# Patient Record
Sex: MALE | Race: WHITE | NOT HISPANIC OR LATINO | Employment: FULL TIME | URBAN - METROPOLITAN AREA
[De-identification: names, ages, dates, MRNs, and addresses within clinical notes are randomized per-mention and may not be internally consistent; named-entity substitution may affect disease eponyms.]

---

## 2018-01-25 ENCOUNTER — HOSPITAL ENCOUNTER (INPATIENT)
Facility: HOSPITAL | Age: 48
LOS: 55 days | Discharge: HOME HEALTH | DRG: 023 | End: 2018-03-21
Attending: EMERGENCY MEDICINE | Admitting: PSYCHIATRY & NEUROLOGY
Payer: COMMERCIAL

## 2018-01-25 DIAGNOSIS — I63.9 ISCHEMIC STROKE: ICD-10-CM

## 2018-01-25 DIAGNOSIS — R41.82 ALTERED MENTAL STATUS, UNSPECIFIED ALTERED MENTAL STATUS TYPE: ICD-10-CM

## 2018-01-25 DIAGNOSIS — G93.5 BRAIN COMPRESSION: ICD-10-CM

## 2018-01-25 DIAGNOSIS — R13.11 ORAL PHASE DYSPHAGIA: ICD-10-CM

## 2018-01-25 DIAGNOSIS — G47.33 OBSTRUCTIVE SLEEP APNEA: ICD-10-CM

## 2018-01-25 DIAGNOSIS — I10 ESSENTIAL HYPERTENSION: ICD-10-CM

## 2018-01-25 DIAGNOSIS — I63.412 EMBOLIC STROKE INVOLVING LEFT MIDDLE CEREBRAL ARTERY: ICD-10-CM

## 2018-01-25 DIAGNOSIS — K94.22 INFECTION OF PEG SITE: ICD-10-CM

## 2018-01-25 DIAGNOSIS — R41.82 ALTERED MENTAL STATUS: ICD-10-CM

## 2018-01-25 DIAGNOSIS — E87.3 METABOLIC ALKALOSIS: ICD-10-CM

## 2018-01-25 DIAGNOSIS — R06.03 RESPIRATORY DISTRESS, ACUTE: ICD-10-CM

## 2018-01-25 DIAGNOSIS — N39.0 URINARY TRACT INFECTION DUE TO KLEBSIELLA SPECIES: ICD-10-CM

## 2018-01-25 DIAGNOSIS — I63.9 STROKE: Primary | ICD-10-CM

## 2018-01-25 DIAGNOSIS — R06.02 SHORTNESS OF BREATH: ICD-10-CM

## 2018-01-25 DIAGNOSIS — E11.65 TYPE 2 DIABETES MELLITUS WITH HYPERGLYCEMIA, WITHOUT LONG-TERM CURRENT USE OF INSULIN: ICD-10-CM

## 2018-01-25 DIAGNOSIS — Z78.9 ON ENTERAL NUTRITION: ICD-10-CM

## 2018-01-25 DIAGNOSIS — G93.6 CYTOTOXIC CEREBRAL EDEMA: ICD-10-CM

## 2018-01-25 DIAGNOSIS — Z79.4 TYPE 2 DIABETES MELLITUS WITH HYPERGLYCEMIA, WITH LONG-TERM CURRENT USE OF INSULIN: ICD-10-CM

## 2018-01-25 DIAGNOSIS — E11.65 TYPE 2 DIABETES MELLITUS WITH HYPERGLYCEMIA, WITH LONG-TERM CURRENT USE OF INSULIN: ICD-10-CM

## 2018-01-25 DIAGNOSIS — R74.01 TRANSAMINITIS: ICD-10-CM

## 2018-01-25 DIAGNOSIS — J96.01 ACUTE RESPIRATORY FAILURE WITH HYPOXIA: ICD-10-CM

## 2018-01-25 DIAGNOSIS — B96.89 URINARY TRACT INFECTION DUE TO KLEBSIELLA SPECIES: ICD-10-CM

## 2018-01-25 LAB
ABO + RH BLD: NORMAL
ALBUMIN SERPL BCP-MCNC: 3.8 G/DL
ALBUMIN SERPL BCP-MCNC: 3.9 G/DL
ALLENS TEST: ABNORMAL
ALP SERPL-CCNC: 53 U/L
ALP SERPL-CCNC: 55 U/L
ALT SERPL W/O P-5'-P-CCNC: 34 U/L
ALT SERPL W/O P-5'-P-CCNC: 35 U/L
ANION GAP SERPL CALC-SCNC: 13 MMOL/L
ANION GAP SERPL CALC-SCNC: 15 MMOL/L
APTT BLDCRRT: <21 SEC
AST SERPL-CCNC: 17 U/L
AST SERPL-CCNC: 18 U/L
BASOPHILS # BLD AUTO: 0.03 K/UL
BASOPHILS # BLD AUTO: 0.04 K/UL
BASOPHILS NFR BLD: 0.3 %
BASOPHILS NFR BLD: 0.3 %
BILIRUB SERPL-MCNC: 0.7 MG/DL
BILIRUB SERPL-MCNC: 0.7 MG/DL
BLD GP AB SCN CELLS X3 SERPL QL: NORMAL
BNP SERPL-MCNC: <10 PG/ML
BUN SERPL-MCNC: 20 MG/DL
BUN SERPL-MCNC: 21 MG/DL
CALCIUM SERPL-MCNC: 9 MG/DL
CALCIUM SERPL-MCNC: 9.4 MG/DL
CHLORIDE SERPL-SCNC: 98 MMOL/L
CHLORIDE SERPL-SCNC: 98 MMOL/L
CHOLEST SERPL-MCNC: 200 MG/DL
CHOLEST/HDLC SERPL: 7.4 {RATIO}
CK MB SERPL-MCNC: 1.8 NG/ML
CK MB SERPL-RTO: 3.3 %
CK SERPL-CCNC: 54 U/L
CO2 SERPL-SCNC: 21 MMOL/L
CO2 SERPL-SCNC: 22 MMOL/L
CREAT SERPL-MCNC: 0.7 MG/DL (ref 0.5–1.4)
CREAT SERPL-MCNC: 1.2 MG/DL
CREAT SERPL-MCNC: 1.3 MG/DL
DELSYS: ABNORMAL
DIFFERENTIAL METHOD: ABNORMAL
DIFFERENTIAL METHOD: ABNORMAL
EOSINOPHIL # BLD AUTO: 0 K/UL
EOSINOPHIL # BLD AUTO: 0 K/UL
EOSINOPHIL NFR BLD: 0.2 %
EOSINOPHIL NFR BLD: 0.3 %
ERYTHROCYTE [DISTWIDTH] IN BLOOD BY AUTOMATED COUNT: 12.6 %
ERYTHROCYTE [DISTWIDTH] IN BLOOD BY AUTOMATED COUNT: 12.6 %
EST. GFR  (AFRICAN AMERICAN): >60 ML/MIN/1.73 M^2
EST. GFR  (AFRICAN AMERICAN): >60 ML/MIN/1.73 M^2
EST. GFR  (NON AFRICAN AMERICAN): >60 ML/MIN/1.73 M^2
EST. GFR  (NON AFRICAN AMERICAN): >60 ML/MIN/1.73 M^2
FIO2: 32
FLOW: 3
GLUCOSE SERPL-MCNC: 411 MG/DL
GLUCOSE SERPL-MCNC: 457 MG/DL
HCO3 UR-SCNC: 23 MMOL/L (ref 24–28)
HCT VFR BLD AUTO: 44.6 %
HCT VFR BLD AUTO: 46.8 %
HDLC SERPL-MCNC: 27 MG/DL
HDLC SERPL: 13.5 %
HGB BLD-MCNC: 15.9 G/DL
HGB BLD-MCNC: 16.8 G/DL
IMM GRANULOCYTES # BLD AUTO: 0.04 K/UL
IMM GRANULOCYTES # BLD AUTO: 0.07 K/UL
IMM GRANULOCYTES NFR BLD AUTO: 0.3 %
IMM GRANULOCYTES NFR BLD AUTO: 0.5 %
INR PPP: 1
INR PPP: 1
LDLC SERPL CALC-MCNC: ABNORMAL MG/DL
LYMPHOCYTES # BLD AUTO: 1.4 K/UL
LYMPHOCYTES # BLD AUTO: 2 K/UL
LYMPHOCYTES NFR BLD: 11.9 %
LYMPHOCYTES NFR BLD: 15.4 %
MAGNESIUM SERPL-MCNC: 1.8 MG/DL
MCH RBC QN AUTO: 29.7 PG
MCH RBC QN AUTO: 29.8 PG
MCHC RBC AUTO-ENTMCNC: 35.7 G/DL
MCHC RBC AUTO-ENTMCNC: 35.9 G/DL
MCV RBC AUTO: 83 FL
MCV RBC AUTO: 83 FL
MODE: ABNORMAL
MONOCYTES # BLD AUTO: 0.6 K/UL
MONOCYTES # BLD AUTO: 0.7 K/UL
MONOCYTES NFR BLD: 4.8 %
MONOCYTES NFR BLD: 5.1 %
NEUTROPHILS # BLD AUTO: 10 K/UL
NEUTROPHILS # BLD AUTO: 9.9 K/UL
NEUTROPHILS NFR BLD: 78.4 %
NEUTROPHILS NFR BLD: 82.5 %
NONHDLC SERPL-MCNC: 173 MG/DL
NRBC BLD-RTO: 0 /100 WBC
NRBC BLD-RTO: 0 /100 WBC
PCO2 BLDA: 40.7 MMHG (ref 35–45)
PH SMN: 7.36 [PH] (ref 7.35–7.45)
PHOSPHATE SERPL-MCNC: 3.8 MG/DL
PLATELET # BLD AUTO: 254 K/UL
PLATELET # BLD AUTO: 277 K/UL
PMV BLD AUTO: 10.3 FL
PMV BLD AUTO: 9.9 FL
PO2 BLDA: 91 MMHG (ref 80–100)
POC BE: -2 MMOL/L
POC PTINR: 1.1 (ref 0.9–1.2)
POC PTWBT: 12.6 SEC (ref 9.7–14.3)
POC SATURATED O2: 97 % (ref 95–100)
POC TCO2: 24 MMOL/L (ref 23–27)
POCT GLUCOSE: 276 MG/DL (ref 70–110)
POTASSIUM SERPL-SCNC: 4 MMOL/L
POTASSIUM SERPL-SCNC: 4 MMOL/L
PROT SERPL-MCNC: 7.2 G/DL
PROT SERPL-MCNC: 7.6 G/DL
PROTHROMBIN TIME: 10.1 SEC
PROTHROMBIN TIME: 10.3 SEC
RBC # BLD AUTO: 5.36 M/UL
RBC # BLD AUTO: 5.64 M/UL
SAMPLE: ABNORMAL
SAMPLE: NORMAL
SAMPLE: NORMAL
SITE: ABNORMAL
SODIUM SERPL-SCNC: 133 MMOL/L
SODIUM SERPL-SCNC: 134 MMOL/L
TRIGL SERPL-MCNC: 460 MG/DL
TROPONIN I SERPL DL<=0.01 NG/ML-MCNC: <0.006 NG/ML
TROPONIN I SERPL DL<=0.01 NG/ML-MCNC: <0.006 NG/ML
TSH SERPL DL<=0.005 MIU/L-ACNC: 1.87 UIU/ML
WBC # BLD AUTO: 11.99 K/UL
WBC # BLD AUTO: 12.83 K/UL

## 2018-01-25 PROCEDURE — 85025 COMPLETE CBC W/AUTO DIFF WBC: CPT

## 2018-01-25 PROCEDURE — 85730 THROMBOPLASTIN TIME PARTIAL: CPT

## 2018-01-25 PROCEDURE — 99285 EMERGENCY DEPT VISIT HI MDM: CPT | Mod: 25

## 2018-01-25 PROCEDURE — 80053 COMPREHEN METABOLIC PANEL: CPT | Mod: 91

## 2018-01-25 PROCEDURE — 63600175 PHARM REV CODE 636 W HCPCS: Performed by: NURSE PRACTITIONER

## 2018-01-25 PROCEDURE — 99900035 HC TECH TIME PER 15 MIN (STAT)

## 2018-01-25 PROCEDURE — 25000003 PHARM REV CODE 250: Performed by: NURSE PRACTITIONER

## 2018-01-25 PROCEDURE — 84443 ASSAY THYROID STIM HORMONE: CPT

## 2018-01-25 PROCEDURE — 25000003 PHARM REV CODE 250: Performed by: RADIOLOGY

## 2018-01-25 PROCEDURE — 83735 ASSAY OF MAGNESIUM: CPT

## 2018-01-25 PROCEDURE — 36415 COLL VENOUS BLD VENIPUNCTURE: CPT

## 2018-01-25 PROCEDURE — 84484 ASSAY OF TROPONIN QUANT: CPT | Mod: 91

## 2018-01-25 PROCEDURE — 93010 ELECTROCARDIOGRAM REPORT: CPT | Mod: ,,, | Performed by: INTERNAL MEDICINE

## 2018-01-25 PROCEDURE — 80053 COMPREHEN METABOLIC PANEL: CPT

## 2018-01-25 PROCEDURE — 96374 THER/PROPH/DIAG INJ IV PUSH: CPT

## 2018-01-25 PROCEDURE — 25500020 PHARM REV CODE 255: Performed by: EMERGENCY MEDICINE

## 2018-01-25 PROCEDURE — 80061 LIPID PANEL: CPT

## 2018-01-25 PROCEDURE — 83880 ASSAY OF NATRIURETIC PEPTIDE: CPT

## 2018-01-25 PROCEDURE — 82553 CREATINE MB FRACTION: CPT

## 2018-01-25 PROCEDURE — 037G3EZ DILATION OF INTRACRANIAL ARTERY WITH TWO INTRALUMINAL DEVICES, PERCUTANEOUS APPROACH: ICD-10-PCS | Performed by: PSYCHIATRY & NEUROLOGY

## 2018-01-25 PROCEDURE — 82550 ASSAY OF CK (CPK): CPT

## 2018-01-25 PROCEDURE — 20000000 HC ICU ROOM

## 2018-01-25 PROCEDURE — 36600 WITHDRAWAL OF ARTERIAL BLOOD: CPT

## 2018-01-25 PROCEDURE — B3141ZZ FLUOROSCOPY OF LEFT COMMON CAROTID ARTERY USING LOW OSMOLAR CONTRAST: ICD-10-PCS | Performed by: PSYCHIATRY & NEUROLOGY

## 2018-01-25 PROCEDURE — 99291 CRITICAL CARE FIRST HOUR: CPT | Mod: ,,, | Performed by: PSYCHIATRY & NEUROLOGY

## 2018-01-25 PROCEDURE — 85610 PROTHROMBIN TIME: CPT

## 2018-01-25 PROCEDURE — 86901 BLOOD TYPING SEROLOGIC RH(D): CPT

## 2018-01-25 PROCEDURE — 84100 ASSAY OF PHOSPHORUS: CPT

## 2018-01-25 PROCEDURE — 63600175 PHARM REV CODE 636 W HCPCS: Performed by: PSYCHIATRY & NEUROLOGY

## 2018-01-25 PROCEDURE — 82565 ASSAY OF CREATININE: CPT

## 2018-01-25 PROCEDURE — 81001 URINALYSIS AUTO W/SCOPE: CPT

## 2018-01-25 PROCEDURE — 99285 EMERGENCY DEPT VISIT HI MDM: CPT | Mod: ,,, | Performed by: EMERGENCY MEDICINE

## 2018-01-25 PROCEDURE — 83036 HEMOGLOBIN GLYCOSYLATED A1C: CPT

## 2018-01-25 PROCEDURE — 03CG3ZZ EXTIRPATION OF MATTER FROM INTRACRANIAL ARTERY, PERCUTANEOUS APPROACH: ICD-10-PCS | Performed by: PSYCHIATRY & NEUROLOGY

## 2018-01-25 PROCEDURE — 25000003 PHARM REV CODE 250: Performed by: PHYSICIAN ASSISTANT

## 2018-01-25 PROCEDURE — 82803 BLOOD GASES ANY COMBINATION: CPT

## 2018-01-25 PROCEDURE — 85610 PROTHROMBIN TIME: CPT | Mod: 91

## 2018-01-25 PROCEDURE — 84484 ASSAY OF TROPONIN QUANT: CPT

## 2018-01-25 PROCEDURE — B3171ZZ FLUOROSCOPY OF LEFT INTERNAL CAROTID ARTERY USING LOW OSMOLAR CONTRAST: ICD-10-PCS | Performed by: PSYCHIATRY & NEUROLOGY

## 2018-01-25 RX ORDER — CLOPIDOGREL BISULFATE 75 MG/1
75 TABLET ORAL DAILY
Status: DISCONTINUED | OUTPATIENT
Start: 2018-01-26 | End: 2018-02-09

## 2018-01-25 RX ORDER — SODIUM CHLORIDE 0.9 % (FLUSH) 0.9 %
3 SYRINGE (ML) INJECTION EVERY 8 HOURS
Status: DISCONTINUED | OUTPATIENT
Start: 2018-01-26 | End: 2018-03-21 | Stop reason: HOSPADM

## 2018-01-25 RX ORDER — CLOPIDOGREL 300 MG/1
600 TABLET, FILM COATED ORAL ONCE
Status: DISCONTINUED | OUTPATIENT
Start: 2018-01-25 | End: 2018-01-25

## 2018-01-25 RX ORDER — CLOPIDOGREL 300 MG/1
600 TABLET, FILM COATED ORAL ONCE
Status: COMPLETED | OUTPATIENT
Start: 2018-01-25 | End: 2018-01-25

## 2018-01-25 RX ORDER — LABETALOL HYDROCHLORIDE 5 MG/ML
10 INJECTION, SOLUTION INTRAVENOUS EVERY 4 HOURS PRN
Status: DISCONTINUED | OUTPATIENT
Start: 2018-01-25 | End: 2018-01-29

## 2018-01-25 RX ORDER — ASPIRIN 325 MG
325 TABLET ORAL DAILY
Status: DISCONTINUED | OUTPATIENT
Start: 2018-01-26 | End: 2018-02-05

## 2018-01-25 RX ORDER — INSULIN ASPART 100 [IU]/ML
1-10 INJECTION, SOLUTION INTRAVENOUS; SUBCUTANEOUS EVERY 6 HOURS PRN
Status: DISCONTINUED | OUTPATIENT
Start: 2018-01-25 | End: 2018-01-25

## 2018-01-25 RX ORDER — NICARDIPINE HYDROCHLORIDE 0.2 MG/ML
5 INJECTION INTRAVENOUS CONTINUOUS
Status: DISCONTINUED | OUTPATIENT
Start: 2018-01-25 | End: 2018-01-29

## 2018-01-25 RX ORDER — CLOPIDOGREL 300 MG/1
600 TABLET, FILM COATED ORAL ONCE
Status: DISCONTINUED | OUTPATIENT
Start: 2018-01-26 | End: 2018-01-25

## 2018-01-25 RX ORDER — CLOPIDOGREL BISULFATE 75 MG/1
75 TABLET ORAL DAILY
Status: DISCONTINUED | OUTPATIENT
Start: 2018-01-26 | End: 2018-01-25

## 2018-01-25 RX ORDER — GLUCAGON 1 MG
1 KIT INJECTION
Status: DISCONTINUED | OUTPATIENT
Start: 2018-01-25 | End: 2018-02-06 | Stop reason: SDUPTHER

## 2018-01-25 RX ORDER — SODIUM CHLORIDE 9 MG/ML
INJECTION, SOLUTION INTRAVENOUS CONTINUOUS
Status: DISCONTINUED | OUTPATIENT
Start: 2018-01-25 | End: 2018-01-29

## 2018-01-25 RX ORDER — SODIUM CHLORIDE 0.9 % (FLUSH) 0.9 %
5 SYRINGE (ML) INJECTION
Status: DISCONTINUED | OUTPATIENT
Start: 2018-01-25 | End: 2018-02-21

## 2018-01-25 RX ORDER — ASPIRIN 325 MG
325 TABLET ORAL DAILY
Status: DISCONTINUED | OUTPATIENT
Start: 2018-01-25 | End: 2018-01-25

## 2018-01-25 RX ORDER — ATORVASTATIN CALCIUM 20 MG/1
40 TABLET, FILM COATED ORAL DAILY
Status: DISCONTINUED | OUTPATIENT
Start: 2018-01-26 | End: 2018-01-26

## 2018-01-25 RX ORDER — AMOXICILLIN 250 MG
1 CAPSULE ORAL 2 TIMES DAILY
Status: DISCONTINUED | OUTPATIENT
Start: 2018-01-25 | End: 2018-01-29

## 2018-01-25 RX ORDER — FENTANYL CITRATE 50 UG/ML
50 INJECTION, SOLUTION INTRAMUSCULAR; INTRAVENOUS ONCE
Status: COMPLETED | OUTPATIENT
Start: 2018-01-25 | End: 2018-01-25

## 2018-01-25 RX ORDER — ACETAMINOPHEN 325 MG/1
650 TABLET ORAL EVERY 6 HOURS PRN
Status: DISCONTINUED | OUTPATIENT
Start: 2018-01-25 | End: 2018-01-26

## 2018-01-25 RX ORDER — ONDANSETRON 2 MG/ML
4 INJECTION INTRAMUSCULAR; INTRAVENOUS EVERY 8 HOURS PRN
Status: DISCONTINUED | OUTPATIENT
Start: 2018-01-25 | End: 2018-03-21 | Stop reason: HOSPADM

## 2018-01-25 RX ADMIN — IOHEXOL 100 ML: 350 INJECTION, SOLUTION INTRAVENOUS at 07:01

## 2018-01-25 RX ADMIN — SODIUM CHLORIDE: 0.9 INJECTION, SOLUTION INTRAVENOUS at 11:01

## 2018-01-25 RX ADMIN — ASPIRIN 325 MG ORAL TABLET 325 MG: 325 PILL ORAL at 10:01

## 2018-01-25 RX ADMIN — FENTANYL CITRATE 50 MCG: 50 INJECTION, SOLUTION INTRAMUSCULAR; INTRAVENOUS at 07:01

## 2018-01-25 RX ADMIN — CLOPIDOGREL BISULFATE 600 MG: 300 TABLET, FILM COATED ORAL at 11:01

## 2018-01-25 RX ADMIN — NICARDIPINE HYDROCHLORIDE 10 MG/HR: 0.2 INJECTION, SOLUTION INTRAVENOUS at 09:01

## 2018-01-25 RX ADMIN — INSULIN ASPART 3 UNITS: 100 INJECTION, SOLUTION INTRAVENOUS; SUBCUTANEOUS at 11:01

## 2018-01-25 RX ADMIN — STANDARDIZED SENNA CONCENTRATE AND DOCUSATE SODIUM 1 TABLET: 8.6; 5 TABLET, FILM COATED ORAL at 11:01

## 2018-01-25 RX ADMIN — NICARDIPINE HYDROCHLORIDE 15 MG/HR: 0.2 INJECTION, SOLUTION INTRAVENOUS at 10:01

## 2018-01-26 PROBLEM — R73.9 HYPERGLYCEMIA: Status: ACTIVE | Noted: 2018-01-26

## 2018-01-26 PROBLEM — I63.412 EMBOLIC STROKE INVOLVING LEFT MIDDLE CEREBRAL ARTERY: Status: ACTIVE | Noted: 2018-01-26

## 2018-01-26 PROBLEM — E78.5 HYPERLIPIDEMIA: Status: ACTIVE | Noted: 2018-01-26

## 2018-01-26 PROBLEM — I63.9 STROKE: Status: RESOLVED | Noted: 2018-01-25 | Resolved: 2018-01-26

## 2018-01-26 PROBLEM — E11.9 TYPE 2 DIABETES MELLITUS: Status: ACTIVE | Noted: 2018-01-26

## 2018-01-26 PROBLEM — I10 ESSENTIAL HYPERTENSION: Status: ACTIVE | Noted: 2018-01-26

## 2018-01-26 LAB
ALBUMIN SERPL BCP-MCNC: 3.6 G/DL
ALP SERPL-CCNC: 50 U/L
ALT SERPL W/O P-5'-P-CCNC: 32 U/L
ANION GAP SERPL CALC-SCNC: 11 MMOL/L
ANION GAP SERPL CALC-SCNC: 12 MMOL/L
AST SERPL-CCNC: 15 U/L
BACTERIA #/AREA URNS AUTO: NORMAL /HPF
BASOPHILS # BLD AUTO: 0.03 K/UL
BASOPHILS NFR BLD: 0.2 %
BILIRUB SERPL-MCNC: 0.9 MG/DL
BILIRUB UR QL STRIP: NEGATIVE
BUN SERPL-MCNC: 19 MG/DL
BUN SERPL-MCNC: 23 MG/DL
CALCIUM SERPL-MCNC: 8 MG/DL
CALCIUM SERPL-MCNC: 8.3 MG/DL
CHLORIDE SERPL-SCNC: 101 MMOL/L
CHLORIDE SERPL-SCNC: 107 MMOL/L
CLARITY UR REFRACT.AUTO: CLEAR
CO2 SERPL-SCNC: 21 MMOL/L
CO2 SERPL-SCNC: 24 MMOL/L
COLOR UR AUTO: YELLOW
CREAT SERPL-MCNC: 1 MG/DL
CREAT SERPL-MCNC: 1.4 MG/DL
DIASTOLIC DYSFUNCTION: NO
DIFFERENTIAL METHOD: ABNORMAL
EOSINOPHIL # BLD AUTO: 0 K/UL
EOSINOPHIL NFR BLD: 0.1 %
ERYTHROCYTE [DISTWIDTH] IN BLOOD BY AUTOMATED COUNT: 12.6 %
EST. GFR  (AFRICAN AMERICAN): >60 ML/MIN/1.73 M^2
EST. GFR  (AFRICAN AMERICAN): >60 ML/MIN/1.73 M^2
EST. GFR  (NON AFRICAN AMERICAN): 59 ML/MIN/1.73 M^2
EST. GFR  (NON AFRICAN AMERICAN): >60 ML/MIN/1.73 M^2
ESTIMATED AVG GLUCOSE: 240 MG/DL
GLUCOSE SERPL-MCNC: 155 MG/DL
GLUCOSE SERPL-MCNC: 478 MG/DL
GLUCOSE UR QL STRIP: ABNORMAL
HBA1C MFR BLD HPLC: 10 %
HCT VFR BLD AUTO: 43.1 %
HGB BLD-MCNC: 15.5 G/DL
HGB UR QL STRIP: NEGATIVE
IMM GRANULOCYTES # BLD AUTO: 0.07 K/UL
IMM GRANULOCYTES NFR BLD AUTO: 0.5 %
INR PPP: 1
KETONES UR QL STRIP: ABNORMAL
LEUKOCYTE ESTERASE UR QL STRIP: NEGATIVE
LYMPHOCYTES # BLD AUTO: 1.1 K/UL
LYMPHOCYTES NFR BLD: 8.5 %
MAGNESIUM SERPL-MCNC: 1.8 MG/DL
MCH RBC QN AUTO: 29.8 PG
MCHC RBC AUTO-ENTMCNC: 36 G/DL
MCV RBC AUTO: 83 FL
MICROSCOPIC COMMENT: NORMAL
MONOCYTES # BLD AUTO: 0.6 K/UL
MONOCYTES NFR BLD: 4.2 %
NEUTROPHILS # BLD AUTO: 11.2 K/UL
NEUTROPHILS NFR BLD: 86.5 %
NITRITE UR QL STRIP: NEGATIVE
NON-SQ EPI CELLS #/AREA URNS AUTO: 0 /HPF
NRBC BLD-RTO: 0 /100 WBC
PH UR STRIP: 5 [PH] (ref 5–8)
PHOSPHATE SERPL-MCNC: 3 MG/DL
PLATELET # BLD AUTO: 255 K/UL
PMV BLD AUTO: 9.6 FL
POCT GLUCOSE: 153 MG/DL (ref 70–110)
POCT GLUCOSE: 181 MG/DL (ref 70–110)
POCT GLUCOSE: 215 MG/DL (ref 70–110)
POCT GLUCOSE: 254 MG/DL (ref 70–110)
POCT GLUCOSE: 264 MG/DL (ref 70–110)
POCT GLUCOSE: 320 MG/DL (ref 70–110)
POCT GLUCOSE: 326 MG/DL (ref 70–110)
POCT GLUCOSE: 329 MG/DL (ref 70–110)
POCT GLUCOSE: 329 MG/DL (ref 70–110)
POCT GLUCOSE: 360 MG/DL (ref 70–110)
POTASSIUM SERPL-SCNC: 3.6 MMOL/L
POTASSIUM SERPL-SCNC: 4.4 MMOL/L
PROT SERPL-MCNC: 7 G/DL
PROT UR QL STRIP: NEGATIVE
PROTHROMBIN TIME: 10.3 SEC
RBC # BLD AUTO: 5.21 M/UL
RBC #/AREA URNS AUTO: 0 /HPF (ref 0–4)
RETIRED EF AND QEF - SEE NOTES: 68 (ref 55–65)
SODIUM SERPL-SCNC: 134 MMOL/L
SODIUM SERPL-SCNC: 142 MMOL/L
SP GR UR STRIP: >=1.03 (ref 1–1.03)
URN SPEC COLLECT METH UR: ABNORMAL
UROBILINOGEN UR STRIP-ACNC: NEGATIVE EU/DL
WBC # BLD AUTO: 12.96 K/UL
YEAST UR QL AUTO: NORMAL

## 2018-01-26 PROCEDURE — 36620 INSERTION CATHETER ARTERY: CPT | Mod: ,,, | Performed by: NURSE PRACTITIONER

## 2018-01-26 PROCEDURE — 27000221 HC OXYGEN, UP TO 24 HOURS

## 2018-01-26 PROCEDURE — 85025 COMPLETE CBC W/AUTO DIFF WBC: CPT

## 2018-01-26 PROCEDURE — 85610 PROTHROMBIN TIME: CPT

## 2018-01-26 PROCEDURE — 94660 CPAP INITIATION&MGMT: CPT

## 2018-01-26 PROCEDURE — 25000003 PHARM REV CODE 250: Performed by: PHYSICIAN ASSISTANT

## 2018-01-26 PROCEDURE — 93005 ELECTROCARDIOGRAM TRACING: CPT

## 2018-01-26 PROCEDURE — 97163 PT EVAL HIGH COMPLEX 45 MIN: CPT

## 2018-01-26 PROCEDURE — 95951 PR EEG MONITORING/VIDEORECORD: CPT | Mod: 26,,, | Performed by: PSYCHIATRY & NEUROLOGY

## 2018-01-26 PROCEDURE — 93306 TTE W/DOPPLER COMPLETE: CPT | Mod: 26,,, | Performed by: INTERNAL MEDICINE

## 2018-01-26 PROCEDURE — 80048 BASIC METABOLIC PNL TOTAL CA: CPT

## 2018-01-26 PROCEDURE — 83735 ASSAY OF MAGNESIUM: CPT

## 2018-01-26 PROCEDURE — 36620 INSERTION CATHETER ARTERY: CPT

## 2018-01-26 PROCEDURE — 99223 1ST HOSP IP/OBS HIGH 75: CPT | Mod: 25,,, | Performed by: NURSE PRACTITIONER

## 2018-01-26 PROCEDURE — 84100 ASSAY OF PHOSPHORUS: CPT

## 2018-01-26 PROCEDURE — 97165 OT EVAL LOW COMPLEX 30 MIN: CPT

## 2018-01-26 PROCEDURE — 97530 THERAPEUTIC ACTIVITIES: CPT

## 2018-01-26 PROCEDURE — 93306 TTE W/DOPPLER COMPLETE: CPT

## 2018-01-26 PROCEDURE — 92610 EVALUATE SWALLOWING FUNCTION: CPT

## 2018-01-26 PROCEDURE — 99900035 HC TECH TIME PER 15 MIN (STAT)

## 2018-01-26 PROCEDURE — 20000000 HC ICU ROOM

## 2018-01-26 PROCEDURE — 63600175 PHARM REV CODE 636 W HCPCS: Performed by: NURSE PRACTITIONER

## 2018-01-26 PROCEDURE — 95951 HC EEG MONITORING/VIDEO RECORD: CPT

## 2018-01-26 PROCEDURE — 80053 COMPREHEN METABOLIC PANEL: CPT

## 2018-01-26 PROCEDURE — 99223 1ST HOSP IP/OBS HIGH 75: CPT | Mod: ,,, | Performed by: NEUROLOGICAL SURGERY

## 2018-01-26 PROCEDURE — 99233 SBSQ HOSP IP/OBS HIGH 50: CPT | Mod: ,,, | Performed by: PSYCHIATRY & NEUROLOGY

## 2018-01-26 PROCEDURE — 25000003 PHARM REV CODE 250: Performed by: NURSE PRACTITIONER

## 2018-01-26 PROCEDURE — 93010 ELECTROCARDIOGRAM REPORT: CPT | Mod: ,,, | Performed by: INTERNAL MEDICINE

## 2018-01-26 PROCEDURE — A4216 STERILE WATER/SALINE, 10 ML: HCPCS | Performed by: NURSE PRACTITIONER

## 2018-01-26 RX ORDER — ACETAMINOPHEN 325 MG/1
650 TABLET ORAL EVERY 6 HOURS
Status: DISCONTINUED | OUTPATIENT
Start: 2018-01-27 | End: 2018-02-05

## 2018-01-26 RX ORDER — ATORVASTATIN CALCIUM 20 MG/1
80 TABLET, FILM COATED ORAL DAILY
Status: DISCONTINUED | OUTPATIENT
Start: 2018-01-26 | End: 2018-03-05

## 2018-01-26 RX ADMIN — LABETALOL HYDROCHLORIDE 10 MG: 5 INJECTION, SOLUTION INTRAVENOUS at 02:01

## 2018-01-26 RX ADMIN — ACETAMINOPHEN 650 MG: 325 TABLET, FILM COATED ORAL at 07:01

## 2018-01-26 RX ADMIN — SODIUM CHLORIDE 4 UNITS/HR: 9 INJECTION, SOLUTION INTRAVENOUS at 12:01

## 2018-01-26 RX ADMIN — NICARDIPINE HYDROCHLORIDE 12.5 MG/HR: 0.2 INJECTION, SOLUTION INTRAVENOUS at 04:01

## 2018-01-26 RX ADMIN — Medication 3 ML: at 02:01

## 2018-01-26 RX ADMIN — ATORVASTATIN CALCIUM 80 MG: 20 TABLET, FILM COATED ORAL at 10:01

## 2018-01-26 RX ADMIN — ACETAMINOPHEN 650 MG: 325 TABLET, FILM COATED ORAL at 11:01

## 2018-01-26 RX ADMIN — NICARDIPINE HYDROCHLORIDE 2.5 MG/HR: 0.2 INJECTION, SOLUTION INTRAVENOUS at 10:01

## 2018-01-26 RX ADMIN — CLOPIDOGREL 75 MG: 75 TABLET, FILM COATED ORAL at 10:01

## 2018-01-26 RX ADMIN — NICARDIPINE HYDROCHLORIDE 12.5 MG/HR: 0.2 INJECTION, SOLUTION INTRAVENOUS at 12:01

## 2018-01-26 RX ADMIN — SODIUM CHLORIDE: 0.9 INJECTION, SOLUTION INTRAVENOUS at 01:01

## 2018-01-26 RX ADMIN — STANDARDIZED SENNA CONCENTRATE AND DOCUSATE SODIUM 1 TABLET: 8.6; 5 TABLET, FILM COATED ORAL at 10:01

## 2018-01-26 RX ADMIN — Medication 3 ML: at 10:01

## 2018-01-26 RX ADMIN — STANDARDIZED SENNA CONCENTRATE AND DOCUSATE SODIUM 1 TABLET: 8.6; 5 TABLET, FILM COATED ORAL at 08:01

## 2018-01-26 RX ADMIN — ASPIRIN 325 MG ORAL TABLET 325 MG: 325 PILL ORAL at 10:01

## 2018-01-26 RX ADMIN — SODIUM CHLORIDE 2.4 UNITS/HR: 9 INJECTION, SOLUTION INTRAVENOUS at 10:01

## 2018-01-26 RX ADMIN — NICARDIPINE HYDROCHLORIDE 2.5 MG/HR: 0.2 INJECTION, SOLUTION INTRAVENOUS at 05:01

## 2018-01-26 NOTE — ASSESSMENT & PLAN NOTE
Patient is a 47 year old male who walked into [facility] and was acting abnormal.  EMS was called and brought to the ED for concern of L MCA syndrome. Not tpa candidate due to unknown last known normal.  Patient went to IR for thrombectomy of L M1 occlusion seen on CTA MP.  TICI2C reperfusion and angioplasty.      Antithrombotics for secondary stroke prevention:start asa 325mg qd and plavix 75mg qd     Statins for secondary stroke prevention and hyperlipidemia, if present: LDL     Aggressive risk factor modification: Obesity     Rehab efforts: Occupational Therapy, PT/OT/SLP to evaluate and treat    Diagnostics ordered/pending: MRI head without contrast to assess brain parenchyma, TTE to assess cardiac function/status , LDL, HgA1C     VTE prophylaxis: Heparin 5000 units SQ every 8 hours after repeat scan     BP parameters: Infarct: Post sucessful thrombectomy, SBP <140

## 2018-01-26 NOTE — PLAN OF CARE
Problem: Patient Care Overview  Goal: Plan of Care Review  Outcome: Ongoing (interventions implemented as appropriate)  VS and assessment per flow sheet, pt taken for MRI, tolerated well, exam unchanged and stable, EEG in place, remains on insulin gtt, patient progressing towards goal as tolerated. Plan of care reviewed with Todd Quevedo and wife at 1600, all concerns addressed. Will continue to monitor.

## 2018-01-26 NOTE — PROGRESS NOTES
Per hospital HgbA1c policy, pt identified for education on diabetic diet. HgbA1c 10.0    Pt remains NPO, failed WOODY, globally aphasic with NG tube in place.  Education inappropriate at this time 2/2 reasons stated prior.  Please consult should nutrition support or education be warranted at any time during admission.    Thanks,  TAMARA Austin, LDN  [id number]

## 2018-01-26 NOTE — PLAN OF CARE
Problem: Physical Therapy Goal  Goal: Physical Therapy Goal  Outcome: Ongoing (interventions implemented as appropriate)  Initial eval completed.  Results, POC, and therapy recommendations discussed with patient and wife.  Complete evaluation documentation to follow.     Recommendations pending OOB assessment.     Madina Luu, PT  1/26/2018  [phone] (pager)

## 2018-01-26 NOTE — ED NOTES
Pt presents to ED via EMS. It is stated pt was found in a restaurant unresponsive earlier this evening. No hx is able to be found at this time. Pt being sent straight to CT scan    Unable to complete admission assessments per pt's condition.       LOC:   · The pt is awake, alert, and nonverbal  APPEARANCE:   · Pt resting comfortably and in no acute distress; clean and well groomed  SKIN:   · Skin is warm and dry; color consistent with ethnicity; pt has normal skin turgor and moist mucus membranes; no obvious trauma seen  MUSCULOSKELETAL:   · Pt moving all extremities; absent of obvious swelling  RESPIRATORY:   · Airway is open and patent; respirations are spontaneous; normal effort and rate noted; absent of accessory-muscle use  CARDIAC:   · normal heart sounds auscultated; Pt has no peripheral edema noted; capillary refill < 3 seconds  ABDOMEN:   · Soft; bowel sounds present x 4  NEUROLOGIC:   · PERRL, 3mm bilaterally, eyes open spontaneously; pt follows commands; facial expression symmetrical

## 2018-01-26 NOTE — ASSESSMENT & PLAN NOTE
S/P L M1 thrombectomy  --Continue Neuro checks q 1hr  -- Vascular Neurology  following  -- CT MP-Short segment occlusion of left M1   -- Repeat CTH-No detrimental change  -- SBP goal <140  -- PT/OT/Speech

## 2018-01-26 NOTE — HPI
Patient is a 47 year old male who walked into [facility] and was acting abnormal.  EMS was called and brought to the ED for concern of L MCA syndrome.  He had left gaze deviation, right sided hemianopsia, right sided weakness and global aphasia.  CT head showed no hemorrhage and CTA MP showed L M1 occlusion.  Not a candidate for IV-tpa because unknown last normal.  He was than emergently taken for thrombectomy.          EMS reported glucose 309 and EKG abnormalities.

## 2018-01-26 NOTE — SUBJECTIVE & OBJECTIVE
No prescriptions prior to admission.       Review of patient's allergies indicates:  No Known Allergies    History reviewed. No pertinent past medical history.  History reviewed. No pertinent surgical history.  Family History     None        Social History Main Topics    Smoking status: Unknown If Ever Smoked    Smokeless tobacco: Not on file    Alcohol use Not on file    Drug use: Unknown    Sexual activity: Not on file     Review of Systems   Unable to obtain secondary to mental status  Objective:     Weight: 113.8 kg (250 lb 14.1 oz)  Body mass index is 36 kg/m².  Vital Signs (Most Recent):  Temp: 99.6 °F (37.6 °C) (01/26/18 1103)  Pulse: 95 (01/26/18 1521)  Resp: (!) 9 (01/26/18 1521)  BP: (!) 120/59 (01/26/18 1200)  SpO2: (!) 94 % (01/26/18 1521) Vital Signs (24h Range):  Temp:  [97.8 °F (36.6 °C)-99.6 °F (37.6 °C)] 99.6 °F (37.6 °C)  Pulse:  [] 95  Resp:  [9-35] 9  SpO2:  [92 %-98 %] 94 %  BP: (115-217)/() 120/59  Arterial Line BP: (103-121)/(58-68) 121/62       Date 01/26/18 0700 - 01/27/18 0659   Shift 1762-0152 4705-6479 4072-7905 24 Hour Total   I  N  T  A  K  E   I.V.  (mL/kg) 525  (4.6)   525  (4.6)    NG/GT 60   60    Shift Total  (mL/kg) 585  (5.1)   585  (5.1)   O  U  T  P  U  T   Urine  (mL/kg/hr) 300  (0.3)   300    Shift Total  (mL/kg) 300  (2.6)   300  (2.6)   Weight (kg) 113.8 113.8 113.8 113.8              Oxygen Concentration (%):  [30] 30         NG/OG Tube 01/25/18 2325 nasogastric Right nostril (Active)   Placement Check placement verified by x-ray 1/26/2018  3:00 AM   Tolerance no signs/symptoms of discomfort 1/26/2018  3:00 AM   Securement taped to commercial device 1/26/2018  3:00 AM   Clamp Status/Tolerance clamped;no residual;no emesis;no abdominal distention 1/26/2018  3:00 AM   Intake (mL) 60 mL 1/26/2018 10:00 AM       Male External Urinary Catheter 01/25/18 2200 Small (Active)   Collection Container Urimeter 1/26/2018  3:00 AM   Securement Method secured to top of  thigh w/ tape 1/26/2018  3:00 AM   Skin no redness;no breakdown 1/26/2018  3:00 AM   Tolerance no signs/symptoms of discomfort 1/26/2018  3:00 AM   Output (mL) 125 mL 1/26/2018 11:00 AM   Catheter Change Date 01/26/18 1/26/2018  3:00 AM   Catheter Change Time 0300 1/26/2018  3:00 AM       Neurosurgery Physical Exam  Vital signs: reviewed above.   Constitutional: well-developed,   Cardiovascular: regular rate and rhythm  Resp: normal respirations on NC  Abdomen: soft, non-distended, not tender to palpation  Pulses: palpable distal pulses   Skin: warm, dry and intact, no rashes  Neurological  GCS unable to accurately assess  Mental Status: lethargic  non verbal on exam, does not follow commands  Head: normocephalic, atraumatic  PERRL  Facial expression symmetric  Moves left extremities spontaneously, right side withdraws to painful stimuli  Unable to assess drift and dysmetria d/t mental status   Corneal and cough/gag intact      Significant Labs:    Recent Labs  Lab 01/25/18 2218 01/25/18 2303 01/26/18  0306   * 457* 478*   * 133* 134*   K 4.0 4.0 4.4   CL 98 98 101   CO2 21* 22* 21*   BUN 20 21* 23*   CREATININE 1.2 1.3 1.4   CALCIUM 9.4 9.0 8.3*   MG  --  1.8 1.8       Recent Labs  Lab 01/25/18 2218 01/25/18 2303 01/26/18  0306   WBC 12.83* 11.99 12.96*   HGB 16.8 15.9 15.5   HCT 46.8 44.6 43.1    254 255       Recent Labs  Lab 01/25/18 2218 01/25/18 2303 01/26/18  0306   INR 1.0 1.0 1.0   APTT <21.0  --   --      Microbiology Results (last 7 days)     ** No results found for the last 168 hours. **          Significant Diagnostics:  CT: Ct Head Without Contrast  Result Date: 1/26/2018  No detrimental change. Evolving subtle left MCA territory infarct.   Electronically signed by resident: DARCY CURRAN MD Date: 01/26/18 Time:00:50 A    MRI: Mri Brain Without Contrast  Result Date: 1/26/2018  1. Large acute left MCA territory infarction as above.  No associated hemorrhage or significant mass  effect at this time. 2. Mild chronic ischemic changes.   Electronically signed by resident: HUNTER CASTRO Date: 01/26/18 Time:14:07

## 2018-01-26 NOTE — ASSESSMENT & PLAN NOTE
47 y.o. male with history of HTN and DM who [place] [facility] [place] for concern of L MCA syndrome 1/25/18 AM. He is s/p thrombectomy of L M1 occlusion. CT MP revealed short segment occlusion of left M1.  Patient went to IR for thrombectomy of L M1.  TICI2C reperfusion and angioplasty. Neurosurgery consulted for hemicrani watch. Patient currently on plavix.    -Imaging reviewed with Dr. Ingram  -Medical management per NCC.  -Neuro checks hourly  -Please notify NSR if worsening neuro exam     Discussed with Dr. Ingram

## 2018-01-26 NOTE — PT/OT/SLP EVAL
Physical Therapy Evaluation    Patient Name:  Todd Quevedo   MRN:  [id number]    Recommendations:     Discharge Recommendations:   (to be determined with OOB assessment )   Discharge Equipment Recommendations:  (TBD)   Barriers to discharge: Decreased caregiver support and high burden of care      Plan:     During this hospitalization, patient to be seen 4 x/week to address the above listed problems via gait training, therapeutic activities, therapeutic exercises, neuromuscular re-education  · Plan of Care Expires:  02/26/18   Plan of Care Reviewed with: spouse    Subjective     Communicated with Rn prior to session.  Patient found supine upon PT entry to room, agreeable to evaluation.      Chief Complaint: patient nonverbal at this time  Patient comments/goals: set by therapist  Pain/Comfort:  Pain Rating 1:  (unable to express)    Patients cultural, spiritual, Yarsani conflicts given the current situation:      Living Environment:  Pt lives with his wife near [place], [place] in a 1 story home with no step to enter.  Pt was independent, working and driving prior to admit.  His wife works nights at a hospital.      Patient has the following equipment: none.  DME owned (not currently used): none.  Upon discharge, family assistance will be limited.     Objective:     Patient found with: arterial line, blood pressure cuff, oxygen, pulse ox (continuous), restraints, telemetry, SCD, boss catheter     General Precautions: Standard, aphasia, aspiration, fall     PHYSICAL EXAMINATION  Cognitive Function:  - Oriented to: somnolent, did not wake up fully during evaluation   - Level of Alertness: somnolent throughout with minimal arousal even during sitting EOB  - Follows Commands/attention: Inattentive  - Communication: global aphasia  - Safety awareness/insight to disability: impaired  Musculoskeletal System  Upper Extremities:   Strength: LUE moves spontaneously during session, no visualized movement of RUE  Lower  Extremities:  ROM: WFL passive  Strength: LLE moves spontaneously during eval, no visualized movement of RLE  Integumentary System: Visible skin intact  Neuromuscular System:  - Sensation: unknown; patient withdrew to noxious stimuli bilaterally   - Coordination: unable to test  Posture and gross symmetry: total assist to sit at this time due to somnolence    BALANCE:  Sitting:  - Static: 0: Needs MAXIMAL assist to maintain sitting without back support; total assist  Patient attempted to maintain head in neutral position after therapist assisted for initial postural support    FUNCTIONAL MOBILITY ASSESSMENT:  Bed Mobility: performed with HOB flat  - Rolling/Turning R: total assist x2   - Rolling/Turning L: total assist x2   - Supine <> sit: total assist x2   - Scooting EOB: total assist x2      Transfers: NT due to poor sitting balance and somnolence    THERAPEUTIC ACTIVITIES AND EXERCISES:  Attempts were made to maximize patient participation by sitting EOB, sternal rub, and maximal verbal/tactile cues.  Despite this level of stimulation, the patient did not open eyes throughout the session.  He roused slightly and was able to hold up his head in sitting for a short period of time, but never fully awakened.  Pt was returned to supine after these attempts and repositioned in bed with total assistance using draw sheet.     Therapist educated patient's wife on the role of PT, POC..  Therapist discussed the patients current mobility status and level of assistance with patient's wife and RN.  Therapist answered questions to patient/familys satisfaction within scope of practice.  White board updated to reflect current level of assistance.       Patient left supine with all lines intact, call button in reach, restraints reapplied at end of session and rn notified.     AM-PAC 6 CLICK MOBILITY  Total Score:6     GOALS:    Physical Therapy Goals        Problem: Physical Therapy Goal    Goal Priority Disciplines Outcome  Goal Variances Interventions   Physical Therapy Goal     PT/OT, PT Ongoing (interventions implemented as appropriate)     Description:    Goals to be met by 2/9/2018    1. Pt will perform rolling to the R and L with moderate assistance.   2. Pt will perform supine to sit from both sides of the bed with moderate assistance.  3. Pt will perform sit to supine with moderate assistance.  4. Pt will sit EOB x 10 minutes with min assistance and no LOB to prepare for functional tasks in sitting.   5. Pt will perform sit to stand transfers with max assistance.    6. Pt will perform bed <> chair transfers with max assistance.  7. Pt will perform gait x 10 feet with max assistance.                    Assessment:     Todd Quevedo is a 47 y.o. male admitted with a medical diagnosis of Embolic stroke involving left middle cerebral artery s/p thrombectomy. Patient was independent prior to admit.   He now presents with the following impairments/functional limitations:  weakness, impaired self care skills, impaired balance, decreased safety awareness, impaired endurance, impaired functional mobilty, decreased upper extremity function, gait instability, impaired cognition, decreased lower extremity function.  Initial evaluation was severely limited due to patient's level of somnolence.  He was unable to open his eyes or remain alert to actively participate in functional mobility tasks.  He would benefit from additional skilled PT services for ongoing assessment of functional abilities, progressive mobilization to improve level of alertness, and d/c planning.  Discharge recommendations not given at this time due to limited initial evaluation.      Rehab Prognosis:  Fair; patient would benefit from acute skilled PT services to address these deficits and reach maximum level of function.      Recent Surgery: * No surgery found *        History:     History reviewed. No pertinent past medical history.    History reviewed. No pertinent  surgical history.    Clinical Decision Making:     Comorbidities and personal factors that affect the PT plan of care or the patient's ability to participate or progress with therapy:  1. Obesity   2. Global aphasia  3. Unable to arouse      Clinical Presentation: Unstable/unpredictable characteristics  Pt evaluated in the ICU with continuous monitoring of vitals and significant impairment in level of alertness.     Level of Complexity:   High Complexity - at least 3 or more personal factors or comorbidities that impact the plan of care; examination addressing 4 or more body structures and functions, activity limitations, and/or participation restrictions; clinical presentation with unstable or unpredictable characteristics      Time Tracking:     PT Received On: 01/26/18  PT Start Time: 1000     PT Stop Time: 1038  PT Total Time (min): 38 min     Billable Minutes: Evaluation 38      Madina Luu, PT  01/26/2018

## 2018-01-26 NOTE — HOSPITAL COURSE
01/25/18:  Brought in for aphasia, RSW with L gaze preferance. LKN unknown, not tPA candidate. Went to IR for angiogram and stent.  01/29/18:  Off Cardene, remains on Insulin gtt. Concern for sepsis, respiratory source. Imaging with mass effect and some brain compression; maycol crani watch.  01/30/18:  EEG consistent with focal L slowing 2/2 lesion and mild generalized slowing, no seizures. CT head stable, no hemorrhagic conversion  02/01/18:  Emergent intubation likely due to upper airway obstruction per NCC.    02/02/18:  Pt off Precedex, moving left side spontaneously and opening eyes. Intubated, on spontaneous today. NCC giving steroids in hopes to extubate tomorrow.  02/03/18:  NAEON, intubated, not responsive to verbal stimuli, withdraws from pain on LUE, SBP ~135-230. Continued on insulin gtt, SQH for DVT ppx, and captopril.   02/16/18:  Few changes on exam, continues to have significant RUE/RLE weakness with global aphasia.  Family member at bedside noted attempt to communicate with her.  02/18/18:  Pt largely unchanged on exam this am.  No family member present during am rounds.  02/19/18:  Tolerating facemask. PEG by IR this afternoon.  02/20/18:  s/p PEG. O2 % on 5L NC, still requiring frequent suctioning. Primary team considering transfer to Medicine tomorrow.  02/21/18:  Pt sating % on 3L NC. Restarted DAPT, including . Plans for step down to stroke service.  02/22/18:  Pt with [facility] but not service-connected so unable to be placed at SNF.  Working on insurance coverage of at least HHC therapy.  02/23/18:  Increased detemir to 36 U bid.  [facility] with [facility] pending completion of paperwork by Stroke team and pt's spouse--in process.  2/24/18 - NAEON. Pts WBC mildly elevated 13.01, pt is afebrile. Will continue to monitor. Endocrine consulted - Recommend levemir 40 units daily to cover basal needs (using ~0.7u/kg); Start novolog 6 units q4hr to cover TF; Moderate correction  q4hr. SNF placement pending.  2/25/18 -  Pt WBC increased overnight, HR range , and temp 99.2. Blood cultures ordered and drawn. UA/UC ordered. Nursing staff called a rapid response this AM due to pts tachycardia, fever, and decreased O2 sats. Arrived to room @ 0824. 1 liter of NS ordered. STAT CXR ordered. Tylenol given. ABG completed with no significant abnormal results. Respiratory suctioned pt and pt repositioned to help with breathing. Pt O2 sats improved on venti mask, temp decreased, and BP remained normotensive. Started on  vanc and zosyn. Pt appears more comfortable and no decline in neuro status. Pt family updated.   2/26/18 - afebrile overnight and leukocytosis improving with BS antibiotics. CT abdomen yesterday noted seroma/hematoma on left rectus abdominus. IR consulted for culture +/- drain.  02/28/2018 s/p aspiration of left rectus collection adjacent to the PEG tube.  Culture sent to lab, pending.  Remains afebrile.  Currently on 4L NC O2.   3/1/18: NAEON.   3/2: Pt afebrile, WBC WNL. Now sating well on 1L NC. Pt emotional on exam, family not interested in SSRI at this time. CM attempting to get [place] so he can then transfer to [facility]. Increased rate of TFs.  3/3: NAEON. Pending placement. Will monitor Na level tomorrow.  3/4: stable, no new issues.  3/5/18 - sleepy today but no events overnight   3/6/18 - sister concerned due to coughing post speech therapy - discussed with speech team and sister   3/7/18 - No change in exam.  [facility] to [facility] - pending approval.  Continue to monitor liver enzymes.  3/8/18 - no acute events overnight. Vitals within normal. Pending placement   03/09/2018 NAEON  3/10/18 starting baclofen for spasticity, fluoxetine for depression and therapy motivation  3/11/18 OT to make splint for RUE to prevent contractures, plans for discharge home with family training planned for Monday  3/15/18 CM working with [facility] to get home care needs in  place. Possible discharge tomorrow with family. NAEON. Liver enzymes normalized, but sister refuses to restart statin due to potential side effects.   3/16/18 NAEON. Still awaiting home health equipment before patient is able to go home.   3-17-18 still awaiting mattress for bed to be able to go home to [place]  3/20/18 mattress to be delivered to patient's home in [place] today, nurse noted cloudy urine, UA pending  3-21-18 UA negative, ready for discharge, will not have f/u with stroke service as going to [place]

## 2018-01-26 NOTE — H&P
[facility]  Neurocritical Care  History & Physical    Admit Date: 1/25/2018  Service Date: 01/26/2018  Length of Stay: 1    Subjective:     Chief Complaint: Embolic stroke involving left middle cerebral artery    History of Present Illness: The patient is a 47 year old male with no known PMHx admitted to [facility]   s/p thrombectomy of L M1 occlusion. Per chart review, he walked into [place] and was acting abnormal.  EMS was called and brought to the ED for concern of L MCA syndrome. CT MP revealed short segment occlusion of left M1.  Patient went to IR for thrombectomy of L M1.  TICI2C reperfusion and angioplasty. Patient admitted to [facility] for close monitoring and higher level of care.   History obtained from chart review only                Review of Systems  Unable to obtain       Objective:     Vitals:  Temp: 98.9 °F (37.2 °C)  Pulse: 108  Rhythm: sinus tachycardia  BP: (!) 121/58  MAP (mmHg): 78  Resp: 17  SpO2: 98 %    Temp  Min: 97.8 °F (36.6 °C)  Max: 98.9 °F (37.2 °C)  Pulse  Min: 79  Max: 134  BP  Min: 118/60  Max: 217/119  MAP (mmHg)  Min: 78  Max: 155  Resp  Min: 14  Max: 35  SpO2  Min: 93 %  Max: 98 %    01/25 0701 - 01/26 0700  In: 213.8 [I.V.:213.8]  Out: 525 [Urine:525]   Unmeasured Output  Urine Occurrence: 1       Physical Exam    Physical Exam:  GA: obese, well-developed, well-nourished  HEENT: No scleral icterus or JVD.   Pulmonary: on Bipap, Coarse to auscultation throughout all lung fields-Anterior  Cardiac: RRR S1 & S2 w/o rubs/murmurs/gallops.   Abdominal: Bowel sounds present x 4. No appreciable hepatosplenomegaly.  Skin: No jaundice, rashes, or visible lesions.  Neuro:  --GCS: E1V1 M5  --Mental Status: sleepy, requires frequent painful stimulation to open eyes, does not follow commands   --CN II-XII -FRIEDA  --Pupils 4mm, PERRL.   --Corneal reflex, gag, cough intact.  --Moves spontaneously LUE/LLE  -- RUE extensor posturing, RLE moves spontaneously  --Sensation unable to  assess      Medications:  Continuous  sodium chloride 0.9% Last Rate: 75 mL/hr at 01/26/18 0000   insulin (HUMAN R) infusion (adults) Last Rate: 4 Units/hr (01/26/18 0045)   niCARdipine Last Rate: 12.5 mg/hr (01/26/18 0053)   Scheduled  aspirin 325 mg Daily   atorvastatin 40 mg Daily   clopidogrel 75 mg Daily   senna-docusate 8.6-50 mg 1 tablet BID   sodium chloride 0.9% 3 mL Q8H   PRN  acetaminophen 650 mg Q6H PRN   dextrose 50% 12.5 g PRN   dextrose 50% 12.5 g PRN   dextrose 50% 25 g PRN   glucagon (human recombinant) 1 mg PRN   labetalol 10 mg Q4H PRN   ondansetron 4 mg Q8H PRN   sodium chloride 0.9% 5 mL PRN     Today I personally reviewed pertinent medications, lines/drains/airways, imaging, lab results,           Assessment/Plan:     Neuro   * Embolic stroke involving left middle cerebral artery    S/P L M1 thrombectomy  --Continue Neuro checks q 1hr  -- Vascular Neurology  following  -- CT MP-Short segment occlusion of left M1   -- Plavix load and daily   -- ASA 325mg once  -- Repeat CTH-No detrimental change  -- SBP goal <140  -- PT/OT/Speech              Cardiac/Vascular   Hyperlipidemia    -total cholesterol 200, hypertriglyceridemia  -atorvastatin 80 daily           Essential hypertension    --SBP <140  --cardene gtt  --pending echo            Endocrine   Hyperglycemia    -A1C-10  -poorly controlled diabetes  -BG >400 on arrival  -insulin gtt              Prophylaxis:  Venous Thromboembolism: chemical  Stress Ulcer: NA  Ventilator Pneumonia: not applicable       Full Code    Isha Gauthier NP  Neurocritical Care  [facility]

## 2018-01-26 NOTE — CONSULTS
Vascular Neurology   Consult Note      Please see consult note completed today by Chely Currie.    Raymundo Shanks MD

## 2018-01-26 NOTE — PLAN OF CARE
01/26/18 1720   Discharge Assessment   Assessment Type Discharge Planning Assessment   Confirmed/corrected address and phone number on facesheet? Yes   Assessment information obtained from? Caregiver   Expected Length of Stay (days) 7   Communicated expected length of stay with patient/caregiver yes   Prior to hospitilization cognitive status: Alert/Oriented   Prior to hospitalization functional status: Independent   Current cognitive status: Unable to Assess   Current Functional Status: Completely Dependent   Lives With spouse   Able to Return to Prior Arrangements unable to determine at this time (comments)   Is patient able to care for self after discharge? Unable to determine at this time (comments)   Who are your caregiver(s) and their phone number(s)? Ruth Quevedo (wife)  [phone]   Patient's perception of discharge disposition other (comments)  (ty)   Readmission Within The Last 30 Days no previous admission in last 30 days   Patient currently being followed by outpatient case management? No   Patient currently receives any other outside agency services? No   Equipment Currently Used at Home none   Do you have any problems affording any of your prescribed medications? No   Is the patient taking medications as prescribed? yes   Does the patient have transportation home? No   Transportation Available none   Does the patient receive services at the [facility]? No   Discharge Plan A Rehab  (patient with [facility] Benefits in [place] )   Discharge Plan B Home with family   Patient/Family In Agreement With Plan unable to assess     Per wife, patient was in [place] for work.  Patient resides in [place].  Per wife, patient has VA benefits.  No health insurance.  Wife stated that she flew here and has no transportation.         PCP:  [facility]      Pharmacy:    [facility] [address] [address]  Phone: [phone] Fax:  [phone]        Emergency Contacts:    Extended Emergency Contact Information  Primary Emergency Contact: Yaquelin Quevedo  Address: [facility] [address] [place]  Home Phone: [phone]  Mobile Phone: [phone]  Relation: Spouse    Insurance:  Payor: [employer] / Plan: VA CHOICE PROGRAM / Product Type: Government /       Delaney Mark RN, CCRN-K, [facility]  Neuro-Critical Care   [phone]

## 2018-01-26 NOTE — SUBJECTIVE & OBJECTIVE
No past medical history on file.  No past surgical history on file.  No family history on file.  Social History   Substance Use Topics    Smoking status: Not on file    Smokeless tobacco: Not on file    Alcohol use Not on file     Review of patient's allergies indicates:  No Known Allergies    Medications: I have reviewed the current medication administration record.      (Not in a hospital admission)    Review of Systems   Constitutional: Negative for chills and fever.   HENT: Negative for drooling and rhinorrhea.    Eyes: Positive for visual disturbance. Negative for redness.   Respiratory: Negative for cough and shortness of breath.    Cardiovascular: Negative for leg swelling.   Gastrointestinal: Negative for diarrhea and vomiting.   Genitourinary: Negative for frequency and urgency.   Musculoskeletal: Positive for arthralgias. Negative for back pain and neck pain.   Skin: Negative for pallor and rash.   Neurological: Positive for weakness. Negative for seizures.   Psychiatric/Behavioral: Negative for agitation and behavioral problems.     Objective:     Vital Signs (Most Recent):  Temp: 97.8 °F (36.6 °C) (01/25/18 1901)  Pulse: 83 (01/25/18 1929)  Resp: 14 (01/25/18 1929)  BP: (!) 177/107 (01/25/18 1929)  SpO2: (!) 94 % (01/25/18 1929)    Vital Signs Range (Last 24H):  Temp:  [97.8 °F (36.6 °C)]   Pulse:  [82-83]   Resp:  [14-17]   BP: (148-177)/()   SpO2:  [94 %-98 %]     Physical Exam   Constitutional: He appears well-developed and well-nourished.   HENT:   Head: Normocephalic and atraumatic.   Eyes: Pupils are equal, round, and reactive to light.   Left gaze deviation    Cardiovascular: Normal rate and regular rhythm.    Pulmonary/Chest: Effort normal. No respiratory distress.   Neurological:   See below   Skin: Skin is warm and dry.   Psychiatric: He has a normal mood and affect.       Neurological Exam:   LOC: alert  Attention Span: poor  Language: Mute  Articulation:  Mute/Anarthric  Orientation: mute aphasia   Visual Fields: right sided hemianopsia   EOM (CN III, IV, VI): Vertical gaze deviation   Pupils (CN II, III): PERRL   Facial Sensation (CN V):equal   Facial Movement (CN VII):mild nasolabial flattening   Gag Reflex: not examined   Reflexes: not examined   Motor: moves all extremities but unable to test strength due to aphasia   Cebellar:unable to examine   Sensation:intact   Tone:normal      Laboratory:  CMP: No results for input(s): GLUCOSE, CALCIUM, ALBUMIN, PROT, NA, K, CO2, CL, BUN, CREATININE, ALKPHOS, ALT, AST, BILITOT in the last 24 hours.  CBC: No results for input(s): WBC, RBC, HGB, HCT, PLT, MCV, MCH, MCHC in the last 168 hours.  Lipid Panel: No results for input(s): CHOL, LDLCALC, HDL, TRIG in the last 168 hours.  Hgb A1C: No results for input(s): HGBA1C in the last 168 hours.  TSH: No results for input(s): TSH in the last 168 hours.    Diagnostic Results:      Brain imaging:  CTA  01/25/18   No evidence of hemorrhagic infarct   Hypodensity in the left parietal lobe  Left m1 occlusion       Vessel Imaging:  See above     Cardiac Evaluation:

## 2018-01-26 NOTE — ASSESSMENT & PLAN NOTE
47 y.o. male with history of HTN and DM who presented to [facility] by EMS for concern of L MCA syndrome 1/25/18 AM. He is s/p thrombectomy of L M1 occlusion. CT MP revealed short segment occlusion of left M1.  Patient went to IR for thrombectomy of L M1.  TICI2C reperfusion and angioplasty. Neurosurgery consulted for hemicrani watch. Patient currently on asa and plavix.    -No neurosurgical intervention at this time.  -Maximum medical management per NCC.  -Neuro checks hourly  -Please notify NSR if worsening neuro exam     Discussed with Dr. Ingram

## 2018-01-26 NOTE — SUBJECTIVE & OBJECTIVE
History reviewed. No pertinent past medical history.  History reviewed. No pertinent surgical history.  History reviewed. No pertinent family history.  Social History   Substance Use Topics    Smoking status: Unknown If Ever Smoked    Smokeless tobacco: Not on file    Alcohol use Not on file     Review of patient's allergies indicates:  No Known Allergies    Medications: I have reviewed the current medication administration record.    No prescriptions prior to admission.       Review of Systems   Constitutional: Negative for chills and fever.   HENT: Negative for drooling and rhinorrhea.    Eyes: Positive for visual disturbance. Negative for redness.   Respiratory: Negative for cough and shortness of breath.    Cardiovascular: Negative for leg swelling.   Gastrointestinal: Negative for diarrhea and vomiting.   Genitourinary: Negative for frequency and urgency.   Musculoskeletal: Positive for arthralgias. Negative for back pain and neck pain.   Skin: Negative for pallor and rash.   Neurological: Positive for weakness. Negative for seizures.   Psychiatric/Behavioral: Negative for agitation and behavioral problems.     Objective:     Vital Signs (Most Recent):  Temp: 99.6 °F (37.6 °C) (01/26/18 1103)  Pulse: 92 (01/26/18 1100)  Resp: (!) 21 (01/26/18 1100)  BP: 128/73 (01/26/18 1100)  SpO2: (!) 94 % (01/26/18 1100)    Vital Signs Range (Last 24H):  Temp:  [97.8 °F (36.6 °C)-99.6 °F (37.6 °C)]   Pulse:  []   Resp:  [10-35]   BP: (115-217)/()   SpO2:  [92 %-98 %]   Arterial Line BP: (103-119)/(58-68)     Physical Exam   Constitutional: He appears well-developed and well-nourished.   HENT:   Head: Normocephalic and atraumatic.   Eyes: Pupils are equal, round, and reactive to light.   Left gaze deviation    Cardiovascular: Normal rate and regular rhythm.    Pulmonary/Chest: Effort normal. No respiratory distress.   Neurological:   See below   Skin: Skin is warm and dry.   Psychiatric: He has a normal  mood and affect.       Neurological Exam:   LOC: alert  Attention Span: poor  Language: Mute  Articulation: Mute/Anarthric  Orientation: mute aphasia   Visual Fields: right sided hemianopsia   EOM (CN III, IV, VI): horizontal gaze deviation   Pupils (CN II, III): PERRL   Facial Sensation (CN V):equal   Facial Movement (CN VII):mild nasolabial flattening   Gag Reflex: not examined   Reflexes: not examined   Motor: moves all extremities but unable to test strength due to aphasia   Cebellar:unable to examine   Sensation:intact   Tone:normal      Laboratory:  CMP:     Recent Labs  Lab 01/26/18  0306   CALCIUM 8.3*   ALBUMIN 3.6   PROT 7.0   *   K 4.4   CO2 21*      BUN 23*   CREATININE 1.4   ALKPHOS 50*   ALT 32   AST 15   BILITOT 0.9     CBC:     Recent Labs  Lab 01/26/18  0306   WBC 12.96*   RBC 5.21   HGB 15.5   HCT 43.1      MCV 83   MCH 29.8   MCHC 36.0     Lipid Panel:     Recent Labs  Lab 01/25/18  2218   CHOL 200*   LDLCALC Invalid, Trig>400.0   HDL 27*   TRIG 460*     Hgb A1C:     Recent Labs  Lab 01/25/18  2303   HGBA1C 10.0*     TSH:     Recent Labs  Lab 01/25/18  2218   TSH 1.872       Diagnostic Results:      Brain imaging:  CTA MP 01/25/18   No evidence of hemorrhagic infarct   Hypodensity in the left parietal lobe  Left m1 occlusion   MRI pending    Vessel Imaging:  See above     Cardiac Evaluation:

## 2018-01-26 NOTE — PLAN OF CARE
Problem: Occupational Therapy Goal  Goal: Occupational Therapy Goal  Goals to be met by: 2/9/18    Patient will increase functional independence with ADLs by performing:    UE Dressing with Moderate Assistance.  Grooming while seated at sink with Moderate Assistance.  Toileting from bedside commode with Moderate Assistance for hygiene and clothing management.   Supine to sit with Maximum Assistance.  Stand pivot transfers with Maximum Assistance.  CG demo independence with upper extremity exercise program per handout.   Pt follow 50% of simple one-step commands  Eyes open 50% of session.     Outcome: Ongoing (interventions implemented as appropriate)  Evaluation completed. Initiate POC.   Anne swenson OT  1/26/2018

## 2018-01-26 NOTE — PT/OT/SLP EVAL
"Occupational Therapy   Evaluation    Name: Todd Quevedo  MRN: [id number]  Admitting Diagnosis:  Embolic stroke involving left middle cerebral artery      Recommendations:     Discharge Recommendations:  (Pending OOB assessement and improve alertness)  Discharge Equipment Recommendations:   (TBD)  Barriers to discharge:   (Increased level of skilled assistance required at this time)    History:     Occupational Profile:  Living Environment: Pt lives with wife in [place]. He lives in a 1SH with no SYLVIA. He has a tub-shower with no DME.   Previous level of function: PTA, wife reports he was I with ADL and functional mobility. He works running heavy equipment.   Roles and Routines: , father, , works mon-sun;  Wife reports, " he is a "   Equipment Owned:  none  Assistance upon Discharge: Lives with wife     History reviewed. No pertinent past medical history.    History reviewed. No pertinent surgical history.    Subjective     Chief Complaint: Unable to verbalize  Communicated with: RN prior to session.  Pt agreeable to participate with therapy.     Pain/Comfort:  · Pain Rating 1:  (Unable to verbalize)    Patients cultural, spiritual, Mandaen conflicts given the current situation:      Objective:     Patient found with: arterial line, blood pressure cuff, pulse ox (continuous), telemetry, peripheral IV, restraints, SCD, oxygen    General Precautions: Standard, fall, aspiration, NPO, aphasia   Orthopedic Precautions:N/A   Braces: N/A     Occupational Performance:    Bed Mobility:    · Patient completed Scooting/Bridging with dependent  Sitting EOB NT 2/2 poor alertness level/safety concerns    Activities of Daily Living:  · Grooming: dependence washing face  · LB Dressing: dependence maciej B socks while supine    Cognitive/Visual Perceptual:  Cognitive/Psychosocial Skills:     -       Oriented to: FRIEDA 2/2 alertness level  -       Follows Commands/attention:FRIEDA 2/2 alertness level  -       " Communication: Non verbal   -       Memory: FRIEDA  -       Safety awareness/insight to disability: impaired   -       Mood/Affect/Coping skills/emotional control: Lethargic  Visual/Perceptual:      - L visual gaze; nystagmus noted ( Unable to follow command to open eyes)     Physical Exam:  Postural examination/scapula alignment:    -       FRIEDA ( No OOB activity)   Skin integrity: Visible skin intact  Edema:  Mild RUE  Sensation:  FRIEDA  Dominant hand:    -       Right  Upper Extremity Range of Motion:     -       Right Upper Extremity: Full PROM; Flaccid; no Active movement noted  -       Left Upper Extremity: Full PROM; spontaeous movement on LUE/LLE   Strength:    -       Right Upper Extremity: 0/5  -       Left Upper Extremity: WFL  Gross motor coordination:   hemiplegia/paresis    Patient left with bed in chair position with all lines intact, call button in reach and RN notified    [facility] 6 Click:  AMPA Total Score: 6    Treatment & Education:  -Pt edu on OT role/POC-- family present for education   - Communication board updated  - family Edu on:         *enviornmental modifications to implement to improve overall alertness level ( Blind open during the day, T.V on, HOB raised, tactile stimuli)           * Positioning of pt in chair position; LUE digits splayed with BUE elevated using pillow; cervical spine in neutral, LLE in neutral position           * BUE P/AROM exercises to complete 3x daily--            1x15 reps in elbow flexion/extension, shoulder IR/ER, wrist flexion/extension, and digit flexion/extension.  Education:    Assessment:     Todd Quevedo is a 47 y.o. male with a medical diagnosis of Embolic stroke involving left middle cerebral artery.  He presents with lethargic and unable to arouse for evaluation.  Performance deficits affecting function are weakness, impaired endurance, impaired self care skills, impaired functional mobilty, impaired balance, decreased upper extremity function, decreased  "lower extremity function, decreased safety awareness, impaired cognition.      Rehab Prognosis:  Fair; patient would benefit from acute skilled OT services to address these deficits and reach maximum level of function.         Clinical Decision Makin.  OT Low:  "Pt evaluation falls under low complexity for evaluation coding due to performance deficits noted in 1-3 areas as stated above and 0 co-morbities affecting current functional status. Data obtained from problem focused assessments. No modifications or assistance was required for completion of evaluation. Only brief occupational profile and history review completed."     Plan:     Patient to be seen 4 x/week to address the above listed problems via self-care/home management, therapeutic activities, therapeutic exercises, neuromuscular re-education, cognitive retraining  · Plan of Care Expires: 18  · Plan of Care Reviewed with:      This Plan of care has been discussed with the patient who was involved in its development and understands and is in agreement with the identified goals and treatment plan    GOALS:    Occupational Therapy Goals        Problem: Occupational Therapy Goal    Goal Priority Disciplines Outcome Interventions   Occupational Therapy Goal     OT, PT/OT Ongoing (interventions implemented as appropriate)    Description:  Goals to be met by: 18    Patient will increase functional independence with ADLs by performing:    UE Dressing with Moderate Assistance.  Grooming while seated at sink with Moderate Assistance.  Toileting from bedside commode with Moderate Assistance for hygiene and clothing management.   Supine to sit with Maximum Assistance.  Stand pivot transfers with Maximum Assistance.  CG demo independence with upper extremity exercise program per handout.   Pt follow 50% of simple one-step commands  Eyes open 50% of session.                       Time Tracking:     OT Date of Treatment: 18  OT Start Time: " 1410  OT Stop Time: 1432  OT Total Time (min): 22 min    Billable Minutes:Evaluation 10  Therapeutic Activity 12    Anne swenson OT  1/26/2018

## 2018-01-26 NOTE — PROCEDURES
Radiology Post-Procedure Note    Pre Op Diagnosis: Stroke    Post Op Diagnosis: same    Procedure: Cerebral angiogram, mechanical thrombectomy, angioplasty and stenting of left MCA    Procedure performed by: Dr. Hollins, Dr. Gannon    Written Informed Consent Obtained: Yes    Specimen Removed: YES, clot    Estimated Blood Loss: Minimal    Procedure report:     An 8F sheath was placed into the right femoral artery and a 5F Cortes catheter was advanced into the aortic arch.  Angiography showed left M1 occlusion which resulted in TICI 2 B flow after 3 passes of aspiration.  There was a persistent stenosis of the distal left M1 which was angioplastied and stented.     Angioseal used to close the arteriotomy.  No hematoma was present at the time of hemostasis.    Attempt made at placing an NG tube on IR table was quickly aborted due to patient's difficulty breathing.  Patient transferred immediately to the neuro ICU.    Patient needs urgent antiplatelet loading - 325 mg aspirin and 600 mg plavix.  If this cannot happen soon he should be started on an integrelin drip.    Kushal Gannon MD  Neurointerventional Fellow  Department of Radiology

## 2018-01-26 NOTE — PLAN OF CARE
LMCA stroke with symptoms suggestive of large hemispheric syndrome.   Lethargy out of proportion for ischemic stroke  MRI of the brain and consider EEG monitoring to r/o NCSE  Neurosurgery consultation for hemicraniectomy watch  Family (wife) updated at bedside

## 2018-01-26 NOTE — ED PROVIDER NOTES
Encounter Date: 1/25/2018       History     Chief Complaint   Patient presents with    Weakness     Mr. Quevedo is a 48-year-old male with unknown past medical history brought in by EMS for altered mental status at the restaurant.  Unsure of prior history, preceding events or onset of symptoms.  There is no additional information provided in his wallet.  Patient is aphasic at this time.          Review of patient's allergies indicates:  No Known Allergies  History reviewed. No pertinent past medical history.  History reviewed. No pertinent surgical history.  History reviewed. No pertinent family history.  Social History   Substance Use Topics    Smoking status: Unknown If Ever Smoked    Smokeless tobacco: Not on file    Alcohol use Not on file     Review of Systems   Unable to perform ROS: Patient nonverbal       Physical Exam     Initial Vitals [01/25/18 1901]   BP Pulse Resp Temp SpO2   (!) 148/87 82 17 97.8 °F (36.6 °C) 98 %      MAP       107.33         Physical Exam    Nursing note and vitals reviewed.  Constitutional: He appears well-developed and well-nourished. He is not diaphoretic. No distress.   HENT:   Head: Normocephalic and atraumatic.   Eyes: Conjunctivae are normal. Pupils are equal, round, and reactive to light.   Neck: Normal range of motion.   Cardiovascular: Normal rate, regular rhythm, normal heart sounds and intact distal pulses. Exam reveals no gallop and no friction rub.    No murmur heard.  Pulmonary/Chest: Breath sounds normal. No respiratory distress. He has no wheezes. He has no rhonchi. He has no rales.   Abdominal: Soft. He exhibits no distension. There is no tenderness. There is no rebound and no guarding.   Musculoskeletal: He exhibits no edema or tenderness.   Neurological: He is alert. A cranial nerve deficit is present. GCS eye subscore is 4. GCS verbal subscore is 1. GCS motor subscore is 6.   Patient with left gaze preference.  See NIH stroke scale below   Skin: Skin is warm.  Capillary refill takes less than 2 seconds.         ED Course   Procedures  Labs Reviewed   POCT GLUCOSE   ISTAT PROCEDURE   ISTAT CREATININE                Additional MDM:     NIH Stroke Scale:   Interval = baseline (upon arrival/admit)  Level of consciousness = 0 - alert  LOC questions = 2 - answers none correctly  Best gaze = 1 - partial gaze palsy  Visual = 0 - no visual loss  Facial palsy = 0 - normal  Motor left arm =  0 - no drift  Motor right arm =  1 - drift  Motor left leg = 0 - no drift  Motor right leg =  0 - no drift  Best language = 3 - mute  Dysarthria = 2 - near to unintelligible  Extinction and inattention = 0 - no neglect  NIH Stroke Scale Total = 9    APC / Resident Notes:   HO4 MDM  Pt is 47 y.o. male that presents with aphasia and right arm weakness. DDx includes but is not limited to hemorrhagic CVA, ischemic CVA, arrhythmia, tox, electrolyte derangement, hypoglycemia.  Patient's glucose prior to arrival elevated stroke code activated upon arrival.  Vascular neurology consulted emergently and expeditiously took the patient to interventional radiology for possible thrombectomy given unsure time of onset.  Andrew Persaud  [facility] Emergency Medicine Resident  7:26 PM 01/25/2018           Attending Attestation:   Physician Attestation Statement for Resident:  As the supervising MD   Physician Attestation Statement: I have personally seen and examined this patient.   I agree with the above history. -: 48 yo m, unknown PMH, per EMS, presented to [facility] about 45 min PTA, noted to be confused and having difficulty on arrival there.   EMS called - found pt with dense aphasia/R sided weakness.  On arrival, pt with same findings, also R sided neglect.  Stat CT head and CT multiphase done - acute L MCA occlusion.  Extensive d/w vascular neurology - no clear time of onset so will proceed directly to IR for thrombectomy.  Pt has never been to this hospital before so no family contact numbers.  Also looked  through pt's wallet to try to find contacts but unable to locate any   As the supervising MD I agree with the above PE.    As the supervising MD I agree with the above treatment, course, plan, and disposition.  I have reviewed and agree with the residents interpretation of the following: lab data, x-rays, CT scans and EKG.                    ED Course      Clinical Impression:   The primary encounter diagnosis was Stroke. Diagnoses of Ischemic stroke and Embolic stroke involving left middle cerebral artery were also pertinent to this visit.                           Andrew Persaud MD  Resident  01/26/18 8561

## 2018-01-26 NOTE — PROGRESS NOTES
[facility]  Vascular Neurology  Comprehensive Stroke Center  Progress Note    Assessment/Plan:     * Embolic stroke involving left middle cerebral artery    Patient is a 47 year old male who walked into [facility] and was acting abnormal.  EMS was called and brought to the ED for concern of L MCA syndrome. Not tpa candidate due to unknown last known normal.  Patient went to IR for thrombectomy of L M1 occlusion seen on CTA MP.  TICI2C reperfusion and angioplasty.      Antithrombotics for secondary stroke prevention:start asa 325mg qd and plavix 75mg qd     Statins for secondary stroke prevention and hyperlipidemia, if present: LDL     Aggressive risk factor modification: Obesity     Rehab efforts: Occupational Therapy, PT/OT/SLP to evaluate and treat    Diagnostics ordered/pending: MRI, TTE pending  CTA - L M1 occlusion  A1C 10, TSH 1.8, LDL immeasurable 2/2 trigylcerides (460), Chol 200    VTE prophylaxis: Heparin 5000 units SQ every 8 hours after repeat scan     BP parameters: Infarct: Post sucessful thrombectomy, SBP <140            Type 2 diabetes mellitus    - A1C 10  - Glu 478; started on insulin gtt         Hyperlipidemia    - Chol 200, LDL immeasurable,   - atorvastatin 80 mg          Essential hypertension    - management for primary team  - goal SBP post thrombectomy <140             1/25: Brought in for aphasia, RSW with L gaze preferance. LKN unknown, not tPA candidate. Went to IR for angiogram and stent.    STROKE DOCUMENTATION   Acute Stroke Times   Stroke Team Called Date: 01/25/18  Stroke Team Called Time: 1852  Stroke Team Arrival Date: 01/25/18  Stroke Team Arrival Time: 0652  CT Interpretation Time: 1910  Decision to Treat Time for Alteplase:  (n/a unknown last known normal )  Decision to Treat Time for IR: 1915    NIH Scale:  1a. Level Of Consciousness: 2-->Not alert: requires repeated stimulation to attend, or is obtunded and requires strong or painful stimulation to  make movements (not stereotyped)  1b. LOC Questions: 2-->Answers neither question correctly  1c. LOC Commands: 2-->Performs neither task correctly  2. Best Gaze: 1-->Partial gaze palsy: gaze is abnormal in one or both eyes, but forced deviation or total gaze paresis is not present  3. Visual: 2-->Complete hemianopia  4. Facial Palsy: 0-->Normal symmetrical movements  5a. Motor Arm, Left: 0-->No drift: limb holds 90 (or 45) degrees for full 10 secs  5b. Motor Arm, Right: 0-->No drift: limb holds 90 (or 45) degrees for full 10 secs  6a. Motor Leg, Left: 0-->No drift: leg holds 30 degree position for full 5 secs  6b. Motor Leg, Right: 0-->No drift: leg holds 30 degree position for full 5 secs  7. Limb Ataxia: 0-->Absent  8. Sensory: 0-->Normal: no sensory loss  9. Best Language: 3-->Mute, global aphasia: no usable speech or auditory comprehension  10. Dysarthria: 0-->Normal  11. Extinction and Inattention (formerly Neglect): 1-->Visual, tactile, auditory, spatial, or personal inattention or extinction to bilateral simultaneous stimulation in one of the sensory modalities  Total (NIH Stroke Scale): 13       Modified [place]    [place] Coma Scale:    ABCD2 Score:    [id number] Score:   HAS -BLED Score:   ICH Score:   Hunt & Laguerre Classification:      Hemorrhagic change of an Ischemic Stroke: Does this patient have an ischemic stroke with hemorrhagic changes? No         History reviewed. No pertinent past medical history.  History reviewed. No pertinent surgical history.  History reviewed. No pertinent family history.  Social History   Substance Use Topics    Smoking status: Unknown If Ever Smoked    Smokeless tobacco: Not on file    Alcohol use Not on file     Review of patient's allergies indicates:  No Known Allergies    Medications: I have reviewed the current medication administration record.    No prescriptions prior to admission.       Review of Systems   Constitutional: Negative for chills and fever.   HENT:  Negative for drooling and rhinorrhea.    Eyes: Positive for visual disturbance. Negative for redness.   Respiratory: Negative for cough and shortness of breath.    Cardiovascular: Negative for leg swelling.   Gastrointestinal: Negative for diarrhea and vomiting.   Genitourinary: Negative for frequency and urgency.   Musculoskeletal: Positive for arthralgias. Negative for back pain and neck pain.   Skin: Negative for pallor and rash.   Neurological: Positive for weakness. Negative for seizures.   Psychiatric/Behavioral: Negative for agitation and behavioral problems.     Objective:     Vital Signs (Most Recent):  Temp: 99.6 °F (37.6 °C) (01/26/18 1103)  Pulse: 92 (01/26/18 1100)  Resp: (!) 21 (01/26/18 1100)  BP: 128/73 (01/26/18 1100)  SpO2: (!) 94 % (01/26/18 1100)    Vital Signs Range (Last 24H):  Temp:  [97.8 °F (36.6 °C)-99.6 °F (37.6 °C)]   Pulse:  []   Resp:  [10-35]   BP: (115-217)/()   SpO2:  [92 %-98 %]   Arterial Line BP: (103-119)/(58-68)     Physical Exam   Constitutional: He appears well-developed and well-nourished.   HENT:   Head: Normocephalic and atraumatic.   Eyes: Pupils are equal, round, and reactive to light.   Left gaze deviation    Cardiovascular: Normal rate and regular rhythm.    Pulmonary/Chest: Effort normal. No respiratory distress.   Neurological:   See below   Skin: Skin is warm and dry.   Psychiatric: He has a normal mood and affect.       Neurological Exam:   LOC: alert  Attention Span: poor  Language: Mute  Articulation: Mute/Anarthric  Orientation: mute aphasia   Visual Fields: right sided hemianopsia   EOM (CN III, IV, VI): horizontal gaze deviation   Pupils (CN II, III): PERRL   Facial Sensation (CN V):equal   Facial Movement (CN VII):mild nasolabial flattening   Gag Reflex: not examined   Reflexes: not examined   Motor: moves all extremities but unable to test strength due to aphasia   Cebellar:unable to examine   Sensation:intact   Tone:normal      Laboratory:  CMP:      Recent Labs  Lab 01/26/18  0306   CALCIUM 8.3*   ALBUMIN 3.6   PROT 7.0   *   K 4.4   CO2 21*      BUN 23*   CREATININE 1.4   ALKPHOS 50*   ALT 32   AST 15   BILITOT 0.9     CBC:     Recent Labs  Lab 01/26/18  0306   WBC 12.96*   RBC 5.21   HGB 15.5   HCT 43.1      MCV 83   MCH 29.8   MCHC 36.0     Lipid Panel:     Recent Labs  Lab 01/25/18  2218   CHOL 200*   LDLCALC Invalid, Trig>400.0   HDL 27*   TRIG 460*     Hgb A1C:     Recent Labs  Lab 01/25/18  2303   HGBA1C 10.0*     TSH:     Recent Labs  Lab 01/25/18  2218   TSH 1.872       Diagnostic Results:      Brain imaging:  CTA MP 01/25/18   No evidence of hemorrhagic infarct   Hypodensity in the left parietal lobe  Left m1 occlusion   MRI pending    Vessel Imaging:  See above     Cardiac Evaluation:         Shagufta Arce MD  Comprehensive Stroke Center  Department of Vascular Neurology   [facility]

## 2018-01-26 NOTE — PLAN OF CARE
Problem: SLP Goal  Goal: SLP Goal  Speech Language Pathology Goals  Goals expected to be met by 2/2  1. Pt will participate in ongoing assessment of swallow to determine least restrictive diet as appropriate.  2. Pt will complete speech, language, cognitive evaluation to further determine short term goals as appropriate.        Outcome: Ongoing (interventions implemented as appropriate)  Bedside swallow study completed.  Rec strict npo with aspiration precautions.  SLP to continue to follow. EDWARD Key, BLANCHE/SLP  1/26/2018

## 2018-01-26 NOTE — HPI
Todd Quevedo is 47 y.o. male with history of HTN and DM who [place] [facility] [place] for concern of L MCA syndrome 1/25/18 AM. He is s/p thrombectomy of L M1 occlusion. CT MP revealed short segment occlusion of left M1.  Patient went to IR for thrombectomy of L M1.  TICI2C reperfusion and angioplasty. Neurosurgery consulted for hemicrani watch. Wife was at bedside during exam. Patient not intubated, lethargic, and moving left side spontaneously. He is currently on plavix daily.

## 2018-01-26 NOTE — PT/OT/SLP EVAL
Speech Language Pathology Evaluation  Bedside Swallow    Patient Name:  Todd Quevedo   MRN:  [id number]  Admitting Diagnosis: Embolic stroke involving left middle cerebral artery    Recommendations:                 General Recommendations:  Dysphagia therapy, Speech language evaluation and Cognitive-linguistic evaluation  Diet recommendations:  NPO, NPO   Aspiration Precautions: Continue alternate means of nutrition and Standard aspiration precautions   General Precautions: Standard, aspiration, aphasia, fall, NPO, respiratory (hemicrani/intubation watch)  Communication strategies:  provide increased time to answer and go to room if call light pushed    History:     History reviewed. No pertinent past medical history.    History reviewed. No pertinent surgical history.    Social History: unknown, pt somnolent, family not present, assessment to be ongoing.    Prior Intubation HX:  None this admission, on CPAP overnight    Subjective     Pt somnolent, eyes closed t/o, nonverbal, no response to y/n questions or commands      Objective:     Oral Musculature Evaluation  · Oral Musculature: unable to assess due to poor participation/comprehension  · Secretion Management: left corner drooling, coughing on secretions  · Volitional Cough: not elicited  · Volitional Swallow: not elicited  · Voice Prior to PO Intake: not elicited  · Oral Musculature Comments: head turn to L with pooling/drooling of thick brown secretions observed    Bedside Swallow Eval:   Consistencies Assessed:  · Oral care provided with suction 2/2 pooling of thick, brown secretions  · Ice chip to lips x3     Oral Phase:   · No attempt to accept ice chip when placed to lips    Pharyngeal Phase:   · coughing/choking on secretions  · Spontaneous swallow of secretions noted x2, swallow initiated with oral care x1 and on 2/3 thermal stim presented to lips    Compensatory Strategies  · None    Treatment: Education on role of SLP, npo, aspiration risk and POC  provided though no response by pt.  Nursing alerted re: results and recs.     Assessment:     Todd Quevedo is a 47 y.o. male with an SLP diagnosis of Dysphagia.  He presents with with poor alertness/responsiveness.    Goals:    SLP Goals        Problem: SLP Goal    Goal Priority Disciplines Outcome   SLP Goal     SLP Ongoing (interventions implemented as appropriate)   Description:  Speech Language Pathology Goals  Goals expected to be met by 2/2  1. Pt will participate in ongoing assessment of swallow to determine least restrictive diet as appropriate.  2. Pt will complete speech, language, cognitive evaluation to further determine short term goals as appropriate.                         Plan:     · Patient to be seen:  4 x/week   · Plan of Care expires:  02/25/18  · Plan of Care reviewed with:  patient   · SLP Follow-Up:  Yes       Discharge recommendations:   (pending improved alertness)   Barriers to Discharge:  Level of Skilled Assistance Needed   and unsafe for po intake at this time    Time Tracking:     SLP Treatment Date:   01/26/18  Speech Start Time:  0732  Speech Stop Time:  0741     Speech Total Time (min):  9 min    Billable Minutes: Eval Swallow and Oral Function 9    EDWARD Key, CCC-SLP  01/26/2018

## 2018-01-26 NOTE — HOSPITAL COURSE
1/25: Pt admitted to [facility] s/p L M1 thrombectomy, TICI2C reperfusion and angioplasty   1/27: large L MCA, hemicrani watch, NSGY following, insulin ggt, cardene ggt, L sided intermittent slowing eeg but no sz, +nasal trumpet for copious thick secretions, wheezing this am - improved with duo nebs, 3% nebs, and cough assist, concern for sepsis 2/2 hemodynamic changes - increased HR and BP, worsening leukocytosis, +ceftriaxone, pan cx, adrian track  1/28: continued respiratory challenges due to secretions. Aggressive pulmonary toileting. Continue monitoring for neuro worsening. Add moxifloxacin.   1/29: CTH to eval for increased edema/shift, EEG afterwards. Concern for decrease exam, no following/decreased alertness). CXR and Procal to follow leukocytosis. Hold TF until eval decreasing EXAM  1/30: neuro exam stable, increase 2%, current amount of sodium iv not suffuicient to meet target 145-155, cont abx for likely pneumonia, repeat ct in am  1/31: abrupt desaturation today requiring emergent intubation followed by bronchoscopy  Etiology likely upper airway obstruction from dried secretions  02/01: stable on vent overnight, marked improvement on today's chest x ray, switch to spontaneous today, sodium stable at 150, start tfs  02/02: moves left side spontaneously and opens eyes off sedation,  02/03: 2% buffered NS re-started at 20mL/hr for rapid decrease in serum sodium. Enteral free water flushes discontinued. Patient has copious secretions likely pneumonia. Holding dexamethasone  2/4:  No acute events overnight.  Sodium stable.  2/5: transition insulin infusion to subcutaneous insulin   2/7: gen surg consulted for trach/peg  2/9: increased Aspart and Detemir, pending Trach/Peg placement next week.  2/10: No changes overnight . Pending PEG and trach, WBC mildly elevated  12K  2/11: No changes overnight . Pending PEG and trach, WBC mildly elevated  13K. Procal was low and lower than prior. Patient got 16 units of SSI  yesterday.  2/13: NAEO.  Pending peg/trach tomorrow. But then self-extubated.  2/14: Tolerating face mask well.  Holding tube feeds.   2/15: IR consult placed, for PEG. ABD ct and NG placed in preparation for procedure.  2/16: NAEO.  Pending PEG.  2/17: NAEON, Pending PEG placement on Monday 2/18: NAEON.  Pending PEG on Monday.  Hold tube feeds, ASA, meds, scheduled aspart after MN.  2/19: Peg   2/20: Heparin and asa restarted, Ngt pulled and oxygenation improved, possible transfer to medicine nandini  2/21: Transfer to stroke, nasal trumpet dced

## 2018-01-26 NOTE — PLAN OF CARE
Problem: Patient Care Overview  Goal: Plan of Care Review  Outcome: Ongoing (interventions implemented as appropriate)  POC reviewed with pt at 0400. Pt is globally aphasic - unable to verbalize nor demonstrate understanding. Questions and concerns addressed. CT scan completed overnight. Pt progressing toward goals. Will continue to monitor. See flowsheets for full assessment and VS info

## 2018-01-26 NOTE — PROGRESS NOTES
Hemostasis achieved via right groin with use of AngioSeal closure device.  Pt must remain flat until 2226.

## 2018-01-26 NOTE — CONSULTS
[facility]  Neurosurgery  Consult Note    Consults  Subjective:     Chief Complaint/Reason for Admission: L MCA stroke     History of Present Illness: Todd Quevedo is 47 y.o. male with history of HTN and DM who presented to [facility] by EMS for concern of L MCA syndrome 1/25/18 AM. He is s/p thrombectomy of L M1 occlusion. CT MP revealed short segment occlusion of left M1.  Patient went to IR for thrombectomy of L M1.  TICI2C reperfusion and angioplasty. Neurosurgery consulted for hemicrani watch. Wife was at bedside during exam. Patient not intubated, lethargic, and moving left side spontaneously. He is currently on plavix daily.     No prescriptions prior to admission.       Review of patient's allergies indicates:  No Known Allergies    History reviewed. No pertinent past medical history.  History reviewed. No pertinent surgical history.  Family History     None        Social History Main Topics    Smoking status: Unknown If Ever Smoked    Smokeless tobacco: Not on file    Alcohol use Not on file    Drug use: Unknown    Sexual activity: Not on file     Review of Systems   Unable to obtain secondary to mental status  Objective:     Weight: 113.8 kg (250 lb 14.1 oz)  Body mass index is 36 kg/m².  Vital Signs (Most Recent):  Temp: 99.6 °F (37.6 °C) (01/26/18 1103)  Pulse: 95 (01/26/18 1521)  Resp: (!) 9 (01/26/18 1521)  BP: (!) 120/59 (01/26/18 1200)  SpO2: (!) 94 % (01/26/18 1521) Vital Signs (24h Range):  Temp:  [97.8 °F (36.6 °C)-99.6 °F (37.6 °C)] 99.6 °F (37.6 °C)  Pulse:  [] 95  Resp:  [9-35] 9  SpO2:  [92 %-98 %] 94 %  BP: (115-217)/() 120/59  Arterial Line BP: (103-121)/(58-68) 121/62       Date 01/26/18 0700 - 01/27/18 0659   Shift 4632-4817 2685-2478 7565-4806 24 Hour Total   I  N  T  A  K  E   I.V.  (mL/kg) 525  (4.6)   525  (4.6)    NG/GT 60   60    Shift Total  (mL/kg) 585  (5.1)   585  (5.1)   O  U  T  P  U  T   Urine  (mL/kg/hr) 300  (0.3)   300    Shift Total  (mL/kg)  300  (2.6)   300  (2.6)   Weight (kg) 113.8 113.8 113.8 113.8              Oxygen Concentration (%):  [30] 30         NG/OG Tube 01/25/18 2325 nasogastric Right nostril (Active)   Placement Check placement verified by x-ray 1/26/2018  3:00 AM   Tolerance no signs/symptoms of discomfort 1/26/2018  3:00 AM   Securement taped to commercial device 1/26/2018  3:00 AM   Clamp Status/Tolerance clamped;no residual;no emesis;no abdominal distention 1/26/2018  3:00 AM   Intake (mL) 60 mL 1/26/2018 10:00 AM       Male External Urinary Catheter 01/25/18 2200 Small (Active)   Collection Container Urimeter 1/26/2018  3:00 AM   Securement Method secured to top of thigh w/ tape 1/26/2018  3:00 AM   Skin no redness;no breakdown 1/26/2018  3:00 AM   Tolerance no signs/symptoms of discomfort 1/26/2018  3:00 AM   Output (mL) 125 mL 1/26/2018 11:00 AM   Catheter Change Date 01/26/18 1/26/2018  3:00 AM   Catheter Change Time 0300 1/26/2018  3:00 AM       Neurosurgery Physical Exam  Vital signs: reviewed above.   Constitutional: well-developed,   Cardiovascular: regular rate and rhythm  Resp: normal respirations on NC  Abdomen: soft, non-distended, not tender to palpation  Pulses: palpable distal pulses   Skin: warm, dry and intact, no rashes  Neurological  GCS unable to accurately assess  Mental Status: lethargic  non verbal on exam, does not follow commands  Head: normocephalic, atraumatic  PERRL  Facial expression symmetric  Moves left extremities spontaneously, right side withdraws to painful stimuli  Unable to assess drift and dysmetria d/t mental status   Corneal and cough/gag intact      Significant Labs:    Recent Labs  Lab 01/25/18 2218 01/25/18  2303 01/26/18  0306   * 457* 478*   * 133* 134*   K 4.0 4.0 4.4   CL 98 98 101   CO2 21* 22* 21*   BUN 20 21* 23*   CREATININE 1.2 1.3 1.4   CALCIUM 9.4 9.0 8.3*   MG  --  1.8 1.8       Recent Labs  Lab 01/25/18 2218 01/25/18  2303 01/26/18  0306   WBC 12.83* 11.99 12.96*    HGB 16.8 15.9 15.5   HCT 46.8 44.6 43.1    254 255       Recent Labs  Lab 01/25/18  2218 01/25/18  2303 01/26/18  0306   INR 1.0 1.0 1.0   APTT <21.0  --   --      Microbiology Results (last 7 days)     ** No results found for the last 168 hours. **          Significant Diagnostics:  CT: Ct Head Without Contrast  Result Date: 1/26/2018  No detrimental change. Evolving subtle left MCA territory infarct.   Electronically signed by resident: DARCY CURRAN MD Date: 01/26/18 Time:00:50 A    MRI: Mri Brain Without Contrast  Result Date: 1/26/2018  1. Large acute left MCA territory infarction as above.  No associated hemorrhage or significant mass effect at this time. 2. Mild chronic ischemic changes.   Electronically signed by resident: HUNTER CASTRO Date: 01/26/18 Time:14:07       Assessment/Plan:     * Embolic stroke involving left middle cerebral artery    47 y.o. male with history of HTN and DM who presented to [facility] by EMS for concern of L MCA syndrome 1/25/18 AM. He is s/p thrombectomy of L M1 occlusion. CT MP revealed short segment occlusion of left M1.  Patient went to IR for thrombectomy of L M1.  TICI2C reperfusion and angioplasty. Neurosurgery consulted for hemicrani watch. Patient currently on asa and plavix.    -Imaging reviewed with Dr. Ingram  -Medical management per NCC.  -Neuro checks hourly  -Please notify NSR if worsening neuro exam     Discussed with Dr. Ingram                Thank you for your consult. I will follow-up with patient. Please contact us if you have any additional questions.    Ibrahima Maynard PA-C  Neurosurgery  [facility]

## 2018-01-26 NOTE — PROCEDURES
"Todd Quevedo is a 47 y.o. male patient.    Temp: 98.6 °F (37 °C) (01/26/18 0300)  Pulse: 97 (01/26/18 0600)  Resp: 18 (01/26/18 0600)  BP: 120/61 (01/26/18 0600)  SpO2: 96 % (01/26/18 0600)  Weight: 113.8 kg (250 lb 14.1 oz) (01/26/18 0300)  Height: 5' 10" (177.8 cm) (01/25/18 2300)       Arterial Line  Date/Time: 1/26/2018 6:27 AM  Location procedure was performed: [facility]  Performed by: JUAN PABLO GAUTHIER  Authorized by: JUAN PABLO GAUTHIER   Assisting provider: AGNES GHOSH  Consent Done: Emergent Situation  Indications: hemodynamic monitoring  Location: right radial  Edward's test normal: yes  Seldinger technique: Seldinger technique used  Number of attempts: 1  Complications: No  Estimated blood loss (mL): 0  Implants: No  Post-procedure: dressing applied  Patient tolerance: Patient tolerated the procedure well with no immediate complications          Juan Pablo Gauthier  1/26/2018    "

## 2018-01-26 NOTE — H&P
Inpatient Radiology Pre-procedure Note    History of Present Illness:  Todd Quevedo is a 47 y.o. male with stroke.    Admission H&P reviewed.  No past medical history on file.  No past surgical history on file.    Review of Systems:   As documented in primary team H&P    Home Meds:   Prior to Admission medications    Not on File     Scheduled Meds:   Continuous Infusions:   PRN Meds:  Anticoagulants/Antiplatelets: no anticoagulation    Allergies: Review of patient's allergies indicates:  No Known Allergies  Sedation Hx: have not been any systemic reactions    Labs:  No results for input(s): INR in the last 168 hours.    Invalid input(s):  PT,  PTT  No results for input(s): WBC, HGB, HCT, MCV, PLT in the last 168 hours. No results for input(s): GLU, NA, K, CL, CO2, BUN, CREATININE, CALCIUM, MG, ALT, AST, ALBUMIN, BILITOT, BILIDIR in the last 168 hours.      Vitals:  Temp: 97.8 °F (36.6 °C) (01/25/18 1901)  Pulse: 82 (01/25/18 1901)  Resp: 17 (01/25/18 1901)  BP: (!) 148/87 (01/25/18 1901)  SpO2: 98 % (01/25/18 1901)     Physical Exam:  ASA: 3  Mallampati: 2    Plan: cerebral angiogram / thrombectomy  Sedation Plan: light to moderate, PRN    Kushal Gannon MD  Neurointerventional Fellow  Department of Radiology  [phone]

## 2018-01-26 NOTE — CONSULTS
[facility]  Vascular Neurology  Comprehensive Stroke Center  Consult Note    Inpatient consult to Vascular (Stroke) Neurology  Consult performed by: ANTHONY CALLOWAY  Consult ordered by: DAVIS MYERS  Reason for consult: L MCA syndrome         Assessment/Plan:     Patient is a 47 y.o. year old male with:    Embolic stroke involving left middle cerebral artery    Patient is a 47 year old male who walked into [facility] and was acting abnormal.  EMS was called and brought to the ED for concern of L MCA syndrome. Not tpa candidate due to unknown last known normal.  Patient went to IR for thrombectomy of L M1 occlusion seen on CTA MP.  TICI2C reperfusion and angioplasty.      Antithrombotics for secondary stroke prevention:start asa 325mg qd and plavix 75mg qd     Statins for secondary stroke prevention and hyperlipidemia, if present: LDL     Aggressive risk factor modification: Obesity     Rehab efforts: Occupational Therapy, PT/OT/SLP to evaluate and treat    Diagnostics ordered/pending: MRI head without contrast to assess brain parenchyma, TTE to assess cardiac function/status , LDL, HgA1C     VTE prophylaxis: Heparin 5000 units SQ every 8 hours after repeat scan     BP parameters: Infarct: Post sucessful thrombectomy, SBP <140                STROKE DOCUMENTATION     Acute Stroke Times   Stroke Team Called Date: 01/25/18  Stroke Team Called Time: 1852  Stroke Team Arrival Date: 01/25/18  Stroke Team Arrival Time: 0652  CT Interpretation Time: 1910  Decision to Treat Time for Alteplase:  (n/a unknown last known normal )  Decision to Treat Time for IR: 1915    NIH Scale:  Interval: baseline (upon arrival/admit)  1a. Level Of Consciousness: 0-->Alert: keenly responsive  1b. LOC Questions: 2-->Answers neither question correctly  1c. LOC Commands: 2-->Performs neither task correctly  2. Best Gaze: 2-->Forced deviation, or total gaze paresis not overcome by the oculocephalic maneuver  3. Visual:  2-->Complete hemianopia  4. Facial Palsy: 0-->Normal symmetrical movements  5a. Motor Arm, Left: 3-->No effort against gravity: limb falls  5b. Motor Arm, Right: 3-->No effort against gravity: limb falls  6a. Motor Leg, Left: 3-->No effort against gravity: leg falls to bed immediately  6b. Motor Leg, Right: 3-->No effort against gravity: leg falls to bed immediately  7. Limb Ataxia: 0-->Absent  8. Sensory: 0-->Normal: no sensory loss  9. Best Language: 3-->Mute, global aphasia: no usable speech or auditory comprehension  10. Dysarthria: (UN) Intubated or other physical barrier  11. Extinction and Inattention (formerly Neglect): 2-->Profound maycol-inattention/extinction more than 1 modality  Total (NIH Stroke Scale): 25    Modified [place]    [place] Coma Scale:    ABCD2 Score:    [id number] Score:   HAS -BLED Score:   ICH Score:   Hunt & Laguerre Classification:       Thrombolysis Candidate? No, unknown last known normal       Interventional Revascularization Candidate?   Is the patient eligible for mechanical endovascular reperfusion (SIERRA)?  Yes TICI 2C reperfusion and angioplasty       Hemorrhagic change of an Ischemic Stroke: Does this patient have an ischemic stroke with hemorrhagic changes? No     Subjective:     History of Present Illness:  Patient is a 47 year old male who walked into [facility] and was acting abnormal.  EMS was called and brought to the ED for concern of L MCA syndrome.  He had left gaze deviation, right sided hemianopsia, right sided weakness and global aphasia.  CT head showed no hemorrhage and CTA MP showed L M1 occlusion.  Not a candidate for IV-tpa because unknown last normal.  He was than emergently taken for thrombectomy.            EMS reported glucose 309 and EKG abnormalities.          No past medical history on file.  No past surgical history on file.  No family history on file.  Social History   Substance Use Topics    Smoking status: Not on file    Smokeless tobacco: Not on file     Alcohol use Not on file     Review of patient's allergies indicates:  No Known Allergies    Medications: I have reviewed the current medication administration record.      (Not in a hospital admission)    Review of Systems   Constitutional: Negative for chills and fever.   HENT: Negative for drooling and rhinorrhea.    Eyes: Positive for visual disturbance. Negative for redness.   Respiratory: Negative for cough and shortness of breath.    Cardiovascular: Negative for leg swelling.   Gastrointestinal: Negative for diarrhea and vomiting.   Genitourinary: Negative for frequency and urgency.   Musculoskeletal: Positive for arthralgias. Negative for back pain and neck pain.   Skin: Negative for pallor and rash.   Neurological: Positive for weakness. Negative for seizures.   Psychiatric/Behavioral: Negative for agitation and behavioral problems.     Objective:     Vital Signs (Most Recent):  Temp: 97.8 °F (36.6 °C) (01/25/18 1901)  Pulse: 83 (01/25/18 1929)  Resp: 14 (01/25/18 1929)  BP: (!) 177/107 (01/25/18 1929)  SpO2: (!) 94 % (01/25/18 1929)    Vital Signs Range (Last 24H):  Temp:  [97.8 °F (36.6 °C)]   Pulse:  [82-83]   Resp:  [14-17]   BP: (148-177)/()   SpO2:  [94 %-98 %]     Physical Exam   Constitutional: He appears well-developed and well-nourished.   HENT:   Head: Normocephalic and atraumatic.   Eyes: Pupils are equal, round, and reactive to light.   Left gaze deviation    Cardiovascular: Normal rate and regular rhythm.    Pulmonary/Chest: Effort normal. No respiratory distress.   Neurological:   See below   Skin: Skin is warm and dry.   Psychiatric: He has a normal mood and affect.       Neurological Exam:   LOC: alert  Attention Span: poor  Language: Mute  Articulation: Mute/Anarthric  Orientation: mute aphasia   Visual Fields: right sided hemianopsia   EOM (CN III, IV, VI): Vertical gaze deviation   Pupils (CN II, III): PERRL   Facial Sensation (CN V):equal   Facial Movement (CN VII):mild  nasolabial flattening   Gag Reflex: not examined   Reflexes: not examined   Motor: moves all extremities but unable to test strength due to aphasia   Cebellar:unable to examine   Sensation:intact   Tone:normal      Laboratory:  CMP: No results for input(s): GLUCOSE, CALCIUM, ALBUMIN, PROT, NA, K, CO2, CL, BUN, CREATININE, ALKPHOS, ALT, AST, BILITOT in the last 24 hours.  CBC: No results for input(s): WBC, RBC, HGB, HCT, PLT, MCV, MCH, MCHC in the last 168 hours.  Lipid Panel: No results for input(s): CHOL, LDLCALC, HDL, TRIG in the last 168 hours.  Hgb A1C: No results for input(s): HGBA1C in the last 168 hours.  TSH: No results for input(s): TSH in the last 168 hours.    Diagnostic Results:      Brain imaging:  CTA MP 01/25/18   No evidence of hemorrhagic infarct   Hypodensity in the left parietal lobe  Left m1 occlusion       Vessel Imaging:  See above     Cardiac Evaluation:         Chely Currie PA-C  [facility] Stroke Center  Department of Vascular Neurology   [facility]

## 2018-01-26 NOTE — HPI
The patient is a 47 year old male with no known PMHx admitted to [facility]  s/p thrombectomy of L M1 occlusion. Per chart review, he   walked into [place] and was acting abnormal.  EMS was called and brought to the ED for concern of L MCA syndrome. CT MP revealed short segment occlusion of left M1.  Patient went to IR for thrombectomy of L M1 occlusion seen on CTA MP.  TICI2C reperfusion and angioplasty. Patient admitted to [facility] for close monitoring and higher level of care.   History obtained from chart review only

## 2018-01-27 PROBLEM — D72.829 LEUKOCYTOSIS: Status: ACTIVE | Noted: 2018-01-27

## 2018-01-27 PROBLEM — R06.03 RESPIRATORY DISTRESS: Status: ACTIVE | Noted: 2018-01-27

## 2018-01-27 LAB
ALBUMIN SERPL BCP-MCNC: 3.5 G/DL
ALLENS TEST: ABNORMAL
ALP SERPL-CCNC: 52 U/L
ALT SERPL W/O P-5'-P-CCNC: 29 U/L
ANION GAP SERPL CALC-SCNC: 10 MMOL/L
AST SERPL-CCNC: 22 U/L
BACTERIA #/AREA URNS AUTO: NORMAL /HPF
BASOPHILS # BLD AUTO: 0.04 K/UL
BASOPHILS NFR BLD: 0.3 %
BILIRUB SERPL-MCNC: 1.2 MG/DL
BILIRUB UR QL STRIP: NEGATIVE
BUN SERPL-MCNC: 18 MG/DL
CALCIUM SERPL-MCNC: 8.1 MG/DL
CHLORIDE SERPL-SCNC: 106 MMOL/L
CLARITY UR REFRACT.AUTO: CLEAR
CO2 SERPL-SCNC: 22 MMOL/L
COLOR UR AUTO: YELLOW
CREAT SERPL-MCNC: 1 MG/DL
DELSYS: ABNORMAL
DIFFERENTIAL METHOD: ABNORMAL
EOSINOPHIL # BLD AUTO: 0 K/UL
EOSINOPHIL NFR BLD: 0.1 %
ERYTHROCYTE [DISTWIDTH] IN BLOOD BY AUTOMATED COUNT: 12.8 %
ERYTHROCYTE [SEDIMENTATION RATE] IN BLOOD BY WESTERGREN METHOD: 16 MM/H
EST. GFR  (AFRICAN AMERICAN): >60 ML/MIN/1.73 M^2
EST. GFR  (NON AFRICAN AMERICAN): >60 ML/MIN/1.73 M^2
FIO2: 32
FLOW: 3
GLUCOSE SERPL-MCNC: 262 MG/DL
GLUCOSE UR QL STRIP: ABNORMAL
HCO3 UR-SCNC: 21.5 MMOL/L (ref 24–28)
HCT VFR BLD AUTO: 44.4 %
HGB BLD-MCNC: 15.4 G/DL
HGB UR QL STRIP: NEGATIVE
IMM GRANULOCYTES # BLD AUTO: 0.05 K/UL
IMM GRANULOCYTES NFR BLD AUTO: 0.4 %
INR PPP: 1
KETONES UR QL STRIP: ABNORMAL
LEUKOCYTE ESTERASE UR QL STRIP: NEGATIVE
LYMPHOCYTES # BLD AUTO: 2.3 K/UL
LYMPHOCYTES NFR BLD: 17.2 %
MAGNESIUM SERPL-MCNC: 2 MG/DL
MCH RBC QN AUTO: 29.6 PG
MCHC RBC AUTO-ENTMCNC: 34.7 G/DL
MCV RBC AUTO: 85 FL
MICROSCOPIC COMMENT: NORMAL
MODE: ABNORMAL
MONOCYTES # BLD AUTO: 1.1 K/UL
MONOCYTES NFR BLD: 7.9 %
NEUTROPHILS # BLD AUTO: 10 K/UL
NEUTROPHILS NFR BLD: 74.1 %
NITRITE UR QL STRIP: NEGATIVE
NRBC BLD-RTO: 0 /100 WBC
PCO2 BLDA: 31.7 MMHG (ref 35–45)
PH SMN: 7.44 [PH] (ref 7.35–7.45)
PH UR STRIP: 5 [PH] (ref 5–8)
PHOSPHATE SERPL-MCNC: 2.8 MG/DL
PLATELET # BLD AUTO: 253 K/UL
PMV BLD AUTO: 9.5 FL
PO2 BLDA: 59 MMHG (ref 80–100)
POC BE: -3 MMOL/L
POC SATURATED O2: 92 % (ref 95–100)
POC TCO2: 22 MMOL/L (ref 23–27)
POCT GLUCOSE: 108 MG/DL (ref 70–110)
POCT GLUCOSE: 121 MG/DL (ref 70–110)
POCT GLUCOSE: 123 MG/DL (ref 70–110)
POCT GLUCOSE: 133 MG/DL (ref 70–110)
POCT GLUCOSE: 140 MG/DL (ref 70–110)
POCT GLUCOSE: 144 MG/DL (ref 70–110)
POCT GLUCOSE: 164 MG/DL (ref 70–110)
POCT GLUCOSE: 165 MG/DL (ref 70–110)
POCT GLUCOSE: 171 MG/DL (ref 70–110)
POCT GLUCOSE: 172 MG/DL (ref 70–110)
POCT GLUCOSE: 175 MG/DL (ref 70–110)
POCT GLUCOSE: 176 MG/DL (ref 70–110)
POCT GLUCOSE: 177 MG/DL (ref 70–110)
POCT GLUCOSE: 180 MG/DL (ref 70–110)
POCT GLUCOSE: 181 MG/DL (ref 70–110)
POCT GLUCOSE: 183 MG/DL (ref 70–110)
POCT GLUCOSE: 191 MG/DL (ref 70–110)
POCT GLUCOSE: 192 MG/DL (ref 70–110)
POCT GLUCOSE: 196 MG/DL (ref 70–110)
POCT GLUCOSE: 211 MG/DL (ref 70–110)
POCT GLUCOSE: 212 MG/DL (ref 70–110)
POCT GLUCOSE: 216 MG/DL (ref 70–110)
POCT GLUCOSE: 216 MG/DL (ref 70–110)
POCT GLUCOSE: 225 MG/DL (ref 70–110)
POCT GLUCOSE: 226 MG/DL (ref 70–110)
POCT GLUCOSE: 235 MG/DL (ref 70–110)
POCT GLUCOSE: 235 MG/DL (ref 70–110)
POCT GLUCOSE: 242 MG/DL (ref 70–110)
POTASSIUM SERPL-SCNC: 3.9 MMOL/L
PROT SERPL-MCNC: 7.1 G/DL
PROT UR QL STRIP: NEGATIVE
PROTHROMBIN TIME: 10.2 SEC
RBC # BLD AUTO: 5.21 M/UL
RBC #/AREA URNS AUTO: 2 /HPF (ref 0–4)
SAMPLE: ABNORMAL
SITE: ABNORMAL
SODIUM SERPL-SCNC: 138 MMOL/L
SP GR UR STRIP: 1.02 (ref 1–1.03)
SP02: 93
URN SPEC COLLECT METH UR: ABNORMAL
UROBILINOGEN UR STRIP-ACNC: NEGATIVE EU/DL
WBC # BLD AUTO: 13.47 K/UL
WBC #/AREA URNS AUTO: 1 /HPF (ref 0–5)
YEAST UR QL AUTO: NORMAL

## 2018-01-27 PROCEDURE — 80053 COMPREHEN METABOLIC PANEL: CPT

## 2018-01-27 PROCEDURE — 63600175 PHARM REV CODE 636 W HCPCS: Performed by: NURSE PRACTITIONER

## 2018-01-27 PROCEDURE — 20000000 HC ICU ROOM

## 2018-01-27 PROCEDURE — 94640 AIRWAY INHALATION TREATMENT: CPT

## 2018-01-27 PROCEDURE — 25000003 PHARM REV CODE 250: Performed by: NURSE PRACTITIONER

## 2018-01-27 PROCEDURE — 25000003 PHARM REV CODE 250: Performed by: PHYSICIAN ASSISTANT

## 2018-01-27 PROCEDURE — 84100 ASSAY OF PHOSPHORUS: CPT

## 2018-01-27 PROCEDURE — 85610 PROTHROMBIN TIME: CPT

## 2018-01-27 PROCEDURE — 87086 URINE CULTURE/COLONY COUNT: CPT

## 2018-01-27 PROCEDURE — C1751 CATH, INF, PER/CENT/MIDLINE: HCPCS

## 2018-01-27 PROCEDURE — 85025 COMPLETE CBC W/AUTO DIFF WBC: CPT

## 2018-01-27 PROCEDURE — 27000221 HC OXYGEN, UP TO 24 HOURS

## 2018-01-27 PROCEDURE — 87040 BLOOD CULTURE FOR BACTERIA: CPT

## 2018-01-27 PROCEDURE — 81001 URINALYSIS AUTO W/SCOPE: CPT

## 2018-01-27 PROCEDURE — 31720 CLEARANCE OF AIRWAYS: CPT

## 2018-01-27 PROCEDURE — 25000003 PHARM REV CODE 250: Performed by: PSYCHIATRY & NEUROLOGY

## 2018-01-27 PROCEDURE — 99233 SBSQ HOSP IP/OBS HIGH 50: CPT | Mod: ,,, | Performed by: NEUROLOGICAL SURGERY

## 2018-01-27 PROCEDURE — 37799 UNLISTED PX VASCULAR SURGERY: CPT

## 2018-01-27 PROCEDURE — 83735 ASSAY OF MAGNESIUM: CPT

## 2018-01-27 PROCEDURE — 82803 BLOOD GASES ANY COMBINATION: CPT

## 2018-01-27 PROCEDURE — 95951 PR EEG MONITORING/VIDEORECORD: CPT | Mod: 26,52,, | Performed by: PSYCHIATRY & NEUROLOGY

## 2018-01-27 PROCEDURE — 63600175 PHARM REV CODE 636 W HCPCS: Performed by: PHYSICIAN ASSISTANT

## 2018-01-27 PROCEDURE — 95951 HC EEG MONITORING/VIDEO RECORD: CPT

## 2018-01-27 PROCEDURE — 63600175 PHARM REV CODE 636 W HCPCS: Performed by: PSYCHIATRY & NEUROLOGY

## 2018-01-27 PROCEDURE — 99233 SBSQ HOSP IP/OBS HIGH 50: CPT | Mod: ,,, | Performed by: PSYCHIATRY & NEUROLOGY

## 2018-01-27 PROCEDURE — 99233 SBSQ HOSP IP/OBS HIGH 50: CPT | Mod: ,,, | Performed by: PHYSICIAN ASSISTANT

## 2018-01-27 PROCEDURE — A4216 STERILE WATER/SALINE, 10 ML: HCPCS | Performed by: NURSE PRACTITIONER

## 2018-01-27 PROCEDURE — 25000242 PHARM REV CODE 250 ALT 637 W/ HCPCS: Performed by: PHYSICIAN ASSISTANT

## 2018-01-27 PROCEDURE — 99900035 HC TECH TIME PER 15 MIN (STAT)

## 2018-01-27 RX ORDER — HEPARIN SODIUM 5000 [USP'U]/ML
5000 INJECTION, SOLUTION INTRAVENOUS; SUBCUTANEOUS EVERY 8 HOURS
Status: DISCONTINUED | OUTPATIENT
Start: 2018-01-27 | End: 2018-02-08

## 2018-01-27 RX ORDER — SODIUM CHLORIDE FOR INHALATION 3 %
4 VIAL, NEBULIZER (ML) INHALATION EVERY 4 HOURS
Status: DISCONTINUED | OUTPATIENT
Start: 2018-01-27 | End: 2018-02-03

## 2018-01-27 RX ORDER — IPRATROPIUM BROMIDE AND ALBUTEROL SULFATE 2.5; .5 MG/3ML; MG/3ML
3 SOLUTION RESPIRATORY (INHALATION) EVERY 4 HOURS
Status: DISCONTINUED | OUTPATIENT
Start: 2018-01-27 | End: 2018-02-03

## 2018-01-27 RX ADMIN — ATORVASTATIN CALCIUM 80 MG: 20 TABLET, FILM COATED ORAL at 08:01

## 2018-01-27 RX ADMIN — Medication 3 ML: at 05:01

## 2018-01-27 RX ADMIN — IPRATROPIUM BROMIDE AND ALBUTEROL SULFATE 3 ML: .5; 3 SOLUTION RESPIRATORY (INHALATION) at 09:01

## 2018-01-27 RX ADMIN — SODIUM CHLORIDE 1.6 UNITS/HR: 9 INJECTION, SOLUTION INTRAVENOUS at 11:01

## 2018-01-27 RX ADMIN — IPRATROPIUM BROMIDE AND ALBUTEROL SULFATE 3 ML: .5; 3 SOLUTION RESPIRATORY (INHALATION) at 11:01

## 2018-01-27 RX ADMIN — STANDARDIZED SENNA CONCENTRATE AND DOCUSATE SODIUM 1 TABLET: 8.6; 5 TABLET, FILM COATED ORAL at 08:01

## 2018-01-27 RX ADMIN — NICARDIPINE HYDROCHLORIDE 7.5 MG/HR: 0.2 INJECTION, SOLUTION INTRAVENOUS at 04:01

## 2018-01-27 RX ADMIN — CLOPIDOGREL 75 MG: 75 TABLET, FILM COATED ORAL at 08:01

## 2018-01-27 RX ADMIN — SODIUM CHLORIDE 1000 ML: 0.9 INJECTION, SOLUTION INTRAVENOUS at 04:01

## 2018-01-27 RX ADMIN — IPRATROPIUM BROMIDE AND ALBUTEROL SULFATE 3 ML: .5; 3 SOLUTION RESPIRATORY (INHALATION) at 07:01

## 2018-01-27 RX ADMIN — ACETAMINOPHEN 650 MG: 325 TABLET, FILM COATED ORAL at 05:01

## 2018-01-27 RX ADMIN — ACETAMINOPHEN 650 MG: 325 TABLET, FILM COATED ORAL at 11:01

## 2018-01-27 RX ADMIN — NICARDIPINE HYDROCHLORIDE 10 MG/HR: 0.2 INJECTION, SOLUTION INTRAVENOUS at 06:01

## 2018-01-27 RX ADMIN — NICARDIPINE HYDROCHLORIDE 10 MG/HR: 0.2 INJECTION, SOLUTION INTRAVENOUS at 08:01

## 2018-01-27 RX ADMIN — DEXTROSE MONOHYDRATE 750 MG: 5 INJECTION, SOLUTION INTRAVENOUS at 08:01

## 2018-01-27 RX ADMIN — SODIUM CHLORIDE SOLN NEBU 3% 4 ML: 3 NEBU SOLN at 04:01

## 2018-01-27 RX ADMIN — HEPARIN SODIUM 5000 UNITS: 5000 INJECTION, SOLUTION INTRAVENOUS; SUBCUTANEOUS at 09:01

## 2018-01-27 RX ADMIN — Medication 3 ML: at 02:01

## 2018-01-27 RX ADMIN — DEXTROSE MONOHYDRATE 750 MG: 5 INJECTION, SOLUTION INTRAVENOUS at 04:01

## 2018-01-27 RX ADMIN — NICARDIPINE HYDROCHLORIDE 10 MG/HR: 0.2 INJECTION, SOLUTION INTRAVENOUS at 01:01

## 2018-01-27 RX ADMIN — CEFTRIAXONE 2 G: 2 INJECTION, SOLUTION INTRAVENOUS at 04:01

## 2018-01-27 RX ADMIN — IPRATROPIUM BROMIDE AND ALBUTEROL SULFATE 3 ML: .5; 3 SOLUTION RESPIRATORY (INHALATION) at 04:01

## 2018-01-27 RX ADMIN — ACETAMINOPHEN 650 MG: 325 TABLET, FILM COATED ORAL at 06:01

## 2018-01-27 RX ADMIN — SODIUM CHLORIDE SOLN NEBU 3% 4 ML: 3 NEBU SOLN at 11:01

## 2018-01-27 RX ADMIN — ASPIRIN 325 MG ORAL TABLET 325 MG: 325 PILL ORAL at 08:01

## 2018-01-27 RX ADMIN — SODIUM CHLORIDE SOLN NEBU 3% 4 ML: 3 NEBU SOLN at 09:01

## 2018-01-27 RX ADMIN — ACETAMINOPHEN 650 MG: 325 TABLET, FILM COATED ORAL at 01:01

## 2018-01-27 RX ADMIN — SODIUM CHLORIDE SOLN NEBU 3% 4 ML: 3 NEBU SOLN at 07:01

## 2018-01-27 NOTE — PLAN OF CARE
Problem: Patient Care Overview  Goal: Plan of Care Review  Outcome: Ongoing (interventions implemented as appropriate)  POC reviewed with pt and pt's wife at 0400. Pt is globally aphasic and unable to verbalize understanding, pt's wife verbalized understanding. Questions and concerns addressed. No acute events overnight. Pt progressing toward goals. Will continue to monitor. See flowsheets for full assessment and VS info

## 2018-01-27 NOTE — PROGRESS NOTES
[facility]  Neurosurgery  Progress Note    Subjective:     History of Present Illness: Todd Quevedo is 47 y.o. male with history of HTN and DM who presented to [facility] by EMS for concern of L MCA syndrome 1/25/18 AM. He is s/p thrombectomy of L M1 occlusion. CT MP revealed short segment occlusion of left M1.  Patient went to IR for thrombectomy of L M1.  TICI2C reperfusion and angioplasty. Neurosurgery consulted for hemicrani watch. Wife was at bedside during exam. Patient not intubated, lethargic, and moving left side spontaneously. He is currently on plavix daily.     Post-Op Info:  * No surgery found *         Interval History: NAEON.    Medications:  Continuous Infusions:   sodium chloride 0.9% 75 mL/hr at 01/27/18 1000    insulin (HUMAN R) infusion (adults) 5.2 Units/hr (01/27/18 0600)    niCARdipine 10 mg/hr (01/27/18 1000)     Scheduled Meds:   acetaminophen  650 mg Per NG tube Q6H    albuterol-ipratropium 2.5mg-0.5mg/3mL  3 mL Nebulization Q4H    aspirin  325 mg Per NG tube Daily    atorvastatin  80 mg Per NG tube Daily    clopidogrel  75 mg Per NG tube Daily    senna-docusate 8.6-50 mg  1 tablet Oral BID    sodium chloride 0.9%  3 mL Intravenous Q8H    sodium chloride 3%  4 mL Nebulization Q4H     PRN Meds:dextrose 50%, dextrose 50%, dextrose 50%, glucagon (human recombinant), labetalol, ondansetron, sodium chloride 0.9%     Review of Systems  Objective:     Weight: 113.1 kg (249 lb 5.4 oz)  Body mass index is 35.78 kg/m².  Vital Signs (Most Recent):  Temp: 98.9 °F (37.2 °C) (01/27/18 0701)  Pulse: (!) 113 (01/27/18 1000)  Resp: (!) 27 (01/27/18 1000)  BP: (!) 207/128 (01/27/18 0900)  SpO2: 97 % (01/27/18 1000) Vital Signs (24h Range):  Temp:  [98.8 °F (37.1 °C)-100.5 °F (38.1 °C)] 98.9 °F (37.2 °C)  Pulse:  [] 113  Resp:  [16-32] 27  SpO2:  [93 %-100 %] 97 %  BP: (117-207)/() 207/128  Arterial Line BP: (113-161)/(62-88) 140/73       Date 01/27/18 0700 - 01/28/18 0659    Shift [id number] 9170-0869 5179-1212 24 Hour Total   I  N  T  A  K  E   I.V.  (mL/kg) 500  (4.4)   500  (4.4)    Shift Total  (mL/kg) 500  (4.4)   500  (4.4)   O  U  T  P  U  T   Urine  (mL/kg/hr) 400   400    Shift Total  (mL/kg) 400  (3.5)   400  (3.5)   Weight (kg) 113.1 113.1 113.1 113.1            NG/OG Tube 01/25/18 2325 nasogastric Right nostril (Active)   Placement Check placement verified by x-ray 1/27/2018  7:01 AM   Tolerance no signs/symptoms of discomfort 1/27/2018  7:01 AM   Securement taped to nostril center 1/27/2018  7:01 AM   Clamp Status/Tolerance clamped 1/27/2018  7:01 AM   Intake (mL) 60 mL 1/26/2018 11:00 PM       Male External Urinary Catheter 01/25/18 2200 Small (Active)   Collection Container Urimeter 1/27/2018  7:01 AM   Securement Method secured to top of thigh w/ adhesive device 1/27/2018  7:01 AM   Skin no redness;no breakdown 1/27/2018  7:01 AM   Tolerance no signs/symptoms of discomfort 1/27/2018  7:01 AM   Output (mL) 400 mL 1/27/2018 10:00 AM   Catheter Change Date 01/26/18 1/27/2018  3:00 AM   Catheter Change Time 0300 1/27/2018  3:00 AM       Neurosurgery Physical Exam    Vital signs: reviewed above.   Constitutional: well-developed,   Cardiovascular: regular rate and rhythm  Resp: on CPAP  Abdomen: soft  Neurological  GCS E2V1M5  Mental Status: lethargic  non verbal on exam, does not follow commands  Head: normocephalic, atraumatic  PERRL  Facial expression symmetric  Moves left extremities spontaneously, right side withdraws to painful stimuli    Significant Labs:    Recent Labs  Lab 01/25/18  2303 01/26/18  0306 01/26/18  1752 01/27/18  0205   * 478* 155* 262*   * 134* 142 138   K 4.0 4.4 3.6 3.9   CL 98 101 107 106   CO2 22* 21* 24 22*   BUN 21* 23* 19 18   CREATININE 1.3 1.4 1.0 1.0   CALCIUM 9.0 8.3* 8.0* 8.1*   MG 1.8 1.8  --  2.0       Recent Labs  Lab 01/25/18  2303 01/26/18  0306 01/27/18  0205   WBC 11.99 12.96* 13.47*   HGB 15.9 15.5 15.4   HCT 44.6 43.1  44.4    255 253       Recent Labs  Lab 01/25/18  2218 01/25/18  2303 01/26/18  0306 01/27/18  0205   INR 1.0 1.0 1.0 1.0   APTT <21.0  --   --   --      Microbiology Results (last 7 days)     ** No results found for the last 168 hours. **        All pertinent labs from the last 24 hours have been reviewed.    Significant Diagnostics:  MRI: Mri Brain Without Contrast    Result Date: 1/26/2018  1. Large acute left MCA territory infarction as above.  No associated hemorrhage or significant mass effect at this time. 2. Mild chronic ischemic changes. ______________________________________ Electronically signed by resident: HUNTER CASTRO Date:     01/26/18 Time:    14:07 As the supervising and teaching physician, I personally reviewed the images and resident's interpretation and I agree with the findings. Electronically signed by: FLORES MENEZES MD Date:     01/26/18 Time:    15:25     Assessment/Plan:     * Embolic stroke involving left middle cerebral artery    47 y.o. male with history of HTN and DM who presented to [facility] by EMS for concern of L MCA syndrome 1/25/18 AM. He is s/p thrombectomy of L M1 occlusion. CT MP revealed short segment occlusion of left M1.  Patient went to IR for thrombectomy of L M1.  TICI2C reperfusion and angioplasty. Neurosurgery consulted for hemicrani watch. Patient currently on asa and plavix.    -No neurosurgical intervention at this time.  -Maximum medical management per NCC.  -Neuro checks hourly  -Please notify NSR if worsening neuro exam     Discussed with Dr. Nataly Duran MD  Neurosurgery  [facility]

## 2018-01-27 NOTE — SUBJECTIVE & OBJECTIVE
Interval History: NAEON.    Medications:  Continuous Infusions:   sodium chloride 0.9% 75 mL/hr at 01/27/18 1000    insulin (HUMAN R) infusion (adults) 5.2 Units/hr (01/27/18 0600)    niCARdipine 10 mg/hr (01/27/18 1000)     Scheduled Meds:   acetaminophen  650 mg Per NG tube Q6H    albuterol-ipratropium 2.5mg-0.5mg/3mL  3 mL Nebulization Q4H    aspirin  325 mg Per NG tube Daily    atorvastatin  80 mg Per NG tube Daily    clopidogrel  75 mg Per NG tube Daily    senna-docusate 8.6-50 mg  1 tablet Oral BID    sodium chloride 0.9%  3 mL Intravenous Q8H    sodium chloride 3%  4 mL Nebulization Q4H     PRN Meds:dextrose 50%, dextrose 50%, dextrose 50%, glucagon (human recombinant), labetalol, ondansetron, sodium chloride 0.9%     Review of Systems  Objective:     Weight: 113.1 kg (249 lb 5.4 oz)  Body mass index is 35.78 kg/m².  Vital Signs (Most Recent):  Temp: 98.9 °F (37.2 °C) (01/27/18 0701)  Pulse: (!) 113 (01/27/18 1000)  Resp: (!) 27 (01/27/18 1000)  BP: (!) 207/128 (01/27/18 0900)  SpO2: 97 % (01/27/18 1000) Vital Signs (24h Range):  Temp:  [98.8 °F (37.1 °C)-100.5 °F (38.1 °C)] 98.9 °F (37.2 °C)  Pulse:  [] 113  Resp:  [16-32] 27  SpO2:  [93 %-100 %] 97 %  BP: (117-207)/() 207/128  Arterial Line BP: (113-161)/(62-88) 140/73       Date 01/27/18 0700 - 01/28/18 0659   Shift [id number] 1171-8538 5342-8155 24 Hour Total   I  N  T  A  K  E   I.V.  (mL/kg) 500  (4.4)   500  (4.4)    Shift Total  (mL/kg) 500  (4.4)   500  (4.4)   O  U  T  P  U  T   Urine  (mL/kg/hr) 400   400    Shift Total  (mL/kg) 400  (3.5)   400  (3.5)   Weight (kg) 113.1 113.1 113.1 113.1            NG/OG Tube 01/25/18 2758 nasogastric Right nostril (Active)   Placement Check placement verified by x-ray 1/27/2018  7:01 AM   Tolerance no signs/symptoms of discomfort 1/27/2018  7:01 AM   Securement taped to nostril center 1/27/2018  7:01 AM   Clamp Status/Tolerance clamped 1/27/2018  7:01 AM   Intake (mL) 60 mL 1/26/2018 11:00  PM       Male External Urinary Catheter 01/25/18 2200 Small (Active)   Collection Container Urimeter 1/27/2018  7:01 AM   Securement Method secured to top of thigh w/ adhesive device 1/27/2018  7:01 AM   Skin no redness;no breakdown 1/27/2018  7:01 AM   Tolerance no signs/symptoms of discomfort 1/27/2018  7:01 AM   Output (mL) 400 mL 1/27/2018 10:00 AM   Catheter Change Date 01/26/18 1/27/2018  3:00 AM   Catheter Change Time 0300 1/27/2018  3:00 AM       Neurosurgery Physical Exam    Vital signs: reviewed above.   Constitutional: well-developed,   Cardiovascular: regular rate and rhythm  Resp: on CPAP  Abdomen: soft  Neurological  GCS E2V1M5  Mental Status: lethargic  non verbal on exam, does not follow commands  Head: normocephalic, atraumatic  PERRL  Facial expression symmetric  Moves left extremities spontaneously, right side withdraws to painful stimuli    Significant Labs:    Recent Labs  Lab 01/25/18  2303 01/26/18  0306 01/26/18  1752 01/27/18  0205   * 478* 155* 262*   * 134* 142 138   K 4.0 4.4 3.6 3.9   CL 98 101 107 106   CO2 22* 21* 24 22*   BUN 21* 23* 19 18   CREATININE 1.3 1.4 1.0 1.0   CALCIUM 9.0 8.3* 8.0* 8.1*   MG 1.8 1.8  --  2.0       Recent Labs  Lab 01/25/18  2303 01/26/18  0306 01/27/18  0205   WBC 11.99 12.96* 13.47*   HGB 15.9 15.5 15.4   HCT 44.6 43.1 44.4    255 253       Recent Labs  Lab 01/25/18  2218 01/25/18  2303 01/26/18  0306 01/27/18  0205   INR 1.0 1.0 1.0 1.0   APTT <21.0  --   --   --      Microbiology Results (last 7 days)     ** No results found for the last 168 hours. **        All pertinent labs from the last 24 hours have been reviewed.    Significant Diagnostics:  MRI: Mri Brain Without Contrast    Result Date: 1/26/2018  1. Large acute left MCA territory infarction as above.  No associated hemorrhage or significant mass effect at this time. 2. Mild chronic ischemic changes. ______________________________________ Electronically signed by resident:  HUNTER CASTRO Date:     01/26/18 Time:    14:07 As the supervising and teaching physician, I personally reviewed the images and resident's interpretation and I agree with the findings. Electronically signed by: FLORES MENEZES MD Date:     01/26/18 Time:    15:25

## 2018-01-27 NOTE — PROGRESS NOTES
[facility]  Neurocritical Care  Progress Note    Admit Date: 1/25/2018  Service Date: 01/27/2018  Length of Stay: 2    Subjective:     Chief Complaint: Embolic stroke involving left middle cerebral artery    History of Present Illness: The patient is a 47 year old male with no known PMHx admitted to [facility]   s/p thrombectomy of L M1 occlusion. Per chart review, he   walked into [place] and was acting abnormal.  EMS was called and brought to the ED for concern of L MCA syndrome. CT MP revealed short segment occlusion of left M1.  Patient went to IR for thrombectomy of L M1 occlusion seen on CTA MP.  TICI2C reperfusion and angioplasty. Patient admitted to [facility] for close monitoring and higher level of care.   History obtained from chart review only        Hospital Course:  1/25: Pt admitted to [facility] s/p L M1 thrombectomy, TICI2C reperfusion and angioplasty   1/27: large L MCA, hemicrani watch, NSGY following, insulin ggt, cardene ggt, L sided intermittent slowing eeg but no sz, +nasal trumpet for copious thick secretions, wheezing this am - improved with duo nebs, 3% nebs, and cough assist, concern for sepsis 2/2 hemodynamic changes - increased HR and BP, worsening leukocytosis, +ceftriaxone, pan cx, adrian track    Interval History: wheezing and respiratory distress this am relieved with nebs and NT suctioning, concern for sepsis, pan cx, ID work up    Review of Systems:   Constitutional: Denies fevers or chills.  Pulmonary: Denies shortness of breath or cough.  Cardiology: Denies chest pain or palpitations.  GI: Denies abdominal pain or constipation.  Neurologic: Denies new weakness,  headache, or paresthesias.    Vitals:   Temp: 100.1 °F (37.8 °C)  Pulse: (!) 114  Rhythm: sinus tachycardia  BP: (!) 146/108  MAP (mmHg): 119  CI (L/min/m2): 4.4 L/min/m2  SVI: 37  SVV: 20 %  Resp: (!) 23  SpO2: 96 %  O2 Device (Oxygen Therapy): nasal cannula w/ humidification    Temp  Min: 98.7 °F (37.1 °C)  Max: 100.5 °F  (38.1 °C)  Pulse  Min: 88  Max: 118  BP  Min: 135/69  Max: 207/128  MAP (mmHg)  Min: 93  Max: 151  CI (L/min/m2)  Min: 4.4 L/min/m2  Max: 4.4 L/min/m2  SVI  Min: 37  Max: 37  SVV  Min: 20 %  Max: 20 %  Resp  Min: 15  Max: 32  SpO2  Min: 92 %  Max: 100 %    01/26 0701 - 01/27 0700  In: 2430.8 [I.V.:2250.8]  Out: 1400 [Urine:1400]   Unmeasured Output  Urine Occurrence: 1     Examination:   Constitutional: Well-nourished and -developed. No apparent distress.   Eyes: Conjunctiva clear, anicteric. Lids no lesions.  Head/Ears/Nose/Mouth/Throat/Neck: Moist mucous membranes. External ears, nose atraumatic.   Cardiovascular: Regular rhythm. No murmurs. No leg edema.  Respiratory: copious secretions. Exp wheezing bilat.  Gastrointestinal: No hernia. Soft, nondistended, nontender. + bowel sounds.    Neurologic:  --GCS: E1V1 M5  --Mental Status: lethargic, requires frequent painful stimulation to open eyes, does not follow commands   --CN II-XII -FRIEDA  --Pupils 4mm, PERRL.   --Corneal reflex, gag, cough intact.  --Moves spontaneously LUE/LLE  --RUE extensor posturing, RLE moves spontaneously  --Sensation unable to assess     Medications:   Continuous    sodium chloride 0.9% Last Rate: 75 mL/hr at 01/27/18 1700   insulin (HUMAN R) infusion (adults) Last Rate: 5.2 Units/hr (01/27/18 0600)   niCARdipine Last Rate: 10 mg/hr (01/27/18 1700)   Scheduled    acetaminophen 650 mg Q6H   albuterol-ipratropium 2.5mg-0.5mg/3mL 3 mL Q4H   aspirin 325 mg Daily   atorvastatin 80 mg Daily   cefTRIAXone (ROCEPHIN) IVPB 2 g Q24H   clopidogrel 75 mg Daily   heparin (porcine) 5,000 Units Q8H   levetiracetam (KEPPRA) IVPB 100ml 750 mg Q12H   senna-docusate 8.6-50 mg 1 tablet BID   sodium chloride 0.9% 3 mL Q8H   sodium chloride 3% 4 mL Q4H   PRN    dextrose 50% 12.5 g PRN   dextrose 50% 12.5 g PRN   dextrose 50% 25 g PRN   glucagon (human recombinant) 1 mg PRN   labetalol 10 mg Q4H PRN   ondansetron 4 mg Q8H PRN   sodium chloride 0.9% 5 mL PRN       Today I independently reviewed pertinent medications, lines/drains/airways, imaging, cardiology, lab results, microbiology results, notably: pan cx, infectious workup, repeat CTH stable, remains on hemicrani watch. VSS. HDS. cardene ggt. Airway secure - NC and nasal trumpet.     Assessment/Plan:     Neuro   * Embolic stroke involving left middle cerebral artery    S/P L M1 thrombectomy  -- Neuro checks q 1hr  -- Vascular Neurology  following  -- CT MP- Short segment occlusion of left M1   -- Repeat CTH 1/26 - No detrimental change  -- SBP goal <140  -- PT/OT/Speech  -- hemicrani watch, NSGY following   -- MRI 1/26 - L MCA, no hemorrhagic conversion  -- stat CTH 1/27: L MCA stable, no worsening edema or hemorrhagic conversion  -- cEEG, no sz but intermittent L sided slowing, starting keppra 750 mg BID   -- ASA, plavix             Pulmonary   Respiratory distress    -episode of wheezing this am, improved with suction via nasal trumpet, 3% nebs, duo nebs, and cough assist  -concern for sinusitis/resp infection 2/2 low grade fever, leukocytosis, and increasing thick secretions  -ceftriaxone 2g q24h  -pan cx  -concern for sepsis, adrian track, follow abgs, 1L NS bolus         Cardiac/Vascular   Hyperlipidemia    -atorvastatin 80 daily           Essential hypertension    --SBP <140  --cardene gtt  --echo:    1 - Normal left ventricular systolic function (EF 65-70%).     2 - Concentric remodeling.     3 - No wall motion abnormalities.     4 - Normal left ventricular diastolic function.     5 - Normal right ventricular systolic function .             Endocrine   Type 2 diabetes mellitus    -A1C-10  -poorly controlled diabetes  -BG >400 on arrival  -insulin gtt              Prophylaxis:  Venous Thromboembolism: mechanical chemical  Stress Ulcer: None  Ventilator Pneumonia: not applicable     Activity Orders          None        Full Code    Michael Cochran PA-C  Neurocritical Care  [facility]

## 2018-01-27 NOTE — ASSESSMENT & PLAN NOTE
-episode of wheezing this am, improved with suction via nasal trumpet, 3% nebs, duo nebs, and cough assist  -concern for sinusitis/resp infection 2/2 low grade fever, leukocytosis, and increasing thick secretions  -ceftriaxone 2g q24h  -pan cx  -concern for sepsis, adrian track, follow abgs, 1L NS bolus

## 2018-01-27 NOTE — PLAN OF CARE
Problem: Patient Care Overview  Goal: Plan of Care Review  Outcome: Ongoing (interventions implemented as appropriate)  VS and assessment per flow sheet, pt taken to CT, tolerated well, blood cultures and urine culture collected, fluids and antibiotics started, pt remains on cooling blanket for fever, patient progressing towards goal as tolerated. Plan of care reviewed with Todd Quevedo and spouse at 1700, all concerns addressed. Will continue to monitor.

## 2018-01-27 NOTE — ASSESSMENT & PLAN NOTE
S/P L M1 thrombectomy  -- Neuro checks q 1hr  -- Vascular Neurology  following  -- CT MP- Short segment occlusion of left M1   -- Repeat CTH 1/26 - No detrimental change  -- SBP goal <140  -- PT/OT/Speech  -- hemicrani watch, NSGY following   -- MRI 1/26 - L MCA, no hemorrhagic conversion  -- stat CTH 1/27: L MCA stable, no worsening edema or hemorrhagic conversion  -- cEEG, no sz but intermittent L sided slowing, starting keppra 750 mg BID   -- ASA, plavix

## 2018-01-27 NOTE — ASSESSMENT & PLAN NOTE
--SBP <140  --cardene gtt  --echo:    1 - Normal left ventricular systolic function (EF 65-70%).     2 - Concentric remodeling.     3 - No wall motion abnormalities.     4 - Normal left ventricular diastolic function.     5 - Normal right ventricular systolic function .

## 2018-01-28 PROBLEM — G93.6 CYTOTOXIC CEREBRAL EDEMA: Status: ACTIVE | Noted: 2018-01-28

## 2018-01-28 LAB
ALBUMIN SERPL BCP-MCNC: 3.3 G/DL
ALP SERPL-CCNC: 65 U/L
ALT SERPL W/O P-5'-P-CCNC: 32 U/L
ANION GAP SERPL CALC-SCNC: 14 MMOL/L
AST SERPL-CCNC: 30 U/L
BACTERIA UR CULT: NO GROWTH
BASOPHILS # BLD AUTO: 0.05 K/UL
BASOPHILS NFR BLD: 0.3 %
BILIRUB SERPL-MCNC: 1.4 MG/DL
BUN SERPL-MCNC: 16 MG/DL
CALCIUM SERPL-MCNC: 8.5 MG/DL
CHLORIDE SERPL-SCNC: 107 MMOL/L
CO2 SERPL-SCNC: 18 MMOL/L
CREAT SERPL-MCNC: 0.9 MG/DL
DIFFERENTIAL METHOD: ABNORMAL
EOSINOPHIL # BLD AUTO: 0 K/UL
EOSINOPHIL NFR BLD: 0 %
ERYTHROCYTE [DISTWIDTH] IN BLOOD BY AUTOMATED COUNT: 12.5 %
EST. GFR  (AFRICAN AMERICAN): >60 ML/MIN/1.73 M^2
EST. GFR  (NON AFRICAN AMERICAN): >60 ML/MIN/1.73 M^2
GLUCOSE SERPL-MCNC: 238 MG/DL
HCT VFR BLD AUTO: 42.9 %
HGB BLD-MCNC: 15.2 G/DL
IMM GRANULOCYTES # BLD AUTO: 0.11 K/UL
IMM GRANULOCYTES NFR BLD AUTO: 0.6 %
INR PPP: 1
LYMPHOCYTES # BLD AUTO: 1.8 K/UL
LYMPHOCYTES NFR BLD: 9.5 %
MAGNESIUM SERPL-MCNC: 1.7 MG/DL
MCH RBC QN AUTO: 29.5 PG
MCHC RBC AUTO-ENTMCNC: 35.4 G/DL
MCV RBC AUTO: 83 FL
MONOCYTES # BLD AUTO: 1.5 K/UL
MONOCYTES NFR BLD: 8.1 %
NEUTROPHILS # BLD AUTO: 15.3 K/UL
NEUTROPHILS NFR BLD: 81.5 %
NRBC BLD-RTO: 0 /100 WBC
PHOSPHATE SERPL-MCNC: 3.5 MG/DL
PLATELET # BLD AUTO: 276 K/UL
PMV BLD AUTO: 9.2 FL
POCT GLUCOSE: 110 MG/DL (ref 70–110)
POCT GLUCOSE: 132 MG/DL (ref 70–110)
POCT GLUCOSE: 140 MG/DL (ref 70–110)
POCT GLUCOSE: 157 MG/DL (ref 70–110)
POCT GLUCOSE: 160 MG/DL (ref 70–110)
POCT GLUCOSE: 166 MG/DL (ref 70–110)
POCT GLUCOSE: 173 MG/DL (ref 70–110)
POCT GLUCOSE: 198 MG/DL (ref 70–110)
POCT GLUCOSE: 203 MG/DL (ref 70–110)
POCT GLUCOSE: 223 MG/DL (ref 70–110)
POCT GLUCOSE: 225 MG/DL (ref 70–110)
POCT GLUCOSE: 230 MG/DL (ref 70–110)
POCT GLUCOSE: 87 MG/DL (ref 70–110)
POTASSIUM SERPL-SCNC: 3.9 MMOL/L
PROCALCITONIN SERPL IA-MCNC: 0.23 NG/ML
PROT SERPL-MCNC: 7.1 G/DL
PROTHROMBIN TIME: 10.5 SEC
RBC # BLD AUTO: 5.16 M/UL
SODIUM SERPL-SCNC: 139 MMOL/L
WBC # BLD AUTO: 18.71 K/UL

## 2018-01-28 PROCEDURE — 99233 SBSQ HOSP IP/OBS HIGH 50: CPT | Mod: ,,, | Performed by: NEUROLOGICAL SURGERY

## 2018-01-28 PROCEDURE — 83735 ASSAY OF MAGNESIUM: CPT

## 2018-01-28 PROCEDURE — A4216 STERILE WATER/SALINE, 10 ML: HCPCS | Performed by: NURSE PRACTITIONER

## 2018-01-28 PROCEDURE — 25000003 PHARM REV CODE 250: Performed by: PHYSICIAN ASSISTANT

## 2018-01-28 PROCEDURE — 25000242 PHARM REV CODE 250 ALT 637 W/ HCPCS: Performed by: PHYSICIAN ASSISTANT

## 2018-01-28 PROCEDURE — 94640 AIRWAY INHALATION TREATMENT: CPT

## 2018-01-28 PROCEDURE — 94761 N-INVAS EAR/PLS OXIMETRY MLT: CPT

## 2018-01-28 PROCEDURE — 85025 COMPLETE CBC W/AUTO DIFF WBC: CPT

## 2018-01-28 PROCEDURE — 25000003 PHARM REV CODE 250: Performed by: NURSE PRACTITIONER

## 2018-01-28 PROCEDURE — 99233 SBSQ HOSP IP/OBS HIGH 50: CPT | Mod: ,,, | Performed by: PSYCHIATRY & NEUROLOGY

## 2018-01-28 PROCEDURE — 27000221 HC OXYGEN, UP TO 24 HOURS

## 2018-01-28 PROCEDURE — 63600175 PHARM REV CODE 636 W HCPCS: Performed by: PHYSICIAN ASSISTANT

## 2018-01-28 PROCEDURE — 25000003 PHARM REV CODE 250: Performed by: PSYCHIATRY & NEUROLOGY

## 2018-01-28 PROCEDURE — 84145 PROCALCITONIN (PCT): CPT

## 2018-01-28 PROCEDURE — 94660 CPAP INITIATION&MGMT: CPT

## 2018-01-28 PROCEDURE — 84100 ASSAY OF PHOSPHORUS: CPT

## 2018-01-28 PROCEDURE — 63600175 PHARM REV CODE 636 W HCPCS: Performed by: PSYCHIATRY & NEUROLOGY

## 2018-01-28 PROCEDURE — 20000000 HC ICU ROOM

## 2018-01-28 PROCEDURE — 99900035 HC TECH TIME PER 15 MIN (STAT)

## 2018-01-28 PROCEDURE — 99291 CRITICAL CARE FIRST HOUR: CPT | Mod: ,,, | Performed by: PSYCHIATRY & NEUROLOGY

## 2018-01-28 PROCEDURE — 85610 PROTHROMBIN TIME: CPT

## 2018-01-28 PROCEDURE — 80053 COMPREHEN METABOLIC PANEL: CPT

## 2018-01-28 RX ORDER — MAGNESIUM SULFATE HEPTAHYDRATE 40 MG/ML
4 INJECTION, SOLUTION INTRAVENOUS
Status: DISCONTINUED | OUTPATIENT
Start: 2018-01-28 | End: 2018-02-21

## 2018-01-28 RX ORDER — BISACODYL 10 MG
10 SUPPOSITORY, RECTAL RECTAL DAILY PRN
Status: DISCONTINUED | OUTPATIENT
Start: 2018-01-28 | End: 2018-03-21 | Stop reason: HOSPADM

## 2018-01-28 RX ORDER — MAGNESIUM SULFATE HEPTAHYDRATE 40 MG/ML
2 INJECTION, SOLUTION INTRAVENOUS
Status: DISCONTINUED | OUTPATIENT
Start: 2018-01-28 | End: 2018-02-21

## 2018-01-28 RX ADMIN — IPRATROPIUM BROMIDE AND ALBUTEROL SULFATE 3 ML: .5; 3 SOLUTION RESPIRATORY (INHALATION) at 03:01

## 2018-01-28 RX ADMIN — HEPARIN SODIUM 5000 UNITS: 5000 INJECTION, SOLUTION INTRAVENOUS; SUBCUTANEOUS at 10:01

## 2018-01-28 RX ADMIN — CLOPIDOGREL 75 MG: 75 TABLET, FILM COATED ORAL at 08:01

## 2018-01-28 RX ADMIN — HEPARIN SODIUM 5000 UNITS: 5000 INJECTION, SOLUTION INTRAVENOUS; SUBCUTANEOUS at 01:01

## 2018-01-28 RX ADMIN — HEPARIN SODIUM 5000 UNITS: 5000 INJECTION, SOLUTION INTRAVENOUS; SUBCUTANEOUS at 05:01

## 2018-01-28 RX ADMIN — ATORVASTATIN CALCIUM 80 MG: 20 TABLET, FILM COATED ORAL at 08:01

## 2018-01-28 RX ADMIN — IPRATROPIUM BROMIDE AND ALBUTEROL SULFATE 3 ML: .5; 3 SOLUTION RESPIRATORY (INHALATION) at 07:01

## 2018-01-28 RX ADMIN — IPRATROPIUM BROMIDE AND ALBUTEROL SULFATE 3 ML: .5; 3 SOLUTION RESPIRATORY (INHALATION) at 08:01

## 2018-01-28 RX ADMIN — DEXTROSE MONOHYDRATE 750 MG: 5 INJECTION, SOLUTION INTRAVENOUS at 08:01

## 2018-01-28 RX ADMIN — ACETAMINOPHEN 650 MG: 325 TABLET, FILM COATED ORAL at 05:01

## 2018-01-28 RX ADMIN — SODIUM CHLORIDE SOLN NEBU 3% 4 ML: 3 NEBU SOLN at 08:01

## 2018-01-28 RX ADMIN — SODIUM CHLORIDE SOLN NEBU 3% 4 ML: 3 NEBU SOLN at 12:01

## 2018-01-28 RX ADMIN — Medication 3 ML: at 10:01

## 2018-01-28 RX ADMIN — Medication 3 ML: at 01:01

## 2018-01-28 RX ADMIN — NICARDIPINE HYDROCHLORIDE 12.5 MG/HR: 0.2 INJECTION, SOLUTION INTRAVENOUS at 03:01

## 2018-01-28 RX ADMIN — NICARDIPINE HYDROCHLORIDE 10 MG/HR: 0.2 INJECTION, SOLUTION INTRAVENOUS at 06:01

## 2018-01-28 RX ADMIN — STANDARDIZED SENNA CONCENTRATE AND DOCUSATE SODIUM 1 TABLET: 8.6; 5 TABLET, FILM COATED ORAL at 08:01

## 2018-01-28 RX ADMIN — Medication 3 ML: at 05:01

## 2018-01-28 RX ADMIN — BISACODYL 10 MG: 10 SUPPOSITORY RECTAL at 03:01

## 2018-01-28 RX ADMIN — SODIUM CHLORIDE SOLN NEBU 3% 4 ML: 3 NEBU SOLN at 03:01

## 2018-01-28 RX ADMIN — MAGNESIUM SULFATE HEPTAHYDRATE 2 G: 40 INJECTION, SOLUTION INTRAVENOUS at 09:01

## 2018-01-28 RX ADMIN — SODIUM CHLORIDE SOLN NEBU 3% 4 ML: 3 NEBU SOLN at 07:01

## 2018-01-28 RX ADMIN — IPRATROPIUM BROMIDE AND ALBUTEROL SULFATE 3 ML: .5; 3 SOLUTION RESPIRATORY (INHALATION) at 12:01

## 2018-01-28 RX ADMIN — SODIUM CHLORIDE SOLN NEBU 3% 4 ML: 3 NEBU SOLN at 11:01

## 2018-01-28 RX ADMIN — CEFTRIAXONE 2 G: 2 INJECTION, SOLUTION INTRAVENOUS at 03:01

## 2018-01-28 RX ADMIN — ASPIRIN 325 MG ORAL TABLET 325 MG: 325 PILL ORAL at 08:01

## 2018-01-28 RX ADMIN — IPRATROPIUM BROMIDE AND ALBUTEROL SULFATE 3 ML: .5; 3 SOLUTION RESPIRATORY (INHALATION) at 11:01

## 2018-01-28 RX ADMIN — MOXIFLOXACIN HYDROCHLORIDE 400 MG: 400 INJECTION, SOLUTION INTRAVENOUS at 09:01

## 2018-01-28 NOTE — PROGRESS NOTES
Attempted to NT suction via nasal trumpet; resistance was felt; nasal trumpet was removed with intention of being replaced. Upon removal it was noted by RT and RN that nasal trumpet was clogged with secretions.   Pt. began grunting; oral and NT suction was attempted; Pt was placed on 4NC with humidity; SpO2@ 95%; Nasal trumpet was not replaced at this time to prevent further irritation; Will continue to monitor.

## 2018-01-28 NOTE — PROGRESS NOTES
[facility]  Neurocritical Care  Progress Note    Admit Date: 1/25/2018  Service Date: 01/28/2018  Length of Stay: 3    Subjective:     Chief Complaint: Embolic stroke involving left middle cerebral artery    History of Present Illness: The patient is a 47 year old male with no known PMHx admitted to [facility]   s/p thrombectomy of L M1 occlusion. Per chart review, he   walked into [place] and was acting abnormal.  EMS was called and brought to the ED for concern of L MCA syndrome. CT MP revealed short segment occlusion of left M1.  Patient went to IR for thrombectomy of L M1 occlusion seen on CTA MP.  TICI2C reperfusion and angioplasty. Patient admitted to [facility] for close monitoring and higher level of care.   History obtained from chart review only            Hospital Course: 1/25: Pt admitted to [facility] s/p L M1 thrombectomy, TICI2C reperfusion and angioplasty   1/27: large L MCA, hemicrani watch, NSGY following, insulin ggt, cardene ggt, L sided intermittent slowing eeg but no sz, +nasal trumpet for copious thick secretions, wheezing this am - improved with duo nebs, 3% nebs, and cough assist, concern for sepsis 2/2 hemodynamic changes - increased HR and BP, worsening leukocytosis, +ceftriaxone, pan cx, adrian track  1/28: continued respiratory challenges due to secretions. Aggressive pulmonary toileting. Continue monitoring for neuro worsening. Add moxifloxacin.       Review of Symptoms: encephalopathic, aphasic cannot participate  Constitutional: Denies fevers or chills.  Pulmonary: Denies shortness of breath or cough.  Cardiology: Denies chest pain or palpitations.  GI: Denies abdominal pain or constipation.  Neurologic: Denies new weakness, headaches, or paresthesias.     Medications:  Continuous Infusions:   sodium chloride 0.9% 75 mL/hr at 01/28/18 0800    insulin (HUMAN R) infusion (adults) 1.3 Units/hr (01/28/18 0800)    niCARdipine 5 mg/hr (01/28/18 0800)     Scheduled Meds:   acetaminophen   650 mg Per NG tube Q6H    albuterol-ipratropium 2.5mg-0.5mg/3mL  3 mL Nebulization Q4H    aspirin  325 mg Per NG tube Daily    atorvastatin  80 mg Per NG tube Daily    cefTRIAXone (ROCEPHIN) IVPB  2 g Intravenous Q24H    clopidogrel  75 mg Per NG tube Daily    heparin (porcine)  5,000 Units Subcutaneous Q8H    levetiracetam (KEPPRA) IVPB 100ml  750 mg Intravenous Q12H    senna-docusate 8.6-50 mg  1 tablet Oral BID    sodium chloride 0.9%  3 mL Intravenous Q8H    sodium chloride 3%  4 mL Nebulization Q4H     PRN Meds:.dextrose 50%, dextrose 50%, dextrose 50%, glucagon (human recombinant), labetalol, ondansetron, sodium chloride 0.9%    OBJECTIVE:   Vital Signs (Most Recent):   Temp: 99 °F (37.2 °C) (01/28/18 0701)  Pulse: 97 (01/28/18 0800)  Resp: 20 (01/28/18 0800)  BP: (!) 157/70 (01/28/18 0100)  SpO2: 100 % (01/28/18 0800)    Vital Signs (24h Range):   Temp:  [97.9 °F (36.6 °C)-100.1 °F (37.8 °C)] 99 °F (37.2 °C)  Pulse:  [] 97  Resp:  [14-33] 20  SpO2:  [92 %-100 %] 100 %  BP: (127-207)/() 157/70  Arterial Line BP: (122-158)/(64-80) 128/64    ICP/CPP (Last 24h):   CO:  [9.8 L/min-12.9 L/min] 9.8 L/min  CI:  [4.3 L/min/m2-5.6 L/min/m2] 4.3 L/min/m2    I & O (Last 24h):   Intake/Output Summary (Last 24 hours) at 01/28/18 0843  Last data filed at 01/28/18 0800   Gross per 24 hour   Intake          4286.58 ml   Output             3180 ml   Net          1106.58 ml     Physical Exam:  GA: Lethargic, comfortable, no acute distress.   HEENT: No scleral icterus or JVD.   Pulmonary: Clear to auscultation A/P/L. wheezing  Cardiac: RRR S1 & S2 w/o rubs/murmurs/gallops.   Abdominal: Bowel sounds present x 4. No appreciable hepatosplenomegaly.  Skin: No jaundice, rashes, or visible lesions.  Neuro:  --Mental Status:  Lethargic, grimaces to noxious stimuli and localizes with left UE.   --Pupils 3.5 mm, PERRL.  Scleral edema R>L  --Corneal reflex, gag, cough intact.  Right facial nerve palsy UMN type  Dense  right hemiparesis      Vent Data:   Oxygen Concentration (%):  [3-30] 30    Lines/Drains/Airway:            Arterial Line 01/26/18 0626 Right Radial (Active)   Site Assessment Clean;Dry;Intact;No redness;No swelling 1/28/2018  3:00 AM   Line Status Positional 1/28/2018  3:00 AM   Art Line Waveform Appropriate;Square wave test performed 1/28/2018  3:00 AM   Arterial Line Interventions Zeroed and calibrated;Leveled;Connections checked and tightened;Flushed per protocol 1/28/2018  3:00 AM   Color/Movement/Sensation Capillary refill less than 3 sec 1/28/2018  3:00 AM   Dressing Type Transparent 1/28/2018  3:00 AM   Dressing Status Biopatch in place;Clean;Dry;Intact 1/28/2018  3:00 AM   Dressing Intervention Dressing reinforced 1/28/2018  3:00 AM   Dressing Change Due 02/02/18 1/28/2018  3:00 AM           NG/OG Tube 01/25/18 2325 nasogastric Right nostril (Active)   Placement Check placement verified by x-ray 1/28/2018  3:00 AM   Tolerance no signs/symptoms of discomfort 1/28/2018  3:00 AM   Securement taped to commercial device 1/28/2018  3:00 AM   Clamp Status/Tolerance clamped;no residual;no emesis;no abdominal distention 1/28/2018  3:00 AM   Intake (mL) 60 mL 1/26/2018 11:00 PM       Male External Urinary Catheter 01/25/18 2200 Small (Active)   Collection Container Urimeter 1/28/2018  3:00 AM   Securement Method secured to top of thigh w/ tape 1/28/2018  3:00 AM   Skin no redness;no breakdown 1/28/2018  3:00 AM   Tolerance no signs/symptoms of discomfort 1/28/2018  3:00 AM   Output (mL) 450 mL 1/28/2018  7:01 AM   Catheter Change Date 01/26/18 1/28/2018  3:00 AM   Catheter Change Time 0300 1/28/2018  3:00 AM     Nutrition/Tube Feeds (if NPO state why): npo, hemicrani watch     Labs:  ABG:   Recent Labs  Lab 01/27/18  1607   PH 7.441   PO2 59*   PCO2 31.7*   HCO3 21.5*   POCSATURATED 92*   BE -3     BMP:  Recent Labs  Lab 01/28/18  0154      K 3.9      CO2 18*   BUN 16   CREATININE 0.9   *   MG 1.7    PHOS 3.5     LFT: Lab Results   Component Value Date    AST 30 01/28/2018    ALT 32 01/28/2018    ALKPHOS 65 01/28/2018    BILITOT 1.4 (H) 01/28/2018    ALBUMIN 3.3 (L) 01/28/2018    PROT 7.1 01/28/2018     CBC:   Lab Results   Component Value Date    WBC 18.71 (H) 01/28/2018    HGB 15.2 01/28/2018    HCT 42.9 01/28/2018    MCV 83 01/28/2018     01/28/2018     Microbiology x 7d:   Microbiology Results (last 7 days)     Procedure Component Value Units Date/Time    Blood culture [[id number]] Collected:  01/27/18 1559    Order Status:  Completed Specimen:  Blood from Peripheral, Hand, Right Updated:  01/28/18 0115     Blood Culture, Routine No Growth to date    Narrative:       Blood cultures from 2 different sites. 4 bottles total.  Please draw before starting antibiotics.    Blood culture [[id number]] Collected:  01/27/18 1558    Order Status:  Completed Specimen:  Blood from Peripheral, Hand, Left Updated:  01/28/18 0115     Blood Culture, Routine No Growth to date    Narrative:       Blood cultures x 2 different sites. 4 bottles total. Please  draw cultures before administering antibiotics.    Urine culture [[id number]] Collected:  01/27/18 1621    Order Status:  Sent Specimen:  Urine from Urine, Catheterized Updated:  01/27/18 1730        Imaging:  CT head 1/27 - defined left mca stroke with mass effect on lateral ventricles.   I personally reviewed the above image.    ASSESSMENT/PLAN:     Active Hospital Problems    Diagnosis    *Embolic stroke involving left middle cerebral artery    Cytotoxic cerebral edema    Leukocytosis    Respiratory distress    Essential hypertension    Hyperlipidemia    Type 2 diabetes mellitus    Obstructive sleep apnea      Neuro:   Hemicrani watch for cerebral edema  Encephalopathy out of proportion to ischemic stroke injury  Likely due to underlying sepsis.   Monitoring progression of cerebral edema  Secondary stroke prevention . Holding asa in anticipation of needing  surgical decompression.  Started on keppra 750mg for prophylaxis; will d/c if EEG neg.    Pulmonary:   Significant respiratory secretions. Likely sinusitis  Pulmonary toileting strategies.   Duonebs/3% nacl nebs  Intubation watch    Cardiac:   Hemodynamically stable  SBP <160 mmhg  EF 65-70%    Renal:    Making urine  BUN/cr stable    ID:   Febrile on cooling blanket  Leucocytosis may reflect underlying cerebrovascular disease with superimpose sepsis.  Check procalcitonin  Add moxifloxacin     Hem/Onc:   Stable HH and plt count    Endocrine:    On insulin gtt    Fluids/Electrolytes/Nutrition/GI:   Npo, aspiration precaution  Intubation watch  Lines:  Art: +  CVC NA  ETT NA  Bruce NA  NG +  PEG NA    Proph:  DVT:SCD/heparin  Constipation:  Last output:bowel regimen  PUP:pepcid  VAP:NA    Family updated at bedside    Full Code    Uninterrupted Critical Care/Counseling Time (not including procedures):: 33 mins      Jaswant Quigley MD  Neurocritical Care  [facility]

## 2018-01-28 NOTE — ASSESSMENT & PLAN NOTE
47 y.o. male with history of HTN and DM who presented to [facility] by EMS for concern of L MCA syndrome 1/25/18 AM. He is s/p thrombectomy of L M1 occlusion. CT MP revealed short segment occlusion of left M1.  Patient went to IR for thrombectomy of L M1.  TICI2C reperfusion and angioplasty. Neurosurgery consulted for hemicrani watch. Patient currently on asa and plavix.    -No neurosurgical intervention at this time.  -Maximum medical management per NCC.  -Goal Na 145-155  -Neuro checks hourly  -Please notify NSR if worsening neuro exam     Discussed with Dr. Ingram

## 2018-01-28 NOTE — SUBJECTIVE & OBJECTIVE
Neurologic Chief Complaint:  L M1 embolic infarction s/p thrombectomy    Subjective:     Interval History: Patient is seen for follow-up neurological assessment and treatment recommendations:     T max 100.5 last night.  Pan cultured and started on antibiotics.  PVCs on telemetry overnight.    HPI, Past Medical, Family, and Social History remains the same as documented in the initial encounter.     Review of Systems   Constitutional: Positive for fever.   Neurological: Positive for weakness.   Psychiatric/Behavioral: Positive for confusion.     Scheduled Meds:   acetaminophen  650 mg Per NG tube Q6H    albuterol-ipratropium 2.5mg-0.5mg/3mL  3 mL Nebulization Q4H    aspirin  325 mg Per NG tube Daily    atorvastatin  80 mg Per NG tube Daily    cefTRIAXone (ROCEPHIN) IVPB  2 g Intravenous Q24H    clopidogrel  75 mg Per NG tube Daily    heparin (porcine)  5,000 Units Subcutaneous Q8H    levetiracetam (KEPPRA) IVPB 100ml  750 mg Intravenous Q12H    senna-docusate 8.6-50 mg  1 tablet Oral BID    sodium chloride 0.9%  3 mL Intravenous Q8H    sodium chloride 3%  4 mL Nebulization Q4H     Continuous Infusions:   sodium chloride 0.9% 75 mL/hr at 01/27/18 2000    insulin (HUMAN R) infusion (adults) 1.8 Units/hr (01/27/18 2000)    niCARdipine 7.5 mg/hr (01/27/18 2000)     PRN Meds:dextrose 50%, dextrose 50%, dextrose 50%, glucagon (human recombinant), labetalol, ondansetron, sodium chloride 0.9%    Objective:     Vital Signs (Most Recent):  Temp: 100 °F (37.8 °C) (01/27/18 1900)  Pulse: (!) 122 (01/27/18 2000)  Resp: (!) 22 (01/27/18 2000)  BP: 137/75 (01/27/18 2000)  SpO2: 95 % (01/27/18 2000)  BP Location: Right arm    Vital Signs Range (Last 24H):  Temp:  [98.7 °F (37.1 °C)-100.3 °F (37.9 °C)]   Pulse:  []   Resp:  [15-32]   BP: (135-207)/()   SpO2:  [92 %-100 %]   Arterial Line BP: (113-161)/(64-88)   BP Location: Right arm    Physical Exam   Constitutional: He appears well-developed.    Cardiovascular: Normal rate and regular rhythm.  Exam reveals no friction rub.    No murmur heard.  Pulmonary/Chest: No respiratory distress. He has no wheezes.   Abdominal: Soft. He exhibits no distension. There is no tenderness.       Neurological Exam:   LOC: obtunded  Language: Mute  Articulation: Mute/Anarthric  Visual Fields: no blink to threat b/l  EOM (CN III, IV, VI): L gaze preference  Pupils (CN II, III): PERRL  Sustained clonus on R    Laboratory:  Recent Results (from the past 24 hour(s))   POCT glucose    Collection Time: 01/26/18  9:02 PM   Result Value Ref Range    POCT Glucose 176 (H) 70 - 110 mg/dL   POCT glucose    Collection Time: 01/26/18 10:02 PM   Result Value Ref Range    POCT Glucose 216 (H) 70 - 110 mg/dL   POCT glucose    Collection Time: 01/26/18 11:05 PM   Result Value Ref Range    POCT Glucose 181 (H) 70 - 110 mg/dL   POCT glucose    Collection Time: 01/26/18 11:50 PM   Result Value Ref Range    POCT Glucose 171 (H) 70 - 110 mg/dL   POCT glucose    Collection Time: 01/27/18  1:04 AM   Result Value Ref Range    POCT Glucose 242 (H) 70 - 110 mg/dL   POCT glucose    Collection Time: 01/27/18  2:03 AM   Result Value Ref Range    POCT Glucose 235 (H) 70 - 110 mg/dL   Comprehensive metabolic panel    Collection Time: 01/27/18  2:05 AM   Result Value Ref Range    Sodium 138 136 - 145 mmol/L    Potassium 3.9 3.5 - 5.1 mmol/L    Chloride 106 95 - 110 mmol/L    CO2 22 (L) 23 - 29 mmol/L    Glucose 262 (H) 70 - 110 mg/dL    BUN, Bld 18 6 - 20 mg/dL    Creatinine 1.0 0.5 - 1.4 mg/dL    Calcium 8.1 (L) 8.7 - 10.5 mg/dL    Total Protein 7.1 6.0 - 8.4 g/dL    Albumin 3.5 3.5 - 5.2 g/dL    Total Bilirubin 1.2 (H) 0.1 - 1.0 mg/dL    Alkaline Phosphatase 52 (L) 55 - 135 U/L    AST 22 10 - 40 U/L    ALT 29 10 - 44 U/L    Anion Gap 10 8 - 16 mmol/L    eGFR if African American >60.0 >60 mL/min/1.73 m^2    eGFR if non African American >60.0 >60 mL/min/1.73 m^2   CBC auto differential    Collection Time:  01/27/18  2:05 AM   Result Value Ref Range    WBC 13.47 (H) 3.90 - 12.70 K/uL    RBC 5.21 4.60 - 6.20 M/uL    Hemoglobin 15.4 14.0 - 18.0 g/dL    Hematocrit 44.4 40.0 - 54.0 %    MCV 85 82 - 98 fL    MCH 29.6 27.0 - 31.0 pg    MCHC 34.7 32.0 - 36.0 g/dL    RDW 12.8 11.5 - 14.5 %    Platelets 253 150 - 350 K/uL    MPV 9.5 9.2 - 12.9 fL    Immature Granulocytes 0.4 0.0 - 0.5 %    Gran # (ANC) 10.0 (H) 1.8 - 7.7 K/uL    Immature Grans (Abs) 0.05 (H) 0.00 - 0.04 K/uL    Lymph # 2.3 1.0 - 4.8 K/uL    Mono # 1.1 (H) 0.3 - 1.0 K/uL    Eos # 0.0 0.0 - 0.5 K/uL    Baso # 0.04 0.00 - 0.20 K/uL    nRBC 0 0 /100 WBC    Gran% 74.1 (H) 38.0 - 73.0 %    Lymph% 17.2 (L) 18.0 - 48.0 %    Mono% 7.9 4.0 - 15.0 %    Eosinophil% 0.1 0.0 - 8.0 %    Basophil% 0.3 0.0 - 1.9 %    Differential Method Automated    Magnesium    Collection Time: 01/27/18  2:05 AM   Result Value Ref Range    Magnesium 2.0 1.6 - 2.6 mg/dL   Phosphorus    Collection Time: 01/27/18  2:05 AM   Result Value Ref Range    Phosphorus 2.8 2.7 - 4.5 mg/dL   Protime-INR    Collection Time: 01/27/18  2:05 AM   Result Value Ref Range    Prothrombin Time 10.2 9.0 - 12.5 sec    INR 1.0 0.8 - 1.2   POCT glucose    Collection Time: 01/27/18  3:04 AM   Result Value Ref Range    POCT Glucose 225 (H) 70 - 110 mg/dL   POCT glucose    Collection Time: 01/27/18  4:01 AM   Result Value Ref Range    POCT Glucose 192 (H) 70 - 110 mg/dL   POCT glucose    Collection Time: 01/27/18  5:03 AM   Result Value Ref Range    POCT Glucose 235 (H) 70 - 110 mg/dL   POCT glucose    Collection Time: 01/27/18  5:55 AM   Result Value Ref Range    POCT Glucose 211 (H) 70 - 110 mg/dL   POCT glucose    Collection Time: 01/27/18  8:05 AM   Result Value Ref Range    POCT Glucose 212 (H) 70 - 110 mg/dL   POCT glucose    Collection Time: 01/27/18  9:07 AM   Result Value Ref Range    POCT Glucose 165 (H) 70 - 110 mg/dL   POCT glucose    Collection Time: 01/27/18 10:01 AM   Result Value Ref Range    POCT Glucose  180 (H) 70 - 110 mg/dL   POCT glucose    Collection Time: 01/27/18 10:59 AM   Result Value Ref Range    POCT Glucose 177 (H) 70 - 110 mg/dL   POCT glucose    Collection Time: 01/27/18 12:20 PM   Result Value Ref Range    POCT Glucose 175 (H) 70 - 110 mg/dL   POCT glucose    Collection Time: 01/27/18  1:05 PM   Result Value Ref Range    POCT Glucose 191 (H) 70 - 110 mg/dL   POCT glucose    Collection Time: 01/27/18  1:59 PM   Result Value Ref Range    POCT Glucose 183 (H) 70 - 110 mg/dL   POCT glucose    Collection Time: 01/27/18  3:45 PM   Result Value Ref Range    POCT Glucose 216 (H) 70 - 110 mg/dL   ISTAT PROCEDURE    Collection Time: 01/27/18  4:07 PM   Result Value Ref Range    POC PH 7.441 7.35 - 7.45    POC PCO2 31.7 (L) 35 - 45 mmHg    POC PO2 59 (LL) 80 - 100 mmHg    POC HCO3 21.5 (L) 24 - 28 mmol/L    POC BE -3 -2 to 2 mmol/L    POC SATURATED O2 92 (L) 95 - 100 %    POC TCO2 22 (L) 23 - 27 mmol/L    Rate 16     Sample ARTERIAL     Site Brianne/UAC     Allens Test N/A     DelSys Nasal Can     Mode SPONT     Flow 3     FiO2 32     Sp02 93    Urinalysis    Collection Time: 01/27/18  4:20 PM   Result Value Ref Range    Specimen UA Urine, Catheterized     Color, UA Yellow Yellow, Straw, Norma    Appearance, UA Clear Clear    pH, UA 5.0 5.0 - 8.0    Specific Gravity, UA 1.020 1.005 - 1.030    Protein, UA Negative Negative    Glucose, UA 3+ (A) Negative    Ketones, UA 1+ (A) Negative    Bilirubin (UA) Negative Negative    Occult Blood UA Negative Negative    Nitrite, UA Negative Negative    Urobilinogen, UA Negative <2.0 EU/dL    Leukocytes, UA Negative Negative   Urinalysis Microscopic    Collection Time: 01/27/18  4:20 PM   Result Value Ref Range    RBC, UA 2 0 - 4 /hpf    WBC, UA 1 0 - 5 /hpf    Bacteria, UA None None-Occ /hpf    Yeast, UA None None    Microscopic Comment SEE COMMENT    POCT glucose    Collection Time: 01/27/18  6:05 PM   Result Value Ref Range    POCT Glucose 108 70 - 110 mg/dL   POCT glucose     Collection Time: 01/27/18  6:40 PM   Result Value Ref Range    POCT Glucose 121 (H) 70 - 110 mg/dL       Microbiology Results (last 7 days)     Procedure Component Value Units Date/Time    Urine culture [[id number]] Collected:  01/27/18 1621    Order Status:  Sent Specimen:  Urine from Urine, Catheterized Updated:  01/27/18 1730    Blood culture [[id number]] Collected:  01/27/18 1558    Order Status:  Sent Specimen:  Blood from Peripheral, Hand, Left Updated:  01/27/18 1631    Blood culture [[id number]] Collected:  01/27/18 1559    Order Status:  Sent Specimen:  Blood from Peripheral, Hand, Right Updated:  01/27/18 1631            Diagnostic Results     Brain Imaging   1/27/18 CTH: Per independent resident review and interpretation,  L MCA infarction s/p stenting.  No sign of hemorrhagic transformation.  R basal ganglia and cerebellar infarct.          1/26/18 MRI Brain w/o: Per independent resident review and interpretation,  Acute L MCA infarction.        Vessel Imaging   1/25/18 IR Angiogram: Per independent resident review and interpretation,  L M1 occlusion.        Cardiac Imaging   1/26/18 EKG: Per independent resident review and interpretation,  Sinus tachycardia.  .  QTc 500.    1/26/18 TTE: Per report,  LA wnl  EF 65-70%  No regional wall motion abnormalities  Diastolic function normal

## 2018-01-28 NOTE — ASSESSMENT & PLAN NOTE
- A1C 10  - hyperglycemic; started on insulin gtt   - Can consider endocrine consult when stepped down to stroke service

## 2018-01-28 NOTE — PROGRESS NOTES
[facility]  Neurosurgery  Progress Note    Subjective:     History of Present Illness: Todd Quevedo is 47 y.o. male with history of HTN and DM who presented to [facility] by EMS for concern of L MCA syndrome 1/25/18 AM. He is s/p thrombectomy of L M1 occlusion. CT MP revealed short segment occlusion of left M1.  Patient went to IR for thrombectomy of L M1.  TICI2C reperfusion and angioplasty. Neurosurgery consulted for hemicrani watch. Wife was at bedside during exam. Patient not intubated, lethargic, and moving left side spontaneously. He is currently on plavix daily.     Post-Op Info:  * No surgery found *         Interval History: Exam stable.     Medications:  Continuous Infusions:   sodium chloride 0.9% 75 mL/hr at 01/28/18 0800    insulin (HUMAN R) infusion (adults) 1.3 Units/hr (01/28/18 0800)    niCARdipine 5 mg/hr (01/28/18 0800)     Scheduled Meds:   acetaminophen  650 mg Per NG tube Q6H    albuterol-ipratropium 2.5mg-0.5mg/3mL  3 mL Nebulization Q4H    aspirin  325 mg Per NG tube Daily    atorvastatin  80 mg Per NG tube Daily    cefTRIAXone (ROCEPHIN) IVPB  2 g Intravenous Q24H    clopidogrel  75 mg Per NG tube Daily    heparin (porcine)  5,000 Units Subcutaneous Q8H    levetiracetam (KEPPRA) IVPB 100ml  750 mg Intravenous Q12H    senna-docusate 8.6-50 mg  1 tablet Oral BID    sodium chloride 0.9%  3 mL Intravenous Q8H    sodium chloride 3%  4 mL Nebulization Q4H     PRN Meds:dextrose 50%, dextrose 50%, dextrose 50%, glucagon (human recombinant), labetalol, ondansetron, sodium chloride 0.9%     Review of Systems    Objective:     Weight: 113.1 kg (249 lb 5.4 oz)  Body mass index is 35.78 kg/m².  Vital Signs (Most Recent):  Temp: 99 °F (37.2 °C) (01/28/18 0701)  Pulse: 97 (01/28/18 0800)  Resp: 20 (01/28/18 0800)  BP: (!) 157/70 (01/28/18 0100)  SpO2: 100 % (01/28/18 0800) Vital Signs (24h Range):  Temp:  [97.9 °F (36.6 °C)-100.1 °F (37.8 °C)] 99 °F (37.2 °C)  Pulse:  []  97  Resp:  [14-33] 20  SpO2:  [92 %-100 %] 100 %  BP: (127-207)/() 157/70  Arterial Line BP: (122-158)/(64-80) 128/64       Date 01/28/18 0700 - 01/29/18 0659   Shift 5769-8863 9002-4063 8527-7839 24 Hour Total   I  N  T  A  K  E   I.V.  (mL/kg) 228  (2)   228  (2)    Shift Total  (mL/kg) 228  (2)   228  (2)   O  U  T  P  U  T   Urine  (mL/kg/hr) 450   450    Shift Total  (mL/kg) 450  (4)   450  (4)   Weight (kg) 113.1 113.1 113.1 113.1            NG/OG Tube 01/25/18 2325 nasogastric Right nostril (Active)   Placement Check placement verified by x-ray 1/27/2018  7:01 AM   Tolerance no signs/symptoms of discomfort 1/27/2018  7:01 AM   Securement taped to nostril center 1/27/2018  7:01 AM   Clamp Status/Tolerance clamped 1/27/2018  7:01 AM   Intake (mL) 60 mL 1/26/2018 11:00 PM       Male External Urinary Catheter 01/25/18 2200 Small (Active)   Collection Container Urimeter 1/27/2018  7:01 AM   Securement Method secured to top of thigh w/ adhesive device 1/27/2018  7:01 AM   Skin no redness;no breakdown 1/27/2018  7:01 AM   Tolerance no signs/symptoms of discomfort 1/27/2018  7:01 AM   Output (mL) 400 mL 1/27/2018 10:00 AM   Catheter Change Date 01/26/18 1/27/2018  3:00 AM   Catheter Change Time 0300 1/27/2018  3:00 AM       Neurosurgery Physical Exam      Vital signs: reviewed above.   Constitutional: well-developed,   Cardiovascular: regular rate and rhythm  Resp: on CPAP  Abdomen: soft  Neurological  GCS [id number]  Mental Status: lethargic  non verbal on exam, does not follow commands  Head: normocephalic, atraumatic  PERRL  Facial expression symmetric  Moves left extremities spontaneously, right side withdraws to painful stimuli    Significant Labs:    Recent Labs  Lab 01/26/18  1752 01/27/18  0205 01/28/18  0154   * 262* 238*    138 139   K 3.6 3.9 3.9    106 107   CO2 24 22* 18*   BUN 19 18 16   CREATININE 1.0 1.0 0.9   CALCIUM 8.0* 8.1* 8.5*   MG  --  2.0 1.7       Recent Labs  Lab  01/27/18  0205 01/28/18  0154   WBC 13.47* 18.71*   HGB 15.4 15.2   HCT 44.4 42.9    276       Recent Labs  Lab 01/27/18 0205 01/28/18  0154   INR 1.0 1.0     Microbiology Results (last 7 days)     Procedure Component Value Units Date/Time    Blood culture [[id number]] Collected:  01/27/18 1559    Order Status:  Completed Specimen:  Blood from Peripheral, Hand, Right Updated:  01/28/18 0115     Blood Culture, Routine No Growth to date    Narrative:       Blood cultures from 2 different sites. 4 bottles total.  Please draw before starting antibiotics.    Blood culture [[id number]] Collected:  01/27/18 1558    Order Status:  Completed Specimen:  Blood from Peripheral, Hand, Left Updated:  01/28/18 0115     Blood Culture, Routine No Growth to date    Narrative:       Blood cultures x 2 different sites. 4 bottles total. Please  draw cultures before administering antibiotics.    Urine culture [[id number]] Collected:  01/27/18 1621    Order Status:  Sent Specimen:  Urine from Urine, Catheterized Updated:  01/27/18 1730        All pertinent labs from the last 24 hours have been reviewed.    Significant Diagnostics:  CTH reviewed    Assessment/Plan:     * Embolic stroke involving left middle cerebral artery    47 y.o. male with history of HTN and DM who presented to [facility] by EMS for concern of L MCA syndrome 1/25/18 AM. He is s/p thrombectomy of L M1 occlusion. CT MP revealed short segment occlusion of left M1.  Patient went to IR for thrombectomy of L M1.  TICI2C reperfusion and angioplasty. Neurosurgery consulted for hemicrani watch. Patient currently on asa and plavix.    -No neurosurgical intervention at this time.  -Maximum medical management per NCC.  -Goal Na 145-155  -Neuro checks hourly  -Please notify NSR if worsening neuro exam     Discussed with Dr. Nataly Duran MD  Neurosurgery  [facility]

## 2018-01-28 NOTE — ASSESSMENT & PLAN NOTE
Mr. Quevedo is a 47 year old male who walked into [facility] and was acting abnormal.  EMS was called and brought to the ED for concern of L MCA syndrome. Not tpa candidate due to unknown last known normal.  Patient went to IR for thrombectomy of L M1 occlusion seen on CTA MP on 1/25.  TICI2C reperfusion and angioplasty.      Antithrombotics for secondary stroke prevention: asa 325mg qd and plavix 75mg qd     Statins for secondary stroke prevention and hyperlipidemia, if present: Repeat LDL (last invalid secondary to hypertriglyceridemia), atorvastatin 80 in interim    Aggressive risk factor modification: Obesity     Rehab efforts: Occupational Therapy, PT/OT/SLP to evaluate and treat    Diagnostics ordered/pending:   CTA - L M1 occlusion  A1C 10, TSH 1.8, LDL immeasurable 2/2 trigylcerides (460), Chol 200    VTE prophylaxis: Heparin 5000 units SQ every 8 hours    BP parameters: Infarct: Post sucessful thrombectomy, SBP <140     Currently on insulin gtt, cardene, keppra and antibiotics

## 2018-01-28 NOTE — PLAN OF CARE
Problem: Patient Care Overview  Goal: Plan of Care Review  Outcome: Ongoing (interventions implemented as appropriate)  POC reviewed with pt and pt's wife at 0300. Pt globally aphasic and unable to verbalize understanding, wife verbalizes understanding but will need reinforcement. Questions and concerns addressed. No acute events overnight. Pt progressing toward goals. Will continue to monitor. See flowsheets for full assessment and VS info

## 2018-01-28 NOTE — PROGRESS NOTES
[facility]  Vascular Neurology  Comprehensive Stroke Center  Progress Note    Assessment/Plan:     * Embolic stroke involving left middle cerebral artery    Mr. Quevedo is a 47 year old male who walked into [facility] and was acting abnormal.  EMS was called and brought to the ED for concern of L MCA syndrome. Not tpa candidate due to unknown last known normal.  Patient went to IR for thrombectomy of L M1 occlusion seen on CTA MP on 1/25.  TICI2C reperfusion and angioplasty.      Antithrombotics for secondary stroke prevention: asa 325mg qd and plavix 75mg qd     Statins for secondary stroke prevention and hyperlipidemia, if present: Repeat LDL (last invalid secondary to hypertriglyceridemia), atorvastatin 80 in interim    Aggressive risk factor modification: Obesity     Rehab efforts: Occupational Therapy, PT/OT/SLP to evaluate and treat    Diagnostics ordered/pending:   CTA - L M1 occlusion  A1C 10, TSH 1.8, LDL immeasurable 2/2 trigylcerides (460), Chol 200    VTE prophylaxis: Heparin 5000 units SQ every 8 hours    BP parameters: Infarct: Post sucessful thrombectomy, SBP <140     Currently on insulin gtt, cardenebetora (EEG read pending) and antibiotics            Leukocytosis    Pancultured, broad spectrum antibiotics  -Management per primary        Type 2 diabetes mellitus    - A1C 10  - hyperglycemic; started on insulin gtt   - Can consider endocrine consult when stepped down to stroke service        Hyperlipidemia    - Chol 200, LDL immeasurable,   - atorvastatin 80 mg          Essential hypertension    - management for primary team  - goal SBP post thrombectomy <140  -On cardene        Obstructive sleep apnea    Stroke risk factor               1/25: Brought in for aphasia, RSW with L gaze preferance. LKN unknown, not tPA candidate. Went to IR for angiogram and stent.    STROKE DOCUMENTATION   Acute Stroke Times   Stroke Team Called Date: 01/25/18  Stroke Team Called Time:  1852  Stroke Team Arrival Date: 01/25/18  Stroke Team Arrival Time: 0652  CT Interpretation Time: 1910  Decision to Treat Time for Alteplase:  (n/a unknown last known normal )  Decision to Treat Time for IR: 1915    NIH Scale:  1a. Level Of Consciousness: 2-->Not alert: requires repeated stimulation to attend, or is obtunded and requires strong or painful stimulation to make movements (not stereotyped)  1b. LOC Questions: 2-->Answers neither question correctly  1c. LOC Commands: 2-->Performs neither task correctly  2. Best Gaze: 1-->Partial gaze palsy: gaze is abnormal in one or both eyes, but forced deviation or total gaze paresis is not present  3. Visual: 3-->Bilateral hemianopia (blind including cortical blindness)  4. Facial Palsy: 0-->Normal symmetrical movements  5a. Motor Arm, Left: 2-->Some effort against gravity: limb cannot get to or maintain (if cued) 90 (or 45) degrees, drifts down to bed, but has some effort against gravity  5b. Motor Arm, Right: 2-->Some effort against gravity: limb cannot get to or maintain (if cued) 90 (or 45) degrees, drifts down to bed, but has some effort against gravity  6a. Motor Leg, Left: 2-->Some effort against gravity: leg falls to bed by 5 secs, but has some effort against gravity  6b. Motor Leg, Right: 3-->No effort against gravity: leg falls to bed immediately  7. Limb Ataxia: 0-->Absent  8. Sensory: 0-->Normal: no sensory loss  9. Best Language: 3-->Mute, global aphasia: no usable speech or auditory comprehension  10. Dysarthria: 2-->Severe dysarthria: patients speech is so slurred as to be unintelligible in the absence of or out of proportion to any dysphasia, or is mute/anarthric  11. Extinction and Inattention (formerly Neglect): 0-->No abnormality  Total (NIH Stroke Scale): 24       Modified [place] Coma Scale:7   ABCD2 Score:    [id number] Score:   HAS -BLED Score:   ICH Score:   Hunt & Laguerre Classification:      Hemorrhagic change of an Ischemic Stroke:  Does this patient have an ischemic stroke with hemorrhagic changes? No     Neurologic Chief Complaint:  L M1 embolic infarction s/p thrombectomy    Subjective:     Interval History: Patient is seen for follow-up neurological assessment and treatment recommendations:     T max 100.5 last night.  Pan cultured and started on antibiotics.  PVCs on telemetry overnight.    HPI, Past Medical, Family, and Social History remains the same as documented in the initial encounter.     Review of Systems   Constitutional: Positive for fever.   Neurological: Positive for weakness.   Psychiatric/Behavioral: Positive for confusion.     Scheduled Meds:   acetaminophen  650 mg Per NG tube Q6H    albuterol-ipratropium 2.5mg-0.5mg/3mL  3 mL Nebulization Q4H    aspirin  325 mg Per NG tube Daily    atorvastatin  80 mg Per NG tube Daily    cefTRIAXone (ROCEPHIN) IVPB  2 g Intravenous Q24H    clopidogrel  75 mg Per NG tube Daily    heparin (porcine)  5,000 Units Subcutaneous Q8H    levetiracetam (KEPPRA) IVPB 100ml  750 mg Intravenous Q12H    senna-docusate 8.6-50 mg  1 tablet Oral BID    sodium chloride 0.9%  3 mL Intravenous Q8H    sodium chloride 3%  4 mL Nebulization Q4H     Continuous Infusions:   sodium chloride 0.9% 75 mL/hr at 01/27/18 2000    insulin (HUMAN R) infusion (adults) 1.8 Units/hr (01/27/18 2000)    niCARdipine 7.5 mg/hr (01/27/18 2000)     PRN Meds:dextrose 50%, dextrose 50%, dextrose 50%, glucagon (human recombinant), labetalol, ondansetron, sodium chloride 0.9%    Objective:     Vital Signs (Most Recent):  Temp: 100 °F (37.8 °C) (01/27/18 1900)  Pulse: (!) 122 (01/27/18 2000)  Resp: (!) 22 (01/27/18 2000)  BP: 137/75 (01/27/18 2000)  SpO2: 95 % (01/27/18 2000)  BP Location: Right arm    Vital Signs Range (Last 24H):  Temp:  [98.7 °F (37.1 °C)-100.3 °F (37.9 °C)]   Pulse:  []   Resp:  [15-32]   BP: (135-207)/()   SpO2:  [92 %-100 %]   Arterial Line BP: (113-161)/(64-88)   BP Location: Right  arm    Physical Exam   Constitutional: He appears well-developed.   Cardiovascular: Normal rate and regular rhythm.  Exam reveals no friction rub.    No murmur heard.  Pulmonary/Chest: No respiratory distress. He has no wheezes.   Abdominal: Soft. He exhibits no distension. There is no tenderness.       Neurological Exam:   LOC: obtunded  Language: Mute  Articulation: Mute/Anarthric  Visual Fields: no blink to threat b/l  EOM (CN III, IV, VI): L gaze preference  Pupils (CN II, III): PERRL  Sustained clonus on R    Laboratory:  Recent Results (from the past 24 hour(s))   POCT glucose    Collection Time: 01/26/18  9:02 PM   Result Value Ref Range    POCT Glucose 176 (H) 70 - 110 mg/dL   POCT glucose    Collection Time: 01/26/18 10:02 PM   Result Value Ref Range    POCT Glucose 216 (H) 70 - 110 mg/dL   POCT glucose    Collection Time: 01/26/18 11:05 PM   Result Value Ref Range    POCT Glucose 181 (H) 70 - 110 mg/dL   POCT glucose    Collection Time: 01/26/18 11:50 PM   Result Value Ref Range    POCT Glucose 171 (H) 70 - 110 mg/dL   POCT glucose    Collection Time: 01/27/18  1:04 AM   Result Value Ref Range    POCT Glucose 242 (H) 70 - 110 mg/dL   POCT glucose    Collection Time: 01/27/18  2:03 AM   Result Value Ref Range    POCT Glucose 235 (H) 70 - 110 mg/dL   Comprehensive metabolic panel    Collection Time: 01/27/18  2:05 AM   Result Value Ref Range    Sodium 138 136 - 145 mmol/L    Potassium 3.9 3.5 - 5.1 mmol/L    Chloride 106 95 - 110 mmol/L    CO2 22 (L) 23 - 29 mmol/L    Glucose 262 (H) 70 - 110 mg/dL    BUN, Bld 18 6 - 20 mg/dL    Creatinine 1.0 0.5 - 1.4 mg/dL    Calcium 8.1 (L) 8.7 - 10.5 mg/dL    Total Protein 7.1 6.0 - 8.4 g/dL    Albumin 3.5 3.5 - 5.2 g/dL    Total Bilirubin 1.2 (H) 0.1 - 1.0 mg/dL    Alkaline Phosphatase 52 (L) 55 - 135 U/L    AST 22 10 - 40 U/L    ALT 29 10 - 44 U/L    Anion Gap 10 8 - 16 mmol/L    eGFR if African American >60.0 >60 mL/min/1.73 m^2    eGFR if non African American >60.0  >60 mL/min/1.73 m^2   CBC auto differential    Collection Time: 01/27/18  2:05 AM   Result Value Ref Range    WBC 13.47 (H) 3.90 - 12.70 K/uL    RBC 5.21 4.60 - 6.20 M/uL    Hemoglobin 15.4 14.0 - 18.0 g/dL    Hematocrit 44.4 40.0 - 54.0 %    MCV 85 82 - 98 fL    MCH 29.6 27.0 - 31.0 pg    MCHC 34.7 32.0 - 36.0 g/dL    RDW 12.8 11.5 - 14.5 %    Platelets 253 150 - 350 K/uL    MPV 9.5 9.2 - 12.9 fL    Immature Granulocytes 0.4 0.0 - 0.5 %    Gran # (ANC) 10.0 (H) 1.8 - 7.7 K/uL    Immature Grans (Abs) 0.05 (H) 0.00 - 0.04 K/uL    Lymph # 2.3 1.0 - 4.8 K/uL    Mono # 1.1 (H) 0.3 - 1.0 K/uL    Eos # 0.0 0.0 - 0.5 K/uL    Baso # 0.04 0.00 - 0.20 K/uL    nRBC 0 0 /100 WBC    Gran% 74.1 (H) 38.0 - 73.0 %    Lymph% 17.2 (L) 18.0 - 48.0 %    Mono% 7.9 4.0 - 15.0 %    Eosinophil% 0.1 0.0 - 8.0 %    Basophil% 0.3 0.0 - 1.9 %    Differential Method Automated    Magnesium    Collection Time: 01/27/18  2:05 AM   Result Value Ref Range    Magnesium 2.0 1.6 - 2.6 mg/dL   Phosphorus    Collection Time: 01/27/18  2:05 AM   Result Value Ref Range    Phosphorus 2.8 2.7 - 4.5 mg/dL   Protime-INR    Collection Time: 01/27/18  2:05 AM   Result Value Ref Range    Prothrombin Time 10.2 9.0 - 12.5 sec    INR 1.0 0.8 - 1.2   POCT glucose    Collection Time: 01/27/18  3:04 AM   Result Value Ref Range    POCT Glucose 225 (H) 70 - 110 mg/dL   POCT glucose    Collection Time: 01/27/18  4:01 AM   Result Value Ref Range    POCT Glucose 192 (H) 70 - 110 mg/dL   POCT glucose    Collection Time: 01/27/18  5:03 AM   Result Value Ref Range    POCT Glucose 235 (H) 70 - 110 mg/dL   POCT glucose    Collection Time: 01/27/18  5:55 AM   Result Value Ref Range    POCT Glucose 211 (H) 70 - 110 mg/dL   POCT glucose    Collection Time: 01/27/18  8:05 AM   Result Value Ref Range    POCT Glucose 212 (H) 70 - 110 mg/dL   POCT glucose    Collection Time: 01/27/18  9:07 AM   Result Value Ref Range    POCT Glucose 165 (H) 70 - 110 mg/dL   POCT glucose    Collection  Time: 01/27/18 10:01 AM   Result Value Ref Range    POCT Glucose 180 (H) 70 - 110 mg/dL   POCT glucose    Collection Time: 01/27/18 10:59 AM   Result Value Ref Range    POCT Glucose 177 (H) 70 - 110 mg/dL   POCT glucose    Collection Time: 01/27/18 12:20 PM   Result Value Ref Range    POCT Glucose 175 (H) 70 - 110 mg/dL   POCT glucose    Collection Time: 01/27/18  1:05 PM   Result Value Ref Range    POCT Glucose 191 (H) 70 - 110 mg/dL   POCT glucose    Collection Time: 01/27/18  1:59 PM   Result Value Ref Range    POCT Glucose 183 (H) 70 - 110 mg/dL   POCT glucose    Collection Time: 01/27/18  3:45 PM   Result Value Ref Range    POCT Glucose 216 (H) 70 - 110 mg/dL   ISTAT PROCEDURE    Collection Time: 01/27/18  4:07 PM   Result Value Ref Range    POC PH 7.441 7.35 - 7.45    POC PCO2 31.7 (L) 35 - 45 mmHg    POC PO2 59 (LL) 80 - 100 mmHg    POC HCO3 21.5 (L) 24 - 28 mmol/L    POC BE -3 -2 to 2 mmol/L    POC SATURATED O2 92 (L) 95 - 100 %    POC TCO2 22 (L) 23 - 27 mmol/L    Rate 16     Sample ARTERIAL     Site Brianne/UAC     Allens Test N/A     DelSys Nasal Can     Mode SPONT     Flow 3     FiO2 32     Sp02 93    Urinalysis    Collection Time: 01/27/18  4:20 PM   Result Value Ref Range    Specimen UA Urine, Catheterized     Color, UA Yellow Yellow, Straw, Norma    Appearance, UA Clear Clear    pH, UA 5.0 5.0 - 8.0    Specific Gravity, UA 1.020 1.005 - 1.030    Protein, UA Negative Negative    Glucose, UA 3+ (A) Negative    Ketones, UA 1+ (A) Negative    Bilirubin (UA) Negative Negative    Occult Blood UA Negative Negative    Nitrite, UA Negative Negative    Urobilinogen, UA Negative <2.0 EU/dL    Leukocytes, UA Negative Negative   Urinalysis Microscopic    Collection Time: 01/27/18  4:20 PM   Result Value Ref Range    RBC, UA 2 0 - 4 /hpf    WBC, UA 1 0 - 5 /hpf    Bacteria, UA None None-Occ /hpf    Yeast, UA None None    Microscopic Comment SEE COMMENT    POCT glucose    Collection Time: 01/27/18  6:05 PM   Result  Value Ref Range    POCT Glucose 108 70 - 110 mg/dL   POCT glucose    Collection Time: 01/27/18  6:40 PM   Result Value Ref Range    POCT Glucose 121 (H) 70 - 110 mg/dL       Microbiology Results (last 7 days)     Procedure Component Value Units Date/Time    Urine culture [[id number]] Collected:  01/27/18 1621    Order Status:  Sent Specimen:  Urine from Urine, Catheterized Updated:  01/27/18 1730    Blood culture [[id number]] Collected:  01/27/18 1558    Order Status:  Sent Specimen:  Blood from Peripheral, Hand, Left Updated:  01/27/18 1631    Blood culture [[id number]] Collected:  01/27/18 1559    Order Status:  Sent Specimen:  Blood from Peripheral, Hand, Right Updated:  01/27/18 1631            Diagnostic Results     Brain Imaging   1/27/18 CTH: Per independent resident review and interpretation,  L MCA infarction s/p stenting.  No sign of hemorrhagic transformation.  R basal ganglia and cerebellar infarct.          1/26/18 MRI Brain w/o: Per independent resident review and interpretation,  Acute L MCA infarction.        Vessel Imaging   1/25/18 IR Angiogram: Per independent resident review and interpretation,  L M1 occlusion.        Cardiac Imaging   1/26/18 EKG: Per independent resident review and interpretation,  Sinus tachycardia.  .  QTc 500.    1/26/18 TTE: Per report,  LA wnl  EF 65-70%  No regional wall motion abnormalities  Diastolic function normal        Raymundo Shanks MD  [facility]  Department of Vascular Neurology   [facility]

## 2018-01-28 NOTE — PROCEDURES
DATE OF SERVICE:  01/26/2018    ICU EEG/VIDEO MONITORING REPORT    METHODOLOGY:  Electroencephalographic (EEG) is recorded with electrodes placed   according to the International 10-20 placement system.  Thirty two (32) channels   of digital signal (sampling rate of 512/sec), including T1 and T2, were   simultaneously recorded from the scalp and may include EKG, EMG, and/or eye   monitors.  Recording band pass was 0.1 to 512 Hz.  Digital video recording of   the patient is simultaneously recorded with the EEG.  The patient is instructed   to report clinical symptoms which may occur during the recording session.  EEG   and video recording are stored and archived in digital format.  Activation   procedures, which include photic stimulation, hyperventilation and instructing   patients to perform simple tasks, are done in selected patients.    The EEG is displayed on a monitor screen and can be reviewed using different   montages.  Computer-assisted analysis is employed to detect spike and   electrographic seizure activity.   The entire record is submitted for computer   analysis.  The entire recording is visually reviewed, and the times identified   by computer analysis as being spikes or seizures are reviewed again.    Compressed spectral analysis (CSA) is also performed on the activity recorded   from each individual channel.  This is displayed as a power display of   frequencies from 0 to 30 Hz over time.   The CSA is reviewed looking for   asymmetries in power between homologous areas of the scalp, then compared with   the original EEG recording.    Einspect software was also utilized in the review of this study.  This software   suite analyzes the EEG recording in multiple domains.  Coherence and rhythmicity   are computed to identify EEG sections which may contain organized seizures.    Each channel undergoes analysis to detect the presence of spike and sharp waves   which have special and morphological  characteristics of epileptic activity.  The   routine EEG recording is converted from special into frequency domain.  This is   then displayed comparing homologous areas to identify areas of significant   asymmetry.  Algorithm to identify non-cortically generated artifact is used to   separate artifact from the EEG.    RECORDING TIMES:  Study begins on 01/26/2018 at 1447.  Ends on 01/27/2018 at 0700.  Duration of this study is 14 hours and 40 minutes.    EEG FINDINGS:  The background of this study contains bilaterally symmetrical,   mixed frequency activity with alpha frequencies seen in the posterior region at   9 to 10 Hz at times with a normal posterior dominant rhythm type appearance.    However, there are intermixed theta frequencies seen in the alpha and there are   frequent paroxysms of rhythmic delta activity seen diffusely interrupting this   pattern.  There is a significant amount of variability in the record with   frontal and generalized rhythmic delta activity commonly seen.  The patient   becomes drowsy at times with qualitatively normal appearing stage II sleep with   vertex sharp waves and sleep spindles seen centrally.  There are long portions   of sleep seen overnight.  During the waking portions, a normal posterior   dominant rhythm is seen at times, but the anterior background continues to   fluctuate with a variety of frequencies including theta and delta suggesting   some degree of encephalopathy.  No clear epileptiform disturbances were seen.    INTERPRETATION:  Abnormal EEG due to mild disorganization and slowing of the   anterior background during wakefulness suggesting a mild encephalopathy.    Qualitatively, normal sleep was seen and a posterior dominant rhythm was seen,   which suggest a mild degree of encephalopathy.  Evidence of seizures was not   seen.      MEGHAN/REHANA  dd: 01/27/2018 13:31:04 (CST)  td: 01/27/2018 21:06:42 (CST)  Doc ID   #[id number]  Job ID #[id number]    CC:

## 2018-01-28 NOTE — SUBJECTIVE & OBJECTIVE
Interval History: Exam stable.     Medications:  Continuous Infusions:   sodium chloride 0.9% 75 mL/hr at 01/28/18 0800    insulin (HUMAN R) infusion (adults) 1.3 Units/hr (01/28/18 0800)    niCARdipine 5 mg/hr (01/28/18 0800)     Scheduled Meds:   acetaminophen  650 mg Per NG tube Q6H    albuterol-ipratropium 2.5mg-0.5mg/3mL  3 mL Nebulization Q4H    aspirin  325 mg Per NG tube Daily    atorvastatin  80 mg Per NG tube Daily    cefTRIAXone (ROCEPHIN) IVPB  2 g Intravenous Q24H    clopidogrel  75 mg Per NG tube Daily    heparin (porcine)  5,000 Units Subcutaneous Q8H    levetiracetam (KEPPRA) IVPB 100ml  750 mg Intravenous Q12H    senna-docusate 8.6-50 mg  1 tablet Oral BID    sodium chloride 0.9%  3 mL Intravenous Q8H    sodium chloride 3%  4 mL Nebulization Q4H     PRN Meds:dextrose 50%, dextrose 50%, dextrose 50%, glucagon (human recombinant), labetalol, ondansetron, sodium chloride 0.9%     Review of Systems    Objective:     Weight: 113.1 kg (249 lb 5.4 oz)  Body mass index is 35.78 kg/m².  Vital Signs (Most Recent):  Temp: 99 °F (37.2 °C) (01/28/18 0701)  Pulse: 97 (01/28/18 0800)  Resp: 20 (01/28/18 0800)  BP: (!) 157/70 (01/28/18 0100)  SpO2: 100 % (01/28/18 0800) Vital Signs (24h Range):  Temp:  [97.9 °F (36.6 °C)-100.1 °F (37.8 °C)] 99 °F (37.2 °C)  Pulse:  [] 97  Resp:  [14-33] 20  SpO2:  [92 %-100 %] 100 %  BP: (127-207)/() 157/70  Arterial Line BP: (122-158)/(64-80) 128/64       Date 01/28/18 0700 - 01/29/18 0659   Shift 1030-8448 2391-8001 2222-3509 24 Hour Total   I  N  T  A  K  E   I.V.  (mL/kg) 228  (2)   228  (2)    Shift Total  (mL/kg) 228  (2)   228  (2)   O  U  T  P  U  T   Urine  (mL/kg/hr) 450   450    Shift Total  (mL/kg) 450  (4)   450  (4)   Weight (kg) 113.1 113.1 113.1 113.1            NG/OG Tube 01/25/18 8625 nasogastric Right nostril (Active)   Placement Check placement verified by x-ray 1/27/2018  7:01 AM   Tolerance no signs/symptoms of discomfort 1/27/2018   7:01 AM   Securement taped to nostril center 1/27/2018  7:01 AM   Clamp Status/Tolerance clamped 1/27/2018  7:01 AM   Intake (mL) 60 mL 1/26/2018 11:00 PM       Male External Urinary Catheter 01/25/18 2200 Small (Active)   Collection Container Urimeter 1/27/2018  7:01 AM   Securement Method secured to top of thigh w/ adhesive device 1/27/2018  7:01 AM   Skin no redness;no breakdown 1/27/2018  7:01 AM   Tolerance no signs/symptoms of discomfort 1/27/2018  7:01 AM   Output (mL) 400 mL 1/27/2018 10:00 AM   Catheter Change Date 01/26/18 1/27/2018  3:00 AM   Catheter Change Time 0300 1/27/2018  3:00 AM       Neurosurgery Physical Exam      Vital signs: reviewed above.   Constitutional: well-developed,   Cardiovascular: regular rate and rhythm  Resp: on CPAP  Abdomen: soft  Neurological  GCS E2V1M5  Mental Status: lethargic  non verbal on exam, does not follow commands  Head: normocephalic, atraumatic  PERRL  Facial expression symmetric  Moves left extremities spontaneously, right side withdraws to painful stimuli    Significant Labs:    Recent Labs  Lab 01/26/18  1752 01/27/18  0205 01/28/18  0154   * 262* 238*    138 139   K 3.6 3.9 3.9    106 107   CO2 24 22* 18*   BUN 19 18 16   CREATININE 1.0 1.0 0.9   CALCIUM 8.0* 8.1* 8.5*   MG  --  2.0 1.7       Recent Labs  Lab 01/27/18  0205 01/28/18  0154   WBC 13.47* 18.71*   HGB 15.4 15.2   HCT 44.4 42.9    276       Recent Labs  Lab 01/27/18  0205 01/28/18  0154   INR 1.0 1.0     Microbiology Results (last 7 days)     Procedure Component Value Units Date/Time    Blood culture [[id number]] Collected:  01/27/18 1559    Order Status:  Completed Specimen:  Blood from Peripheral, Hand, Right Updated:  01/28/18 0115     Blood Culture, Routine No Growth to date    Narrative:       Blood cultures from 2 different sites. 4 bottles total.  Please draw before starting antibiotics.    Blood culture [[id number]] Collected:  01/27/18 1558    Order Status:   Completed Specimen:  Blood from Peripheral, Hand, Left Updated:  01/28/18 0115     Blood Culture, Routine No Growth to date    Narrative:       Blood cultures x 2 different sites. 4 bottles total. Please  draw cultures before administering antibiotics.    Urine culture [[id number]] Collected:  01/27/18 1621    Order Status:  Sent Specimen:  Urine from Urine, Catheterized Updated:  01/27/18 1730        All pertinent labs from the last 24 hours have been reviewed.    Significant Diagnostics:  CTH reviewed

## 2018-01-29 LAB
ABO + RH BLD: NORMAL
ALBUMIN SERPL BCP-MCNC: 3 G/DL
ALLENS TEST: ABNORMAL
ALP SERPL-CCNC: 69 U/L
ALT SERPL W/O P-5'-P-CCNC: 30 U/L
ANION GAP SERPL CALC-SCNC: 10 MMOL/L
AST SERPL-CCNC: 29 U/L
BASOPHILS # BLD AUTO: 0.04 K/UL
BASOPHILS NFR BLD: 0.3 %
BILIRUB SERPL-MCNC: 1.1 MG/DL
BLD GP AB SCN CELLS X3 SERPL QL: NORMAL
BUN SERPL-MCNC: 20 MG/DL
CALCIUM SERPL-MCNC: 8.3 MG/DL
CHLORIDE SERPL-SCNC: 110 MMOL/L
CO2 SERPL-SCNC: 21 MMOL/L
CREAT SERPL-MCNC: 0.8 MG/DL
DELSYS: ABNORMAL
DIFFERENTIAL METHOD: ABNORMAL
EOSINOPHIL # BLD AUTO: 0 K/UL
EOSINOPHIL NFR BLD: 0.2 %
ERYTHROCYTE [DISTWIDTH] IN BLOOD BY AUTOMATED COUNT: 12.8 %
EST. GFR  (AFRICAN AMERICAN): >60 ML/MIN/1.73 M^2
EST. GFR  (NON AFRICAN AMERICAN): >60 ML/MIN/1.73 M^2
GLUCOSE SERPL-MCNC: 158 MG/DL
HCO3 UR-SCNC: 21 MMOL/L (ref 24–28)
HCT VFR BLD AUTO: 41.3 %
HGB BLD-MCNC: 14.4 G/DL
IMM GRANULOCYTES # BLD AUTO: 0.06 K/UL
IMM GRANULOCYTES NFR BLD AUTO: 0.5 %
INR PPP: 0.9
LYMPHOCYTES # BLD AUTO: 2.1 K/UL
LYMPHOCYTES NFR BLD: 15.8 %
MAGNESIUM SERPL-MCNC: 2.3 MG/DL
MCH RBC QN AUTO: 29.4 PG
MCHC RBC AUTO-ENTMCNC: 34.9 G/DL
MCV RBC AUTO: 84 FL
MONOCYTES # BLD AUTO: 1.2 K/UL
MONOCYTES NFR BLD: 8.8 %
NEUTROPHILS # BLD AUTO: 9.8 K/UL
NEUTROPHILS NFR BLD: 74.4 %
NRBC BLD-RTO: 0 /100 WBC
PCO2 BLDA: 27.7 MMHG (ref 35–45)
PH SMN: 7.49 [PH] (ref 7.35–7.45)
PHOSPHATE SERPL-MCNC: 3.2 MG/DL
PLATELET # BLD AUTO: 279 K/UL
PMV BLD AUTO: 9.5 FL
PO2 BLDA: 94 MMHG (ref 80–100)
POC BE: -2 MMOL/L
POC SATURATED O2: 98 % (ref 95–100)
POC TCO2: 22 MMOL/L (ref 23–27)
POCT GLUCOSE: 130 MG/DL (ref 70–110)
POCT GLUCOSE: 134 MG/DL (ref 70–110)
POCT GLUCOSE: 139 MG/DL (ref 70–110)
POCT GLUCOSE: 144 MG/DL (ref 70–110)
POCT GLUCOSE: 147 MG/DL (ref 70–110)
POCT GLUCOSE: 158 MG/DL (ref 70–110)
POCT GLUCOSE: 158 MG/DL (ref 70–110)
POCT GLUCOSE: 159 MG/DL (ref 70–110)
POCT GLUCOSE: 161 MG/DL (ref 70–110)
POCT GLUCOSE: 161 MG/DL (ref 70–110)
POCT GLUCOSE: 163 MG/DL (ref 70–110)
POCT GLUCOSE: 165 MG/DL (ref 70–110)
POCT GLUCOSE: 166 MG/DL (ref 70–110)
POCT GLUCOSE: 168 MG/DL (ref 70–110)
POCT GLUCOSE: 173 MG/DL (ref 70–110)
POCT GLUCOSE: 175 MG/DL (ref 70–110)
POCT GLUCOSE: 175 MG/DL (ref 70–110)
POCT GLUCOSE: 178 MG/DL (ref 70–110)
POCT GLUCOSE: 179 MG/DL (ref 70–110)
POCT GLUCOSE: 183 MG/DL (ref 70–110)
POCT GLUCOSE: 184 MG/DL (ref 70–110)
POCT GLUCOSE: 185 MG/DL (ref 70–110)
POCT GLUCOSE: 186 MG/DL (ref 70–110)
POCT GLUCOSE: 187 MG/DL (ref 70–110)
POCT GLUCOSE: 191 MG/DL (ref 70–110)
POCT GLUCOSE: 192 MG/DL (ref 70–110)
POCT GLUCOSE: 194 MG/DL (ref 70–110)
POCT GLUCOSE: 198 MG/DL (ref 70–110)
POCT GLUCOSE: 199 MG/DL (ref 70–110)
POCT GLUCOSE: 202 MG/DL (ref 70–110)
POCT GLUCOSE: 207 MG/DL (ref 70–110)
POCT GLUCOSE: 209 MG/DL (ref 70–110)
POCT GLUCOSE: 212 MG/DL (ref 70–110)
POCT GLUCOSE: 219 MG/DL (ref 70–110)
POCT GLUCOSE: 240 MG/DL (ref 70–110)
POTASSIUM SERPL-SCNC: 3.8 MMOL/L
PROCALCITONIN SERPL IA-MCNC: 0.2 NG/ML
PROT SERPL-MCNC: 6.9 G/DL
PROTHROMBIN TIME: 9.8 SEC
RBC # BLD AUTO: 4.9 M/UL
SAMPLE: ABNORMAL
SITE: ABNORMAL
SODIUM SERPL-SCNC: 140 MMOL/L
SODIUM SERPL-SCNC: 141 MMOL/L
SODIUM SERPL-SCNC: 141 MMOL/L
WBC # BLD AUTO: 13.19 K/UL

## 2018-01-29 PROCEDURE — 25000003 PHARM REV CODE 250: Performed by: PSYCHIATRY & NEUROLOGY

## 2018-01-29 PROCEDURE — 82803 BLOOD GASES ANY COMBINATION: CPT

## 2018-01-29 PROCEDURE — 85025 COMPLETE CBC W/AUTO DIFF WBC: CPT

## 2018-01-29 PROCEDURE — 92523 SPEECH SOUND LANG COMPREHEN: CPT

## 2018-01-29 PROCEDURE — 83735 ASSAY OF MAGNESIUM: CPT

## 2018-01-29 PROCEDURE — 63600175 PHARM REV CODE 636 W HCPCS: Performed by: PSYCHIATRY & NEUROLOGY

## 2018-01-29 PROCEDURE — 99900035 HC TECH TIME PER 15 MIN (STAT)

## 2018-01-29 PROCEDURE — 20000000 HC ICU ROOM

## 2018-01-29 PROCEDURE — 99232 SBSQ HOSP IP/OBS MODERATE 35: CPT | Mod: ,,, | Performed by: NEUROLOGICAL SURGERY

## 2018-01-29 PROCEDURE — 99233 SBSQ HOSP IP/OBS HIGH 50: CPT | Mod: ,,, | Performed by: PSYCHIATRY & NEUROLOGY

## 2018-01-29 PROCEDURE — A4217 STERILE WATER/SALINE, 500 ML: HCPCS | Performed by: NURSE PRACTITIONER

## 2018-01-29 PROCEDURE — 27000221 HC OXYGEN, UP TO 24 HOURS

## 2018-01-29 PROCEDURE — 80053 COMPREHEN METABOLIC PANEL: CPT

## 2018-01-29 PROCEDURE — 95951 HC EEG MONITORING/VIDEO RECORD: CPT

## 2018-01-29 PROCEDURE — 63600175 PHARM REV CODE 636 W HCPCS: Performed by: PHYSICIAN ASSISTANT

## 2018-01-29 PROCEDURE — 95951 PR EEG MONITORING/VIDEORECORD: CPT | Mod: 26,,, | Performed by: PSYCHIATRY & NEUROLOGY

## 2018-01-29 PROCEDURE — 84145 PROCALCITONIN (PCT): CPT

## 2018-01-29 PROCEDURE — 94660 CPAP INITIATION&MGMT: CPT

## 2018-01-29 PROCEDURE — 84100 ASSAY OF PHOSPHORUS: CPT

## 2018-01-29 PROCEDURE — A4216 STERILE WATER/SALINE, 10 ML: HCPCS | Performed by: NURSE PRACTITIONER

## 2018-01-29 PROCEDURE — 94640 AIRWAY INHALATION TREATMENT: CPT

## 2018-01-29 PROCEDURE — 25000242 PHARM REV CODE 250 ALT 637 W/ HCPCS: Performed by: PHYSICIAN ASSISTANT

## 2018-01-29 PROCEDURE — 25000003 PHARM REV CODE 250: Performed by: NURSE PRACTITIONER

## 2018-01-29 PROCEDURE — 86901 BLOOD TYPING SEROLOGIC RH(D): CPT

## 2018-01-29 PROCEDURE — 37799 UNLISTED PX VASCULAR SURGERY: CPT

## 2018-01-29 PROCEDURE — 84295 ASSAY OF SERUM SODIUM: CPT

## 2018-01-29 PROCEDURE — 25000003 PHARM REV CODE 250: Performed by: PHYSICIAN ASSISTANT

## 2018-01-29 PROCEDURE — 85610 PROTHROMBIN TIME: CPT

## 2018-01-29 PROCEDURE — 63600175 PHARM REV CODE 636 W HCPCS: Performed by: NURSE PRACTITIONER

## 2018-01-29 RX ORDER — HYDRALAZINE HYDROCHLORIDE 20 MG/ML
10 INJECTION INTRAMUSCULAR; INTRAVENOUS EVERY 4 HOURS PRN
Status: DISCONTINUED | OUTPATIENT
Start: 2018-01-29 | End: 2018-02-02

## 2018-01-29 RX ORDER — AMOXICILLIN 250 MG
1 CAPSULE ORAL 2 TIMES DAILY
Status: DISCONTINUED | OUTPATIENT
Start: 2018-01-29 | End: 2018-03-21 | Stop reason: HOSPADM

## 2018-01-29 RX ORDER — LABETALOL HYDROCHLORIDE 5 MG/ML
10 INJECTION, SOLUTION INTRAVENOUS EVERY 4 HOURS PRN
Status: DISCONTINUED | OUTPATIENT
Start: 2018-01-29 | End: 2018-02-02

## 2018-01-29 RX ADMIN — HEPARIN SODIUM 5000 UNITS: 5000 INJECTION, SOLUTION INTRAVENOUS; SUBCUTANEOUS at 03:01

## 2018-01-29 RX ADMIN — MOXIFLOXACIN HYDROCHLORIDE 400 MG: 400 INJECTION, SOLUTION INTRAVENOUS at 08:01

## 2018-01-29 RX ADMIN — Medication 3 ML: at 06:01

## 2018-01-29 RX ADMIN — SODIUM ACETATE: 164 INJECTION, SOLUTION, CONCENTRATE INTRAVENOUS at 05:01

## 2018-01-29 RX ADMIN — ASPIRIN 325 MG ORAL TABLET 325 MG: 325 PILL ORAL at 08:01

## 2018-01-29 RX ADMIN — Medication 3 ML: at 09:01

## 2018-01-29 RX ADMIN — SODIUM CHLORIDE SOLN NEBU 3% 4 ML: 3 NEBU SOLN at 03:01

## 2018-01-29 RX ADMIN — CLOPIDOGREL 75 MG: 75 TABLET, FILM COATED ORAL at 08:01

## 2018-01-29 RX ADMIN — ACETAMINOPHEN 650 MG: 325 TABLET, FILM COATED ORAL at 05:01

## 2018-01-29 RX ADMIN — HEPARIN SODIUM 5000 UNITS: 5000 INJECTION, SOLUTION INTRAVENOUS; SUBCUTANEOUS at 09:01

## 2018-01-29 RX ADMIN — IPRATROPIUM BROMIDE AND ALBUTEROL SULFATE 3 ML: .5; 3 SOLUTION RESPIRATORY (INHALATION) at 11:01

## 2018-01-29 RX ADMIN — IPRATROPIUM BROMIDE AND ALBUTEROL SULFATE 3 ML: .5; 3 SOLUTION RESPIRATORY (INHALATION) at 12:01

## 2018-01-29 RX ADMIN — STANDARDIZED SENNA CONCENTRATE AND DOCUSATE SODIUM 1 TABLET: 8.6; 5 TABLET, FILM COATED ORAL at 08:01

## 2018-01-29 RX ADMIN — SODIUM CHLORIDE SOLN NEBU 3% 4 ML: 3 NEBU SOLN at 07:01

## 2018-01-29 RX ADMIN — HEPARIN SODIUM 5000 UNITS: 5000 INJECTION, SOLUTION INTRAVENOUS; SUBCUTANEOUS at 05:01

## 2018-01-29 RX ADMIN — ACETAMINOPHEN 650 MG: 325 TABLET, FILM COATED ORAL at 11:01

## 2018-01-29 RX ADMIN — ACETAMINOPHEN 650 MG: 325 TABLET, FILM COATED ORAL at 06:01

## 2018-01-29 RX ADMIN — CEFTRIAXONE 2 G: 2 INJECTION, SOLUTION INTRAVENOUS at 03:01

## 2018-01-29 RX ADMIN — SODIUM CHLORIDE SOLN NEBU 3% 4 ML: 3 NEBU SOLN at 12:01

## 2018-01-29 RX ADMIN — Medication 3 ML: at 02:01

## 2018-01-29 RX ADMIN — IPRATROPIUM BROMIDE AND ALBUTEROL SULFATE 3 ML: .5; 3 SOLUTION RESPIRATORY (INHALATION) at 03:01

## 2018-01-29 RX ADMIN — ATORVASTATIN CALCIUM 80 MG: 20 TABLET, FILM COATED ORAL at 08:01

## 2018-01-29 RX ADMIN — LABETALOL HYDROCHLORIDE 10 MG: 5 INJECTION, SOLUTION INTRAVENOUS at 03:01

## 2018-01-29 RX ADMIN — SODIUM CHLORIDE SOLN NEBU 3% 4 ML: 3 NEBU SOLN at 08:01

## 2018-01-29 RX ADMIN — IPRATROPIUM BROMIDE AND ALBUTEROL SULFATE 3 ML: .5; 3 SOLUTION RESPIRATORY (INHALATION) at 07:01

## 2018-01-29 RX ADMIN — ACETAMINOPHEN 650 MG: 325 TABLET, FILM COATED ORAL at 12:01

## 2018-01-29 RX ADMIN — IPRATROPIUM BROMIDE AND ALBUTEROL SULFATE 3 ML: .5; 3 SOLUTION RESPIRATORY (INHALATION) at 08:01

## 2018-01-29 NOTE — PROGRESS NOTES
[facility]  Neurocritical Care  Progress Note    Admit Date: 1/25/2018  Service Date: 01/29/2018  Length of Stay: 4    Subjective:     Chief Complaint: Embolic stroke involving left middle cerebral artery    History of Present Illness: The patient is a 47 year old male with no known PMHx admitted to [facility]   s/p thrombectomy of L M1 occlusion. Per chart review, he   walked into [place] and was acting abnormal.  EMS was called and brought to the ED for concern of L MCA syndrome. CT MP revealed short segment occlusion of left M1.  Patient went to IR for thrombectomy of L M1 occlusion seen on CTA MP.  TICI2C reperfusion and angioplasty. Patient admitted to [facility] for close monitoring and higher level of care.   History obtained from chart review only            Hospital Course: 1/25: Pt admitted to [facility] s/p L M1 thrombectomy, TICI2C reperfusion and angioplasty   1/27: large L MCA, hemicrani watch, NSGY following, insulin ggt, cardene ggt, L sided intermittent slowing eeg but no sz, +nasal trumpet for copious thick secretions, wheezing this am - improved with duo nebs, 3% nebs, and cough assist, concern for sepsis 2/2 hemodynamic changes - increased HR and BP, worsening leukocytosis, +ceftriaxone, pan cx, adrian track  1/28: continued respiratory challenges due to secretions. Aggressive pulmonary toileting. Continue monitoring for neuro worsening. Add moxifloxacin.   1/29: CTH to eval for increased edema/shift, EEG afterwards. Concern for decrease exam, no following/decreased alertness). CXR and Procal to follow leukocytosis. Hold TF until eval decreasing EXAM    Interval History:    --CTH stat  --EEG pending CTH results  --follow CXR/Procal, unsure of leukocytosis cause          Review of Systems   Reason unable to perform ROS: aphasic.   .  Objective:     Vitals:  Temp: 99.4 °F (37.4 °C)  Pulse: 104  Rhythm: sinus tachycardia  BP: (!) 150/104  MAP (mmHg): 118  CI (L/min/m2): 3.5 L/min/m2  SVI: 13  SVV: 33  %  Resp: 20  SpO2: 100 %  Oxygen Concentration (%): 30  O2 Device (Oxygen Therapy): nasal cannula w/ humidification    Temp  Min: 98.5 °F (36.9 °C)  Max: 99.6 °F (37.6 °C)  Pulse  Min: 79  Max: 125  BP  Min: 132/80  Max: 188/97  MAP (mmHg)  Min: 98  Max: 151  CI (L/min/m2)  Min: 3.5 L/min/m2  Max: 4.7 L/min/m2  SVI  Min: 13  Max: 39  SVV  Min: 30 %  Max: 35 %  Resp  Min: 17  Max: 33  SpO2  Min: 96 %  Max: 100 %  Oxygen Concentration (%)  Min: 30  Max: 30    01/28 0701 - 01/29 0700  In: 2447.6 [I.V.:2047.6]  Out: 2020 [Urine:2020]   Unmeasured Output  Urine Occurrence: 1  Stool Occurrence: 1       Physical Exam   Cardiovascular: Normal rate, regular rhythm, normal heart sounds and intact distal pulses.    Pulmonary/Chest: Effort normal and breath sounds normal.   Abdominal: Soft. Bowel sounds are normal.   Neurological:   E4 V1 M4  Eyes open, does not attend or follow  PERRLA, 3mm/3mm  Right hemiparesis    RUE 5/5  LUE withdraw to pain  RLE 5/5  LLE triple flexion   Vitals reviewed.        Medications:  Continuous  sodium chloride 0.9% Last Rate: 75 mL/hr at 01/29/18 0900   insulin (HUMAN R) infusion (adults) Last Rate: 2.1 Units/hr (01/29/18 0900)   niCARdipine Last Rate: Stopped (01/28/18 0855)   Scheduled  acetaminophen 650 mg Q6H   albuterol-ipratropium 2.5mg-0.5mg/3mL 3 mL Q4H   aspirin 325 mg Daily   atorvastatin 80 mg Daily   cefTRIAXone (ROCEPHIN) IVPB 2 g Q24H   clopidogrel 75 mg Daily   heparin (porcine) 5,000 Units Q8H   senna-docusate 8.6-50 mg 1 tablet BID   sodium chloride 0.9% 3 mL Q8H   sodium chloride 3% 4 mL Q4H   PRN  bisacodyl 10 mg Daily PRN   dextrose 50% 12.5 g PRN   dextrose 50% 12.5 g PRN   dextrose 50% 25 g PRN   glucagon (human recombinant) 1 mg PRN   labetalol 10 mg Q4H PRN   magnesium sulfate IVPB 2 g PRN   magnesium sulfate IVPB 4 g PRN   ondansetron 4 mg Q8H PRN   sodium chloride 0.9% 5 mL PRN     Today I personally reviewed pertinent medications, lines/drains/airways, imaging,  cardiology, lab results, microbiology results,   Diet  Diet NPO  Diet NPO        Assessment/Plan:     Neuro   * Embolic stroke involving left middle cerebral artery    S/P L M1 thrombectomy  -- Neuro checks q 1hr  -- Vascular Neurology  following  -- CT MP- Short segment occlusion of left M1   -- Repeat CTH 1/26 - No detrimental change  -- SBP goal <160  -- PT/OT/Speech  -- hemicrani watch, NSGY following   -- MRI 1/26 - L MCA, no hemorrhagic conversion  -- stat CTH 1/27: L MCA stable, no worsening edema or hemorrhagic conversion  -- ASA, plavix     Appears exam declined, no consistent with previous imaging. Stat CTH to eval for increased edema and then EEG.   NO AEDs at this time             Cytotoxic cerebral edema    monitor CTh and exam for increasing edema        Pulmonary   Respiratory distress    -wheezing of weekend , improved with suction via nasal trumpet, 3% nebs, duo nebs, and cough assist  -concern for sinusitis/resp infection 2/2 low grade fever, leukocytosis, and increasing thick secretions  -ceftriaxone 2g q24h and moxifloxacin  -pan cx, follow procal and eval CXR          Cardiac/Vascular   Hyperlipidemia    -atorvastatin 80 daily           Essential hypertension    --SBP <160  --cardene gtt  --echo:    1 - Normal left ventricular systolic function (EF 65-70%).     2 - Concentric remodeling.     3 - No wall motion abnormalities.     4 - Normal left ventricular diastolic function.     5 - Normal right ventricular systolic function .             Oncology   Leukocytosis    _Pan Cx and Abx over weekend  -Trend PRocal  -SLight improvement in Leukocytosis with ABX  -Eval CXR        Endocrine   Type 2 diabetes mellitus    -A1C-10  -poorly controlled diabetes  -BG >400 on arrival  -insulin gtt until on TF. Will resp stability before starting TF              Prophylaxis:  Venous Thromboembolism: mechanical chemical  Stress Ulcer: None  Ventilator Pneumonia: not applicable     Activity Orders          None         Full Code   Critical Care: 36 min    Scar Bear NP  Neurocritical Care  [facility]

## 2018-01-29 NOTE — ASSESSMENT & PLAN NOTE
-A1C-10  -poorly controlled diabetes  -BG >400 on arrival  -insulin gtt until on TF. Will resp stability before starting TF

## 2018-01-29 NOTE — SUBJECTIVE & OBJECTIVE
Neurologic Chief Complaint:  L M1 embolic infarction s/p thrombectomy    Subjective:     Interval History: Patient is seen for follow-up neurological assessment and treatment recommendations:     Concern for sepsis, started on moxifloxacin. EEG and procal pending.       HPI, Past Medical, Family, and Social History remains the same as documented in the initial encounter.     Review of Systems   Constitutional: Positive for fever.   Neurological: Positive for weakness.   Psychiatric/Behavioral: Positive for confusion.     Scheduled Meds:   acetaminophen  650 mg Per NG tube Q6H    albuterol-ipratropium 2.5mg-0.5mg/3mL  3 mL Nebulization Q4H    aspirin  325 mg Per NG tube Daily    atorvastatin  80 mg Per NG tube Daily    cefTRIAXone (ROCEPHIN) IVPB  2 g Intravenous Q24H    clopidogrel  75 mg Per NG tube Daily    heparin (porcine)  5,000 Units Subcutaneous Q8H    senna-docusate 8.6-50 mg  1 tablet Per NG tube BID    sodium chloride 0.9%  3 mL Intravenous Q8H    sodium chloride 3%  4 mL Nebulization Q4H     Continuous Infusions:   insulin (HUMAN R) infusion (adults) 2.1 Units/hr (01/29/18 1600)    buffered 2% sodium acetate 86meq, sodium chloride 86meq, sterile water for inj IV soln       PRN Meds:bisacodyl, dextrose 50%, dextrose 50%, dextrose 50%, glucagon (human recombinant), hydrALAZINE, labetalol, magnesium sulfate IVPB, magnesium sulfate IVPB, ondansetron, sodium chloride 0.9%    Objective:     Vital Signs (Most Recent):  Temp: 99.4 °F (37.4 °C) (01/29/18 1600)  Pulse: 89 (01/29/18 1600)  Resp: (!) 37 (01/29/18 1600)  BP: (!) 175/119 (01/29/18 1600)  SpO2: (!) 94 % (01/29/18 1600)  BP Location: Right leg    Vital Signs Range (Last 24H):  Temp:  [98.5 °F (36.9 °C)-99.6 °F (37.6 °C)]   Pulse:  []   Resp:  [17-37]   BP: (132-188)/()   SpO2:  [94 %-100 %]   Arterial Line BP: ()/()   BP Location: Right leg    Physical Exam   Constitutional: He appears well-developed.   Cardiovascular:  Normal rate and regular rhythm.  Exam reveals no friction rub.    No murmur heard.  Pulmonary/Chest: No respiratory distress. He has no wheezes.   Abdominal: Soft. He exhibits no distension. There is no tenderness.       Neurological Exam:   LOC: obtunded  Language: Mute  Articulation: Mute/Anarthric  Visual Fields: no blink to threat b/l  EOM (CN III, IV, VI): L gaze preference  Pupils (CN II, III): PERRL  Sustained clonus on R    Laboratory:  Recent Results (from the past 24 hour(s))   POCT glucose    Collection Time: 01/28/18  5:03 PM   Result Value Ref Range    POCT Glucose 175 (H) 70 - 110 mg/dL   POCT glucose    Collection Time: 01/28/18  5:42 PM   Result Value Ref Range    POCT Glucose 134 (H) 70 - 110 mg/dL   POCT glucose    Collection Time: 01/28/18  7:26 PM   Result Value Ref Range    POCT Glucose 192 (H) 70 - 110 mg/dL   POCT glucose    Collection Time: 01/28/18  8:06 PM   Result Value Ref Range    POCT Glucose 198 (H) 70 - 110 mg/dL   POCT glucose    Collection Time: 01/28/18  9:05 PM   Result Value Ref Range    POCT Glucose 183 (H) 70 - 110 mg/dL   POCT glucose    Collection Time: 01/28/18 10:22 PM   Result Value Ref Range    POCT Glucose 209 (H) 70 - 110 mg/dL   POCT glucose    Collection Time: 01/28/18 11:13 PM   Result Value Ref Range    POCT Glucose 202 (H) 70 - 110 mg/dL   POCT glucose    Collection Time: 01/29/18 12:33 AM   Result Value Ref Range    POCT Glucose 179 (H) 70 - 110 mg/dL   POCT glucose    Collection Time: 01/29/18  1:10 AM   Result Value Ref Range    POCT Glucose 184 (H) 70 - 110 mg/dL   POCT glucose    Collection Time: 01/29/18  2:03 AM   Result Value Ref Range    POCT Glucose 158 (H) 70 - 110 mg/dL   Comprehensive metabolic panel    Collection Time: 01/29/18  2:32 AM   Result Value Ref Range    Sodium 141 136 - 145 mmol/L    Potassium 3.8 3.5 - 5.1 mmol/L    Chloride 110 95 - 110 mmol/L    CO2 21 (L) 23 - 29 mmol/L    Glucose 158 (H) 70 - 110 mg/dL    BUN, Bld 20 6 - 20 mg/dL     Creatinine 0.8 0.5 - 1.4 mg/dL    Calcium 8.3 (L) 8.7 - 10.5 mg/dL    Total Protein 6.9 6.0 - 8.4 g/dL    Albumin 3.0 (L) 3.5 - 5.2 g/dL    Total Bilirubin 1.1 (H) 0.1 - 1.0 mg/dL    Alkaline Phosphatase 69 55 - 135 U/L    AST 29 10 - 40 U/L    ALT 30 10 - 44 U/L    Anion Gap 10 8 - 16 mmol/L    eGFR if African American >60.0 >60 mL/min/1.73 m^2    eGFR if non African American >60.0 >60 mL/min/1.73 m^2   CBC auto differential    Collection Time: 01/29/18  2:32 AM   Result Value Ref Range    WBC 13.19 (H) 3.90 - 12.70 K/uL    RBC 4.90 4.60 - 6.20 M/uL    Hemoglobin 14.4 14.0 - 18.0 g/dL    Hematocrit 41.3 40.0 - 54.0 %    MCV 84 82 - 98 fL    MCH 29.4 27.0 - 31.0 pg    MCHC 34.9 32.0 - 36.0 g/dL    RDW 12.8 11.5 - 14.5 %    Platelets 279 150 - 350 K/uL    MPV 9.5 9.2 - 12.9 fL    Immature Granulocytes 0.5 0.0 - 0.5 %    Gran # (ANC) 9.8 (H) 1.8 - 7.7 K/uL    Immature Grans (Abs) 0.06 (H) 0.00 - 0.04 K/uL    Lymph # 2.1 1.0 - 4.8 K/uL    Mono # 1.2 (H) 0.3 - 1.0 K/uL    Eos # 0.0 0.0 - 0.5 K/uL    Baso # 0.04 0.00 - 0.20 K/uL    nRBC 0 0 /100 WBC    Gran% 74.4 (H) 38.0 - 73.0 %    Lymph% 15.8 (L) 18.0 - 48.0 %    Mono% 8.8 4.0 - 15.0 %    Eosinophil% 0.2 0.0 - 8.0 %    Basophil% 0.3 0.0 - 1.9 %    Differential Method Automated    Magnesium    Collection Time: 01/29/18  2:32 AM   Result Value Ref Range    Magnesium 2.3 1.6 - 2.6 mg/dL   Phosphorus    Collection Time: 01/29/18  2:32 AM   Result Value Ref Range    Phosphorus 3.2 2.7 - 4.5 mg/dL   Protime-INR    Collection Time: 01/29/18  2:32 AM   Result Value Ref Range    Prothrombin Time 9.8 9.0 - 12.5 sec    INR 0.9 0.8 - 1.2   Type & Screen    Collection Time: 01/29/18  2:32 AM   Result Value Ref Range    Group & Rh O POS     Indirect Leyla NEG    POCT glucose    Collection Time: 01/29/18  2:46 AM   Result Value Ref Range    POCT Glucose 139 (H) 70 - 110 mg/dL   POCT glucose    Collection Time: 01/29/18  4:25 AM   Result Value Ref Range    POCT Glucose 161 (H) 70 -  110 mg/dL   POCT glucose    Collection Time: 01/29/18  5:05 AM   Result Value Ref Range    POCT Glucose 161 (H) 70 - 110 mg/dL   POCT glucose    Collection Time: 01/29/18  6:09 AM   Result Value Ref Range    POCT Glucose 173 (H) 70 - 110 mg/dL   POCT glucose    Collection Time: 01/29/18  7:51 AM   Result Value Ref Range    POCT Glucose 166 (H) 70 - 110 mg/dL   POCT glucose    Collection Time: 01/29/18  9:03 AM   Result Value Ref Range    POCT Glucose 194 (H) 70 - 110 mg/dL   POCT glucose    Collection Time: 01/29/18  9:59 AM   Result Value Ref Range    POCT Glucose 207 (H) 70 - 110 mg/dL   POCT glucose    Collection Time: 01/29/18 11:06 AM   Result Value Ref Range    POCT Glucose 179 (H) 70 - 110 mg/dL   POCT glucose    Collection Time: 01/29/18 12:05 PM   Result Value Ref Range    POCT Glucose 178 (H) 70 - 110 mg/dL   POCT glucose    Collection Time: 01/29/18  1:32 PM   Result Value Ref Range    POCT Glucose 168 (H) 70 - 110 mg/dL   Procalcitonin    Collection Time: 01/29/18  1:44 PM   Result Value Ref Range    Procalcitonin 0.20 <0.25 ng/mL   POCT glucose    Collection Time: 01/29/18  2:36 PM   Result Value Ref Range    POCT Glucose 147 (H) 70 - 110 mg/dL   POCT glucose    Collection Time: 01/29/18  3:38 PM   Result Value Ref Range    POCT Glucose 144 (H) 70 - 110 mg/dL   POCT glucose    Collection Time: 01/29/18  4:20 PM   Result Value Ref Range    POCT Glucose 179 (H) 70 - 110 mg/dL       Microbiology Results (last 7 days)     Procedure Component Value Units Date/Time    Urine culture [[id number]] Collected:  01/27/18 1621    Order Status:  Completed Specimen:  Urine from Urine, Catheterized Updated:  01/28/18 2047     Urine Culture, Routine No growth    Blood culture [[id number]] Collected:  01/27/18 1559    Order Status:  Completed Specimen:  Blood from Peripheral, Hand, Right Updated:  01/28/18 2012     Blood Culture, Routine No Growth to date     Blood Culture, Routine No Growth to date    Narrative:        Blood cultures from 2 different sites. 4 bottles total.  Please draw before starting antibiotics.    Blood culture [[id number]] Collected:  01/27/18 2471    Order Status:  Completed Specimen:  Blood from Peripheral, Hand, Left Updated:  01/28/18 2012     Blood Culture, Routine No Growth to date     Blood Culture, Routine No Growth to date    Narrative:       Blood cultures x 2 different sites. 4 bottles total. Please  draw cultures before administering antibiotics.            Diagnostic Results     Brain Imaging   1/27/18 CTH: Per independent resident review and interpretation,  L MCA infarction s/p stenting.  No sign of hemorrhagic transformation.  R basal ganglia and cerebellar infarct.          1/26/18 MRI Brain w/o: Per independent resident review and interpretation,  Acute L MCA infarction.        Vessel Imaging   1/25/18 IR Angiogram: Per independent resident review and interpretation,  L M1 occlusion.        Cardiac Imaging   1/26/18 EKG: Per independent resident review and interpretation,  Sinus tachycardia.  .  QTc 500.    1/26/18 TTE: Per report,  LA wnl  EF 65-70%  No regional wall motion abnormalities  Diastolic function normal

## 2018-01-29 NOTE — ASSESSMENT & PLAN NOTE
47 y.o. male with history of HTN and DM who presented to [facility] by EMS for concern of L MCA syndrome 1/25/18 AM. He is s/p thrombectomy of L M1 occlusion. CT MP revealed short segment occlusion of left M1.  Patient went to IR for thrombectomy of L M1.  TICI2C reperfusion and angioplasty. Neurosurgery consulted for hemicrani watch. Patient currently on asa and plavix.    -No neurosurgical intervention at this time.  -Maximum medical management per NCC.  -Goal Na 145-155  -Neuro checks hourly  -Please notify NSR if worsening neuro exam   -We will continue to monitor closely, please contact us with any questions or concerns.

## 2018-01-29 NOTE — PLAN OF CARE
Problem: Patient Care Overview  Goal: Plan of Care Review  Outcome: Ongoing (interventions implemented as appropriate)  VS and assessment per flow sheet, SBP goal changed to <160, cardene gtt off, patient progressing towards goal as tolerated. Plan of care reviewed with Todd Quevedo and family at 1800, all concerns addressed. Will continue to monitor.

## 2018-01-29 NOTE — ASSESSMENT & PLAN NOTE
--SBP <160  --cardene gtt  --echo:    1 - Normal left ventricular systolic function (EF 65-70%).     2 - Concentric remodeling.     3 - No wall motion abnormalities.     4 - Normal left ventricular diastolic function.     5 - Normal right ventricular systolic function .

## 2018-01-29 NOTE — ASSESSMENT & PLAN NOTE
-wheezing of weekend , improved with suction via nasal trumpet, 3% nebs, duo nebs, and cough assist  -concern for sinusitis/resp infection 2/2 low grade fever, leukocytosis, and increasing thick secretions  -ceftriaxone 2g q24h and moxifloxacin  -pan cx, follow procal and eval CXR

## 2018-01-29 NOTE — SUBJECTIVE & OBJECTIVE
Neurologic Chief Complaint:  L M1 embolic infarction s/p thrombectomy    Subjective:     Interval History: Patient is seen for follow-up neurological assessment and treatment recommendations: Off Cardene, remains on Insulin gtt. Concern for sepsis, respiratory source. Imaging with mass effect and some brain compression; maycol crani watch. Wife taking close care at bedside.      HPI, Past Medical, Family, and Social History remains the same as documented in the initial encounter.     Review of Systems   Constitutional: Positive for fever.   Neurological: Positive for weakness.   Psychiatric/Behavioral: Positive for confusion.     Scheduled Meds:   acetaminophen  650 mg Per NG tube Q6H    albuterol-ipratropium 2.5mg-0.5mg/3mL  3 mL Nebulization Q4H    aspirin  325 mg Per NG tube Daily    atorvastatin  80 mg Per NG tube Daily    cefTRIAXone (ROCEPHIN) IVPB  2 g Intravenous Q24H    clopidogrel  75 mg Per NG tube Daily    heparin (porcine)  5,000 Units Subcutaneous Q8H    moxifloxacin  400 mg Intravenous Q24H    senna-docusate 8.6-50 mg  1 tablet Oral BID    sodium chloride 0.9%  3 mL Intravenous Q8H    sodium chloride 3%  4 mL Nebulization Q4H     Continuous Infusions:   sodium chloride 0.9% 75 mL/hr at 01/28/18 2300    insulin (HUMAN R) infusion (adults) 3.1 Units/hr (01/28/18 2300)    niCARdipine Stopped (01/28/18 0855)     PRN Meds:bisacodyl, dextrose 50%, dextrose 50%, dextrose 50%, glucagon (human recombinant), labetalol, magnesium sulfate IVPB, magnesium sulfate IVPB, ondansetron, sodium chloride 0.9%    Objective:     Vital Signs (Most Recent):  Temp: 99 °F (37.2 °C) (01/28/18 2300)  Pulse: 95 (01/28/18 2307)  Resp: 17 (01/28/18 2307)  BP: (!) 142/80 (01/28/18 2300)  SpO2: 98 % (01/28/18 2307)  BP Location: Right leg    Vital Signs Range (Last 24H):  Temp:  [97.9 °F (36.6 °C)-99.7 °F (37.6 °C)]   Pulse:  []   Resp:  [14-33]   BP: (132-181)/()   SpO2:  [96 %-100 %]   Arterial Line BP:  ()/()   BP Location: Right leg    Physical Exam   Constitutional: He appears well-developed.   Cardiovascular: Normal rate and regular rhythm.  Exam reveals no friction rub.    No murmur heard.  Pulmonary/Chest: No respiratory distress. He has no wheezes.   Abdominal: Soft. He exhibits no distension. There is no tenderness.       Neurological Exam:   LOC: obtunded  Language: Mute  Articulation: Mute/Anarthric  Visual Fields: no blink to threat b/l  EOM (CN III, IV, VI): L gaze preference  Pupils (CN II, III): PERRL  Sustained clonus on R    Laboratory:  Recent Results (from the past 24 hour(s))   POCT glucose    Collection Time: 01/28/18  1:51 AM   Result Value Ref Range    POCT Glucose 223 (H) 70 - 110 mg/dL   Comprehensive metabolic panel    Collection Time: 01/28/18  1:54 AM   Result Value Ref Range    Sodium 139 136 - 145 mmol/L    Potassium 3.9 3.5 - 5.1 mmol/L    Chloride 107 95 - 110 mmol/L    CO2 18 (L) 23 - 29 mmol/L    Glucose 238 (H) 70 - 110 mg/dL    BUN, Bld 16 6 - 20 mg/dL    Creatinine 0.9 0.5 - 1.4 mg/dL    Calcium 8.5 (L) 8.7 - 10.5 mg/dL    Total Protein 7.1 6.0 - 8.4 g/dL    Albumin 3.3 (L) 3.5 - 5.2 g/dL    Total Bilirubin 1.4 (H) 0.1 - 1.0 mg/dL    Alkaline Phosphatase 65 55 - 135 U/L    AST 30 10 - 40 U/L    ALT 32 10 - 44 U/L    Anion Gap 14 8 - 16 mmol/L    eGFR if African American >60.0 >60 mL/min/1.73 m^2    eGFR if non African American >60.0 >60 mL/min/1.73 m^2   CBC auto differential    Collection Time: 01/28/18  1:54 AM   Result Value Ref Range    WBC 18.71 (H) 3.90 - 12.70 K/uL    RBC 5.16 4.60 - 6.20 M/uL    Hemoglobin 15.2 14.0 - 18.0 g/dL    Hematocrit 42.9 40.0 - 54.0 %    MCV 83 82 - 98 fL    MCH 29.5 27.0 - 31.0 pg    MCHC 35.4 32.0 - 36.0 g/dL    RDW 12.5 11.5 - 14.5 %    Platelets 276 150 - 350 K/uL    MPV 9.2 9.2 - 12.9 fL    Immature Granulocytes 0.6 (H) 0.0 - 0.5 %    Gran # (ANC) 15.3 (H) 1.8 - 7.7 K/uL    Immature Grans (Abs) 0.11 (H) 0.00 - 0.04 K/uL    Lymph #  1.8 1.0 - 4.8 K/uL    Mono # 1.5 (H) 0.3 - 1.0 K/uL    Eos # 0.0 0.0 - 0.5 K/uL    Baso # 0.05 0.00 - 0.20 K/uL    nRBC 0 0 /100 WBC    Gran% 81.5 (H) 38.0 - 73.0 %    Lymph% 9.5 (L) 18.0 - 48.0 %    Mono% 8.1 4.0 - 15.0 %    Eosinophil% 0.0 0.0 - 8.0 %    Basophil% 0.3 0.0 - 1.9 %    Differential Method Automated    Magnesium    Collection Time: 01/28/18  1:54 AM   Result Value Ref Range    Magnesium 1.7 1.6 - 2.6 mg/dL   Phosphorus    Collection Time: 01/28/18  1:54 AM   Result Value Ref Range    Phosphorus 3.5 2.7 - 4.5 mg/dL   Protime-INR    Collection Time: 01/28/18  1:54 AM   Result Value Ref Range    Prothrombin Time 10.5 9.0 - 12.5 sec    INR 1.0 0.8 - 1.2   POCT glucose    Collection Time: 01/28/18  3:01 AM   Result Value Ref Range    POCT Glucose 173 (H) 70 - 110 mg/dL   POCT glucose    Collection Time: 01/28/18  4:04 AM   Result Value Ref Range    POCT Glucose 230 (H) 70 - 110 mg/dL   POCT glucose    Collection Time: 01/28/18  4:59 AM   Result Value Ref Range    POCT Glucose 160 (H) 70 - 110 mg/dL   POCT glucose    Collection Time: 01/28/18  6:02 AM   Result Value Ref Range    POCT Glucose 225 (H) 70 - 110 mg/dL   Procalcitonin    Collection Time: 01/28/18  7:49 AM   Result Value Ref Range    Procalcitonin 0.23 <0.25 ng/mL       Microbiology Results (last 7 days)     Procedure Component Value Units Date/Time    Urine culture [[id number]] Collected:  01/27/18 1621    Order Status:  Completed Specimen:  Urine from Urine, Catheterized Updated:  01/28/18 2047     Urine Culture, Routine No growth    Blood culture [[id number]] Collected:  01/27/18 1559    Order Status:  Completed Specimen:  Blood from Peripheral, Hand, Right Updated:  01/28/18 2012     Blood Culture, Routine No Growth to date     Blood Culture, Routine No Growth to date    Narrative:       Blood cultures from 2 different sites. 4 bottles total.  Please draw before starting antibiotics.    Blood culture [[id number]] Collected:  01/27/18 1559     Order Status:  Completed Specimen:  Blood from Peripheral, Hand, Left Updated:  01/28/18 2012     Blood Culture, Routine No Growth to date     Blood Culture, Routine No Growth to date    Narrative:       Blood cultures x 2 different sites. 4 bottles total. Please  draw cultures before administering antibiotics.            Diagnostic Results     Brain Imaging   1/27/18 CTH: Per independent resident review and interpretation,  L MCA infarction s/p stenting.  No sign of hemorrhagic transformation.  R basal ganglia and cerebellar infarct.          1/26/18 MRI Brain w/o: Per independent resident review and interpretation,  Acute L MCA infarction.        Vessel Imaging   1/25/18 IR Angiogram: Per independent resident review and interpretation,  L M1 occlusion.        Cardiac Imaging   1/26/18 EKG: Per independent resident review and interpretation,  Sinus tachycardia.  .  QTc 500.    1/26/18 TTE: Per report,  LA wnl  EF 65-70%  No regional wall motion abnormalities  Diastolic function normal

## 2018-01-29 NOTE — ASSESSMENT & PLAN NOTE
Mr. Queved[facility] is a 47 year [facility].  EMS was called and brought to the ED for concern of L MCA syndrome. Not tpa candidate due to unknown last known normal.  Patient went to IR for thrombectomy of L M1 occlusion seen on CTA MP on 1/25.  TICI2C reperfusion and angioplasty.      Primary team with concerns for sepsis, believe respiratory source, however, difficult to get an adequate specimen as pt not intubated, with thick secretions. Intubation watch.    Antithrombotics for secondary stroke prevention: asa 325mg qd and plavix 75mg qd   Statins for secondary stroke prevention and hyperlipidemia, if present: Repeat LDL (last invalid secondary to hypertriglyceridemia), atorvastatin 80 in interim  Aggressive risk factor modification: Obesity  Rehab efforts: PT/OT/SLP to evaluate and treat  Diagnostics ordered/pending:   CTA - L M1 occlusion  A1C 10, TSH 1.8, LDL immeasurable 2/2 trigylcerides (460), Chol 200  MRI - most of the L MCA shows area of infarct  VTE prophylaxis: Heparin 5000 units SQ every 8 hours  BP parameters: Infarct: Post sucessful thrombectomy, SBP <140     Worsening exam today. Repeat head CT negative for hemorrhagic conversion. EEG, procal, CXR

## 2018-01-29 NOTE — ASSESSMENT & PLAN NOTE
Mr. Quevedo is a 47 year old male [facility].  EMS was called and brought to the ED for concern of L MCA syndrome. Not tpa candidate due to unknown last known normal.  Patient went to IR for thrombectomy of L M1 occlusion seen on CTA MP on 1/25.  TICI2C reperfusion and angioplasty.      Primary team with concerns for sepsis, believe respiratory source, however, difficult to get an adequate specimen as pt not intubated, with thick secretions. Intubation watch.    Antithrombotics for secondary stroke prevention: asa 325mg qd and plavix 75mg qd   Statins for secondary stroke prevention and hyperlipidemia, if present: Repeat LDL (last invalid secondary to hypertriglyceridemia), atorvastatin 80 in interim  Aggressive risk factor modification: Obesity  Rehab efforts: PT/OT/SLP to evaluate and treat  Diagnostics ordered/pending:   CTA - L M1 occlusion  A1C 10, TSH 1.8, LDL immeasurable 2/2 trigylcerides (460), Chol 200  VTE prophylaxis: Heparin 5000 units SQ every 8 hours  BP parameters: Infarct: Post sucessful thrombectomy, SBP <140     Currently on insulin gtt, keppra and antibiotics

## 2018-01-29 NOTE — PROCEDURES
DATE OF SERVICE:  01/27/2018    ICU EEG/VIDEO MONITORING REPORT    METHODOLOGY:  Electroencephalographic (EEG) is recorded with electrodes placed   according to the International 10-20 placement system.  Thirty two (32) channels   of digital signal (sampling rate of 512/sec), including T1 and T2, were   simultaneously recorded from the scalp and may include EKG, EMG, and/or eye   monitors.  Recording band pass was 0.1 to 512 Hz.  Digital video recording of   the patient is simultaneously recorded with the EEG.  The patient is instructed   to report clinical symptoms which may occur during the recording session.  EEG   and video recording are stored and archived in digital format.  Activation   procedures, which include photic stimulation, hyperventilation and instructing   patients to perform simple tasks, are done in selected patients.    The EEG is displayed on a monitor screen and can be reviewed using different   montages.  Computer-assisted analysis is employed to detect spike and   electrographic seizure activity.  The entire record is submitted for computer   analysis.  The entire recording is visually reviewed, and the times identified   by computer analysis as being spikes or seizures are reviewed again.    Compressed spectral analysis (CSA) is also performed on the activity recorded   from each individual channel.  This is displayed as a power display of   frequencies from 0 to 30 Hz over time.  The CSA is reviewed looking for   asymmetries in power between homologous areas of the scalp, then compared with   the original EEG recording.    [employer]erv software was also utilized in the review of this study.  This software   suite analyzes the EEG recording in multiple domains.  Coherence and rhythmicity   are computed to identify EEG sections which may contain organized seizures.    Each channel undergoes analysis to detect the presence of spike and sharp waves   which have special and morphological  characteristics of epileptic activity.  The   routine EEG recording is converted from special into frequency domain.  This is   then displayed comparing homologous areas to identify areas of significant   asymmetry.  Algorithm to identify non-cortically generated artifact is used to   separate artifact from the EEG.    RECORDING TIMES:  Duration is 7 hours and 25 minutes.  The study begins January 27th at 07:00 and ends January 27th at 14:25 (the study does continue after   that, but EEG is removed and video is inoperative so the remainder of the study   is uninterpretable).    EEG FINDINGS:  This record consists of mixed frequency activity with   predominantly theta and alpha frequencies seen in the right hemisphere and a   relatively slower background in the left hemisphere consisting of   medium-amplitude delta activity, which is at times rhythmic and semi-rhythmic.    There are also superimposed faster frequencies seen in the left hemisphere in   the alpha and beta range.  During wakefulness, the hemispheric asymmetry is more   pronounced, and during quiescent or sleep periods, the record at roughly   symmetrical.  No epileptiform discharges or seizures are seen at any point.    INTERPRETATION:  Abnormal EEG due to mild diffuse encephalopathy and   disorganization with superimposed left hemispheric slowing indicating focal   cerebral dysfunction in the left hemisphere.  No seizures were seen.      MEGHAN/REHANA  dd: 01/28/2018 11:12:25 (CST)  td: 01/28/2018 22:21:56 (CST)  Doc ID   #[id number]  Job ID #[id number]    CC:

## 2018-01-29 NOTE — PLAN OF CARE
Problem: SLP Goal  Goal: SLP Goal  Speech Language Pathology Goals  Goals expected to be met by 2/2  1. Pt will participate in ongoing assessment of swallow to determine least restrictive diet as appropriate.  2. Pt will complete speech, language, cognitive evaluation to further determine short term goals as appropriate. -met  Additional goals:  2. Pt will open eyes to stim x5/session given mod cues.  3. Pt will follow simple commands x2/session given max cues.  4. Pt will answer basic y/n question via any modality x2/session given max cues.  5. Pt will vocalize in response to any stim x2/session given max cues.        Outcome: Ongoing (interventions implemented as appropriate)  Evaluation completed.  Pt remains somnolent.  POC updated. EDWARD Key, CCC/SLP  1/29/2018

## 2018-01-29 NOTE — ASSESSMENT & PLAN NOTE
Pancultured, broad spectrum antibiotics  Possible sepsis, Respiratory source  -Management per primary

## 2018-01-29 NOTE — PROGRESS NOTES
[facility]  Vascular Neurology  Comprehensive Stroke Center  Progress Note    Assessment/Plan:     * Embolic stroke involving left middle cerebral artery    Mr. Quevedo is a 47 year old male who [facility] and was acting abnormal.  EMS was called and brought to the ED for concern of L MCA syndrome. Not tpa candidate due to unknown last known normal.  Patient went to IR for thrombectomy of L M1 occlusion seen on CTA MP on 1/25.  TICI2C reperfusion and angioplasty.      Primary team with concerns for sepsis, believe respiratory source, however, difficult to get an adequate specimen as pt not intubated, with thick secretions. Intubation watch.    Antithrombotics for secondary stroke prevention: asa 325mg qd and plavix 75mg qd   Statins for secondary stroke prevention and hyperlipidemia, if present: Repeat LDL (last invalid secondary to hypertriglyceridemia), atorvastatin 80 in interim  Aggressive risk factor modification: Obesity  Rehab efforts: PT/OT/SLP to evaluate and treat  Diagnostics ordered/pending:   CTA - L M1 occlusion  A1C 10, TSH 1.8, LDL immeasurable 2/2 trigylcerides (460), Chol 200  MRI - most of the L MCA shows area of infarct  VTE prophylaxis: Heparin 5000 units SQ every 8 hours  BP parameters: Infarct: Post sucessful thrombectomy, SBP <140     Worsening exam today. Repeat head CT negative for hemorrhagic conversion. EEG, procal, CXR        Cytotoxic cerebral edema    Due to stroke  Evidence of mass effect and brain compression on imaging        Leukocytosis    Pancultured, broad spectrum antibiotics  Possible sepsis, Respiratory source  -Management per primary        Type 2 diabetes mellitus    - A1C 10  - hyperglycemic; started on insulin gtt   - Can consider endocrine consult when stepped down to stroke service        Hyperlipidemia    - Chol 200, LDL immeasurable,   - atorvastatin 80 mg          Essential hypertension    - management for primary team  - goal SBP  post thrombectomy <140  -Off cardene        Obstructive sleep apnea    Stroke risk factor               1/25: Brought in for aphasia, RSW with L gaze preferance. LKN unknown, not tPA candidate. Went to IR for angiogram and stent.  1/29: Off Cardene, remains on Insulin gtt. Concern for sepsis, respiratory source. Imaging with mass effect and some brain compression; maycol crani watch.    STROKE DOCUMENTATION   Acute Stroke Times   Stroke Team Called Date: 01/25/18  Stroke Team Called Time: 1852  Stroke Team Arrival Date: 01/25/18  Stroke Team Arrival Time: 0652  CT Interpretation Time: 1910  Decision to Treat Time for Alteplase:  (n/a unknown last known normal )  Decision to Treat Time for IR: 1915    NIH Scale:  1a. Level Of Consciousness: 2-->Not alert: requires repeated stimulation to attend, or is obtunded and requires strong or painful stimulation to make movements (not stereotyped)  1b. LOC Questions: 2-->Answers neither question correctly  1c. LOC Commands: 2-->Performs neither task correctly  2. Best Gaze: 1-->Partial gaze palsy: gaze is abnormal in one or both eyes, but forced deviation or total gaze paresis is not present  3. Visual: 1-->Partial hemianopia  4. Facial Palsy: 0-->Normal symmetrical movements  5a. Motor Arm, Left: 2-->Some effort against gravity: limb cannot get to or maintain (if cued) 90 (or 45) degrees, drifts down to bed, but has some effort against gravity  5b. Motor Arm, Right: 3-->No effort against gravity: limb falls  6a. Motor Leg, Left: 1-->Drift: leg falls by the end of the 5-sec period but does not hit bed  6b. Motor Leg, Right: 1-->Drift: leg falls by the end of the 5-sec period but does not hit bed  7. Limb Ataxia: 0-->Absent  8. Sensory: 0-->Normal: no sensory loss  9. Best Language: 3-->Mute, global aphasia: no usable speech or auditory comprehension  10. Dysarthria: 0-->Normal  11. Extinction and Inattention (formerly Neglect): 0-->No abnormality  Total (NIH Stroke Scale): 18        Modified Jasmin    Jeff Coma Scale:    ABCD2 Score:    [id number] Score:   HAS -BLED Score:   ICH Score:   Hunt & Laguerre Classification:      Hemorrhagic change of an Ischemic Stroke: Does this patient have an ischemic stroke with hemorrhagic changes? No     Neurologic Chief Complaint:  L M1 embolic infarction s/p thrombectomy    Subjective:     Interval History: Patient is seen for follow-up neurological assessment and treatment recommendations:     Concern for sepsis, started on moxifloxacin. EEG and procal pending.       HPI, Past Medical, Family, and Social History remains the same as documented in the initial encounter.     Review of Systems   Constitutional: Positive for fever.   Neurological: Positive for weakness.   Psychiatric/Behavioral: Positive for confusion.     Scheduled Meds:   acetaminophen  650 mg Per NG tube Q6H    albuterol-ipratropium 2.5mg-0.5mg/3mL  3 mL Nebulization Q4H    aspirin  325 mg Per NG tube Daily    atorvastatin  80 mg Per NG tube Daily    cefTRIAXone (ROCEPHIN) IVPB  2 g Intravenous Q24H    clopidogrel  75 mg Per NG tube Daily    heparin (porcine)  5,000 Units Subcutaneous Q8H    senna-docusate 8.6-50 mg  1 tablet Per NG tube BID    sodium chloride 0.9%  3 mL Intravenous Q8H    sodium chloride 3%  4 mL Nebulization Q4H     Continuous Infusions:   insulin (HUMAN R) infusion (adults) 2.1 Units/hr (01/29/18 1600)    buffered 2% sodium acetate 86meq, sodium chloride 86meq, sterile water for inj IV soln       PRN Meds:bisacodyl, dextrose 50%, dextrose 50%, dextrose 50%, glucagon (human recombinant), hydrALAZINE, labetalol, magnesium sulfate IVPB, magnesium sulfate IVPB, ondansetron, sodium chloride 0.9%    Objective:     Vital Signs (Most Recent):  Temp: 99.4 °F (37.4 °C) (01/29/18 1600)  Pulse: 89 (01/29/18 1600)  Resp: (!) 37 (01/29/18 1600)  BP: (!) 175/119 (01/29/18 1600)  SpO2: (!) 94 % (01/29/18 1600)  BP Location: Right leg    Vital Signs Range (Last  24H):  Temp:  [98.5 °F (36.9 °C)-99.6 °F (37.6 °C)]   Pulse:  []   Resp:  [17-37]   BP: (132-188)/()   SpO2:  [94 %-100 %]   Arterial Line BP: ()/()   BP Location: Right leg    Physical Exam   Constitutional: He appears well-developed.   Cardiovascular: Normal rate and regular rhythm.  Exam reveals no friction rub.    No murmur heard.  Pulmonary/Chest: No respiratory distress. He has no wheezes.   Abdominal: Soft. He exhibits no distension. There is no tenderness.       Neurological Exam:   LOC: obtunded  Language: Mute  Articulation: Mute/Anarthric  Visual Fields: no blink to threat b/l  EOM (CN III, IV, VI): L gaze preference  Pupils (CN II, III): PERRL  Sustained clonus on R    Laboratory:  Recent Results (from the past 24 hour(s))   POCT glucose    Collection Time: 01/28/18  5:03 PM   Result Value Ref Range    POCT Glucose 175 (H) 70 - 110 mg/dL   POCT glucose    Collection Time: 01/28/18  5:42 PM   Result Value Ref Range    POCT Glucose 134 (H) 70 - 110 mg/dL   POCT glucose    Collection Time: 01/28/18  7:26 PM   Result Value Ref Range    POCT Glucose 192 (H) 70 - 110 mg/dL   POCT glucose    Collection Time: 01/28/18  8:06 PM   Result Value Ref Range    POCT Glucose 198 (H) 70 - 110 mg/dL   POCT glucose    Collection Time: 01/28/18  9:05 PM   Result Value Ref Range    POCT Glucose 183 (H) 70 - 110 mg/dL   POCT glucose    Collection Time: 01/28/18 10:22 PM   Result Value Ref Range    POCT Glucose 209 (H) 70 - 110 mg/dL   POCT glucose    Collection Time: 01/28/18 11:13 PM   Result Value Ref Range    POCT Glucose 202 (H) 70 - 110 mg/dL   POCT glucose    Collection Time: 01/29/18 12:33 AM   Result Value Ref Range    POCT Glucose 179 (H) 70 - 110 mg/dL   POCT glucose    Collection Time: 01/29/18  1:10 AM   Result Value Ref Range    POCT Glucose 184 (H) 70 - 110 mg/dL   POCT glucose    Collection Time: 01/29/18  2:03 AM   Result Value Ref Range    POCT Glucose 158 (H) 70 - 110 mg/dL    Comprehensive metabolic panel    Collection Time: 01/29/18  2:32 AM   Result Value Ref Range    Sodium 141 136 - 145 mmol/L    Potassium 3.8 3.5 - 5.1 mmol/L    Chloride 110 95 - 110 mmol/L    CO2 21 (L) 23 - 29 mmol/L    Glucose 158 (H) 70 - 110 mg/dL    BUN, Bld 20 6 - 20 mg/dL    Creatinine 0.8 0.5 - 1.4 mg/dL    Calcium 8.3 (L) 8.7 - 10.5 mg/dL    Total Protein 6.9 6.0 - 8.4 g/dL    Albumin 3.0 (L) 3.5 - 5.2 g/dL    Total Bilirubin 1.1 (H) 0.1 - 1.0 mg/dL    Alkaline Phosphatase 69 55 - 135 U/L    AST 29 10 - 40 U/L    ALT 30 10 - 44 U/L    Anion Gap 10 8 - 16 mmol/L    eGFR if African American >60.0 >60 mL/min/1.73 m^2    eGFR if non African American >60.0 >60 mL/min/1.73 m^2   CBC auto differential    Collection Time: 01/29/18  2:32 AM   Result Value Ref Range    WBC 13.19 (H) 3.90 - 12.70 K/uL    RBC 4.90 4.60 - 6.20 M/uL    Hemoglobin 14.4 14.0 - 18.0 g/dL    Hematocrit 41.3 40.0 - 54.0 %    MCV 84 82 - 98 fL    MCH 29.4 27.0 - 31.0 pg    MCHC 34.9 32.0 - 36.0 g/dL    RDW 12.8 11.5 - 14.5 %    Platelets 279 150 - 350 K/uL    MPV 9.5 9.2 - 12.9 fL    Immature Granulocytes 0.5 0.0 - 0.5 %    Gran # (ANC) 9.8 (H) 1.8 - 7.7 K/uL    Immature Grans (Abs) 0.06 (H) 0.00 - 0.04 K/uL    Lymph # 2.1 1.0 - 4.8 K/uL    Mono # 1.2 (H) 0.3 - 1.0 K/uL    Eos # 0.0 0.0 - 0.5 K/uL    Baso # 0.04 0.00 - 0.20 K/uL    nRBC 0 0 /100 WBC    Gran% 74.4 (H) 38.0 - 73.0 %    Lymph% 15.8 (L) 18.0 - 48.0 %    Mono% 8.8 4.0 - 15.0 %    Eosinophil% 0.2 0.0 - 8.0 %    Basophil% 0.3 0.0 - 1.9 %    Differential Method Automated    Magnesium    Collection Time: 01/29/18  2:32 AM   Result Value Ref Range    Magnesium 2.3 1.6 - 2.6 mg/dL   Phosphorus    Collection Time: 01/29/18  2:32 AM   Result Value Ref Range    Phosphorus 3.2 2.7 - 4.5 mg/dL   Protime-INR    Collection Time: 01/29/18  2:32 AM   Result Value Ref Range    Prothrombin Time 9.8 9.0 - 12.5 sec    INR 0.9 0.8 - 1.2   Type & Screen    Collection Time: 01/29/18  2:32 AM   Result  Value Ref Range    Group & Rh O POS     Indirect Leyla NEG    POCT glucose    Collection Time: 01/29/18  2:46 AM   Result Value Ref Range    POCT Glucose 139 (H) 70 - 110 mg/dL   POCT glucose    Collection Time: 01/29/18  4:25 AM   Result Value Ref Range    POCT Glucose 161 (H) 70 - 110 mg/dL   POCT glucose    Collection Time: 01/29/18  5:05 AM   Result Value Ref Range    POCT Glucose 161 (H) 70 - 110 mg/dL   POCT glucose    Collection Time: 01/29/18  6:09 AM   Result Value Ref Range    POCT Glucose 173 (H) 70 - 110 mg/dL   POCT glucose    Collection Time: 01/29/18  7:51 AM   Result Value Ref Range    POCT Glucose 166 (H) 70 - 110 mg/dL   POCT glucose    Collection Time: 01/29/18  9:03 AM   Result Value Ref Range    POCT Glucose 194 (H) 70 - 110 mg/dL   POCT glucose    Collection Time: 01/29/18  9:59 AM   Result Value Ref Range    POCT Glucose 207 (H) 70 - 110 mg/dL   POCT glucose    Collection Time: 01/29/18 11:06 AM   Result Value Ref Range    POCT Glucose 179 (H) 70 - 110 mg/dL   POCT glucose    Collection Time: 01/29/18 12:05 PM   Result Value Ref Range    POCT Glucose 178 (H) 70 - 110 mg/dL   POCT glucose    Collection Time: 01/29/18  1:32 PM   Result Value Ref Range    POCT Glucose 168 (H) 70 - 110 mg/dL   Procalcitonin    Collection Time: 01/29/18  1:44 PM   Result Value Ref Range    Procalcitonin 0.20 <0.25 ng/mL   POCT glucose    Collection Time: 01/29/18  2:36 PM   Result Value Ref Range    POCT Glucose 147 (H) 70 - 110 mg/dL   POCT glucose    Collection Time: 01/29/18  3:38 PM   Result Value Ref Range    POCT Glucose 144 (H) 70 - 110 mg/dL   POCT glucose    Collection Time: 01/29/18  4:20 PM   Result Value Ref Range    POCT Glucose 179 (H) 70 - 110 mg/dL       Microbiology Results (last 7 days)     Procedure Component Value Units Date/Time    Urine culture [[id number]] Collected:  01/27/18 1621    Order Status:  Completed Specimen:  Urine from Urine, Catheterized Updated:  01/28/18 2047     Urine Culture,  Routine No growth    Blood culture [[id number]] Collected:  01/27/18 1559    Order Status:  Completed Specimen:  Blood from Peripheral, Hand, Right Updated:  01/28/18 2012     Blood Culture, Routine No Growth to date     Blood Culture, Routine No Growth to date    Narrative:       Blood cultures from 2 different sites. 4 bottles total.  Please draw before starting antibiotics.    Blood culture [[id number]] Collected:  01/27/18 1558    Order Status:  Completed Specimen:  Blood from Peripheral, Hand, Left Updated:  01/28/18 2012     Blood Culture, Routine No Growth to date     Blood Culture, Routine No Growth to date    Narrative:       Blood cultures x 2 different sites. 4 bottles total. Please  draw cultures before administering antibiotics.            Diagnostic Results     Brain Imaging   1/27/18 CTH: Per independent resident review and interpretation,  L MCA infarction s/p stenting.  No sign of hemorrhagic transformation.  R basal ganglia and cerebellar infarct.          1/26/18 MRI Brain w/o: Per independent resident review and interpretation,  Acute L MCA infarction.        Vessel Imaging   1/25/18 IR Angiogram: Per independent resident review and interpretation,  L M1 occlusion.        Cardiac Imaging   1/26/18 EKG: Per independent resident review and interpretation,  Sinus tachycardia.  .  QTc 500.    1/26/18 TTE: Per report,  LA wnl  EF 65-70%  No regional wall motion abnormalities  Diastolic function normal        Shagufta Arce MD  Comprehensive Stroke Center  Department of Vascular Neurology   [facility]

## 2018-01-29 NOTE — SUBJECTIVE & OBJECTIVE
Interval History: Exam stable. Pt on CPAP and cEEG per primary team.     Medications:  Continuous Infusions:   sodium chloride 0.9% 75 mL/hr at 01/29/18 0900    insulin (HUMAN R) infusion (adults) 2.1 Units/hr (01/29/18 0900)    niCARdipine Stopped (01/28/18 0855)     Scheduled Meds:   acetaminophen  650 mg Per NG tube Q6H    albuterol-ipratropium 2.5mg-0.5mg/3mL  3 mL Nebulization Q4H    aspirin  325 mg Per NG tube Daily    atorvastatin  80 mg Per NG tube Daily    cefTRIAXone (ROCEPHIN) IVPB  2 g Intravenous Q24H    clopidogrel  75 mg Per NG tube Daily    heparin (porcine)  5,000 Units Subcutaneous Q8H    senna-docusate 8.6-50 mg  1 tablet Oral BID    sodium chloride 0.9%  3 mL Intravenous Q8H    sodium chloride 3%  4 mL Nebulization Q4H     PRN Meds:bisacodyl, dextrose 50%, dextrose 50%, dextrose 50%, glucagon (human recombinant), labetalol, magnesium sulfate IVPB, magnesium sulfate IVPB, ondansetron, sodium chloride 0.9%     Review of Systems    Objective:     Weight: 116.3 kg (256 lb 6.3 oz)  Body mass index is 36.79 kg/m².  Vital Signs (Most Recent):  Temp: 99.4 °F (37.4 °C) (01/29/18 0700)  Pulse: (!) 112 (01/29/18 0909)  Resp: (!) 28 (01/29/18 0909)  BP: (!) 150/104 (01/29/18 0900)  SpO2: 97 % (01/29/18 0909) Vital Signs (24h Range):  Temp:  [98.5 °F (36.9 °C)-99.7 °F (37.6 °C)] 99.4 °F (37.4 °C)  Pulse:  [] 112  Resp:  [14-33] 28  SpO2:  [96 %-100 %] 97 %  BP: (132-188)/() 150/104  Arterial Line BP: ()/() 161/90       Date 01/29/18 0700 - 01/30/18 0659   Shift 4567-0441 4741-5015 2559-7074 24 Hour Total   I  N  T  A  K  E   I.V.  (mL/kg) 229.5  (2)   229.5  (2)    NG/   200    IV Piggyback 250   250    Shift Total  (mL/kg) 679.5  (5.8)   679.5  (5.8)   O  U  T  P  U  T   Urine  (mL/kg/hr) 25   25    Shift Total  (mL/kg) 25  (0.2)   25  (0.2)   Weight (kg) 116.3 116.3 116.3 116.3            NG/OG Tube 01/25/18 2695 nasogastric Right nostril (Active)   Placement Check  placement verified by x-ray 1/27/2018  7:01 AM   Tolerance no signs/symptoms of discomfort 1/27/2018  7:01 AM   Securement taped to nostril center 1/27/2018  7:01 AM   Clamp Status/Tolerance clamped 1/27/2018  7:01 AM   Intake (mL) 60 mL 1/26/2018 11:00 PM       Male External Urinary Catheter 01/25/18 2200 Small (Active)   Collection Container Urimeter 1/27/2018  7:01 AM   Securement Method secured to top of thigh w/ adhesive device 1/27/2018  7:01 AM   Skin no redness;no breakdown 1/27/2018  7:01 AM   Tolerance no signs/symptoms of discomfort 1/27/2018  7:01 AM   Output (mL) 400 mL 1/27/2018 10:00 AM   Catheter Change Date 01/26/18 1/27/2018  3:00 AM   Catheter Change Time 0300 1/27/2018  3:00 AM       Neurosurgery Physical Exam      Vital signs: reviewed above.   Constitutional: well-developed,   Cardiovascular: regular rate and rhythm  Resp: on CPAP  Abdomen: soft  Neurological  GCS E2V1M5  Mental Status: lethargic  non verbal on exam, does not follow commands  Head: normocephalic, atraumatic  PERRL  Facial expression symmetric  Moves left extremities spontaneously, right side withdraws to painful stimuli    Significant Labs:    Recent Labs  Lab 01/28/18  0154 01/29/18  0232   * 158*    141   K 3.9 3.8    110   CO2 18* 21*   BUN 16 20   CREATININE 0.9 0.8   CALCIUM 8.5* 8.3*   MG 1.7 2.3       Recent Labs  Lab 01/28/18  0154 01/29/18  0232   WBC 18.71* 13.19*   HGB 15.2 14.4   HCT 42.9 41.3    279       Recent Labs  Lab 01/28/18  0154 01/29/18  0232   INR 1.0 0.9     Microbiology Results (last 7 days)     Procedure Component Value Units Date/Time    Urine culture [[id number]] Collected:  01/27/18 1621    Order Status:  Completed Specimen:  Urine from Urine, Catheterized Updated:  01/28/18 2047     Urine Culture, Routine No growth    Blood culture [[id number]] Collected:  01/27/18 1559    Order Status:  Completed Specimen:  Blood from Peripheral, Hand, Right Updated:  01/28/18 2012      Blood Culture, Routine No Growth to date     Blood Culture, Routine No Growth to date    Narrative:       Blood cultures from 2 different sites. 4 bottles total.  Please draw before starting antibiotics.    Blood culture [[id number]] Collected:  01/27/18 1558    Order Status:  Completed Specimen:  Blood from Peripheral, Hand, Left Updated:  01/28/18 2012     Blood Culture, Routine No Growth to date     Blood Culture, Routine No Growth to date    Narrative:       Blood cultures x 2 different sites. 4 bottles total. Please  draw cultures before administering antibiotics.        All pertinent labs from the last 24 hours have been reviewed.    Significant Diagnostics:  CTH reviewed

## 2018-01-29 NOTE — PT/OT/SLP EVAL
"Speech Language Pathology Evaluation  Cognitive Communication    Patient Name:  Todd Quevedo   MRN:  [id number]  Admitting Diagnosis: Embolic stroke involving left middle cerebral artery    Recommendations:     Recommendations:                General Recommendations:  Dysphagia therapy, Speech/language therapy and Cognitive-linguistic therapy  Diet recommendations:  NPO, NPO   Aspiration Precautions: Strict aspiration precautions   General Precautions: Standard, aphasia, aspiration, fall, NPO, respiratory  Communication strategies:  yes/no questions only, provide increased time to answer and go to room if call light pushed    History:     History reviewed. No pertinent past medical history.    History reviewed. No pertinent surgical history.    Social History: Patient lives with spouse, indpt pta.    Prior Intubation HX:  None this admission, on intubation watch    Modified Barium Swallow: none this admission    Prior diet: reg/thin.      Subjective   Pt somnolent, opened eyes x2 though minimal ability to sustain  Family reports, "He moved his leg on command earlier.  He moans but hasn't said anything yet."    Objective:   Cognitive Status:    Unable to assess 2/2 profound language impairment, poor alertness      Receptive Language:   Comprehension:   severely impaired  Questions Simple yes/no no response via any modality  Commands  One step 0%    Pragmatics:    Miniaml eye opening, did localize to SLP on L with head turn x2    Expressive Language:  Verbal:    Spontnaeous moan elicited x1 only, no volitional vocalizations observed in response to any stim  Nonverbal:   Pt with spontaneous movement of L hand and foot though not on command to aid nonverbal communication       Motor Speech:  cont to assess, pt nonverbal    Voice:   cont to assess, pt nonverbal    Visual-Spatial:  tba    Reading:   tba     Written Expression:   tba    Treatment: Pt with heavy, loud breathing.  Dried blood noted in R buccal cavity.  gentle " oral care provided with spontaneous swallow elicited x2.  Pt with some coating to R side of tongue, possibly 2/2 bite injury though would not open oral cavity to allow for oral care past teeth.  Nursing alerted. Family educated on role of SLP, POC, appropriate level of stimulation and ongoing swallow evaluation.  They verbalized understanding.     Assessment:   Todd Quevedo is a 47 y.o. male with an SLP diagnosis of Aphasia, Dysphagia and Cognitive-Linguistic Impairment.  He presents with poor alertness.    Goals:    SLP Goals        Problem: SLP Goal    Goal Priority Disciplines Outcome   SLP Goal     SLP Ongoing (interventions implemented as appropriate)   Description:  Speech Language Pathology Goals  Goals expected to be met by 2/2  1. Pt will participate in ongoing assessment of swallow to determine least restrictive diet as appropriate.  2. Pt will complete speech, language, cognitive evaluation to further determine short term goals as appropriate. -met  Additional goals:  2. Pt will open eyes to stim x5/session given mod cues.  3. Pt will follow simple commands x2/session given max cues.  4. Pt will answer basic y/n question via any modality x2/session given max cues.  5. Pt will vocalize in response to any stim x2/session given max cues.                          Plan:   · Patient to be seen:  4 x/week   · Plan of Care expires:  02/25/18  · Plan of Care reviewed with:  patient, family   · SLP Follow-Up:  Yes       Discharge recommendations:  Discharge Facility/Level Of Care Needs:  (pending progress/improved alertness)   Barriers to Discharge:  Level of Skilled Assistance Needed      Time Tracking:   SLP Treatment Date:   01/29/18  Speech Start Time:  0940  Speech Stop Time:  0952     Speech Total Time (min):  12 min    Billable Minutes: Eval 12     EDWARD Key, CCC-SLP  01/29/2018

## 2018-01-29 NOTE — ASSESSMENT & PLAN NOTE
_Pan Cx and Abx over weekend  -Trend PRocal  -SLight improvement in Leukocytosis with ABX  -Eval CXR

## 2018-01-29 NOTE — PROGRESS NOTES
[facility]  Neurosurgery  Progress Note    Subjective:     History of Present Illness: Todd Quevedo is 47 y.o. male with history of HTN and DM who presented to [facility] [place] for concern of L MCA syndrome 1/25/18 AM. He is s/p thrombectomy of L M1 occlusion. CT MP revealed short segment occlusion of left M1.  Patient went to IR for thrombectomy of L M1.  TICI2C reperfusion and angioplasty. Neurosurgery consulted for hemicrani watch. Wife was at bedside during exam. Patient not intubated, lethargic, and moving left side spontaneously. He is currently on plavix daily.     Post-Op Info:  * No surgery found *         Interval History: Exam stable. Pt on CPAP and cEEG per primary team.     Medications:  Continuous Infusions:   sodium chloride 0.9% 75 mL/hr at 01/29/18 0900    insulin (HUMAN R) infusion (adults) 2.1 Units/hr (01/29/18 0900)    niCARdipine Stopped (01/28/18 0855)     Scheduled Meds:   acetaminophen  650 mg Per NG tube Q6H    albuterol-ipratropium 2.5mg-0.5mg/3mL  3 mL Nebulization Q4H    aspirin  325 mg Per NG tube Daily    atorvastatin  80 mg Per NG tube Daily    cefTRIAXone (ROCEPHIN) IVPB  2 g Intravenous Q24H    clopidogrel  75 mg Per NG tube Daily    heparin (porcine)  5,000 Units Subcutaneous Q8H    senna-docusate 8.6-50 mg  1 tablet Oral BID    sodium chloride 0.9%  3 mL Intravenous Q8H    sodium chloride 3%  4 mL Nebulization Q4H     PRN Meds:bisacodyl, dextrose 50%, dextrose 50%, dextrose 50%, glucagon (human recombinant), labetalol, magnesium sulfate IVPB, magnesium sulfate IVPB, ondansetron, sodium chloride 0.9%     Review of Systems    Objective:     Weight: 116.3 kg (256 lb 6.3 oz)  Body mass index is 36.79 kg/m².  Vital Signs (Most Recent):  Temp: 99.4 °F (37.4 °C) (01/29/18 0700)  Pulse: (!) 112 (01/29/18 0909)  Resp: (!) 28 (01/29/18 0909)  BP: (!) 150/104 (01/29/18 0900)  SpO2: 97 % (01/29/18 0909) Vital Signs (24h Range):  Temp:  [98.5 °F (36.9 °C)-99.7 °F  (37.6 °C)] 99.4 °F (37.4 °C)  Pulse:  [] 112  Resp:  [14-33] 28  SpO2:  [96 %-100 %] 97 %  BP: (132-188)/() 150/104  Arterial Line BP: ()/() 161/90       Date 01/29/18 0700 - 01/30/18 0659   Shift 8338-8195 9707-2277 4977-8446 24 Hour Total   I  N  T  A  K  E   I.V.  (mL/kg) 229.5  (2)   229.5  (2)    NG/   200    IV Piggyback 250   250    Shift Total  (mL/kg) 679.5  (5.8)   679.5  (5.8)   O  U  T  P  U  T   Urine  (mL/kg/hr) 25   25    Shift Total  (mL/kg) 25  (0.2)   25  (0.2)   Weight (kg) 116.3 116.3 116.3 116.3            NG/OG Tube 01/25/18 2325 nasogastric Right nostril (Active)   Placement Check placement verified by x-ray 1/27/2018  7:01 AM   Tolerance no signs/symptoms of discomfort 1/27/2018  7:01 AM   Securement taped to nostril center 1/27/2018  7:01 AM   Clamp Status/Tolerance clamped 1/27/2018  7:01 AM   Intake (mL) 60 mL 1/26/2018 11:00 PM       Male External Urinary Catheter 01/25/18 2200 Small (Active)   Collection Container Urimeter 1/27/2018  7:01 AM   Securement Method secured to top of thigh w/ adhesive device 1/27/2018  7:01 AM   Skin no redness;no breakdown 1/27/2018  7:01 AM   Tolerance no signs/symptoms of discomfort 1/27/2018  7:01 AM   Output (mL) 400 mL 1/27/2018 10:00 AM   Catheter Change Date 01/26/18 1/27/2018  3:00 AM   Catheter Change Time 0300 1/27/2018  3:00 AM       Neurosurgery Physical Exam      Vital signs: reviewed above.   Constitutional: well-developed,   Cardiovascular: regular rate and rhythm  Resp: on CPAP  Abdomen: soft  Neurological  GCS E2V1M5  Mental Status: lethargic  non verbal on exam, does not follow commands  Head: normocephalic, atraumatic  PERRL  Facial expression symmetric  Moves left extremities spontaneously, right side withdraws to painful stimuli    Significant Labs:    Recent Labs  Lab 01/28/18  0154 01/29/18  0232   * 158*    141   K 3.9 3.8    110   CO2 18* 21*   BUN 16 20   CREATININE 0.9 0.8   CALCIUM  8.5* 8.3*   MG 1.7 2.3       Recent Labs  Lab 01/28/18  0154 01/29/18  0232   WBC 18.71* 13.19*   HGB 15.2 14.4   HCT 42.9 41.3    279       Recent Labs  Lab 01/28/18  0154 01/29/18  0232   INR 1.0 0.9     Microbiology Results (last 7 days)     Procedure Component Value Units Date/Time    Urine culture [[id number]] Collected:  01/27/18 1621    Order Status:  Completed Specimen:  Urine from Urine, Catheterized Updated:  01/28/18 2047     Urine Culture, Routine No growth    Blood culture [[id number]] Collected:  01/27/18 1559    Order Status:  Completed Specimen:  Blood from Peripheral, Hand, Right Updated:  01/28/18 2012     Blood Culture, Routine No Growth to date     Blood Culture, Routine No Growth to date    Narrative:       Blood cultures from 2 different sites. 4 bottles total.  Please draw before starting antibiotics.    Blood culture [[id number]] Collected:  01/27/18 1558    Order Status:  Completed Specimen:  Blood from Peripheral, Hand, Left Updated:  01/28/18 2012     Blood Culture, Routine No Growth to date     Blood Culture, Routine No Growth to date    Narrative:       Blood cultures x 2 different sites. 4 bottles total. Please  draw cultures before administering antibiotics.        All pertinent labs from the last 24 hours have been reviewed.    Significant Diagnostics:  CTH reviewed    Assessment/Plan:     * Embolic stroke involving left middle cerebral artery    47 y.o. male with history of HTN and DM who [place] [facility] [place] for concern of L MCA syndrome 1/25/18 AM. He is s/p thrombectomy of L M1 occlusion. CT MP revealed short segment occlusion of left M1.  Patient went to IR for thrombectomy of L M1.  TICI2C reperfusion and angioplasty. Neurosurgery consulted for hemicrani watch. Patient currently on asa and plavix.    -No neurosurgical intervention at this time.  -Maximum medical management per NCC.  -Goal Na 145-155  -Neuro checks hourly  -Please notify NSR if worsening neuro exam   -We  will continue to monitor closely, please contact us with any questions or concerns.                   Raymundo Gan MD  Neurosurgery  [facility]

## 2018-01-29 NOTE — PLAN OF CARE
01/29/18 1613   Discharge Reassessment   Assessment Type Discharge Planning Reassessment   Provided patient/caregiver education on the expected discharge date and the discharge plan No   Do you have any problems affording any of your prescribed medications? No   Discharge Plan A Rehab   Discharge Plan B Home with family   Patient choice form signed by patient/caregiver N/A   Can the patient answer the patient profile reliably? No, cognitively impaired   How does the patient rate their overall health at the present time? (ty)   Describe the patient's ability to walk at the present time. Does not walk or unable to take any steps at all   How often would a person be available to care for the patient? Occasionally   Number of comorbid conditions (as recorded on the chart) Two   During the past month, has the patient often been bothered by feeling down, depressed or hopeless? (ty)   During the past month, has the patient often been bothered by little interest or pleasure in doing things? (ty)       Patient not medically stable for discharge.     Delaney Mark RN, CCRN-K, [facility]  Neuro-Critical Care   [phone]

## 2018-01-29 NOTE — PLAN OF CARE
Problem: Patient Care Overview  Goal: Plan of Care Review  Outcome: Ongoing (interventions implemented as appropriate)  POC reviewed with patient and patient's wife. Pt unable to verbalized understanding. Questions and concerns addressed with wife. No acute events overnight. Pt on and off cooling blanket. Insulin drip titrated up and down during the night. No signs of acute neurological change. Sodium stayed within normal range. Pt progressing toward goals. Will continue to monitor. See flow sheets for full assessment and VS info

## 2018-01-29 NOTE — ASSESSMENT & PLAN NOTE
S/P L M1 thrombectomy  -- Neuro checks q 1hr  -- Vascular Neurology  following  -- CT MP- Short segment occlusion of left M1   -- Repeat CTH 1/26 - No detrimental change  -- SBP goal <160  -- PT/OT/Speech  -- hemicrani watch, NSGY following   -- MRI 1/26 - L MCA, no hemorrhagic conversion  -- stat CTH 1/27: L MCA stable, no worsening edema or hemorrhagic conversion  -- ASA, plavix     Appears exam declined, no consistent with previous imaging. Stat CTH to eval for increased edema and then EEG.   NO AEDs at this time

## 2018-01-29 NOTE — PROGRESS NOTES
[facility]  Vascular Neurology  Comprehensive Stroke Center  Progress Note    Assessment/Plan:     * Embolic stroke involving left middle cerebral artery    Mr. Quevedo is a 47 year old male [facility] and was acting abnormal.  EMS was called and brought to the ED for concern of L MCA syndrome. Not tpa candidate due to unknown last known normal.  Patient went to IR for thrombectomy of L M1 occlusion seen on CTA MP on 1/25.  TICI2C reperfusion and angioplasty.      Primary team with concerns for sepsis, believe respiratory source, however, difficult to get an adequate specimen as pt not intubated, with thick secretions. Intubation watch.    Antithrombotics for secondary stroke prevention: asa 325mg qd and plavix 75mg qd   Statins for secondary stroke prevention and hyperlipidemia, if present: Repeat LDL (last invalid secondary to hypertriglyceridemia), atorvastatin 80 in interim  Aggressive risk factor modification: Obesity  Rehab efforts: PT/OT/SLP to evaluate and treat  Diagnostics ordered/pending:   CTA - L M1 occlusion  A1C 10, TSH 1.8, LDL immeasurable 2/2 trigylcerides (460), Chol 200  VTE prophylaxis: Heparin 5000 units SQ every 8 hours  BP parameters: Infarct: Post sucessful thrombectomy, SBP <140     Currently on insulin gtt, keppra and antibiotics        Cytotoxic cerebral edema    Due to stroke  Evidence of mass effect and brain compression on imaging        Leukocytosis    Pancultured, broad spectrum antibiotics  Possible sepsis, Respiratory source  -Management per primary        Type 2 diabetes mellitus    - A1C 10  - hyperglycemic; started on insulin gtt   - Can consider endocrine consult when stepped down to stroke service        Hyperlipidemia    - Chol 200, LDL immeasurable,   - atorvastatin 80 mg          Essential hypertension    - management for primary team  - goal SBP post thrombectomy <140  -Off cardene        Obstructive sleep apnea    Stroke risk  factor               1/25: Brought in for aphasia, RSW with L gaze preferance. LKN unknown, not tPA candidate. Went to IR for angiogram and stent.  1/29: Off Cardene, remains on Insulin gtt. Concern for sepsis, respiratory source. Imaging with mass effect and some brain compression; maycol crani watch.    STROKE DOCUMENTATION   Acute Stroke Times   Stroke Team Called Date: 01/25/18  Stroke Team Called Time: 1852  Stroke Team Arrival Date: 01/25/18  Stroke Team Arrival Time: 0652  CT Interpretation Time: 1910  Decision to Treat Time for Alteplase:  (n/a unknown last known normal )  Decision to Treat Time for IR: 1915    NIH Scale:  1a. Level Of Consciousness: 2-->Not alert: requires repeated stimulation to attend, or is obtunded and requires strong or painful stimulation to make movements (not stereotyped)  1b. LOC Questions: 2-->Answers neither question correctly  1c. LOC Commands: 2-->Performs neither task correctly  2. Best Gaze: 1-->Partial gaze palsy: gaze is abnormal in one or both eyes, but forced deviation or total gaze paresis is not present  3. Visual: 3-->Bilateral hemianopia (blind including cortical blindness)  4. Facial Palsy: 0-->Normal symmetrical movements  5a. Motor Arm, Left: 4-->No movement  5b. Motor Arm, Right: 3-->No effort against gravity: limb falls  6a. Motor Leg, Left: 3-->No effort against gravity: leg falls to bed immediately  6b. Motor Leg, Right: 3-->No effort against gravity: leg falls to bed immediately  7. Limb Ataxia: 0-->Absent  8. Sensory: 0-->Normal: no sensory loss  9. Best Language: 3-->Mute, global aphasia: no usable speech or auditory comprehension  10. Dysarthria: 2-->Severe dysarthria: patients speech is so slurred as to be unintelligible in the absence of or out of proportion to any dysphasia, or is mute/anarthric  11. Extinction and Inattention (formerly Neglect): 0-->No abnormality  Total (NIH Stroke Scale): 28       Modified [place]    Jeff Coma Scale:    ABCD2 Score:     [id number] Score:   HAS -BLED Score:   ICH Score:   Hunt & Laguerre Classification:      Hemorrhagic change of an Ischemic Stroke: Does this patient have an ischemic stroke with hemorrhagic changes? No     Neurologic Chief Complaint:  L M1 embolic infarction s/p thrombectomy    Subjective:     Interval History: Patient is seen for follow-up neurological assessment and treatment recommendations: Off Cardene, remains on Insulin gtt. Concern for sepsis, respiratory source. Imaging with mass effect and some brain compression; maycol crani watch. Wife taking close care at bedside.      HPI, Past Medical, Family, and Social History remains the same as documented in the initial encounter.     Review of Systems   Constitutional: Positive for fever.   Neurological: Positive for weakness.   Psychiatric/Behavioral: Positive for confusion.     Scheduled Meds:   acetaminophen  650 mg Per NG tube Q6H    albuterol-ipratropium 2.5mg-0.5mg/3mL  3 mL Nebulization Q4H    aspirin  325 mg Per NG tube Daily    atorvastatin  80 mg Per NG tube Daily    cefTRIAXone (ROCEPHIN) IVPB  2 g Intravenous Q24H    clopidogrel  75 mg Per NG tube Daily    heparin (porcine)  5,000 Units Subcutaneous Q8H    moxifloxacin  400 mg Intravenous Q24H    senna-docusate 8.6-50 mg  1 tablet Oral BID    sodium chloride 0.9%  3 mL Intravenous Q8H    sodium chloride 3%  4 mL Nebulization Q4H     Continuous Infusions:   sodium chloride 0.9% 75 mL/hr at 01/28/18 2300    insulin (HUMAN R) infusion (adults) 3.1 Units/hr (01/28/18 2300)    niCARdipine Stopped (01/28/18 0855)     PRN Meds:bisacodyl, dextrose 50%, dextrose 50%, dextrose 50%, glucagon (human recombinant), labetalol, magnesium sulfate IVPB, magnesium sulfate IVPB, ondansetron, sodium chloride 0.9%    Objective:     Vital Signs (Most Recent):  Temp: 99 °F (37.2 °C) (01/28/18 2300)  Pulse: 95 (01/28/18 2307)  Resp: 17 (01/28/18 2307)  BP: (!) 142/80 (01/28/18 2300)  SpO2: 98 % (01/28/18 2307)  BP  Location: Right leg    Vital Signs Range (Last 24H):  Temp:  [97.9 °F (36.6 °C)-99.7 °F (37.6 °C)]   Pulse:  []   Resp:  [14-33]   BP: (132-181)/()   SpO2:  [96 %-100 %]   Arterial Line BP: ()/()   BP Location: Right leg    Physical Exam   Constitutional: He appears well-developed.   Cardiovascular: Normal rate and regular rhythm.  Exam reveals no friction rub.    No murmur heard.  Pulmonary/Chest: No respiratory distress. He has no wheezes.   Abdominal: Soft. He exhibits no distension. There is no tenderness.       Neurological Exam:   LOC: obtunded  Language: Mute  Articulation: Mute/Anarthric  Visual Fields: no blink to threat b/l  EOM (CN III, IV, VI): L gaze preference  Pupils (CN II, III): PERRL  Sustained clonus on R    Laboratory:  Recent Results (from the past 24 hour(s))   POCT glucose    Collection Time: 01/28/18  1:51 AM   Result Value Ref Range    POCT Glucose 223 (H) 70 - 110 mg/dL   Comprehensive metabolic panel    Collection Time: 01/28/18  1:54 AM   Result Value Ref Range    Sodium 139 136 - 145 mmol/L    Potassium 3.9 3.5 - 5.1 mmol/L    Chloride 107 95 - 110 mmol/L    CO2 18 (L) 23 - 29 mmol/L    Glucose 238 (H) 70 - 110 mg/dL    BUN, Bld 16 6 - 20 mg/dL    Creatinine 0.9 0.5 - 1.4 mg/dL    Calcium 8.5 (L) 8.7 - 10.5 mg/dL    Total Protein 7.1 6.0 - 8.4 g/dL    Albumin 3.3 (L) 3.5 - 5.2 g/dL    Total Bilirubin 1.4 (H) 0.1 - 1.0 mg/dL    Alkaline Phosphatase 65 55 - 135 U/L    AST 30 10 - 40 U/L    ALT 32 10 - 44 U/L    Anion Gap 14 8 - 16 mmol/L    eGFR if African American >60.0 >60 mL/min/1.73 m^2    eGFR if non African American >60.0 >60 mL/min/1.73 m^2   CBC auto differential    Collection Time: 01/28/18  1:54 AM   Result Value Ref Range    WBC 18.71 (H) 3.90 - 12.70 K/uL    RBC 5.16 4.60 - 6.20 M/uL    Hemoglobin 15.2 14.0 - 18.0 g/dL    Hematocrit 42.9 40.0 - 54.0 %    MCV 83 82 - 98 fL    MCH 29.5 27.0 - 31.0 pg    MCHC 35.4 32.0 - 36.0 g/dL    RDW 12.5 11.5 - 14.5 %     Platelets 276 150 - 350 K/uL    MPV 9.2 9.2 - 12.9 fL    Immature Granulocytes 0.6 (H) 0.0 - 0.5 %    Gran # (ANC) 15.3 (H) 1.8 - 7.7 K/uL    Immature Grans (Abs) 0.11 (H) 0.00 - 0.04 K/uL    Lymph # 1.8 1.0 - 4.8 K/uL    Mono # 1.5 (H) 0.3 - 1.0 K/uL    Eos # 0.0 0.0 - 0.5 K/uL    Baso # 0.05 0.00 - 0.20 K/uL    nRBC 0 0 /100 WBC    Gran% 81.5 (H) 38.0 - 73.0 %    Lymph% 9.5 (L) 18.0 - 48.0 %    Mono% 8.1 4.0 - 15.0 %    Eosinophil% 0.0 0.0 - 8.0 %    Basophil% 0.3 0.0 - 1.9 %    Differential Method Automated    Magnesium    Collection Time: 01/28/18  1:54 AM   Result Value Ref Range    Magnesium 1.7 1.6 - 2.6 mg/dL   Phosphorus    Collection Time: 01/28/18  1:54 AM   Result Value Ref Range    Phosphorus 3.5 2.7 - 4.5 mg/dL   Protime-INR    Collection Time: 01/28/18  1:54 AM   Result Value Ref Range    Prothrombin Time 10.5 9.0 - 12.5 sec    INR 1.0 0.8 - 1.2   POCT glucose    Collection Time: 01/28/18  3:01 AM   Result Value Ref Range    POCT Glucose 173 (H) 70 - 110 mg/dL   POCT glucose    Collection Time: 01/28/18  4:04 AM   Result Value Ref Range    POCT Glucose 230 (H) 70 - 110 mg/dL   POCT glucose    Collection Time: 01/28/18  4:59 AM   Result Value Ref Range    POCT Glucose 160 (H) 70 - 110 mg/dL   POCT glucose    Collection Time: 01/28/18  6:02 AM   Result Value Ref Range    POCT Glucose 225 (H) 70 - 110 mg/dL   Procalcitonin    Collection Time: 01/28/18  7:49 AM   Result Value Ref Range    Procalcitonin 0.23 <0.25 ng/mL       Microbiology Results (last 7 days)     Procedure Component Value Units Date/Time    Urine culture [[id number]] Collected:  01/27/18 1621    Order Status:  Completed Specimen:  Urine from Urine, Catheterized Updated:  01/28/18 2047     Urine Culture, Routine No growth    Blood culture [[id number]] Collected:  01/27/18 1559    Order Status:  Completed Specimen:  Blood from Peripheral, Hand, Right Updated:  01/28/18 2012     Blood Culture, Routine No Growth to date     Blood Culture,  Routine No Growth to date    Narrative:       Blood cultures from 2 different sites. 4 bottles total.  Please draw before starting antibiotics.    Blood culture [[id number]] Collected:  01/27/18 8411    Order Status:  Completed Specimen:  Blood from Peripheral, Hand, Left Updated:  01/28/18 2012     Blood Culture, Routine No Growth to date     Blood Culture, Routine No Growth to date    Narrative:       Blood cultures x 2 different sites. 4 bottles total. Please  draw cultures before administering antibiotics.            Diagnostic Results     Brain Imaging   1/27/18 CTH: Per independent resident review and interpretation,  L MCA infarction s/p stenting.  No sign of hemorrhagic transformation.  R basal ganglia and cerebellar infarct.          1/26/18 MRI Brain w/o: Per independent resident review and interpretation,  Acute L MCA infarction.        Vessel Imaging   1/25/18 IR Angiogram: Per independent resident review and interpretation,  L M1 occlusion.        Cardiac Imaging   1/26/18 EKG: Per independent resident review and interpretation,  Sinus tachycardia.  .  QTc 500.    1/26/18 TTE: Per report,  LA wnl  EF 65-70%  No regional wall motion abnormalities  Diastolic function normal        Arlette Hernández PA-C  Comprehensive Stroke Center  Department of Vascular Neurology   [facility]

## 2018-01-29 NOTE — SUBJECTIVE & OBJECTIVE
Interval History:    --CTH stat  --EEG pending CTH results  --follow CXR/Procal, unsure of leukocytosis cause          Review of Systems   Reason unable to perform ROS: aphasic.   .  Objective:     Vitals:  Temp: 99.4 °F (37.4 °C)  Pulse: 104  Rhythm: sinus tachycardia  BP: (!) 150/104  MAP (mmHg): 118  CI (L/min/m2): 3.5 L/min/m2  SVI: 13  SVV: 33 %  Resp: 20  SpO2: 100 %  Oxygen Concentration (%): 30  O2 Device (Oxygen Therapy): nasal cannula w/ humidification    Temp  Min: 98.5 °F (36.9 °C)  Max: 99.6 °F (37.6 °C)  Pulse  Min: 79  Max: 125  BP  Min: 132/80  Max: 188/97  MAP (mmHg)  Min: 98  Max: 151  CI (L/min/m2)  Min: 3.5 L/min/m2  Max: 4.7 L/min/m2  SVI  Min: 13  Max: 39  SVV  Min: 30 %  Max: 35 %  Resp  Min: 17  Max: 33  SpO2  Min: 96 %  Max: 100 %  Oxygen Concentration (%)  Min: 30  Max: 30    01/28 0701 - 01/29 0700  In: 2447.6 [I.V.:2047.6]  Out: 2020 [Urine:2020]   Unmeasured Output  Urine Occurrence: 1  Stool Occurrence: 1       Physical Exam   Cardiovascular: Normal rate, regular rhythm, normal heart sounds and intact distal pulses.    Pulmonary/Chest: Effort normal and breath sounds normal.   Abdominal: Soft. Bowel sounds are normal.   Neurological:   E4 V1 M4  Eyes open, does not attend or follow  PERRLA, 3mm/3mm  Right hemiparesis    RUE 5/5  LUE withdraw to pain  RLE 5/5  LLE triple flexion   Vitals reviewed.        Medications:  Continuous  sodium chloride 0.9% Last Rate: 75 mL/hr at 01/29/18 0900   insulin (HUMAN R) infusion (adults) Last Rate: 2.1 Units/hr (01/29/18 0900)   niCARdipine Last Rate: Stopped (01/28/18 0855)   Scheduled  acetaminophen 650 mg Q6H   albuterol-ipratropium 2.5mg-0.5mg/3mL 3 mL Q4H   aspirin 325 mg Daily   atorvastatin 80 mg Daily   cefTRIAXone (ROCEPHIN) IVPB 2 g Q24H   clopidogrel 75 mg Daily   heparin (porcine) 5,000 Units Q8H   senna-docusate 8.6-50 mg 1 tablet BID   sodium chloride 0.9% 3 mL Q8H   sodium chloride 3% 4 mL Q4H   PRN  bisacodyl 10 mg Daily PRN   dextrose  50% 12.5 g PRN   dextrose 50% 12.5 g PRN   dextrose 50% 25 g PRN   glucagon (human recombinant) 1 mg PRN   labetalol 10 mg Q4H PRN   magnesium sulfate IVPB 2 g PRN   magnesium sulfate IVPB 4 g PRN   ondansetron 4 mg Q8H PRN   sodium chloride 0.9% 5 mL PRN     Today I personally reviewed pertinent medications, lines/drains/airways, imaging, cardiology, lab results, microbiology results,   Diet  Diet NPO  Diet NPO

## 2018-01-30 PROBLEM — I63.9 ISCHEMIC STROKE: Status: ACTIVE | Noted: 2018-01-30

## 2018-01-30 LAB
ALBUMIN SERPL BCP-MCNC: 2.6 G/DL
ALLENS TEST: ABNORMAL
ALP SERPL-CCNC: 64 U/L
ALT SERPL W/O P-5'-P-CCNC: 26 U/L
ANION GAP SERPL CALC-SCNC: 10 MMOL/L
AST SERPL-CCNC: 27 U/L
BASOPHILS # BLD AUTO: 0.03 K/UL
BASOPHILS NFR BLD: 0.4 %
BILIRUB SERPL-MCNC: 0.7 MG/DL
BUN SERPL-MCNC: 20 MG/DL
CALCIUM SERPL-MCNC: 8.1 MG/DL
CHLORIDE SERPL-SCNC: 109 MMOL/L
CO2 SERPL-SCNC: 26 MMOL/L
CREAT SERPL-MCNC: 0.8 MG/DL
DELSYS: ABNORMAL
DIFFERENTIAL METHOD: ABNORMAL
EOSINOPHIL # BLD AUTO: 0.1 K/UL
EOSINOPHIL NFR BLD: 0.8 %
ERYTHROCYTE [DISTWIDTH] IN BLOOD BY AUTOMATED COUNT: 12.9 %
ERYTHROCYTE [SEDIMENTATION RATE] IN BLOOD BY WESTERGREN METHOD: 30 MM/H
EST. GFR  (AFRICAN AMERICAN): >60 ML/MIN/1.73 M^2
EST. GFR  (NON AFRICAN AMERICAN): >60 ML/MIN/1.73 M^2
FIO2: 35
FLOW: 3
GLUCOSE SERPL-MCNC: 168 MG/DL
HCO3 UR-SCNC: 25.1 MMOL/L (ref 24–28)
HCT VFR BLD AUTO: 36.5 %
HGB BLD-MCNC: 12.6 G/DL
IMM GRANULOCYTES # BLD AUTO: 0.05 K/UL
IMM GRANULOCYTES NFR BLD AUTO: 0.6 %
INR PPP: 1
LYMPHOCYTES # BLD AUTO: 1.4 K/UL
LYMPHOCYTES NFR BLD: 16.9 %
MAGNESIUM SERPL-MCNC: 2.1 MG/DL
MCH RBC QN AUTO: 29.8 PG
MCHC RBC AUTO-ENTMCNC: 34.5 G/DL
MCV RBC AUTO: 86 FL
MODE: ABNORMAL
MONOCYTES # BLD AUTO: 0.8 K/UL
MONOCYTES NFR BLD: 9.5 %
NEUTROPHILS # BLD AUTO: 5.9 K/UL
NEUTROPHILS NFR BLD: 71.8 %
NRBC BLD-RTO: 0 /100 WBC
PCO2 BLDA: 36.3 MMHG (ref 35–45)
PH SMN: 7.45 [PH] (ref 7.35–7.45)
PHOSPHATE SERPL-MCNC: 3.2 MG/DL
PLATELET # BLD AUTO: 251 K/UL
PMV BLD AUTO: 9.4 FL
PO2 BLDA: 125 MMHG (ref 80–100)
POC BE: 1 MMOL/L
POC SATURATED O2: 99 % (ref 95–100)
POC TCO2: 26 MMOL/L (ref 23–27)
POCT GLUCOSE: 140 MG/DL (ref 70–110)
POCT GLUCOSE: 141 MG/DL (ref 70–110)
POCT GLUCOSE: 149 MG/DL (ref 70–110)
POCT GLUCOSE: 149 MG/DL (ref 70–110)
POCT GLUCOSE: 151 MG/DL (ref 70–110)
POCT GLUCOSE: 153 MG/DL (ref 70–110)
POCT GLUCOSE: 155 MG/DL (ref 70–110)
POCT GLUCOSE: 156 MG/DL (ref 70–110)
POCT GLUCOSE: 159 MG/DL (ref 70–110)
POCT GLUCOSE: 159 MG/DL (ref 70–110)
POCT GLUCOSE: 164 MG/DL (ref 70–110)
POCT GLUCOSE: 165 MG/DL (ref 70–110)
POCT GLUCOSE: 168 MG/DL (ref 70–110)
POCT GLUCOSE: 177 MG/DL (ref 70–110)
POCT GLUCOSE: 181 MG/DL (ref 70–110)
POCT GLUCOSE: 184 MG/DL (ref 70–110)
POCT GLUCOSE: 190 MG/DL (ref 70–110)
POTASSIUM SERPL-SCNC: 3.4 MMOL/L
POTASSIUM SERPL-SCNC: 3.9 MMOL/L
PROT SERPL-MCNC: 6.1 G/DL
PROTHROMBIN TIME: 10.5 SEC
RBC # BLD AUTO: 4.23 M/UL
SAMPLE: ABNORMAL
SITE: ABNORMAL
SODIUM SERPL-SCNC: 143 MMOL/L
SODIUM SERPL-SCNC: 144 MMOL/L
SODIUM SERPL-SCNC: 145 MMOL/L
SODIUM SERPL-SCNC: 146 MMOL/L
SODIUM SERPL-SCNC: 147 MMOL/L
SP02: 98
WBC # BLD AUTO: 8.24 K/UL

## 2018-01-30 PROCEDURE — 99233 SBSQ HOSP IP/OBS HIGH 50: CPT | Mod: ,,, | Performed by: PSYCHIATRY & NEUROLOGY

## 2018-01-30 PROCEDURE — 85610 PROTHROMBIN TIME: CPT

## 2018-01-30 PROCEDURE — 92507 TX SP LANG VOICE COMM INDIV: CPT

## 2018-01-30 PROCEDURE — 84100 ASSAY OF PHOSPHORUS: CPT

## 2018-01-30 PROCEDURE — 80053 COMPREHEN METABOLIC PANEL: CPT

## 2018-01-30 PROCEDURE — 37799 UNLISTED PX VASCULAR SURGERY: CPT

## 2018-01-30 PROCEDURE — A4216 STERILE WATER/SALINE, 10 ML: HCPCS | Performed by: NURSE PRACTITIONER

## 2018-01-30 PROCEDURE — 25000003 PHARM REV CODE 250: Performed by: PSYCHIATRY & NEUROLOGY

## 2018-01-30 PROCEDURE — 84295 ASSAY OF SERUM SODIUM: CPT

## 2018-01-30 PROCEDURE — 63600175 PHARM REV CODE 636 W HCPCS: Performed by: PSYCHIATRY & NEUROLOGY

## 2018-01-30 PROCEDURE — 25000242 PHARM REV CODE 250 ALT 637 W/ HCPCS: Performed by: PHYSICIAN ASSISTANT

## 2018-01-30 PROCEDURE — 99900035 HC TECH TIME PER 15 MIN (STAT)

## 2018-01-30 PROCEDURE — A4217 STERILE WATER/SALINE, 500 ML: HCPCS | Performed by: PSYCHIATRY & NEUROLOGY

## 2018-01-30 PROCEDURE — 94640 AIRWAY INHALATION TREATMENT: CPT

## 2018-01-30 PROCEDURE — 84132 ASSAY OF SERUM POTASSIUM: CPT

## 2018-01-30 PROCEDURE — 84295 ASSAY OF SERUM SODIUM: CPT | Mod: 91

## 2018-01-30 PROCEDURE — 99232 SBSQ HOSP IP/OBS MODERATE 35: CPT | Mod: ,,, | Performed by: NEUROLOGICAL SURGERY

## 2018-01-30 PROCEDURE — 97803 MED NUTRITION INDIV SUBSEQ: CPT

## 2018-01-30 PROCEDURE — 97530 THERAPEUTIC ACTIVITIES: CPT

## 2018-01-30 PROCEDURE — 27000221 HC OXYGEN, UP TO 24 HOURS

## 2018-01-30 PROCEDURE — 63600175 PHARM REV CODE 636 W HCPCS: Performed by: NURSE PRACTITIONER

## 2018-01-30 PROCEDURE — 82803 BLOOD GASES ANY COMBINATION: CPT

## 2018-01-30 PROCEDURE — 25000003 PHARM REV CODE 250: Performed by: PHYSICIAN ASSISTANT

## 2018-01-30 PROCEDURE — 94761 N-INVAS EAR/PLS OXIMETRY MLT: CPT

## 2018-01-30 PROCEDURE — 20000000 HC ICU ROOM

## 2018-01-30 PROCEDURE — 85025 COMPLETE CBC W/AUTO DIFF WBC: CPT

## 2018-01-30 PROCEDURE — 25000003 PHARM REV CODE 250: Performed by: NURSE PRACTITIONER

## 2018-01-30 PROCEDURE — 99291 CRITICAL CARE FIRST HOUR: CPT | Mod: ,,, | Performed by: PSYCHIATRY & NEUROLOGY

## 2018-01-30 PROCEDURE — A4217 STERILE WATER/SALINE, 500 ML: HCPCS | Performed by: NURSE PRACTITIONER

## 2018-01-30 PROCEDURE — 94660 CPAP INITIATION&MGMT: CPT

## 2018-01-30 PROCEDURE — 83735 ASSAY OF MAGNESIUM: CPT

## 2018-01-30 RX ORDER — POTASSIUM CHLORIDE 20 MEQ/15ML
40 SOLUTION ORAL
Status: DISCONTINUED | OUTPATIENT
Start: 2018-01-30 | End: 2018-02-21

## 2018-01-30 RX ADMIN — SODIUM CHLORIDE 2.7 UNITS/HR: 9 INJECTION, SOLUTION INTRAVENOUS at 09:01

## 2018-01-30 RX ADMIN — CEFTRIAXONE 2 G: 2 INJECTION, SOLUTION INTRAVENOUS at 03:01

## 2018-01-30 RX ADMIN — ASPIRIN 325 MG ORAL TABLET 325 MG: 325 PILL ORAL at 08:01

## 2018-01-30 RX ADMIN — Medication 3 ML: at 09:01

## 2018-01-30 RX ADMIN — LABETALOL HYDROCHLORIDE 10 MG: 5 INJECTION, SOLUTION INTRAVENOUS at 02:01

## 2018-01-30 RX ADMIN — SODIUM CHLORIDE SOLN NEBU 3% 4 ML: 3 NEBU SOLN at 11:01

## 2018-01-30 RX ADMIN — Medication 3 ML: at 05:01

## 2018-01-30 RX ADMIN — ACETAMINOPHEN 650 MG: 325 TABLET, FILM COATED ORAL at 05:01

## 2018-01-30 RX ADMIN — STANDARDIZED SENNA CONCENTRATE AND DOCUSATE SODIUM 1 TABLET: 8.6; 5 TABLET, FILM COATED ORAL at 08:01

## 2018-01-30 RX ADMIN — IPRATROPIUM BROMIDE AND ALBUTEROL SULFATE 3 ML: .5; 3 SOLUTION RESPIRATORY (INHALATION) at 03:01

## 2018-01-30 RX ADMIN — HYDRALAZINE HYDROCHLORIDE 10 MG: 20 INJECTION INTRAMUSCULAR; INTRAVENOUS at 09:01

## 2018-01-30 RX ADMIN — ATORVASTATIN CALCIUM 80 MG: 20 TABLET, FILM COATED ORAL at 08:01

## 2018-01-30 RX ADMIN — IPRATROPIUM BROMIDE AND ALBUTEROL SULFATE 3 ML: .5; 3 SOLUTION RESPIRATORY (INHALATION) at 12:01

## 2018-01-30 RX ADMIN — LABETALOL HYDROCHLORIDE 10 MG: 5 INJECTION, SOLUTION INTRAVENOUS at 08:01

## 2018-01-30 RX ADMIN — CLOPIDOGREL 75 MG: 75 TABLET, FILM COATED ORAL at 08:01

## 2018-01-30 RX ADMIN — SODIUM CHLORIDE SOLN NEBU 3% 4 ML: 3 NEBU SOLN at 03:01

## 2018-01-30 RX ADMIN — IPRATROPIUM BROMIDE AND ALBUTEROL SULFATE 3 ML: .5; 3 SOLUTION RESPIRATORY (INHALATION) at 04:01

## 2018-01-30 RX ADMIN — HYDRALAZINE HYDROCHLORIDE 10 MG: 20 INJECTION INTRAMUSCULAR; INTRAVENOUS at 05:01

## 2018-01-30 RX ADMIN — SODIUM ACETATE: 164 INJECTION, SOLUTION, CONCENTRATE INTRAVENOUS at 09:01

## 2018-01-30 RX ADMIN — HEPARIN SODIUM 5000 UNITS: 5000 INJECTION, SOLUTION INTRAVENOUS; SUBCUTANEOUS at 02:01

## 2018-01-30 RX ADMIN — STANDARDIZED SENNA CONCENTRATE AND DOCUSATE SODIUM 1 TABLET: 8.6; 5 TABLET, FILM COATED ORAL at 09:01

## 2018-01-30 RX ADMIN — SODIUM ACETATE: 164 INJECTION, SOLUTION, CONCENTRATE INTRAVENOUS at 10:01

## 2018-01-30 RX ADMIN — SODIUM CHLORIDE SOLN NEBU 3% 4 ML: 3 NEBU SOLN at 04:01

## 2018-01-30 RX ADMIN — POTASSIUM CHLORIDE 40 MEQ: 20 SOLUTION ORAL at 02:01

## 2018-01-30 RX ADMIN — ACETAMINOPHEN 650 MG: 325 TABLET, FILM COATED ORAL at 01:01

## 2018-01-30 RX ADMIN — SODIUM ACETATE: 164 INJECTION, SOLUTION, CONCENTRATE INTRAVENOUS at 02:01

## 2018-01-30 RX ADMIN — POTASSIUM CHLORIDE 40 MEQ: 20 SOLUTION ORAL at 05:01

## 2018-01-30 RX ADMIN — IPRATROPIUM BROMIDE AND ALBUTEROL SULFATE 3 ML: .5; 3 SOLUTION RESPIRATORY (INHALATION) at 11:01

## 2018-01-30 RX ADMIN — HEPARIN SODIUM 5000 UNITS: 5000 INJECTION, SOLUTION INTRAVENOUS; SUBCUTANEOUS at 09:01

## 2018-01-30 RX ADMIN — HEPARIN SODIUM 5000 UNITS: 5000 INJECTION, SOLUTION INTRAVENOUS; SUBCUTANEOUS at 05:01

## 2018-01-30 RX ADMIN — IPRATROPIUM BROMIDE AND ALBUTEROL SULFATE 3 ML: .5; 3 SOLUTION RESPIRATORY (INHALATION) at 07:01

## 2018-01-30 RX ADMIN — ACETAMINOPHEN 650 MG: 325 TABLET, FILM COATED ORAL at 11:01

## 2018-01-30 RX ADMIN — IPRATROPIUM BROMIDE AND ALBUTEROL SULFATE 3 ML: .5; 3 SOLUTION RESPIRATORY (INHALATION) at 08:01

## 2018-01-30 RX ADMIN — LABETALOL HYDROCHLORIDE 10 MG: 5 INJECTION, SOLUTION INTRAVENOUS at 07:01

## 2018-01-30 RX ADMIN — SODIUM CHLORIDE SOLN NEBU 3% 4 ML: 3 NEBU SOLN at 07:01

## 2018-01-30 RX ADMIN — SODIUM CHLORIDE SOLN NEBU 3% 4 ML: 3 NEBU SOLN at 12:01

## 2018-01-30 RX ADMIN — SODIUM CHLORIDE SOLN NEBU 3% 4 ML: 3 NEBU SOLN at 08:01

## 2018-01-30 RX ADMIN — Medication 3 ML: at 02:01

## 2018-01-30 NOTE — PT/OT/SLP PROGRESS
Physical Therapy  Missed Visit    Patient Name:  Todd Quevedo   MRN:  [id number]  Admitting Diagnosis:  Embolic stroke involving left middle cerebral artery   Recent Surgery: * No surgery found *      Patient not seen today secondary to Unavailable (Comment) (Attempted to see pt at 1516, RN states pt lethargic with limited responses. OT performed ROM exercises on this date. Will hold PT on this date until pt can participate in EOB activities). Will follow-up as POC allows.    Yvonne Perea PT, DPT  1/30/2018  Pager: [phone]

## 2018-01-30 NOTE — PLAN OF CARE
Problem: SLP Goal  Goal: SLP Goal  Speech Language Pathology Goals  Goals expected to be met by 2/2  1. Pt will participate in ongoing assessment of swallow to determine least restrictive diet as appropriate.  2. Pt will complete speech, language, cognitive evaluation to further determine short term goals as appropriate. -met  Additional goals:  2. Pt will open eyes to stim x5/session given mod cues.  3. Pt will follow simple commands x2/session given max cues.  4. Pt will answer basic y/n question via any modality x2/session given max cues.  5. Pt will vocalize in response to any stim x2/session given max cues.         Outcome: Ongoing (interventions implemented as appropriate)  Goals remain appropriate, cont POC. EDWARD Key, CCC/SLP  1/30/2018

## 2018-01-30 NOTE — PROCEDURES
[facility]     PRELIMINARY C-EEG REPORT:  Review: 1/29/18, 16:32 (start) - 18:32     Focal left sided delta > theta (2-5 Hz) slowing seen         No epileptiform activity or electrographic seizures seen.    Full report to follow.  Will continue to monitor.     Thank you for involving us in the care of this patient.  Millicent Ann MD, MADDY (), [facility].  Neurology-Epilepsy.

## 2018-01-30 NOTE — PLAN OF CARE
Problem: Occupational Therapy Goal  Goal: Occupational Therapy Goal  Goals to be met by: 2/9/18    Patient will increase functional independence with ADLs by performing:    UE Dressing with Moderate Assistance.  Grooming while seated at sink with Moderate Assistance.  Toileting from bedside commode with Moderate Assistance for hygiene and clothing management.   Supine to sit with Maximum Assistance.  Stand pivot transfers with Maximum Assistance.  CG demo independence with upper extremity exercise program per handout.   Pt follow 50% of simple one-step commands  Eyes open 50% of session.        POC remains appropriate.    ISAMAR Chang  1/30/2018

## 2018-01-30 NOTE — ASSESSMENT & PLAN NOTE
Mr. Quevedo is a 47 year old male who walked into [facility] and was acting abnormal.  EMS was called and brought to the ED for concern of L MCA syndrome. Not tpa candidate due to unknown last known normal.  Patient went to IR for thrombectomy of L M1 occlusion seen on CTA MP on 1/25.  TICI2C reperfusion and angioplasty.      Primary team with concerns for sepsis, believe respiratory source. Started on antibiotics.    Antithrombotics for secondary stroke prevention: asa 325mg qd and plavix 75mg qd   Statins for secondary stroke prevention and hyperlipidemia, if present: Repeat LDL (last invalid secondary to hypertriglyceridemia), atorvastatin 80 in interim  Aggressive risk factor modification: Obesity  Rehab efforts: PT/OT/SLP to evaluate and treat  Diagnostics ordered/pending:   CTA - L M1 occlusion  A1C 10, TSH 1.8, LDL immeasurable 2/2 trigylcerides (460), Chol 200  MRI - most of the L MCA shows area of infarct  VTE prophylaxis: Heparin 5000 units SQ every 8 hours  BP parameters: Infarct: Post sucessful thrombectomy, SBP <140     Worsening exam on 1/29: EEG showed difuse slowing and left focal slowing consistent with stroke but no seizures, procal wnl , CXR unremarkable

## 2018-01-30 NOTE — SUBJECTIVE & OBJECTIVE
Interval History: Exam stable. Pt on CPAP and cEEG per primary team.     Medications:  Continuous Infusions:   insulin (HUMAN R) infusion (adults) 2.7 Units/hr (01/30/18 1100)    buffered 2% sodium acetate 86meq, sodium chloride 86meq, sterile water for inj IV soln 100 mL/hr at 01/30/18 1100     Scheduled Meds:   acetaminophen  650 mg Per NG tube Q6H    albuterol-ipratropium 2.5mg-0.5mg/3mL  3 mL Nebulization Q4H    aspirin  325 mg Per NG tube Daily    atorvastatin  80 mg Per NG tube Daily    cefTRIAXone (ROCEPHIN) IVPB  2 g Intravenous Q24H    clopidogrel  75 mg Per NG tube Daily    heparin (porcine)  5,000 Units Subcutaneous Q8H    senna-docusate 8.6-50 mg  1 tablet Per NG tube BID    sodium chloride 0.9%  3 mL Intravenous Q8H    sodium chloride 3%  4 mL Nebulization Q4H     PRN Meds:bisacodyl, dextrose 50%, dextrose 50%, dextrose 50%, glucagon (human recombinant), hydrALAZINE, labetalol, magnesium sulfate IVPB, magnesium sulfate IVPB, ondansetron, potassium chloride 10%, potassium chloride 10%, sodium chloride 0.9%     Review of Systems    Objective:     Weight: 116.3 kg (256 lb 6.3 oz)  Body mass index is 36.79 kg/m².  Vital Signs (Most Recent):  Temp: 99 °F (37.2 °C) (01/30/18 1100)  Pulse: 88 (01/30/18 1221)  Resp: (!) 30 (01/30/18 1221)  BP: (!) 142/113 (01/30/18 1100)  SpO2: 99 % (01/30/18 1221) Vital Signs (24h Range):  Temp:  [98.7 °F (37.1 °C)-100.3 °F (37.9 °C)] 99 °F (37.2 °C)  Pulse:  [] 88  Resp:  [15-43] 30  SpO2:  [93 %-100 %] 99 %  BP: (142-199)/() 142/113  Arterial Line BP: (117-239)/() 239/237       Date 01/30/18 0700 - 01/31/18 0659   Shift 7193-4653 5720-8655 5516-8989 24 Hour Total   I  N  T  A  K  E   I.V.  (mL/kg) 359.9  (3.1)   359.9  (3.1)    Shift Total  (mL/kg) 359.9  (3.1)   359.9  (3.1)   O  U  T  P  U  T   Urine  (mL/kg/hr) 700   700    Shift Total  (mL/kg) 700  (6)   700  (6)   Weight (kg) 116.3 116.3 116.3 116.3     Interval History: Exam stable. Pt on  CPAP and cEEG per primary team.     Medications:  Continuous Infusions:   insulin (HUMAN R) infusion (adults) 2.7 Units/hr (01/30/18 1100)    buffered 2% sodium acetate 86meq, sodium chloride 86meq, sterile water for inj IV soln 100 mL/hr at 01/30/18 1100     Scheduled Meds:   acetaminophen  650 mg Per NG tube Q6H    albuterol-ipratropium 2.5mg-0.5mg/3mL  3 mL Nebulization Q4H    aspirin  325 mg Per NG tube Daily    atorvastatin  80 mg Per NG tube Daily    cefTRIAXone (ROCEPHIN) IVPB  2 g Intravenous Q24H    clopidogrel  75 mg Per NG tube Daily    heparin (porcine)  5,000 Units Subcutaneous Q8H    senna-docusate 8.6-50 mg  1 tablet Per NG tube BID    sodium chloride 0.9%  3 mL Intravenous Q8H    sodium chloride 3%  4 mL Nebulization Q4H     PRN Meds:bisacodyl, dextrose 50%, dextrose 50%, dextrose 50%, glucagon (human recombinant), hydrALAZINE, labetalol, magnesium sulfate IVPB, magnesium sulfate IVPB, ondansetron, potassium chloride 10%, potassium chloride 10%, sodium chloride 0.9%     Review of Systems    Objective:     Weight: 116.3 kg (256 lb 6.3 oz)  Body mass index is 36.79 kg/m².  Vital Signs (Most Recent):  Temp: 99 °F (37.2 °C) (01/30/18 1100)  Pulse: 88 (01/30/18 1221)  Resp: (!) 30 (01/30/18 1221)  BP: (!) 142/113 (01/30/18 1100)  SpO2: 99 % (01/30/18 1221) Vital Signs (24h Range):  Temp:  [98.7 °F (37.1 °C)-100.3 °F (37.9 °C)] 99 °F (37.2 °C)  Pulse:  [] 88  Resp:  [15-43] 30  SpO2:  [93 %-100 %] 99 %  BP: (142-199)/() 142/113  Arterial Line BP: (117-239)/() 239/237       Date 01/30/18 0700 - 01/31/18 0659   Shift 3204-1276 3786-2592 6055-9376 24 Hour Total   I  N  T  A  K  E   I.V.  (mL/kg) 359.9  (3.1)   359.9  (3.1)    Shift Total  (mL/kg) 359.9  (3.1)   359.9  (3.1)   O  U  T  P  U  T   Urine  (mL/kg/hr) 700   700    Shift Total  (mL/kg) 700  (6)   700  (6)   Weight (kg) 116.3 116.3 116.3 116.3            NG/OG Tube 01/25/18 2325 nasogastric Right nostril (Active)    Placement Check placement verified by x-ray 1/27/2018  7:01 AM   Tolerance no signs/symptoms of discomfort 1/27/2018  7:01 AM   Securement taped to nostril center 1/27/2018  7:01 AM   Clamp Status/Tolerance clamped 1/27/2018  7:01 AM   Intake (mL) 60 mL 1/26/2018 11:00 PM       Male External Urinary Catheter 01/25/18 2200 Small (Active)   Collection Container Urimeter 1/27/2018  7:01 AM   Securement Method secured to top of thigh w/ adhesive device 1/27/2018  7:01 AM   Skin no redness;no breakdown 1/27/2018  7:01 AM   Tolerance no signs/symptoms of discomfort 1/27/2018  7:01 AM   Output (mL) 400 mL 1/27/2018 10:00 AM   Catheter Change Date 01/26/18 1/27/2018  3:00 AM   Catheter Change Time 0300 1/27/2018  3:00 AM       Neurosurgery Physical Exam      Vital signs: reviewed above.   Constitutional: well-developed,   Cardiovascular: regular rate and rhythm  Resp: on CPAP  Abdomen: soft  Neurological  GCS E2V1M5  Mental Status: lethargic  non verbal on exam, does not follow commands  Head: normocephalic, atraumatic  PERRL  Facial expression symmetric  Moves left extremities spontaneously, right side withdraws to painful stimuli    Significant Labs:    Recent Labs  Lab 01/29/18 0232 01/30/18  0004 01/30/18 0101 01/30/18  0552   *  --   --  168*  --      < > 144 145 143   K 3.8  --   --  3.4*  --      --   --  109  --    CO2 21*  --   --  26  --    BUN 20  --   --  20  --    CREATININE 0.8  --   --  0.8  --    CALCIUM 8.3*  --   --  8.1*  --    MG 2.3  --   --  2.1  --    < > = values in this interval not displayed.    Recent Labs  Lab 01/29/18 0232 01/30/18 0101   WBC 13.19* 8.24   HGB 14.4 12.6*   HCT 41.3 36.5*    251       Recent Labs  Lab 01/29/18 0232 01/30/18 0101   INR 0.9 1.0     Microbiology Results (last 7 days)     Procedure Component Value Units Date/Time    Blood culture [[id number]] Collected:  01/27/18 1559    Order Status:  Completed Specimen:  Blood from Peripheral,  Hand, Right Updated:  01/29/18 2012     Blood Culture, Routine No Growth to date     Blood Culture, Routine No Growth to date     Blood Culture, Routine No Growth to date    Narrative:       Blood cultures from 2 different sites. 4 bottles total.  Please draw before starting antibiotics.    Blood culture [[id number]] Collected:  01/27/18 1558    Order Status:  Completed Specimen:  Blood from Peripheral, Hand, Left Updated:  01/29/18 2012     Blood Culture, Routine No Growth to date     Blood Culture, Routine No Growth to date     Blood Culture, Routine No Growth to date    Narrative:       Blood cultures x 2 different sites. 4 bottles total. Please  draw cultures before administering antibiotics.    Urine culture [[id number]] Collected:  01/27/18 1621    Order Status:  Completed Specimen:  Urine from Urine, Catheterized Updated:  01/28/18 2047     Urine Culture, Routine No growth        All pertinent labs from the last 24 hours have been reviewed.    Significant Diagnostics:  CTH reviewed       NG/OG Tube 01/25/18 2325 nasogastric Right nostril (Active)   Placement Check placement verified by x-ray 1/27/2018  7:01 AM   Tolerance no signs/symptoms of discomfort 1/27/2018  7:01 AM   Securement taped to nostril center 1/27/2018  7:01 AM   Clamp Status/Tolerance clamped 1/27/2018  7:01 AM   Intake (mL) 60 mL 1/26/2018 11:00 PM       Male External Urinary Catheter 01/25/18 2200 Small (Active)   Collection Container Urimeter 1/27/2018  7:01 AM   Securement Method secured to top of thigh w/ adhesive device 1/27/2018  7:01 AM   Skin no redness;no breakdown 1/27/2018  7:01 AM   Tolerance no signs/symptoms of discomfort 1/27/2018  7:01 AM   Output (mL) 400 mL 1/27/2018 10:00 AM   Catheter Change Date 01/26/18 1/27/2018  3:00 AM   Catheter Change Time 0300 1/27/2018  3:00 AM       Neurosurgery Physical Exam      Vital signs: reviewed above.   Constitutional: well-developed,   Cardiovascular: regular rate and rhythm  Resp: on  CPAP  Abdomen: soft  Neurological  GCS E2V1M5  Mental Status: lethargic  non verbal on exam, does not follow commands  Head: normocephalic, atraumatic  PERRL  Facial expression symmetric  Moves left extremities spontaneously, right side withdraws to painful stimuli    Significant Labs:    Recent Labs  Lab 01/29/18  0232  01/30/18  0004 01/30/18  0101 01/30/18  0552   *  --   --  168*  --      < > 144 145 143   K 3.8  --   --  3.4*  --      --   --  109  --    CO2 21*  --   --  26  --    BUN 20  --   --  20  --    CREATININE 0.8  --   --  0.8  --    CALCIUM 8.3*  --   --  8.1*  --    MG 2.3  --   --  2.1  --    < > = values in this interval not displayed.    Recent Labs  Lab 01/29/18  0232 01/30/18  0101   WBC 13.19* 8.24   HGB 14.4 12.6*   HCT 41.3 36.5*    251       Recent Labs  Lab 01/29/18  0232 01/30/18  0101   INR 0.9 1.0     Microbiology Results (last 7 days)     Procedure Component Value Units Date/Time    Blood culture [[id number]] Collected:  01/27/18 1559    Order Status:  Completed Specimen:  Blood from Peripheral, Hand, Right Updated:  01/29/18 2012     Blood Culture, Routine No Growth to date     Blood Culture, Routine No Growth to date     Blood Culture, Routine No Growth to date    Narrative:       Blood cultures from 2 different sites. 4 bottles total.  Please draw before starting antibiotics.    Blood culture [[id number]] Collected:  01/27/18 1558    Order Status:  Completed Specimen:  Blood from Peripheral, Hand, Left Updated:  01/29/18 2012     Blood Culture, Routine No Growth to date     Blood Culture, Routine No Growth to date     Blood Culture, Routine No Growth to date    Narrative:       Blood cultures x 2 different sites. 4 bottles total. Please  draw cultures before administering antibiotics.    Urine culture [[id number]] Collected:  01/27/18 1621    Order Status:  Completed Specimen:  Urine from Urine, Catheterized Updated:  01/28/18 2047     Urine Culture, Routine  No growth        All pertinent labs from the last 24 hours have been reviewed.    Significant Diagnostics:  CTH reviewed

## 2018-01-30 NOTE — PROGRESS NOTES
[facility]  Adult Nutrition  Progress Note    SUMMARY     Recommendations    Recommend change of TF formula to Glucerna 1.5 starting @ 10 ml/hr, increasing 10 ml/hr q4hrs until rate rate of 70mL/hr reached to provide 2520 kcals, 129g pro, and 1184 ml free fluids.    - Hold for residuals >500 ml.     RD to monitor and follow-up.    Goals: % EEN, EPN  Nutrition Goal Status: new  Communication of RD Recs: reviewed with RN    Reason for Assessment    Reason for Assessment: new tube feeding  Diagnosis: stroke/CVA (Embolic stroke L MCA)     General Information Comments: Pt currently on Diabeta Source @ 10 ml/hr with goal rate of 60 ml/hr. Goal rate of 60 ml/hr of Diabeticsource will not provide adequate calories. Recommend switch to Glucerna 1.5.   Pt continues to be inappropriate for education at this time (HgbA1c 10.0) 2/2 NPO and TF.      Nutrition Discharge Plannin-100% EEN, EPN    Nutrition Prescription Ordered    Current Diet Order: NPO  Current Nutrition Support Formula Ordered: Diabetisource  Current Nutrition Support Rate Ordered: 60 (ml)  Current Nutrition Support Frequency Ordered: ml/hr    Evaluation of Received Nutrients/Fluid Intake    Enteral Calories (kcal): 1728  Enteral Protein (gm): 86  Enteral (Free Water) Fluid (mL): 1178  Free Water Flush Fluid (mL): 500  Energy Calories Required: not meeting needs   % Kcal Needs: 68     Total Protein (gm): 86  Protein Required: not meeting needs  % Protein Needs: 74     IV fluids(ml): 4078   Total Fluid Intake (mL): 1678  I/O: +  Fluid Required: not meeting needs  Comments: LBM 1/    % Intake of Estimated Energy Needs: 50 - 75 %  % Meal Intake: NPO     Nutrition Risk Screen     Nutrition Risk Screen: no indicators present    Nutrition/Diet History    Patient Reported Diet/Restrictions/Preferences: other (see comments) (FRIEDA)  Typical Food/Fluid Intake: FRIEDA  Food Preferences: FRIEDA 2/2 pt not awake at the time of visit  Supplemental  "Drinks or Food Habits: Other (see comments) (FRIEDA)  Factors Affecting Nutritional Intake: impaired cognitive status/motor control, difficulty/impaired swallowing, inability to feed self, NPO     Labs/Tests/Procedures/Meds    Pertinent Labs Reviewed: reviewed  Pertinent Labs Comments: K 3.4, Glucose 168, Ca 8.1, Alb 2.6, Chol 200, HDL 27,   Pertinent Medications Reviewed: reviewed  Pertinent Medications Comments: Statin, Heparin, Senna-Docusate, NaCl    Physical Findings    Overall Physical Appearance: overweight  Tubes: nasogastric tube  Oral/Mouth Cavity: WDL  Skin: intact    Anthropometrics    Height: 5' 10" (177.8 cm)  Weight Method: Bed Scale  Weight: 116.3 kg (256 lb 6.3 oz)     Ideal Body Weight (IBW), Male: 166 lb  % Ideal Body Weight, Male (lb): 154.46 lb    BMI (Calculated): 36.9  BMI Grade: 35 - 39.9 - obesity - grade II    Estimated/Assessed Needs    Weight Used For Calorie Calculations: 116.3 kg (256 lb 6.3 oz)     Energy Calorie Requirements (kcal): 2549  Energy Need Method: Mineral-St Jeor (1.25 PAL)  RMR (Mineral-St. Jeor Equation): 2039.25     Weight Used For Protein Calculations: 116.3 kg (256 lb 6.3 oz)  Protein Requirements: 117-140 g/d (1.0-1.2 g/kg)    Fluid Need Method: RDA Method (Per MD or 1 ml/kcal)  RDA Method (mL): 2549    CHO Requirement: 50% total kcals     Assessment and Plan    * Embolic stroke involving left middle cerebral artery    Problem  Inadequate oral intake    Related to (etiology):   Inability to consume sufficient energy    Signs and Symptoms (as evidenced by):   NPO with no alternative means of nutrition    Interventions/Recommendations (treatment strategy):  See recs above    Nutrition Diagnosis Status:   New              Monitor and Evaluation    Food and Nutrient Intake: enteral nutrition intake  Food and Nutrient Adminstration: enteral and parenteral nutrition administration     Physical Activity and Function: nutrition-related ADLs and IADLs  Anthropometric " Measurements: height/length, weight, body mass index, weight change  Biochemical Data, Medical Tests and Procedures: electrolyte and renal panel, gastrointestinal profile, glucose/endocrine profile, inflammatory profile, lipid profile  Nutrition-Focused Physical Findings: overall appearance    Nutrition Risk    Level of Risk:  (2x/week)    Nutrition Follow-Up    RD Follow-up?: Yes     I certify that I directed the dietetic intern in service delivery and guided them using my skilled judgment. As the cosigning dietitian, I have reviewed the dietetic interns documentation and am responsible for the treatment, assessment, and plan.      Ramon Self  Dietetic Intern

## 2018-01-30 NOTE — PLAN OF CARE
Problem: Patient Care Overview  Goal: Plan of Care Review  Plan of care reviewed with spouse.All questions and concerns addressed.EEG monitoring in place.No acute distress noted.

## 2018-01-30 NOTE — PROGRESS NOTES
[facility]  Vascular Neurology  Comprehensive Stroke Center  Progress Note    Assessment/Plan:     * Embolic stroke involving left middle cerebral artery    Mr. Quevedo is a 47 year old male who walked into [facility] and was acting abnormal.  EMS was called and brought to the ED for concern of L MCA syndrome. Not tpa candidate due to unknown last known normal.  Patient went to IR for thrombectomy of L M1 occlusion seen on CTA MP on 1/25.  TICI2C reperfusion and angioplasty.      Primary team with concerns for sepsis, believe respiratory source. Started on antibiotics.    Antithrombotics for secondary stroke prevention: asa 325mg qd and plavix 75mg qd   Statins for secondary stroke prevention and hyperlipidemia, if present: Repeat LDL (last invalid secondary to hypertriglyceridemia), atorvastatin 80 in interim  Aggressive risk factor modification: Obesity  Rehab efforts: PT/OT/SLP to evaluate and treat  Diagnostics ordered/pending:   CTA - L M1 occlusion  A1C 10, TSH 1.8, LDL immeasurable 2/2 trigylcerides (460), Chol 200  MRI - most of the L MCA shows area of infarct  VTE prophylaxis: Heparin 5000 units SQ every 8 hours  BP parameters: Infarct: Post sucessful thrombectomy, SBP <140     Worsening exam on 1/29: EEG showed difuse slowing and left focal slowing consistent with stroke but no seizures, procal wnl , CXR unremarkable        Cytotoxic cerebral edema    Due to stroke  Evidence of mass effect and brain compression on imaging        Leukocytosis    Pancultured, broad spectrum antibiotics  Possible sepsis, Respiratory source  -Management per primary        Type 2 diabetes mellitus    - A1C 10  - hyperglycemic; started on insulin gtt   - Can consider endocrine consult when stepped down to stroke service        Hyperlipidemia    - Chol 200, LDL immeasurable,   - atorvastatin 80 mg  - repeat lipid panel          Essential hypertension    - management for primary team  - goal SBP post  thrombectomy <140  -Off cardene        Obstructive sleep apnea    Stroke risk factor               1/25: Brought in for aphasia, RSW with L gaze preferance. LKN unknown, not tPA candidate. Went to IR for angiogram and stent.  1/29: Off Cardene, remains on Insulin gtt. Concern for sepsis, respiratory source. Imaging with mass effect and some brain compression; maycol crani watch.  1/30: EEG consistent with focal L slowing 2/2 lesion and mild generalized slowing, no seizures. CT head stable, no hemorrhagic conversion    STROKE DOCUMENTATION   Acute Stroke Times   Stroke Team Called Date: 01/25/18  Stroke Team Called Time: 1852  Stroke Team Arrival Date: 01/25/18  Stroke Team Arrival Time: 0652  CT Interpretation Time: 1910  Decision to Treat Time for Alteplase:  (n/a unknown last known normal )  Decision to Treat Time for IR: 1915    NIH Scale:  1a. Level Of Consciousness: 2-->Not alert: requires repeated stimulation to attend, or is obtunded and requires strong or painful stimulation to make movements (not stereotyped)  1b. LOC Questions: 2-->Answers neither question correctly  1c. LOC Commands: 2-->Performs neither task correctly  2. Best Gaze: 1-->Partial gaze palsy: gaze is abnormal in one or both eyes, but forced deviation or total gaze paresis is not present  3. Visual: 2-->Complete hemianopia  4. Facial Palsy: 0-->Normal symmetrical movements  5a. Motor Arm, Left: 0-->No drift: limb holds 90 (or 45) degrees for full 10 secs  5b. Motor Arm, Right: 0-->No drift: limb holds 90 (or 45) degrees for full 10 secs  6a. Motor Leg, Left: 0-->No drift: leg holds 30 degree position for full 5 secs  6b. Motor Leg, Right: 0-->No drift: leg holds 30 degree position for full 5 secs  7. Limb Ataxia: 0-->Absent  8. Sensory: 0-->Normal: no sensory loss  9. Best Language: 3-->Mute, global aphasia: no usable speech or auditory comprehension  10. Dysarthria: 0-->Normal  11. Extinction and Inattention (formerly Neglect): 1-->Visual,  tactile, auditory, spatial, or personal inattention or extinction to bilateral simultaneous stimulation in one of the sensory modalities  Total (NIH Stroke Scale): 13       Modified [place]    Jeff Coma Scale:    ABCD2 Score:    [id number] Score:   HAS -BLED Score:   ICH Score:   Hunt & Laguerre Classification:      Hemorrhagic change of an Ischemic Stroke: Does this patient have an ischemic stroke with hemorrhagic changes? No     Neurologic Chief Complaint:  L M1 embolic infarction s/p thrombectomy    Subjective:     Interval History: Patient is seen for follow-up neurological assessment and treatment recommendations:     Wife says pt follows commands intermittently.     HPI, Past Medical, Family, and Social History remains the same as documented in the initial encounter.     Review of Systems   Constitutional: Positive for fever.   Neurological: Positive for weakness.   Psychiatric/Behavioral: Positive for confusion.     Scheduled Meds:   acetaminophen  650 mg Per NG tube Q6H    albuterol-ipratropium 2.5mg-0.5mg/3mL  3 mL Nebulization Q4H    aspirin  325 mg Per NG tube Daily    atorvastatin  80 mg Per NG tube Daily    cefTRIAXone (ROCEPHIN) IVPB  2 g Intravenous Q24H    clopidogrel  75 mg Per NG tube Daily    heparin (porcine)  5,000 Units Subcutaneous Q8H    senna-docusate 8.6-50 mg  1 tablet Per NG tube BID    sodium chloride 0.9%  3 mL Intravenous Q8H    sodium chloride 3%  4 mL Nebulization Q4H     Continuous Infusions:   insulin (HUMAN R) infusion (adults) 2.7 Units/hr (01/30/18 0948)    buffered 2% sodium acetate 86meq, sodium chloride 86meq, sterile water for inj IV soln 100 mL/hr at 01/30/18 0859     PRN Meds:bisacodyl, dextrose 50%, dextrose 50%, dextrose 50%, glucagon (human recombinant), hydrALAZINE, labetalol, magnesium sulfate IVPB, magnesium sulfate IVPB, ondansetron, potassium chloride 10%, potassium chloride 10%, sodium chloride 0.9%    Objective:     Vital Signs (Most Recent):  Temp:  98.7 °F (37.1 °C) (01/30/18 0700)  Pulse: 71 (01/30/18 0900)  Resp: (!) 22 (01/30/18 0900)  BP: (!) 150/72 (01/30/18 0900)  SpO2: 98 % (01/30/18 0900)  BP Location: Right leg    Vital Signs Range (Last 24H):  Temp:  [98.7 °F (37.1 °C)-100.3 °F (37.9 °C)]   Pulse:  []   Resp:  [15-43]   BP: (144-199)/()   SpO2:  [93 %-100 %]   Arterial Line BP: (117-177)/()   BP Location: Right leg    Physical Exam   Constitutional: He appears well-developed.   Cardiovascular: Normal rate and regular rhythm.  Exam reveals no friction rub.    No murmur heard.  Pulmonary/Chest: No respiratory distress. He has no wheezes.   Abdominal: Soft. He exhibits no distension. There is no tenderness.       Neurological Exam:   LOC: obtunded  Language: Mute  Articulation: Mute/Anarthric  Visual Fields: no blink to threat on right  EOM (CN III, IV, VI): L gaze preference  Pupils (CN II, III): PERRL  Sustained clonus on R  Withdraws to noxious stimulin in all 4 extremities    Laboratory:  Recent Results (from the past 24 hour(s))   POCT glucose    Collection Time: 01/29/18 11:06 AM   Result Value Ref Range    POCT Glucose 179 (H) 70 - 110 mg/dL   POCT glucose    Collection Time: 01/29/18 12:05 PM   Result Value Ref Range    POCT Glucose 178 (H) 70 - 110 mg/dL   POCT glucose    Collection Time: 01/29/18  1:32 PM   Result Value Ref Range    POCT Glucose 168 (H) 70 - 110 mg/dL   Procalcitonin    Collection Time: 01/29/18  1:44 PM   Result Value Ref Range    Procalcitonin 0.20 <0.25 ng/mL   POCT glucose    Collection Time: 01/29/18  2:36 PM   Result Value Ref Range    POCT Glucose 147 (H) 70 - 110 mg/dL   POCT glucose    Collection Time: 01/29/18  3:38 PM   Result Value Ref Range    POCT Glucose 144 (H) 70 - 110 mg/dL   POCT glucose    Collection Time: 01/29/18  4:20 PM   Result Value Ref Range    POCT Glucose 179 (H) 70 - 110 mg/dL   ISTAT PROCEDURE    Collection Time: 01/29/18  4:59 PM   Result Value Ref Range    POC PH 7.488 (H)  7.35 - 7.45    POC PCO2 27.7 (LL) 35 - 45 mmHg    POC PO2 94 80 - 100 mmHg    POC HCO3 21.0 (L) 24 - 28 mmol/L    POC BE -2 -2 to 2 mmol/L    POC SATURATED O2 98 95 - 100 %    POC TCO2 22 (L) 23 - 27 mmol/L    Sample ARTERIAL     Site [place]/C     Allens Test N/A     DelSys Nasal Can    POCT glucose    Collection Time: 01/29/18  5:08 PM   Result Value Ref Range    POCT Glucose 185 (H) 70 - 110 mg/dL   Sodium    Collection Time: 01/29/18  5:16 PM   Result Value Ref Range    Sodium 140 136 - 145 mmol/L   POCT glucose    Collection Time: 01/29/18  6:17 PM   Result Value Ref Range    POCT Glucose 163 (H) 70 - 110 mg/dL   Sodium    Collection Time: 01/29/18  6:31 PM   Result Value Ref Range    Sodium 141 136 - 145 mmol/L   POCT glucose    Collection Time: 01/29/18  7:21 PM   Result Value Ref Range    POCT Glucose 165 (H) 70 - 110 mg/dL   POCT glucose    Collection Time: 01/29/18  8:05 PM   Result Value Ref Range    POCT Glucose 187 (H) 70 - 110 mg/dL   POCT glucose    Collection Time: 01/29/18  8:56 PM   Result Value Ref Range    POCT Glucose 175 (H) 70 - 110 mg/dL   POCT glucose    Collection Time: 01/29/18 10:02 PM   Result Value Ref Range    POCT Glucose 158 (H) 70 - 110 mg/dL   POCT glucose    Collection Time: 01/29/18 10:55 PM   Result Value Ref Range    POCT Glucose 130 (H) 70 - 110 mg/dL   POCT glucose    Collection Time: 01/30/18 12:00 AM   Result Value Ref Range    POCT Glucose 140 (H) 70 - 110 mg/dL   Sodium    Collection Time: 01/30/18 12:04 AM   Result Value Ref Range    Sodium 144 136 - 145 mmol/L   POCT glucose    Collection Time: 01/30/18 12:55 AM   Result Value Ref Range    POCT Glucose 165 (H) 70 - 110 mg/dL   Comprehensive metabolic panel    Collection Time: 01/30/18  1:01 AM   Result Value Ref Range    Sodium 145 136 - 145 mmol/L    Potassium 3.4 (L) 3.5 - 5.1 mmol/L    Chloride 109 95 - 110 mmol/L    CO2 26 23 - 29 mmol/L    Glucose 168 (H) 70 - 110 mg/dL    BUN, Bld 20 6 - 20 mg/dL    Creatinine 0.8  0.5 - 1.4 mg/dL    Calcium 8.1 (L) 8.7 - 10.5 mg/dL    Total Protein 6.1 6.0 - 8.4 g/dL    Albumin 2.6 (L) 3.5 - 5.2 g/dL    Total Bilirubin 0.7 0.1 - 1.0 mg/dL    Alkaline Phosphatase 64 55 - 135 U/L    AST 27 10 - 40 U/L    ALT 26 10 - 44 U/L    Anion Gap 10 8 - 16 mmol/L    eGFR if African American >60.0 >60 mL/min/1.73 m^2    eGFR if non African American >60.0 >60 mL/min/1.73 m^2   CBC auto differential    Collection Time: 01/30/18  1:01 AM   Result Value Ref Range    WBC 8.24 3.90 - 12.70 K/uL    RBC 4.23 (L) 4.60 - 6.20 M/uL    Hemoglobin 12.6 (L) 14.0 - 18.0 g/dL    Hematocrit 36.5 (L) 40.0 - 54.0 %    MCV 86 82 - 98 fL    MCH 29.8 27.0 - 31.0 pg    MCHC 34.5 32.0 - 36.0 g/dL    RDW 12.9 11.5 - 14.5 %    Platelets 251 150 - 350 K/uL    MPV 9.4 9.2 - 12.9 fL    Immature Granulocytes 0.6 (H) 0.0 - 0.5 %    Gran # (ANC) 5.9 1.8 - 7.7 K/uL    Immature Grans (Abs) 0.05 (H) 0.00 - 0.04 K/uL    Lymph # 1.4 1.0 - 4.8 K/uL    Mono # 0.8 0.3 - 1.0 K/uL    Eos # 0.1 0.0 - 0.5 K/uL    Baso # 0.03 0.00 - 0.20 K/uL    nRBC 0 0 /100 WBC    Gran% 71.8 38.0 - 73.0 %    Lymph% 16.9 (L) 18.0 - 48.0 %    Mono% 9.5 4.0 - 15.0 %    Eosinophil% 0.8 0.0 - 8.0 %    Basophil% 0.4 0.0 - 1.9 %    Differential Method Automated    Magnesium    Collection Time: 01/30/18  1:01 AM   Result Value Ref Range    Magnesium 2.1 1.6 - 2.6 mg/dL   Phosphorus    Collection Time: 01/30/18  1:01 AM   Result Value Ref Range    Phosphorus 3.2 2.7 - 4.5 mg/dL   Protime-INR    Collection Time: 01/30/18  1:01 AM   Result Value Ref Range    Prothrombin Time 10.5 9.0 - 12.5 sec    INR 1.0 0.8 - 1.2   POCT glucose    Collection Time: 01/30/18  1:57 AM   Result Value Ref Range    POCT Glucose 159 (H) 70 - 110 mg/dL   POCT glucose    Collection Time: 01/30/18  2:58 AM   Result Value Ref Range    POCT Glucose 190 (H) 70 - 110 mg/dL   POCT glucose    Collection Time: 01/30/18  3:56 AM   Result Value Ref Range    POCT Glucose 177 (H) 70 - 110 mg/dL   POCT glucose     Collection Time: 01/30/18  4:57 AM   Result Value Ref Range    POCT Glucose 181 (H) 70 - 110 mg/dL   Sodium    Collection Time: 01/30/18  5:52 AM   Result Value Ref Range    Sodium 143 136 - 145 mmol/L   POCT glucose    Collection Time: 01/30/18  6:04 AM   Result Value Ref Range    POCT Glucose 184 (H) 70 - 110 mg/dL   ISTAT PROCEDURE    Collection Time: 01/30/18  6:31 AM   Result Value Ref Range    POC PH 7.448 7.35 - 7.45    POC PCO2 36.3 35 - 45 mmHg    POC PO2 125 (H) 80 - 100 mmHg    POC HCO3 25.1 24 - 28 mmol/L    POC BE 1 -2 to 2 mmol/L    POC SATURATED O2 99 95 - 100 %    POC TCO2 26 23 - 27 mmol/L    Rate 30     Sample ARTERIAL     Site [place]     Allens Test N/A     DelSys Nasal Can     Mode SPONT     Flow 3     FiO2 35     Sp02 98    POCT glucose    Collection Time: 01/30/18  7:07 AM   Result Value Ref Range    POCT Glucose 164 (H) 70 - 110 mg/dL   POCT glucose    Collection Time: 01/30/18  8:03 AM   Result Value Ref Range    POCT Glucose 168 (H) 70 - 110 mg/dL   POCT glucose    Collection Time: 01/30/18  9:03 AM   Result Value Ref Range    POCT Glucose 151 (H) 70 - 110 mg/dL       Microbiology Results (last 7 days)     Procedure Component Value Units Date/Time    Blood culture [[id number]] Collected:  01/27/18 1559    Order Status:  Completed Specimen:  Blood from Peripheral, Hand, Right Updated:  01/29/18 2012     Blood Culture, Routine No Growth to date     Blood Culture, Routine No Growth to date     Blood Culture, Routine No Growth to date    Narrative:       Blood cultures from 2 different sites. 4 bottles total.  Please draw before starting antibiotics.    Blood culture [[id number]] Collected:  01/27/18 1558    Order Status:  Completed Specimen:  Blood from Peripheral, Hand, Left Updated:  01/29/18 2012     Blood Culture, Routine No Growth to date     Blood Culture, Routine No Growth to date     Blood Culture, Routine No Growth to date    Narrative:       Blood cultures x 2 different sites. 4  bottles total. Please  draw cultures before administering antibiotics.    Urine culture [[id number]] Collected:  01/27/18 1621    Order Status:  Completed Specimen:  Urine from Urine, Catheterized Updated:  01/28/18 2047     Urine Culture, Routine No growth            Diagnostic Results     Brain Imaging   1/27/18 CTH: Per independent resident review and interpretation,  L MCA infarction s/p stenting.  No sign of hemorrhagic transformation.  R basal ganglia and cerebellar infarct.          1/26/18 MRI Brain w/o: Per independent resident review and interpretation,  Acute L MCA infarction.        Vessel Imaging   1/25/18 IR Angiogram: Per independent resident review and interpretation,  L M1 occlusion.        Cardiac Imaging   1/26/18 EKG: Per independent resident review and interpretation,  Sinus tachycardia.  .  QTc 500.    1/26/18 TTE: Per report,  LA wnl  EF 65-70%  No regional wall motion abnormalities  Diastolic function normal        Shagufta Arce MD  Comprehensive Stroke Center  Department of Vascular Neurology   [facility]

## 2018-01-30 NOTE — ASSESSMENT & PLAN NOTE
47 y.o. male with history of HTN and DM who presented to [facility] by EMS for concern of L MCA syndrome 1/25/18 AM. He is s/p thrombectomy of L M1 occlusion. CT MP revealed short segment occlusion of left M1.  Patient went to IR for thrombectomy of L M1.  TICI2C reperfusion and angioplasty. Neurosurgery consulted for hemicrani watch. Patient currently on asa and plavix.    -No neurosurgical intervention at this time.  -Maximum medical management per NCC.  -Goal Na 145-155  -Neuro checks hourly  -Please notify NSR if worsening neuro exam   -We will continue to monitor closely, please contact us with any questions or concerns.

## 2018-01-30 NOTE — ASSESSMENT & PLAN NOTE
Problem  Inadequate oral intake    Related to (etiology):   Inability to consume sufficient energy    Signs and Symptoms (as evidenced by):   TF meeting <85% EEN/EPN     Interventions/Recommendations (treatment strategy):  See recs above    Nutrition Diagnosis Status:   Continues

## 2018-01-30 NOTE — PROGRESS NOTES
[facility]  Neurosurgery  Progress Note    Subjective:     History of Present Illness: Todd Quevedo is 47 y.o. male with history of HTN and DM [place] [facility] [place] concern of L MCA syndrome 1/25/18 AM. He is s/p thrombectomy of L M1 occlusion. CT MP revealed short segment occlusion of left M1.  Patient went to IR for thrombectomy of L M1.  TICI2C reperfusion and angioplasty. Neurosurgery consulted for hemicrani watch. Wife was at bedside during exam. Patient not intubated, lethargic, and moving left side spontaneously. He is currently on plavix daily.     Post-Op Info:  * No surgery found *         Interval History: Exam stable. Pt on CPAP and cEEG per primary team.     Medications:  Continuous Infusions:   insulin (HUMAN R) infusion (adults) 2.7 Units/hr (01/30/18 1100)    buffered 2% sodium acetate 86meq, sodium chloride 86meq, sterile water for inj IV soln 100 mL/hr at 01/30/18 1100     Scheduled Meds:   acetaminophen  650 mg Per NG tube Q6H    albuterol-ipratropium 2.5mg-0.5mg/3mL  3 mL Nebulization Q4H    aspirin  325 mg Per NG tube Daily    atorvastatin  80 mg Per NG tube Daily    cefTRIAXone (ROCEPHIN) IVPB  2 g Intravenous Q24H    clopidogrel  75 mg Per NG tube Daily    heparin (porcine)  5,000 Units Subcutaneous Q8H    senna-docusate 8.6-50 mg  1 tablet Per NG tube BID    sodium chloride 0.9%  3 mL Intravenous Q8H    sodium chloride 3%  4 mL Nebulization Q4H     PRN Meds:bisacodyl, dextrose 50%, dextrose 50%, dextrose 50%, glucagon (human recombinant), hydrALAZINE, labetalol, magnesium sulfate IVPB, magnesium sulfate IVPB, ondansetron, potassium chloride 10%, potassium chloride 10%, sodium chloride 0.9%     Review of Systems    Objective:     Weight: 116.3 kg (256 lb 6.3 oz)  Body mass index is 36.79 kg/m².  Vital Signs (Most Recent):  Temp: 99 °F (37.2 °C) (01/30/18 1100)  Pulse: 88 (01/30/18 1221)  Resp: (!) 30 (01/30/18 1221)  BP: (!) 142/113 (01/30/18  1100)  SpO2: 99 % (01/30/18 1221) Vital Signs (24h Range):  Temp:  [98.7 °F (37.1 °C)-100.3 °F (37.9 °C)] 99 °F (37.2 °C)  Pulse:  [] 88  Resp:  [15-43] 30  SpO2:  [93 %-100 %] 99 %  BP: (142-199)/() 142/113  Arterial Line BP: (117-239)/() 239/237       Date 01/30/18 0700 - 01/31/18 0659   Shift 3883-6713 2152-5834 3078-2442 24 Hour Total   I  N  T  A  K  E   I.V.  (mL/kg) 359.9  (3.1)   359.9  (3.1)    Shift Total  (mL/kg) 359.9  (3.1)   359.9  (3.1)   O  U  T  P  U  T   Urine  (mL/kg/hr) 700   700    Shift Total  (mL/kg) 700  (6)   700  (6)   Weight (kg) 116.3 116.3 116.3 116.3     Interval History: Exam stable. Pt on CPAP and cEEG per primary team.     Medications:  Continuous Infusions:   insulin (HUMAN R) infusion (adults) 2.7 Units/hr (01/30/18 1100)    buffered 2% sodium acetate 86meq, sodium chloride 86meq, sterile water for inj IV soln 100 mL/hr at 01/30/18 1100     Scheduled Meds:   acetaminophen  650 mg Per NG tube Q6H    albuterol-ipratropium 2.5mg-0.5mg/3mL  3 mL Nebulization Q4H    aspirin  325 mg Per NG tube Daily    atorvastatin  80 mg Per NG tube Daily    cefTRIAXone (ROCEPHIN) IVPB  2 g Intravenous Q24H    clopidogrel  75 mg Per NG tube Daily    heparin (porcine)  5,000 Units Subcutaneous Q8H    senna-docusate 8.6-50 mg  1 tablet Per NG tube BID    sodium chloride 0.9%  3 mL Intravenous Q8H    sodium chloride 3%  4 mL Nebulization Q4H     PRN Meds:bisacodyl, dextrose 50%, dextrose 50%, dextrose 50%, glucagon (human recombinant), hydrALAZINE, labetalol, magnesium sulfate IVPB, magnesium sulfate IVPB, ondansetron, potassium chloride 10%, potassium chloride 10%, sodium chloride 0.9%     Review of Systems    Objective:     Weight: 116.3 kg (256 lb 6.3 oz)  Body mass index is 36.79 kg/m².  Vital Signs (Most Recent):  Temp: 99 °F (37.2 °C) (01/30/18 1100)  Pulse: 88 (01/30/18 1221)  Resp: (!) 30 (01/30/18 1221)  BP: (!) 142/113 (01/30/18 1100)  SpO2: 99 % (01/30/18 1221)  Vital Signs (24h Range):  Temp:  [98.7 °F (37.1 °C)-100.3 °F (37.9 °C)] 99 °F (37.2 °C)  Pulse:  [] 88  Resp:  [15-43] 30  SpO2:  [93 %-100 %] 99 %  BP: (142-199)/() 142/113  Arterial Line BP: (117-239)/() 239/237       Date 01/30/18 0700 - 01/31/18 0659   Shift 0635-2729 9626-1687 1994-5175 24 Hour Total   I  N  T  A  K  E   I.V.  (mL/kg) 359.9  (3.1)   359.9  (3.1)    Shift Total  (mL/kg) 359.9  (3.1)   359.9  (3.1)   O  U  T  P  U  T   Urine  (mL/kg/hr) 700   700    Shift Total  (mL/kg) 700  (6)   700  (6)   Weight (kg) 116.3 116.3 116.3 116.3            NG/OG Tube 01/25/18 2325 nasogastric Right nostril (Active)   Placement Check placement verified by x-ray 1/27/2018  7:01 AM   Tolerance no signs/symptoms of discomfort 1/27/2018  7:01 AM   Securement taped to nostril center 1/27/2018  7:01 AM   Clamp Status/Tolerance clamped 1/27/2018  7:01 AM   Intake (mL) 60 mL 1/26/2018 11:00 PM       Male External Urinary Catheter 01/25/18 2200 Small (Active)   Collection Container Urimeter 1/27/2018  7:01 AM   Securement Method secured to top of thigh w/ adhesive device 1/27/2018  7:01 AM   Skin no redness;no breakdown 1/27/2018  7:01 AM   Tolerance no signs/symptoms of discomfort 1/27/2018  7:01 AM   Output (mL) 400 mL 1/27/2018 10:00 AM   Catheter Change Date 01/26/18 1/27/2018  3:00 AM   Catheter Change Time 0300 1/27/2018  3:00 AM       Neurosurgery Physical Exam      Vital signs: reviewed above.   Constitutional: well-developed,   Cardiovascular: regular rate and rhythm  Resp: on CPAP  Abdomen: soft  Neurological  GCS E2V1M5  Mental Status: lethargic  non verbal on exam, does not follow commands  Head: normocephalic, atraumatic  PERRL  Facial expression symmetric  Moves left extremities spontaneously, right side withdraws to painful stimuli    Significant Labs:    Recent Labs  Lab 01/29/18  0232  01/30/18  0004 01/30/18  0101 01/30/18  0552   *  --   --  168*  --      < > 144 145 143   K  3.8  --   --  3.4*  --      --   --  109  --    CO2 21*  --   --  26  --    BUN 20  --   --  20  --    CREATININE 0.8  --   --  0.8  --    CALCIUM 8.3*  --   --  8.1*  --    MG 2.3  --   --  2.1  --    < > = values in this interval not displayed.    Recent Labs  Lab 01/29/18  0232 01/30/18  0101   WBC 13.19* 8.24   HGB 14.4 12.6*   HCT 41.3 36.5*    251       Recent Labs  Lab 01/29/18  0232 01/30/18  0101   INR 0.9 1.0     Microbiology Results (last 7 days)     Procedure Component Value Units Date/Time    Blood culture [[id number]] Collected:  01/27/18 1559    Order Status:  Completed Specimen:  Blood from Peripheral, Hand, Right Updated:  01/29/18 2012     Blood Culture, Routine No Growth to date     Blood Culture, Routine No Growth to date     Blood Culture, Routine No Growth to date    Narrative:       Blood cultures from 2 different sites. 4 bottles total.  Please draw before starting antibiotics.    Blood culture [[id number]] Collected:  01/27/18 1558    Order Status:  Completed Specimen:  Blood from Peripheral, Hand, Left Updated:  01/29/18 2012     Blood Culture, Routine No Growth to date     Blood Culture, Routine No Growth to date     Blood Culture, Routine No Growth to date    Narrative:       Blood cultures x 2 different sites. 4 bottles total. Please  draw cultures before administering antibiotics.    Urine culture [[id number]] Collected:  01/27/18 1621    Order Status:  Completed Specimen:  Urine from Urine, Catheterized Updated:  01/28/18 2047     Urine Culture, Routine No growth        All pertinent labs from the last 24 hours have been reviewed.    Significant Diagnostics:  [facility] reviewed       NG/OG Tube 01/25/18 0705 nasogastric Right nostril (Active)   Placement Check placement verified by x-ray 1/27/2018  7:01 AM   Tolerance no signs/symptoms of discomfort 1/27/2018  7:01 AM   Securement taped to nostril center 1/27/2018  7:01 AM   Clamp Status/Tolerance clamped 1/27/2018  7:01 AM    Intake (mL) 60 mL 1/26/2018 11:00 PM       Male External Urinary Catheter 01/25/18 2200 Small (Active)   Collection Container Urimeter 1/27/2018  7:01 AM   Securement Method secured to top of thigh w/ adhesive device 1/27/2018  7:01 AM   Skin no redness;no breakdown 1/27/2018  7:01 AM   Tolerance no signs/symptoms of discomfort 1/27/2018  7:01 AM   Output (mL) 400 mL 1/27/2018 10:00 AM   Catheter Change Date 01/26/18 1/27/2018  3:00 AM   Catheter Change Time 0300 1/27/2018  3:00 AM       Neurosurgery Physical Exam      Vital signs: reviewed above.   Constitutional: well-developed,   Cardiovascular: regular rate and rhythm  Resp: on CPAP  Abdomen: soft  Neurological  GCS E2V1M5  Mental Status: lethargic  non verbal on exam, does not follow commands  Head: normocephalic, atraumatic  PERRL  Facial expression symmetric  Moves left extremities spontaneously, right side withdraws to painful stimuli    Significant Labs:    Recent Labs  Lab 01/29/18 0232 01/30/18  0004 01/30/18  0101 01/30/18  0552   *  --   --  168*  --      < > 144 145 143   K 3.8  --   --  3.4*  --      --   --  109  --    CO2 21*  --   --  26  --    BUN 20  --   --  20  --    CREATININE 0.8  --   --  0.8  --    CALCIUM 8.3*  --   --  8.1*  --    MG 2.3  --   --  2.1  --    < > = values in this interval not displayed.    Recent Labs  Lab 01/29/18 0232 01/30/18 0101   WBC 13.19* 8.24   HGB 14.4 12.6*   HCT 41.3 36.5*    251       Recent Labs  Lab 01/29/18 0232 01/30/18  0101   INR 0.9 1.0     Microbiology Results (last 7 days)     Procedure Component Value Units Date/Time    Blood culture [[id number]] Collected:  01/27/18 1557    Order Status:  Completed Specimen:  Blood from Peripheral, Hand, Right Updated:  01/29/18 2012     Blood Culture, Routine No Growth to date     Blood Culture, Routine No Growth to date     Blood Culture, Routine No Growth to date    Narrative:       Blood cultures from 2 different sites. 4 bottles  total.  Please draw before starting antibiotics.    Blood culture [[id number]] Collected:  01/27/18 1558    Order Status:  Completed Specimen:  Blood from Peripheral, Hand, Left Updated:  01/29/18 2012     Blood Culture, Routine No Growth to date     Blood Culture, Routine No Growth to date     Blood Culture, Routine No Growth to date    Narrative:       Blood cultures x 2 different sites. 4 bottles total. Please  draw cultures before administering antibiotics.    Urine culture [[id number]] Collected:  01/27/18 1621    Order Status:  Completed Specimen:  Urine from Urine, Catheterized Updated:  01/28/18 2047     Urine Culture, Routine No growth        All pertinent labs from the last 24 hours have been reviewed.    Significant Diagnostics:  [facility] reviewed    Assessment/Plan:     * Embolic stroke involving left middle cerebral artery    47 y.o. male with history of HTN and DM who presented to [facility] by EMS for concern of L MCA syndrome 1/25/18 AM. He is s/p thrombectomy of L M1 occlusion. CT MP revealed short segment occlusion of left M1.  Patient went to IR for thrombectomy of L M1.  TICI2C reperfusion and angioplasty. Neurosurgery consulted for hemicrani watch. Patient currently on asa and plavix.    -No neurosurgical intervention at this time.  -Maximum medical management per NCC.  -Goal Na 145-155  -Neuro checks hourly  -Please notify NSR if worsening neuro exam   -We will continue to monitor closely, please contact us with any questions or concerns.                   Raymundo Gan MD  Neurosurgery  [facility]

## 2018-01-30 NOTE — PLAN OF CARE
Problem: Pressure Ulcer Risk (Champ Scale) (Adult,Obstetrics,Pediatric)  Intervention: Promote/Optimize Nutrition  Recommendations     1. Recommend change of TF formula to Glucerna 1.5 starting @ 10 ml/hr, increasing 10 ml/hr q4hrs until rate rate of 70mL/hr reached to provide 2520 kcals, 129g pro, and 1184 ml free fluids.               - Hold for residuals >500 ml.   2. RD to monitor and follow-up.    Assessment and Plan         * Embolic stroke involving left middle cerebral artery     Problem  Inadequate oral intake     Related to (etiology):   Inability to consume sufficient energy     Signs and Symptoms (as evidenced by):   NPO with no alternative means of nutrition     Interventions/Recommendations (treatment strategy):  See recs above     Nutrition Diagnosis Status:   New

## 2018-01-30 NOTE — PT/OT/SLP PROGRESS
"Speech Language Pathology Treatment    Patient Name:  Todd Quevedo   MRN:  [id number]  Admitting Diagnosis: Embolic stroke involving left middle cerebral artery    Recommendations:                 Recommendations:                General Recommendations:  Dysphagia therapy, Speech/language therapy and Cognitive-linguistic therapy  Diet recommendations:  NPO, NPO   Aspiration Precautions: Strict aspiration precautions   General Precautions: Standard, aphasia, aspiration, fall, NPO, respiratory  Communication strategies:  yes/no questions only, provide increased time to answer and go to room if call light pushed    Subjective     "His scan was stable and he's doing a little more so I feel better" spouse      Pain/Comfort:  · Pain Rating 1: 0/10  · Pain Rating Post-Intervention 1: 0/10    Objective:     Has the patient been evaluated by SLP for swallowing?   Yes  Keep patient NPO? Yes   Current Respiratory Status: nasal cannula      Pt remains somnolent with spouse at bedside.  Pt minimally opened eyes x1 given max stim including wash cloth to face and brisk sternal rub.  He did not follow commands or answer y/n questions via any modality.  Oral care provided 2/2 thick secretions build-up.  No spontaneous swallow elicited.  PO noted attempted 2/2 poor alertness and loud/noisy mouth breathing.  Education provided to pt;s wife re: continued SLP POC and appropriate stim.  She verbalized understanding         Assessment:     Todd Quevedo is a 47 y.o. male with an SLP diagnosis of Aphasia, Dysphagia and Cognitive-Linguistic Impairment.      Goals:    SLP Goals        Problem: SLP Goal    Goal Priority Disciplines Outcome   SLP Goal     SLP Ongoing (interventions implemented as appropriate)   Description:  Speech Language Pathology Goals  Goals expected to be met by 2/2  1. Pt will participate in ongoing assessment of swallow to determine least restrictive diet as appropriate.  2. Pt will complete speech, language, cognitive " evaluation to further determine short term goals as appropriate. -met  Additional goals:  2. Pt will open eyes to stim x5/session given mod cues.  3. Pt will follow simple commands x2/session given max cues.  4. Pt will answer basic y/n question via any modality x2/session given max cues.  5. Pt will vocalize in response to any stim x2/session given max cues.                          Plan:     · Patient to be seen:  4 x/week   · Plan of Care expires:  02/25/18  · Plan of Care reviewed with:  spouse, patient   · SLP Follow-Up:  Yes       Discharge recommendations:   (pending progress/improved alertness)   Barriers to Discharge:  Level of Skilled Assistance Needed      Time Tracking:     SLP Treatment Date:   01/30/18  Speech Start Time:  0814  Speech Stop Time:  0823     Speech Total Time (min):  9 min    Billable Minutes: Speech Therapy Individual 9    EDWARD Key, CCC-SLP  01/30/2018

## 2018-01-30 NOTE — PLAN OF CARE
Problem: Patient Care Overview  Goal: Plan of Care Review  Outcome: Ongoing (interventions implemented as appropriate)  POC reviewed with pt at 0500. Pt unable to verbalized understanding due to diagnosis. Spouse verbalized understanding. Questions and concerns addressed. No acute events overnight. Pt progressing toward goals. Will continue to monitor. See flowsheets for full assessment and VS info

## 2018-01-30 NOTE — PROCEDURES
ICU EEG/VIDEO MONITORING REPORT    Todd Queveod  [id number]  1970    DATE OF SERVICE: 1/29-30/18    DATE OF ADMISSION: 1/25/2018  7:02 PM    ADMITTING PROVIDER: Augustine Hollins MD    REASON FOR CONSULT: 48yo M with no PMH, admitted with L MCA stroke    MEDICATIONS:   Current Facility-Administered Medications   Medication    acetaminophen tablet 650 mg    albuterol-ipratropium 2.5mg-0.5mg/3mL nebulizer solution 3 mL    aspirin tablet 325 mg    atorvastatin tablet 80 mg    bisacodyl suppository 10 mg    cefTRIAXone (ROCEPHIN) 2 g in dextrose 5 % 50 mL IVPB    clopidogrel tablet 75 mg    dextrose 50% injection 12.5 g    dextrose 50% injection 12.5 g    dextrose 50% injection 25 g    glucagon (human recombinant) injection 1 mg    heparin (porcine) injection 5,000 Units    hydrALAZINE injection 10 mg    insulin regular (Humulin R) 100 Units in sodium chloride 0.9% 100 mL infusion    labetalol injection 10 mg    magnesium sulfate 2g in water 50mL IVPB (premix)    magnesium sulfate 2g in water 50mL IVPB (premix)    ondansetron injection 4 mg    potassium chloride 10% solution 40 mEq    potassium chloride 10% solution 40 mEq    senna-docusate 8.6-50 mg per tablet 1 tablet    sodium acetate 86 mEq, sodium chloride (23.4%) 4 mEq/mL 86 mEq in sterile water 500 mL (buffered sodium 2%) infusion    sodium chloride 0.9% flush 3 mL    sodium chloride 0.9% flush 5 mL    sodium chloride 3% nebulizer solution 4 mL     METHODOLOGY   Electroencephalographic (EEG) recording is with electrodes placed according to the International 10-20 placement system.  Thirty two (32) channels of digital signal are simultaneously recorded from the scalp and may include EKG, EMG, and/or eye monitors.   Recording band pass was 0.1 to 512 hz.  Digital video recording of the patient is simultaneously recorded with the EEG.  The nursing staff report clinical symptoms and may press an event button when the patient has symptoms  of clinical interest to the treating physicians.  EEG and video recording is stored and archived in digital format.  The entire recording is visually reviewed and the times identified by computer analysis as being spikes or seizures are reviewed again.  Activation procedures which include photic stimulation, hyperventilation and instructing patients to perform simple task are done in selected patients.   Compresses spectral analysis (CSA) is also performed on the activity recorded from each individual channel.  This is displayed as a power display of frequencies from 0 to 30 Hz over time.   The CSA analysis is done and displayed continuously.  This is reviewed for asymmetries in power between homologous areas of the scalp and for presence of changes in power which canbe seen when seizures occur.  Sections of suspected abnormalities on the CSA is then compared with the original EEG recording.     Z[employer] software was also utilized in the review of this study.  This software suite analyzes the EEG recording in multiple domains.  Coherence and rhythmicity is computed to identify EEG sections which may contain organized seizures.  Each channel undergoes analysis to detect presence of spike and sharp waves which have special and morphological characteristic of epileptic activity.  The routine EEG recording is converted from spacial into frequency domain.  This is then displayed comparing homologous areas to identify areas of significant asymmetry.  Algorithm to identify non-cortically generated artifact is used to separate eye movement, EMG and other artifact from the EEG.      Recording Times  Start on 1/29/18, 16:32  Stop on 1/30/18, 09:21    A total of 16 hours and 46 minutes of EEG was recorded.    EEG FINDINGS  Background activity:   The background rhythm was characterized by theta-alpha (7-8 Hz) activity with a 8 Hz posterior dominant alpha rhythm at 30-70 microvolts, seen on the right.   Symmetry and continuity:   the background was continuous; asymmetry with left sided delta > theta (2-5 Hz) slowing seen throughout.  Latter part of the study has bilateral electrode artifacts limiting interpretation.    Sleep:   Not seen.    Activation procedures:   Not done    Abnormal activity:   No epileptiform discharges, periodic discharges, lateralized rhythmic delta activity or electrographic seizures were seen.    IMPRESSION:   This is an abnormal C-EEG due to:  1) focal left sided slowing, suggestive of underlying structural lesion  2) mild generalized slowing, suggestive of mild diffuse or multifocal cerebral dysfunction.    No epileptiform activity or electrographic seizures seen.    CLINICAL CORRELATION IS RECOMMENDED.    Millicent Ann MD, MADDY(), FACNS.  Neurology-Epilepsy.

## 2018-01-30 NOTE — SUBJECTIVE & OBJECTIVE
Neurologic Chief Complaint:  L M1 embolic infarction s/p thrombectomy    Subjective:     Interval History: Patient is seen for follow-up neurological assessment and treatment recommendations:     Wife says pt follows commands intermittently.     HPI, Past Medical, Family, and Social History remains the same as documented in the initial encounter.     Review of Systems   Constitutional: Positive for fever.   Neurological: Positive for weakness.   Psychiatric/Behavioral: Positive for confusion.     Scheduled Meds:   acetaminophen  650 mg Per NG tube Q6H    albuterol-ipratropium 2.5mg-0.5mg/3mL  3 mL Nebulization Q4H    aspirin  325 mg Per NG tube Daily    atorvastatin  80 mg Per NG tube Daily    cefTRIAXone (ROCEPHIN) IVPB  2 g Intravenous Q24H    clopidogrel  75 mg Per NG tube Daily    heparin (porcine)  5,000 Units Subcutaneous Q8H    senna-docusate 8.6-50 mg  1 tablet Per NG tube BID    sodium chloride 0.9%  3 mL Intravenous Q8H    sodium chloride 3%  4 mL Nebulization Q4H     Continuous Infusions:   insulin (HUMAN R) infusion (adults) 2.7 Units/hr (01/30/18 0948)    buffered 2% sodium acetate 86meq, sodium chloride 86meq, sterile water for inj IV soln 100 mL/hr at 01/30/18 0859     PRN Meds:bisacodyl, dextrose 50%, dextrose 50%, dextrose 50%, glucagon (human recombinant), hydrALAZINE, labetalol, magnesium sulfate IVPB, magnesium sulfate IVPB, ondansetron, potassium chloride 10%, potassium chloride 10%, sodium chloride 0.9%    Objective:     Vital Signs (Most Recent):  Temp: 98.7 °F (37.1 °C) (01/30/18 0700)  Pulse: 71 (01/30/18 0900)  Resp: (!) 22 (01/30/18 0900)  BP: (!) 150/72 (01/30/18 0900)  SpO2: 98 % (01/30/18 0900)  BP Location: Right leg    Vital Signs Range (Last 24H):  Temp:  [98.7 °F (37.1 °C)-100.3 °F (37.9 °C)]   Pulse:  []   Resp:  [15-43]   BP: (144-199)/()   SpO2:  [93 %-100 %]   Arterial Line BP: (117-177)/()   BP Location: Right leg    Physical Exam   Constitutional:  He appears well-developed.   Cardiovascular: Normal rate and regular rhythm.  Exam reveals no friction rub.    No murmur heard.  Pulmonary/Chest: No respiratory distress. He has no wheezes.   Abdominal: Soft. He exhibits no distension. There is no tenderness.       Neurological Exam:   LOC: obtunded  Language: Mute  Articulation: Mute/Anarthric  Visual Fields: no blink to threat on right  EOM (CN III, IV, VI): L gaze preference  Pupils (CN II, III): PERRL  Sustained clonus on R  Withdraws to noxious stimulin in all 4 extremities    Laboratory:  Recent Results (from the past 24 hour(s))   POCT glucose    Collection Time: 01/29/18 11:06 AM   Result Value Ref Range    POCT Glucose 179 (H) 70 - 110 mg/dL   POCT glucose    Collection Time: 01/29/18 12:05 PM   Result Value Ref Range    POCT Glucose 178 (H) 70 - 110 mg/dL   POCT glucose    Collection Time: 01/29/18  1:32 PM   Result Value Ref Range    POCT Glucose 168 (H) 70 - 110 mg/dL   Procalcitonin    Collection Time: 01/29/18  1:44 PM   Result Value Ref Range    Procalcitonin 0.20 <0.25 ng/mL   POCT glucose    Collection Time: 01/29/18  2:36 PM   Result Value Ref Range    POCT Glucose 147 (H) 70 - 110 mg/dL   POCT glucose    Collection Time: 01/29/18  3:38 PM   Result Value Ref Range    POCT Glucose 144 (H) 70 - 110 mg/dL   POCT glucose    Collection Time: 01/29/18  4:20 PM   Result Value Ref Range    POCT Glucose 179 (H) 70 - 110 mg/dL   ISTAT PROCEDURE    Collection Time: 01/29/18  4:59 PM   Result Value Ref Range    POC PH 7.488 (H) 7.35 - 7.45    POC PCO2 27.7 (LL) 35 - 45 mmHg    POC PO2 94 80 - 100 mmHg    POC HCO3 21.0 (L) 24 - 28 mmol/L    POC BE -2 -2 to 2 mmol/L    POC SATURATED O2 98 95 - 100 %    POC TCO2 22 (L) 23 - 27 mmol/L    Sample ARTERIAL     Site Brianne/UAC     Allens Test N/A     DelSys Nasal Can    POCT glucose    Collection Time: 01/29/18  5:08 PM   Result Value Ref Range    POCT Glucose 185 (H) 70 - 110 mg/dL   Sodium    Collection Time:  01/29/18  5:16 PM   Result Value Ref Range    Sodium 140 136 - 145 mmol/L   POCT glucose    Collection Time: 01/29/18  6:17 PM   Result Value Ref Range    POCT Glucose 163 (H) 70 - 110 mg/dL   Sodium    Collection Time: 01/29/18  6:31 PM   Result Value Ref Range    Sodium 141 136 - 145 mmol/L   POCT glucose    Collection Time: 01/29/18  7:21 PM   Result Value Ref Range    POCT Glucose 165 (H) 70 - 110 mg/dL   POCT glucose    Collection Time: 01/29/18  8:05 PM   Result Value Ref Range    POCT Glucose 187 (H) 70 - 110 mg/dL   POCT glucose    Collection Time: 01/29/18  8:56 PM   Result Value Ref Range    POCT Glucose 175 (H) 70 - 110 mg/dL   POCT glucose    Collection Time: 01/29/18 10:02 PM   Result Value Ref Range    POCT Glucose 158 (H) 70 - 110 mg/dL   POCT glucose    Collection Time: 01/29/18 10:55 PM   Result Value Ref Range    POCT Glucose 130 (H) 70 - 110 mg/dL   POCT glucose    Collection Time: 01/30/18 12:00 AM   Result Value Ref Range    POCT Glucose 140 (H) 70 - 110 mg/dL   Sodium    Collection Time: 01/30/18 12:04 AM   Result Value Ref Range    Sodium 144 136 - 145 mmol/L   POCT glucose    Collection Time: 01/30/18 12:55 AM   Result Value Ref Range    POCT Glucose 165 (H) 70 - 110 mg/dL   Comprehensive metabolic panel    Collection Time: 01/30/18  1:01 AM   Result Value Ref Range    Sodium 145 136 - 145 mmol/L    Potassium 3.4 (L) 3.5 - 5.1 mmol/L    Chloride 109 95 - 110 mmol/L    CO2 26 23 - 29 mmol/L    Glucose 168 (H) 70 - 110 mg/dL    BUN, Bld 20 6 - 20 mg/dL    Creatinine 0.8 0.5 - 1.4 mg/dL    Calcium 8.1 (L) 8.7 - 10.5 mg/dL    Total Protein 6.1 6.0 - 8.4 g/dL    Albumin 2.6 (L) 3.5 - 5.2 g/dL    Total Bilirubin 0.7 0.1 - 1.0 mg/dL    Alkaline Phosphatase 64 55 - 135 U/L    AST 27 10 - 40 U/L    ALT 26 10 - 44 U/L    Anion Gap 10 8 - 16 mmol/L    eGFR if African American >60.0 >60 mL/min/1.73 m^2    eGFR if non African American >60.0 >60 mL/min/1.73 m^2   CBC auto differential    Collection Time:  01/30/18  1:01 AM   Result Value Ref Range    WBC 8.24 3.90 - 12.70 K/uL    RBC 4.23 (L) 4.60 - 6.20 M/uL    Hemoglobin 12.6 (L) 14.0 - 18.0 g/dL    Hematocrit 36.5 (L) 40.0 - 54.0 %    MCV 86 82 - 98 fL    MCH 29.8 27.0 - 31.0 pg    MCHC 34.5 32.0 - 36.0 g/dL    RDW 12.9 11.5 - 14.5 %    Platelets 251 150 - 350 K/uL    MPV 9.4 9.2 - 12.9 fL    Immature Granulocytes 0.6 (H) 0.0 - 0.5 %    Gran # (ANC) 5.9 1.8 - 7.7 K/uL    Immature Grans (Abs) 0.05 (H) 0.00 - 0.04 K/uL    Lymph # 1.4 1.0 - 4.8 K/uL    Mono # 0.8 0.3 - 1.0 K/uL    Eos # 0.1 0.0 - 0.5 K/uL    Baso # 0.03 0.00 - 0.20 K/uL    nRBC 0 0 /100 WBC    Gran% 71.8 38.0 - 73.0 %    Lymph% 16.9 (L) 18.0 - 48.0 %    Mono% 9.5 4.0 - 15.0 %    Eosinophil% 0.8 0.0 - 8.0 %    Basophil% 0.4 0.0 - 1.9 %    Differential Method Automated    Magnesium    Collection Time: 01/30/18  1:01 AM   Result Value Ref Range    Magnesium 2.1 1.6 - 2.6 mg/dL   Phosphorus    Collection Time: 01/30/18  1:01 AM   Result Value Ref Range    Phosphorus 3.2 2.7 - 4.5 mg/dL   Protime-INR    Collection Time: 01/30/18  1:01 AM   Result Value Ref Range    Prothrombin Time 10.5 9.0 - 12.5 sec    INR 1.0 0.8 - 1.2   POCT glucose    Collection Time: 01/30/18  1:57 AM   Result Value Ref Range    POCT Glucose 159 (H) 70 - 110 mg/dL   POCT glucose    Collection Time: 01/30/18  2:58 AM   Result Value Ref Range    POCT Glucose 190 (H) 70 - 110 mg/dL   POCT glucose    Collection Time: 01/30/18  3:56 AM   Result Value Ref Range    POCT Glucose 177 (H) 70 - 110 mg/dL   POCT glucose    Collection Time: 01/30/18  4:57 AM   Result Value Ref Range    POCT Glucose 181 (H) 70 - 110 mg/dL   Sodium    Collection Time: 01/30/18  5:52 AM   Result Value Ref Range    Sodium 143 136 - 145 mmol/L   POCT glucose    Collection Time: 01/30/18  6:04 AM   Result Value Ref Range    POCT Glucose 184 (H) 70 - 110 mg/dL   ISTAT PROCEDURE    Collection Time: 01/30/18  6:31 AM   Result Value Ref Range    POC PH 7.448 7.35 - 7.45     POC PCO2 36.3 35 - 45 mmHg    POC PO2 125 (H) 80 - 100 mmHg    POC HCO3 25.1 24 - 28 mmol/L    POC BE 1 -2 to 2 mmol/L    POC SATURATED O2 99 95 - 100 %    POC TCO2 26 23 - 27 mmol/L    Rate 30     Sample ARTERIAL     Site Brianne/UAC     Allens Test N/A     DelSys Nasal Can     Mode SPONT     Flow 3     FiO2 35     Sp02 98    POCT glucose    Collection Time: 01/30/18  7:07 AM   Result Value Ref Range    POCT Glucose 164 (H) 70 - 110 mg/dL   POCT glucose    Collection Time: 01/30/18  8:03 AM   Result Value Ref Range    POCT Glucose 168 (H) 70 - 110 mg/dL   POCT glucose    Collection Time: 01/30/18  9:03 AM   Result Value Ref Range    POCT Glucose 151 (H) 70 - 110 mg/dL       Microbiology Results (last 7 days)     Procedure Component Value Units Date/Time    Blood culture [[id number]] Collected:  01/27/18 1559    Order Status:  Completed Specimen:  Blood from Peripheral, Hand, Right Updated:  01/29/18 2012     Blood Culture, Routine No Growth to date     Blood Culture, Routine No Growth to date     Blood Culture, Routine No Growth to date    Narrative:       Blood cultures from 2 different sites. 4 bottles total.  Please draw before starting antibiotics.    Blood culture [[id number]] Collected:  01/27/18 1558    Order Status:  Completed Specimen:  Blood from Peripheral, Hand, Left Updated:  01/29/18 2012     Blood Culture, Routine No Growth to date     Blood Culture, Routine No Growth to date     Blood Culture, Routine No Growth to date    Narrative:       Blood cultures x 2 different sites. 4 bottles total. Please  draw cultures before administering antibiotics.    Urine culture [[id number]] Collected:  01/27/18 1621    Order Status:  Completed Specimen:  Urine from Urine, Catheterized Updated:  01/28/18 2047     Urine Culture, Routine No growth            Diagnostic Results     Brain Imaging   1/27/18 CTH: Per independent resident review and interpretation,  L MCA infarction s/p stenting.  No sign of hemorrhagic  transformation.  R basal ganglia and cerebellar infarct.          1/26/18 MRI Brain w/o: Per independent resident review and interpretation,  Acute L MCA infarction.        Vessel Imaging   1/25/18 IR Angiogram: Per independent resident review and interpretation,  L M1 occlusion.        Cardiac Imaging   1/26/18 EKG: Per independent resident review and interpretation,  Sinus tachycardia.  .  QTc 500.    1/26/18 TTE: Per report,  LA wnl  EF 65-70%  No regional wall motion abnormalities  Diastolic function normal

## 2018-01-30 NOTE — PT/OT/SLP PROGRESS
Occupational Therapy   Treatment    Name: Todd Quevedo  MRN: [id number]  Admitting Diagnosis:  Embolic stroke involving left middle cerebral artery       Recommendations:     Discharge Recommendations:  (pending progress/level of alertness)  Discharge Equipment Recommendations:   (TBD)  Barriers to discharge:   (Increased assist required at this time)    Subjective     Communicated with: RN prior to session.  Pain/Comfort:  · Pain Rating 1: 0/10  · Pain Rating Post-Intervention 1: 0/10    Patients cultural, spiritual, Jainism conflicts given the current situation:      Objective:     Patient found with: arterial line, blood pressure cuff, peripheral IV, pulse ox (continuous), telemetry, SCD, oxygen, NG tube, boss catheter, restraints    General Precautions: Standard, aspiration, aphasia, fall, NPO, respiratory   Orthopedic Precautions:N/A   Braces: N/A     Occupational Performance:    Bed Mobility:    · Not assessed 2* session performed at bed level (pt sedated)    Functional Mobility/Transfers:  · Not appropriate to assess this date 2* lethargic and sedated    Activities of Daily Living:  · Grooming: total assistance for washing face while supine with HOB elevated.    Patient left supine with all lines intact, call button in reach and wife present    AMPA 6 Click:  [facility] Total Score: 6    Treatment & Education:  *PROM performed on (B) UE and (B) LE incorporating all joints: 3 sets x 15 reps per limb  *OT wiped face of pt several times with cool cloth to help increase level of arousal.  In addition max tactile and verbal stimuli provided with pt remaining in sedated state.    *Pt and spouse educated on purpose of ROM; wife verbalized understanding.  Education:    Assessment:     Todd Quevedo is a 47 y.o. male with a medical diagnosis of Embolic stroke involving left middle cerebral artery.  He presents with increased lethargy.  Performance deficits affecting function are weakness, impaired endurance, impaired  functional mobilty, impaired balance, impaired self care skills, decreased lower extremity function, decreased upper extremity function, impaired cognition.  Pt remained in sedated and lethargic state during session and was unable to follow commands.  Session performed at bed level for this reason; however, EOB activity to be assessed in upcoming sessions to determine if that will help pt's level of arousal.  Pt tolerated PROM well with some spontaneous movement  noted in L side of body.  Pt would continue to benefit from skilled OT services to address problems listed below and increase independence with ADLs.     Rehab Prognosis:  Good; patient would benefit from acute skilled OT services to address these deficits and reach maximum level of function.       Plan:     Patient to be seen 4 x/week to address the above listed problems via self-care/home management, therapeutic activities, therapeutic exercises, neuromuscular re-education  · Plan of Care Expires: 02/23/18  · Plan of Care Reviewed with: spouse, patient    This Plan of care has been discussed with the patient who was involved in its development and understands and is in agreement with the identified goals and treatment plan    GOALS:    Occupational Therapy Goals        Problem: Occupational Therapy Goal    Goal Priority Disciplines Outcome Interventions   Occupational Therapy Goal     OT, PT/OT Ongoing (interventions implemented as appropriate)    Description:  Goals to be met by: 2/9/18    Patient will increase functional independence with ADLs by performing:    UE Dressing with Moderate Assistance.  Grooming while seated at sink with Moderate Assistance.  Toileting from bedside commode with Moderate Assistance for hygiene and clothing management.   Supine to sit with Maximum Assistance.  Stand pivot transfers with Maximum Assistance.  CG demo independence with upper extremity exercise program per handout.   Pt follow 50% of simple one-step  commands  Eyes open 50% of session.                       Time Tracking:     OT Date of Treatment: 01/30/18  OT Start Time: 1405  OT Stop Time: 1423  OT Total Time (min): 18 min    Billable Minutes:Therapeutic Activity 18    ISAMAR Chang  1/30/2018

## 2018-01-31 PROBLEM — R06.03 RESPIRATORY DISTRESS: Status: RESOLVED | Noted: 2018-01-27 | Resolved: 2018-01-31

## 2018-01-31 PROBLEM — J96.01 ACUTE RESPIRATORY FAILURE WITH HYPOXIA: Status: ACTIVE | Noted: 2018-01-31

## 2018-01-31 PROBLEM — I63.9 ISCHEMIC STROKE: Status: RESOLVED | Noted: 2018-01-30 | Resolved: 2018-01-31

## 2018-01-31 LAB
ALBUMIN SERPL BCP-MCNC: 2.7 G/DL
ALLENS TEST: ABNORMAL
ALLENS TEST: ABNORMAL
ALP SERPL-CCNC: 83 U/L
ALT SERPL W/O P-5'-P-CCNC: 31 U/L
ANION GAP SERPL CALC-SCNC: 10 MMOL/L
AST SERPL-CCNC: 36 U/L
BASOPHILS # BLD AUTO: 0.04 K/UL
BASOPHILS NFR BLD: 0.5 %
BILIRUB SERPL-MCNC: 0.5 MG/DL
BUN SERPL-MCNC: 21 MG/DL
CALCIUM SERPL-MCNC: 8.9 MG/DL
CHLORIDE SERPL-SCNC: 110 MMOL/L
CO2 SERPL-SCNC: 28 MMOL/L
CREAT SERPL-MCNC: 1 MG/DL
DELSYS: ABNORMAL
DELSYS: ABNORMAL
DIFFERENTIAL METHOD: ABNORMAL
EOSINOPHIL # BLD AUTO: 0 K/UL
EOSINOPHIL NFR BLD: 0.4 %
EP: 8
ERYTHROCYTE [DISTWIDTH] IN BLOOD BY AUTOMATED COUNT: 12.8 %
ERYTHROCYTE [SEDIMENTATION RATE] IN BLOOD BY WESTERGREN METHOD: 16 MM/H
ERYTHROCYTE [SEDIMENTATION RATE] IN BLOOD BY WESTERGREN METHOD: 16 MM/H
EST. GFR  (AFRICAN AMERICAN): >60 ML/MIN/1.73 M^2
EST. GFR  (NON AFRICAN AMERICAN): >60 ML/MIN/1.73 M^2
FIO2: 55
FIO2: 70
GLUCOSE SERPL-MCNC: 175 MG/DL
HCO3 UR-SCNC: 32 MMOL/L (ref 24–28)
HCO3 UR-SCNC: 33.6 MMOL/L (ref 24–28)
HCT VFR BLD AUTO: 37.9 %
HGB BLD-MCNC: 13.2 G/DL
IMM GRANULOCYTES # BLD AUTO: 0.06 K/UL
IMM GRANULOCYTES NFR BLD AUTO: 0.8 %
INR PPP: 1
LYMPHOCYTES # BLD AUTO: 1.4 K/UL
LYMPHOCYTES NFR BLD: 18.1 %
MAGNESIUM SERPL-MCNC: 2.1 MG/DL
MCH RBC QN AUTO: 30.2 PG
MCHC RBC AUTO-ENTMCNC: 34.8 G/DL
MCV RBC AUTO: 87 FL
MIN VOL: 17
MODE: ABNORMAL
MODE: ABNORMAL
MONOCYTES # BLD AUTO: 0.7 K/UL
MONOCYTES NFR BLD: 8.4 %
NEUTROPHILS # BLD AUTO: 5.5 K/UL
NEUTROPHILS NFR BLD: 71.8 %
NRBC BLD-RTO: 0 /100 WBC
PCO2 BLDA: 35.7 MMHG (ref 35–45)
PCO2 BLDA: 39.3 MMHG (ref 35–45)
PH SMN: 7.52 [PH] (ref 7.35–7.45)
PH SMN: 7.58 [PH] (ref 7.35–7.45)
PHOSPHATE SERPL-MCNC: 3.2 MG/DL
PIP: 10
PLATELET # BLD AUTO: 290 K/UL
PMV BLD AUTO: 9.5 FL
PO2 BLDA: 112 MMHG (ref 80–100)
PO2 BLDA: 50 MMHG (ref 80–100)
POC BE: 12 MMOL/L
POC BE: 9 MMOL/L
POC SATURATED O2: 90 % (ref 95–100)
POC SATURATED O2: 99 % (ref 95–100)
POC TCO2: 33 MMOL/L (ref 23–27)
POC TCO2: 35 MMOL/L (ref 23–27)
POCT GLUCOSE: 103 MG/DL (ref 70–110)
POCT GLUCOSE: 129 MG/DL (ref 70–110)
POCT GLUCOSE: 130 MG/DL (ref 70–110)
POCT GLUCOSE: 148 MG/DL (ref 70–110)
POCT GLUCOSE: 148 MG/DL (ref 70–110)
POCT GLUCOSE: 149 MG/DL (ref 70–110)
POCT GLUCOSE: 154 MG/DL (ref 70–110)
POCT GLUCOSE: 156 MG/DL (ref 70–110)
POCT GLUCOSE: 163 MG/DL (ref 70–110)
POCT GLUCOSE: 165 MG/DL (ref 70–110)
POCT GLUCOSE: 167 MG/DL (ref 70–110)
POCT GLUCOSE: 167 MG/DL (ref 70–110)
POCT GLUCOSE: 171 MG/DL (ref 70–110)
POCT GLUCOSE: 173 MG/DL (ref 70–110)
POCT GLUCOSE: 176 MG/DL (ref 70–110)
POCT GLUCOSE: 177 MG/DL (ref 70–110)
POCT GLUCOSE: 178 MG/DL (ref 70–110)
POCT GLUCOSE: 179 MG/DL (ref 70–110)
POCT GLUCOSE: 179 MG/DL (ref 70–110)
POCT GLUCOSE: 185 MG/DL (ref 70–110)
POCT GLUCOSE: 189 MG/DL (ref 70–110)
POCT GLUCOSE: 194 MG/DL (ref 70–110)
POCT GLUCOSE: 199 MG/DL (ref 70–110)
POCT GLUCOSE: 199 MG/DL (ref 70–110)
POCT GLUCOSE: 208 MG/DL (ref 70–110)
POCT GLUCOSE: 222 MG/DL (ref 70–110)
POCT GLUCOSE: 223 MG/DL (ref 70–110)
POTASSIUM SERPL-SCNC: 3.5 MMOL/L
POTASSIUM SERPL-SCNC: 3.7 MMOL/L
PROT SERPL-MCNC: 6.6 G/DL
PROTHROMBIN TIME: 10.9 SEC
RBC # BLD AUTO: 4.37 M/UL
SAMPLE: ABNORMAL
SAMPLE: ABNORMAL
SITE: ABNORMAL
SITE: ABNORMAL
SODIUM SERPL-SCNC: 148 MMOL/L
SODIUM SERPL-SCNC: 149 MMOL/L
SODIUM SERPL-SCNC: 149 MMOL/L
SODIUM SERPL-SCNC: 151 MMOL/L
SP02: 85
SPONT RATE: 32
VT: 550
WBC # BLD AUTO: 7.7 K/UL

## 2018-01-31 PROCEDURE — 85610 PROTHROMBIN TIME: CPT

## 2018-01-31 PROCEDURE — 94640 AIRWAY INHALATION TREATMENT: CPT

## 2018-01-31 PROCEDURE — A4217 STERILE WATER/SALINE, 500 ML: HCPCS | Performed by: PSYCHIATRY & NEUROLOGY

## 2018-01-31 PROCEDURE — 25000003 PHARM REV CODE 250: Performed by: PSYCHIATRY & NEUROLOGY

## 2018-01-31 PROCEDURE — 63600175 PHARM REV CODE 636 W HCPCS: Performed by: NURSE PRACTITIONER

## 2018-01-31 PROCEDURE — 63600175 PHARM REV CODE 636 W HCPCS: Performed by: PSYCHIATRY & NEUROLOGY

## 2018-01-31 PROCEDURE — 85025 COMPLETE CBC W/AUTO DIFF WBC: CPT

## 2018-01-31 PROCEDURE — 27200966 HC CLOSED SUCTION SYSTEM

## 2018-01-31 PROCEDURE — 63600175 PHARM REV CODE 636 W HCPCS: Performed by: STUDENT IN AN ORGANIZED HEALTH CARE EDUCATION/TRAINING PROGRAM

## 2018-01-31 PROCEDURE — 25000003 PHARM REV CODE 250: Performed by: NURSE PRACTITIONER

## 2018-01-31 PROCEDURE — 84295 ASSAY OF SERUM SODIUM: CPT | Mod: 91

## 2018-01-31 PROCEDURE — 25000242 PHARM REV CODE 250 ALT 637 W/ HCPCS: Performed by: PHYSICIAN ASSISTANT

## 2018-01-31 PROCEDURE — 36620 INSERTION CATHETER ARTERY: CPT

## 2018-01-31 PROCEDURE — 84132 ASSAY OF SERUM POTASSIUM: CPT

## 2018-01-31 PROCEDURE — 83735 ASSAY OF MAGNESIUM: CPT

## 2018-01-31 PROCEDURE — 80053 COMPREHEN METABOLIC PANEL: CPT

## 2018-01-31 PROCEDURE — A4216 STERILE WATER/SALINE, 10 ML: HCPCS | Performed by: NURSE PRACTITIONER

## 2018-01-31 PROCEDURE — 94002 VENT MGMT INPAT INIT DAY: CPT

## 2018-01-31 PROCEDURE — 99292 CRITICAL CARE ADDL 30 MIN: CPT | Mod: 25,,, | Performed by: PSYCHIATRY & NEUROLOGY

## 2018-01-31 PROCEDURE — 94761 N-INVAS EAR/PLS OXIMETRY MLT: CPT

## 2018-01-31 PROCEDURE — 20000000 HC ICU ROOM

## 2018-01-31 PROCEDURE — 99233 SBSQ HOSP IP/OBS HIGH 50: CPT | Mod: ,,, | Performed by: PSYCHIATRY & NEUROLOGY

## 2018-01-31 PROCEDURE — 99900025 HC BRONCHOSCOPY-ASST (STAT)

## 2018-01-31 PROCEDURE — 31622 DX BRONCHOSCOPE/WASH: CPT

## 2018-01-31 PROCEDURE — 94660 CPAP INITIATION&MGMT: CPT

## 2018-01-31 PROCEDURE — 37799 UNLISTED PX VASCULAR SURGERY: CPT

## 2018-01-31 PROCEDURE — 99291 CRITICAL CARE FIRST HOUR: CPT | Mod: 25,,, | Performed by: PSYCHIATRY & NEUROLOGY

## 2018-01-31 PROCEDURE — 25000003 PHARM REV CODE 250

## 2018-01-31 PROCEDURE — 99900026 HC AIRWAY MAINTENANCE (STAT)

## 2018-01-31 PROCEDURE — 82803 BLOOD GASES ANY COMBINATION: CPT

## 2018-01-31 PROCEDURE — 84100 ASSAY OF PHOSPHORUS: CPT

## 2018-01-31 PROCEDURE — 27100171 HC OXYGEN HIGH FLOW UP TO 24 HOURS

## 2018-01-31 PROCEDURE — 0BC38ZZ EXTIRPATION OF MATTER FROM RIGHT MAIN BRONCHUS, VIA NATURAL OR ARTIFICIAL OPENING ENDOSCOPIC: ICD-10-PCS | Performed by: PSYCHIATRY & NEUROLOGY

## 2018-01-31 PROCEDURE — 5A1955Z RESPIRATORY VENTILATION, GREATER THAN 96 CONSECUTIVE HOURS: ICD-10-PCS | Performed by: PSYCHIATRY & NEUROLOGY

## 2018-01-31 PROCEDURE — 0BH17EZ INSERTION OF ENDOTRACHEAL AIRWAY INTO TRACHEA, VIA NATURAL OR ARTIFICIAL OPENING: ICD-10-PCS | Performed by: PSYCHIATRY & NEUROLOGY

## 2018-01-31 PROCEDURE — 31500 INSERT EMERGENCY AIRWAY: CPT | Mod: ,,, | Performed by: PSYCHIATRY & NEUROLOGY

## 2018-01-31 PROCEDURE — 25000003 PHARM REV CODE 250: Performed by: STUDENT IN AN ORGANIZED HEALTH CARE EDUCATION/TRAINING PROGRAM

## 2018-01-31 PROCEDURE — 25000242 PHARM REV CODE 250 ALT 637 W/ HCPCS: Performed by: NURSE PRACTITIONER

## 2018-01-31 PROCEDURE — 99232 SBSQ HOSP IP/OBS MODERATE 35: CPT | Mod: ,,, | Performed by: NEUROLOGICAL SURGERY

## 2018-01-31 PROCEDURE — 99900035 HC TECH TIME PER 15 MIN (STAT)

## 2018-01-31 PROCEDURE — 31622 DX BRONCHOSCOPE/WASH: CPT | Mod: ,,, | Performed by: PSYCHIATRY & NEUROLOGY

## 2018-01-31 RX ORDER — ETOMIDATE 2 MG/ML
40 INJECTION INTRAVENOUS ONCE
Status: COMPLETED | OUTPATIENT
Start: 2018-01-31 | End: 2018-01-31

## 2018-01-31 RX ORDER — CHLORHEXIDINE GLUCONATE ORAL RINSE 1.2 MG/ML
15 SOLUTION DENTAL 2 TIMES DAILY
Status: DISCONTINUED | OUTPATIENT
Start: 2018-01-31 | End: 2018-02-14

## 2018-01-31 RX ORDER — SUCCINYLCHOLINE CHLORIDE 20 MG/ML
INJECTION INTRAMUSCULAR; INTRAVENOUS
Status: DISPENSED
Start: 2018-01-31 | End: 2018-01-31

## 2018-01-31 RX ORDER — ROCURONIUM BROMIDE 10 MG/ML
INJECTION, SOLUTION INTRAVENOUS
Status: COMPLETED
Start: 2018-01-31 | End: 2018-01-31

## 2018-01-31 RX ORDER — PHENYLEPHRINE HCL IN 0.9% NACL 1 MG/10 ML
100 SYRINGE (ML) INTRAVENOUS ONCE
Status: DISCONTINUED | OUTPATIENT
Start: 2018-01-31 | End: 2018-02-01

## 2018-01-31 RX ORDER — ETOMIDATE 2 MG/ML
INJECTION INTRAVENOUS
Status: COMPLETED
Start: 2018-01-31 | End: 2018-01-31

## 2018-01-31 RX ORDER — DEXMEDETOMIDINE HYDROCHLORIDE 4 UG/ML
0.2 INJECTION, SOLUTION INTRAVENOUS CONTINUOUS
Status: DISCONTINUED | OUTPATIENT
Start: 2018-01-31 | End: 2018-02-02

## 2018-01-31 RX ORDER — HYDRALAZINE HYDROCHLORIDE 20 MG/ML
10 INJECTION INTRAMUSCULAR; INTRAVENOUS ONCE
Status: COMPLETED | OUTPATIENT
Start: 2018-01-31 | End: 2018-01-31

## 2018-01-31 RX ORDER — MIDAZOLAM HYDROCHLORIDE 1 MG/ML
4 INJECTION INTRAMUSCULAR; INTRAVENOUS ONCE
Status: COMPLETED | OUTPATIENT
Start: 2018-01-31 | End: 2018-01-31

## 2018-01-31 RX ORDER — ROCURONIUM BROMIDE 10 MG/ML
100 INJECTION, SOLUTION INTRAVENOUS ONCE
Status: COMPLETED | OUTPATIENT
Start: 2018-01-31 | End: 2018-01-31

## 2018-01-31 RX ORDER — FAMOTIDINE 20 MG/1
20 TABLET, FILM COATED ORAL 2 TIMES DAILY
Status: DISCONTINUED | OUTPATIENT
Start: 2018-01-31 | End: 2018-02-14

## 2018-01-31 RX ORDER — PROPOFOL 10 MG/ML
INJECTION, EMULSION INTRAVENOUS
Status: DISPENSED
Start: 2018-01-31 | End: 2018-01-31

## 2018-01-31 RX ORDER — MIDAZOLAM HYDROCHLORIDE 1 MG/ML
INJECTION INTRAMUSCULAR; INTRAVENOUS
Status: DISPENSED
Start: 2018-01-31 | End: 2018-01-31

## 2018-01-31 RX ORDER — IPRATROPIUM BROMIDE AND ALBUTEROL SULFATE 2.5; .5 MG/3ML; MG/3ML
3 SOLUTION RESPIRATORY (INHALATION) EVERY 4 HOURS PRN
Status: DISCONTINUED | OUTPATIENT
Start: 2018-01-31 | End: 2018-02-20

## 2018-01-31 RX ORDER — FUROSEMIDE 10 MG/ML
60 INJECTION INTRAMUSCULAR; INTRAVENOUS ONCE
Status: COMPLETED | OUTPATIENT
Start: 2018-01-31 | End: 2018-01-31

## 2018-01-31 RX ORDER — IPRATROPIUM BROMIDE AND ALBUTEROL SULFATE 2.5; .5 MG/3ML; MG/3ML
3 SOLUTION RESPIRATORY (INHALATION) ONCE
Status: COMPLETED | OUTPATIENT
Start: 2018-01-31 | End: 2018-01-31

## 2018-01-31 RX ORDER — PHENYLEPHRINE HCL IN 0.9% NACL 1 MG/10 ML
SYRINGE (ML) INTRAVENOUS
Status: DISPENSED
Start: 2018-01-31 | End: 2018-01-31

## 2018-01-31 RX ADMIN — SODIUM ACETATE: 164 INJECTION, SOLUTION, CONCENTRATE INTRAVENOUS at 01:01

## 2018-01-31 RX ADMIN — CHLORHEXIDINE GLUCONATE 15 ML: 1.2 RINSE ORAL at 09:01

## 2018-01-31 RX ADMIN — HEPARIN SODIUM 5000 UNITS: 5000 INJECTION, SOLUTION INTRAVENOUS; SUBCUTANEOUS at 09:01

## 2018-01-31 RX ADMIN — SODIUM CHLORIDE 2.9 UNITS/HR: 9 INJECTION, SOLUTION INTRAVENOUS at 11:01

## 2018-01-31 RX ADMIN — ACETAMINOPHEN 650 MG: 325 TABLET, FILM COATED ORAL at 05:01

## 2018-01-31 RX ADMIN — CEFTRIAXONE 2 G: 2 INJECTION, SOLUTION INTRAVENOUS at 04:01

## 2018-01-31 RX ADMIN — SODIUM CHLORIDE SOLN NEBU 3% 4 ML: 3 NEBU SOLN at 08:01

## 2018-01-31 RX ADMIN — SODIUM CHLORIDE SOLN NEBU 3% 4 ML: 3 NEBU SOLN at 04:01

## 2018-01-31 RX ADMIN — SODIUM CHLORIDE 500 ML: 0.9 INJECTION, SOLUTION INTRAVENOUS at 12:01

## 2018-01-31 RX ADMIN — ETOMIDATE 40 MG: 2 INJECTION INTRAVENOUS at 09:01

## 2018-01-31 RX ADMIN — Medication 3 ML: at 09:01

## 2018-01-31 RX ADMIN — SODIUM ACETATE: 3.28 INJECTION, SOLUTION, CONCENTRATE INTRAVENOUS at 12:01

## 2018-01-31 RX ADMIN — HYDRALAZINE HYDROCHLORIDE 10 MG: 20 INJECTION INTRAMUSCULAR; INTRAVENOUS at 05:01

## 2018-01-31 RX ADMIN — HEPARIN SODIUM 5000 UNITS: 5000 INJECTION, SOLUTION INTRAVENOUS; SUBCUTANEOUS at 02:01

## 2018-01-31 RX ADMIN — ROCURONIUM BROMIDE 100 MG: 10 INJECTION INTRAVENOUS at 09:01

## 2018-01-31 RX ADMIN — SODIUM CHLORIDE 2.3 UNITS/HR: 9 INJECTION, SOLUTION INTRAVENOUS at 08:01

## 2018-01-31 RX ADMIN — MIDAZOLAM HYDROCHLORIDE 4 MG: 1 INJECTION, SOLUTION INTRAMUSCULAR; INTRAVENOUS at 09:01

## 2018-01-31 RX ADMIN — CHLORHEXIDINE GLUCONATE 15 ML: 1.2 RINSE ORAL at 12:01

## 2018-01-31 RX ADMIN — HYDRALAZINE HYDROCHLORIDE 10 MG: 20 INJECTION INTRAMUSCULAR; INTRAVENOUS at 08:01

## 2018-01-31 RX ADMIN — IPRATROPIUM BROMIDE AND ALBUTEROL SULFATE 3 ML: .5; 3 SOLUTION RESPIRATORY (INHALATION) at 05:01

## 2018-01-31 RX ADMIN — FUROSEMIDE 60 MG: 10 INJECTION, SOLUTION INTRAMUSCULAR; INTRAVENOUS at 08:01

## 2018-01-31 RX ADMIN — DEXMEDETOMIDINE HYDROCHLORIDE 0.2 MCG/KG/HR: 4 INJECTION, SOLUTION INTRAVENOUS at 09:01

## 2018-01-31 RX ADMIN — SODIUM CHLORIDE: 0.9 INJECTION, SOLUTION INTRAVENOUS at 11:01

## 2018-01-31 RX ADMIN — POTASSIUM CHLORIDE 40 MEQ: 20 SOLUTION ORAL at 03:01

## 2018-01-31 RX ADMIN — STANDARDIZED SENNA CONCENTRATE AND DOCUSATE SODIUM 1 TABLET: 8.6; 5 TABLET, FILM COATED ORAL at 09:01

## 2018-01-31 RX ADMIN — IPRATROPIUM BROMIDE AND ALBUTEROL SULFATE 3 ML: .5; 3 SOLUTION RESPIRATORY (INHALATION) at 11:01

## 2018-01-31 RX ADMIN — Medication 3 ML: at 05:01

## 2018-01-31 RX ADMIN — ACETAMINOPHEN 650 MG: 325 TABLET, FILM COATED ORAL at 03:01

## 2018-01-31 RX ADMIN — ROCURONIUM BROMIDE 100 MG: 10 INJECTION, SOLUTION INTRAVENOUS at 09:01

## 2018-01-31 RX ADMIN — IPRATROPIUM BROMIDE AND ALBUTEROL SULFATE 3 ML: .5; 3 SOLUTION RESPIRATORY (INHALATION) at 08:01

## 2018-01-31 RX ADMIN — IPRATROPIUM BROMIDE AND ALBUTEROL SULFATE 3 ML: .5; 3 SOLUTION RESPIRATORY (INHALATION) at 04:01

## 2018-01-31 RX ADMIN — Medication 3 ML: at 02:01

## 2018-01-31 RX ADMIN — FAMOTIDINE 20 MG: 20 TABLET, FILM COATED ORAL at 09:01

## 2018-01-31 RX ADMIN — IPRATROPIUM BROMIDE AND ALBUTEROL SULFATE 3 ML: .5; 3 SOLUTION RESPIRATORY (INHALATION) at 09:01

## 2018-01-31 RX ADMIN — ETOMIDATE 40 MG: 2 INJECTION, SOLUTION INTRAVENOUS at 09:01

## 2018-01-31 RX ADMIN — HEPARIN SODIUM 5000 UNITS: 5000 INJECTION, SOLUTION INTRAVENOUS; SUBCUTANEOUS at 05:01

## 2018-01-31 RX ADMIN — LABETALOL HYDROCHLORIDE 10 MG: 5 INJECTION, SOLUTION INTRAVENOUS at 07:01

## 2018-01-31 RX ADMIN — SODIUM CHLORIDE SOLN NEBU 3% 4 ML: 3 NEBU SOLN at 11:01

## 2018-01-31 RX ADMIN — DEXMEDETOMIDINE HYDROCHLORIDE 0.4 MCG/KG/HR: 4 INJECTION, SOLUTION INTRAVENOUS at 09:01

## 2018-01-31 RX ADMIN — ETOMIDATE 40 MG: 2 INJECTION, SOLUTION INTRAVENOUS at 11:01

## 2018-01-31 RX ADMIN — LABETALOL HYDROCHLORIDE 10 MG: 5 INJECTION, SOLUTION INTRAVENOUS at 12:01

## 2018-01-31 RX ADMIN — ACETAMINOPHEN 650 MG: 325 TABLET, FILM COATED ORAL at 06:01

## 2018-01-31 RX ADMIN — POTASSIUM CHLORIDE 40 MEQ: 20 SOLUTION ORAL at 02:01

## 2018-01-31 NOTE — PROCEDURES
"Todd Quevedo is a 47 y.o. male patient.    Temp: 99.7 °F (37.6 °C) (01/31/18 1105)  Pulse: 78 (01/31/18 1205)  Resp: 16 (01/31/18 1205)  BP: (!) 120/59 (01/31/18 1205)  SpO2: 99 % (01/31/18 1205)  Weight: 116.3 kg (256 lb 6.3 oz) (01/30/18 1000)  Height: 5' 10" (177.8 cm) (01/30/18 1000)       Procedures    : Hany Stephenson              ANESTHETIC: Etomidate   PROCEDURE:  Flexible bronchoscopy  Position:   Supine  Intubation site:  ETT  INDICATION:  Pulmonary toilet/mucous plugging  FINDINGS: Large mucus plug at the right main bronchus   Di scription:  CXR prior to procedure showed complete collapse of the right lung and atelectasis. Patient O2 requirements continued to increase. A flexible bronchoscopy was done and a large right main bronchus mucus plug was removed. Patient tolerated procedure well and his O2 requirement started to improve   COMPLICATIONS:   None  IMPRESSION:   Bronchoscopy :       Hany Stephenson  1/31/2018  "

## 2018-01-31 NOTE — ASSESSMENT & PLAN NOTE
monitor CTh and exam for increasing edema  2% increased to 100ml/hr, if no increase in serum sodium or ct unchanged or worse, needs tlc for 3%

## 2018-01-31 NOTE — PT/OT/SLP PROGRESS
Physical Therapy      Patient Name:  Todd Quevedo   MRN:  [id number]    The patient experienced a change in respiratory status and is now on mechanical intubation.  He is only appropriate for one therapy discipline.  WIll hold PT until patient is extubated and appropriate for mobilization.  OT will continue to follow this patient.     Madina Luu, PT  1/31/2018  [phone] (pager)

## 2018-01-31 NOTE — PROGRESS NOTES
[facility]  Neurocritical Care  Progress Note    Admit Date: 1/25/2018  Service Date: 01/30/2018  Length of Stay: 5    Subjective:     Chief Complaint: Embolic stroke involving left middle cerebral artery    History of Present Illness: The patient is a 47 year old male with no known PMHx admitted to [facility]   s/p thrombectomy of L M1 occlusion. Per chart review, he   walked into [place] and was acting abnormal.  EMS was called and brought to the ED for concern of L MCA syndrome. CT MP revealed short segment occlusion of left M1.  Patient went to IR for thrombectomy of L M1 occlusion seen on CTA MP.  TICI2C reperfusion and angioplasty. Patient admitted to [facility] for close monitoring and higher level of care.   History obtained from chart review only            Hospital Course: 1/25: Pt admitted to [facility] s/p L M1 thrombectomy, TICI2C reperfusion and angioplasty   1/27: large L MCA, hemicrani watch, NSGY following, insulin ggt, cardene ggt, L sided intermittent slowing eeg but no sz, +nasal trumpet for copious thick secretions, wheezing this am - improved with duo nebs, 3% nebs, and cough assist, concern for sepsis 2/2 hemodynamic changes - increased HR and BP, worsening leukocytosis, +ceftriaxone, pan cx, adrian track  1/28: continued respiratory challenges due to secretions. Aggressive pulmonary toileting. Continue monitoring for neuro worsening. Add moxifloxacin.   1/29: CTH to eval for increased edema/shift, EEG afterwards. Concern for decrease exam, no following/decreased alertness). CXR and Procal to follow leukocytosis. Hold TF until eval decreasing EXAM  1/30: neuro exam stable, increase 2%, current amount of sodium iv not suffuicient to meet target 145-155, cont abx for likely pneumonia, repeat ct in am        Review of Systems   Unable to perform ROS: Intubated       Objective:     Vitals:  Temp: 99 °F (37.2 °C)  Pulse: 95  Rhythm: sinus tachycardia  BP: (!) 142/113  MAP (mmHg): 120  CI (L/min/m2):  2.7 L/min/m2  SVI: 27  SVV: 24 %  Resp: 20  SpO2: 98 %  O2 Device (Oxygen Therapy): nasal cannula    Temp  Min: 98.7 °F (37.1 °C)  Max: 99.8 °F (37.7 °C)  Pulse  Min: 71  Max: 106  BP  Min: 142/113  Max: 199/133  MAP (mmHg)  Min: 103  Max: 159  CI (L/min/m2)  Min: 2.7 L/min/m2  Max: 2.7 L/min/m2  SVI  Min: 27  Max: 27  SVV  Min: 24 %  Max: 24 %  Resp  Min: 20  Max: 43  SpO2  Min: 93 %  Max: 99 %  Oxygen Concentration (%)  Min: 30  Max: 30    01/29 0701 - 01/30 0700  In: 1992.4 [I.V.:1492.4]  Out: 1550 [Urine:1550]   Unmeasured Output  Urine Occurrence: 1  Stool Occurrence: 0       Physical Exam   Constitutional: He appears well-developed.   Eyes: Pupils are equal, round, and reactive to light.   Cardiovascular: Normal rate and regular rhythm.    Pulmonary/Chest: Breath sounds normal.   Abdominal: Soft. Bowel sounds are normal.   Neurological:   Arouses with physical stimulation, nonverbal, localizes with left, GCS 9  Occasional spontaneous movement right leg         Medications:  Continuous  insulin (HUMAN R) infusion (adults) Last Rate: 2.7 Units/hr (01/30/18 1800)   buffered 2% sodium acetate 86meq, sodium chloride 86meq, sterile water for inj IV soln Last Rate: 100 mL/hr at 01/30/18 1800   Scheduled  acetaminophen 650 mg Q6H   albuterol-ipratropium 2.5mg-0.5mg/3mL 3 mL Q4H   aspirin 325 mg Daily   atorvastatin 80 mg Daily   cefTRIAXone (ROCEPHIN) IVPB 2 g Q24H   clopidogrel 75 mg Daily   heparin (porcine) 5,000 Units Q8H   senna-docusate 8.6-50 mg 1 tablet BID   sodium chloride 0.9% 3 mL Q8H   sodium chloride 3% 4 mL Q4H   PRN  bisacodyl 10 mg Daily PRN   dextrose 50% 12.5 g PRN   dextrose 50% 12.5 g PRN   dextrose 50% 25 g PRN   glucagon (human recombinant) 1 mg PRN   hydrALAZINE 10 mg Q4H PRN   labetalol 10 mg Q4H PRN   magnesium sulfate IVPB 2 g PRN   magnesium sulfate IVPB 4 g PRN   ondansetron 4 mg Q8H PRN   potassium chloride 10% 40 mEq PRN   potassium chloride 10% 40 mEq PRN   sodium chloride 0.9% 5 mL PRN      Today I personally reviewed pertinent medications, lines/drains/airways, imaging, lab results, microbiology results, notably:    Diet  Diet NPO  Diet NPO        Assessment/Plan:     Neuro   * Embolic stroke involving left middle cerebral artery    S/P L M1 thrombectomy  -- Neuro checks q 1hr  -- Vascular Neurology  following  -- CT MP- Short segment occlusion of left M1   -- Repeat CTH 1/26 - No detrimental change  -- SBP goal <160  -- PT/OT/Speech  -- hemicrani watch, NSGY following   -- MRI 1/26 - L MCA, no hemorrhagic conversion  -- stat CTH 1/27: L MCA stable, no worsening edema or hemorrhagic conversion  -- ASA, plavix     Appears exam declined, no consistent with previous imaging. Stat CTH to eval for increased edema and then EEG.   NO AEDs at this time             Cytotoxic cerebral edema    monitor CTh and exam for increasing edema  2% increased to 100ml/hr, if no increase in serum sodium or ct unchanged or worse, needs tlc for 3%        Pulmonary   Respiratory distress    -wheezing of weekend , improved with suction via nasal trumpet, 3% nebs, duo nebs, and cough assist  -concern for sinusitis/resp infection 2/2 low grade fever, leukocytosis, and increasing thick secretions  -ceftriaxone 2g q24h and moxifloxacin  -pan cx, follow procal and eval CXR          Cardiac/Vascular   Hyperlipidemia    -atorvastatin 80 daily           Essential hypertension    --SBP <160  --cardene gtt  --echo:    1 - Normal left ventricular systolic function (EF 65-70%).     2 - Concentric remodeling.     3 - No wall motion abnormalities.     4 - Normal left ventricular diastolic function.     5 - Normal right ventricular systolic function .             Oncology   Leukocytosis    _Pan Cx and Abx over weekend  -Trend PRocal  -SLight improvement in Leukocytosis with ABX  -Eval CXR  Small development of perihilar infiltrate, temp, white count, secretions all better on ceftriaxone,cont 7 day course        Endocrine   Type 2  diabetes mellitus    -A1C-10  -poorly controlled diabetes  -BG >400 on arrival  -insulin gtt until on TF. Will resp stability before starting TF          Other   Obstructive sleep apnea    Oxygenating well at 45 degrees head position or higher            Prophylaxis:  Venous Thromboembolism: chemical  Stress Ulcer: None  Ventilator Pneumonia: no     Activity Orders          None      CRC> 30 min  Full Code    Matty Coulter MD  Neurocritical Care  [facility]

## 2018-01-31 NOTE — ASSESSMENT & PLAN NOTE
- management for primary team  - goal SBP post thrombectomy <140  - requiring multiple administration of PRN hydralazine

## 2018-01-31 NOTE — PLAN OF CARE
Problem: Patient Care Overview  Goal: Plan of Care Review  Outcome: Ongoing (interventions implemented as appropriate)  POC reviewed with pt at 0500. Pt  Unable to verbalize understanding due to diagnosis. Pt spouse verbalized understanding.  Questions and concerns addressed. Pt required PRN antihypertensives to keep BP with in parameters. Pt CT scan complete. Pt on and off cooling blanket. Pt progressing toward goals. Will continue to monitor. See flowsheets for full assessment and VS info

## 2018-01-31 NOTE — SUBJECTIVE & OBJECTIVE
Review of Systems   Unable to perform ROS: Intubated       Objective:     Vitals:  Temp: 99 °F (37.2 °C)  Pulse: 95  Rhythm: sinus tachycardia  BP: (!) 142/113  MAP (mmHg): 120  CI (L/min/m2): 2.7 L/min/m2  SVI: 27  SVV: 24 %  Resp: 20  SpO2: 98 %  O2 Device (Oxygen Therapy): nasal cannula    Temp  Min: 98.7 °F (37.1 °C)  Max: 99.8 °F (37.7 °C)  Pulse  Min: 71  Max: 106  BP  Min: 142/113  Max: 199/133  MAP (mmHg)  Min: 103  Max: 159  CI (L/min/m2)  Min: 2.7 L/min/m2  Max: 2.7 L/min/m2  SVI  Min: 27  Max: 27  SVV  Min: 24 %  Max: 24 %  Resp  Min: 20  Max: 43  SpO2  Min: 93 %  Max: 99 %  Oxygen Concentration (%)  Min: 30  Max: 30    01/29 0701 - 01/30 0700  In: 1992.4 [I.V.:1492.4]  Out: 1550 [Urine:1550]   Unmeasured Output  Urine Occurrence: 1  Stool Occurrence: 0       Physical Exam   Constitutional: He appears well-developed.   Eyes: Pupils are equal, round, and reactive to light.   Cardiovascular: Normal rate and regular rhythm.    Pulmonary/Chest: Breath sounds normal.   Abdominal: Soft. Bowel sounds are normal.   Neurological:   Arouses with physical stimulation, nonverbal, localizes with left, GCS 9  Occasional spontaneous movement right leg         Medications:  Continuous  insulin (HUMAN R) infusion (adults) Last Rate: 2.7 Units/hr (01/30/18 1800)   buffered 2% sodium acetate 86meq, sodium chloride 86meq, sterile water for inj IV soln Last Rate: 100 mL/hr at 01/30/18 1800   Scheduled  acetaminophen 650 mg Q6H   albuterol-ipratropium 2.5mg-0.5mg/3mL 3 mL Q4H   aspirin 325 mg Daily   atorvastatin 80 mg Daily   cefTRIAXone (ROCEPHIN) IVPB 2 g Q24H   clopidogrel 75 mg Daily   heparin (porcine) 5,000 Units Q8H   senna-docusate 8.6-50 mg 1 tablet BID   sodium chloride 0.9% 3 mL Q8H   sodium chloride 3% 4 mL Q4H   PRN  bisacodyl 10 mg Daily PRN   dextrose 50% 12.5 g PRN   dextrose 50% 12.5 g PRN   dextrose 50% 25 g PRN   glucagon (human recombinant) 1 mg PRN   hydrALAZINE 10 mg Q4H PRN   labetalol 10 mg Q4H PRN    magnesium sulfate IVPB 2 g PRN   magnesium sulfate IVPB 4 g PRN   ondansetron 4 mg Q8H PRN   potassium chloride 10% 40 mEq PRN   potassium chloride 10% 40 mEq PRN   sodium chloride 0.9% 5 mL PRN     Today I personally reviewed pertinent medications, lines/drains/airways, imaging, lab results, microbiology results, notably:    Diet  Diet NPO  Diet NPO

## 2018-01-31 NOTE — PLAN OF CARE
VLAD received a phone call from [facility]  [phone].  Per Ceci, once discharge plan is known, a Discharge Worksheet Packet must be completed and faxed to [phone] along with a consent to transfer signed by the patent or next of kin.  Per Ceci, there are rehab facilities in [facility].   VLAD informed Ceci that once patient is medically stable, the paperwork will be faxed to her for placement.     Delaney Mark RN, CCRN-K, [facility]  Neuro-Critical Care   [phone]

## 2018-01-31 NOTE — ASSESSMENT & PLAN NOTE
_Pan Cx and Abx over weekend  -Trend PRocal  -SLight improvement in Leukocytosis with ABX  -Eval CXR  Small development of perihilar infiltrate, temp, white count, secretions all better on ceftriaxone,cont 7 day course

## 2018-01-31 NOTE — PROGRESS NOTES
RN notified NCC team that the patient's SBP maintained in the 180s. RN ordered to give an additional hydralazine 10 mg per Isha., NP. RN will continue to monitor.

## 2018-01-31 NOTE — PROGRESS NOTES
[facility]  Neurosurgery  Progress Note    Subjective:     History of Present Illness: Todd Quevedo is 47 y.o. male with history of HTN and DM who [place] [facility] [place] for concern of L MCA syndrome 1/25/18 AM. He is s/p thrombectomy of L M1 occlusion. CT MP revealed short segment occlusion of left M1.  Patient went to IR for thrombectomy of L M1.  TICI2C reperfusion and angioplasty. Neurosurgery consulted for hemicrani watch. Wife was at bedside during exam. Patient not intubated, lethargic, and moving left side spontaneously. He is currently on plavix daily.     Post-Op Info:  * No surgery found *         Interval History: Exam stable.    Medications:  Continuous Infusions:   dexmedetomidine (PRECEDEX) infusion 1 mcg/kg/hr (01/31/18 1205)    insulin (HUMAN R) infusion (adults) 4.9 Units/hr (01/31/18 1205)    buffered 2% sodium acetate 86meq, sodium chloride 86meq, sterile water for inj IV soln 75 mL/hr at 01/31/18 1214     Scheduled Meds:   acetaminophen  650 mg Per NG tube Q6H    albuterol-ipratropium 2.5mg-0.5mg/3mL  3 mL Nebulization Q4H    aspirin  325 mg Per NG tube Daily    atorvastatin  80 mg Per NG tube Daily    cefTRIAXone (ROCEPHIN) IVPB  2 g Intravenous Q24H    chlorhexidine  15 mL Mouth/Throat BID    clopidogrel  75 mg Per NG tube Daily    famotidine  20 mg Per NG tube BID    heparin (porcine)  5,000 Units Subcutaneous Q8H    phenylephrine HCl in 0.9% NaCl  100 mcg Intravenous Once    phenylephrine HCl in 0.9% NaCl        propofol        propofol        senna-docusate 8.6-50 mg  1 tablet Per NG tube BID    sodium chloride 0.9%  500 mL Intravenous Once    sodium chloride 0.9%  3 mL Intravenous Q8H    sodium chloride 3%  4 mL Nebulization Q4H    succinylcholine         PRN Meds:albuterol-ipratropium 2.5mg-0.5mg/3mL, bisacodyl, dextrose 50%, dextrose 50%, dextrose 50%, glucagon (human recombinant), hydrALAZINE, labetalol, magnesium sulfate IVPB, magnesium  sulfate IVPB, ondansetron, potassium chloride 10%, potassium chloride 10%, sodium chloride 0.9%     Review of Systems    Objective:     Weight: 116.3 kg (256 lb 6.3 oz)  Body mass index is 36.79 kg/m².  Vital Signs (Most Recent):  Temp: 99.7 °F (37.6 °C) (01/31/18 1105)  Pulse: 78 (01/31/18 1205)  Resp: 16 (01/31/18 1205)  BP: (!) 120/59 (01/31/18 1205)  SpO2: 99 % (01/31/18 1205) Vital Signs (24h Range):  Temp:  [98.1 °F (36.7 °C)-99.7 °F (37.6 °C)] 99.7 °F (37.6 °C)  Pulse:  [] 78  Resp:  [0-57] 16  SpO2:  [85 %-99 %] 99 %  BP: (120-186)/() 120/59  Arterial Line BP: (100-213)/() 100/64       Date 01/31/18 0700 - 02/01/18 0659   Shift 7711-2453 5487-8938 2201-2892 24 Hour Total   I  N  T  A  K  E   I.V.  (mL/kg) 393.5  (3.4)   393.5  (3.4)    NG/GT 0   0    IV Piggyback 500   500    Shift Total  (mL/kg) 893.5  (7.7)   893.5  (7.7)   O  U  T  P  U  T   Urine  (mL/kg/hr) 1466   1466    Shift Total  (mL/kg) 1466  (12.6)   1466  (12.6)   Weight (kg) 116.3 116.3 116.3 116.3     Interval History: Exam stable. Pt on CPAP and cEEG per primary team.     Medications:  Continuous Infusions:   dexmedetomidine (PRECEDEX) infusion 1 mcg/kg/hr (01/31/18 1205)    insulin (HUMAN R) infusion (adults) 4.9 Units/hr (01/31/18 1205)    buffered 2% sodium acetate 86meq, sodium chloride 86meq, sterile water for inj IV soln 75 mL/hr at 01/31/18 1214     Scheduled Meds:   acetaminophen  650 mg Per NG tube Q6H    albuterol-ipratropium 2.5mg-0.5mg/3mL  3 mL Nebulization Q4H    aspirin  325 mg Per NG tube Daily    atorvastatin  80 mg Per NG tube Daily    cefTRIAXone (ROCEPHIN) IVPB  2 g Intravenous Q24H    chlorhexidine  15 mL Mouth/Throat BID    clopidogrel  75 mg Per NG tube Daily    famotidine  20 mg Per NG tube BID    heparin (porcine)  5,000 Units Subcutaneous Q8H    phenylephrine HCl in 0.9% NaCl  100 mcg Intravenous Once    phenylephrine HCl in 0.9% NaCl        propofol        propofol         senna-docusate 8.6-50 mg  1 tablet Per NG tube BID    sodium chloride 0.9%  500 mL Intravenous Once    sodium chloride 0.9%  3 mL Intravenous Q8H    sodium chloride 3%  4 mL Nebulization Q4H    succinylcholine         PRN Meds:albuterol-ipratropium 2.5mg-0.5mg/3mL, bisacodyl, dextrose 50%, dextrose 50%, dextrose 50%, glucagon (human recombinant), hydrALAZINE, labetalol, magnesium sulfate IVPB, magnesium sulfate IVPB, ondansetron, potassium chloride 10%, potassium chloride 10%, sodium chloride 0.9%     Review of Systems    Objective:     Weight: 116.3 kg (256 lb 6.3 oz)  Body mass index is 36.79 kg/m².  Vital Signs (Most Recent):  Temp: 99.7 °F (37.6 °C) (01/31/18 1105)  Pulse: 78 (01/31/18 1205)  Resp: 16 (01/31/18 1205)  BP: (!) 120/59 (01/31/18 1205)  SpO2: 99 % (01/31/18 1205) Vital Signs (24h Range):  Temp:  [98.1 °F (36.7 °C)-99.7 °F (37.6 °C)] 99.7 °F (37.6 °C)  Pulse:  [] 78  Resp:  [0-57] 16  SpO2:  [85 %-99 %] 99 %  BP: (120-186)/() 120/59  Arterial Line BP: (100-213)/() 100/64       Date 01/31/18 0700 - 02/01/18 0659   Shift 9054-2370 4631-9503 5504-4972 24 Hour Total   I  N  T  A  K  E   I.V.  (mL/kg) 393.5  (3.4)   393.5  (3.4)    NG/GT 0   0    IV Piggyback 500   500    Shift Total  (mL/kg) 893.5  (7.7)   893.5  (7.7)   O  U  T  P  U  T   Urine  (mL/kg/hr) 1466   1466    Shift Total  (mL/kg) 1466  (12.6)   1466  (12.6)   Weight (kg) 116.3 116.3 116.3 116.3            NG/OG Tube 01/25/18 2325 nasogastric Right nostril (Active)   Placement Check placement verified by x-ray 1/27/2018  7:01 AM   Tolerance no signs/symptoms of discomfort 1/27/2018  7:01 AM   Securement taped to nostril center 1/27/2018  7:01 AM   Clamp Status/Tolerance clamped 1/27/2018  7:01 AM   Intake (mL) 60 mL 1/26/2018 11:00 PM       Male External Urinary Catheter 01/25/18 2200 Small (Active)   Collection Container Urimeter 1/27/2018  7:01 AM   Securement Method secured to top of thigh w/ adhesive device 1/27/2018   7:01 AM   Skin no redness;no breakdown 1/27/2018  7:01 AM   Tolerance no signs/symptoms of discomfort 1/27/2018  7:01 AM   Output (mL) 400 mL 1/27/2018 10:00 AM   Catheter Change Date 01/26/18 1/27/2018  3:00 AM   Catheter Change Time 0300 1/27/2018  3:00 AM       Neurosurgery Physical Exam      Vital signs: reviewed above.   Constitutional: well-developed,   Cardiovascular: regular rate and rhythm  Resp: on CPAP  Abdomen: soft  Neurological  GCS [id number]  Mental Status: lethargic  non verbal on exam, does not follow commands  Head: normocephalic, atraumatic  PERRL  Facial expression symmetric  Moves left extremities spontaneously, right side withdraws to painful stimuli    Significant Labs:    Recent Labs  Lab 01/30/18  0101  01/30/18  1244  01/31/18  0110 01/31/18  0303 01/31/18  0821 01/31/18  1227   *  --   --   --  175*  --   --   --      < > 145  145  < > 148* 149* 148* 148*   K 3.4*  --  3.9  --  3.7  --   --   --      --   --   --  110  --   --   --    CO2 26  --   --   --  28  --   --   --    BUN 20  --   --   --  21*  --   --   --    CREATININE 0.8  --   --   --  1.0  --   --   --    CALCIUM 8.1*  --   --   --  8.9  --   --   --    MG 2.1  --   --   --  2.1  --   --   --    < > = values in this interval not displayed.    Recent Labs  Lab 01/30/18  0101 01/31/18  0110   WBC 8.24 7.70   HGB 12.6* 13.2*   HCT 36.5* 37.9*    290       Recent Labs  Lab 01/30/18 0101 01/31/18  0110   INR 1.0 1.0     Microbiology Results (last 7 days)     Procedure Component Value Units Date/Time    Blood culture [[id number]] Collected:  01/27/18 1558    Order Status:  Completed Specimen:  Blood from Peripheral, Hand, Left Updated:  01/30/18 2012     Blood Culture, Routine No Growth to date     Blood Culture, Routine No Growth to date     Blood Culture, Routine No Growth to date     Blood Culture, Routine No Growth to date    Narrative:       Blood cultures x 2 different sites. 4 bottles total.  Please  draw cultures before administering antibiotics.    Blood culture [[id number]] Collected:  01/27/18 1559    Order Status:  Completed Specimen:  Blood from Peripheral, Hand, Right Updated:  01/30/18 2012     Blood Culture, Routine No Growth to date     Blood Culture, Routine No Growth to date     Blood Culture, Routine No Growth to date     Blood Culture, Routine No Growth to date    Narrative:       Blood cultures from 2 different sites. 4 bottles total.  Please draw before starting antibiotics.    Urine culture [[id number]] Collected:  01/27/18 1621    Order Status:  Completed Specimen:  Urine from Urine, Catheterized Updated:  01/28/18 2047     Urine Culture, Routine No growth        All pertinent labs from the last 24 hours have been reviewed.    Significant Diagnostics:  CTH reviewed       NG/OG Tube 01/25/18 2325 nasogastric Right nostril (Active)   Placement Check placement verified by x-ray 1/27/2018  7:01 AM   Tolerance no signs/symptoms of discomfort 1/27/2018  7:01 AM   Securement taped to nostril center 1/27/2018  7:01 AM   Clamp Status/Tolerance clamped 1/27/2018  7:01 AM   Intake (mL) 60 mL 1/26/2018 11:00 PM       Male External Urinary Catheter 01/25/18 2200 Small (Active)   Collection Container Urimeter 1/27/2018  7:01 AM   Securement Method secured to top of thigh w/ adhesive device 1/27/2018  7:01 AM   Skin no redness;no breakdown 1/27/2018  7:01 AM   Tolerance no signs/symptoms of discomfort 1/27/2018  7:01 AM   Output (mL) 400 mL 1/27/2018 10:00 AM   Catheter Change Date 01/26/18 1/27/2018  3:00 AM   Catheter Change Time 0300 1/27/2018  3:00 AM       Neurosurgery Physical Exam      Vital signs: reviewed above.   Constitutional: well-developed,   Cardiovascular: regular rate and rhythm  Resp: on CPAP  Abdomen: soft  Neurological  GCS E3V2M6  Mental Status: lethargiv  Head: normocephalic, atraumatic  PERRL  Facial expression symmetric  Follows commands on left, right side withdraws to painful  stimuli    Significant Labs:    Recent Labs  Lab 01/30/18  0101  01/30/18  1244  01/31/18  0110 01/31/18  0303 01/31/18  0821 01/31/18  1227   *  --   --   --  175*  --   --   --      < > 145  145  < > 148* 149* 148* 148*   K 3.4*  --  3.9  --  3.7  --   --   --      --   --   --  110  --   --   --    CO2 26  --   --   --  28  --   --   --    BUN 20  --   --   --  21*  --   --   --    CREATININE 0.8  --   --   --  1.0  --   --   --    CALCIUM 8.1*  --   --   --  8.9  --   --   --    MG 2.1  --   --   --  2.1  --   --   --    < > = values in this interval not displayed.    Recent Labs  Lab 01/30/18  0101 01/31/18  0110   WBC 8.24 7.70   HGB 12.6* 13.2*   HCT 36.5* 37.9*    290       Recent Labs  Lab 01/30/18  0101 01/31/18  0110   INR 1.0 1.0     Microbiology Results (last 7 days)     Procedure Component Value Units Date/Time    Blood culture [[id number]] Collected:  01/27/18 1558    Order Status:  Completed Specimen:  Blood from Peripheral, Hand, Left Updated:  01/30/18 2012     Blood Culture, Routine No Growth to date     Blood Culture, Routine No Growth to date     Blood Culture, Routine No Growth to date     Blood Culture, Routine No Growth to date    Narrative:       Blood cultures x 2 different sites. 4 bottles total. Please  draw cultures before administering antibiotics.    Blood culture [[id number]] Collected:  01/27/18 1559    Order Status:  Completed Specimen:  Blood from Peripheral, Hand, Right Updated:  01/30/18 2012     Blood Culture, Routine No Growth to date     Blood Culture, Routine No Growth to date     Blood Culture, Routine No Growth to date     Blood Culture, Routine No Growth to date    Narrative:       Blood cultures from 2 different sites. 4 bottles total.  Please draw before starting antibiotics.    Urine culture [[id number]] Collected:  01/27/18 1621    Order Status:  Completed Specimen:  Urine from Urine, Catheterized Updated:  01/28/18 2047     Urine Culture,  Routine No growth        All pertinent labs from the last 24 hours have been reviewed.    Significant Diagnostics:  CTH reviewed    Assessment/Plan:     * Embolic stroke involving left middle cerebral artery    47 y.o. male with history of HTN and DM who [place] [facility] concern of L MCA syndrome 1/25/18 AM. He is s/p thrombectomy of L M1 occlusion. CT MP revealed short segment occlusion of left M1.  Patient went to IR for thrombectomy of L M1.  TICI2C reperfusion and angioplasty. Neurosurgery consulted for hemicrani watch. Patient currently on asa and plavix.    -No neurosurgical intervention at this time.  -Continue care per primary team.  -Neurosurgery will be signing off, please contact us with any questions or concerns.                   Raymundo Gan MD  Neurosurgery  [facility]

## 2018-01-31 NOTE — SUBJECTIVE & OBJECTIVE
Interval History: Exam stable.    Medications:  Continuous Infusions:   dexmedetomidine (PRECEDEX) infusion 1 mcg/kg/hr (01/31/18 1205)    insulin (HUMAN R) infusion (adults) 4.9 Units/hr (01/31/18 1205)    buffered 2% sodium acetate 86meq, sodium chloride 86meq, sterile water for inj IV soln 75 mL/hr at 01/31/18 1214     Scheduled Meds:   acetaminophen  650 mg Per NG tube Q6H    albuterol-ipratropium 2.5mg-0.5mg/3mL  3 mL Nebulization Q4H    aspirin  325 mg Per NG tube Daily    atorvastatin  80 mg Per NG tube Daily    cefTRIAXone (ROCEPHIN) IVPB  2 g Intravenous Q24H    chlorhexidine  15 mL Mouth/Throat BID    clopidogrel  75 mg Per NG tube Daily    famotidine  20 mg Per NG tube BID    heparin (porcine)  5,000 Units Subcutaneous Q8H    phenylephrine HCl in 0.9% NaCl  100 mcg Intravenous Once    phenylephrine HCl in 0.9% NaCl        propofol        propofol        senna-docusate 8.6-50 mg  1 tablet Per NG tube BID    sodium chloride 0.9%  500 mL Intravenous Once    sodium chloride 0.9%  3 mL Intravenous Q8H    sodium chloride 3%  4 mL Nebulization Q4H    succinylcholine         PRN Meds:albuterol-ipratropium 2.5mg-0.5mg/3mL, bisacodyl, dextrose 50%, dextrose 50%, dextrose 50%, glucagon (human recombinant), hydrALAZINE, labetalol, magnesium sulfate IVPB, magnesium sulfate IVPB, ondansetron, potassium chloride 10%, potassium chloride 10%, sodium chloride 0.9%     Review of Systems    Objective:     Weight: 116.3 kg (256 lb 6.3 oz)  Body mass index is 36.79 kg/m².  Vital Signs (Most Recent):  Temp: 99.7 °F (37.6 °C) (01/31/18 1105)  Pulse: 78 (01/31/18 1205)  Resp: 16 (01/31/18 1205)  BP: (!) 120/59 (01/31/18 1205)  SpO2: 99 % (01/31/18 1205) Vital Signs (24h Range):  Temp:  [98.1 °F (36.7 °C)-99.7 °F (37.6 °C)] 99.7 °F (37.6 °C)  Pulse:  [] 78  Resp:  [0-57] 16  SpO2:  [85 %-99 %] 99 %  BP: (120-186)/() 120/59  Arterial Line BP: (100-213)/() 100/64       Date 01/31/18 0700 -  02/01/18 0659   Shift 0204-3728 4650-0961 4294-8696 24 Hour Total   I  N  T  A  K  E   I.V.  (mL/kg) 393.5  (3.4)   393.5  (3.4)    NG/GT 0   0    IV Piggyback 500   500    Shift Total  (mL/kg) 893.5  (7.7)   893.5  (7.7)   O  U  T  P  U  T   Urine  (mL/kg/hr) 1466   1466    Shift Total  (mL/kg) 1466  (12.6)   1466  (12.6)   Weight (kg) 116.3 116.3 116.3 116.3     Interval History: Exam stable. Pt on CPAP and cEEG per primary team.     Medications:  Continuous Infusions:   dexmedetomidine (PRECEDEX) infusion 1 mcg/kg/hr (01/31/18 1205)    insulin (HUMAN R) infusion (adults) 4.9 Units/hr (01/31/18 1205)    buffered 2% sodium acetate 86meq, sodium chloride 86meq, sterile water for inj IV soln 75 mL/hr at 01/31/18 1214     Scheduled Meds:   acetaminophen  650 mg Per NG tube Q6H    albuterol-ipratropium 2.5mg-0.5mg/3mL  3 mL Nebulization Q4H    aspirin  325 mg Per NG tube Daily    atorvastatin  80 mg Per NG tube Daily    cefTRIAXone (ROCEPHIN) IVPB  2 g Intravenous Q24H    chlorhexidine  15 mL Mouth/Throat BID    clopidogrel  75 mg Per NG tube Daily    famotidine  20 mg Per NG tube BID    heparin (porcine)  5,000 Units Subcutaneous Q8H    phenylephrine HCl in 0.9% NaCl  100 mcg Intravenous Once    phenylephrine HCl in 0.9% NaCl        propofol        propofol        senna-docusate 8.6-50 mg  1 tablet Per NG tube BID    sodium chloride 0.9%  500 mL Intravenous Once    sodium chloride 0.9%  3 mL Intravenous Q8H    sodium chloride 3%  4 mL Nebulization Q4H    succinylcholine         PRN Meds:albuterol-ipratropium 2.5mg-0.5mg/3mL, bisacodyl, dextrose 50%, dextrose 50%, dextrose 50%, glucagon (human recombinant), hydrALAZINE, labetalol, magnesium sulfate IVPB, magnesium sulfate IVPB, ondansetron, potassium chloride 10%, potassium chloride 10%, sodium chloride 0.9%     Review of Systems    Objective:     Weight: 116.3 kg (256 lb 6.3 oz)  Body mass index is 36.79 kg/m².  Vital Signs (Most Recent):  Temp:  99.7 °F (37.6 °C) (01/31/18 1105)  Pulse: 78 (01/31/18 1205)  Resp: 16 (01/31/18 1205)  BP: (!) 120/59 (01/31/18 1205)  SpO2: 99 % (01/31/18 1205) Vital Signs (24h Range):  Temp:  [98.1 °F (36.7 °C)-99.7 °F (37.6 °C)] 99.7 °F (37.6 °C)  Pulse:  [] 78  Resp:  [0-57] 16  SpO2:  [85 %-99 %] 99 %  BP: (120-186)/() 120/59  Arterial Line BP: (100-213)/() 100/64       Date 01/31/18 0700 - 02/01/18 0659   Shift 7727-0662 1965-6560 9462-4371 24 Hour Total   I  N  T  A  K  E   I.V.  (mL/kg) 393.5  (3.4)   393.5  (3.4)    NG/GT 0   0    IV Piggyback 500   500    Shift Total  (mL/kg) 893.5  (7.7)   893.5  (7.7)   O  U  T  P  U  T   Urine  (mL/kg/hr) 1466   1466    Shift Total  (mL/kg) 1466  (12.6)   1466  (12.6)   Weight (kg) 116.3 116.3 116.3 116.3            NG/OG Tube 01/25/18 2325 nasogastric Right nostril (Active)   Placement Check placement verified by x-ray 1/27/2018  7:01 AM   Tolerance no signs/symptoms of discomfort 1/27/2018  7:01 AM   Securement taped to nostril center 1/27/2018  7:01 AM   Clamp Status/Tolerance clamped 1/27/2018  7:01 AM   Intake (mL) 60 mL 1/26/2018 11:00 PM       Male External Urinary Catheter 01/25/18 2200 Small (Active)   Collection Container Urimeter 1/27/2018  7:01 AM   Securement Method secured to top of thigh w/ adhesive device 1/27/2018  7:01 AM   Skin no redness;no breakdown 1/27/2018  7:01 AM   Tolerance no signs/symptoms of discomfort 1/27/2018  7:01 AM   Output (mL) 400 mL 1/27/2018 10:00 AM   Catheter Change Date 01/26/18 1/27/2018  3:00 AM   Catheter Change Time 0300 1/27/2018  3:00 AM       Neurosurgery Physical Exam      Vital signs: reviewed above.   Constitutional: well-developed,   Cardiovascular: regular rate and rhythm  Resp: on CPAP  Abdomen: soft  Neurological  GCS [id number]  Mental Status: lethargic  non verbal on exam, does not follow commands  Head: normocephalic, atraumatic  PERRL  Facial expression symmetric  Moves left extremities spontaneously, right  side withdraws to painful stimuli    Significant Labs:    Recent Labs  Lab 01/30/18  0101  01/30/18  1244  01/31/18  0110 01/31/18  0303 01/31/18  0821 01/31/18  1227   *  --   --   --  175*  --   --   --      < > 145  145  < > 148* 149* 148* 148*   K 3.4*  --  3.9  --  3.7  --   --   --      --   --   --  110  --   --   --    CO2 26  --   --   --  28  --   --   --    BUN 20  --   --   --  21*  --   --   --    CREATININE 0.8  --   --   --  1.0  --   --   --    CALCIUM 8.1*  --   --   --  8.9  --   --   --    MG 2.1  --   --   --  2.1  --   --   --    < > = values in this interval not displayed.    Recent Labs  Lab 01/30/18  0101 01/31/18  0110   WBC 8.24 7.70   HGB 12.6* 13.2*   HCT 36.5* 37.9*    290       Recent Labs  Lab 01/30/18  0101 01/31/18  0110   INR 1.0 1.0     Microbiology Results (last 7 days)     Procedure Component Value Units Date/Time    Blood culture [[id number]] Collected:  01/27/18 1558    Order Status:  Completed Specimen:  Blood from Peripheral, Hand, Left Updated:  01/30/18 2012     Blood Culture, Routine No Growth to date     Blood Culture, Routine No Growth to date     Blood Culture, Routine No Growth to date     Blood Culture, Routine No Growth to date    Narrative:       Blood cultures x 2 different sites. 4 bottles total. Please  draw cultures before administering antibiotics.    Blood culture [[id number]] Collected:  01/27/18 1559    Order Status:  Completed Specimen:  Blood from Peripheral, Hand, Right Updated:  01/30/18 2012     Blood Culture, Routine No Growth to date     Blood Culture, Routine No Growth to date     Blood Culture, Routine No Growth to date     Blood Culture, Routine No Growth to date    Narrative:       Blood cultures from 2 different sites. 4 bottles total.  Please draw before starting antibiotics.    Urine culture [[id number]] Collected:  01/27/18 1621    Order Status:  Completed Specimen:  Urine from Urine, Catheterized Updated:  01/28/18  2047     Urine Culture, Routine No growth        All pertinent labs from the last 24 hours have been reviewed.    Significant Diagnostics:  [facility] reviewed       NG/OG Tube 01/25/18 2325 nasogastric Right nostril (Active)   Placement Check placement verified by x-ray 1/27/2018  7:01 AM   Tolerance no signs/symptoms of discomfort 1/27/2018  7:01 AM   Securement taped to nostril center 1/27/2018  7:01 AM   Clamp Status/Tolerance clamped 1/27/2018  7:01 AM   Intake (mL) 60 mL 1/26/2018 11:00 PM       Male External Urinary Catheter 01/25/18 2200 Small (Active)   Collection Container Urimeter 1/27/2018  7:01 AM   Securement Method secured to top of thigh w/ adhesive device 1/27/2018  7:01 AM   Skin no redness;no breakdown 1/27/2018  7:01 AM   Tolerance no signs/symptoms of discomfort 1/27/2018  7:01 AM   Output (mL) 400 mL 1/27/2018 10:00 AM   Catheter Change Date 01/26/18 1/27/2018  3:00 AM   Catheter Change Time 0300 1/27/2018  3:00 AM       Neurosurgery Physical Exam      Vital signs: reviewed above.   Constitutional: well-developed,   Cardiovascular: regular rate and rhythm  Resp: on CPAP  Abdomen: soft  Neurological  GCS E3V2M6  Mental Status: lethargiv  Head: normocephalic, atraumatic  PERRL  Facial expression symmetric  Follows commands on left, right side withdraws to painful stimuli    Significant Labs:    Recent Labs  Lab 01/30/18  0101  01/30/18  1244  01/31/18  0110 01/31/18  0303 01/31/18  0821 01/31/18  1227   *  --   --   --  175*  --   --   --      < > 145  145  < > 148* 149* 148* 148*   K 3.4*  --  3.9  --  3.7  --   --   --      --   --   --  110  --   --   --    CO2 26  --   --   --  28  --   --   --    BUN 20  --   --   --  21*  --   --   --    CREATININE 0.8  --   --   --  1.0  --   --   --    CALCIUM 8.1*  --   --   --  8.9  --   --   --    MG 2.1  --   --   --  2.1  --   --   --    < > = values in this interval not displayed.    Recent Labs  Lab 01/30/18  0101 01/31/18  0110   WBC  8.24 7.70   HGB 12.6* 13.2*   HCT 36.5* 37.9*    290       Recent Labs  Lab 01/30/18  0101 01/31/18  0110   INR 1.0 1.0     Microbiology Results (last 7 days)     Procedure Component Value Units Date/Time    Blood culture [[id number]] Collected:  01/27/18 1558    Order Status:  Completed Specimen:  Blood from Peripheral, Hand, Left Updated:  01/30/18 2012     Blood Culture, Routine No Growth to date     Blood Culture, Routine No Growth to date     Blood Culture, Routine No Growth to date     Blood Culture, Routine No Growth to date    Narrative:       Blood cultures x 2 different sites. 4 bottles total. Please  draw cultures before administering antibiotics.    Blood culture [[id number]] Collected:  01/27/18 1559    Order Status:  Completed Specimen:  Blood from Peripheral, Hand, Right Updated:  01/30/18 2012     Blood Culture, Routine No Growth to date     Blood Culture, Routine No Growth to date     Blood Culture, Routine No Growth to date     Blood Culture, Routine No Growth to date    Narrative:       Blood cultures from 2 different sites. 4 bottles total.  Please draw before starting antibiotics.    Urine culture [[id number]] Collected:  01/27/18 1621    Order Status:  Completed Specimen:  Urine from Urine, Catheterized Updated:  01/28/18 2047     Urine Culture, Routine No growth        All pertinent labs from the last 24 hours have been reviewed.    Significant Diagnostics:  CT reviewed

## 2018-01-31 NOTE — SUBJECTIVE & OBJECTIVE
Review of Systems   Unable to perform ROS: Intubated       Objective:     Vitals:  Temp: 99.7 °F (37.6 °C)  Pulse: 80  Rhythm: normal sinus rhythm  BP: (!) 97/55  MAP (mmHg): 70  CI (L/min/m2): 1.9 L/min/m2  SVI: 24  SVV: 16 %  Resp: (!) 26  SpO2: 99 %  Oxygen Concentration (%): 65  O2 Device (Oxygen Therapy): ventilator  Vent Mode: A/C  Set Rate: 16 bmp  Vt Set: 500 mL  Pressure Support: 0 cmH20  PEEP/CPAP: 10 cmH20  Peak Airway Pressure: 22 cmH2O  Mean Airway Pressure: 14 cmH20  Plateau Pressure: 0 cmH20    Temp  Min: 98.1 °F (36.7 °C)  Max: 99.7 °F (37.6 °C)  Pulse  Min: 78  Max: 121  BP  Min: 97/55  Max: 186/103  MAP (mmHg)  Min: 70  Max: 145  CI (L/min/m2)  Min: 1.2 L/min/m2  Max: 3.2 L/min/m2  SVI  Min: 15  Max: 30  SVV  Min: 12 %  Max: 35 %  Resp  Min: 0  Max: 57  SpO2  Min: 85 %  Max: 100 %  Oxygen Concentration (%)  Min: 30  Max: 100    01/30 0701 - 01/31 0700  In: 2783.5 [I.V.:2273.5]  Out: 2550 [Urine:2550]   Unmeasured Output  Urine Occurrence: 1  Stool Occurrence: 0       Physical Exam   Constitutional: He appears well-developed.   Eyes: Pupils are equal, round, and reactive to light.   Cardiovascular: Regular rhythm and normal heart sounds.    Pulmonary/Chest: Breath sounds normal.   Abdominal: Soft. Bowel sounds are normal.   Neurological:   Sedated, withdrawal again noted mostly on left side, some withdrawal right leg         Medications:  Continuous  dexmedetomidine (PRECEDEX) infusion Last Rate: 0.8 mcg/kg/hr (01/31/18 1405)   insulin (HUMAN R) infusion (adults) Last Rate: 5.9 Units/hr (01/31/18 1305)   buffered 2% sodium acetate 86meq, sodium chloride 86meq, sterile water for inj IV soln Last Rate: 75 mL/hr at 01/31/18 1405   Scheduled  acetaminophen 650 mg Q6H   albuterol-ipratropium 2.5mg-0.5mg/3mL 3 mL Q4H   aspirin 325 mg Daily   atorvastatin 80 mg Daily   cefTRIAXone (ROCEPHIN) IVPB 2 g Q24H   chlorhexidine 15 mL BID   clopidogrel 75 mg Daily   famotidine 20 mg BID   heparin (porcine)  5,000 Units Q8H   phenylephrine HCl in 0.9% NaCl 100 mcg Once   phenylephrine HCl in 0.9% NaCl     propofol     propofol     senna-docusate 8.6-50 mg 1 tablet BID   sodium chloride 0.9% 3 mL Q8H   sodium chloride 3% 4 mL Q4H   succinylcholine     PRN  albuterol-ipratropium 2.5mg-0.5mg/3mL 3 mL Q4H PRN   bisacodyl 10 mg Daily PRN   dextrose 50% 12.5 g PRN   dextrose 50% 12.5 g PRN   dextrose 50% 25 g PRN   glucagon (human recombinant) 1 mg PRN   hydrALAZINE 10 mg Q4H PRN   labetalol 10 mg Q4H PRN   magnesium sulfate IVPB 2 g PRN   magnesium sulfate IVPB 4 g PRN   ondansetron 4 mg Q8H PRN   potassium chloride 10% 40 mEq PRN   potassium chloride 10% 40 mEq PRN   sodium chloride 0.9% 5 mL PRN     Today I personally reviewed pertinent medications, lines/drains/airways, imaging, lab results, notably:    Diet  Diet NPO  Diet NPO

## 2018-01-31 NOTE — PLAN OF CARE
Problem: Patient Care Overview  Goal: Plan of Care Review  Plan of care reviewed with pt and spouse.All questions and concerns addressed.EEG removed.No acute distress noted.

## 2018-01-31 NOTE — PROCEDURES
"Todd Quevedo is a 47 y.o. male patient.    Temp: 99.7 °F (37.6 °C) (01/31/18 1105)  Pulse: 79 (01/31/18 1327)  Resp: 16 (01/31/18 1327)  BP: 102/62 (01/31/18 1305)  SpO2: 100 % (01/31/18 1327)  Weight: 116.3 kg (256 lb 6.3 oz) (01/30/18 1000)  Height: 5' 10" (177.8 cm) (01/30/18 1000)       Intubation  Date/Time: 1/31/2018 9:30 AM  Performed by: MATTY COULTER  Authorized by: MATTY COULTER   Consent Done: Emergent Situation  Indications: respiratory distress,  respiratory failure and  hypoxemia  Intubation method: video-assisted  Patient status: sedated  Preoxygenation: BVM  Sedatives: etomidate  Paralytic: rocuronium  Laryngoscope size: Mac 3  Tube size: 8.0 mm  Tube type: cuffed  Number of attempts: 2  Ventilation between attempts: BVM  Cricoid pressure: no  Cords visualized: no  Post-procedure assessment: CO2 detector  Breath sounds: equal  Cuff inflated: yes  ETT to lip: 26 cm  ETT to teeth: 25 cm  Tube secured with: ETT bell  Chest x-ray interpreted by me.  Chest x-ray findings: endotracheal tube in appropriate position  Patient tolerance: Patient tolerated the procedure well with no immediate complications  Complications: No  Specimens: No  Implants: No  Comments: After procedure, patient became hypoxemic again, chest xray revealed adequate position of tube but right main stem bronchos occlusion. This was likely due to excessive dried secretions in mouth which were removed as best as able to visualize before intubation. With ambu bag, saline and suction as well as positioning, sats improved to mid 80s on 100% fiO2 and peep 14  Emergent bronchoscopy was then performed          Matty Coulter  1/31/2018    "

## 2018-01-31 NOTE — ASSESSMENT & PLAN NOTE
Mr. Quevedo is a 47 year old male who walked into [facility] in [place] and was acting abnormal.  EMS was called and brought to the ED for concern of L MCA syndrome. Not tpa candidate due to unknown last known normal.  Patient went to IR for thrombectomy of L M1 occlusion seen on CTA MP on 1/25.  TICI2C reperfusion and angioplasty.      Primary team with concerns for sepsis, believe respiratory source. Started on antibiotics.    Antithrombotics for secondary stroke prevention: asa 325mg qd and plavix 75mg qd   Statins for secondary stroke prevention and hyperlipidemia, if present: Repeat LDL (last invalid secondary to hypertriglyceridemia), atorvastatin 80 in interim  Aggressive risk factor modification: Obesity  Rehab efforts: PT/OT/SLP to evaluate and treat  Diagnostics ordered/pending:   CTA - L M1 occlusion  A1C 10, TSH 1.8, LDL immeasurable 2/2 trigylcerides (460), Chol 200  MRI - most of the L MCA shows area of infarct  VTE prophylaxis: Heparin 5000 units SQ every 8 hours  BP parameters: Infarct: Post sucessful thrombectomy, SBP <140     Repeat CTHead sable.

## 2018-01-31 NOTE — ASSESSMENT & PLAN NOTE
Due to airway obstruction form hardened secretions overlying epiglottis and soft palate, improved after intubation and bronchoscopy

## 2018-01-31 NOTE — PROGRESS NOTES
[facility]  Neurocritical Care  Progress Note    Admit Date: 1/25/2018  Service Date: 01/31/2018  Length of Stay: 6    Subjective:     Chief Complaint: Embolic stroke involving left middle cerebral artery    History of Present Illness: The patient is a 47 year old male with no known PMHx admitted to [facility]   s/p thrombectomy of L M1 occlusion. Per chart review, he   walked into [place] and was acting abnormal.  EMS was called and brought to the ED for concern of L MCA syndrome. CT MP revealed short segment occlusion of left M1.  Patient went to IR for thrombectomy of L M1 occlusion seen on CTA MP.  TICI2C reperfusion and angioplasty. Patient admitted to [facility] for close monitoring and higher level of care.   History obtained from chart review only            Hospital Course: 1/25: Pt admitted to [facility] s/p L M1 thrombectomy, TICI2C reperfusion and angioplasty   1/27: large L MCA, hemicrani watch, NSGY following, insulin ggt, cardene ggt, L sided intermittent slowing eeg but no sz, +nasal trumpet for copious thick secretions, wheezing this am - improved with duo nebs, 3% nebs, and cough assist, concern for sepsis 2/2 hemodynamic changes - increased HR and BP, worsening leukocytosis, +ceftriaxone, pan cx, adrian track  1/28: continued respiratory challenges due to secretions. Aggressive pulmonary toileting. Continue monitoring for neuro worsening. Add moxifloxacin.   1/29: CTH to eval for increased edema/shift, EEG afterwards. Concern for decrease exam, no following/decreased alertness). CXR and Procal to follow leukocytosis. Hold TF until eval decreasing EXAM  1/30: neuro exam stable, increase 2%, current amount of sodium iv not suffuicient to meet target 145-155, cont abx for likely pneumonia, repeat ct in am  1/31: abrupt desaturation today requiring emergent intubation followed by bronchoscopy  Etiology likely upper airway obstruction from dried secretions        Review of Systems   Unable to perform  ROS: Intubated       Objective:     Vitals:  Temp: 99.7 °F (37.6 °C)  Pulse: 80  Rhythm: normal sinus rhythm  BP: (!) 97/55  MAP (mmHg): 70  CI (L/min/m2): 1.9 L/min/m2  SVI: 24  SVV: 16 %  Resp: (!) 26  SpO2: 99 %  Oxygen Concentration (%): 65  O2 Device (Oxygen Therapy): ventilator  Vent Mode: A/C  Set Rate: 16 bmp  Vt Set: 500 mL  Pressure Support: 0 cmH20  PEEP/CPAP: 10 cmH20  Peak Airway Pressure: 22 cmH2O  Mean Airway Pressure: 14 cmH20  Plateau Pressure: 0 cmH20    Temp  Min: 98.1 °F (36.7 °C)  Max: 99.7 °F (37.6 °C)  Pulse  Min: 78  Max: 121  BP  Min: 97/55  Max: 186/103  MAP (mmHg)  Min: 70  Max: 145  CI (L/min/m2)  Min: 1.2 L/min/m2  Max: 3.2 L/min/m2  SVI  Min: 15  Max: 30  SVV  Min: 12 %  Max: 35 %  Resp  Min: 0  Max: 57  SpO2  Min: 85 %  Max: 100 %  Oxygen Concentration (%)  Min: 30  Max: 100    01/30 0701 - 01/31 0700  In: 2783.5 [I.V.:2273.5]  Out: 2550 [Urine:2550]   Unmeasured Output  Urine Occurrence: 1  Stool Occurrence: 0       Physical Exam   Constitutional: He appears well-developed.   Eyes: Pupils are equal, round, and reactive to light.   Cardiovascular: Regular rhythm and normal heart sounds.    Pulmonary/Chest: Breath sounds normal.   Abdominal: Soft. Bowel sounds are normal.   Neurological:   Sedated, withdrawal again noted mostly on left side, some withdrawal right leg         Medications:  Continuous  dexmedetomidine (PRECEDEX) infusion Last Rate: 0.8 mcg/kg/hr (01/31/18 1405)   insulin (HUMAN R) infusion (adults) Last Rate: 5.9 Units/hr (01/31/18 1305)   buffered 2% sodium acetate 86meq, sodium chloride 86meq, sterile water for inj IV soln Last Rate: 75 mL/hr at 01/31/18 1405   Scheduled  acetaminophen 650 mg Q6H   albuterol-ipratropium 2.5mg-0.5mg/3mL 3 mL Q4H   aspirin 325 mg Daily   atorvastatin 80 mg Daily   cefTRIAXone (ROCEPHIN) IVPB 2 g Q24H   chlorhexidine 15 mL BID   clopidogrel 75 mg Daily   famotidine 20 mg BID   heparin (porcine) 5,000 Units Q8H   phenylephrine HCl in 0.9%  NaCl 100 mcg Once   phenylephrine HCl in 0.9% NaCl     propofol     propofol     senna-docusate 8.6-50 mg 1 tablet BID   sodium chloride 0.9% 3 mL Q8H   sodium chloride 3% 4 mL Q4H   succinylcholine     PRN  albuterol-ipratropium 2.5mg-0.5mg/3mL 3 mL Q4H PRN   bisacodyl 10 mg Daily PRN   dextrose 50% 12.5 g PRN   dextrose 50% 12.5 g PRN   dextrose 50% 25 g PRN   glucagon (human recombinant) 1 mg PRN   hydrALAZINE 10 mg Q4H PRN   labetalol 10 mg Q4H PRN   magnesium sulfate IVPB 2 g PRN   magnesium sulfate IVPB 4 g PRN   ondansetron 4 mg Q8H PRN   potassium chloride 10% 40 mEq PRN   potassium chloride 10% 40 mEq PRN   sodium chloride 0.9% 5 mL PRN     Today I personally reviewed pertinent medications, lines/drains/airways, imaging, lab results, notably:    Diet  Diet NPO  Diet NPO        Assessment/Plan:     Neuro   * Embolic stroke involving left middle cerebral artery    S/P L M1 thrombectomy  -- Neuro checks q 1hr  -- Vascular Neurology  following  -- CT MP- Short segment occlusion of left M1   -- Repeat CTH 1/26 - No detrimental change  -- SBP goal <160  -- PT/OT/Speech  -- hemicrani watch, NSGY following   -- MRI 1/26 - L MCA, no hemorrhagic conversion  -- stat CTH 1/27: L MCA stable, no worsening edema or hemorrhagic conversion  -- ASA, plavix     Appears exam declined, no consistent with previous imaging. Stat CTH to eval for increased edema and then EEG.   NO AEDs at this time  01/31: no eeg evidence for seizure x 2 days, ct head stable, now that airway secured-for mri in am            Cytotoxic cerebral edema    monitor CTh and exam for increasing edema  2% increased to 100ml/hr, if no increase in serum sodium or ct unchanged or worse, needs tlc for 3%  1/31: sodium improved to goal, cont 2%        Pulmonary   Acute respiratory failure with hypoxia    Due to airway obstruction form hardened secretions overlying epiglottis and soft palate, improved after intubation and bronchoscopy        Cardiac/Vascular    Hyperlipidemia    -atorvastatin 80 daily           Essential hypertension    --SBP <160  --cardene gtt  --echo:    1 - Normal left ventricular systolic function (EF 65-70%).     2 - Concentric remodeling.     3 - No wall motion abnormalities.     4 - Normal left ventricular diastolic function.     5 - Normal right ventricular systolic function .             Oncology   Leukocytosis    _Pan Cx and Abx over weekend  -Trend PRocal  -SLight improvement in Leukocytosis with ABX  -Eval CXR  Small development of perihilar infiltrate, temp, white count, secretions all better on ceftriaxone,cont 7 day course        Endocrine   Type 2 diabetes mellitus    -A1C-10  -poorly controlled diabetes  -BG >400 on arrival  -insulin gtt until on TF. Will resp stability before starting TF          Other   Obstructive sleep apnea    Oxygenating well at 45 degrees head position or higher  Intubated due to inspissated secretions from nocturnal cpap            Prophylaxis:  Venous Thromboembolism: chemical  Stress Ulcer: PPI  Ventilator Pneumonia: no     Activity Orders          None      CRC 90 minutes  Full Code    Matty Coulter MD  Neurocritical Care  [facility]

## 2018-01-31 NOTE — ASSESSMENT & PLAN NOTE
Oxygenating well at 45 degrees head position or higher  Intubated due to inspissated secretions from nocturnal cpap

## 2018-01-31 NOTE — PROGRESS NOTES
[facility]  Vascular Neurology  Comprehensive Stroke Center  Progress Note    Assessment/Plan:     * Embolic stroke involving left middle cerebral artery    Mr. Quevedo is a 47 year old male who walked into [facility] and was acting abnormal.  EMS was called and brought to the ED for concern of L MCA syndrome. Not tpa candidate due to unknown last known normal.  Patient went to IR for thrombectomy of L M1 occlusion seen on CTA MP on 1/25.  TICI2C reperfusion and angioplasty.      Primary team with concerns for sepsis, believe respiratory source. Started on antibiotics.    Antithrombotics for secondary stroke prevention: asa 325mg qd and plavix 75mg qd   Statins for secondary stroke prevention and hyperlipidemia, if present: Repeat LDL (last invalid secondary to hypertriglyceridemia), atorvastatin 80 in interim  Aggressive risk factor modification: Obesity  Rehab efforts: PT/OT/SLP to evaluate and treat  Diagnostics ordered/pending:   CTA - L M1 occlusion  A1C 10, TSH 1.8, LDL immeasurable 2/2 trigylcerides (460), Chol 200  MRI - most of the L MCA shows area of infarct  VTE prophylaxis: Heparin 5000 units SQ every 8 hours  BP parameters: Infarct: Post sucessful thrombectomy, SBP <140     Repeat CTHead sable.         Cytotoxic cerebral edema    Due to stroke  Evidence of mass effect and brain compression on imaging        Leukocytosis    Pancultured, broad spectrum antibiotics  Possible sepsis, Respiratory source  -Management per primary        Type 2 diabetes mellitus    - A1C 10  - hyperglycemic; started on insulin gtt   - Can consider endocrine consult when stepped down to stroke service        Hyperlipidemia    - Chol 200, LDL immeasurable,   - atorvastatin 80 mg  - repeat lipid panel          Essential hypertension    - management for primary team  - goal SBP post thrombectomy <140  - requiring multiple administration of PRN hydralazine        Obstructive sleep apnea    Stroke risk  factor               1/25: Brought in for aphasia, RSW with L gaze preferance. LKN unknown, not tPA candidate. Went to IR for angiogram and stent.  1/29: Off Cardene, remains on Insulin gtt. Concern for sepsis, respiratory source. Imaging with mass effect and some brain compression; maycol crani watch.  1/30: EEG consistent with focal L slowing 2/2 lesion and mild generalized slowing, no seizures. CT head stable, no hemorrhagic conversion    STROKE DOCUMENTATION   Acute Stroke Times   Stroke Team Called Date: 01/25/18  Stroke Team Called Time: 1852  Stroke Team Arrival Date: 01/25/18  Stroke Team Arrival Time: 0652  CT Interpretation Time: 1910  Decision to Treat Time for Alteplase:  (n/a unknown last known normal )  Decision to Treat Time for IR: 1915    NIH Scale:  1a. Level Of Consciousness: 2-->Not alert: requires repeated stimulation to attend, or is obtunded and requires strong or painful stimulation to make movements (not stereotyped)  1b. LOC Questions: 2-->Answers neither question correctly  1c. LOC Commands: 2-->Performs neither task correctly  2. Best Gaze: 0-->Normal  3. Visual: 0-->No visual loss  4. Facial Palsy: 0-->Normal symmetrical movements  5a. Motor Arm, Left: 4-->No movement  5b. Motor Arm, Right: 4-->No movement  6a. Motor Leg, Left: 4-->No movement  6b. Motor Leg, Right: 4-->No movement  7. Limb Ataxia: 0-->Absent  8. Sensory: 0-->Normal: no sensory loss  9. Best Language: 3-->Mute, global aphasia: no usable speech or auditory comprehension  10. Dysarthria: 0-->Normal  11. Extinction and Inattention (formerly Neglect): 0-->No abnormality  Total (NIH Stroke Scale): 25       Modified Jasmin    University Coma Scale:    ABCD2 Score:    [id number] Score:   HAS -BLED Score:   ICH Score:   Hunt & Laguerre Classification:      Hemorrhagic change of an Ischemic Stroke: Does this patient have an ischemic stroke with hemorrhagic changes? No     Neurologic Chief Complaint:  L M1 embolic infarction s/p  thrombectomy    Subjective:     Interval History: Patient is seen for follow-up neurological assessment and treatment recommendations:     No acute changes overnight.     HPI, Past Medical, Family, and Social History remains the same as documented in the initial encounter.     Review of Systems   Constitutional: Positive for fever.   Neurological: Positive for weakness.   Psychiatric/Behavioral: Positive for confusion.     Scheduled Meds:   acetaminophen  650 mg Per NG tube Q6H    albuterol-ipratropium 2.5mg-0.5mg/3mL  3 mL Nebulization Q4H    aspirin  325 mg Per NG tube Daily    atorvastatin  80 mg Per NG tube Daily    cefTRIAXone (ROCEPHIN) IVPB  2 g Intravenous Q24H    chlorhexidine  15 mL Mouth/Throat BID    clopidogrel  75 mg Per NG tube Daily    famotidine  20 mg Per NG tube BID    heparin (porcine)  5,000 Units Subcutaneous Q8H    phenylephrine HCl in 0.9% NaCl  100 mcg Intravenous Once    phenylephrine HCl in 0.9% NaCl        propofol        propofol        senna-docusate 8.6-50 mg  1 tablet Per NG tube BID    sodium chloride 0.9%  3 mL Intravenous Q8H    sodium chloride 3%  4 mL Nebulization Q4H    succinylcholine         Continuous Infusions:   dexmedetomidine (PRECEDEX) infusion 1 mcg/kg/hr (01/31/18 1205)    insulin (HUMAN R) infusion (adults) 4.9 Units/hr (01/31/18 1205)    buffered 2% sodium acetate 86meq, sodium chloride 86meq, sterile water for inj IV soln 75 mL/hr at 01/31/18 1214     PRN Meds:albuterol-ipratropium 2.5mg-0.5mg/3mL, bisacodyl, dextrose 50%, dextrose 50%, dextrose 50%, glucagon (human recombinant), hydrALAZINE, labetalol, magnesium sulfate IVPB, magnesium sulfate IVPB, ondansetron, potassium chloride 10%, potassium chloride 10%, sodium chloride 0.9%    Objective:     Vital Signs (Most Recent):  Temp: 99.7 °F (37.6 °C) (01/31/18 1105)  Pulse: 79 (01/31/18 1327)  Resp: 16 (01/31/18 1327)  BP: (!) 120/59 (01/31/18 1205)  SpO2: 100 % (01/31/18 1327)  BP Location: Left  arm    Vital Signs Range (Last 24H):  Temp:  [98.1 °F (36.7 °C)-99.7 °F (37.6 °C)]   Pulse:  []   Resp:  [0-57]   BP: (120-186)/()   SpO2:  [85 %-100 %]   Arterial Line BP: (100-213)/()   BP Location: Left arm    Physical Exam   Constitutional: He appears well-developed.   Cardiovascular: Normal rate and regular rhythm.  Exam reveals no friction rub.    No murmur heard.  Pulmonary/Chest: No respiratory distress. He has no wheezes.   Abdominal: Soft. He exhibits no distension. There is no tenderness.       Neurological Exam:   LOC: obtunded  Language: Mute  Articulation: Mute/Anarthric  Visual Fields: no blink to threat on right  EOM (CN III, IV, VI): L gaze preference  Pupils (CN II, III): PERRL  Sustained clonus on R  Withdraws to noxious stimulin in all 4 extremities    Laboratory:  Recent Results (from the past 24 hour(s))   POCT glucose    Collection Time: 01/30/18  2:00 PM   Result Value Ref Range    POCT Glucose 155 (H) 70 - 110 mg/dL   POCT glucose    Collection Time: 01/30/18  3:08 PM   Result Value Ref Range    POCT Glucose 151 (H) 70 - 110 mg/dL   POCT glucose    Collection Time: 01/30/18  4:06 PM   Result Value Ref Range    POCT Glucose 141 (H) 70 - 110 mg/dL   Sodium    Collection Time: 01/30/18  4:15 PM   Result Value Ref Range    Sodium 146 (H) 136 - 145 mmol/L   POCT glucose    Collection Time: 01/30/18  5:17 PM   Result Value Ref Range    POCT Glucose 159 (H) 70 - 110 mg/dL   POCT glucose    Collection Time: 01/30/18  6:03 PM   Result Value Ref Range    POCT Glucose 156 (H) 70 - 110 mg/dL   POCT glucose    Collection Time: 01/30/18  7:09 PM   Result Value Ref Range    POCT Glucose 154 (H) 70 - 110 mg/dL   Sodium    Collection Time: 01/30/18  7:12 PM   Result Value Ref Range    Sodium 145 136 - 145 mmol/L   POCT glucose    Collection Time: 01/30/18  7:56 PM   Result Value Ref Range    POCT Glucose 154 (H) 70 - 110 mg/dL   POCT glucose    Collection Time: 01/30/18  9:05 PM   Result  Value Ref Range    POCT Glucose 149 (H) 70 - 110 mg/dL   POCT glucose    Collection Time: 01/30/18  9:57 PM   Result Value Ref Range    POCT Glucose 154 (H) 70 - 110 mg/dL   POCT glucose    Collection Time: 01/30/18 10:59 PM   Result Value Ref Range    POCT Glucose 177 (H) 70 - 110 mg/dL   Sodium    Collection Time: 01/30/18 11:06 PM   Result Value Ref Range    Sodium 147 (H) 136 - 145 mmol/L   POCT glucose    Collection Time: 01/31/18 12:08 AM   Result Value Ref Range    POCT Glucose 163 (H) 70 - 110 mg/dL   POCT glucose    Collection Time: 01/31/18 12:54 AM   Result Value Ref Range    POCT Glucose 167 (H) 70 - 110 mg/dL   Comprehensive metabolic panel    Collection Time: 01/31/18  1:10 AM   Result Value Ref Range    Sodium 148 (H) 136 - 145 mmol/L    Potassium 3.7 3.5 - 5.1 mmol/L    Chloride 110 95 - 110 mmol/L    CO2 28 23 - 29 mmol/L    Glucose 175 (H) 70 - 110 mg/dL    BUN, Bld 21 (H) 6 - 20 mg/dL    Creatinine 1.0 0.5 - 1.4 mg/dL    Calcium 8.9 8.7 - 10.5 mg/dL    Total Protein 6.6 6.0 - 8.4 g/dL    Albumin 2.7 (L) 3.5 - 5.2 g/dL    Total Bilirubin 0.5 0.1 - 1.0 mg/dL    Alkaline Phosphatase 83 55 - 135 U/L    AST 36 10 - 40 U/L    ALT 31 10 - 44 U/L    Anion Gap 10 8 - 16 mmol/L    eGFR if African American >60.0 >60 mL/min/1.73 m^2    eGFR if non African American >60.0 >60 mL/min/1.73 m^2   CBC auto differential    Collection Time: 01/31/18  1:10 AM   Result Value Ref Range    WBC 7.70 3.90 - 12.70 K/uL    RBC 4.37 (L) 4.60 - 6.20 M/uL    Hemoglobin 13.2 (L) 14.0 - 18.0 g/dL    Hematocrit 37.9 (L) 40.0 - 54.0 %    MCV 87 82 - 98 fL    MCH 30.2 27.0 - 31.0 pg    MCHC 34.8 32.0 - 36.0 g/dL    RDW 12.8 11.5 - 14.5 %    Platelets 290 150 - 350 K/uL    MPV 9.5 9.2 - 12.9 fL    Immature Granulocytes 0.8 (H) 0.0 - 0.5 %    Gran # (ANC) 5.5 1.8 - 7.7 K/uL    Immature Grans (Abs) 0.06 (H) 0.00 - 0.04 K/uL    Lymph # 1.4 1.0 - 4.8 K/uL    Mono # 0.7 0.3 - 1.0 K/uL    Eos # 0.0 0.0 - 0.5 K/uL    Baso # 0.04 0.00 - 0.20  K/uL    nRBC 0 0 /100 WBC    Gran% 71.8 38.0 - 73.0 %    Lymph% 18.1 18.0 - 48.0 %    Mono% 8.4 4.0 - 15.0 %    Eosinophil% 0.4 0.0 - 8.0 %    Basophil% 0.5 0.0 - 1.9 %    Differential Method Automated    Magnesium    Collection Time: 01/31/18  1:10 AM   Result Value Ref Range    Magnesium 2.1 1.6 - 2.6 mg/dL   Phosphorus    Collection Time: 01/31/18  1:10 AM   Result Value Ref Range    Phosphorus 3.2 2.7 - 4.5 mg/dL   Protime-INR    Collection Time: 01/31/18  1:10 AM   Result Value Ref Range    Prothrombin Time 10.9 9.0 - 12.5 sec    INR 1.0 0.8 - 1.2   POCT glucose    Collection Time: 01/31/18  1:55 AM   Result Value Ref Range    POCT Glucose 185 (H) 70 - 110 mg/dL   POCT glucose    Collection Time: 01/31/18  2:59 AM   Result Value Ref Range    POCT Glucose 178 (H) 70 - 110 mg/dL   Sodium    Collection Time: 01/31/18  3:03 AM   Result Value Ref Range    Sodium 149 (H) 136 - 145 mmol/L   POCT glucose    Collection Time: 01/31/18  4:13 AM   Result Value Ref Range    POCT Glucose 173 (H) 70 - 110 mg/dL   POCT glucose    Collection Time: 01/31/18  5:10 AM   Result Value Ref Range    POCT Glucose 148 (H) 70 - 110 mg/dL   POCT glucose    Collection Time: 01/31/18  6:06 AM   Result Value Ref Range    POCT Glucose 148 (H) 70 - 110 mg/dL   POCT glucose    Collection Time: 01/31/18  7:30 AM   Result Value Ref Range    POCT Glucose 179 (H) 70 - 110 mg/dL   POCT glucose    Collection Time: 01/31/18  8:11 AM   Result Value Ref Range    POCT Glucose 199 (H) 70 - 110 mg/dL   Sodium    Collection Time: 01/31/18  8:21 AM   Result Value Ref Range    Sodium 148 (H) 136 - 145 mmol/L   ISTAT PROCEDURE    Collection Time: 01/31/18  8:48 AM   Result Value Ref Range    POC PH 7.520 (H) 7.35 - 7.45    POC PCO2 39.3 35 - 45 mmHg    POC PO2 112 (H) 80 - 100 mmHg    POC HCO3 32.0 (H) 24 - 28 mmol/L    POC BE 9 -2 to 2 mmol/L    POC SATURATED O2 99 95 - 100 %    POC TCO2 33 (H) 23 - 27 mmol/L    Rate 16     Sample ARTERIAL     Site Brianne/UAC      Allens Test N/A     DelSys CPAP/BiPAP     Mode AVAPS     Vt 550     PiP 10     FiO2 55    POCT glucose    Collection Time: 01/31/18  9:10 AM   Result Value Ref Range    POCT Glucose 199 (H) 70 - 110 mg/dL   ISTAT PROCEDURE    Collection Time: 01/31/18  9:44 AM   Result Value Ref Range    POC PH 7.581 (HH) 7.35 - 7.45    POC PCO2 35.7 35 - 45 mmHg    POC PO2 50 (LL) 80 - 100 mmHg    POC HCO3 33.6 (H) 24 - 28 mmol/L    POC BE 12 -2 to 2 mmol/L    POC SATURATED O2 90 (L) 95 - 100 %    POC TCO2 35 (H) 23 - 27 mmol/L    Rate 16     Sample ARTERIAL     Site Brianne/UAC     Allens Test N/A     DelSys CPAP/BiPAP     Mode BiPAP     FiO2 70     Spont Rate 32     Min Vol 17     Sp02 85     EP 8    POCT glucose    Collection Time: 01/31/18 10:44 AM   Result Value Ref Range    POCT Glucose 222 (H) 70 - 110 mg/dL   POCT glucose    Collection Time: 01/31/18 11:09 AM   Result Value Ref Range    POCT Glucose 223 (H) 70 - 110 mg/dL   POCT glucose    Collection Time: 01/31/18 12:22 PM   Result Value Ref Range    POCT Glucose 208 (H) 70 - 110 mg/dL   Sodium    Collection Time: 01/31/18 12:27 PM   Result Value Ref Range    Sodium 148 (H) 136 - 145 mmol/L       Microbiology Results (last 7 days)     Procedure Component Value Units Date/Time    Blood culture [[id number]] Collected:  01/27/18 2592    Order Status:  Completed Specimen:  Blood from Peripheral, Hand, Left Updated:  01/30/18 2012     Blood Culture, Routine No Growth to date     Blood Culture, Routine No Growth to date     Blood Culture, Routine No Growth to date     Blood Culture, Routine No Growth to date    Narrative:       Blood cultures x 2 different sites. 4 bottles total. Please  draw cultures before administering antibiotics.    Blood culture [[id number]] Collected:  01/27/18 4033    Order Status:  Completed Specimen:  Blood from Peripheral, Hand, Right Updated:  01/30/18 2012     Blood Culture, Routine No Growth to date     Blood Culture, Routine No Growth to date      Blood Culture, Routine No Growth to date     Blood Culture, Routine No Growth to date    Narrative:       Blood cultures from 2 different sites. 4 bottles total.  Please draw before starting antibiotics.    Urine culture [[id number]] Collected:  01/27/18 1621    Order Status:  Completed Specimen:  Urine from Urine, Catheterized Updated:  01/28/18 2047     Urine Culture, Routine No growth            Diagnostic Results     Brain Imaging   1/27/18 CTH: Per independent resident review and interpretation,  L MCA infarction s/p stenting.  No sign of hemorrhagic transformation.  R basal ganglia and cerebellar infarct.          1/26/18 MRI Brain w/o: Per independent resident review and interpretation,  Acute L MCA infarction.        Vessel Imaging   1/25/18 IR Angiogram: Per independent resident review and interpretation,  L M1 occlusion.        Cardiac Imaging   1/26/18 EKG: Per independent resident review and interpretation,  Sinus tachycardia.  .  QTc 500.    1/26/18 TTE: Per report,  LA wnl  EF 65-70%  No regional wall motion abnormalities  Diastolic function normal        Shagufta Arce MD  Comprehensive Stroke Center  Department of Vascular Neurology   [facility]

## 2018-01-31 NOTE — PROGRESS NOTES
0330: Pt transported to CT with RN and PCT on portable monitor and O2.    0410: Pt returned to room and reconnected to bedside monitor.     NCC on call aware of CT completion.

## 2018-01-31 NOTE — ASSESSMENT & PLAN NOTE
47 y.o. male with history of HTN and DM who presented to [facility] by EMS for concern of L MCA syndrome 1/25/18 AM. He is s/p thrombectomy of L M1 occlusion. CT MP revealed short segment occlusion of left M1.  Patient went to IR for thrombectomy of L M1.  TICI2C reperfusion and angioplasty. Neurosurgery consulted for hemicrani watch. Patient currently on asa and plavix.    -No neurosurgical intervention at this time.  -Continue care per primary team.  -Neurosurgery will be signing off, please contact us with any questions or concerns.

## 2018-01-31 NOTE — ASSESSMENT & PLAN NOTE
monitor CTh and exam for increasing edema  2% increased to 100ml/hr, if no increase in serum sodium or ct unchanged or worse, needs tlc for 3%  1/31: sodium improved to goal, cont 2%

## 2018-01-31 NOTE — PLAN OF CARE
Problem: Patient Care Overview  Goal: Plan of Care Review  Outcome: Ongoing (interventions implemented as appropriate)  POC reviewed with patient and family at 1600. No evidence of learning from the patient. Questions and concerns addressed with the wife. RN came onto shift and turned off TF due to CPAP on patient. Patient was in respiratory distress and stating in the low 90s. RN called NCC team to bedside due to low stats and SBP > 160. RN was ordered to administer an additional dose of hydralazine, Lasix 60 mg and 4 mg of Versed per MD Jina. Patient did not improve and blood gases ordered. Patient was intubated per NCC team. Patient was bronched post intubation due to low O2 stats. Patient is currently stable and on precedex, 2%, and an insulin drip. Patient is on AC, rate of 16, , FiO2 at 65%, and Peep of 8. Day bath given. Patient turned per protocol. Oral care per protocol. RN will continue to monitor. See flowsheets for full assessment and VS info.

## 2018-01-31 NOTE — ASSESSMENT & PLAN NOTE
S/P L M1 thrombectomy  -- Neuro checks q 1hr  -- Vascular Neurology  following  -- CT MP- Short segment occlusion of left M1   -- Repeat CTH 1/26 - No detrimental change  -- SBP goal <160  -- PT/OT/Speech  -- hemicrani watch, NSGY following   -- MRI 1/26 - L MCA, no hemorrhagic conversion  -- stat CTH 1/27: L MCA stable, no worsening edema or hemorrhagic conversion  -- ASA, plavix     Appears exam declined, no consistent with previous imaging. Stat CTH to eval for increased edema and then EEG.   NO AEDs at this time  01/31: no eeg evidence for seizure x 2 days, ct head stable, now that airway secured-for mri in am

## 2018-01-31 NOTE — SUBJECTIVE & OBJECTIVE
Neurologic Chief Complaint:  L M1 embolic infarction s/p thrombectomy    Subjective:     Interval History: Patient is seen for follow-up neurological assessment and treatment recommendations:     No acute changes overnight.     HPI, Past Medical, Family, and Social History remains the same as documented in the initial encounter.     Review of Systems   Constitutional: Positive for fever.   Neurological: Positive for weakness.   Psychiatric/Behavioral: Positive for confusion.     Scheduled Meds:   acetaminophen  650 mg Per NG tube Q6H    albuterol-ipratropium 2.5mg-0.5mg/3mL  3 mL Nebulization Q4H    aspirin  325 mg Per NG tube Daily    atorvastatin  80 mg Per NG tube Daily    cefTRIAXone (ROCEPHIN) IVPB  2 g Intravenous Q24H    chlorhexidine  15 mL Mouth/Throat BID    clopidogrel  75 mg Per NG tube Daily    famotidine  20 mg Per NG tube BID    heparin (porcine)  5,000 Units Subcutaneous Q8H    phenylephrine HCl in 0.9% NaCl  100 mcg Intravenous Once    phenylephrine HCl in 0.9% NaCl        propofol        propofol        senna-docusate 8.6-50 mg  1 tablet Per NG tube BID    sodium chloride 0.9%  3 mL Intravenous Q8H    sodium chloride 3%  4 mL Nebulization Q4H    succinylcholine         Continuous Infusions:   dexmedetomidine (PRECEDEX) infusion 1 mcg/kg/hr (01/31/18 1205)    insulin (HUMAN R) infusion (adults) 4.9 Units/hr (01/31/18 1205)    buffered 2% sodium acetate 86meq, sodium chloride 86meq, sterile water for inj IV soln 75 mL/hr at 01/31/18 1214     PRN Meds:albuterol-ipratropium 2.5mg-0.5mg/3mL, bisacodyl, dextrose 50%, dextrose 50%, dextrose 50%, glucagon (human recombinant), hydrALAZINE, labetalol, magnesium sulfate IVPB, magnesium sulfate IVPB, ondansetron, potassium chloride 10%, potassium chloride 10%, sodium chloride 0.9%    Objective:     Vital Signs (Most Recent):  Temp: 99.7 °F (37.6 °C) (01/31/18 1105)  Pulse: 79 (01/31/18 1327)  Resp: 16 (01/31/18 1327)  BP: (!) 120/59  (01/31/18 1205)  SpO2: 100 % (01/31/18 1327)  BP Location: Left arm    Vital Signs Range (Last 24H):  Temp:  [98.1 °F (36.7 °C)-99.7 °F (37.6 °C)]   Pulse:  []   Resp:  [0-57]   BP: (120-186)/()   SpO2:  [85 %-100 %]   Arterial Line BP: (100-213)/()   BP Location: Left arm    Physical Exam   Constitutional: He appears well-developed.   Cardiovascular: Normal rate and regular rhythm.  Exam reveals no friction rub.    No murmur heard.  Pulmonary/Chest: No respiratory distress. He has no wheezes.   Abdominal: Soft. He exhibits no distension. There is no tenderness.       Neurological Exam:   LOC: obtunded  Language: Mute  Articulation: Mute/Anarthric  Visual Fields: no blink to threat on right  EOM (CN III, IV, VI): L gaze preference  Pupils (CN II, III): PERRL  Sustained clonus on R  Withdraws to noxious stimulin in all 4 extremities    Laboratory:  Recent Results (from the past 24 hour(s))   POCT glucose    Collection Time: 01/30/18  2:00 PM   Result Value Ref Range    POCT Glucose 155 (H) 70 - 110 mg/dL   POCT glucose    Collection Time: 01/30/18  3:08 PM   Result Value Ref Range    POCT Glucose 151 (H) 70 - 110 mg/dL   POCT glucose    Collection Time: 01/30/18  4:06 PM   Result Value Ref Range    POCT Glucose 141 (H) 70 - 110 mg/dL   Sodium    Collection Time: 01/30/18  4:15 PM   Result Value Ref Range    Sodium 146 (H) 136 - 145 mmol/L   POCT glucose    Collection Time: 01/30/18  5:17 PM   Result Value Ref Range    POCT Glucose 159 (H) 70 - 110 mg/dL   POCT glucose    Collection Time: 01/30/18  6:03 PM   Result Value Ref Range    POCT Glucose 156 (H) 70 - 110 mg/dL   POCT glucose    Collection Time: 01/30/18  7:09 PM   Result Value Ref Range    POCT Glucose 154 (H) 70 - 110 mg/dL   Sodium    Collection Time: 01/30/18  7:12 PM   Result Value Ref Range    Sodium 145 136 - 145 mmol/L   POCT glucose    Collection Time: 01/30/18  7:56 PM   Result Value Ref Range    POCT Glucose 154 (H) 70 - 110 mg/dL    POCT glucose    Collection Time: 01/30/18  9:05 PM   Result Value Ref Range    POCT Glucose 149 (H) 70 - 110 mg/dL   POCT glucose    Collection Time: 01/30/18  9:57 PM   Result Value Ref Range    POCT Glucose 154 (H) 70 - 110 mg/dL   POCT glucose    Collection Time: 01/30/18 10:59 PM   Result Value Ref Range    POCT Glucose 177 (H) 70 - 110 mg/dL   Sodium    Collection Time: 01/30/18 11:06 PM   Result Value Ref Range    Sodium 147 (H) 136 - 145 mmol/L   POCT glucose    Collection Time: 01/31/18 12:08 AM   Result Value Ref Range    POCT Glucose 163 (H) 70 - 110 mg/dL   POCT glucose    Collection Time: 01/31/18 12:54 AM   Result Value Ref Range    POCT Glucose 167 (H) 70 - 110 mg/dL   Comprehensive metabolic panel    Collection Time: 01/31/18  1:10 AM   Result Value Ref Range    Sodium 148 (H) 136 - 145 mmol/L    Potassium 3.7 3.5 - 5.1 mmol/L    Chloride 110 95 - 110 mmol/L    CO2 28 23 - 29 mmol/L    Glucose 175 (H) 70 - 110 mg/dL    BUN, Bld 21 (H) 6 - 20 mg/dL    Creatinine 1.0 0.5 - 1.4 mg/dL    Calcium 8.9 8.7 - 10.5 mg/dL    Total Protein 6.6 6.0 - 8.4 g/dL    Albumin 2.7 (L) 3.5 - 5.2 g/dL    Total Bilirubin 0.5 0.1 - 1.0 mg/dL    Alkaline Phosphatase 83 55 - 135 U/L    AST 36 10 - 40 U/L    ALT 31 10 - 44 U/L    Anion Gap 10 8 - 16 mmol/L    eGFR if African American >60.0 >60 mL/min/1.73 m^2    eGFR if non African American >60.0 >60 mL/min/1.73 m^2   CBC auto differential    Collection Time: 01/31/18  1:10 AM   Result Value Ref Range    WBC 7.70 3.90 - 12.70 K/uL    RBC 4.37 (L) 4.60 - 6.20 M/uL    Hemoglobin 13.2 (L) 14.0 - 18.0 g/dL    Hematocrit 37.9 (L) 40.0 - 54.0 %    MCV 87 82 - 98 fL    MCH 30.2 27.0 - 31.0 pg    MCHC 34.8 32.0 - 36.0 g/dL    RDW 12.8 11.5 - 14.5 %    Platelets 290 150 - 350 K/uL    MPV 9.5 9.2 - 12.9 fL    Immature Granulocytes 0.8 (H) 0.0 - 0.5 %    Gran # (ANC) 5.5 1.8 - 7.7 K/uL    Immature Grans (Abs) 0.06 (H) 0.00 - 0.04 K/uL    Lymph # 1.4 1.0 - 4.8 K/uL    Mono # 0.7 0.3 -  1.0 K/uL    Eos # 0.0 0.0 - 0.5 K/uL    Baso # 0.04 0.00 - 0.20 K/uL    nRBC 0 0 /100 WBC    Gran% 71.8 38.0 - 73.0 %    Lymph% 18.1 18.0 - 48.0 %    Mono% 8.4 4.0 - 15.0 %    Eosinophil% 0.4 0.0 - 8.0 %    Basophil% 0.5 0.0 - 1.9 %    Differential Method Automated    Magnesium    Collection Time: 01/31/18  1:10 AM   Result Value Ref Range    Magnesium 2.1 1.6 - 2.6 mg/dL   Phosphorus    Collection Time: 01/31/18  1:10 AM   Result Value Ref Range    Phosphorus 3.2 2.7 - 4.5 mg/dL   Protime-INR    Collection Time: 01/31/18  1:10 AM   Result Value Ref Range    Prothrombin Time 10.9 9.0 - 12.5 sec    INR 1.0 0.8 - 1.2   POCT glucose    Collection Time: 01/31/18  1:55 AM   Result Value Ref Range    POCT Glucose 185 (H) 70 - 110 mg/dL   POCT glucose    Collection Time: 01/31/18  2:59 AM   Result Value Ref Range    POCT Glucose 178 (H) 70 - 110 mg/dL   Sodium    Collection Time: 01/31/18  3:03 AM   Result Value Ref Range    Sodium 149 (H) 136 - 145 mmol/L   POCT glucose    Collection Time: 01/31/18  4:13 AM   Result Value Ref Range    POCT Glucose 173 (H) 70 - 110 mg/dL   POCT glucose    Collection Time: 01/31/18  5:10 AM   Result Value Ref Range    POCT Glucose 148 (H) 70 - 110 mg/dL   POCT glucose    Collection Time: 01/31/18  6:06 AM   Result Value Ref Range    POCT Glucose 148 (H) 70 - 110 mg/dL   POCT glucose    Collection Time: 01/31/18  7:30 AM   Result Value Ref Range    POCT Glucose 179 (H) 70 - 110 mg/dL   POCT glucose    Collection Time: 01/31/18  8:11 AM   Result Value Ref Range    POCT Glucose 199 (H) 70 - 110 mg/dL   Sodium    Collection Time: 01/31/18  8:21 AM   Result Value Ref Range    Sodium 148 (H) 136 - 145 mmol/L   ISTAT PROCEDURE    Collection Time: 01/31/18  8:48 AM   Result Value Ref Range    POC PH 7.520 (H) 7.35 - 7.45    POC PCO2 39.3 35 - 45 mmHg    POC PO2 112 (H) 80 - 100 mmHg    POC HCO3 32.0 (H) 24 - 28 mmol/L    POC BE 9 -2 to 2 mmol/L    POC SATURATED O2 99 95 - 100 %    POC TCO2 33 (H)  23 - 27 mmol/L    Rate 16     Sample ARTERIAL     Site Brianne/UAC     Allens Test N/A     DelSys CPAP/BiPAP     Mode AVAPS     Vt 550     PiP 10     FiO2 55    POCT glucose    Collection Time: 01/31/18  9:10 AM   Result Value Ref Range    POCT Glucose 199 (H) 70 - 110 mg/dL   ISTAT PROCEDURE    Collection Time: 01/31/18  9:44 AM   Result Value Ref Range    POC PH 7.581 (HH) 7.35 - 7.45    POC PCO2 35.7 35 - 45 mmHg    POC PO2 50 (LL) 80 - 100 mmHg    POC HCO3 33.6 (H) 24 - 28 mmol/L    POC BE 12 -2 to 2 mmol/L    POC SATURATED O2 90 (L) 95 - 100 %    POC TCO2 35 (H) 23 - 27 mmol/L    Rate 16     Sample ARTERIAL     Site Brianne/UAC     Allens Test N/A     DelSys CPAP/BiPAP     Mode BiPAP     FiO2 70     Spont Rate 32     Min Vol 17     Sp02 85     EP 8    POCT glucose    Collection Time: 01/31/18 10:44 AM   Result Value Ref Range    POCT Glucose 222 (H) 70 - 110 mg/dL   POCT glucose    Collection Time: 01/31/18 11:09 AM   Result Value Ref Range    POCT Glucose 223 (H) 70 - 110 mg/dL   POCT glucose    Collection Time: 01/31/18 12:22 PM   Result Value Ref Range    POCT Glucose 208 (H) 70 - 110 mg/dL   Sodium    Collection Time: 01/31/18 12:27 PM   Result Value Ref Range    Sodium 148 (H) 136 - 145 mmol/L       Microbiology Results (last 7 days)     Procedure Component Value Units Date/Time    Blood culture [[id number]] Collected:  01/27/18 9918    Order Status:  Completed Specimen:  Blood from Peripheral, Hand, Left Updated:  01/30/18 2012     Blood Culture, Routine No Growth to date     Blood Culture, Routine No Growth to date     Blood Culture, Routine No Growth to date     Blood Culture, Routine No Growth to date    Narrative:       Blood cultures x 2 different sites. 4 bottles total. Please  draw cultures before administering antibiotics.    Blood culture [[id number]] Collected:  01/27/18 1550    Order Status:  Completed Specimen:  Blood from Peripheral, Hand, Right Updated:  01/30/18 2012     Blood Culture, Routine No  Growth to date     Blood Culture, Routine No Growth to date     Blood Culture, Routine No Growth to date     Blood Culture, Routine No Growth to date    Narrative:       Blood cultures from 2 different sites. 4 bottles total.  Please draw before starting antibiotics.    Urine culture [[id number]] Collected:  01/27/18 1621    Order Status:  Completed Specimen:  Urine from Urine, Catheterized Updated:  01/28/18 2047     Urine Culture, Routine No growth            Diagnostic Results     Brain Imaging   1/27/18 CTH: Per independent resident review and interpretation,  L MCA infarction s/p stenting.  No sign of hemorrhagic transformation.  R basal ganglia and cerebellar infarct.          1/26/18 MRI Brain w/o: Per independent resident review and interpretation,  Acute L MCA infarction.        Vessel Imaging   1/25/18 IR Angiogram: Per independent resident review and interpretation,  L M1 occlusion.        Cardiac Imaging   1/26/18 EKG: Per independent resident review and interpretation,  Sinus tachycardia.  .  QTc 500.    1/26/18 TTE: Per report,  LA wnl  EF 65-70%  No regional wall motion abnormalities  Diastolic function normal

## 2018-02-01 PROBLEM — R06.03 RESPIRATORY DISTRESS, ACUTE: Status: ACTIVE | Noted: 2018-02-01

## 2018-02-01 LAB
ALBUMIN SERPL BCP-MCNC: 2.5 G/DL
ALLENS TEST: ABNORMAL
ALLENS TEST: ABNORMAL
ALP SERPL-CCNC: 72 U/L
ALT SERPL W/O P-5'-P-CCNC: 36 U/L
ANION GAP SERPL CALC-SCNC: 10 MMOL/L
AST SERPL-CCNC: 32 U/L
BACTERIA BLD CULT: NORMAL
BACTERIA BLD CULT: NORMAL
BASOPHILS # BLD AUTO: 0.04 K/UL
BASOPHILS NFR BLD: 0.5 %
BILIRUB SERPL-MCNC: 0.6 MG/DL
BUN SERPL-MCNC: 27 MG/DL
CALCIUM SERPL-MCNC: 8.5 MG/DL
CHLORIDE SERPL-SCNC: 112 MMOL/L
CO2 SERPL-SCNC: 29 MMOL/L
CREAT SERPL-MCNC: 0.9 MG/DL
DELSYS: ABNORMAL
DELSYS: ABNORMAL
DIFFERENTIAL METHOD: ABNORMAL
EOSINOPHIL # BLD AUTO: 0.2 K/UL
EOSINOPHIL NFR BLD: 2.5 %
ERYTHROCYTE [DISTWIDTH] IN BLOOD BY AUTOMATED COUNT: 13 %
ERYTHROCYTE [SEDIMENTATION RATE] IN BLOOD BY WESTERGREN METHOD: 16 MM/H
EST. GFR  (AFRICAN AMERICAN): >60 ML/MIN/1.73 M^2
EST. GFR  (NON AFRICAN AMERICAN): >60 ML/MIN/1.73 M^2
FIO2: 50
FIO2: 65
GLUCOSE SERPL-MCNC: 186 MG/DL
HCO3 UR-SCNC: 36 MMOL/L (ref 24–28)
HCO3 UR-SCNC: 36 MMOL/L (ref 24–28)
HCT VFR BLD AUTO: 38.8 %
HGB BLD-MCNC: 12.7 G/DL
IMM GRANULOCYTES # BLD AUTO: 0.03 K/UL
IMM GRANULOCYTES NFR BLD AUTO: 0.4 %
INR PPP: 1.1
LYMPHOCYTES # BLD AUTO: 1.9 K/UL
LYMPHOCYTES NFR BLD: 22.9 %
MAGNESIUM SERPL-MCNC: 2.1 MG/DL
MCH RBC QN AUTO: 29.7 PG
MCHC RBC AUTO-ENTMCNC: 32.7 G/DL
MCV RBC AUTO: 91 FL
MIN VOL: 17.5
MODE: ABNORMAL
MODE: ABNORMAL
MONOCYTES # BLD AUTO: 0.7 K/UL
MONOCYTES NFR BLD: 8.4 %
NEUTROPHILS # BLD AUTO: 5.3 K/UL
NEUTROPHILS NFR BLD: 65.3 %
NRBC BLD-RTO: 0 /100 WBC
PCO2 BLDA: 43.9 MMHG (ref 35–45)
PCO2 BLDA: 45.2 MMHG (ref 35–45)
PEEP: 5
PEEP: 8
PH SMN: 7.51 [PH] (ref 7.35–7.45)
PH SMN: 7.52 [PH] (ref 7.35–7.45)
PHOSPHATE SERPL-MCNC: 4 MG/DL
PIP: 26
PLATELET # BLD AUTO: 236 K/UL
PMV BLD AUTO: 9.4 FL
PO2 BLDA: 100 MMHG (ref 80–100)
PO2 BLDA: 244 MMHG (ref 80–100)
POC BE: 13 MMOL/L
POC BE: 13 MMOL/L
POC SATURATED O2: 100 % (ref 95–100)
POC SATURATED O2: 98 % (ref 95–100)
POC TCO2: 37 MMOL/L (ref 23–27)
POC TCO2: 37 MMOL/L (ref 23–27)
POCT GLUCOSE: 137 MG/DL (ref 70–110)
POCT GLUCOSE: 142 MG/DL (ref 70–110)
POCT GLUCOSE: 156 MG/DL (ref 70–110)
POCT GLUCOSE: 159 MG/DL (ref 70–110)
POCT GLUCOSE: 161 MG/DL (ref 70–110)
POCT GLUCOSE: 163 MG/DL (ref 70–110)
POCT GLUCOSE: 163 MG/DL (ref 70–110)
POCT GLUCOSE: 164 MG/DL (ref 70–110)
POCT GLUCOSE: 164 MG/DL (ref 70–110)
POCT GLUCOSE: 166 MG/DL (ref 70–110)
POCT GLUCOSE: 168 MG/DL (ref 70–110)
POCT GLUCOSE: 171 MG/DL (ref 70–110)
POCT GLUCOSE: 171 MG/DL (ref 70–110)
POCT GLUCOSE: 176 MG/DL (ref 70–110)
POCT GLUCOSE: 176 MG/DL (ref 70–110)
POCT GLUCOSE: 178 MG/DL (ref 70–110)
POCT GLUCOSE: 182 MG/DL (ref 70–110)
POCT GLUCOSE: 183 MG/DL (ref 70–110)
POCT GLUCOSE: 184 MG/DL (ref 70–110)
POCT GLUCOSE: 184 MG/DL (ref 70–110)
POCT GLUCOSE: 186 MG/DL (ref 70–110)
POCT GLUCOSE: 192 MG/DL (ref 70–110)
POCT GLUCOSE: 193 MG/DL (ref 70–110)
POCT GLUCOSE: 195 MG/DL (ref 70–110)
POTASSIUM SERPL-SCNC: 3.7 MMOL/L
POTASSIUM SERPL-SCNC: 3.9 MMOL/L
PROT SERPL-MCNC: 6.3 G/DL
PROTHROMBIN TIME: 11.7 SEC
PS: 10
RBC # BLD AUTO: 4.27 M/UL
SAMPLE: ABNORMAL
SAMPLE: ABNORMAL
SITE: ABNORMAL
SITE: ABNORMAL
SODIUM SERPL-SCNC: 150 MMOL/L
SODIUM SERPL-SCNC: 151 MMOL/L
SODIUM SERPL-SCNC: 151 MMOL/L
SODIUM SERPL-SCNC: 152 MMOL/L
SODIUM SERPL-SCNC: 152 MMOL/L
SODIUM SERPL-SCNC: 153 MMOL/L
SODIUM SERPL-SCNC: 153 MMOL/L
SP02: 100
SP02: 98
SPONT RATE: 25
VT: 500
WBC # BLD AUTO: 8.08 K/UL

## 2018-02-01 PROCEDURE — 25000003 PHARM REV CODE 250: Performed by: PSYCHIATRY & NEUROLOGY

## 2018-02-01 PROCEDURE — 63600175 PHARM REV CODE 636 W HCPCS: Performed by: PSYCHIATRY & NEUROLOGY

## 2018-02-01 PROCEDURE — 85610 PROTHROMBIN TIME: CPT

## 2018-02-01 PROCEDURE — 87205 SMEAR GRAM STAIN: CPT

## 2018-02-01 PROCEDURE — 97803 MED NUTRITION INDIV SUBSEQ: CPT

## 2018-02-01 PROCEDURE — 82803 BLOOD GASES ANY COMBINATION: CPT

## 2018-02-01 PROCEDURE — 25000003 PHARM REV CODE 250: Performed by: NURSE PRACTITIONER

## 2018-02-01 PROCEDURE — 37799 UNLISTED PX VASCULAR SURGERY: CPT

## 2018-02-01 PROCEDURE — 99900026 HC AIRWAY MAINTENANCE (STAT)

## 2018-02-01 PROCEDURE — 94761 N-INVAS EAR/PLS OXIMETRY MLT: CPT

## 2018-02-01 PROCEDURE — 84295 ASSAY OF SERUM SODIUM: CPT | Mod: 91

## 2018-02-01 PROCEDURE — A4217 STERILE WATER/SALINE, 500 ML: HCPCS | Performed by: PSYCHIATRY & NEUROLOGY

## 2018-02-01 PROCEDURE — 83735 ASSAY OF MAGNESIUM: CPT

## 2018-02-01 PROCEDURE — 27200966 HC CLOSED SUCTION SYSTEM

## 2018-02-01 PROCEDURE — 85025 COMPLETE CBC W/AUTO DIFF WBC: CPT

## 2018-02-01 PROCEDURE — 99900035 HC TECH TIME PER 15 MIN (STAT)

## 2018-02-01 PROCEDURE — 25000242 PHARM REV CODE 250 ALT 637 W/ HCPCS: Performed by: PHYSICIAN ASSISTANT

## 2018-02-01 PROCEDURE — 99291 CRITICAL CARE FIRST HOUR: CPT | Mod: ,,, | Performed by: PSYCHIATRY & NEUROLOGY

## 2018-02-01 PROCEDURE — 94003 VENT MGMT INPAT SUBQ DAY: CPT

## 2018-02-01 PROCEDURE — A4216 STERILE WATER/SALINE, 10 ML: HCPCS | Performed by: NURSE PRACTITIONER

## 2018-02-01 PROCEDURE — 84100 ASSAY OF PHOSPHORUS: CPT

## 2018-02-01 PROCEDURE — 80053 COMPREHEN METABOLIC PANEL: CPT

## 2018-02-01 PROCEDURE — 63600175 PHARM REV CODE 636 W HCPCS: Performed by: NURSE PRACTITIONER

## 2018-02-01 PROCEDURE — 25000003 PHARM REV CODE 250: Performed by: PHYSICIAN ASSISTANT

## 2018-02-01 PROCEDURE — 94640 AIRWAY INHALATION TREATMENT: CPT

## 2018-02-01 PROCEDURE — 84132 ASSAY OF SERUM POTASSIUM: CPT

## 2018-02-01 PROCEDURE — 20000000 HC ICU ROOM

## 2018-02-01 PROCEDURE — 87070 CULTURE OTHR SPECIMN AEROBIC: CPT

## 2018-02-01 PROCEDURE — 99233 SBSQ HOSP IP/OBS HIGH 50: CPT | Mod: ,,, | Performed by: PSYCHIATRY & NEUROLOGY

## 2018-02-01 RX ORDER — METOPROLOL TARTRATE 1 MG/ML
5 INJECTION, SOLUTION INTRAVENOUS ONCE
Status: DISCONTINUED | OUTPATIENT
Start: 2018-02-01 | End: 2018-02-01

## 2018-02-01 RX ORDER — PHENYLEPHRINE HCL IN 0.9% NACL 1 MG/10 ML
100 SYRINGE (ML) INTRAVENOUS EVERY 10 MIN PRN
Status: DISCONTINUED | OUTPATIENT
Start: 2018-02-01 | End: 2018-02-01

## 2018-02-01 RX ADMIN — ATORVASTATIN CALCIUM 80 MG: 20 TABLET, FILM COATED ORAL at 08:02

## 2018-02-01 RX ADMIN — SODIUM CHLORIDE SOLN NEBU 3% 4 ML: 3 NEBU SOLN at 11:02

## 2018-02-01 RX ADMIN — HEPARIN SODIUM 5000 UNITS: 5000 INJECTION, SOLUTION INTRAVENOUS; SUBCUTANEOUS at 05:02

## 2018-02-01 RX ADMIN — IPRATROPIUM BROMIDE AND ALBUTEROL SULFATE 3 ML: .5; 3 SOLUTION RESPIRATORY (INHALATION) at 03:02

## 2018-02-01 RX ADMIN — ASPIRIN 325 MG ORAL TABLET 325 MG: 325 PILL ORAL at 08:02

## 2018-02-01 RX ADMIN — SODIUM ACETATE: 3.28 INJECTION, SOLUTION, CONCENTRATE INTRAVENOUS at 05:02

## 2018-02-01 RX ADMIN — LABETALOL HYDROCHLORIDE 10 MG: 5 INJECTION, SOLUTION INTRAVENOUS at 09:02

## 2018-02-01 RX ADMIN — FAMOTIDINE 20 MG: 20 TABLET, FILM COATED ORAL at 09:02

## 2018-02-01 RX ADMIN — SODIUM CHLORIDE SOLN NEBU 3% 4 ML: 3 NEBU SOLN at 07:02

## 2018-02-01 RX ADMIN — POTASSIUM CHLORIDE 40 MEQ: 20 SOLUTION ORAL at 05:02

## 2018-02-01 RX ADMIN — Medication 3 ML: at 09:02

## 2018-02-01 RX ADMIN — SODIUM ACETATE: 3.28 INJECTION, SOLUTION, CONCENTRATE INTRAVENOUS at 06:02

## 2018-02-01 RX ADMIN — CEFTRIAXONE 2 G: 2 INJECTION, SOLUTION INTRAVENOUS at 03:02

## 2018-02-01 RX ADMIN — CLOPIDOGREL 75 MG: 75 TABLET, FILM COATED ORAL at 08:02

## 2018-02-01 RX ADMIN — STANDARDIZED SENNA CONCENTRATE AND DOCUSATE SODIUM 1 TABLET: 8.6; 5 TABLET, FILM COATED ORAL at 09:02

## 2018-02-01 RX ADMIN — DEXMEDETOMIDINE HYDROCHLORIDE 0.6 MCG/KG/HR: 4 INJECTION, SOLUTION INTRAVENOUS at 09:02

## 2018-02-01 RX ADMIN — IPRATROPIUM BROMIDE AND ALBUTEROL SULFATE 3 ML: .5; 3 SOLUTION RESPIRATORY (INHALATION) at 04:02

## 2018-02-01 RX ADMIN — FAMOTIDINE 20 MG: 20 TABLET, FILM COATED ORAL at 08:02

## 2018-02-01 RX ADMIN — IPRATROPIUM BROMIDE AND ALBUTEROL SULFATE 3 ML: .5; 3 SOLUTION RESPIRATORY (INHALATION) at 12:02

## 2018-02-01 RX ADMIN — SODIUM CHLORIDE 5.85 UNITS/HR: 9 INJECTION, SOLUTION INTRAVENOUS at 09:02

## 2018-02-01 RX ADMIN — IPRATROPIUM BROMIDE AND ALBUTEROL SULFATE 3 ML: .5; 3 SOLUTION RESPIRATORY (INHALATION) at 11:02

## 2018-02-01 RX ADMIN — IPRATROPIUM BROMIDE AND ALBUTEROL SULFATE 3 ML: .5; 3 SOLUTION RESPIRATORY (INHALATION) at 07:02

## 2018-02-01 RX ADMIN — ACETAMINOPHEN 650 MG: 325 TABLET, FILM COATED ORAL at 06:02

## 2018-02-01 RX ADMIN — SODIUM CHLORIDE SOLN NEBU 3% 4 ML: 3 NEBU SOLN at 12:02

## 2018-02-01 RX ADMIN — CHLORHEXIDINE GLUCONATE 15 ML: 1.2 RINSE ORAL at 09:02

## 2018-02-01 RX ADMIN — STANDARDIZED SENNA CONCENTRATE AND DOCUSATE SODIUM 1 TABLET: 8.6; 5 TABLET, FILM COATED ORAL at 08:02

## 2018-02-01 RX ADMIN — ACETAMINOPHEN 650 MG: 325 TABLET, FILM COATED ORAL at 12:02

## 2018-02-01 RX ADMIN — HEPARIN SODIUM 5000 UNITS: 5000 INJECTION, SOLUTION INTRAVENOUS; SUBCUTANEOUS at 09:02

## 2018-02-01 RX ADMIN — SODIUM ACETATE: 3.28 INJECTION, SOLUTION, CONCENTRATE INTRAVENOUS at 11:02

## 2018-02-01 RX ADMIN — Medication 3 ML: at 05:02

## 2018-02-01 RX ADMIN — Medication 3 ML: at 02:02

## 2018-02-01 RX ADMIN — SODIUM CHLORIDE SOLN NEBU 3% 4 ML: 3 NEBU SOLN at 08:02

## 2018-02-01 RX ADMIN — IPRATROPIUM BROMIDE AND ALBUTEROL SULFATE 3 ML: .5; 3 SOLUTION RESPIRATORY (INHALATION) at 08:02

## 2018-02-01 RX ADMIN — HEPARIN SODIUM 5000 UNITS: 5000 INJECTION, SOLUTION INTRAVENOUS; SUBCUTANEOUS at 03:02

## 2018-02-01 RX ADMIN — SODIUM CHLORIDE SOLN NEBU 3% 4 ML: 3 NEBU SOLN at 03:02

## 2018-02-01 RX ADMIN — DEXMEDETOMIDINE HYDROCHLORIDE 0.4 MCG/KG/HR: 4 INJECTION, SOLUTION INTRAVENOUS at 05:02

## 2018-02-01 RX ADMIN — ACETAMINOPHEN 650 MG: 325 TABLET, FILM COATED ORAL at 05:02

## 2018-02-01 RX ADMIN — SODIUM CHLORIDE SOLN NEBU 3% 4 ML: 3 NEBU SOLN at 04:02

## 2018-02-01 NOTE — ASSESSMENT & PLAN NOTE
-A1C-10  -poorly controlled diabetes  -BG >400 on arrival  -insulin gtt until on TF. Will resp stability before starting TF  02/01: intubated, tfs restarted

## 2018-02-01 NOTE — PT/OT/SLP PROGRESS
Speech Language Pathology  Discharge      Todd Quevedo  MRN: [id number]    Patient not seen today secondary to Other (Pt currently intubated). Please re-consult once pt extubated and appropriate for SLP services.       EDWARD Key, CCC-SLP

## 2018-02-01 NOTE — ASSESSMENT & PLAN NOTE
Due to airway obstruction form hardened secretions overlying epiglottis and soft palate, improved after intubation and bronchoscopy  Brief temp elevation yesterday, likely reflecting immune response to foreign material in lungs

## 2018-02-01 NOTE — PROGRESS NOTES
[facility]  Adult Nutrition  Progress Note    SUMMARY     Recommendations    1. Initiate change of TF formula to Glucerna 1.5 starting @ 10 ml/hr, increasing rate by 10 ml/hr 4qhrs until goal rate of 70 ml/hr reached to provide 2520 kcals, 129g pro, and 1184 ml free fluids.    - Hold residuals at >500 ml. Additional fluids per MD.  2. RD to monitor and follow-up.    Goals: % EEN, EPN  Nutrition Goal Status: goal not met  Communication of RD Recs: reviewed with RN    Reason for Assessment    Reason for Assessment: RD follow-up  Diagnosis: stroke/CVA (Embolic stroke L MCA)  General Information Comments: Pt currently intubated. TF stopped for CPAP , however, TF restarted at 30 ml/hr and progressing towards current goal of 60 ml/hr at the time of visit. Current TF rate and formula does not meet pt's needs. Recommend switch to Glucerna 1.5. Diabetic education not appropriate at this time (A1c 10.0) RD to follow-up.  Nutrition Discharge Plannin-100% EEN, EPN    Nutrition Prescription Ordered    Current Diet Order: NPO  Current Nutrition Support Formula Ordered: Diabetisource  Current Nutrition Support Rate Ordered: 60 (ml)  Current Nutrition Support Frequency Ordered: ml/hr    Evaluation of Received Nutrients/Fluid Intake    Enteral Calories (kcal): 1728  Enteral Protein (gm): 86  Enteral (Free Water) Fluid (mL): 1178  Free Water Flush Fluid (mL): 0     Energy Calories Required: not meeting needs  % Kcal Needs: 68     Total Protein (gm): 86  Protein Required: not meeting needs  % Protein Needs: 78    IV Fluid (mL): 1800  Total Fluid Intake (mL): 1678    I/O: + I/O, good UOP  Fluid Required: not meeting needs  Comments: LBM , 0 ml residual 2/1      % Intake of Estimated Energy Needs: 50 - 75 %  % Meal Intake: NPO     Nutrition Risk Screen     Nutrition Risk Screen: no indicators present    Nutrition/Diet History    Patient Reported Diet/Restrictions/Preferences: other (see comments)  "(FRIEDA)  Typical Food/Fluid Intake: FRIEDA  Food Preferences: FRIEDA 2/2 pt not awake at the time of visit    Supplemental Drinks or Food Habits: Other (see comments) (FRIEDA)  Factors Affecting Nutritional Intake: impaired cognitive status/motor control, difficulty/impaired swallowing, inability to feed self, NPO    Labs/Tests/Procedures/Meds    Pertinent Labs Reviewed: reviewed  Pertinent Labs Comments: Na 151, Cl 112, BUN 27, Glu 186, Ca 8.5, Alb 2.5, Chol 200, HDL 27, , A1c 10.0  Pertinent Medications Reviewed: reviewed  Pertinent Medications Comments: Statin, Heparin, NaCl, Senna-Docusate    Physical Findings    Overall Physical Appearance: overweight  Tubes: nasogastric tube  Oral/Mouth Cavity: WDL  Skin: intact    Anthropometrics    Height: 5' 10" (177.8 cm)  Weight Method: Bed Scale  Weight: 116.3 kg (256 lb 6.3 oz)     Ideal Body Weight (IBW), Male: 166 lb  % Ideal Body Weight, Male (lb): 154.46 lb    BMI (Calculated): 36.9  BMI Grade: 35 - 39.9 - obesity - grade II    Estimated/Assessed Needs    Weight Used For Calorie Calculations: 116.3 kg (256 lb 6.3 oz)     Energy Calorie Requirements (kcal): 2553  Energy Need Method: [facility]  RMR ([place]-St. Jeor Equation): 2039.25     Weight Used For Protein Calculations: 73 kg (160 lb 15 oz)  Protein Requirements: 110-146g/d (1.5-2.0 g/kg)    Fluid Need Method: RDA Method (Per MD or 1 ml/kcal)  RDA Method (mL): 2553    CHO Requirement: 50% total kcals     Assessment and Plan    * Embolic stroke involving left middle cerebral artery    Problem  Inadequate oral intake    Related to (etiology):   Inability to consume sufficient energy    Signs and Symptoms (as evidenced by):   NPO with no alternative means of nutrition    Interventions/Recommendations (treatment strategy):  See recs above    Nutrition Diagnosis Status:   Improving              Monitor and Evaluation    Food and Nutrient Intake: enteral nutrition intake  Food and Nutrient Adminstration: enteral and " parenteral nutrition administration     Physical Activity and Function: nutrition-related ADLs and IADLs  Anthropometric Measurements: height/length, weight, body mass index, weight change  Biochemical Data, Medical Tests and Procedures: electrolyte and renal panel, gastrointestinal profile, glucose/endocrine profile, inflammatory profile, lipid profile  Nutrition-Focused Physical Findings: overall appearance    Nutrition Risk    Level of Risk:  (2x/week)    Nutrition Follow-Up    RD Follow-up?: Yes     I certify that I directed the dietetic intern in service delivery and guided them using my skilled judgment. As the cosigning dietitian, I have reviewed the dietetic interns documentation and am responsible for the treatment, assessment, and plan.        Ramon Self  Dietetic Intern

## 2018-02-01 NOTE — PROGRESS NOTES
[facility]  Neurocritical Care  Progress Note    Admit Date: 1/25/2018  Service Date: 02/01/2018  Length of Stay: 7    Subjective:     Chief Complaint: Embolic stroke involving left middle cerebral artery    History of Present Illness: The patient is a 47 year old male with no known PMHx admitted to [facility]   s/p thrombectomy of L M1 occlusion. Per chart review, he   walked into [place] and was acting abnormal.  EMS was called and brought to the ED for concern of L MCA syndrome. CT MP revealed short segment occlusion of left M1.  Patient went to IR for thrombectomy of L M1 occlusion seen on CTA MP.  TICI2C reperfusion and angioplasty. Patient admitted to [facility] for close monitoring and higher level of care.   History obtained from chart review only            Hospital Course: 1/25: Pt admitted to [facility] s/p L M1 thrombectomy, TICI2C reperfusion and angioplasty   1/27: large L MCA, hemicrani watch, NSGY following, insulin ggt, cardene ggt, L sided intermittent slowing eeg but no sz, +nasal trumpet for copious thick secretions, wheezing this am - improved with duo nebs, 3% nebs, and cough assist, concern for sepsis 2/2 hemodynamic changes - increased HR and BP, worsening leukocytosis, +ceftriaxone, pan cx, adrian track  1/28: continued respiratory challenges due to secretions. Aggressive pulmonary toileting. Continue monitoring for neuro worsening. Add moxifloxacin.   1/29: CTH to eval for increased edema/shift, EEG afterwards. Concern for decrease exam, no following/decreased alertness). CXR and Procal to follow leukocytosis. Hold TF until eval decreasing EXAM  1/30: neuro exam stable, increase 2%, current amount of sodium iv not suffuicient to meet target 145-155, cont abx for likely pneumonia, repeat ct in am  1/31: abrupt desaturation today requiring emergent intubation followed by bronchoscopy  Etiology likely upper airway obstruction from dried secretions  02/01: stable on vent overnight, marked  improvement on today's chest x ray, switch to spontaneous today, sodium stable at 150, start tfs        Review of Systems   Unable to perform ROS: Intubated       Objective:     Vitals:  Temp: 99.8 °F (37.7 °C)  Pulse: 74  Rhythm: normal sinus rhythm  BP: 133/75  MAP (mmHg): 98  CI (L/min/m2): 2.2 L/min/m2  SVI: 30  SVV: 8 %  Resp: (!) 25  SpO2: 99 %  Oxygen Concentration (%): 50  O2 Device (Oxygen Therapy): ventilator  Vent Mode: Spont  Set Rate: 0 bmp  Vt Set: 500 mL  Pressure Support: 10 cmH20  PEEP/CPAP: 5 cmH20  Peak Airway Pressure: 15 cmH2O  Mean Airway Pressure: 7.2 cmH20  Plateau Pressure: 17 cmH20    Temp  Min: 98.1 °F (36.7 °C)  Max: 103.1 °F (39.5 °C)  Pulse  Min: 58  Max: 86  BP  Min: 88/53  Max: 163/106  MAP (mmHg)  Min: 65  Max: 128  CI (L/min/m2)  Min: 1.1 L/min/m2  Max: 2.2 L/min/m2  SVI  Min: 15  Max: 32  SVV  Min: 6 %  Max: 28 %  Resp  Min: 2  Max: 26  SpO2  Min: 95 %  Max: 100 %  Oxygen Concentration (%)  Min: 50  Max: 65    01/31 0701 - 02/01 0700  In: 2692.7 [I.V.:2112.7]  Out: 2444 [Urine:2444]   Unmeasured Output  Urine Occurrence: 1  Stool Occurrence: 0       Physical Exam   Constitutional: He appears well-developed.   Eyes: Pupils are equal, round, and reactive to light.   Cardiovascular: Regular rhythm and normal heart sounds.    Pulmonary/Chest: Breath sounds normal.   Abdominal: Soft. Bowel sounds are normal.   Neurological:   Sedated, opens eye to physical stimulation  Withdrawal left side         Medications:  Continuous  dexmedetomidine (PRECEDEX) infusion Last Rate: 0.4 mcg/kg/hr (02/01/18 1205)   insulin (HUMAN R) infusion (adults) Last Rate: 1.65 Units/hr (02/01/18 1105)   buffered 2% sodium acetate 86meq, sodium chloride 86meq, sterile water for inj IV soln Last Rate: 75 mL/hr at 02/01/18 1205   Scheduled  acetaminophen 650 mg Q6H   albuterol-ipratropium 2.5mg-0.5mg/3mL 3 mL Q4H   aspirin 325 mg Daily   atorvastatin 80 mg Daily   cefTRIAXone (ROCEPHIN) IVPB 2 g Q24H    chlorhexidine 15 mL BID   clopidogrel 75 mg Daily   famotidine 20 mg BID   heparin (porcine) 5,000 Units Q8H   senna-docusate 8.6-50 mg 1 tablet BID   sodium chloride 0.9% 3 mL Q8H   sodium chloride 3% 4 mL Q4H   PRN  albuterol-ipratropium 2.5mg-0.5mg/3mL 3 mL Q4H PRN   bisacodyl 10 mg Daily PRN   dextrose 50% 12.5 g PRN   dextrose 50% 12.5 g PRN   dextrose 50% 25 g PRN   glucagon (human recombinant) 1 mg PRN   hydrALAZINE 10 mg Q4H PRN   labetalol 10 mg Q4H PRN   magnesium sulfate IVPB 2 g PRN   magnesium sulfate IVPB 4 g PRN   ondansetron 4 mg Q8H PRN   potassium chloride 10% 40 mEq PRN   potassium chloride 10% 40 mEq PRN   sodium chloride 0.9% 5 mL PRN     Today I personally reviewed pertinent medications, lines/drains/airways, imaging, lab results, notably:    Diet  Diet NPO  Diet NPO        Assessment/Plan:     Neuro   * Embolic stroke involving left middle cerebral artery    S/P L M1 thrombectomy  -- Neuro checks q 1hr  -- Vascular Neurology  following  -- CT MP- Short segment occlusion of left M1   -- Repeat CTH 1/26 - No detrimental change  -- SBP goal <160  -- PT/OT/Speech  -- hemicrani watch, NSGY following   -- MRI 1/26 - L MCA, no hemorrhagic conversion  -- stat CTH 1/27: L MCA stable, no worsening edema or hemorrhagic conversion  -- ASA, plavix     Appears exam declined, no consistent with previous imaging. Stat CTH to eval for increased edema and then EEG.   NO AEDs at this time  01/31: no eeg evidence for seizure x 2 days, ct head stable, now that airway secured-for mri in am            Cytotoxic cerebral edema    monitor CTh and exam for increasing edema  2% increased to 100ml/hr, if no increase in serum sodium or ct unchanged or worse, needs tlc for 3%  1/31: sodium improved to goal, cont 2%  02/01: equibrated sodium 150-151, cont 2% at current rate          Pulmonary   Acute respiratory failure with hypoxia    Due to airway obstruction form hardened secretions overlying epiglottis and soft  palate, improved after intubation and bronchoscopy  Brief temp elevation yesterday, likely reflecting immune response to foreign material in lungs        Cardiac/Vascular   Hyperlipidemia    -atorvastatin 80 daily           Essential hypertension    --SBP <160  --cardene gtt  --echo:    1 - Normal left ventricular systolic function (EF 65-70%).     2 - Concentric remodeling.     3 - No wall motion abnormalities.     4 - Normal left ventricular diastolic function.     5 - Normal right ventricular systolic function .             Oncology   Leukocytosis    _Pan Cx and Abx over weekend  -Trend PRocal  -SLight improvement in Leukocytosis with ABX  -Eval CXR  Small development of perihilar infiltrate, temp, white count, secretions all better on ceftriaxone,cont 7 day course        Endocrine   Type 2 diabetes mellitus    -A1C-10  -poorly controlled diabetes  -BG >400 on arrival  -insulin gtt until on TF. Will resp stability before starting TF  02/01: intubated, tfs restarted          Other   Obstructive sleep apnea    Oxygenating well at 45 degrees head position or higher  Intubated due to inspissated secretions from nocturnal cpap            Prophylaxis:  Venous Thromboembolism: chemical  Stress Ulcer: H2B  Ventilator Pneumonia: no     Activity Orders          None      CRC> 30 min  Full Code    Matty Coulter MD  Neurocritical Care  [facility]

## 2018-02-01 NOTE — PLAN OF CARE
02/01/18 1329   Discharge Reassessment   Assessment Type Discharge Planning Reassessment   Provided patient/caregiver education on the expected discharge date and the discharge plan No   Do you have any problems affording any of your prescribed medications? No   Discharge Plan A Rehab   Discharge Plan B Home with family   Patient choice form signed by patient/caregiver N/A   Can the patient answer the patient profile reliably? No, cognitively impaired   How does the patient rate their overall health at the present time? (ty)   Describe the patient's ability to walk at the present time. Does not walk or unable to take any steps at all   How often would a person be available to care for the patient? Occasionally   Number of comorbid conditions (as recorded on the chart) Two   During the past month, has the patient often been bothered by feeling down, depressed or hopeless? (ty)   During the past month, has the patient often been bothered by little interest or pleasure in doing things? (ty)       Patient intubated on vent.  Not medically stable for discharge.   Discharge Planner[facility]  [facility]  [phone].  Per Ceci, once discharge plan is known, a Discharge Worksheet Packet must be completed and faxed to [phone] along with a consent to transfer signed by the patent or next of kin.    Delaney Mark RN, CCRN-K, [facility]  Neuro-Critical Care   [phone]

## 2018-02-01 NOTE — SUBJECTIVE & OBJECTIVE
Neurologic Chief Complaint: L MCA infarct     Subjective:     Interval History: Patient is seen for follow-up neurological assessment and treatment recommendations:emergent intubation likely due to upper airway obstruction per NCC.      HPI, Past Medical, Family, and Social History remains the same as documented in the initial encounter.     Review of Systems   Constitutional: Negative for chills and fever.   Respiratory: Negative for cough and shortness of breath.    Gastrointestinal: Negative for diarrhea and vomiting.     Scheduled Meds:   acetaminophen  650 mg Per NG tube Q6H    albuterol-ipratropium 2.5mg-0.5mg/3mL  3 mL Nebulization Q4H    aspirin  325 mg Per NG tube Daily    atorvastatin  80 mg Per NG tube Daily    cefTRIAXone (ROCEPHIN) IVPB  2 g Intravenous Q24H    chlorhexidine  15 mL Mouth/Throat BID    clopidogrel  75 mg Per NG tube Daily    famotidine  20 mg Per NG tube BID    heparin (porcine)  5,000 Units Subcutaneous Q8H    senna-docusate 8.6-50 mg  1 tablet Per NG tube BID    sodium chloride 0.9%  3 mL Intravenous Q8H    sodium chloride 3%  4 mL Nebulization Q4H     Continuous Infusions:   dexmedetomidine (PRECEDEX) infusion 0.4 mcg/kg/hr (02/01/18 1605)    insulin (HUMAN R) infusion (adults) 5.85 Units/hr (02/01/18 1605)    buffered 2% sodium acetate 86meq, sodium chloride 86meq, sterile water for inj IV soln 75 mL/hr at 02/01/18 1605     PRN Meds:albuterol-ipratropium 2.5mg-0.5mg/3mL, bisacodyl, dextrose 50%, dextrose 50%, dextrose 50%, glucagon (human recombinant), hydrALAZINE, labetalol, magnesium sulfate IVPB, magnesium sulfate IVPB, ondansetron, potassium chloride 10%, potassium chloride 10%, sodium chloride 0.9%    Objective:     Vital Signs (Most Recent):  Temp: 99.4 °F (37.4 °C) (02/01/18 1505)  Pulse: 80 (02/01/18 1605)  Resp: (!) 25 (02/01/18 1605)  BP: (!) 156/91 (02/01/18 1605)  SpO2: 98 % (02/01/18 1605)  BP Location: Left arm    Vital Signs Range (Last 24H):  Temp:   [98.1 °F (36.7 °C)-103.1 °F (39.5 °C)]   Pulse:  [58-84]   Resp:  [2-26]   BP: ()/()   SpO2:  [95 %-100 %]   Arterial Line BP: ()/(41-99)   BP Location: Left arm    Physical Exam   Constitutional: He appears well-developed and well-nourished.   HENT:   Head: Normocephalic and atraumatic.   Eyes: Pupils are equal, round, and reactive to light.   Brisk,   Left gaze deviation    Pulmonary/Chest: Effort normal. No respiratory distress.   Intubated        Neurological Exam:   LOC: drowsy  Attention Span: poor  Language: Receptive aphasia, Intubated  Articulation: Untestable due to intubation  Orientation: Person, Place, Time , Untestable due to intubation  EOM (CN III, IV, VI):left gaze deviation   Pupils (CN II, III): PERRL  Motor: no spontaneous movement   Tone:  Normal tone in all extremities     Laboratory:  CMP:   Recent Labs  Lab 02/01/18  0300  02/01/18  1012  02/01/18  1639   CALCIUM 8.5*  --   --   --   --    ALBUMIN 2.5*  --   --   --   --    PROT 6.3  --   --   --   --    *  151*  < >  --   < > 153*   K 3.7  --  3.9  --   --    CO2 29  --   --   --   --    *  --   --   --   --    BUN 27*  --   --   --   --    CREATININE 0.9  --   --   --   --    ALKPHOS 72  --   --   --   --    ALT 36  --   --   --   --    AST 32  --   --   --   --    BILITOT 0.6  --   --   --   --    < > = values in this interval not displayed.  CBC:   Recent Labs  Lab 02/01/18  0300   WBC 8.08   RBC 4.27*   HGB 12.7*   HCT 38.8*      MCV 91   MCH 29.7   MCHC 32.7     Lipid Panel:   Recent Labs  Lab 01/25/18  2218   CHOL 200*   LDLCALC Invalid, Trig>400.0   HDL 27*   TRIG 460*     Hgb A1C:   Recent Labs  Lab 01/25/18  2303   HGBA1C 10.0*     TSH:   Recent Labs  Lab 01/25/18  2218   TSH 1.872       Diagnostic Results     Brain Imaging   CT head 01/31/18  Stable L MCA territory infarct  Cytotoxic cerebral edema    1/27/18 CTH: Per independent resident review and interpretation,  L MCA infarction s/p  stenting.  No sign of hemorrhagic transformation.  R basal ganglia and cerebellar infarct.            1/26/18 MRI Brain w/o:  Restricted diffusion in L MCA territory  Cytotoxic cerebral edema           Vessel Imaging   1/25/18 IR Angiogram: Per independent resident review and interpretation,  L M1 occlusion.          Cardiac Imaging   1/26/18 EKG: Per independent resident review and interpretation,  Sinus tachycardia.  .  QTc 500.     1/26/18 TTE: Per report,  LA wnl  EF 65-70%  No regional wall motion abnormalities  Diastolic function normal

## 2018-02-01 NOTE — PHYSICIAN QUERY
PT Name: Todd Quevedo  MR #: [id number]     Physician Query Form - Documentation Clarification      CDS/: Diane Robles RN, CDS               Contact information: [email or web address]    This form is a permanent document in the medical record.     Query Date: February 1, 2018    By submitting this query, we are merely seeking further clarification of documentation. Please utilize your independent clinical judgment when addressing the question(s) below.    The Medical record reflects the following:    Supporting Clinical Findings Location in Medical Record     --Leukocytosis        -episode of wheezing this am, improved with suction via nasal trumpet, 3% nebs, duo nebs, and cough assist        -concern for sinusitis/resp infection 2/2 low grade fever, leukocytosis, and increasing thick secretions        -ceftriaxone 2g q24h        -concern for sepsis,    --Encephalopathy out of proportion to ischemic stroke injury        -Likely due to underlying sepsis.   --Significant respiratory secretions.        -Likely sinusitis  --Leucocytosis may reflect underlying cerebrovascular disease with superimpose sepsis    --Leukocytosis         -follow CXR/Procal, unsure of leukocytosis cause          -Trend PRocal         -SLight improvement in Leukocytosis with ABX          -Eval CXR             -Small development of perihilar infiltrate, temp, white count, secretions all better on ceftriaxone,cont 7 day course    --cont abx for likely pneumonia  --1/31: abrupt desaturation today requiring emergent intubation followed by bronchoscopy. Etiology likely upper airway obstruction from dried secretions       NCC Note 1/27                NCC Note 1/28              NCC Note 1/30                NCC Note 1/31       Bld Cx: NGTD  Urine CX: NGTD  Resp Cx with gram stain: Moderate wbc's, no organisms seen    LA: 0.23->0.20     Bld Cx: 1/27  Urine cx: 1/27  Resp cx: 2/1    Labs: 1/28->1/29                                                                             Doctor, Please specify diagnosis or diagnoses associated with above clinical findings.      Please clarify the diagnosis(es) ruled in as related to the leukocytosis: (select all that apply)      Provider Use Only    [  ] Acute sinusitis         [  ] POA    [  ] not POA    [ x ] Pneumonia        [  ] POA    [ x ] not POA    [  ] Sepsis        [  ] POA    [  ] not POA    [  ] Other (please specify): _____________________                                                                                                             [  ] Clinically undetermined

## 2018-02-01 NOTE — ASSESSMENT & PLAN NOTE
monitor CTh and exam for increasing edema  2% increased to 100ml/hr, if no increase in serum sodium or ct unchanged or worse, needs tlc for 3%  1/31: sodium improved to goal, cont 2%  02/01: equibrated sodium 150-151, cont 2% at current rate

## 2018-02-01 NOTE — PLAN OF CARE
Problem: Patient Care Overview  Goal: Plan of Care Review  Outcome: Ongoing (interventions implemented as appropriate)  POC reviewed w/ Mr. Todd Quevedo and sister @ 0100. Pt's sister verbalized understanding. Questions and concerns addressed. Pt medically sedated and intubated. No evidence of learning at this time. VSS. No acute events overnight. Will continue to monitor. See flowsheet for further assessment and VS info.

## 2018-02-01 NOTE — SUBJECTIVE & OBJECTIVE
Review of Systems   Unable to perform ROS: Intubated       Objective:     Vitals:  Temp: 99.8 °F (37.7 °C)  Pulse: 74  Rhythm: normal sinus rhythm  BP: 133/75  MAP (mmHg): 98  CI (L/min/m2): 2.2 L/min/m2  SVI: 30  SVV: 8 %  Resp: (!) 25  SpO2: 99 %  Oxygen Concentration (%): 50  O2 Device (Oxygen Therapy): ventilator  Vent Mode: Spont  Set Rate: 0 bmp  Vt Set: 500 mL  Pressure Support: 10 cmH20  PEEP/CPAP: 5 cmH20  Peak Airway Pressure: 15 cmH2O  Mean Airway Pressure: 7.2 cmH20  Plateau Pressure: 17 cmH20    Temp  Min: 98.1 °F (36.7 °C)  Max: 103.1 °F (39.5 °C)  Pulse  Min: 58  Max: 86  BP  Min: 88/53  Max: 163/106  MAP (mmHg)  Min: 65  Max: 128  CI (L/min/m2)  Min: 1.1 L/min/m2  Max: 2.2 L/min/m2  SVI  Min: 15  Max: 32  SVV  Min: 6 %  Max: 28 %  Resp  Min: 2  Max: 26  SpO2  Min: 95 %  Max: 100 %  Oxygen Concentration (%)  Min: 50  Max: 65    01/31 0701 - 02/01 0700  In: 2692.7 [I.V.:2112.7]  Out: 2444 [Urine:2444]   Unmeasured Output  Urine Occurrence: 1  Stool Occurrence: 0       Physical Exam   Constitutional: He appears well-developed.   Eyes: Pupils are equal, round, and reactive to light.   Cardiovascular: Regular rhythm and normal heart sounds.    Pulmonary/Chest: Breath sounds normal.   Abdominal: Soft. Bowel sounds are normal.   Neurological:   Sedated, opens eye to physical stimulation  Withdrawal left side         Medications:  Continuous  dexmedetomidine (PRECEDEX) infusion Last Rate: 0.4 mcg/kg/hr (02/01/18 1205)   insulin (HUMAN R) infusion (adults) Last Rate: 1.65 Units/hr (02/01/18 1105)   buffered 2% sodium acetate 86meq, sodium chloride 86meq, sterile water for inj IV soln Last Rate: 75 mL/hr at 02/01/18 1205   Scheduled  acetaminophen 650 mg Q6H   albuterol-ipratropium 2.5mg-0.5mg/3mL 3 mL Q4H   aspirin 325 mg Daily   atorvastatin 80 mg Daily   cefTRIAXone (ROCEPHIN) IVPB 2 g Q24H   chlorhexidine 15 mL BID   clopidogrel 75 mg Daily   famotidine 20 mg BID   heparin (porcine) 5,000 Units Q8H    senna-docusate 8.6-50 mg 1 tablet BID   sodium chloride 0.9% 3 mL Q8H   sodium chloride 3% 4 mL Q4H   PRN  albuterol-ipratropium 2.5mg-0.5mg/3mL 3 mL Q4H PRN   bisacodyl 10 mg Daily PRN   dextrose 50% 12.5 g PRN   dextrose 50% 12.5 g PRN   dextrose 50% 25 g PRN   glucagon (human recombinant) 1 mg PRN   hydrALAZINE 10 mg Q4H PRN   labetalol 10 mg Q4H PRN   magnesium sulfate IVPB 2 g PRN   magnesium sulfate IVPB 4 g PRN   ondansetron 4 mg Q8H PRN   potassium chloride 10% 40 mEq PRN   potassium chloride 10% 40 mEq PRN   sodium chloride 0.9% 5 mL PRN     Today I personally reviewed pertinent medications, lines/drains/airways, imaging, lab results, notably:    Diet  Diet NPO  Diet NPO

## 2018-02-01 NOTE — PLAN OF CARE
Problem: Pressure Ulcer Risk (Champ Scale) (Adult,Obstetrics,Pediatric)  Intervention: Promote/Optimize Nutrition    Recommendations     1. Initiate change of TF formula to Glucerna 1.5 starting @ 10 ml/hr, increasing rate by 10 ml/hr 4qhrs until goal rate of 70 ml/hr reached to provide 2520 kcals, 129g pro, and 1184 ml free fluids.               - Hold residuals at >500 ml. Additional fluids per MD.  2. RD to monitor and follow-up.    Assessment and Plan         * Embolic stroke involving left middle cerebral artery     Problem  Inadequate oral intake     Related to (etiology):   Inability to consume sufficient energy     Signs and Symptoms (as evidenced by):   NPO with no alternative means of nutrition     Interventions/Recommendations (treatment strategy):  See recs above     Nutrition Diagnosis Status:   Improving

## 2018-02-01 NOTE — PLAN OF CARE
Problem: Patient Care Overview  Goal: Plan of Care Review  Outcome: Ongoing (interventions implemented as appropriate)  POC reviewed with patient at 1400. No evidence of learning from the patient. No acute events today. Day bath given. Patient turned per protocol. Oral care per protocol.  Patient  progressing toward goals. RN will continue to monitor. See flowsheets for full assessment and VS info.

## 2018-02-01 NOTE — PHYSICIAN QUERY
PT Name: Todd Quevedo  MR #: [id number]     Physician Query Form - Documentation Clarification      CDS/: Diane Robles RN, CDS              Contact information: [email or web address]    This form is a permanent document in the medical record.     Query Date: February 1, 2018    By submitting this query, we are merely seeking further clarification of documentation. Please utilize your independent clinical judgment when addressing the question(s) below.    The Medical record reflects the following:    Supporting Clinical Findings Location in Medical Record     --Cytotoxic cerebral edema noted on MRI. Mass effect starting to be noticed with some brain compression.    --Imaging with mass effect and some brain compression; maycol crani watch.      -Worsening exam today. Repeat head CT negative for hemorrhagic conversion.     --CT head stable    --Neurosurgery consulted for hemicrani watch.       -No neurosurgical intervention at this time.      -Neurosurgery will be signing off, please contact us with any questions or concerns.      Vas neuro Note 1/27      Vas Neuro Note 1/29          Vas Neuro 1/31    Neuro Sx Note 1/31                                                                                Doctor, Please specify diagnosis or diagnoses associated with above clinical findings.      Please specify the significance of the imaging with some brain compression:        Provider Use Only      [  ] Brain compression is of no significance    [x  ] Brain compression is of significance                                                                                                                 [  ] Clinically undetermined

## 2018-02-02 LAB
ALBUMIN SERPL BCP-MCNC: 2.4 G/DL
ALLENS TEST: ABNORMAL
ALP SERPL-CCNC: 81 U/L
ALT SERPL W/O P-5'-P-CCNC: 33 U/L
ANION GAP SERPL CALC-SCNC: 9 MMOL/L
AST SERPL-CCNC: 28 U/L
BASOPHILS # BLD AUTO: 0.03 K/UL
BASOPHILS NFR BLD: 0.4 %
BILIRUB SERPL-MCNC: 0.4 MG/DL
BUN SERPL-MCNC: 23 MG/DL
CALCIUM SERPL-MCNC: 8.6 MG/DL
CHLORIDE SERPL-SCNC: 116 MMOL/L
CO2 SERPL-SCNC: 27 MMOL/L
CREAT SERPL-MCNC: 0.9 MG/DL
DELSYS: ABNORMAL
DIFFERENTIAL METHOD: ABNORMAL
EOSINOPHIL # BLD AUTO: 0.3 K/UL
EOSINOPHIL NFR BLD: 3.6 %
ERYTHROCYTE [DISTWIDTH] IN BLOOD BY AUTOMATED COUNT: 12.8 %
EST. GFR  (AFRICAN AMERICAN): >60 ML/MIN/1.73 M^2
EST. GFR  (NON AFRICAN AMERICAN): >60 ML/MIN/1.73 M^2
FIO2: 50
GLUCOSE SERPL-MCNC: 221 MG/DL
HCO3 UR-SCNC: 31.5 MMOL/L (ref 24–28)
HCT VFR BLD AUTO: 39.3 %
HGB BLD-MCNC: 12.9 G/DL
IMM GRANULOCYTES # BLD AUTO: 0.07 K/UL
IMM GRANULOCYTES NFR BLD AUTO: 0.9 %
INR PPP: 0.9
LYMPHOCYTES # BLD AUTO: 1.9 K/UL
LYMPHOCYTES NFR BLD: 23.4 %
MAGNESIUM SERPL-MCNC: 2 MG/DL
MAP: 8.9
MCH RBC QN AUTO: 29.6 PG
MCHC RBC AUTO-ENTMCNC: 32.8 G/DL
MCV RBC AUTO: 90 FL
MIN VOL: 10.3
MODE: ABNORMAL
MONOCYTES # BLD AUTO: 0.6 K/UL
MONOCYTES NFR BLD: 7.3 %
NEUTROPHILS # BLD AUTO: 5.1 K/UL
NEUTROPHILS NFR BLD: 64.4 %
NRBC BLD-RTO: 0 /100 WBC
PCO2 BLDA: 42.8 MMHG (ref 35–45)
PEEP: 5
PH SMN: 7.47 [PH] (ref 7.35–7.45)
PHOSPHATE SERPL-MCNC: 2.7 MG/DL
PLATELET # BLD AUTO: 254 K/UL
PMV BLD AUTO: 9.4 FL
PO2 BLDA: 166 MMHG (ref 80–100)
POC BE: 8 MMOL/L
POC SATURATED O2: 100 % (ref 95–100)
POC TCO2: 33 MMOL/L (ref 23–27)
POCT GLUCOSE: 107 MG/DL (ref 70–110)
POCT GLUCOSE: 154 MG/DL (ref 70–110)
POCT GLUCOSE: 154 MG/DL (ref 70–110)
POCT GLUCOSE: 156 MG/DL (ref 70–110)
POCT GLUCOSE: 157 MG/DL (ref 70–110)
POCT GLUCOSE: 157 MG/DL (ref 70–110)
POCT GLUCOSE: 158 MG/DL (ref 70–110)
POCT GLUCOSE: 159 MG/DL (ref 70–110)
POCT GLUCOSE: 171 MG/DL (ref 70–110)
POCT GLUCOSE: 173 MG/DL (ref 70–110)
POCT GLUCOSE: 174 MG/DL (ref 70–110)
POCT GLUCOSE: 176 MG/DL (ref 70–110)
POCT GLUCOSE: 178 MG/DL (ref 70–110)
POCT GLUCOSE: 179 MG/DL (ref 70–110)
POCT GLUCOSE: 181 MG/DL (ref 70–110)
POCT GLUCOSE: 184 MG/DL (ref 70–110)
POCT GLUCOSE: 184 MG/DL (ref 70–110)
POCT GLUCOSE: 187 MG/DL (ref 70–110)
POCT GLUCOSE: 193 MG/DL (ref 70–110)
POCT GLUCOSE: 198 MG/DL (ref 70–110)
POTASSIUM SERPL-SCNC: 3.6 MMOL/L
PROT SERPL-MCNC: 6.3 G/DL
PROTHROMBIN TIME: 10 SEC
PS: 10
RBC # BLD AUTO: 4.36 M/UL
SAMPLE: ABNORMAL
SITE: ABNORMAL
SODIUM SERPL-SCNC: 149 MMOL/L
SODIUM SERPL-SCNC: 150 MMOL/L
SODIUM SERPL-SCNC: 152 MMOL/L
SODIUM SERPL-SCNC: 153 MMOL/L
SODIUM SERPL-SCNC: 153 MMOL/L
SP02: 100
SPONT RATE: 22
VOL: 450
WBC # BLD AUTO: 7.98 K/UL

## 2018-02-02 PROCEDURE — 20000000 HC ICU ROOM

## 2018-02-02 PROCEDURE — 63600175 PHARM REV CODE 636 W HCPCS: Performed by: PSYCHIATRY & NEUROLOGY

## 2018-02-02 PROCEDURE — 25000003 PHARM REV CODE 250: Performed by: PSYCHIATRY & NEUROLOGY

## 2018-02-02 PROCEDURE — 82803 BLOOD GASES ANY COMBINATION: CPT

## 2018-02-02 PROCEDURE — 94003 VENT MGMT INPAT SUBQ DAY: CPT

## 2018-02-02 PROCEDURE — 27000221 HC OXYGEN, UP TO 24 HOURS

## 2018-02-02 PROCEDURE — 80053 COMPREHEN METABOLIC PANEL: CPT

## 2018-02-02 PROCEDURE — 37799 UNLISTED PX VASCULAR SURGERY: CPT

## 2018-02-02 PROCEDURE — 94640 AIRWAY INHALATION TREATMENT: CPT

## 2018-02-02 PROCEDURE — 84295 ASSAY OF SERUM SODIUM: CPT | Mod: 91

## 2018-02-02 PROCEDURE — A4217 STERILE WATER/SALINE, 500 ML: HCPCS | Performed by: PSYCHIATRY & NEUROLOGY

## 2018-02-02 PROCEDURE — 63600175 PHARM REV CODE 636 W HCPCS: Performed by: NURSE PRACTITIONER

## 2018-02-02 PROCEDURE — 25000003 PHARM REV CODE 250: Performed by: PHYSICIAN ASSISTANT

## 2018-02-02 PROCEDURE — 25000003 PHARM REV CODE 250: Performed by: NURSE PRACTITIONER

## 2018-02-02 PROCEDURE — A4216 STERILE WATER/SALINE, 10 ML: HCPCS | Performed by: NURSE PRACTITIONER

## 2018-02-02 PROCEDURE — 94761 N-INVAS EAR/PLS OXIMETRY MLT: CPT

## 2018-02-02 PROCEDURE — 99233 SBSQ HOSP IP/OBS HIGH 50: CPT | Mod: ,,, | Performed by: PSYCHIATRY & NEUROLOGY

## 2018-02-02 PROCEDURE — 25000242 PHARM REV CODE 250 ALT 637 W/ HCPCS: Performed by: PHYSICIAN ASSISTANT

## 2018-02-02 PROCEDURE — 99900026 HC AIRWAY MAINTENANCE (STAT)

## 2018-02-02 PROCEDURE — 27200966 HC CLOSED SUCTION SYSTEM

## 2018-02-02 PROCEDURE — 83735 ASSAY OF MAGNESIUM: CPT

## 2018-02-02 PROCEDURE — 85610 PROTHROMBIN TIME: CPT

## 2018-02-02 PROCEDURE — 97110 THERAPEUTIC EXERCISES: CPT

## 2018-02-02 PROCEDURE — 84100 ASSAY OF PHOSPHORUS: CPT

## 2018-02-02 PROCEDURE — 85025 COMPLETE CBC W/AUTO DIFF WBC: CPT

## 2018-02-02 RX ORDER — FENTANYL CITRATE 50 UG/ML
50 INJECTION, SOLUTION INTRAMUSCULAR; INTRAVENOUS
Status: DISCONTINUED | OUTPATIENT
Start: 2018-02-02 | End: 2018-02-08

## 2018-02-02 RX ORDER — HYDRALAZINE HYDROCHLORIDE 20 MG/ML
10 INJECTION INTRAMUSCULAR; INTRAVENOUS EVERY 4 HOURS PRN
Status: DISCONTINUED | OUTPATIENT
Start: 2018-02-02 | End: 2018-02-21

## 2018-02-02 RX ORDER — LABETALOL HYDROCHLORIDE 5 MG/ML
10 INJECTION, SOLUTION INTRAVENOUS EVERY 4 HOURS PRN
Status: DISCONTINUED | OUTPATIENT
Start: 2018-02-02 | End: 2018-02-28

## 2018-02-02 RX ORDER — DEXAMETHASONE SODIUM PHOSPHATE 4 MG/ML
6 INJECTION, SOLUTION INTRA-ARTICULAR; INTRALESIONAL; INTRAMUSCULAR; INTRAVENOUS; SOFT TISSUE EVERY 8 HOURS
Status: DISCONTINUED | OUTPATIENT
Start: 2018-02-02 | End: 2018-02-03

## 2018-02-02 RX ORDER — CARVEDILOL 12.5 MG/1
12.5 TABLET ORAL EVERY 8 HOURS
Status: DISCONTINUED | OUTPATIENT
Start: 2018-02-02 | End: 2018-02-02

## 2018-02-02 RX ORDER — CAPTOPRIL 12.5 MG/1
12.5 TABLET ORAL 3 TIMES DAILY
Status: DISCONTINUED | OUTPATIENT
Start: 2018-02-02 | End: 2018-02-04

## 2018-02-02 RX ADMIN — LABETALOL HYDROCHLORIDE 10 MG: 5 INJECTION, SOLUTION INTRAVENOUS at 02:02

## 2018-02-02 RX ADMIN — SODIUM CHLORIDE SOLN NEBU 3% 4 ML: 3 NEBU SOLN at 04:02

## 2018-02-02 RX ADMIN — STANDARDIZED SENNA CONCENTRATE AND DOCUSATE SODIUM 1 TABLET: 8.6; 5 TABLET, FILM COATED ORAL at 08:02

## 2018-02-02 RX ADMIN — ACETAMINOPHEN 650 MG: 325 TABLET, FILM COATED ORAL at 12:02

## 2018-02-02 RX ADMIN — DEXAMETHASONE SODIUM PHOSPHATE 6 MG: 4 INJECTION, SOLUTION INTRAMUSCULAR; INTRAVENOUS at 12:02

## 2018-02-02 RX ADMIN — SODIUM CHLORIDE SOLN NEBU 3% 4 ML: 3 NEBU SOLN at 08:02

## 2018-02-02 RX ADMIN — CAPTOPRIL 12.5 MG: 12.5 TABLET ORAL at 09:02

## 2018-02-02 RX ADMIN — FENTANYL CITRATE 50 MCG: 50 INJECTION INTRAMUSCULAR; INTRAVENOUS at 10:02

## 2018-02-02 RX ADMIN — CLOPIDOGREL 75 MG: 75 TABLET, FILM COATED ORAL at 08:02

## 2018-02-02 RX ADMIN — FENTANYL CITRATE 50 MCG: 50 INJECTION INTRAMUSCULAR; INTRAVENOUS at 05:02

## 2018-02-02 RX ADMIN — HEPARIN SODIUM 5000 UNITS: 5000 INJECTION, SOLUTION INTRAVENOUS; SUBCUTANEOUS at 01:02

## 2018-02-02 RX ADMIN — IPRATROPIUM BROMIDE AND ALBUTEROL SULFATE 3 ML: .5; 3 SOLUTION RESPIRATORY (INHALATION) at 04:02

## 2018-02-02 RX ADMIN — POTASSIUM CHLORIDE 40 MEQ: 20 SOLUTION ORAL at 05:02

## 2018-02-02 RX ADMIN — ACETAMINOPHEN 650 MG: 325 TABLET, FILM COATED ORAL at 05:02

## 2018-02-02 RX ADMIN — HYDRALAZINE HYDROCHLORIDE 10 MG: 20 INJECTION INTRAMUSCULAR; INTRAVENOUS at 03:02

## 2018-02-02 RX ADMIN — IPRATROPIUM BROMIDE AND ALBUTEROL SULFATE 3 ML: .5; 3 SOLUTION RESPIRATORY (INHALATION) at 12:02

## 2018-02-02 RX ADMIN — CHLORHEXIDINE GLUCONATE 15 ML: 1.2 RINSE ORAL at 08:02

## 2018-02-02 RX ADMIN — ATORVASTATIN CALCIUM 80 MG: 20 TABLET, FILM COATED ORAL at 08:02

## 2018-02-02 RX ADMIN — DEXAMETHASONE SODIUM PHOSPHATE 6 MG: 4 INJECTION, SOLUTION INTRAMUSCULAR; INTRAVENOUS at 09:02

## 2018-02-02 RX ADMIN — FAMOTIDINE 20 MG: 20 TABLET, FILM COATED ORAL at 08:02

## 2018-02-02 RX ADMIN — FENTANYL CITRATE 50 MCG: 50 INJECTION INTRAMUSCULAR; INTRAVENOUS at 01:02

## 2018-02-02 RX ADMIN — Medication 3 ML: at 09:02

## 2018-02-02 RX ADMIN — Medication 3 ML: at 05:02

## 2018-02-02 RX ADMIN — ACETAMINOPHEN 650 MG: 325 TABLET, FILM COATED ORAL at 06:02

## 2018-02-02 RX ADMIN — HEPARIN SODIUM 5000 UNITS: 5000 INJECTION, SOLUTION INTRAVENOUS; SUBCUTANEOUS at 05:02

## 2018-02-02 RX ADMIN — FENTANYL CITRATE 50 MCG: 50 INJECTION INTRAMUSCULAR; INTRAVENOUS at 08:02

## 2018-02-02 RX ADMIN — SODIUM CHLORIDE 9.4 UNITS/HR: 9 INJECTION, SOLUTION INTRAVENOUS at 07:02

## 2018-02-02 RX ADMIN — CEFTRIAXONE 2 G: 2 INJECTION, SOLUTION INTRAVENOUS at 04:02

## 2018-02-02 RX ADMIN — IPRATROPIUM BROMIDE AND ALBUTEROL SULFATE 3 ML: .5; 3 SOLUTION RESPIRATORY (INHALATION) at 08:02

## 2018-02-02 RX ADMIN — SODIUM ACETATE: 3.28 INJECTION, SOLUTION, CONCENTRATE INTRAVENOUS at 08:02

## 2018-02-02 RX ADMIN — IPRATROPIUM BROMIDE AND ALBUTEROL SULFATE 3 ML: .5; 3 SOLUTION RESPIRATORY (INHALATION) at 07:02

## 2018-02-02 RX ADMIN — Medication 3 ML: at 01:02

## 2018-02-02 RX ADMIN — SODIUM CHLORIDE SOLN NEBU 3% 4 ML: 3 NEBU SOLN at 11:02

## 2018-02-02 RX ADMIN — FENTANYL CITRATE 50 MCG: 50 INJECTION INTRAMUSCULAR; INTRAVENOUS at 03:02

## 2018-02-02 RX ADMIN — CHLORHEXIDINE GLUCONATE 15 ML: 1.2 RINSE ORAL at 09:02

## 2018-02-02 RX ADMIN — ASPIRIN 325 MG ORAL TABLET 325 MG: 325 PILL ORAL at 08:02

## 2018-02-02 RX ADMIN — HYDRALAZINE HYDROCHLORIDE 10 MG: 20 INJECTION INTRAMUSCULAR; INTRAVENOUS at 11:02

## 2018-02-02 RX ADMIN — SODIUM CHLORIDE SOLN NEBU 3% 4 ML: 3 NEBU SOLN at 12:02

## 2018-02-02 RX ADMIN — IPRATROPIUM BROMIDE AND ALBUTEROL SULFATE 3 ML: .5; 3 SOLUTION RESPIRATORY (INHALATION) at 11:02

## 2018-02-02 RX ADMIN — HEPARIN SODIUM 5000 UNITS: 5000 INJECTION, SOLUTION INTRAVENOUS; SUBCUTANEOUS at 09:02

## 2018-02-02 RX ADMIN — HYDRALAZINE HYDROCHLORIDE 10 MG: 20 INJECTION INTRAMUSCULAR; INTRAVENOUS at 12:02

## 2018-02-02 RX ADMIN — SODIUM ACETATE: 3.28 INJECTION, SOLUTION, CONCENTRATE INTRAVENOUS at 12:02

## 2018-02-02 RX ADMIN — CAPTOPRIL 12.5 MG: 12.5 TABLET ORAL at 04:02

## 2018-02-02 RX ADMIN — DEXMEDETOMIDINE HYDROCHLORIDE 0.8 MCG/KG/HR: 4 INJECTION, SOLUTION INTRAVENOUS at 06:02

## 2018-02-02 NOTE — PLAN OF CARE
Problem: Patient Care Overview  Goal: Plan of Care Review  Outcome: Ongoing (interventions implemented as appropriate)  POC reviewed with patient at 1400. No evidence of learning. Precedex stopped per NCC team. 2% stopped per NCC team. SBP goal now < 180 per NCC team. Fentanyl 50 mcg q 2hr for agitation per NCC team. Patient turned q 2hr, oral care per protocol. Day bath given. No acute events today. Patient is progressing toward goals. RN will continue to monitor. See flowsheets for full assessment and VS info.

## 2018-02-02 NOTE — PROGRESS NOTES
[facility]  Vascular Neurology  Comprehensive Stroke Center  Progress Note    Assessment/Plan:     * Embolic stroke involving left middle cerebral artery    Mr. Quevedo is a 47 year old male who walked into [facility] and was acting abnormal.  EMS was called and brought to the ED for concern of L MCA syndrome. Not tpa candidate due to unknown last known normal.  Patient went to IR for thrombectomy of L M1 occlusion seen on CTA MP on 1/25.  TICI2C reperfusion and angioplasty.      Primary team with concerns for sepsis, believe respiratory source. Started on antibiotics.    Antithrombotics for secondary stroke prevention: asa 325mg qd and plavix 75mg qd   Statins for secondary stroke prevention and hyperlipidemia, if present: Repeat LDL (last invalid secondary to hypertriglyceridemia), atorvastatin 80 in interim  Aggressive risk factor modification: Obesity  Rehab efforts: PT/OT/SLP to evaluate and treat  Diagnostics ordered/pending:   CTA - L M1 occlusion  A1C 10, TSH 1.8, LDL immeasurable 2/2 trigylcerides (460), Chol 200  MRI - most of the L MCA shows area of infarct  VTE prophylaxis: Heparin 5000 units SQ every 8 hours  BP parameters: Infarct: Post sucessful thrombectomy, SBP <140             Respiratory distress, acute    Requiring intubation   NCC managing         Cytotoxic cerebral edema    Due to stroke  Evidence of mass effect and brain compression on imaging        Leukocytosis    Pancultured, broad spectrum antibiotics  Possible sepsis, Respiratory source  -Management per primary        Type 2 diabetes mellitus    - A1C 10  - hyperglycemic; started on insulin gtt   - Can consider endocrine consult when stepped down to stroke service        Hyperlipidemia    - Chol 200, LDL immeasurable,   - atorvastatin 80 mg  - repeat lipid panel          Essential hypertension    - management for primary team  - goal SBP post thrombectomy <140  - requiring multiple administration of PRN  hydralazine        Obstructive sleep apnea    Stroke risk factor               1/25: Brought in for aphasia, RSW with L gaze preferance. LKN unknown, not tPA candidate. Went to IR for angiogram and stent.  1/29: Off Cardene, remains on Insulin gtt. Concern for sepsis, respiratory source. Imaging with mass effect and some brain compression; maycol crani watch.  1/30: EEG consistent with focal L slowing 2/2 lesion and mild generalized slowing, no seizures. CT head stable, no hemorrhagic conversion  02/01/18 emergent intubation likely due to upper airway obstruction per NCC.      STROKE DOCUMENTATION   Acute Stroke Times   Stroke Team Called Date: 01/25/18  Stroke Team Called Time: 1852  Stroke Team Arrival Date: 01/25/18  Stroke Team Arrival Time: 0652  CT Interpretation Time: 1910  Decision to Treat Time for Alteplase:  (n/a unknown last known normal )  Decision to Treat Time for IR: 1915    NIH Scale:  1a. Level Of Consciousness: 2-->Not alert: requires repeated stimulation to attend, or is obtunded and requires strong or painful stimulation to make movements (not stereotyped)  1b. LOC Questions: 2-->Answers neither question correctly  1c. LOC Commands: 2-->Performs neither task correctly  2. Best Gaze: 2-->Forced deviation, or total gaze paresis not overcome by the oculocephalic maneuver  3. Visual: 2-->Complete hemianopia  4. Facial Palsy: 0-->Normal symmetrical movements  5a. Motor Arm, Left: 4-->No movement  5b. Motor Arm, Right: 4-->No movement  6a. Motor Leg, Left: 4-->No movement  6b. Motor Leg, Right: 4-->No movement  7. Limb Ataxia: 0-->Absent  9. Best Language: 3-->Mute, global aphasia: no usable speech or auditory comprehension  10. Dysarthria: (UN) Intubated or other physical barrier  11. Extinction and Inattention (formerly Neglect): 2-->Profound maycol-inattention/extinction more than 1 modality       Modified Phelps    Jeff Coma Scale:    ABCD2 Score:    [id number] Score:   HAS -BLED Score:   ICH  Score:   Hernandez & Laguerre Classification:      Hemorrhagic change of an Ischemic Stroke: Does this patient have an ischemic stroke with hemorrhagic changes?no    Neurologic Chief Complaint: L MCA infarct     Subjective:     Interval History: Patient is seen for follow-up neurological assessment and treatment recommendations:emergent intubation likely due to upper airway obstruction per NCC.      HPI, Past Medical, Family, and Social History remains the same as documented in the initial encounter.     Review of Systems   Constitutional: Negative for chills and fever.   Respiratory: Negative for cough and shortness of breath.    Gastrointestinal: Negative for diarrhea and vomiting.     Scheduled Meds:   acetaminophen  650 mg Per NG tube Q6H    albuterol-ipratropium 2.5mg-0.5mg/3mL  3 mL Nebulization Q4H    aspirin  325 mg Per NG tube Daily    atorvastatin  80 mg Per NG tube Daily    cefTRIAXone (ROCEPHIN) IVPB  2 g Intravenous Q24H    chlorhexidine  15 mL Mouth/Throat BID    clopidogrel  75 mg Per NG tube Daily    famotidine  20 mg Per NG tube BID    heparin (porcine)  5,000 Units Subcutaneous Q8H    senna-docusate 8.6-50 mg  1 tablet Per NG tube BID    sodium chloride 0.9%  3 mL Intravenous Q8H    sodium chloride 3%  4 mL Nebulization Q4H     Continuous Infusions:   dexmedetomidine (PRECEDEX) infusion 0.4 mcg/kg/hr (02/01/18 1605)    insulin (HUMAN R) infusion (adults) 5.85 Units/hr (02/01/18 1605)    buffered 2% sodium acetate 86meq, sodium chloride 86meq, sterile water for inj IV soln 75 mL/hr at 02/01/18 1605     PRN Meds:albuterol-ipratropium 2.5mg-0.5mg/3mL, bisacodyl, dextrose 50%, dextrose 50%, dextrose 50%, glucagon (human recombinant), hydrALAZINE, labetalol, magnesium sulfate IVPB, magnesium sulfate IVPB, ondansetron, potassium chloride 10%, potassium chloride 10%, sodium chloride 0.9%    Objective:     Vital Signs (Most Recent):  Temp: 99.4 °F (37.4 °C) (02/01/18 1505)  Pulse: 80 (02/01/18  1605)  Resp: (!) 25 (02/01/18 1605)  BP: (!) 156/91 (02/01/18 1605)  SpO2: 98 % (02/01/18 1605)  BP Location: Left arm    Vital Signs Range (Last 24H):  Temp:  [98.1 °F (36.7 °C)-103.1 °F (39.5 °C)]   Pulse:  [58-84]   Resp:  [2-26]   BP: ()/()   SpO2:  [95 %-100 %]   Arterial Line BP: ()/(41-99)   BP Location: Left arm    Physical Exam   Constitutional: He appears well-developed and well-nourished.   HENT:   Head: Normocephalic and atraumatic.   Eyes: Pupils are equal, round, and reactive to light.   Brisk,   Left gaze deviation    Pulmonary/Chest: Effort normal. No respiratory distress.   Intubated        Neurological Exam:   LOC: drowsy  Attention Span: poor  Language: Receptive aphasia, Intubated  Articulation: Untestable due to intubation  Orientation: Person, Place, Time , Untestable due to intubation  EOM (CN III, IV, VI):left gaze deviation   Pupils (CN II, III): PERRL  Motor: no spontaneous movement   Tone:  Normal tone in all extremities     Laboratory:  CMP:   Recent Labs  Lab 02/01/18  0300  02/01/18  1012  02/01/18  1639   CALCIUM 8.5*  --   --   --   --    ALBUMIN 2.5*  --   --   --   --    PROT 6.3  --   --   --   --    *  151*  < >  --   < > 153*   K 3.7  --  3.9  --   --    CO2 29  --   --   --   --    *  --   --   --   --    BUN 27*  --   --   --   --    CREATININE 0.9  --   --   --   --    ALKPHOS 72  --   --   --   --    ALT 36  --   --   --   --    AST 32  --   --   --   --    BILITOT 0.6  --   --   --   --    < > = values in this interval not displayed.  CBC:   Recent Labs  Lab 02/01/18  0300   WBC 8.08   RBC 4.27*   HGB 12.7*   HCT 38.8*      MCV 91   MCH 29.7   MCHC 32.7     Lipid Panel:   Recent Labs  Lab 01/25/18  2218   CHOL 200*   LDLCALC Invalid, Trig>400.0   HDL 27*   TRIG 460*     Hgb A1C:   Recent Labs  Lab 01/25/18  2303   HGBA1C 10.0*     TSH:   Recent Labs  Lab 01/25/18 2218   TSH 1.872       Diagnostic Results     Brain Imaging   CT head  01/31/18  Stable L MCA territory infarct  Cytotoxic cerebral edema    1/27/18 CTH: Per independent resident review and interpretation,  L MCA infarction s/p stenting.  No sign of hemorrhagic transformation.  R basal ganglia and cerebellar infarct.            1/26/18 MRI Brain w/o:  Restricted diffusion in L MCA territory  Cytotoxic cerebral edema           Vessel Imaging   1/25/18 IR Angiogram: Per independent resident review and interpretation,  L M1 occlusion.          Cardiac Imaging   1/26/18 EKG: Per independent resident review and interpretation,  Sinus tachycardia.  .  QTc 500.     1/26/18 TTE: Per report,  LA wnl  EF 65-70%  No regional wall motion abnormalities  Diastolic function normal         LATONYA SarahC  Comprehensive Stroke Center  Department of Vascular Neurology   [facility]

## 2018-02-02 NOTE — PROGRESS NOTES
[facility]  Neurocritical Care  Progress Note    Admit Date: 1/25/2018  Service Date: 02/02/2018  Length of Stay: 8    Subjective:     Chief Complaint: Embolic stroke involving left middle cerebral artery    History of Present Illness: The patient is a 47 year old male with no known PMHx admitted to [facility]   s/p thrombectomy of L M1 occlusion. Per chart review, he   walked into [place] and was acting abnormal.  EMS was called and brought to the ED for concern of L MCA syndrome. CT MP revealed short segment occlusion of left M1.  Patient went to IR for thrombectomy of L M1 occlusion seen on CTA MP.  TICI2C reperfusion and angioplasty. Patient admitted to [facility] for close monitoring and higher level of care.   History obtained from chart review only            Hospital Course: 1/25: Pt admitted to [facility] s/p L M1 thrombectomy, TICI2C reperfusion and angioplasty   1/27: large L MCA, hemicrani watch, NSGY following, insulin ggt, cardene ggt, L sided intermittent slowing eeg but no sz, +nasal trumpet for copious thick secretions, wheezing this am - improved with duo nebs, 3% nebs, and cough assist, concern for sepsis 2/2 hemodynamic changes - increased HR and BP, worsening leukocytosis, +ceftriaxone, pan cx, adrian track  1/28: continued respiratory challenges due to secretions. Aggressive pulmonary toileting. Continue monitoring for neuro worsening. Add moxifloxacin.   1/29: CTH to eval for increased edema/shift, EEG afterwards. Concern for decrease exam, no following/decreased alertness). CXR and Procal to follow leukocytosis. Hold TF until eval decreasing EXAM  1/30: neuro exam stable, increase 2%, current amount of sodium iv not suffuicient to meet target 145-155, cont abx for likely pneumonia, repeat ct in am  1/31: abrupt desaturation today requiring emergent intubation followed by bronchoscopy  Etiology likely upper airway obstruction from dried secretions  02/01: stable on vent overnight, marked  improvement on today's chest x ray, switch to spontaneous today, sodium stable at 150, start tfs  02/02: moves left side spontaneously and opens eyes off sedation,        Review of Systems   Unable to perform ROS: Intubated       Objective:     Vitals:  Temp: 99.5 °F (37.5 °C)  Pulse: 80  Rhythm: normal sinus rhythm  BP: (!) 155/86  MAP (mmHg): 114  CI (L/min/m2): 2.5 L/min/m2  SVI: 32  SVV: 14 %  Resp: (!) 25  SpO2: 97 %  Oxygen Concentration (%): 50  O2 Device (Oxygen Therapy): ventilator  Vent Mode: Spont  Set Rate: 0 bmp  Vt Set: 500 mL  Pressure Support: 10 cmH20  PEEP/CPAP: 5 cmH20  Peak Airway Pressure: 16 cmH2O  Mean Airway Pressure: 8.6 cmH20  Plateau Pressure: 17 cmH20    Temp  Min: 98.8 °F (37.1 °C)  Max: 100 °F (37.8 °C)  Pulse  Min: 61  Max: 91  BP  Min: 117/65  Max: 192/104  MAP (mmHg)  Min: 85  Max: 140  CI (L/min/m2)  Min: 0.9 L/min/m2  Max: 2.7 L/min/m2  SVI  Min: 11  Max: 36  SVV  Min: 7 %  Max: 24 %  Resp  Min: 16  Max: 46  SpO2  Min: 97 %  Max: 100 %  Oxygen Concentration (%)  Min: 50  Max: 50    02/01 0701 - 02/02 0700  In: 3629.5 [I.V.:2319.5]  Out: 2465 [Urine:2465]   Unmeasured Output  Urine Occurrence: 0  Stool Occurrence: 0       Physical Exam   Eyes: Pupils are equal, round, and reactive to light.   Cardiovascular: Regular rhythm and normal heart sounds.    Pulmonary/Chest: Breath sounds normal.   Abdominal: Soft. Bowel sounds are normal.   Neurological: A cranial nerve deficit is present.   Requires physical stimulation for eye opening  Moves left side         Medications:  Continuous  insulin (HUMAN R) infusion (adults) Last Rate: 5.9 Units/hr (02/02/18 1505)   Scheduled  acetaminophen 650 mg Q6H   albuterol-ipratropium 2.5mg-0.5mg/3mL 3 mL Q4H   aspirin 325 mg Daily   atorvastatin 80 mg Daily   captopril 12.5 mg TID   cefTRIAXone (ROCEPHIN) IVPB 2 g Q24H   chlorhexidine 15 mL BID   clopidogrel 75 mg Daily   dexamethasone 6 mg Q8H   famotidine 20 mg BID   heparin (porcine) 5,000 Units  Q8H   senna-docusate 8.6-50 mg 1 tablet BID   sodium chloride 0.9% 3 mL Q8H   sodium chloride 3% 4 mL Q4H   PRN  albuterol-ipratropium 2.5mg-0.5mg/3mL 3 mL Q4H PRN   bisacodyl 10 mg Daily PRN   dextrose 50% 12.5 g PRN   dextrose 50% 12.5 g PRN   dextrose 50% 25 g PRN   fentaNYL 50 mcg Q2H PRN   glucagon (human recombinant) 1 mg PRN   hydrALAZINE 10 mg Q4H PRN   labetalol 10 mg Q4H PRN   magnesium sulfate IVPB 2 g PRN   magnesium sulfate IVPB 4 g PRN   ondansetron 4 mg Q8H PRN   potassium chloride 10% 40 mEq PRN   potassium chloride 10% 40 mEq PRN   sodium chloride 0.9% 5 mL PRN     Today I personally reviewed pertinent medications, lines/drains/airways, imaging, lab results, notably:    Diet  Diet NPO  Diet NPO        Assessment/Plan:     Neuro   * Embolic stroke involving left middle cerebral artery    S/P L M1 thrombectomy  -- Neuro checks q 1hr  -- Vascular Neurology  following  -- CT MP- Short segment occlusion of left M1   -- Repeat CTH 1/26 - No detrimental change  -- SBP goal <160  -- PT/OT/Speech  -- hemicrani watch, NSGY following   -- MRI 1/26 - L MCA, no hemorrhagic conversion  -- stat CTH 1/27: L MCA stable, no worsening edema or hemorrhagic conversion  -- ASA, plavix     Appears exam declined, no consistent with previous imaging. Stat CTH to eval for increased edema and then EEG.   NO AEDs at this time  01/31: no eeg evidence for seizure x 2 days, ct head stable, now that airway secured-for mri in am            Cytotoxic cerebral edema    monitor CTh and exam for increasing edema  2% increased to 100ml/hr, if no increase in serum sodium or ct unchanged or worse, needs tlc for 3%  1/31: sodium improved to goal, cont 2%  02/01: equibrated sodium 150-151, cont 2% at current rate          Pulmonary   Acute respiratory failure with hypoxia    Due to airway obstruction form hardened secretions overlying epiglottis and soft palate, improved after intubation and bronchoscopy  Brief temp elevation yesterday,  likely reflecting immune response to foreign material in lungs  Has not followed with pneumonia, tolerating spontaneous  Use prn sedation, if mental status improves-attempt extubation  Decadron 6mg q8 x 3 doses for probable swelling post difficult intubation        Cardiac/Vascular   Hyperlipidemia    -atorvastatin 80 daily           Essential hypertension    --SBP <160  --cardene gtt  --echo:    1 - Normal left ventricular systolic function (EF 65-70%).     2 - Concentric remodeling.     3 - No wall motion abnormalities.     4 - Normal left ventricular diastolic function.     5 - Normal right ventricular systolic function .             Oncology   Leukocytosis    _Pan Cx and Abx over weekend  -Trend PRocal  -SLight improvement in Leukocytosis with ABX  -Eval CXR  Small development of perihilar infiltrate, temp, white count, secretions all better on ceftriaxone,cont 7 day course        Endocrine   Type 2 diabetes mellitus    -A1C-10  -poorly controlled diabetes  -BG >400 on arrival  -insulin gtt until on TF. Will resp stability before starting TF  02/01: intubated, tfs restarted          Other   Obstructive sleep apnea    Oxygenating well at 45 degrees head position or higher  Intubated due to inspissated secretions from nocturnal cpap            Prophylaxis:  Venous Thromboembolism: chemical  Stress Ulcer: H2B  Ventilator Pneumonia: no     Activity Orders          None        Full Code    Matty Coulter MD  Neurocritical Care  [facility]

## 2018-02-02 NOTE — PLAN OF CARE
Problem: Occupational Therapy Goal  Goal: Occupational Therapy Goal  Goals to be met by: 2/9/18    Patient will increase functional independence with ADLs by performing:    UE Dressing with Moderate Assistance.  Grooming while seated at sink with Moderate Assistance.  Toileting from bedside commode with Moderate Assistance for hygiene and clothing management.   Supine to sit with Maximum Assistance.  Stand pivot transfers with Maximum Assistance.  CG demo independence with upper extremity exercise program per handout.   Pt follow 50% of simple one-step commands  Eyes open 50% of session.      Outcome: Ongoing (interventions implemented as appropriate)  Continue with POC.   Anne swenson OT  2/2/2018

## 2018-02-02 NOTE — SUBJECTIVE & OBJECTIVE
Review of Systems   Unable to perform ROS: Intubated       Objective:     Vitals:  Temp: 99.5 °F (37.5 °C)  Pulse: 80  Rhythm: normal sinus rhythm  BP: (!) 155/86  MAP (mmHg): 114  CI (L/min/m2): 2.5 L/min/m2  SVI: 32  SVV: 14 %  Resp: (!) 25  SpO2: 97 %  Oxygen Concentration (%): 50  O2 Device (Oxygen Therapy): ventilator  Vent Mode: Spont  Set Rate: 0 bmp  Vt Set: 500 mL  Pressure Support: 10 cmH20  PEEP/CPAP: 5 cmH20  Peak Airway Pressure: 16 cmH2O  Mean Airway Pressure: 8.6 cmH20  Plateau Pressure: 17 cmH20    Temp  Min: 98.8 °F (37.1 °C)  Max: 100 °F (37.8 °C)  Pulse  Min: 61  Max: 91  BP  Min: 117/65  Max: 192/104  MAP (mmHg)  Min: 85  Max: 140  CI (L/min/m2)  Min: 0.9 L/min/m2  Max: 2.7 L/min/m2  SVI  Min: 11  Max: 36  SVV  Min: 7 %  Max: 24 %  Resp  Min: 16  Max: 46  SpO2  Min: 97 %  Max: 100 %  Oxygen Concentration (%)  Min: 50  Max: 50    02/01 0701 - 02/02 0700  In: 3629.5 [I.V.:2319.5]  Out: 2465 [Urine:2465]   Unmeasured Output  Urine Occurrence: 0  Stool Occurrence: 0       Physical Exam   Eyes: Pupils are equal, round, and reactive to light.   Cardiovascular: Regular rhythm and normal heart sounds.    Pulmonary/Chest: Breath sounds normal.   Abdominal: Soft. Bowel sounds are normal.   Neurological: A cranial nerve deficit is present.   Requires physical stimulation for eye opening  Moves left side         Medications:  Continuous  insulin (HUMAN R) infusion (adults) Last Rate: 5.9 Units/hr (02/02/18 1505)   Scheduled  acetaminophen 650 mg Q6H   albuterol-ipratropium 2.5mg-0.5mg/3mL 3 mL Q4H   aspirin 325 mg Daily   atorvastatin 80 mg Daily   captopril 12.5 mg TID   cefTRIAXone (ROCEPHIN) IVPB 2 g Q24H   chlorhexidine 15 mL BID   clopidogrel 75 mg Daily   dexamethasone 6 mg Q8H   famotidine 20 mg BID   heparin (porcine) 5,000 Units Q8H   senna-docusate 8.6-50 mg 1 tablet BID   sodium chloride 0.9% 3 mL Q8H   sodium chloride 3% 4 mL Q4H   PRN  albuterol-ipratropium 2.5mg-0.5mg/3mL 3 mL Q4H PRN    bisacodyl 10 mg Daily PRN   dextrose 50% 12.5 g PRN   dextrose 50% 12.5 g PRN   dextrose 50% 25 g PRN   fentaNYL 50 mcg Q2H PRN   glucagon (human recombinant) 1 mg PRN   hydrALAZINE 10 mg Q4H PRN   labetalol 10 mg Q4H PRN   magnesium sulfate IVPB 2 g PRN   magnesium sulfate IVPB 4 g PRN   ondansetron 4 mg Q8H PRN   potassium chloride 10% 40 mEq PRN   potassium chloride 10% 40 mEq PRN   sodium chloride 0.9% 5 mL PRN     Today I personally reviewed pertinent medications, lines/drains/airways, imaging, lab results, notably:    Diet  Diet NPO  Diet NPO

## 2018-02-02 NOTE — PT/OT/SLP PROGRESS
Occupational Therapy   Treatment    Name: Todd Quevedo  MRN: [id number]  Admitting Diagnosis:  Embolic stroke involving left middle cerebral artery       Recommendations:     Discharge Recommendations:  (TBD-- pending OOB assessment )  Discharge Equipment Recommendations:   (TBD)  Barriers to discharge:   (Increased level of skilled assistance required at this time)    Subjective     Communicated with: RN prior to session.  Pain/Comfort:  · Pain Rating 1:  (No signs or symptoms of pain)      Objective:     Patient found with: arterial line, blood pressure cuff, telemetry, SCD, restraints, pulse ox (continuous), peripheral IV, oxygen, Condom Catheter (Intubated)    General Precautions: Standard, aspiration, aphasia, fall, NPO, respiratory   Orthopedic Precautions:N/A   Braces: N/A     Patient left supine with all lines intact, call button in reach, restraints reapplied at end of session and RN notified    [facility] 6 Click:  [facility] Total Score: 6    Treatment & Education:  -Pt tolerated BUE/BLE PROM exercises while supine: 1x15 reps within all available planes of motion  - Positioning of pt supine with BUE supported with pillows; cervical spine in neutral; BLE in neutral position;pressure relief boots repositioned    Intubated  Eyes closed 100% of session  Follows 0% of simple one- step commands  Spontaneous movement in LUE/LLE; No Active movement observed in LUE/LLE  Education:    Assessment:     Todd Quevedo is a 47 y.o. male with a medical diagnosis of Embolic stroke involving left middle cerebral artery.  He presents intubated; unable to arouse.  Performance deficits affecting function are weakness, impaired endurance, impaired self care skills, impaired functional mobilty, impaired balance, decreased upper extremity function, decreased lower extremity function, decreased safety awareness, edema. Due to overall level of alertness and performance during session, pt frequency decreased to three at this time.     Rehab  Prognosis: Fair; patient would benefit from acute skilled OT services to address these deficits and reach maximum level of function.       Plan:     Patient to be seen 3 x/week to address the above listed problems via self-care/home management, therapeutic activities, neuromuscular re-education  · Plan of Care Expires: 02/23/18  · Plan of Care Reviewed with: patient    This Plan of care has been discussed with the patient who was involved in its development and understands and is in agreement with the identified goals and treatment plan    GOALS:    Occupational Therapy Goals        Problem: Occupational Therapy Goal    Goal Priority Disciplines Outcome Interventions   Occupational Therapy Goal     OT, PT/OT Ongoing (interventions implemented as appropriate)    Description:  Goals to be met by: 2/9/18    Patient will increase functional independence with ADLs by performing:    UE Dressing with Moderate Assistance.  Grooming while seated at sink with Moderate Assistance.  Toileting from bedside commode with Moderate Assistance for hygiene and clothing management.   Supine to sit with Maximum Assistance.  Stand pivot transfers with Maximum Assistance.  CG demo independence with upper extremity exercise program per handout.   Pt follow 50% of simple one-step commands  Eyes open 50% of session.                       Time Tracking:     OT Date of Treatment: 02/02/18  OT Start Time: 1336  OT Stop Time: 1357  OT Total Time (min): 21 min    Billable Minutes:Therapeutic Exercise 21    Anne swenson OT  2/2/2018

## 2018-02-02 NOTE — ASSESSMENT & PLAN NOTE
Mr. Quevedo is a 47 year old male who walked into [facility] and was acting abnormal.  EMS was called and brought to the ED for concern of L MCA syndrome. Not tpa candidate due to unknown last known normal.  Patient went to IR for thrombectomy of L M1 occlusion seen on CTA MP on 1/25.  TICI2C reperfusion and angioplasty.      Primary team with concerns for sepsis, believe respiratory source. Started on antibiotics.    Antithrombotics for secondary stroke prevention: asa 325mg qd and plavix 75mg qd   Statins for secondary stroke prevention and hyperlipidemia, if present: Repeat LDL (last invalid secondary to hypertriglyceridemia), atorvastatin 80 in interim  Aggressive risk factor modification: Obesity  Rehab efforts: PT/OT/SLP to evaluate and treat  Diagnostics ordered/pending:   CTA - L M1 occlusion  A1C 10, TSH 1.8, LDL immeasurable 2/2 trigylcerides (460), Chol 200  MRI - most of the L MCA shows area of infarct  VTE prophylaxis: Heparin 5000 units SQ every 8 hours  BP parameters: Infarct: Post sucessful thrombectomy, SBP <140     Repeat CTHead sable.

## 2018-02-02 NOTE — SUBJECTIVE & OBJECTIVE
Neurologic Chief Complaint: L MCA infarct     Subjective:     Interval History: Patient is seen for follow-up neurological assessment and treatment recommendations: Pt off Precedex, moving left side spontaneously and opening eyes. Intubated, on spontaneous today. NCC giving steroids in hopes to extubate tomorrow.      HPI, Past Medical, Family, and Social History remains the same as documented in the initial encounter.     Review of Systems   Constitutional: Negative for chills and fever.   Respiratory: Negative for cough and shortness of breath.    Gastrointestinal: Negative for diarrhea and vomiting.     Scheduled Meds:   acetaminophen  650 mg Per NG tube Q6H    albuterol-ipratropium 2.5mg-0.5mg/3mL  3 mL Nebulization Q4H    aspirin  325 mg Per NG tube Daily    atorvastatin  80 mg Per NG tube Daily    captopril  12.5 mg Oral TID    chlorhexidine  15 mL Mouth/Throat BID    clopidogrel  75 mg Per NG tube Daily    dexamethasone  6 mg Intravenous Q8H    famotidine  20 mg Per NG tube BID    heparin (porcine)  5,000 Units Subcutaneous Q8H    senna-docusate 8.6-50 mg  1 tablet Per NG tube BID    sodium chloride 0.9%  3 mL Intravenous Q8H    sodium chloride 3%  4 mL Nebulization Q4H     Continuous Infusions:   insulin (HUMAN R) infusion (adults) 5.9 Units/hr (02/02/18 1705)     PRN Meds:albuterol-ipratropium 2.5mg-0.5mg/3mL, bisacodyl, dextrose 50%, dextrose 50%, dextrose 50%, fentaNYL, glucagon (human recombinant), hydrALAZINE, labetalol, magnesium sulfate IVPB, magnesium sulfate IVPB, ondansetron, potassium chloride 10%, potassium chloride 10%, sodium chloride 0.9%    Objective:     Vital Signs (Most Recent):  Temp: 99.5 °F (37.5 °C) (02/02/18 1505)  Pulse: 106 (02/02/18 1734)  Resp: (!) 22 (02/02/18 1734)  BP: (!) 184/98 (02/02/18 1734)  SpO2: 97 % (02/02/18 1734)  BP Location: Left arm    Vital Signs Range (Last 24H):  Temp:  [98.8 °F (37.1 °C)-100 °F (37.8 °C)]   Pulse:  []   Resp:  [16-46]   BP:  (117-192)/()   SpO2:  [97 %-100 %]   Arterial Line BP: (116-201)/()   BP Location: Left arm    Physical Exam   Constitutional: He appears well-developed and well-nourished.   HENT:   Head: Normocephalic and atraumatic.   Eyes: Pupils are equal, round, and reactive to light.   Brisk,   Left gaze deviation    Pulmonary/Chest: Effort normal. No respiratory distress.   Intubated        Neurological Exam:   LOC: drowsy  Attention Span: poor  Language: Intubated  Articulation: Untestable due to intubation  Orientation: Untestable due to intubation  EOM (CN III, IV, VI): Left gaze deviation   Pupils (CN II, III): PERRL  Motor: Spontaneous movement on L, Withdraws to noxious stimuli on R   Tone:  Normal tone in all extremities     Laboratory:  CMP:   Recent Labs  Lab 02/02/18  0313  02/02/18  1628   CALCIUM 8.6*  --   --    ALBUMIN 2.4*  --   --    PROT 6.3  --   --    *  152*  < > 150*   K 3.6  --   --    CO2 27  --   --    *  --   --    BUN 23*  --   --    CREATININE 0.9  --   --    ALKPHOS 81  --   --    ALT 33  --   --    AST 28  --   --    BILITOT 0.4  --   --    < > = values in this interval not displayed.  CBC:     Recent Labs  Lab 02/02/18  0313   WBC 7.98   RBC 4.36*   HGB 12.9*   HCT 39.3*      MCV 90   MCH 29.6   MCHC 32.8     Lipid Panel: No results for input(s): CHOL, LDLCALC, HDL, TRIG in the last 168 hours.  Hgb A1C: No results for input(s): HGBA1C in the last 168 hours.  TSH: No results for input(s): TSH in the last 168 hours.      Diagnostic Results       Brain Imaging     CT head 01/31/18  Stable L MCA territory infarct  Cytotoxic cerebral edema    1/27/18 CTH: Per independent resident review and interpretation,  L MCA infarction s/p stenting.  No sign of hemorrhagic transformation.  R basal ganglia and cerebellar infarct.         1/26/18 MRI Brain w/o:  Restricted diffusion in L MCA territory  Cytotoxic cerebral edema           Vessel Imaging     1/25/18 IR Angiogram: Per  independent resident review and interpretation,  L M1 occlusion.         Cardiac Imaging     1/26/18 EKG: Per independent resident review and interpretation,  Sinus tachycardia.  .  QTc 500.     1/26/18 TTE: Per report,  LA wnl  EF 65-70%  No regional wall motion abnormalities  Diastolic function normal

## 2018-02-02 NOTE — ASSESSMENT & PLAN NOTE
Due to airway obstruction form hardened secretions overlying epiglottis and soft palate, improved after intubation and bronchoscopy  Brief temp elevation yesterday, likely reflecting immune response to foreign material in lungs  Has not followed with pneumonia, tolerating spontaneous  Use prn sedation, if mental status improves-attempt extubation  Decadron 6mg q8 x 3 doses for probable swelling post difficult intubation

## 2018-02-02 NOTE — PLAN OF CARE
Problem: Patient Care Overview  Goal: Plan of Care Review  POC reviewed w/ Mr. Todd Quevedo at 0130. Pt medically sedated and intubated. No evidence of learning at this time. VSS ex PRN antiHTN given to keep SBP < 160. No acute events overnight. Will continue to monitor. See flowsheet for further assessment and VS info.

## 2018-02-02 NOTE — PROGRESS NOTES
RN notified NCC team that the pupils are 3 R and 2 L. Edgardo NP to come to bedside to assess patient. No new orders at this toime. RN to assess.

## 2018-02-03 PROBLEM — J18.9 PNEUMONIA: Status: ACTIVE | Noted: 2018-02-03

## 2018-02-03 LAB
ALBUMIN SERPL BCP-MCNC: 2.6 G/DL
ALLENS TEST: ABNORMAL
ALP SERPL-CCNC: 84 U/L
ALT SERPL W/O P-5'-P-CCNC: 30 U/L
ANION GAP SERPL CALC-SCNC: 9 MMOL/L
AST SERPL-CCNC: 29 U/L
BACTERIA SPEC AEROBE CULT: NO GROWTH
BASOPHILS # BLD AUTO: 0.03 K/UL
BASOPHILS NFR BLD: 0.3 %
BILIRUB SERPL-MCNC: 0.4 MG/DL
BUN SERPL-MCNC: 23 MG/DL
CALCIUM SERPL-MCNC: 9 MG/DL
CHLORIDE SERPL-SCNC: 110 MMOL/L
CO2 SERPL-SCNC: 26 MMOL/L
CREAT SERPL-MCNC: 0.9 MG/DL
DELSYS: ABNORMAL
DIFFERENTIAL METHOD: ABNORMAL
EOSINOPHIL # BLD AUTO: 0 K/UL
EOSINOPHIL NFR BLD: 0 %
ERYTHROCYTE [DISTWIDTH] IN BLOOD BY AUTOMATED COUNT: 12.6 %
ERYTHROCYTE [SEDIMENTATION RATE] IN BLOOD BY WESTERGREN METHOD: 18 MM/H
EST. GFR  (AFRICAN AMERICAN): >60 ML/MIN/1.73 M^2
EST. GFR  (NON AFRICAN AMERICAN): >60 ML/MIN/1.73 M^2
FIO2: 50
GLUCOSE SERPL-MCNC: 243 MG/DL
GRAM STN SPEC: NORMAL
HCO3 UR-SCNC: 30.6 MMOL/L (ref 24–28)
HCT VFR BLD AUTO: 40.5 %
HGB BLD-MCNC: 13.3 G/DL
IMM GRANULOCYTES # BLD AUTO: 0.16 K/UL
IMM GRANULOCYTES NFR BLD AUTO: 1.7 %
INR PPP: 1
LYMPHOCYTES # BLD AUTO: 1.1 K/UL
LYMPHOCYTES NFR BLD: 11.9 %
MAGNESIUM SERPL-MCNC: 2.3 MG/DL
MCH RBC QN AUTO: 29.6 PG
MCHC RBC AUTO-ENTMCNC: 32.8 G/DL
MCV RBC AUTO: 90 FL
MODE: ABNORMAL
MONOCYTES # BLD AUTO: 0.3 K/UL
MONOCYTES NFR BLD: 3.3 %
NEUTROPHILS # BLD AUTO: 7.6 K/UL
NEUTROPHILS NFR BLD: 82.8 %
NRBC BLD-RTO: 0 /100 WBC
PCO2 BLDA: 43.5 MMHG (ref 35–45)
PH SMN: 7.46 [PH] (ref 7.35–7.45)
PHOSPHATE SERPL-MCNC: 4 MG/DL
PLATELET # BLD AUTO: 294 K/UL
PMV BLD AUTO: 9.7 FL
PO2 BLDA: 158 MMHG (ref 80–100)
POC BE: 7 MMOL/L
POC SATURATED O2: 99 % (ref 95–100)
POC TCO2: 32 MMOL/L (ref 23–27)
POCT GLUCOSE: 132 MG/DL (ref 70–110)
POCT GLUCOSE: 145 MG/DL (ref 70–110)
POCT GLUCOSE: 158 MG/DL (ref 70–110)
POCT GLUCOSE: 161 MG/DL (ref 70–110)
POCT GLUCOSE: 169 MG/DL (ref 70–110)
POCT GLUCOSE: 170 MG/DL (ref 70–110)
POCT GLUCOSE: 179 MG/DL (ref 70–110)
POCT GLUCOSE: 183 MG/DL (ref 70–110)
POCT GLUCOSE: 190 MG/DL (ref 70–110)
POCT GLUCOSE: 191 MG/DL (ref 70–110)
POCT GLUCOSE: 191 MG/DL (ref 70–110)
POCT GLUCOSE: 193 MG/DL (ref 70–110)
POCT GLUCOSE: 194 MG/DL (ref 70–110)
POCT GLUCOSE: 199 MG/DL (ref 70–110)
POCT GLUCOSE: 200 MG/DL (ref 70–110)
POCT GLUCOSE: 208 MG/DL (ref 70–110)
POCT GLUCOSE: 208 MG/DL (ref 70–110)
POCT GLUCOSE: 212 MG/DL (ref 70–110)
POCT GLUCOSE: 213 MG/DL (ref 70–110)
POCT GLUCOSE: 218 MG/DL (ref 70–110)
POCT GLUCOSE: 222 MG/DL (ref 70–110)
POCT GLUCOSE: 258 MG/DL (ref 70–110)
POTASSIUM SERPL-SCNC: 4 MMOL/L
PROT SERPL-MCNC: 6.9 G/DL
PROTHROMBIN TIME: 10.3 SEC
RBC # BLD AUTO: 4.49 M/UL
SAMPLE: ABNORMAL
SITE: ABNORMAL
SODIUM SERPL-SCNC: 142 MMOL/L
SODIUM SERPL-SCNC: 145 MMOL/L
SODIUM SERPL-SCNC: 147 MMOL/L
SODIUM SERPL-SCNC: 147 MMOL/L
SP02: 99
WBC # BLD AUTO: 9.19 K/UL

## 2018-02-03 PROCEDURE — 63600175 PHARM REV CODE 636 W HCPCS: Performed by: PSYCHIATRY & NEUROLOGY

## 2018-02-03 PROCEDURE — 25000242 PHARM REV CODE 250 ALT 637 W/ HCPCS: Performed by: PHYSICIAN ASSISTANT

## 2018-02-03 PROCEDURE — 63600175 PHARM REV CODE 636 W HCPCS: Performed by: STUDENT IN AN ORGANIZED HEALTH CARE EDUCATION/TRAINING PROGRAM

## 2018-02-03 PROCEDURE — 99233 SBSQ HOSP IP/OBS HIGH 50: CPT | Mod: ,,, | Performed by: PSYCHIATRY & NEUROLOGY

## 2018-02-03 PROCEDURE — 84295 ASSAY OF SERUM SODIUM: CPT

## 2018-02-03 PROCEDURE — 20000000 HC ICU ROOM

## 2018-02-03 PROCEDURE — 85610 PROTHROMBIN TIME: CPT

## 2018-02-03 PROCEDURE — 63600175 PHARM REV CODE 636 W HCPCS: Performed by: NURSE PRACTITIONER

## 2018-02-03 PROCEDURE — 25000003 PHARM REV CODE 250: Performed by: STUDENT IN AN ORGANIZED HEALTH CARE EDUCATION/TRAINING PROGRAM

## 2018-02-03 PROCEDURE — 25000003 PHARM REV CODE 250: Performed by: NURSE PRACTITIONER

## 2018-02-03 PROCEDURE — 99900026 HC AIRWAY MAINTENANCE (STAT)

## 2018-02-03 PROCEDURE — 84295 ASSAY OF SERUM SODIUM: CPT | Mod: 91

## 2018-02-03 PROCEDURE — 25000242 PHARM REV CODE 250 ALT 637 W/ HCPCS: Performed by: PSYCHIATRY & NEUROLOGY

## 2018-02-03 PROCEDURE — 25000003 PHARM REV CODE 250: Performed by: PSYCHIATRY & NEUROLOGY

## 2018-02-03 PROCEDURE — 99900035 HC TECH TIME PER 15 MIN (STAT)

## 2018-02-03 PROCEDURE — 94003 VENT MGMT INPAT SUBQ DAY: CPT

## 2018-02-03 PROCEDURE — 83735 ASSAY OF MAGNESIUM: CPT

## 2018-02-03 PROCEDURE — A4216 STERILE WATER/SALINE, 10 ML: HCPCS | Performed by: NURSE PRACTITIONER

## 2018-02-03 PROCEDURE — 80053 COMPREHEN METABOLIC PANEL: CPT

## 2018-02-03 PROCEDURE — 99233 SBSQ HOSP IP/OBS HIGH 50: CPT | Mod: ,,, | Performed by: ANESTHESIOLOGY

## 2018-02-03 PROCEDURE — 25000003 PHARM REV CODE 250: Performed by: PHYSICIAN ASSISTANT

## 2018-02-03 PROCEDURE — 94640 AIRWAY INHALATION TREATMENT: CPT

## 2018-02-03 PROCEDURE — 84100 ASSAY OF PHOSPHORUS: CPT

## 2018-02-03 PROCEDURE — A4217 STERILE WATER/SALINE, 500 ML: HCPCS | Performed by: STUDENT IN AN ORGANIZED HEALTH CARE EDUCATION/TRAINING PROGRAM

## 2018-02-03 PROCEDURE — 85025 COMPLETE CBC W/AUTO DIFF WBC: CPT

## 2018-02-03 PROCEDURE — 37799 UNLISTED PX VASCULAR SURGERY: CPT

## 2018-02-03 PROCEDURE — 82803 BLOOD GASES ANY COMBINATION: CPT

## 2018-02-03 PROCEDURE — 94761 N-INVAS EAR/PLS OXIMETRY MLT: CPT

## 2018-02-03 RX ORDER — SODIUM CHLORIDE FOR INHALATION 3 %
4 VIAL, NEBULIZER (ML) INHALATION EVERY 6 HOURS
Status: DISCONTINUED | OUTPATIENT
Start: 2018-02-03 | End: 2018-03-21 | Stop reason: HOSPADM

## 2018-02-03 RX ORDER — SODIUM CHLORIDE 9 MG/ML
INJECTION, SOLUTION INTRAVENOUS CONTINUOUS
Status: DISCONTINUED | OUTPATIENT
Start: 2018-02-03 | End: 2018-02-06

## 2018-02-03 RX ORDER — METOPROLOL TARTRATE 25 MG/1
25 TABLET, FILM COATED ORAL 2 TIMES DAILY
Status: DISCONTINUED | OUTPATIENT
Start: 2018-02-03 | End: 2018-02-05

## 2018-02-03 RX ORDER — IPRATROPIUM BROMIDE AND ALBUTEROL SULFATE 2.5; .5 MG/3ML; MG/3ML
3 SOLUTION RESPIRATORY (INHALATION) EVERY 6 HOURS
Status: DISCONTINUED | OUTPATIENT
Start: 2018-02-03 | End: 2018-02-17

## 2018-02-03 RX ADMIN — HYDRALAZINE HYDROCHLORIDE 10 MG: 20 INJECTION INTRAMUSCULAR; INTRAVENOUS at 01:02

## 2018-02-03 RX ADMIN — LABETALOL HYDROCHLORIDE 10 MG: 5 INJECTION, SOLUTION INTRAVENOUS at 12:02

## 2018-02-03 RX ADMIN — SODIUM ACETATE: 3.28 INJECTION, SOLUTION, CONCENTRATE INTRAVENOUS at 11:02

## 2018-02-03 RX ADMIN — HEPARIN SODIUM 5000 UNITS: 5000 INJECTION, SOLUTION INTRAVENOUS; SUBCUTANEOUS at 09:02

## 2018-02-03 RX ADMIN — CAPTOPRIL 12.5 MG: 12.5 TABLET ORAL at 09:02

## 2018-02-03 RX ADMIN — FAMOTIDINE 20 MG: 20 TABLET, FILM COATED ORAL at 08:02

## 2018-02-03 RX ADMIN — HEPARIN SODIUM 5000 UNITS: 5000 INJECTION, SOLUTION INTRAVENOUS; SUBCUTANEOUS at 01:02

## 2018-02-03 RX ADMIN — FENTANYL CITRATE 50 MCG: 50 INJECTION INTRAMUSCULAR; INTRAVENOUS at 06:02

## 2018-02-03 RX ADMIN — HEPARIN SODIUM 5000 UNITS: 5000 INJECTION, SOLUTION INTRAVENOUS; SUBCUTANEOUS at 06:02

## 2018-02-03 RX ADMIN — ACETAMINOPHEN 650 MG: 325 TABLET, FILM COATED ORAL at 05:02

## 2018-02-03 RX ADMIN — Medication 3 ML: at 06:02

## 2018-02-03 RX ADMIN — Medication 3 ML: at 09:02

## 2018-02-03 RX ADMIN — CHLORHEXIDINE GLUCONATE 15 ML: 1.2 RINSE ORAL at 08:02

## 2018-02-03 RX ADMIN — HYDRALAZINE HYDROCHLORIDE 10 MG: 20 INJECTION INTRAMUSCULAR; INTRAVENOUS at 06:02

## 2018-02-03 RX ADMIN — METOPROLOL TARTRATE 25 MG: 25 TABLET ORAL at 11:02

## 2018-02-03 RX ADMIN — IPRATROPIUM BROMIDE AND ALBUTEROL SULFATE 3 ML: .5; 3 SOLUTION RESPIRATORY (INHALATION) at 02:02

## 2018-02-03 RX ADMIN — LABETALOL HYDROCHLORIDE 10 MG: 5 INJECTION, SOLUTION INTRAVENOUS at 09:02

## 2018-02-03 RX ADMIN — CAPTOPRIL 12.5 MG: 12.5 TABLET ORAL at 01:02

## 2018-02-03 RX ADMIN — ASPIRIN 325 MG ORAL TABLET 325 MG: 325 PILL ORAL at 08:02

## 2018-02-03 RX ADMIN — SODIUM CHLORIDE SOLN NEBU 3% 4 ML: 3 NEBU SOLN at 02:02

## 2018-02-03 RX ADMIN — SODIUM CHLORIDE 9 UNITS/HR: 9 INJECTION, SOLUTION INTRAVENOUS at 10:02

## 2018-02-03 RX ADMIN — IPRATROPIUM BROMIDE AND ALBUTEROL SULFATE 3 ML: .5; 3 SOLUTION RESPIRATORY (INHALATION) at 04:02

## 2018-02-03 RX ADMIN — DEXAMETHASONE SODIUM PHOSPHATE 6 MG: 4 INJECTION, SOLUTION INTRAMUSCULAR; INTRAVENOUS at 06:02

## 2018-02-03 RX ADMIN — CLOPIDOGREL 75 MG: 75 TABLET, FILM COATED ORAL at 08:02

## 2018-02-03 RX ADMIN — ACETAMINOPHEN 650 MG: 325 TABLET, FILM COATED ORAL at 11:02

## 2018-02-03 RX ADMIN — SODIUM CHLORIDE: 0.9 INJECTION, SOLUTION INTRAVENOUS at 11:02

## 2018-02-03 RX ADMIN — IPRATROPIUM BROMIDE AND ALBUTEROL SULFATE 3 ML: .5; 3 SOLUTION RESPIRATORY (INHALATION) at 10:02

## 2018-02-03 RX ADMIN — HYDRALAZINE HYDROCHLORIDE 10 MG: 20 INJECTION INTRAMUSCULAR; INTRAVENOUS at 12:02

## 2018-02-03 RX ADMIN — FENTANYL CITRATE 50 MCG: 50 INJECTION INTRAMUSCULAR; INTRAVENOUS at 04:02

## 2018-02-03 RX ADMIN — LABETALOL HYDROCHLORIDE 10 MG: 5 INJECTION, SOLUTION INTRAVENOUS at 07:02

## 2018-02-03 RX ADMIN — IPRATROPIUM BROMIDE AND ALBUTEROL SULFATE 3 ML: .5; 3 SOLUTION RESPIRATORY (INHALATION) at 08:02

## 2018-02-03 RX ADMIN — STANDARDIZED SENNA CONCENTRATE AND DOCUSATE SODIUM 1 TABLET: 8.6; 5 TABLET, FILM COATED ORAL at 08:02

## 2018-02-03 RX ADMIN — SODIUM CHLORIDE SOLN NEBU 3% 4 ML: 3 NEBU SOLN at 04:02

## 2018-02-03 RX ADMIN — SODIUM CHLORIDE SOLN NEBU 3% 4 ML: 3 NEBU SOLN at 08:02

## 2018-02-03 RX ADMIN — METOPROLOL TARTRATE 25 MG: 25 TABLET ORAL at 08:02

## 2018-02-03 RX ADMIN — ACETAMINOPHEN 650 MG: 325 TABLET, FILM COATED ORAL at 06:02

## 2018-02-03 RX ADMIN — ATORVASTATIN CALCIUM 80 MG: 20 TABLET, FILM COATED ORAL at 08:02

## 2018-02-03 RX ADMIN — CAPTOPRIL 12.5 MG: 12.5 TABLET ORAL at 06:02

## 2018-02-03 RX ADMIN — Medication 3 ML: at 01:02

## 2018-02-03 RX ADMIN — FENTANYL CITRATE 50 MCG: 50 INJECTION INTRAMUSCULAR; INTRAVENOUS at 01:02

## 2018-02-03 NOTE — PLAN OF CARE
Problem: Patient Care Overview  Goal: Plan of Care Review  Outcome: Ongoing (interventions implemented as appropriate)  POC reviewed w/ Mr. Todd Quevedo at 0300. No evidence of learning at this time due to impaired verbal communication. VSS ex PRN given to keep SBP < 180. No acute events overnight. Will continue to monitor. See flowsheet for further assessment and VS info.

## 2018-02-03 NOTE — ASSESSMENT & PLAN NOTE
48 y/o M who p/w L MCA syndrome. No tPA candidate due to unknown LKN.    - L M1 occlusion on CTA ().    - MRI showed most of the L MCA effected by infarct  - IR thrombectomy, + angioplasty and stenting of the residual stenosis, TICI2C reperfusion Etiology   At this ponit the impression is ESUS versus L M1 atherosclerosis.      Antithrombotics for secondary stroke prevention: DAPT with asa 325mg qd and plavix 75mg qd  Statins: atorvastatin 80 in interim  Aggressive risk factor modification: Obesity  Rehab efforts: PT/OT/SLP to evaluate and treat  Diagnostics ordered/pending: none  A1C 10, TSH 1.8, LDL immeasurable 2/2 trigylcerides (460), Chol 200  VTE prophylaxis: Heparin 5000 units SQ every 8 hours  BP parameters: Infarct: Post sucessful thrombectomy, SBP <140     - EE- focal left sided slowing, suggestive of underlying structural lesion 2-  mild generalized slowing, suggestive of mild diffuse or multifocal cerebral dysfunction.   - off of 2% HTS  - was planned to try extubation today, however remains intubated at the time of the visit this morning  - will continue to follow up on the patient along with the primary team

## 2018-02-03 NOTE — ASSESSMENT & PLAN NOTE
monitor CTh and exam for increasing edema  1/31: sodium improved to goal, cont 2%  02/01: equibrated sodium 150-151, cont 2% at current rate  2/2: 2% discontinued and enteral free water boluses started at 200mL QID  2/3: 2% re-started and enteral free water discontinued

## 2018-02-03 NOTE — ASSESSMENT & PLAN NOTE
-continue ceftriaxone  - will hold on extubation until secretions decrease  - discontinue dexamethasone

## 2018-02-03 NOTE — ASSESSMENT & PLAN NOTE
Due to airway obstruction form hardened secretions overlying epiglottis and soft palate, improved after intubation and bronchoscopy  Tolerating spontaneous  Use prn sedation, if mental status improves-attempt extubation  Decadron 6mg q8 x 3 doses for probable swelling post difficult intubation

## 2018-02-03 NOTE — ASSESSMENT & PLAN NOTE
- A1C 10  - hyperglycemic; On insulin gtt   - Will need endocrine consult when stepped down to stroke service

## 2018-02-03 NOTE — ASSESSMENT & PLAN NOTE
--SBP <180  -- start Lopressor pNG 25mg q8 hours  --echo:    1 - Normal left ventricular systolic function (EF 65-70%).     2 - Concentric remodeling.     3 - No wall motion abnormalities.     4 - Normal left ventricular diastolic function.     5 - Normal right ventricular systolic function .

## 2018-02-03 NOTE — ASSESSMENT & PLAN NOTE
S/P L M1 thrombectomy  -- Neuro checks q 1hr  -- Vascular Neurology  following  -- CT MP- Short segment occlusion of left M1   -- s/p thrombectomy of M1 occlusion 1/25  -- Repeat CTH 1/26 - No detrimental change  -- SBP goal <160  -- PT/OT/Speech  -- hemicrani watch, NSGY following   -- MRI 1/26 - L MCA, no hemorrhagic conversion  -- stat CTH 1/27: L MCA stable, no worsening edema or hemorrhagic conversion  -- ASA, plavix     Appears exam declined, no consistent with previous imaging. Stat CTH to eval for increased edema and then EEG.   NO AEDs at this time  01/31: no eeg evidence for seizure x 2 days, ct head stable, now that airway secured-for mri in am

## 2018-02-03 NOTE — SUBJECTIVE & OBJECTIVE
Interval History:  No acute events. ET tube advanced by 4cm this morning    Review of Systems   Unable to obtain a complete ROS due to level of consciousness.  Objective:     Vitals:  Temp: 98.9 °F (37.2 °C)  Pulse: (!) 111  Rhythm: normal sinus rhythm  BP: (!) 150/78  MAP (mmHg): 107  CI (L/min/m2): 2.9 L/min/m2  SVI: 31  SVV: 23 %  Resp: (!) 28  SpO2: (!) 94 %  Oxygen Concentration (%): 40  O2 Device (Oxygen Therapy): ventilator  Vent Mode: Spont  Set Rate: 0 bmp  Vt Set: 500 mL  Pressure Support: 5 cmH20  PEEP/CPAP: 5 cmH20  Peak Airway Pressure: 12 cmH2O  Mean Airway Pressure: 7.2 cmH20  Plateau Pressure: 17 cmH20    Temp  Min: 98.8 °F (37.1 °C)  Max: 100 °F (37.8 °C)  Pulse  Min: 63  Max: 111  BP  Min: 130/73  Max: 231/122  MAP (mmHg)  Min: 96  Max: 141  CI (L/min/m2)  Min: 1.9 L/min/m2  Max: 3.5 L/min/m2  SVI  Min: 27  Max: 36  SVV  Min: 7 %  Max: 26 %  Resp  Min: 16  Max: 46  SpO2  Min: 94 %  Max: 100 %  Oxygen Concentration (%)  Min: 40  Max: 50    02/02 0701 - 02/03 0700  In: 2741.1 [I.V.:521.1]  Out: 3160 [Urine:3160]   Unmeasured Output  Urine Occurrence: 1  Stool Occurrence: 0       Physical Exam  HEENT: No eye discharge, MMM  Cardiovascular: Regular rhythm and normal heart sounds.    Pulmonary/Chest: Breath sounds normal.   Abdominal: Soft. Bowel sounds are normal.   Neurological  PERRL  Requires physical stimulation for eye opening  Moves left side     Medications:  Continuous  insulin (HUMAN R) infusion (adults) Last Rate: 7.6 Units/hr (02/03/18 0824)   Scheduled  acetaminophen 650 mg Q6H   albuterol-ipratropium 2.5mg-0.5mg/3mL 3 mL Q6H   aspirin 325 mg Daily   atorvastatin 80 mg Daily   captopril 12.5 mg TID   chlorhexidine 15 mL BID   clopidogrel 75 mg Daily   dexamethasone 6 mg Q8H   famotidine 20 mg BID   heparin (porcine) 5,000 Units Q8H   senna-docusate 8.6-50 mg 1 tablet BID   sodium chloride 0.9% 3 mL Q8H   sodium chloride 3% 4 mL Q6H   PRN  albuterol-ipratropium 2.5mg-0.5mg/3mL 3 mL Q4H PRN    bisacodyl 10 mg Daily PRN   dextrose 50% 12.5 g PRN   dextrose 50% 12.5 g PRN   dextrose 50% 25 g PRN   fentaNYL 50 mcg Q2H PRN   glucagon (human recombinant) 1 mg PRN   hydrALAZINE 10 mg Q4H PRN   labetalol 10 mg Q4H PRN   magnesium sulfate IVPB 2 g PRN   magnesium sulfate IVPB 4 g PRN   ondansetron 4 mg Q8H PRN   potassium chloride 10% 40 mEq PRN   potassium chloride 10% 40 mEq PRN   sodium chloride 0.9% 5 mL PRN     Today I personally reviewed pertinent imaging, lab results, notably:  Component      Latest Ref Rng & Units 2/3/2018 2/3/2018 2/3/2018           8:22 AM  3:27 AM  3:27 AM   WBC      3.90 - 12.70 K/uL   9.19   RBC      4.60 - 6.20 M/uL   4.49 (L)   Hemoglobin      14.0 - 18.0 g/dL   13.3 (L)   Hematocrit      40.0 - 54.0 %   40.5   MCV      82 - 98 fL   90   MCH      27.0 - 31.0 pg   29.6   MCHC      32.0 - 36.0 g/dL   32.8   RDW      11.5 - 14.5 %   12.6   Platelets      150 - 350 K/uL   294   MPV      9.2 - 12.9 fL   9.7   Immature Granulocytes      0.0 - 0.5 %   1.7 (H)   Gran # (ANC)      1.8 - 7.7 K/uL   7.6   Immature Grans (Abs)      0.00 - 0.04 K/uL   0.16 (H)   Lymph #      1.0 - 4.8 K/uL   1.1   Mono #      0.3 - 1.0 K/uL   0.3   Eos #      0.0 - 0.5 K/uL   0.0   Baso #      0.00 - 0.20 K/uL   0.03   nRBC      0 /100 WBC   0   Gran%      38.0 - 73.0 %   82.8 (H)   Lymph%      18.0 - 48.0 %   11.9 (L)   Mono%      4.0 - 15.0 %   3.3 (L)   Eosinophil%      0.0 - 8.0 %   0.0   Basophil%      0.0 - 1.9 %   0.3   Differential Method         Automated   Sodium      136 - 145 mmol/L  145 145   Potassium      3.5 - 5.1 mmol/L   4.0   Chloride      95 - 110 mmol/L   110   CO2      23 - 29 mmol/L   26   Glucose      70 - 110 mg/dL   243 (H)   BUN, Bld      6 - 20 mg/dL   23 (H)   Creatinine      0.5 - 1.4 mg/dL   0.9   Calcium      8.7 - 10.5 mg/dL   9.0   Total Protein      6.0 - 8.4 g/dL   6.9   Albumin      3.5 - 5.2 g/dL   2.6 (L)   Total Bilirubin      0.1 - 1.0 mg/dL   0.4   Alkaline  Phosphatase      55 - 135 U/L   84   AST      10 - 40 U/L   29   ALT      10 - 44 U/L   30   Anion Gap      8 - 16 mmol/L   9   eGFR if African American      >60 mL/min/1.73 m:2   >60.0   eGFR if non African American      >60 mL/min/1.73 m:2   >60.0   Protime      9.0 - 12.5 sec   10.3   Coumadin Monitoring INR      0.8 - 1.2   1.0   Magnesium      1.6 - 2.6 mg/dL   2.3   Phosphorus      2.7 - 4.5 mg/dL   4.0   POCT Glucose      70 - 110 mg/dL 191 (H)       CXR 2/3: ETT at thoracic inlet    Diet  Diet NPO

## 2018-02-03 NOTE — ASSESSMENT & PLAN NOTE
- A1C 10  - glucose ~200s, remains on insulin gtt   - Will need endocrine consult when stepped down to stroke service

## 2018-02-03 NOTE — PROGRESS NOTES
[facility]  Neurocritical Care  Progress Note    Admit Date: 1/25/2018  Service Date: 02/03/2018  Length of Stay: 9    Subjective:     Chief Complaint: Embolic stroke involving left middle cerebral artery    History of Present Illness: The patient is a 47 year old male with no known PMHx admitted to [facility]   s/p thrombectomy of L M1 occlusion. Per chart review, he   walked into [place] and was acting abnormal.  EMS was called and brought to the ED for concern of L MCA syndrome. CT MP revealed short segment occlusion of left M1.  Patient went to IR for thrombectomy of L M1 occlusion seen on CTA MP.  TICI2C reperfusion and angioplasty. Patient admitted to [facility] for close monitoring and higher level of care.   History obtained from chart review only            Hospital Course: 1/25: Pt admitted to [facility] s/p L M1 thrombectomy, TICI2C reperfusion and angioplasty   1/27: large L MCA, hemicrani watch, NSGY following, insulin ggt, cardene ggt, L sided intermittent slowing eeg but no sz, +nasal trumpet for copious thick secretions, wheezing this am - improved with duo nebs, 3% nebs, and cough assist, concern for sepsis 2/2 hemodynamic changes - increased HR and BP, worsening leukocytosis, +ceftriaxone, pan cx, adrian track  1/28: continued respiratory challenges due to secretions. Aggressive pulmonary toileting. Continue monitoring for neuro worsening. Add moxifloxacin.   1/29: CTH to eval for increased edema/shift, EEG afterwards. Concern for decrease exam, no following/decreased alertness). CXR and Procal to follow leukocytosis. Hold TF until eval decreasing EXAM  1/30: neuro exam stable, increase 2%, current amount of sodium iv not suffuicient to meet target 145-155, cont abx for likely pneumonia, repeat ct in am  1/31: abrupt desaturation today requiring emergent intubation followed by bronchoscopy  Etiology likely upper airway obstruction from dried secretions  02/01: stable on vent overnight, marked  improvement on today's chest x ray, switch to spontaneous today, sodium stable at 150, start tfs  02/02: moves left side spontaneously and opens eyes off sedation,    Interval History:  No acute events. ET tube advanced by 4cm this morning    Review of Systems   Unable to obtain a complete ROS due to level of consciousness.  Objective:     Vitals:  Temp: 98.9 °F (37.2 °C)  Pulse: (!) 111  Rhythm: normal sinus rhythm  BP: (!) 150/78  MAP (mmHg): 107  CI (L/min/m2): 2.9 L/min/m2  SVI: 31  SVV: 23 %  Resp: (!) 28  SpO2: (!) 94 %  Oxygen Concentration (%): 40  O2 Device (Oxygen Therapy): ventilator  Vent Mode: Spont  Set Rate: 0 bmp  Vt Set: 500 mL  Pressure Support: 5 cmH20  PEEP/CPAP: 5 cmH20  Peak Airway Pressure: 12 cmH2O  Mean Airway Pressure: 7.2 cmH20  Plateau Pressure: 17 cmH20    Temp  Min: 98.8 °F (37.1 °C)  Max: 100 °F (37.8 °C)  Pulse  Min: 63  Max: 111  BP  Min: 130/73  Max: 231/122  MAP (mmHg)  Min: 96  Max: 141  CI (L/min/m2)  Min: 1.9 L/min/m2  Max: 3.5 L/min/m2  SVI  Min: 27  Max: 36  SVV  Min: 7 %  Max: 26 %  Resp  Min: 16  Max: 46  SpO2  Min: 94 %  Max: 100 %  Oxygen Concentration (%)  Min: 40  Max: 50    02/02 0701 - 02/03 0700  In: 2741.1 [I.V.:521.1]  Out: 3160 [Urine:3160]   Unmeasured Output  Urine Occurrence: 1  Stool Occurrence: 0       Physical Exam  HEENT: No eye discharge, MMM  Cardiovascular: Regular rhythm and normal heart sounds.    Pulmonary/Chest: Breath sounds normal.   Abdominal: Soft. Bowel sounds are normal.   Neurological  More awake than day before  PERRL  Requires physical stimulation for eye opening  Moves left side     Medications:  Continuous  insulin (HUMAN R) infusion (adults) Last Rate: 7.6 Units/hr (02/03/18 0824)   Scheduled  acetaminophen 650 mg Q6H   albuterol-ipratropium 2.5mg-0.5mg/3mL 3 mL Q6H   aspirin 325 mg Daily   atorvastatin 80 mg Daily   captopril 12.5 mg TID   chlorhexidine 15 mL BID   clopidogrel 75 mg Daily   dexamethasone 6 mg Q8H   famotidine 20 mg BID    heparin (porcine) 5,000 Units Q8H   senna-docusate 8.6-50 mg 1 tablet BID   sodium chloride 0.9% 3 mL Q8H   sodium chloride 3% 4 mL Q6H   PRN  albuterol-ipratropium 2.5mg-0.5mg/3mL 3 mL Q4H PRN   bisacodyl 10 mg Daily PRN   dextrose 50% 12.5 g PRN   dextrose 50% 12.5 g PRN   dextrose 50% 25 g PRN   fentaNYL 50 mcg Q2H PRN   glucagon (human recombinant) 1 mg PRN   hydrALAZINE 10 mg Q4H PRN   labetalol 10 mg Q4H PRN   magnesium sulfate IVPB 2 g PRN   magnesium sulfate IVPB 4 g PRN   ondansetron 4 mg Q8H PRN   potassium chloride 10% 40 mEq PRN   potassium chloride 10% 40 mEq PRN   sodium chloride 0.9% 5 mL PRN     Today I personally reviewed pertinent imaging, lab results, notably:  Component      Latest Ref Rng & Units 2/3/2018 2/3/2018 2/3/2018           8:22 AM  3:27 AM  3:27 AM   WBC      3.90 - 12.70 K/uL   9.19   RBC      4.60 - 6.20 M/uL   4.49 (L)   Hemoglobin      14.0 - 18.0 g/dL   13.3 (L)   Hematocrit      40.0 - 54.0 %   40.5   MCV      82 - 98 fL   90   MCH      27.0 - 31.0 pg   29.6   MCHC      32.0 - 36.0 g/dL   32.8   RDW      11.5 - 14.5 %   12.6   Platelets      150 - 350 K/uL   294   MPV      9.2 - 12.9 fL   9.7   Immature Granulocytes      0.0 - 0.5 %   1.7 (H)   Gran # (ANC)      1.8 - 7.7 K/uL   7.6   Immature Grans (Abs)      0.00 - 0.04 K/uL   0.16 (H)   Lymph #      1.0 - 4.8 K/uL   1.1   Mono #      0.3 - 1.0 K/uL   0.3   Eos #      0.0 - 0.5 K/uL   0.0   Baso #      0.00 - 0.20 K/uL   0.03   nRBC      0 /100 WBC   0   Gran%      38.0 - 73.0 %   82.8 (H)   Lymph%      18.0 - 48.0 %   11.9 (L)   Mono%      4.0 - 15.0 %   3.3 (L)   Eosinophil%      0.0 - 8.0 %   0.0   Basophil%      0.0 - 1.9 %   0.3   Differential Method         Automated   Sodium      136 - 145 mmol/L  145 145   Potassium      3.5 - 5.1 mmol/L   4.0   Chloride      95 - 110 mmol/L   110   CO2      23 - 29 mmol/L   26   Glucose      70 - 110 mg/dL   243 (H)   BUN, Bld      6 - 20 mg/dL   23 (H)   Creatinine      0.5 - 1.4  mg/dL   0.9   Calcium      8.7 - 10.5 mg/dL   9.0   Total Protein      6.0 - 8.4 g/dL   6.9   Albumin      3.5 - 5.2 g/dL   2.6 (L)   Total Bilirubin      0.1 - 1.0 mg/dL   0.4   Alkaline Phosphatase      55 - 135 U/L   84   AST      10 - 40 U/L   29   ALT      10 - 44 U/L   30   Anion Gap      8 - 16 mmol/L   9   eGFR if African American      >60 mL/min/1.73 m:2   >60.0   eGFR if non African American      >60 mL/min/1.73 m:2   >60.0   Protime      9.0 - 12.5 sec   10.3   Coumadin Monitoring INR      0.8 - 1.2   1.0   Magnesium      1.6 - 2.6 mg/dL   2.3   Phosphorus      2.7 - 4.5 mg/dL   4.0   POCT Glucose      70 - 110 mg/dL 191 (H)       CXR 2/3: ETT at thoracic inlet    Diet  Diet NPO      Assessment/Plan:     Neuro   * Embolic stroke involving left middle cerebral artery    S/P L M1 thrombectomy  -- Neuro checks q 1hr  -- Vascular Neurology  following  -- CT MP- Short segment occlusion of left M1   -- s/p thrombectomy of M1 occlusion 1/25  -- Repeat CTH 1/26 - No detrimental change  -- SBP goal <160  -- PT/OT/Speech  -- hemicrani watch, NSGY following   -- MRI 1/26 - L MCA, no hemorrhagic conversion  -- stat CTH 1/27: L MCA stable, no worsening edema or hemorrhagic conversion  -- ASA, plavix     Appears exam declined, no consistent with previous imaging. Stat CTH to eval for increased edema and then EEG.   NO AEDs at this time  01/31: no eeg evidence for seizure x 2 days, ct head stable, now that airway secured-for mri in am            Cytotoxic cerebral edema    monitor CTh and exam for increasing edema  1/31: sodium improved to goal, cont 2%  02/01: equibrated sodium 150-151, cont 2% at current rate  2/2: 2% discontinued and enteral free water boluses started at 200mL QID  2/3: 2% re-started and enteral free water discontinued          Pulmonary   Pneumonia    -continue ceftriaxone  - will hold on extubation until secretions decrease  - discontinue dexamethasone        Acute respiratory failure with hypoxia     Due to airway obstruction form hardened secretions overlying epiglottis and soft palate, improved after intubation and bronchoscopy  Tolerating spontaneous  Use prn sedation, if mental status improves-attempt extubation  Decadron 6mg q8 x 3 doses for probable swelling post difficult intubation        Cardiac/Vascular   Hyperlipidemia    -atorvastatin 80 daily           Essential hypertension    --SBP <180  -- start Lopressor pNG 25mg q8 hours  --echo:    1 - Normal left ventricular systolic function (EF 65-70%).     2 - Concentric remodeling.     3 - No wall motion abnormalities.     4 - Normal left ventricular diastolic function.     5 - Normal right ventricular systolic function .             Oncology   Leukocytosis    _Pan Cx and Abx over weekend  -Trend PRocal  -SLight improvement in Leukocytosis with ABX  -Eval CXR  Small development of perihilar infiltrate, temp, white count, secretions all better on ceftriaxone,cont 7 day course        Endocrine   Type 2 diabetes mellitus    -A1C-10  -poorly controlled diabetes  -BG >400 on arrival  -insulin gtt until on TF.  02/01: intubated, tfs restarted          Other   Obstructive sleep apnea    Oxygenating well at 45 degrees head position or higher  Intubated due to inspissated secretions from nocturnal cpap            Prophylaxis:  Venous Thromboembolism: mechanical chemical  Stress Ulcer: H2B  Ventilator Pneumonia: yes     Activity Orders          None        Full Code    Calin England MD  Neurocritical Care  [facility]

## 2018-02-03 NOTE — ASSESSMENT & PLAN NOTE
-A1C-10  -poorly controlled diabetes  -BG >400 on arrival  -insulin gtt until on TF.  02/01: intubated, tfs restarted

## 2018-02-03 NOTE — PLAN OF CARE
POC reviewed with pt and family at 1400. Sister verbalized understanding. Questions and concerns addressed. No acute events today.2%, NS, and insulin gtt infusing. Cooling blanket on to maintain normothermia. Pt progressing toward goals. Will continue to monitor. See flowsheets for full assessment and VS info.

## 2018-02-03 NOTE — PROGRESS NOTES
[facility]  Vascular Neurology  Comprehensive Stroke Center  Progress Note    Assessment/Plan:     * Embolic stroke involving left middle cerebral artery    47 year old male who walked into [place] and was acting abnormal.  EMS was called and brought to the ED for concern of L MCA syndrome. Not tpa candidate due to unknown last known normal.  Patient went to IR for thrombectomy of L M1 occlusion seen on CTA MP on 1/25.  TICI2C reperfusion and angioplasty.   Etiology at this time suspected ESUS vs L M1 atherosclerosis.    No EEG evidence for seizure x 2 days, CTH stable. Pt no longer on 2%, now with water boluses.   Small development of perihilar infiltrate. Temp, white count, secretions all improving on ceftriaxone.  Pt intubated, on spontaneous today. Receiving Decadron x 3 for possible extubation 2/3/18.  On insulin drip. Restarted TFs. BGs continue to be elevated, however improved from prior.    Antithrombotics for secondary stroke prevention: asa 325mg qd and plavix 75mg qd   Statins for secondary stroke prevention and hyperlipidemia, if present: Repeat LDL (last invalid secondary to hypertriglyceridemia), atorvastatin 80 in interim  Aggressive risk factor modification: Obesity  Rehab efforts: PT/OT/SLP to evaluate and treat  Diagnostics ordered/pending:   CTA - L M1 occlusion  A1C 10, TSH 1.8, LDL immeasurable 2/2 trigylcerides (460), Chol 200  MRI - most of the L MCA shows area of infarct  VTE prophylaxis: Heparin 5000 units SQ every 8 hours  BP parameters: Infarct: Post sucessful thrombectomy, SBP <140         Acute respiratory failure with hypoxia    Intubated, now spontaneous  Decadron x3 d/t swelling after difficult intubation  Possible extubation 2/3/18  Off sedation, Exam slightly improved  CXR improving        Cytotoxic cerebral edema    Due to stroke  Evidence of mass effect and brain compression on imaging  Repeat CTHs stable        Leukocytosis    Resolved  Cxs with no  growth  On Rocephin  -Management per primary        Type 2 diabetes mellitus    - A1C 10  - hyperglycemic; On insulin gtt   - Will need endocrine consult when stepped down to stroke service        Hyperlipidemia    - Chol 200, LDL immeasurable,   - atorvastatin 80 mg  - repeat lipid panel        Essential hypertension    Stroke risk factor  - goal SBP post thrombectomy <140  - management for primary team        Obstructive sleep apnea    Stroke risk factor        Respiratory distress, acute    Requiring intubation   NCC managing              1/25: Brought in for aphasia, RSW with L gaze preferance. LKN unknown, not tPA candidate. Went to IR for angiogram and stent.  1/29: Off Cardene, remains on Insulin gtt. Concern for sepsis, respiratory source. Imaging with mass effect and some brain compression; maycol crani watch.  1/30: EEG consistent with focal L slowing 2/2 lesion and mild generalized slowing, no seizures. CT head stable, no hemorrhagic conversion  02/01/18 emergent intubation likely due to upper airway obstruction per NCC.    2/2/18: Pt off Precedex, moving left side spontaneously and opening eyes. Intubated, on spontaneous today. NCC giving steroids in hopes to extubate tomorrow.    STROKE DOCUMENTATION   Acute Stroke Times   Stroke Team Called Date: 01/25/18  Stroke Team Called Time: 1852  Stroke Team Arrival Date: 01/25/18  Stroke Team Arrival Time: 0652  CT Interpretation Time: 1910  Decision to Treat Time for Alteplase:  (n/a unknown last known normal )  Decision to Treat Time for IR: 1915    NIH Scale:  1a. Level Of Consciousness: 2-->Not alert: requires repeated stimulation to attend, or is obtunded and requires strong or painful stimulation to make movements (not stereotyped)  1b. LOC Questions: 2-->Answers neither question correctly  1c. LOC Commands: 1-->Performs one task correctly  2. Best Gaze: 2-->Forced deviation, or total gaze paresis not overcome by the oculocephalic maneuver  3. Visual:  2-->Complete hemianopia  4. Facial Palsy: 0-->Normal symmetrical movements  5a. Motor Arm, Left: 3-->No effort against gravity: limb falls  5b. Motor Arm, Right: 3-->No effort against gravity: limb falls  6a. Motor Leg, Left: 3-->No effort against gravity: leg falls to bed immediately  6b. Motor Leg, Right: 3-->No effort against gravity: leg falls to bed immediately  7. Limb Ataxia: 0-->Absent  8. Sensory: 0-->Normal: no sensory loss  9. Best Language: 3-->Mute, global aphasia: no usable speech or auditory comprehension  10. Dysarthria: (UN) Intubated or other physical barrier  11. Extinction and Inattention (formerly Neglect): 0-->No abnormality  Total (NIH Stroke Scale): 24       Modified [place]    [place] Coma Scale:    ABCD2 Score:    [id number] Score:   HAS -BLED Score:   ICH Score:   Hunt & Laguerre Classification:      Hemorrhagic change of an Ischemic Stroke: Does this patient have an ischemic stroke with hemorrhagic changes? No     Neurologic Chief Complaint: L MCA infarct     Subjective:     Interval History: Patient is seen for follow-up neurological assessment and treatment recommendations: Pt off Precedex, moving left side spontaneously and opening eyes. Intubated, on spontaneous today. NCC giving steroids in hopes to extubate tomorrow.      HPI, Past Medical, Family, and Social History remains the same as documented in the initial encounter.     Review of Systems   Constitutional: Negative for chills and fever.   Respiratory: Negative for cough and shortness of breath.    Gastrointestinal: Negative for diarrhea and vomiting.     Scheduled Meds:   acetaminophen  650 mg Per NG tube Q6H    albuterol-ipratropium 2.5mg-0.5mg/3mL  3 mL Nebulization Q4H    aspirin  325 mg Per NG tube Daily    atorvastatin  80 mg Per NG tube Daily    captopril  12.5 mg Oral TID    chlorhexidine  15 mL Mouth/Throat BID    clopidogrel  75 mg Per NG tube Daily    dexamethasone  6 mg Intravenous Q8H    famotidine  20 mg Per  NG tube BID    heparin (porcine)  5,000 Units Subcutaneous Q8H    senna-docusate 8.6-50 mg  1 tablet Per NG tube BID    sodium chloride 0.9%  3 mL Intravenous Q8H    sodium chloride 3%  4 mL Nebulization Q4H     Continuous Infusions:   insulin (HUMAN R) infusion (adults) 5.9 Units/hr (02/02/18 1705)     PRN Meds:albuterol-ipratropium 2.5mg-0.5mg/3mL, bisacodyl, dextrose 50%, dextrose 50%, dextrose 50%, fentaNYL, glucagon (human recombinant), hydrALAZINE, labetalol, magnesium sulfate IVPB, magnesium sulfate IVPB, ondansetron, potassium chloride 10%, potassium chloride 10%, sodium chloride 0.9%    Objective:     Vital Signs (Most Recent):  Temp: 99.5 °F (37.5 °C) (02/02/18 1505)  Pulse: 106 (02/02/18 1734)  Resp: (!) 22 (02/02/18 1734)  BP: (!) 184/98 (02/02/18 1734)  SpO2: 97 % (02/02/18 1734)  BP Location: Left arm    Vital Signs Range (Last 24H):  Temp:  [98.8 °F (37.1 °C)-100 °F (37.8 °C)]   Pulse:  []   Resp:  [16-46]   BP: (117-192)/()   SpO2:  [97 %-100 %]   Arterial Line BP: (116-201)/()   BP Location: Left arm    Physical Exam   Constitutional: He appears well-developed and well-nourished.   HENT:   Head: Normocephalic and atraumatic.   Eyes: Pupils are equal, round, and reactive to light.   Brisk,   Left gaze deviation    Pulmonary/Chest: Effort normal. No respiratory distress.   Intubated        Neurological Exam:   LOC: drowsy  Attention Span: poor  Language: Intubated  Articulation: Untestable due to intubation  Orientation: Untestable due to intubation  EOM (CN III, IV, VI): Left gaze deviation   Pupils (CN II, III): PERRL  Motor: Spontaneous movement on L, Withdraws to noxious stimuli on R   Tone:  Normal tone in all extremities     Laboratory:  CMP:   Recent Labs  Lab 02/02/18  0313  02/02/18  1628   CALCIUM 8.6*  --   --    ALBUMIN 2.4*  --   --    PROT 6.3  --   --    *  152*  < > 150*   K 3.6  --   --    CO2 27  --   --    *  --   --    BUN 23*  --   --     CREATININE 0.9  --   --    ALKPHOS 81  --   --    ALT 33  --   --    AST 28  --   --    BILITOT 0.4  --   --    < > = values in this interval not displayed.  CBC:     Recent Labs  Lab 02/02/18  0313   WBC 7.98   RBC 4.36*   HGB 12.9*   HCT 39.3*      MCV 90   MCH 29.6   MCHC 32.8     Lipid Panel: No results for input(s): CHOL, LDLCALC, HDL, TRIG in the last 168 hours.  Hgb A1C: No results for input(s): HGBA1C in the last 168 hours.  TSH: No results for input(s): TSH in the last 168 hours.      Diagnostic Results       Brain Imaging     CT head 01/31/18  Stable L MCA territory infarct  Cytotoxic cerebral edema    1/27/18 CTH: Per independent resident review and interpretation,  L MCA infarction s/p stenting.  No sign of hemorrhagic transformation.  R basal ganglia and cerebellar infarct.         1/26/18 MRI Brain w/o:  Restricted diffusion in L MCA territory  Cytotoxic cerebral edema           Vessel Imaging     1/25/18 IR Angiogram: Per independent resident review and interpretation,  L M1 occlusion.         Cardiac Imaging     1/26/18 EKG: Per independent resident review and interpretation,  Sinus tachycardia.  .  QTc 500.     1/26/18 TTE: Per report,  LA wnl  EF 65-70%  No regional wall motion abnormalities  Diastolic function normal      Arlette Hernández PA-C  Comprehensive Stroke Center  Department of Vascular Neurology   [facility]

## 2018-02-03 NOTE — PROGRESS NOTES
[facility]  Vascular Neurology  Comprehensive Stroke Center  Progress Note    Assessment/Plan:     * Embolic stroke involving left middle cerebral artery    48 y/o M who p/w L MCA syndrome. No tPA candidate due to unknown LKN.    - L M1 occlusion on CTA ().    - MRI showed most of the L MCA effected by infarct  - IR thrombectomy, + angioplasty and stenting of the residual stenosis, TICI2C reperfusion Etiology   At this ponit the impression is ESUS versus L M1 atherosclerosis.      Antithrombotics for secondary stroke prevention: DAPT with asa 325mg qd and plavix 75mg qd  Statins: atorvastatin 80 in interim  Aggressive risk factor modification: Obesity  Rehab efforts: PT/OT/SLP to evaluate and treat  Diagnostics ordered/pending: none  A1C 10, TSH 1.8, LDL immeasurable 2/2 trigylcerides (460), Chol 200  VTE prophylaxis: Heparin 5000 units SQ every 8 hours  BP parameters: Infarct: Post sucessful thrombectomy, SBP <140     - EE- focal left sided slowing, suggestive of underlying structural lesion 2-  mild generalized slowing, suggestive of mild diffuse or multifocal cerebral dysfunction.   - off of 2% HTS  - was planned to try extubation today, however remains intubated at the time of the visit this morning  - will continue to follow up on the patient along with the primary team        Acute respiratory failure with hypoxia    Intubated, now spontaneous  Decadron x3 d/t swelling after difficult intubation  Possible plans for extubation 2/3/18, however has remained intubated for now  Off sedation  CXR stable from yesterday        Cytotoxic cerebral edema    Due to stroke  Evidence of mass effect and brain compression on imaging  Repeat CTHs stable        Type 2 diabetes mellitus    - A1C 10  - glucose ~200s, remains on insulin gtt   - Will need endocrine consult when stepped down to stroke service        Hyperlipidemia    - Chol 200, LDL immeasurable,   - atorvastatin 80 mg  - consider  repeating lipid panel        Essential hypertension    Stroke risk factor  - goal SBP post thrombectomy <140  - management for primary team           1/25: Brought in for aphasia, RSW with L gaze preferance. LKN unknown, not tPA candidate. Went to IR for angiogram and stent.  1/29: Off Cardene, remains on Insulin gtt. Concern for sepsis, respiratory source. Imaging with mass effect and some brain compression; maycol crani watch.  1/30: EEG consistent with focal L slowing 2/2 lesion and mild generalized slowing, no seizures. CT head stable, no hemorrhagic conversion  02/01/18 emergent intubation likely due to upper airway obstruction per NCC.    2/2/18: Pt off Precedex, moving left side spontaneously and opening eyes. Intubated, on spontaneous today. NCC giving steroids in hopes to extubate tomorrow.  2/3: no acute events over night, remains intubated at the time of the visit this morning, not responsive to verbal stimuli, withdraws from pain on LUE, BP raging ~135-230. No significant change in the lab values, glucose ~200. Continued on insulin gtt, SQH for DVT ppx, and captopril.     STROKE DOCUMENTATION   Acute Stroke Times   Stroke Team Called Date: 01/25/18  Stroke Team Called Time: 1852  Stroke Team Arrival Date: 01/25/18  Stroke Team Arrival Time: 0652  CT Interpretation Time: 1910  Decision to Treat Time for Alteplase:  (n/a unknown last known normal )  Decision to Treat Time for IR: 1915    NIH Scale:  1a. Level Of Consciousness: 2-->Not alert: requires repeated stimulation to attend, or is obtunded and requires strong or painful stimulation to make movements (not stereotyped)  1b. LOC Questions: 2-->Answers neither question correctly  1c. LOC Commands: 2-->Performs neither task correctly  2. Best Gaze: 2-->Forced deviation, or total gaze paresis not overcome by the oculocephalic maneuver  3. Visual: 2-->Complete hemianopia  4. Facial Palsy: 0-->Normal symmetrical movements  5a. Motor Arm, Left:  4-->Nomovement  5b. Motor Arm, Right: 4-->Nomovement  6a. Motor Leg, Left: 4-->Nomovement  6b. Motor Leg, Right: 4-->Nomovement  7. Limb Ataxia: 0-->Absent  8. Sensory: 0-->Normal: no sensory loss  9. Best Language: 3-->Mute, global aphasia: no usable speech or auditory comprehension  10. Dysarthria: (UN) Intubated or other physical barrier  11. Extinction and Inattention (formerly Neglect): 0-->No abnormality  Total (NIH Stroke Scale): 27     Modified Dawson 5  [place] Coma Scale: 6  ABCD2 Score:    [id number] Score:   HAS -BLED Score:   ICH Score:   Hunt & Laguerre Classification:      Hemorrhagic change of an Ischemic Stroke: Does this patient have an ischemic stroke with hemorrhagic changes? No     Neurologic Chief Complaint: L MCA infarct     Subjective:     Interval History: Patient is seen for follow-up neurological assessment and treatment recommendations: no acute events over night, remains intubated at the time of the visit this morning, not responsive to verbal stimuli, withdraws from pain on LUE, BP raging ~135-230. No significant change in the lab values, glucose ~200. Continued on insulin gtt, SQH for DVT ppx, and captopril.     HPI, Past Medical, Family, and Social History remains the same as documented in the initial encounter.     Review of Systems   Unable to perform ROS: Intubated   Constitutional: Negative for fever.   Neurological: Negative for seizures.   Psychiatric/Behavioral: Negative for agitation.     Scheduled Meds:   acetaminophen  650 mg Per NG tube Q6H    albuterol-ipratropium 2.5mg-0.5mg/3mL  3 mL Nebulization Q6H    aspirin  325 mg Per NG tube Daily    atorvastatin  80 mg Per NG tube Daily    captopril  12.5 mg Oral TID    chlorhexidine  15 mL Mouth/Throat BID    clopidogrel  75 mg Per NG tube Daily    famotidine  20 mg Per NG tube BID    heparin (porcine)  5,000 Units Subcutaneous Q8H    metoprolol tartrate  25 mg Oral BID    senna-docusate 8.6-50 mg  1 tablet Per NG tube  BID    sodium chloride 0.9%  3 mL Intravenous Q8H    sodium chloride 3%  4 mL Nebulization Q6H     Continuous Infusions:   sodium chloride 0.9% 50 mL/hr at 02/03/18 1305    insulin (HUMAN R) infusion (adults) 2.6 Units/hr (02/03/18 1405)    buffered 2% sodium acetate 86meq, sodium chloride 86meq, sterile water for inj IV soln 20 mL/hr at 02/03/18 1305     PRN Meds:albuterol-ipratropium 2.5mg-0.5mg/3mL, bisacodyl, dextrose 50%, dextrose 50%, dextrose 50%, fentaNYL, glucagon (human recombinant), hydrALAZINE, labetalol, magnesium sulfate IVPB, magnesium sulfate IVPB, ondansetron, potassium chloride 10%, potassium chloride 10%, sodium chloride 0.9%    Objective:     Vital Signs (Most Recent):  Temp: 98.6 °F (37 °C) (02/03/18 1105)  Pulse: 76 (02/03/18 1428)  Resp: 20 (02/03/18 1413)  BP: (!) 185/99 (02/03/18 1105)  SpO2: 97 % (02/03/18 1428)  BP Location: Left arm    Vital Signs Range (Last 24H):  Temp:  [98.6 °F (37 °C)-100 °F (37.8 °C)]   Pulse:  []   Resp:  [16-28]   BP: (137-231)/()   SpO2:  [94 %-100 %]   Arterial Line BP: (121-202)/()   BP Location: Left arm    Physical Exam   Constitutional: He appears well-developed and well-nourished.   HENT:   Head: Normocephalic and atraumatic.   Eyes: Pupils are equal, round, and reactive to light.   Cardiovascular: Normal rate and regular rhythm.    Pulmonary/Chest: Effort normal. No tachypnea. No respiratory distress. He has no wheezes.   Intubated    Abdominal: He exhibits no distension.   Neurological: He is unresponsive. GCS eye subscore is 1. GCS verbal subscore is 1. GCS motor subscore is 4.       Neurological Exam:   LOC: unresponsive  Language: Intubated  Pupils (CN II, III): PERRL  Motor: withdraws from pain on L (upper>lower)  Plantar reflexes neutral  Tone:  Normal tone in all extremities     Laboratory:  CMP:   Recent Labs  Lab 02/03/18  0327  02/03/18  1220   CALCIUM 9.0  --   --    ALBUMIN 2.6*  --   --    PROT 6.9  --   --       145  < > 147*   K 4.0  --   --    CO2 26  --   --      --   --    BUN 23*  --   --    CREATININE 0.9  --   --    ALKPHOS 84  --   --    ALT 30  --   --    AST 29  --   --    BILITOT 0.4  --   --    < > = values in this interval not displayed.  CBC:     Recent Labs  Lab 02/03/18  0327   WBC 9.19   RBC 4.49*   HGB 13.3*   HCT 40.5      MCV 90   MCH 29.6   MCHC 32.8       Diagnostic Results     Brain Imaging   - CT head 1/31:  Stable L MCA territory infarct  Cytotoxic cerebral edema    - CTH 1/27:   L MCA infarction s/p thrombectomy and stenting of the residual stenosis.  No sign of hemorrhagic transformation.  R basal ganglia and cerebellar infarct.       MRI Brain w/o 1/26:  Restricted diffusion in L MCA territory  Cytotoxic cerebral edema           Vessel Imaging     IR cerebral Angiogram 1/25:   L M1 occlusion.         Cardiac Imaging     EKG 1/26:   Sinus tachycardia.  .  QTc 500.  Q waves in inferior leads     TTE 1/26:   EF 65-70%  LA size WNL  No regional wall motion abnormalities  Diastolic function normal      Suzanne Reynolds MD  Comprehensive Stroke Center  Department of Vascular Neurology   [facility]

## 2018-02-03 NOTE — ASSESSMENT & PLAN NOTE
47 [facility]  EMS was called and brought to the ED for concern of L MCA syndrome. Not tpa candidate due to unknown last known normal.  Patient went to IR for thrombectomy of L M1 occlusion seen on CTA MP on 1/25.  TICI2C reperfusion and angioplasty.   Etiology at this time suspected ESUS vs L M1 atherosclerosis.    No EEG evidence for seizure x 2 days, CTH stable. Pt no longer on 2%, now with water boluses.   Small development of perihilar infiltrate. Temp, white count, secretions all improving on ceftriaxone.  Pt intubated, on spontaneous today. Receiving Decadron x 3 for possible extubation 2/3/18.  On insulin drip. Restarted TFs. BGs continue to be elevated, however improved from prior.    Antithrombotics for secondary stroke prevention: asa 325mg qd and plavix 75mg qd   Statins for secondary stroke prevention and hyperlipidemia, if present: Repeat LDL (last invalid secondary to hypertriglyceridemia), atorvastatin 80 in interim  Aggressive risk factor modification: Obesity  Rehab efforts: PT/OT/SLP to evaluate and treat  Diagnostics ordered/pending:   CTA - L M1 occlusion  A1C 10, TSH 1.8, LDL immeasurable 2/2 trigylcerides (460), Chol 200  MRI - most of the L MCA shows area of infarct  VTE prophylaxis: Heparin 5000 units SQ every 8 hours  BP parameters: Infarct: Post sucessful thrombectomy, SBP <140

## 2018-02-03 NOTE — SUBJECTIVE & OBJECTIVE
Neurologic Chief Complaint: L MCA infarct     Subjective:     Interval History: Patient is seen for follow-up neurological assessment and treatment recommendations: no acute events over night, remains intubated at the time of the visit this morning, not responsive to verbal stimuli, withdraws from pain on LUE, BP raging ~135-230. No significant change in the lab values, glucose ~200. Continued on insulin gtt, SQH for DVT ppx, and captopril.     HPI, Past Medical, Family, and Social History remains the same as documented in the initial encounter.     Review of Systems   Unable to perform ROS: Intubated   Constitutional: Negative for fever.   Neurological: Negative for seizures.   Psychiatric/Behavioral: Negative for agitation.     Scheduled Meds:   acetaminophen  650 mg Per NG tube Q6H    albuterol-ipratropium 2.5mg-0.5mg/3mL  3 mL Nebulization Q6H    aspirin  325 mg Per NG tube Daily    atorvastatin  80 mg Per NG tube Daily    captopril  12.5 mg Oral TID    chlorhexidine  15 mL Mouth/Throat BID    clopidogrel  75 mg Per NG tube Daily    famotidine  20 mg Per NG tube BID    heparin (porcine)  5,000 Units Subcutaneous Q8H    metoprolol tartrate  25 mg Oral BID    senna-docusate 8.6-50 mg  1 tablet Per NG tube BID    sodium chloride 0.9%  3 mL Intravenous Q8H    sodium chloride 3%  4 mL Nebulization Q6H     Continuous Infusions:   sodium chloride 0.9% 50 mL/hr at 02/03/18 1305    insulin (HUMAN R) infusion (adults) 2.6 Units/hr (02/03/18 1405)    buffered 2% sodium acetate 86meq, sodium chloride 86meq, sterile water for inj IV soln 20 mL/hr at 02/03/18 1305     PRN Meds:albuterol-ipratropium 2.5mg-0.5mg/3mL, bisacodyl, dextrose 50%, dextrose 50%, dextrose 50%, fentaNYL, glucagon (human recombinant), hydrALAZINE, labetalol, magnesium sulfate IVPB, magnesium sulfate IVPB, ondansetron, potassium chloride 10%, potassium chloride 10%, sodium chloride 0.9%    Objective:     Vital Signs (Most Recent):  Temp:  98.6 °F (37 °C) (02/03/18 1105)  Pulse: 76 (02/03/18 1428)  Resp: 20 (02/03/18 1413)  BP: (!) 185/99 (02/03/18 1105)  SpO2: 97 % (02/03/18 1428)  BP Location: Left arm    Vital Signs Range (Last 24H):  Temp:  [98.6 °F (37 °C)-100 °F (37.8 °C)]   Pulse:  []   Resp:  [16-28]   BP: (137-231)/()   SpO2:  [94 %-100 %]   Arterial Line BP: (121-202)/()   BP Location: Left arm    Physical Exam   Constitutional: He appears well-developed and well-nourished.   HENT:   Head: Normocephalic and atraumatic.   Eyes: Pupils are equal, round, and reactive to light.   Brisk,   Left gaze deviation    Cardiovascular: Normal rate and regular rhythm.    Pulmonary/Chest: Effort normal. No tachypnea. No respiratory distress. He has no wheezes.   Intubated    Abdominal: He exhibits no distension.   Neurological: He is unresponsive. GCS eye subscore is 1. GCS verbal subscore is 1. GCS motor subscore is 4.       Neurological Exam:   LOC: unresponsive  Language: Intubated  Pupils (CN II, III): PERRL  Motor: withdraws from pain on L (upper>lower)  Plantar reflexes neutral  Tone:  Normal tone in all extremities     Laboratory:  CMP:   Recent Labs  Lab 02/03/18  0327  02/03/18  1220   CALCIUM 9.0  --   --    ALBUMIN 2.6*  --   --    PROT 6.9  --   --      145  < > 147*   K 4.0  --   --    CO2 26  --   --      --   --    BUN 23*  --   --    CREATININE 0.9  --   --    ALKPHOS 84  --   --    ALT 30  --   --    AST 29  --   --    BILITOT 0.4  --   --    < > = values in this interval not displayed.  CBC:     Recent Labs  Lab 02/03/18  0327   WBC 9.19   RBC 4.49*   HGB 13.3*   HCT 40.5      MCV 90   MCH 29.6   MCHC 32.8       Diagnostic Results     Brain Imaging   - CT head 1/31:  Stable L MCA territory infarct  Cytotoxic cerebral edema    - CTH 1/27:   L MCA infarction s/p thrombectomy and stenting of the residual stenosis.  No sign of hemorrhagic transformation.  R basal ganglia and cerebellar infarct.       MRI  Brain w/o 1/26:  Restricted diffusion in L MCA territory  Cytotoxic cerebral edema           Vessel Imaging     IR cerebral Angiogram 1/25:   L M1 occlusion.         Cardiac Imaging     EKG 1/26:   Sinus tachycardia.  .  QTc 500.  Q waves in inferior leads     TTE 1/26:   EF 65-70%  LA size WNL  No regional wall motion abnormalities  Diastolic function normal

## 2018-02-03 NOTE — ASSESSMENT & PLAN NOTE
Intubated, now spontaneous  Decadron x3 d/t swelling after difficult intubation  Possible extubation 2/3/18  Off sedation, Exam slightly improved  CXR improving

## 2018-02-04 LAB
ALBUMIN SERPL BCP-MCNC: 2.4 G/DL
ALLENS TEST: ABNORMAL
ALP SERPL-CCNC: 74 U/L
ALT SERPL W/O P-5'-P-CCNC: 29 U/L
ANION GAP SERPL CALC-SCNC: 8 MMOL/L
AST SERPL-CCNC: 27 U/L
BASOPHILS # BLD AUTO: 0.03 K/UL
BASOPHILS NFR BLD: 0.3 %
BILIRUB SERPL-MCNC: 0.4 MG/DL
BUN SERPL-MCNC: 26 MG/DL
CALCIUM SERPL-MCNC: 8.6 MG/DL
CHLORIDE SERPL-SCNC: 109 MMOL/L
CO2 SERPL-SCNC: 29 MMOL/L
CREAT SERPL-MCNC: 1 MG/DL
DELSYS: ABNORMAL
DIFFERENTIAL METHOD: ABNORMAL
EOSINOPHIL # BLD AUTO: 0 K/UL
EOSINOPHIL NFR BLD: 0.4 %
ERYTHROCYTE [DISTWIDTH] IN BLOOD BY AUTOMATED COUNT: 12.6 %
EST. GFR  (AFRICAN AMERICAN): >60 ML/MIN/1.73 M^2
EST. GFR  (NON AFRICAN AMERICAN): >60 ML/MIN/1.73 M^2
FIO2: 40
GLUCOSE SERPL-MCNC: 231 MG/DL
HCO3 UR-SCNC: 30.3 MMOL/L (ref 24–28)
HCT VFR BLD AUTO: 36.8 %
HGB BLD-MCNC: 12.2 G/DL
IMM GRANULOCYTES # BLD AUTO: 0.07 K/UL
IMM GRANULOCYTES NFR BLD AUTO: 0.7 %
INR PPP: 1
LYMPHOCYTES # BLD AUTO: 2.3 K/UL
LYMPHOCYTES NFR BLD: 23.3 %
MAGNESIUM SERPL-MCNC: 1.8 MG/DL
MAGNESIUM SERPL-MCNC: 2.3 MG/DL
MCH RBC QN AUTO: 29.6 PG
MCHC RBC AUTO-ENTMCNC: 33.2 G/DL
MCV RBC AUTO: 89 FL
MODE: ABNORMAL
MONOCYTES # BLD AUTO: 0.8 K/UL
MONOCYTES NFR BLD: 8.1 %
NEUTROPHILS # BLD AUTO: 6.8 K/UL
NEUTROPHILS NFR BLD: 67.2 %
NRBC BLD-RTO: 0 /100 WBC
PCO2 BLDA: 39.5 MMHG (ref 35–45)
PH SMN: 7.49 [PH] (ref 7.35–7.45)
PHOSPHATE SERPL-MCNC: 3.4 MG/DL
PLATELET # BLD AUTO: 310 K/UL
PMV BLD AUTO: 9.8 FL
PO2 BLDA: 96 MMHG (ref 80–100)
POC BE: 7 MMOL/L
POC SATURATED O2: 98 % (ref 95–100)
POC TCO2: 31 MMOL/L (ref 23–27)
POCT GLUCOSE: 136 MG/DL (ref 70–110)
POCT GLUCOSE: 139 MG/DL (ref 70–110)
POCT GLUCOSE: 141 MG/DL (ref 70–110)
POCT GLUCOSE: 146 MG/DL (ref 70–110)
POCT GLUCOSE: 149 MG/DL (ref 70–110)
POCT GLUCOSE: 158 MG/DL (ref 70–110)
POCT GLUCOSE: 159 MG/DL (ref 70–110)
POCT GLUCOSE: 160 MG/DL (ref 70–110)
POCT GLUCOSE: 163 MG/DL (ref 70–110)
POCT GLUCOSE: 164 MG/DL (ref 70–110)
POCT GLUCOSE: 164 MG/DL (ref 70–110)
POCT GLUCOSE: 166 MG/DL (ref 70–110)
POCT GLUCOSE: 167 MG/DL (ref 70–110)
POCT GLUCOSE: 171 MG/DL (ref 70–110)
POCT GLUCOSE: 180 MG/DL (ref 70–110)
POCT GLUCOSE: 186 MG/DL (ref 70–110)
POCT GLUCOSE: 188 MG/DL (ref 70–110)
POCT GLUCOSE: 190 MG/DL (ref 70–110)
POCT GLUCOSE: 194 MG/DL (ref 70–110)
POCT GLUCOSE: 195 MG/DL (ref 70–110)
POCT GLUCOSE: 201 MG/DL (ref 70–110)
POCT GLUCOSE: 209 MG/DL (ref 70–110)
POCT GLUCOSE: 221 MG/DL (ref 70–110)
POCT GLUCOSE: 244 MG/DL (ref 70–110)
POTASSIUM SERPL-SCNC: 3.4 MMOL/L
POTASSIUM SERPL-SCNC: 3.7 MMOL/L
POTASSIUM SERPL-SCNC: 3.8 MMOL/L
PROT SERPL-MCNC: 5.9 G/DL
PROTHROMBIN TIME: 10.4 SEC
RBC # BLD AUTO: 4.12 M/UL
SAMPLE: ABNORMAL
SITE: ABNORMAL
SODIUM SERPL-SCNC: 145 MMOL/L
SODIUM SERPL-SCNC: 146 MMOL/L
SODIUM SERPL-SCNC: 146 MMOL/L
SODIUM SERPL-SCNC: 147 MMOL/L
SP02: 97
WBC # BLD AUTO: 10.06 K/UL

## 2018-02-04 PROCEDURE — 63600175 PHARM REV CODE 636 W HCPCS: Performed by: PHYSICIAN ASSISTANT

## 2018-02-04 PROCEDURE — 27200966 HC CLOSED SUCTION SYSTEM

## 2018-02-04 PROCEDURE — 25000003 PHARM REV CODE 250: Performed by: STUDENT IN AN ORGANIZED HEALTH CARE EDUCATION/TRAINING PROGRAM

## 2018-02-04 PROCEDURE — 94760 N-INVAS EAR/PLS OXIMETRY 1: CPT

## 2018-02-04 PROCEDURE — 83735 ASSAY OF MAGNESIUM: CPT | Mod: 91

## 2018-02-04 PROCEDURE — 63600175 PHARM REV CODE 636 W HCPCS: Performed by: PSYCHIATRY & NEUROLOGY

## 2018-02-04 PROCEDURE — 25000003 PHARM REV CODE 250: Performed by: PSYCHIATRY & NEUROLOGY

## 2018-02-04 PROCEDURE — 99233 SBSQ HOSP IP/OBS HIGH 50: CPT | Mod: ,,, | Performed by: ANESTHESIOLOGY

## 2018-02-04 PROCEDURE — A4216 STERILE WATER/SALINE, 10 ML: HCPCS | Performed by: NURSE PRACTITIONER

## 2018-02-04 PROCEDURE — 84100 ASSAY OF PHOSPHORUS: CPT

## 2018-02-04 PROCEDURE — 99900026 HC AIRWAY MAINTENANCE (STAT)

## 2018-02-04 PROCEDURE — 83735 ASSAY OF MAGNESIUM: CPT

## 2018-02-04 PROCEDURE — 80053 COMPREHEN METABOLIC PANEL: CPT

## 2018-02-04 PROCEDURE — 84132 ASSAY OF SERUM POTASSIUM: CPT

## 2018-02-04 PROCEDURE — 20000000 HC ICU ROOM

## 2018-02-04 PROCEDURE — 94640 AIRWAY INHALATION TREATMENT: CPT

## 2018-02-04 PROCEDURE — 82803 BLOOD GASES ANY COMBINATION: CPT

## 2018-02-04 PROCEDURE — 63600175 PHARM REV CODE 636 W HCPCS: Performed by: NURSE PRACTITIONER

## 2018-02-04 PROCEDURE — 37799 UNLISTED PX VASCULAR SURGERY: CPT

## 2018-02-04 PROCEDURE — 99900035 HC TECH TIME PER 15 MIN (STAT)

## 2018-02-04 PROCEDURE — 85025 COMPLETE CBC W/AUTO DIFF WBC: CPT

## 2018-02-04 PROCEDURE — 94761 N-INVAS EAR/PLS OXIMETRY MLT: CPT

## 2018-02-04 PROCEDURE — 84295 ASSAY OF SERUM SODIUM: CPT | Mod: 91

## 2018-02-04 PROCEDURE — 25000003 PHARM REV CODE 250: Performed by: NURSE PRACTITIONER

## 2018-02-04 PROCEDURE — 25000242 PHARM REV CODE 250 ALT 637 W/ HCPCS: Performed by: PSYCHIATRY & NEUROLOGY

## 2018-02-04 PROCEDURE — 84132 ASSAY OF SERUM POTASSIUM: CPT | Mod: 91

## 2018-02-04 PROCEDURE — 27000221 HC OXYGEN, UP TO 24 HOURS

## 2018-02-04 PROCEDURE — 94003 VENT MGMT INPAT SUBQ DAY: CPT

## 2018-02-04 PROCEDURE — 85610 PROTHROMBIN TIME: CPT

## 2018-02-04 PROCEDURE — 25000003 PHARM REV CODE 250: Performed by: PHYSICIAN ASSISTANT

## 2018-02-04 RX ORDER — CAPTOPRIL 25 MG/1
25 TABLET ORAL 3 TIMES DAILY
Status: DISCONTINUED | OUTPATIENT
Start: 2018-02-04 | End: 2018-02-05

## 2018-02-04 RX ADMIN — CLOPIDOGREL 75 MG: 75 TABLET, FILM COATED ORAL at 09:02

## 2018-02-04 RX ADMIN — SODIUM CHLORIDE SOLN NEBU 3% 4 ML: 3 NEBU SOLN at 08:02

## 2018-02-04 RX ADMIN — ACETAMINOPHEN 650 MG: 325 TABLET, FILM COATED ORAL at 12:02

## 2018-02-04 RX ADMIN — CAPTOPRIL 25 MG: 25 TABLET ORAL at 09:02

## 2018-02-04 RX ADMIN — HEPARIN SODIUM 5000 UNITS: 5000 INJECTION, SOLUTION INTRAVENOUS; SUBCUTANEOUS at 09:02

## 2018-02-04 RX ADMIN — Medication 3 ML: at 05:02

## 2018-02-04 RX ADMIN — STANDARDIZED SENNA CONCENTRATE AND DOCUSATE SODIUM 1 TABLET: 8.6; 5 TABLET, FILM COATED ORAL at 09:02

## 2018-02-04 RX ADMIN — MAGNESIUM SULFATE HEPTAHYDRATE 2 G: 40 INJECTION, SOLUTION INTRAVENOUS at 03:02

## 2018-02-04 RX ADMIN — METOPROLOL TARTRATE 25 MG: 25 TABLET ORAL at 09:02

## 2018-02-04 RX ADMIN — ATORVASTATIN CALCIUM 80 MG: 20 TABLET, FILM COATED ORAL at 09:02

## 2018-02-04 RX ADMIN — IPRATROPIUM BROMIDE AND ALBUTEROL SULFATE 3 ML: .5; 3 SOLUTION RESPIRATORY (INHALATION) at 07:02

## 2018-02-04 RX ADMIN — FENTANYL CITRATE 50 MCG: 50 INJECTION INTRAMUSCULAR; INTRAVENOUS at 12:02

## 2018-02-04 RX ADMIN — SODIUM CHLORIDE SOLN NEBU 3% 4 ML: 3 NEBU SOLN at 07:02

## 2018-02-04 RX ADMIN — ACETAMINOPHEN 650 MG: 325 TABLET, FILM COATED ORAL at 05:02

## 2018-02-04 RX ADMIN — Medication 3 ML: at 10:02

## 2018-02-04 RX ADMIN — IPRATROPIUM BROMIDE AND ALBUTEROL SULFATE 3 ML: .5; 3 SOLUTION RESPIRATORY (INHALATION) at 01:02

## 2018-02-04 RX ADMIN — FAMOTIDINE 20 MG: 20 TABLET, FILM COATED ORAL at 09:02

## 2018-02-04 RX ADMIN — POTASSIUM CHLORIDE 40 MEQ: 20 SOLUTION ORAL at 10:02

## 2018-02-04 RX ADMIN — SODIUM CHLORIDE SOLN NEBU 3% 4 ML: 3 NEBU SOLN at 01:02

## 2018-02-04 RX ADMIN — HYDRALAZINE HYDROCHLORIDE 10 MG: 20 INJECTION INTRAMUSCULAR; INTRAVENOUS at 01:02

## 2018-02-04 RX ADMIN — CHLORHEXIDINE GLUCONATE 15 ML: 1.2 RINSE ORAL at 09:02

## 2018-02-04 RX ADMIN — LABETALOL HYDROCHLORIDE 10 MG: 5 INJECTION, SOLUTION INTRAVENOUS at 05:02

## 2018-02-04 RX ADMIN — SODIUM CHLORIDE 3.7 UNITS/HR: 9 INJECTION, SOLUTION INTRAVENOUS at 10:02

## 2018-02-04 RX ADMIN — IPRATROPIUM BROMIDE AND ALBUTEROL SULFATE 3 ML: .5; 3 SOLUTION RESPIRATORY (INHALATION) at 12:02

## 2018-02-04 RX ADMIN — ASPIRIN 325 MG ORAL TABLET 325 MG: 325 PILL ORAL at 09:02

## 2018-02-04 RX ADMIN — HEPARIN SODIUM 5000 UNITS: 5000 INJECTION, SOLUTION INTRAVENOUS; SUBCUTANEOUS at 02:02

## 2018-02-04 RX ADMIN — CAPTOPRIL 25 MG: 25 TABLET ORAL at 02:02

## 2018-02-04 RX ADMIN — IPRATROPIUM BROMIDE AND ALBUTEROL SULFATE 3 ML: .5; 3 SOLUTION RESPIRATORY (INHALATION) at 08:02

## 2018-02-04 RX ADMIN — CAPTOPRIL 12.5 MG: 12.5 TABLET ORAL at 05:02

## 2018-02-04 RX ADMIN — POTASSIUM CHLORIDE 40 MEQ: 20 SOLUTION ORAL at 05:02

## 2018-02-04 RX ADMIN — SODIUM CHLORIDE SOLN NEBU 3% 4 ML: 3 NEBU SOLN at 12:02

## 2018-02-04 RX ADMIN — ACETAMINOPHEN 650 MG: 325 TABLET, FILM COATED ORAL at 06:02

## 2018-02-04 RX ADMIN — Medication 3 ML: at 02:02

## 2018-02-04 RX ADMIN — HEPARIN SODIUM 5000 UNITS: 5000 INJECTION, SOLUTION INTRAVENOUS; SUBCUTANEOUS at 05:02

## 2018-02-04 RX ADMIN — POTASSIUM CHLORIDE 40 MEQ: 20 SOLUTION ORAL at 03:02

## 2018-02-04 NOTE — ASSESSMENT & PLAN NOTE
-Due to airway obstruction form hardened secretions overlying epiglottis and soft palate, improved after intubation and bronchoscopy  -Tolerating spontaneous well  -Use prn sedation, if mental status improves-attempt extubation

## 2018-02-04 NOTE — SUBJECTIVE & OBJECTIVE
Interval History:      No acute events overnight.  Multiple doses of PRN hydralazine and labetalol overnight for elevated BP. Temperature stable.      Review of Systems   Unable to perform ROS: Patient nonverbal   Constitutional: Negative for fever.   Musculoskeletal:        RUE edema   Skin: Negative for rash.     ROS limited by LOC  Objective:     Vitals:  Temp: 98.7 °F (37.1 °C)  Pulse: 72  Rhythm: normal sinus rhythm  BP: (!) 142/70  MAP (mmHg): 96  CI (L/min/m2): 2.5 L/min/m2  SVI: 32  SVV: 11 %  Resp: (!) 24  SpO2: 99 %  Oxygen Concentration (%): 40  O2 Device (Oxygen Therapy): ventilator  Vent Mode: Spont  Set Rate: 0 bmp  Vt Set: 500 mL  Pressure Support: 5 cmH20  PEEP/CPAP: 5 cmH20  Peak Airway Pressure: 11 cmH2O  Mean Airway Pressure: 7 cmH20  Plateau Pressure: 17 cmH20    Temp  Min: 98.1 °F (36.7 °C)  Max: 98.7 °F (37.1 °C)  Pulse  Min: 71  Max: 103  BP  Min: 129/67  Max: 221/109  MAP (mmHg)  Min: 91  Max: 141  CI (L/min/m2)  Min: 2.3 L/min/m2  Max: 3.5 L/min/m2  SVI  Min: 29  Max: 38  SVV  Min: 6 %  Max: 21 %  Resp  Min: 16  Max: 24  SpO2  Min: 96 %  Max: 99 %  Oxygen Concentration (%)  Min: 40  Max: 40    02/03 0701 - 02/04 0700  In: 3470.7 [I.V.:1580.7]  Out: 2535 [Urine:2535]   Unmeasured Output  Urine Occurrence: 1  Stool Occurrence: 0       Physical Exam    HEENT: No eye discharge, MMM  Cardiovascular: Regular rhythm and normal heart sounds.    Pulmonary/Chest: Breath sounds normal.   Abdominal: Soft. Bowel sounds are normal.     PERRL  Oculocephalic intact  Corneal reflex intact on L  Gag reflex intact  Moves LUE and LLE spontaneously  Grimaces to painful stimuli on R    Medications:  Continuous    sodium chloride 0.9% Last Rate: 50 mL/hr at 02/04/18 0900   insulin (HUMAN R) infusion (adults) Last Rate: 7.7 Units/hr (02/04/18 0900)   buffered 2% sodium acetate 86meq, sodium chloride 86meq, sterile water for inj IV soln Last Rate: 20 mL/hr at 02/04/18 0900   Scheduled    acetaminophen 650 mg Q6H    albuterol-ipratropium 2.5mg-0.5mg/3mL 3 mL Q6H   aspirin 325 mg Daily   atorvastatin 80 mg Daily   captopril 12.5 mg TID   chlorhexidine 15 mL BID   clopidogrel 75 mg Daily   famotidine 20 mg BID   heparin (porcine) 5,000 Units Q8H   metoprolol tartrate 25 mg BID   senna-docusate 8.6-50 mg 1 tablet BID   sodium chloride 0.9% 3 mL Q8H   sodium chloride 3% 4 mL Q6H   PRN    albuterol-ipratropium 2.5mg-0.5mg/3mL 3 mL Q4H PRN   bisacodyl 10 mg Daily PRN   dextrose 50% 12.5 g PRN   dextrose 50% 12.5 g PRN   dextrose 50% 25 g PRN   fentaNYL 50 mcg Q2H PRN   glucagon (human recombinant) 1 mg PRN   hydrALAZINE 10 mg Q4H PRN   labetalol 10 mg Q4H PRN   magnesium sulfate IVPB 2 g PRN   magnesium sulfate IVPB 4 g PRN   ondansetron 4 mg Q8H PRN   potassium chloride 10% 40 mEq PRN   potassium chloride 10% 40 mEq PRN   sodium chloride 0.9% 5 mL PRN     Today I personally reviewed pertinent imaging, lab results, notably:    Recent Results (from the past 24 hour(s))   POCT glucose    Collection Time: 02/03/18 10:13 AM   Result Value Ref Range    POCT Glucose 161 (H) 70 - 110 mg/dL   POCT glucose    Collection Time: 02/03/18 11:03 AM   Result Value Ref Range    POCT Glucose 132 (H) 70 - 110 mg/dL   POCT glucose    Collection Time: 02/03/18 12:19 PM   Result Value Ref Range    POCT Glucose 132 (H) 70 - 110 mg/dL   Sodium    Collection Time: 02/03/18 12:20 PM   Result Value Ref Range    Sodium 147 (H) 136 - 145 mmol/L   POCT glucose    Collection Time: 02/03/18  1:15 PM   Result Value Ref Range    POCT Glucose 132 (H) 70 - 110 mg/dL   POCT glucose    Collection Time: 02/03/18  2:04 PM   Result Value Ref Range    POCT Glucose 212 (H) 70 - 110 mg/dL   POCT glucose    Collection Time: 02/03/18  3:03 PM   Result Value Ref Range    POCT Glucose 190 (H) 70 - 110 mg/dL   POCT glucose    Collection Time: 02/03/18  4:13 PM   Result Value Ref Range    POCT Glucose 258 (H) 70 - 110 mg/dL   Sodium    Collection Time: 02/03/18  4:14 PM   Result  Value Ref Range    Sodium 145 136 - 145 mmol/L   POCT glucose    Collection Time: 02/03/18  5:06 PM   Result Value Ref Range    POCT Glucose 222 (H) 70 - 110 mg/dL   POCT glucose    Collection Time: 02/03/18  5:55 PM   Result Value Ref Range    POCT Glucose 213 (H) 70 - 110 mg/dL   POCT glucose    Collection Time: 02/03/18  7:10 PM   Result Value Ref Range    POCT Glucose 158 (H) 70 - 110 mg/dL   POCT glucose    Collection Time: 02/03/18  8:03 PM   Result Value Ref Range    POCT Glucose 208 (H) 70 - 110 mg/dL   Sodium    Collection Time: 02/03/18  8:04 PM   Result Value Ref Range    Sodium 145 136 - 145 mmol/L   POCT glucose    Collection Time: 02/03/18  8:54 PM   Result Value Ref Range    POCT Glucose 169 (H) 70 - 110 mg/dL   POCT glucose    Collection Time: 02/03/18  9:59 PM   Result Value Ref Range    POCT Glucose 193 (H) 70 - 110 mg/dL   POCT glucose    Collection Time: 02/03/18 10:59 PM   Result Value Ref Range    POCT Glucose 208 (H) 70 - 110 mg/dL   Sodium    Collection Time: 02/03/18 11:25 PM   Result Value Ref Range    Sodium 145 136 - 145 mmol/L   POCT glucose    Collection Time: 02/03/18 11:58 PM   Result Value Ref Range    POCT Glucose 195 (H) 70 - 110 mg/dL   Comprehensive metabolic panel    Collection Time: 02/04/18  1:06 AM   Result Value Ref Range    Sodium 146 (H) 136 - 145 mmol/L    Potassium 3.4 (L) 3.5 - 5.1 mmol/L    Chloride 109 95 - 110 mmol/L    CO2 29 23 - 29 mmol/L    Glucose 231 (H) 70 - 110 mg/dL    BUN, Bld 26 (H) 6 - 20 mg/dL    Creatinine 1.0 0.5 - 1.4 mg/dL    Calcium 8.6 (L) 8.7 - 10.5 mg/dL    Total Protein 5.9 (L) 6.0 - 8.4 g/dL    Albumin 2.4 (L) 3.5 - 5.2 g/dL    Total Bilirubin 0.4 0.1 - 1.0 mg/dL    Alkaline Phosphatase 74 55 - 135 U/L    AST 27 10 - 40 U/L    ALT 29 10 - 44 U/L    Anion Gap 8 8 - 16 mmol/L    eGFR if African American >60.0 >60 mL/min/1.73 m^2    eGFR if non African American >60.0 >60 mL/min/1.73 m^2   CBC auto differential    Collection Time: 02/04/18  1:06 AM    Result Value Ref Range    WBC 10.06 3.90 - 12.70 K/uL    RBC 4.12 (L) 4.60 - 6.20 M/uL    Hemoglobin 12.2 (L) 14.0 - 18.0 g/dL    Hematocrit 36.8 (L) 40.0 - 54.0 %    MCV 89 82 - 98 fL    MCH 29.6 27.0 - 31.0 pg    MCHC 33.2 32.0 - 36.0 g/dL    RDW 12.6 11.5 - 14.5 %    Platelets 310 150 - 350 K/uL    MPV 9.8 9.2 - 12.9 fL    Immature Granulocytes 0.7 (H) 0.0 - 0.5 %    Gran # (ANC) 6.8 1.8 - 7.7 K/uL    Immature Grans (Abs) 0.07 (H) 0.00 - 0.04 K/uL    Lymph # 2.3 1.0 - 4.8 K/uL    Mono # 0.8 0.3 - 1.0 K/uL    Eos # 0.0 0.0 - 0.5 K/uL    Baso # 0.03 0.00 - 0.20 K/uL    nRBC 0 0 /100 WBC    Gran% 67.2 38.0 - 73.0 %    Lymph% 23.3 18.0 - 48.0 %    Mono% 8.1 4.0 - 15.0 %    Eosinophil% 0.4 0.0 - 8.0 %    Basophil% 0.3 0.0 - 1.9 %    Differential Method Automated    Magnesium    Collection Time: 02/04/18  1:06 AM   Result Value Ref Range    Magnesium 1.8 1.6 - 2.6 mg/dL   Phosphorus    Collection Time: 02/04/18  1:06 AM   Result Value Ref Range    Phosphorus 3.4 2.7 - 4.5 mg/dL   Protime-INR    Collection Time: 02/04/18  1:06 AM   Result Value Ref Range    Prothrombin Time 10.4 9.0 - 12.5 sec    INR 1.0 0.8 - 1.2   POCT glucose    Collection Time: 02/04/18  1:10 AM   Result Value Ref Range    POCT Glucose 244 (H) 70 - 110 mg/dL   POCT glucose    Collection Time: 02/04/18  1:58 AM   Result Value Ref Range    POCT Glucose 221 (H) 70 - 110 mg/dL   POCT glucose    Collection Time: 02/04/18  3:09 AM   Result Value Ref Range    POCT Glucose 201 (H) 70 - 110 mg/dL   POCT glucose    Collection Time: 02/04/18  4:01 AM   Result Value Ref Range    POCT Glucose 194 (H) 70 - 110 mg/dL   ISTAT PROCEDURE    Collection Time: 02/04/18  4:02 AM   Result Value Ref Range    POC PH 7.492 (H) 7.35 - 7.45    POC PCO2 39.5 35 - 45 mmHg    POC PO2 96 80 - 100 mmHg    POC HCO3 30.3 (H) 24 - 28 mmol/L    POC BE 7 -2 to 2 mmol/L    POC SATURATED O2 98 95 - 100 %    POC TCO2 31 (H) 23 - 27 mmol/L    Sample ARTERIAL     Site [facility]     Manju  Test N/A     DelSys Adult Vent     Mode SPONT     FiO2 40     Sp02 97    Sodium    Collection Time: 02/04/18  4:59 AM   Result Value Ref Range    Sodium 145 136 - 145 mmol/L   POCT glucose    Collection Time: 02/04/18  4:59 AM   Result Value Ref Range    POCT Glucose 180 (H) 70 - 110 mg/dL   Magnesium    Collection Time: 02/04/18  5:53 AM   Result Value Ref Range    Magnesium 2.3 1.6 - 2.6 mg/dL   POCT glucose    Collection Time: 02/04/18  6:00 AM   Result Value Ref Range    POCT Glucose 171 (H) 70 - 110 mg/dL   POCT glucose    Collection Time: 02/04/18  7:22 AM   Result Value Ref Range    POCT Glucose 163 (H) 70 - 110 mg/dL   POCT glucose    Collection Time: 02/04/18  8:05 AM   Result Value Ref Range    POCT Glucose 149 (H) 70 - 110 mg/dL   Sodium    Collection Time: 02/04/18  8:07 AM   Result Value Ref Range    Sodium 147 (H) 136 - 145 mmol/L   POCT glucose    Collection Time: 02/04/18  9:03 AM   Result Value Ref Range    POCT Glucose 164 (H) 70 - 110 mg/dL   Potassium    Collection Time: 02/04/18  9:04 AM   Result Value Ref Range    Potassium 3.7 3.5 - 5.1 mmol/L     2/3/18 CXR: Per independent resident review and interpretation,  Overall stable compared to prior.  Continues to have b/l hypoinflation.    Microbiology Results (last 7 days)     Procedure Component Value Units Date/Time    Culture, Respiratory with Gram Stain [[id number]] Collected:  02/01/18 0858    Order Status:  Completed Specimen:  Respiratory from Endotracheal Aspirate Updated:  02/03/18 1209     Respiratory Culture No growth     Gram Stain (Respiratory) <10 epithelial cells per low power field.     Gram Stain (Respiratory) Moderate WBC's     Gram Stain (Respiratory) No organisms seen    Blood culture [[id number]] Collected:  01/27/18 1559    Order Status:  Completed Specimen:  Blood from Peripheral, Hand, Right Updated:  02/01/18 2012     Blood Culture, Routine No growth after 5 days.    Narrative:       Blood cultures from 2 different sites. 4  bottles total.  Please draw before starting antibiotics.    Blood culture [[id number]] Collected:  01/27/18 1558    Order Status:  Completed Specimen:  Blood from Peripheral, Hand, Left Updated:  02/01/18 2012     Blood Culture, Routine No growth after 5 days.    Narrative:       Blood cultures x 2 different sites. 4 bottles total. Please  draw cultures before administering antibiotics.    Urine culture [[id number]] Collected:  01/27/18 1621    Order Status:  Completed Specimen:  Urine from Urine, Catheterized Updated:  01/28/18 2047     Urine Culture, Routine No growth          Diet  Diet NPO

## 2018-02-04 NOTE — ASSESSMENT & PLAN NOTE
monitor CTh and exam for increasing edema  1/31: sodium improved to goal, cont 2%  02/01: equibrated sodium 150-151, cont 2% at current rate  2/2: 2% discontinued and enteral free water boluses started at 200mL QID  2/3: 2% re-started and enteral free water discontinued  2/4: Na 146

## 2018-02-04 NOTE — ASSESSMENT & PLAN NOTE
--SBP <180  -- Lopressor 25mg q8 hours  -- 2/4: Increase captopril to 25 Q8hrs (from 12.5)  --echo:    1 - Normal left ventricular systolic function (EF 65-70%).     2 - Concentric remodeling.     3 - No wall motion abnormalities.     4 - Normal left ventricular diastolic function.     5 - Normal right ventricular systolic function .

## 2018-02-04 NOTE — PROGRESS NOTES
"Sister re-educated multiple times throughout shift on important of not untying patients restraints. Rationale explained thoroughly, verbalizes understanding yet continues to "loose" restraints without nurses knowledge. Will continue to monitor closely.   "

## 2018-02-04 NOTE — PLAN OF CARE
Problem: Patient Care Overview  Goal: Plan of Care Review  Outcome: Ongoing (interventions implemented as appropriate)  No acute events throughout the day, VS and assessment per flow sheet, patient progressing towards goal as tolerated. PRN labetalol and hydralazine given to maintain SBP <180. Insulin titrated per insulin drip algorithm. Magnesium and Potassium replaced per MAR protocol. Plan of care reviewed with Todd Quevedo at 0400, no evidence of learning. Will continue to monitor.

## 2018-02-04 NOTE — PROGRESS NOTES
Patient taken to CT at ~1130 by RN, PCT, and RT. Scan completed. Patient return to unit at ~1200. Vitals stable throughout.

## 2018-02-04 NOTE — PROGRESS NOTES
[facility]  Neurocritical Care  Progress Note    Admit Date: 1/25/2018  Service Date: 02/04/2018  Length of Stay: 10    Subjective:     Chief Complaint: Embolic stroke involving left middle cerebral artery    History of Present Illness: The patient is a 47 year old male with no known PMHx admitted to [facility]   s/p thrombectomy of L M1 occlusion. Per chart review, he   walked into [place] and was acting abnormal.  EMS was called and brought to the ED for concern of L MCA syndrome. CT MP revealed short segment occlusion of left M1.  Patient went to IR for thrombectomy of L M1 occlusion seen on CTA MP.  TICI2C reperfusion and angioplasty. Patient admitted to [facility] for close monitoring and higher level of care.   History obtained from chart review only            Hospital Course: 1/25: Pt admitted to [facility] s/p L M1 thrombectomy, TICI2C reperfusion and angioplasty   1/27: large L MCA, hemicrani watch, NSGY following, insulin ggt, cardene ggt, L sided intermittent slowing eeg but no sz, +nasal trumpet for copious thick secretions, wheezing this am - improved with duo nebs, 3% nebs, and cough assist, concern for sepsis 2/2 hemodynamic changes - increased HR and BP, worsening leukocytosis, +ceftriaxone, pan cx, adrian track  1/28: continued respiratory challenges due to secretions. Aggressive pulmonary toileting. Continue monitoring for neuro worsening. Add moxifloxacin.   1/29: CTH to eval for increased edema/shift, EEG afterwards. Concern for decrease exam, no following/decreased alertness). CXR and Procal to follow leukocytosis. Hold TF until eval decreasing EXAM  1/30: neuro exam stable, increase 2%, current amount of sodium iv not suffuicient to meet target 145-155, cont abx for likely pneumonia, repeat ct in am  1/31: abrupt desaturation today requiring emergent intubation followed by bronchoscopy  Etiology likely upper airway obstruction from dried secretions  02/01: stable on vent overnight, marked  improvement on today's chest x ray, switch to spontaneous today, sodium stable at 150, start tfs  02/02: moves left side spontaneously and opens eyes off sedation,  02/03: 2% buffered NS re-started at 20mL/hr for rapid decrease in serum sodium. Enteral free water flushes discontinued. Patient has copious secretions likely pneumonia. Holding dexamethasone  2/4:  No acute events overnight.  Sodium stable.    Interval History:      No acute events overnight.  Multiple doses of PRN hydralazine and labetalol overnight for elevated BP. Temperature stable.      Review of Systems   Unable to perform ROS: Patient nonverbal   Constitutional: Negative for fever.   Musculoskeletal:        RUE edema   Skin: Negative for rash.     ROS limited by LOC  Objective:     Vitals:  Temp: 98.7 °F (37.1 °C)  Pulse: 72  Rhythm: normal sinus rhythm  BP: (!) 142/70  MAP (mmHg): 96  CI (L/min/m2): 2.5 L/min/m2  SVI: 32  SVV: 11 %  Resp: (!) 24  SpO2: 99 %  Oxygen Concentration (%): 40  O2 Device (Oxygen Therapy): ventilator  Vent Mode: Spont  Set Rate: 0 bmp  Vt Set: 500 mL  Pressure Support: 5 cmH20  PEEP/CPAP: 5 cmH20  Peak Airway Pressure: 11 cmH2O  Mean Airway Pressure: 7 cmH20  Plateau Pressure: 17 cmH20    Temp  Min: 98.1 °F (36.7 °C)  Max: 98.7 °F (37.1 °C)  Pulse  Min: 71  Max: 103  BP  Min: 129/67  Max: 221/109  MAP (mmHg)  Min: 91  Max: 141  CI (L/min/m2)  Min: 2.3 L/min/m2  Max: 3.5 L/min/m2  SVI  Min: 29  Max: 38  SVV  Min: 6 %  Max: 21 %  Resp  Min: 16  Max: 24  SpO2  Min: 96 %  Max: 99 %  Oxygen Concentration (%)  Min: 40  Max: 40    02/03 0701 - 02/04 0700  In: 3470.7 [I.V.:1580.7]  Out: 2535 [Urine:2535]   Unmeasured Output  Urine Occurrence: 1  Stool Occurrence: 0       Physical Exam    HEENT: No eye discharge, MMM  Cardiovascular: Regular rhythm and normal heart sounds.    Pulmonary/Chest: Breath sounds normal.   Abdominal: Soft. Bowel sounds are normal.     PERRL  Oculocephalic intact  Corneal reflex intact on L  Gag reflex  intact  Moves LUE and LLE spontaneously  Grimaces to painful stimuli on R    Medications:  Continuous    sodium chloride 0.9% Last Rate: 50 mL/hr at 02/04/18 0900   insulin (HUMAN R) infusion (adults) Last Rate: 7.7 Units/hr (02/04/18 0900)   buffered 2% sodium acetate 86meq, sodium chloride 86meq, sterile water for inj IV soln Last Rate: 20 mL/hr at 02/04/18 0900   Scheduled    acetaminophen 650 mg Q6H   albuterol-ipratropium 2.5mg-0.5mg/3mL 3 mL Q6H   aspirin 325 mg Daily   atorvastatin 80 mg Daily   captopril 12.5 mg TID   chlorhexidine 15 mL BID   clopidogrel 75 mg Daily   famotidine 20 mg BID   heparin (porcine) 5,000 Units Q8H   metoprolol tartrate 25 mg BID   senna-docusate 8.6-50 mg 1 tablet BID   sodium chloride 0.9% 3 mL Q8H   sodium chloride 3% 4 mL Q6H   PRN    albuterol-ipratropium 2.5mg-0.5mg/3mL 3 mL Q4H PRN   bisacodyl 10 mg Daily PRN   dextrose 50% 12.5 g PRN   dextrose 50% 12.5 g PRN   dextrose 50% 25 g PRN   fentaNYL 50 mcg Q2H PRN   glucagon (human recombinant) 1 mg PRN   hydrALAZINE 10 mg Q4H PRN   labetalol 10 mg Q4H PRN   magnesium sulfate IVPB 2 g PRN   magnesium sulfate IVPB 4 g PRN   ondansetron 4 mg Q8H PRN   potassium chloride 10% 40 mEq PRN   potassium chloride 10% 40 mEq PRN   sodium chloride 0.9% 5 mL PRN     Today I personally reviewed pertinent imaging, lab results, notably:    Recent Results (from the past 24 hour(s))   POCT glucose    Collection Time: 02/03/18 10:13 AM   Result Value Ref Range    POCT Glucose 161 (H) 70 - 110 mg/dL   POCT glucose    Collection Time: 02/03/18 11:03 AM   Result Value Ref Range    POCT Glucose 132 (H) 70 - 110 mg/dL   POCT glucose    Collection Time: 02/03/18 12:19 PM   Result Value Ref Range    POCT Glucose 132 (H) 70 - 110 mg/dL   Sodium    Collection Time: 02/03/18 12:20 PM   Result Value Ref Range    Sodium 147 (H) 136 - 145 mmol/L   POCT glucose    Collection Time: 02/03/18  1:15 PM   Result Value Ref Range    POCT Glucose 132 (H) 70 - 110 mg/dL    POCT glucose    Collection Time: 02/03/18  2:04 PM   Result Value Ref Range    POCT Glucose 212 (H) 70 - 110 mg/dL   POCT glucose    Collection Time: 02/03/18  3:03 PM   Result Value Ref Range    POCT Glucose 190 (H) 70 - 110 mg/dL   POCT glucose    Collection Time: 02/03/18  4:13 PM   Result Value Ref Range    POCT Glucose 258 (H) 70 - 110 mg/dL   Sodium    Collection Time: 02/03/18  4:14 PM   Result Value Ref Range    Sodium 145 136 - 145 mmol/L   POCT glucose    Collection Time: 02/03/18  5:06 PM   Result Value Ref Range    POCT Glucose 222 (H) 70 - 110 mg/dL   POCT glucose    Collection Time: 02/03/18  5:55 PM   Result Value Ref Range    POCT Glucose 213 (H) 70 - 110 mg/dL   POCT glucose    Collection Time: 02/03/18  7:10 PM   Result Value Ref Range    POCT Glucose 158 (H) 70 - 110 mg/dL   POCT glucose    Collection Time: 02/03/18  8:03 PM   Result Value Ref Range    POCT Glucose 208 (H) 70 - 110 mg/dL   Sodium    Collection Time: 02/03/18  8:04 PM   Result Value Ref Range    Sodium 145 136 - 145 mmol/L   POCT glucose    Collection Time: 02/03/18  8:54 PM   Result Value Ref Range    POCT Glucose 169 (H) 70 - 110 mg/dL   POCT glucose    Collection Time: 02/03/18  9:59 PM   Result Value Ref Range    POCT Glucose 193 (H) 70 - 110 mg/dL   POCT glucose    Collection Time: 02/03/18 10:59 PM   Result Value Ref Range    POCT Glucose 208 (H) 70 - 110 mg/dL   Sodium    Collection Time: 02/03/18 11:25 PM   Result Value Ref Range    Sodium 145 136 - 145 mmol/L   POCT glucose    Collection Time: 02/03/18 11:58 PM   Result Value Ref Range    POCT Glucose 195 (H) 70 - 110 mg/dL   Comprehensive metabolic panel    Collection Time: 02/04/18  1:06 AM   Result Value Ref Range    Sodium 146 (H) 136 - 145 mmol/L    Potassium 3.4 (L) 3.5 - 5.1 mmol/L    Chloride 109 95 - 110 mmol/L    CO2 29 23 - 29 mmol/L    Glucose 231 (H) 70 - 110 mg/dL    BUN, Bld 26 (H) 6 - 20 mg/dL    Creatinine 1.0 0.5 - 1.4 mg/dL    Calcium 8.6 (L) 8.7 -  10.5 mg/dL    Total Protein 5.9 (L) 6.0 - 8.4 g/dL    Albumin 2.4 (L) 3.5 - 5.2 g/dL    Total Bilirubin 0.4 0.1 - 1.0 mg/dL    Alkaline Phosphatase 74 55 - 135 U/L    AST 27 10 - 40 U/L    ALT 29 10 - 44 U/L    Anion Gap 8 8 - 16 mmol/L    eGFR if African American >60.0 >60 mL/min/1.73 m^2    eGFR if non African American >60.0 >60 mL/min/1.73 m^2   CBC auto differential    Collection Time: 02/04/18  1:06 AM   Result Value Ref Range    WBC 10.06 3.90 - 12.70 K/uL    RBC 4.12 (L) 4.60 - 6.20 M/uL    Hemoglobin 12.2 (L) 14.0 - 18.0 g/dL    Hematocrit 36.8 (L) 40.0 - 54.0 %    MCV 89 82 - 98 fL    MCH 29.6 27.0 - 31.0 pg    MCHC 33.2 32.0 - 36.0 g/dL    RDW 12.6 11.5 - 14.5 %    Platelets 310 150 - 350 K/uL    MPV 9.8 9.2 - 12.9 fL    Immature Granulocytes 0.7 (H) 0.0 - 0.5 %    Gran # (ANC) 6.8 1.8 - 7.7 K/uL    Immature Grans (Abs) 0.07 (H) 0.00 - 0.04 K/uL    Lymph # 2.3 1.0 - 4.8 K/uL    Mono # 0.8 0.3 - 1.0 K/uL    Eos # 0.0 0.0 - 0.5 K/uL    Baso # 0.03 0.00 - 0.20 K/uL    nRBC 0 0 /100 WBC    Gran% 67.2 38.0 - 73.0 %    Lymph% 23.3 18.0 - 48.0 %    Mono% 8.1 4.0 - 15.0 %    Eosinophil% 0.4 0.0 - 8.0 %    Basophil% 0.3 0.0 - 1.9 %    Differential Method Automated    Magnesium    Collection Time: 02/04/18  1:06 AM   Result Value Ref Range    Magnesium 1.8 1.6 - 2.6 mg/dL   Phosphorus    Collection Time: 02/04/18  1:06 AM   Result Value Ref Range    Phosphorus 3.4 2.7 - 4.5 mg/dL   Protime-INR    Collection Time: 02/04/18  1:06 AM   Result Value Ref Range    Prothrombin Time 10.4 9.0 - 12.5 sec    INR 1.0 0.8 - 1.2   POCT glucose    Collection Time: 02/04/18  1:10 AM   Result Value Ref Range    POCT Glucose 244 (H) 70 - 110 mg/dL   POCT glucose    Collection Time: 02/04/18  1:58 AM   Result Value Ref Range    POCT Glucose 221 (H) 70 - 110 mg/dL   POCT glucose    Collection Time: 02/04/18  3:09 AM   Result Value Ref Range    POCT Glucose 201 (H) 70 - 110 mg/dL   POCT glucose    Collection Time: 02/04/18  4:01 AM    Result Value Ref Range    POCT Glucose 194 (H) 70 - 110 mg/dL   ISTAT PROCEDURE    Collection Time: 02/04/18  4:02 AM   Result Value Ref Range    POC PH 7.492 (H) 7.35 - 7.45    POC PCO2 39.5 35 - 45 mmHg    POC PO2 96 80 - 100 mmHg    POC HCO3 30.3 (H) 24 - 28 mmol/L    POC BE 7 -2 to 2 mmol/L    POC SATURATED O2 98 95 - 100 %    POC TCO2 31 (H) 23 - 27 mmol/L    Sample ARTERIAL     Site Brianne/UAC     Allens Test N/A     DelSys Adult Vent     Mode SPONT     FiO2 40     Sp02 97    Sodium    Collection Time: 02/04/18  4:59 AM   Result Value Ref Range    Sodium 145 136 - 145 mmol/L   POCT glucose    Collection Time: 02/04/18  4:59 AM   Result Value Ref Range    POCT Glucose 180 (H) 70 - 110 mg/dL   Magnesium    Collection Time: 02/04/18  5:53 AM   Result Value Ref Range    Magnesium 2.3 1.6 - 2.6 mg/dL   POCT glucose    Collection Time: 02/04/18  6:00 AM   Result Value Ref Range    POCT Glucose 171 (H) 70 - 110 mg/dL   POCT glucose    Collection Time: 02/04/18  7:22 AM   Result Value Ref Range    POCT Glucose 163 (H) 70 - 110 mg/dL   POCT glucose    Collection Time: 02/04/18  8:05 AM   Result Value Ref Range    POCT Glucose 149 (H) 70 - 110 mg/dL   Sodium    Collection Time: 02/04/18  8:07 AM   Result Value Ref Range    Sodium 147 (H) 136 - 145 mmol/L   POCT glucose    Collection Time: 02/04/18  9:03 AM   Result Value Ref Range    POCT Glucose 164 (H) 70 - 110 mg/dL   Potassium    Collection Time: 02/04/18  9:04 AM   Result Value Ref Range    Potassium 3.7 3.5 - 5.1 mmol/L     2/3/18 CXR: Per independent resident review and interpretation,  Overall stable compared to prior.  Continues to have b/l hypoinflation.    Microbiology Results (last 7 days)     Procedure Component Value Units Date/Time    Culture, Respiratory with Gram Stain [[id number]] Collected:  02/01/18 0858    Order Status:  Completed Specimen:  Respiratory from Endotracheal Aspirate Updated:  02/03/18 1209     Respiratory Culture No growth     Gram  Stain (Respiratory) <10 epithelial cells per low power field.     Gram Stain (Respiratory) Moderate WBC's     Gram Stain (Respiratory) No organisms seen    Blood culture [[id number]] Collected:  01/27/18 1559    Order Status:  Completed Specimen:  Blood from Peripheral, Hand, Right Updated:  02/01/18 2012     Blood Culture, Routine No growth after 5 days.    Narrative:       Blood cultures from 2 different sites. 4 bottles total.  Please draw before starting antibiotics.    Blood culture [[id number]] Collected:  01/27/18 1558    Order Status:  Completed Specimen:  Blood from Peripheral, Hand, Left Updated:  02/01/18 2012     Blood Culture, Routine No growth after 5 days.    Narrative:       Blood cultures x 2 different sites. 4 bottles total. Please  draw cultures before administering antibiotics.    Urine culture [[id number]] Collected:  01/27/18 1621    Order Status:  Completed Specimen:  Urine from Urine, Catheterized Updated:  01/28/18 2047     Urine Culture, Routine No growth          Diet  Diet NPO      Assessment/Plan:     Neuro   * Embolic stroke involving left middle cerebral artery    S/P L M1 thrombectomy  -- Neuro checks q 1hr  -- Vascular Neurology  following  -- CT MP- Short segment occlusion of left M1   -- s/p thrombectomy of M1 occlusion 1/25  -- Repeat CTH 1/26 - No detrimental change  -- SBP goal <160  -- PT/OT/Speech  -- hemicrani watch, NSGY following   -- MRI 1/26 - L MCA, no hemorrhagic conversion  -- stat CTH 1/27: L MCA stable, no worsening edema or hemorrhagic conversion  -- ASA, plavix               Cytotoxic cerebral edema    monitor CTh and exam for increasing edema  1/31: sodium improved to goal, cont 2%  02/01: equibrated sodium 150-151, cont 2% at current rate  2/2: 2% discontinued and enteral free water boluses started at 200mL QID  2/3: 2% re-started and enteral free water discontinued  2/4: Na 146          Pulmonary   Pneumonia    - Completed 7d course of ceftriaxone on 2/2  - will  hold on extubation until secretions decrease          Acute respiratory failure with hypoxia    -Due to airway obstruction form hardened secretions overlying epiglottis and soft palate, improved after intubation and bronchoscopy  -Tolerating spontaneous well  -Use prn sedation, if mental status improves-attempt extubation          Cardiac/Vascular   Hyperlipidemia    -atorvastatin 80 daily           Essential hypertension    --SBP <180  -- Lopressor 25mg q8 hours  -- 2/4: Increase captopril to 25 Q8hrs (from 12.5)  --echo:    1 - Normal left ventricular systolic function (EF 65-70%).     2 - Concentric remodeling.     3 - No wall motion abnormalities.     4 - Normal left ventricular diastolic function.     5 - Normal right ventricular systolic function .                Endocrine   Type 2 diabetes mellitus    -A1C-10  -poorly controlled diabetes  -BG >400 on arrival  -insulin gtt until on TF.  02/01: intubated, tfs restarted          Other   Obstructive sleep apnea    Oxygenating well at 45 degrees head position or higher  Intubated due to inspissated secretions from nocturnal cpap            Prophylaxis:  Venous Thromboembolism: chemical  Stress Ulcer: H2B  Ventilator Pneumonia: no - treated    Activity Orders          None        Full Code    Raymundo Shanks MD  Neurocritical Care  [facility]

## 2018-02-04 NOTE — PLAN OF CARE
Problem: Patient Care Overview  Goal: Plan of Care Review  Outcome: Ongoing (interventions implemented as appropriate)  POC reviewed with pt and sister at 1400. Sister verbalized understanding. Questions and concerns addressed. No acute events today. Pt progressing toward goals. Will continue to monitor. See flowsheets for full assessment and VS info.

## 2018-02-04 NOTE — ASSESSMENT & PLAN NOTE
- Completed 7d course of ceftriaxone on 2/2  - will hold on extubation until secretions decrease

## 2018-02-04 NOTE — ASSESSMENT & PLAN NOTE
S/P L M1 thrombectomy  -- Neuro checks q 1hr  -- Vascular Neurology  following  -- CT MP- Short segment occlusion of left M1   -- s/p thrombectomy of M1 occlusion 1/25  -- Repeat CTH 1/26 - No detrimental change  -- SBP goal <160  -- PT/OT/Speech  -- hemicrani watch, NSGY following   -- MRI 1/26 - L MCA, no hemorrhagic conversion  -- stat CTH 1/27: L MCA stable, no worsening edema or hemorrhagic conversion  -- ASA, plavix

## 2018-02-05 LAB
ALBUMIN SERPL BCP-MCNC: 2.4 G/DL
ALLENS TEST: ABNORMAL
ALP SERPL-CCNC: 71 U/L
ALT SERPL W/O P-5'-P-CCNC: 27 U/L
ANION GAP SERPL CALC-SCNC: 8 MMOL/L
AST SERPL-CCNC: 26 U/L
BASOPHILS # BLD AUTO: 0.03 K/UL
BASOPHILS NFR BLD: 0.3 %
BILIRUB SERPL-MCNC: 0.5 MG/DL
BUN SERPL-MCNC: 22 MG/DL
CALCIUM SERPL-MCNC: 8.6 MG/DL
CHLORIDE SERPL-SCNC: 109 MMOL/L
CO2 SERPL-SCNC: 28 MMOL/L
CREAT SERPL-MCNC: 0.9 MG/DL
DIFFERENTIAL METHOD: ABNORMAL
EOSINOPHIL # BLD AUTO: 0.1 K/UL
EOSINOPHIL NFR BLD: 1.2 %
ERYTHROCYTE [DISTWIDTH] IN BLOOD BY AUTOMATED COUNT: 12.4 %
EST. GFR  (AFRICAN AMERICAN): >60 ML/MIN/1.73 M^2
EST. GFR  (NON AFRICAN AMERICAN): >60 ML/MIN/1.73 M^2
GLUCOSE SERPL-MCNC: 203 MG/DL
HCO3 UR-SCNC: 30.7 MMOL/L (ref 24–28)
HCT VFR BLD AUTO: 35.5 %
HGB BLD-MCNC: 11.9 G/DL
IMM GRANULOCYTES # BLD AUTO: 0.09 K/UL
IMM GRANULOCYTES NFR BLD AUTO: 0.9 %
INR PPP: 0.9
LYMPHOCYTES # BLD AUTO: 2 K/UL
LYMPHOCYTES NFR BLD: 20 %
MAGNESIUM SERPL-MCNC: 2 MG/DL
MCH RBC QN AUTO: 29.5 PG
MCHC RBC AUTO-ENTMCNC: 33.5 G/DL
MCV RBC AUTO: 88 FL
MONOCYTES # BLD AUTO: 0.7 K/UL
MONOCYTES NFR BLD: 7.3 %
NEUTROPHILS # BLD AUTO: 6.9 K/UL
NEUTROPHILS NFR BLD: 70.3 %
NRBC BLD-RTO: 0 /100 WBC
PCO2 BLDA: 39.5 MMHG (ref 35–45)
PH SMN: 7.5 [PH] (ref 7.35–7.45)
PHOSPHATE SERPL-MCNC: 3.1 MG/DL
PLATELET # BLD AUTO: 277 K/UL
PMV BLD AUTO: 9.4 FL
PO2 BLDA: 123 MMHG (ref 80–100)
POC BE: 7 MMOL/L
POC SATURATED O2: 99 % (ref 95–100)
POC TCO2: 32 MMOL/L (ref 23–27)
POCT GLUCOSE: 152 MG/DL (ref 70–110)
POCT GLUCOSE: 153 MG/DL (ref 70–110)
POCT GLUCOSE: 156 MG/DL (ref 70–110)
POCT GLUCOSE: 159 MG/DL (ref 70–110)
POCT GLUCOSE: 162 MG/DL (ref 70–110)
POCT GLUCOSE: 177 MG/DL (ref 70–110)
POCT GLUCOSE: 179 MG/DL (ref 70–110)
POCT GLUCOSE: 179 MG/DL (ref 70–110)
POCT GLUCOSE: 182 MG/DL (ref 70–110)
POCT GLUCOSE: 185 MG/DL (ref 70–110)
POCT GLUCOSE: 186 MG/DL (ref 70–110)
POCT GLUCOSE: 187 MG/DL (ref 70–110)
POCT GLUCOSE: 192 MG/DL (ref 70–110)
POCT GLUCOSE: 193 MG/DL (ref 70–110)
POCT GLUCOSE: 194 MG/DL (ref 70–110)
POCT GLUCOSE: 203 MG/DL (ref 70–110)
POCT GLUCOSE: 228 MG/DL (ref 70–110)
POTASSIUM SERPL-SCNC: 3.8 MMOL/L
PROT SERPL-MCNC: 6.1 G/DL
PROTHROMBIN TIME: 9.9 SEC
RBC # BLD AUTO: 4.03 M/UL
SAMPLE: ABNORMAL
SITE: ABNORMAL
SODIUM SERPL-SCNC: 141 MMOL/L
SODIUM SERPL-SCNC: 141 MMOL/L
SODIUM SERPL-SCNC: 143 MMOL/L
SODIUM SERPL-SCNC: 144 MMOL/L
SODIUM SERPL-SCNC: 145 MMOL/L
SODIUM SERPL-SCNC: 145 MMOL/L
SODIUM SERPL-SCNC: 147 MMOL/L
SODIUM SERPL-SCNC: 147 MMOL/L
WBC # BLD AUTO: 9.87 K/UL

## 2018-02-05 PROCEDURE — 84295 ASSAY OF SERUM SODIUM: CPT | Mod: 91

## 2018-02-05 PROCEDURE — 63600175 PHARM REV CODE 636 W HCPCS: Performed by: PHYSICIAN ASSISTANT

## 2018-02-05 PROCEDURE — 25000003 PHARM REV CODE 250: Performed by: NURSE PRACTITIONER

## 2018-02-05 PROCEDURE — 27200966 HC CLOSED SUCTION SYSTEM

## 2018-02-05 PROCEDURE — 27000221 HC OXYGEN, UP TO 24 HOURS

## 2018-02-05 PROCEDURE — 25000003 PHARM REV CODE 250: Performed by: PHYSICIAN ASSISTANT

## 2018-02-05 PROCEDURE — 83735 ASSAY OF MAGNESIUM: CPT

## 2018-02-05 PROCEDURE — 85025 COMPLETE CBC W/AUTO DIFF WBC: CPT

## 2018-02-05 PROCEDURE — 63600175 PHARM REV CODE 636 W HCPCS: Performed by: PSYCHIATRY & NEUROLOGY

## 2018-02-05 PROCEDURE — 25000242 PHARM REV CODE 250 ALT 637 W/ HCPCS: Performed by: PSYCHIATRY & NEUROLOGY

## 2018-02-05 PROCEDURE — A4216 STERILE WATER/SALINE, 10 ML: HCPCS | Performed by: NURSE PRACTITIONER

## 2018-02-05 PROCEDURE — 63600175 PHARM REV CODE 636 W HCPCS: Performed by: NURSE PRACTITIONER

## 2018-02-05 PROCEDURE — 94003 VENT MGMT INPAT SUBQ DAY: CPT

## 2018-02-05 PROCEDURE — 84295 ASSAY OF SERUM SODIUM: CPT

## 2018-02-05 PROCEDURE — 84100 ASSAY OF PHOSPHORUS: CPT

## 2018-02-05 PROCEDURE — 94761 N-INVAS EAR/PLS OXIMETRY MLT: CPT

## 2018-02-05 PROCEDURE — 85610 PROTHROMBIN TIME: CPT

## 2018-02-05 PROCEDURE — 25000003 PHARM REV CODE 250: Performed by: STUDENT IN AN ORGANIZED HEALTH CARE EDUCATION/TRAINING PROGRAM

## 2018-02-05 PROCEDURE — 94640 AIRWAY INHALATION TREATMENT: CPT

## 2018-02-05 PROCEDURE — 99233 SBSQ HOSP IP/OBS HIGH 50: CPT | Mod: ,,, | Performed by: PSYCHIATRY & NEUROLOGY

## 2018-02-05 PROCEDURE — 97803 MED NUTRITION INDIV SUBSEQ: CPT

## 2018-02-05 PROCEDURE — 80053 COMPREHEN METABOLIC PANEL: CPT

## 2018-02-05 PROCEDURE — 20000000 HC ICU ROOM

## 2018-02-05 PROCEDURE — 25000003 PHARM REV CODE 250: Performed by: PSYCHIATRY & NEUROLOGY

## 2018-02-05 PROCEDURE — 99900026 HC AIRWAY MAINTENANCE (STAT)

## 2018-02-05 PROCEDURE — 99233 SBSQ HOSP IP/OBS HIGH 50: CPT | Mod: ,,, | Performed by: PHYSICIAN ASSISTANT

## 2018-02-05 RX ORDER — NAPROXEN SODIUM 220 MG/1
81 TABLET, FILM COATED ORAL DAILY
Status: DISCONTINUED | OUTPATIENT
Start: 2018-02-06 | End: 2018-02-18

## 2018-02-05 RX ORDER — CAPTOPRIL 25 MG/1
25 TABLET ORAL 3 TIMES DAILY
Status: DISCONTINUED | OUTPATIENT
Start: 2018-02-05 | End: 2018-02-12

## 2018-02-05 RX ORDER — GLUCAGON 1 MG
1 KIT INJECTION
Status: DISCONTINUED | OUTPATIENT
Start: 2018-02-05 | End: 2018-02-24

## 2018-02-05 RX ORDER — INSULIN ASPART 100 [IU]/ML
10 INJECTION, SOLUTION INTRAVENOUS; SUBCUTANEOUS EVERY 4 HOURS
Status: DISCONTINUED | OUTPATIENT
Start: 2018-02-05 | End: 2018-02-06

## 2018-02-05 RX ORDER — ACETAMINOPHEN 325 MG/1
650 TABLET ORAL EVERY 6 HOURS PRN
Status: DISCONTINUED | OUTPATIENT
Start: 2018-02-05 | End: 2018-03-21 | Stop reason: HOSPADM

## 2018-02-05 RX ORDER — MODAFINIL 100 MG/1
100 TABLET ORAL 2 TIMES DAILY
Status: DISCONTINUED | OUTPATIENT
Start: 2018-02-05 | End: 2018-02-06

## 2018-02-05 RX ORDER — METOPROLOL TARTRATE 25 MG/1
25 TABLET, FILM COATED ORAL 2 TIMES DAILY
Status: DISCONTINUED | OUTPATIENT
Start: 2018-02-05 | End: 2018-02-28

## 2018-02-05 RX ORDER — POLYETHYLENE GLYCOL 3350 17 G/17G
17 POWDER, FOR SOLUTION ORAL DAILY
Status: DISCONTINUED | OUTPATIENT
Start: 2018-02-05 | End: 2018-03-21 | Stop reason: HOSPADM

## 2018-02-05 RX ORDER — INSULIN ASPART 100 [IU]/ML
1-10 INJECTION, SOLUTION INTRAVENOUS; SUBCUTANEOUS EVERY 4 HOURS PRN
Status: DISCONTINUED | OUTPATIENT
Start: 2018-02-05 | End: 2018-02-24

## 2018-02-05 RX ADMIN — ASPIRIN 325 MG ORAL TABLET 325 MG: 325 PILL ORAL at 08:02

## 2018-02-05 RX ADMIN — IPRATROPIUM BROMIDE AND ALBUTEROL SULFATE 3 ML: .5; 3 SOLUTION RESPIRATORY (INHALATION) at 01:02

## 2018-02-05 RX ADMIN — HEPARIN SODIUM 5000 UNITS: 5000 INJECTION, SOLUTION INTRAVENOUS; SUBCUTANEOUS at 09:02

## 2018-02-05 RX ADMIN — HEPARIN SODIUM 5000 UNITS: 5000 INJECTION, SOLUTION INTRAVENOUS; SUBCUTANEOUS at 05:02

## 2018-02-05 RX ADMIN — STANDARDIZED SENNA CONCENTRATE AND DOCUSATE SODIUM 1 TABLET: 8.6; 5 TABLET, FILM COATED ORAL at 09:02

## 2018-02-05 RX ADMIN — CLOPIDOGREL 75 MG: 75 TABLET, FILM COATED ORAL at 08:02

## 2018-02-05 RX ADMIN — CAPTOPRIL 25 MG: 25 TABLET ORAL at 02:02

## 2018-02-05 RX ADMIN — CHLORHEXIDINE GLUCONATE 15 ML: 1.2 RINSE ORAL at 09:02

## 2018-02-05 RX ADMIN — ACETAMINOPHEN 650 MG: 325 TABLET, FILM COATED ORAL at 05:02

## 2018-02-05 RX ADMIN — Medication 3 ML: at 02:02

## 2018-02-05 RX ADMIN — ACETAMINOPHEN 650 MG: 325 TABLET, FILM COATED ORAL at 12:02

## 2018-02-05 RX ADMIN — INSULIN DETEMIR 30 UNITS: 100 INJECTION, SOLUTION SUBCUTANEOUS at 09:02

## 2018-02-05 RX ADMIN — HEPARIN SODIUM 5000 UNITS: 5000 INJECTION, SOLUTION INTRAVENOUS; SUBCUTANEOUS at 02:02

## 2018-02-05 RX ADMIN — Medication 3 ML: at 05:02

## 2018-02-05 RX ADMIN — INSULIN ASPART 10 UNITS: 100 INJECTION, SOLUTION INTRAVENOUS; SUBCUTANEOUS at 10:02

## 2018-02-05 RX ADMIN — CAPTOPRIL 25 MG: 25 TABLET ORAL at 05:02

## 2018-02-05 RX ADMIN — METOPROLOL TARTRATE 25 MG: 25 TABLET ORAL at 08:02

## 2018-02-05 RX ADMIN — MODAFINIL 100 MG: 100 TABLET ORAL at 02:02

## 2018-02-05 RX ADMIN — IPRATROPIUM BROMIDE AND ALBUTEROL SULFATE 3 ML: .5; 3 SOLUTION RESPIRATORY (INHALATION) at 07:02

## 2018-02-05 RX ADMIN — SODIUM CHLORIDE SOLN NEBU 3% 4 ML: 3 NEBU SOLN at 01:02

## 2018-02-05 RX ADMIN — FAMOTIDINE 20 MG: 20 TABLET, FILM COATED ORAL at 09:02

## 2018-02-05 RX ADMIN — SODIUM CHLORIDE SOLN NEBU 3% 4 ML: 3 NEBU SOLN at 07:02

## 2018-02-05 RX ADMIN — STANDARDIZED SENNA CONCENTRATE AND DOCUSATE SODIUM 1 TABLET: 8.6; 5 TABLET, FILM COATED ORAL at 08:02

## 2018-02-05 RX ADMIN — Medication 3 ML: at 10:02

## 2018-02-05 RX ADMIN — INSULIN ASPART 10 UNITS: 100 INJECTION, SOLUTION INTRAVENOUS; SUBCUTANEOUS at 05:02

## 2018-02-05 RX ADMIN — FAMOTIDINE 20 MG: 20 TABLET, FILM COATED ORAL at 08:02

## 2018-02-05 RX ADMIN — INSULIN ASPART 10 UNITS: 100 INJECTION, SOLUTION INTRAVENOUS; SUBCUTANEOUS at 02:02

## 2018-02-05 RX ADMIN — POLYETHYLENE GLYCOL 3350 17 G: 17 POWDER, FOR SOLUTION ORAL at 12:02

## 2018-02-05 RX ADMIN — CAPTOPRIL 25 MG: 25 TABLET ORAL at 09:02

## 2018-02-05 RX ADMIN — IPRATROPIUM BROMIDE AND ALBUTEROL SULFATE 3 ML: .5; 3 SOLUTION RESPIRATORY (INHALATION) at 08:02

## 2018-02-05 RX ADMIN — INSULIN ASPART 2 UNITS: 100 INJECTION, SOLUTION INTRAVENOUS; SUBCUTANEOUS at 05:02

## 2018-02-05 RX ADMIN — SODIUM CHLORIDE SOLN NEBU 3% 4 ML: 3 NEBU SOLN at 08:02

## 2018-02-05 RX ADMIN — ATORVASTATIN CALCIUM 80 MG: 20 TABLET, FILM COATED ORAL at 08:02

## 2018-02-05 RX ADMIN — INSULIN DETEMIR 30 UNITS: 100 INJECTION, SOLUTION SUBCUTANEOUS at 11:02

## 2018-02-05 RX ADMIN — METOPROLOL TARTRATE 25 MG: 25 TABLET ORAL at 09:02

## 2018-02-05 RX ADMIN — CHLORHEXIDINE GLUCONATE 15 ML: 1.2 RINSE ORAL at 08:02

## 2018-02-05 RX ADMIN — HYDRALAZINE HYDROCHLORIDE 10 MG: 20 INJECTION INTRAMUSCULAR; INTRAVENOUS at 01:02

## 2018-02-05 NOTE — ASSESSMENT & PLAN NOTE
-Due to airway obstruction form hardened secretions overlying epiglottis and soft palate, improved after intubation and bronchoscopy  -CXR and ABG prn

## 2018-02-05 NOTE — PROGRESS NOTES
[facility]  Neurocritical Care  Progress Note    Admit Date: 1/25/2018  Service Date: 02/05/2018  Length of Stay: 11    Subjective:     Chief Complaint: Embolic stroke involving left middle cerebral artery    History of Present Illness: The patient is a 47 year old male with no known PMHx admitted to [facility]   s/p thrombectomy of L M1 occlusion. Per chart review, he   walked into [place] and was acting abnormal.  EMS was called and brought to the ED for concern of L MCA syndrome. CT MP revealed short segment occlusion of left M1.  Patient went to IR for thrombectomy of L M1 occlusion seen on CTA MP.  TICI2C reperfusion and angioplasty. Patient admitted to [facility] for close monitoring and higher level of care.   History obtained from chart review only            Hospital Course: 1/25: Pt admitted to [facility] s/p L M1 thrombectomy, TICI2C reperfusion and angioplasty   1/27: large L MCA, hemicrani watch, NSGY following, insulin ggt, cardene ggt, L sided intermittent slowing eeg but no sz, +nasal trumpet for copious thick secretions, wheezing this am - improved with duo nebs, 3% nebs, and cough assist, concern for sepsis 2/2 hemodynamic changes - increased HR and BP, worsening leukocytosis, +ceftriaxone, pan cx, adrian track  1/28: continued respiratory challenges due to secretions. Aggressive pulmonary toileting. Continue monitoring for neuro worsening. Add moxifloxacin.   1/29: CTH to eval for increased edema/shift, EEG afterwards. Concern for decrease exam, no following/decreased alertness). CXR and Procal to follow leukocytosis. Hold TF until eval decreasing EXAM  1/30: neuro exam stable, increase 2%, current amount of sodium iv not suffuicient to meet target 145-155, cont abx for likely pneumonia, repeat ct in am  1/31: abrupt desaturation today requiring emergent intubation followed by bronchoscopy  Etiology likely upper airway obstruction from dried secretions  02/01: stable on vent overnight, marked  improvement on today's chest x ray, switch to spontaneous today, sodium stable at 150, start tfs  02/02: moves left side spontaneously and opens eyes off sedation,  02/03: 2% buffered NS re-started at 20mL/hr for rapid decrease in serum sodium. Enteral free water flushes discontinued. Patient has copious secretions likely pneumonia. Holding dexamethasone  2/4:  No acute events overnight.  Sodium stable.  2/5: transition insulin infusion to subcutaneous insulin     Interval History: Begin modafinil.  Transition insulin infusion to scheduled subcutaneous insulin.      Review of Systems: FRIEDA    Vitals:   Temp: 98.1 °F (36.7 °C)  Pulse: 81  Rhythm: normal sinus rhythm  BP: (!) 163/103  MAP (mmHg): 122  CI (L/min/m2): 2.5 L/min/m2  SVI: 30  SVV: 23 %  Resp: (!) 22  SpO2: 99 %  Oxygen Concentration (%): 40  O2 Device (Oxygen Therapy): ventilator  Vent Mode: Spont  Set Rate: 0 bmp  Vt Set: 500 mL  Pressure Support: 5 cmH20  PEEP/CPAP: 5 cmH20  Peak Airway Pressure: 11 cmH2O  Mean Airway Pressure: 7.5 cmH20  Plateau Pressure: 0 cmH20    Temp  Min: 98 °F (36.7 °C)  Max: 99 °F (37.2 °C)  Pulse  Min: 69  Max: 92  BP  Min: 121/64  Max: 185/103  MAP (mmHg)  Min: 86  Max: 140  CI (L/min/m2)  Min: 1.9 L/min/m2  Max: 2.9 L/min/m2  SVI  Min: 25  Max: 34  SVV  Min: 7 %  Max: 29 %  Resp  Min: 19  Max: 27  SpO2  Min: 95 %  Max: 99 %  Oxygen Concentration (%)  Min: 40  Max: 40    02/04 0701 - 02/05 0700  In: 3279 [I.V.:1749]  Out: 2890 [Urine:2890]   Unmeasured Output  Urine Occurrence: 1  Stool Occurrence: 0     Examination:   Constitutional: Well-nourished and -developed. No apparent distress.   Eyes: Conjunctiva clear, anicteric. Lids no lesions.  Head/Ears/Nose/Mouth/Throat/Neck: Moist mucous membranes. External ears, nose atraumatic.   Cardiovascular: Regular rhythm. No murmurs. No leg edema.  Respiratory: Comfortable respirations. Clear to auscultation.  Gastrointestinal: No hernia. Soft, nondistended, nontender. + bowel  sounds.    Neurologic:  -GCS [id number]  -LUE/LLE move spontaneously, RUE/RLE withdraws to pain  -pupils 3 mm, reactive     Medications:   Continuous  sodium chloride 0.9% Last Rate: 50 mL/hr at 02/05/18 1400   Scheduled  albuterol-ipratropium 2.5mg-0.5mg/3mL 3 mL Q6H   [START ON 2/6/2018] aspirin 81 mg Daily   atorvastatin 80 mg Daily   captopril 25 mg TID   chlorhexidine 15 mL BID   clopidogrel 75 mg Daily   famotidine 20 mg BID   heparin (porcine) 5,000 Units Q8H   insulin aspart 10 Units Q4H   insulin detemir 30 Units BID   metoprolol tartrate 25 mg BID   modafinil 100 mg BID   polyethylene glycol 17 g Daily   senna-docusate 8.6-50 mg 1 tablet BID   sodium chloride 0.9% 3 mL Q8H   sodium chloride 3% 4 mL Q6H   PRN  acetaminophen 650 mg Q6H PRN   albuterol-ipratropium 2.5mg-0.5mg/3mL 3 mL Q4H PRN   bisacodyl 10 mg Daily PRN   dextrose 50% 12.5 g PRN   fentaNYL 50 mcg Q2H PRN   glucagon (human recombinant) 1 mg PRN   hydrALAZINE 10 mg Q4H PRN   labetalol 10 mg Q4H PRN   magnesium sulfate IVPB 2 g PRN   magnesium sulfate IVPB 4 g PRN   ondansetron 4 mg Q8H PRN   potassium chloride 10% 40 mEq PRN   potassium chloride 10% 40 mEq PRN   sodium chloride 0.9% 5 mL PRN      Today I independently reviewed pertinent medications, lines/drains/airways, imaging, cardiology, lab results, microbiology results,        Assessment/Plan:     Neuro   * Embolic stroke involving left middle cerebral artery    -s/p L M1 thrombectomy  --160  -repeat imaging stable   -ASA, plavix  -statin   -SQH  -modafinil 100 mg bid              Pulmonary   Acute respiratory failure with hypoxia    -Due to airway obstruction form hardened secretions overlying epiglottis and soft palate, improved after intubation and bronchoscopy  -CXR and ABG prn           Cardiac/Vascular   Hyperlipidemia    -atorvastatin 80 daily           Essential hypertension    --160  -metoprolol 25 mg bid  -captopril 25 mg tid            Endocrine   Type 2 diabetes  mellitus    -A1C-10  -poorly controlled diabetes  -BG >400 on arrival  -insulin infusion discontinued  -detemir 30 units bid  -aspart 10 units q 4 hours               Prophylaxis:  Venous Thromboembolism: chemical  Stress Ulcer: H2B  Ventilator Pneumonia: yes     Activity Orders          Activity as tolerated starting at 02/05 1432        Full Code    Barbara Tripp PA-C  Neurocritical Care  [facility]

## 2018-02-05 NOTE — PROGRESS NOTES
[facility]  Adult Nutrition  Progress Note    SUMMARY     Recommendations    Recommendation/Intervention:   1. Recommend increasing TF goal rate. Diabetisource @ 75mL/hr.   - Provides 2160kcals, 108g protein and 1472mL free water.     2. If able to extubate and advance diet, recommend 2200kcal Diabetic diet with texture per SLP recommendations.     RD to monitor.    Goals: % EEN, EPN  Nutrition Goal Status: goal not met  Communication of RD Recs: reviewed with RN    Reason for Assessment    Reason for Assessment: RD follow-up  Diagnosis: stroke/CVA (Embolic stroke L MCA)            General Information Comments: Pt remains intubated. TF at goal, tolerating without residuals.     Nutrition Discharge Planning: unable to determine at this time    Nutrition Prescription Ordered    Current Diet Order: NPO  Nutrition Order Comments: TF at goal  Current Nutrition Support Formula Ordered: Diabetisource  Current Nutrition Support Rate Ordered: 60 (ml)  Current Nutrition Support Frequency Ordered: ml/hr        Evaluation of Received Nutrients/Fluid Intake    Enteral Calories (kcal): 1728  Enteral Protein (gm): 86  Enteral (Free Water) Fluid (mL): 1178     Energy Calories Required: not meeting needs  % Kcal Needs: 75    Protein Required: not meeting needs  % Protein Needs: 78     IV Fluid (mL): 1680  I/O: +I/O, good UOP, LBM 1/28         Fluid Required: not meeting needs  Comments: 0mL residuals 2/5  Tolerance: tolerating  % Intake of Estimated Energy Needs: 75 - 100 %  % Meal Intake: NPO     Nutrition Risk Screen     Nutrition Risk Screen: no indicators present    Nutrition/Diet History    Patient Reported Diet/Restrictions/Preferences: other (see comments) (FRIEDA)  Typical Food/Fluid Intake: FRIEDA  Food Preferences: FRIEDA 2/2 pt not awake at the time of visit     Supplemental Drinks or Food Habits: Other (see comments) (FRIEDA)  Factors Affecting Nutritional Intake: NPO, on mechanical ventilation             "    Labs/Tests/Procedures/Meds       Pertinent Labs Reviewed: reviewed  Pertinent Labs Comments: POCT Glu 162-186  Pertinent Medications Reviewed: reviewed  Pertinent Medications Comments: statin, IVF, insulin    Physical Findings    Overall Physical Appearance: obese, on ventilator support  Tubes: nasogastric tube  Oral/Mouth Cavity: WDL  Skin: intact    Anthropometrics    Temp: 98.6 °F (37 °C)     Height: 5' 10" (177.8 cm)  Weight Method: Bed Scale  Weight: 114.8 kg (253 lb 1.4 oz)  Ideal Body Weight (IBW), Male: 166 lb     % Ideal Body Weight, Male (lb): 152.46 lb     BMI (Calculated): 36.4  BMI Grade: 35 - 39.9 - obesity - grade II                            Estimated/Assessed Needs    Weight Used For Calorie Calculations: 116.3 kg (256 lb 6.3 oz)      Energy Calorie Requirements (kcal): 2289  Energy Need Method: [facility]        RMR ([place] Jeor Equation): 2039.25        Weight Used For Protein Calculations: 73 kg (160 lb 15 oz)  Protein Requirements: 110-146g/d (1.5-2.0 g/kg)       Fluid Need Method: RDA Method (Per MD or 1 ml/kcal)  RDA Method (mL): 2289         CHO Requirement: 50% total kcals     Assessment and Plan    * Embolic stroke involving left middle cerebral artery    Problem  Inadequate oral intake    Related to (etiology):   Inability to consume sufficient energy    Signs and Symptoms (as evidenced by):   NPO with no alternative means of nutrition    Interventions/Recommendations (treatment strategy):  See recs above    Nutrition Diagnosis Status:   Continues              Monitor and Evaluation    Food and Nutrient Intake: energy intake, food and beverage intake, enteral nutrition intake  Food and Nutrient Adminstration: diet order, enteral and parenteral nutrition administration     Physical Activity and Function: nutrition-related ADLs and IADLs  Anthropometric Measurements: height/length, weight, body mass index, weight change  Biochemical Data, Medical Tests and Procedures: electrolyte " and renal panel, gastrointestinal profile, glucose/endocrine profile, inflammatory profile, lipid profile  Nutrition-Focused Physical Findings: overall appearance    Nutrition Risk    Level of Risk: other (see comments) (f/u 1x/week)    Nutrition Follow-Up    RD Follow-up?: Yes

## 2018-02-05 NOTE — ASSESSMENT & PLAN NOTE
-A1C-10  -poorly controlled diabetes  -BG >400 on arrival  -insulin infusion discontinued  -detemir 30 units bid  -aspart 10 units q 4 hours

## 2018-02-05 NOTE — PLAN OF CARE
Problem: Patient Care Overview  Goal: Plan of Care Review  Outcome: Ongoing (interventions implemented as appropriate)  POC reviewed with patient and family at bedside at 1130. Patient's family acknowledged understanding of POC; patient is unable. Patient has remained free of falls, injuries and skin breakdown for the duration of the shift. VSS. No acute distress noted. No evidence of pain. Plans: Modafinil scheduled to stimulate wakefulness; continue to evaluate for extubation. Will continue to monitor and update as needed.

## 2018-02-05 NOTE — PLAN OF CARE
PT REMAINS IN ICU VENTILATOR/ INSULIN DRIP / AND INVASIVE MONITORRING     02/05/18 1052   Discharge Reassessment   Assessment Type Discharge Planning Reassessment   Provided patient/caregiver education on the expected discharge date and the discharge plan No

## 2018-02-05 NOTE — SUBJECTIVE & OBJECTIVE
Neurologic Chief Complaint:  L MCA infarct     Subjective:     Interval History: Patient is seen for follow-up neurological assessment and treatment recommendations: NAEON, patient remains intubated, unresponsive to verbal stimuli, withdraws from pain on LUE.     HPI, Past Medical, Family, and Social History remains the same as documented in the initial encounter.     Review of Systems   Unable to perform ROS: Other     Scheduled Meds:   acetaminophen  650 mg Per NG tube Q6H    albuterol-ipratropium 2.5mg-0.5mg/3mL  3 mL Nebulization Q6H    aspirin  325 mg Per NG tube Daily    atorvastatin  80 mg Per NG tube Daily    captopril  25 mg Oral TID    chlorhexidine  15 mL Mouth/Throat BID    clopidogrel  75 mg Per NG tube Daily    famotidine  20 mg Per NG tube BID    heparin (porcine)  5,000 Units Subcutaneous Q8H    metoprolol tartrate  25 mg Oral BID    senna-docusate 8.6-50 mg  1 tablet Per NG tube BID    sodium chloride 0.9%  3 mL Intravenous Q8H    sodium chloride 3%  4 mL Nebulization Q6H     Continuous Infusions:   sodium chloride 0.9% 50 mL/hr at 02/05/18 0800    insulin (HUMAN R) infusion (adults) 8.35 Units/hr (02/05/18 0800)    buffered 2% sodium acetate 86meq, sodium chloride 86meq, sterile water for inj IV soln 20 mL/hr at 02/05/18 0800     PRN Meds:albuterol-ipratropium 2.5mg-0.5mg/3mL, bisacodyl, dextrose 50%, dextrose 50%, dextrose 50%, fentaNYL, glucagon (human recombinant), hydrALAZINE, labetalol, magnesium sulfate IVPB, magnesium sulfate IVPB, ondansetron, potassium chloride 10%, potassium chloride 10%, sodium chloride 0.9%    Objective:     Vital Signs (Most Recent):  Temp: 98.6 °F (37 °C) (02/05/18 0701)  Pulse: 91 (02/05/18 0800)  Resp: (!) 24 (02/05/18 0800)  BP: (!) 164/91 (02/05/18 0800)  SpO2: 98 % (02/05/18 0800)  BP Location: Right arm    Vital Signs Range (Last 24H):  Temp:  [98 °F (36.7 °C)-99 °F (37.2 °C)]   Pulse:  [65-92]   Resp:  [16-26]   BP: (115-185)/()   SpO2:   [95 %-99 %]   Arterial Line BP: (105-180)/(54-93)   BP Location: Right arm    Physical Exam   Constitutional: He appears well-developed and well-nourished.   HENT:   Head: Normocephalic and atraumatic.   Eyes: Pupils are equal, round, and reactive to light.   Brisk,   Left gaze deviation    Cardiovascular: Normal rate and regular rhythm.    Pulmonary/Chest: Effort normal. No tachypnea. No respiratory distress. He has no wheezes.   Intubated    Abdominal: He exhibits no distension.   Neurological: He is unresponsive. GCS eye subscore is 1. GCS verbal subscore is 1. GCS motor subscore is 4.     Neurological Exam:   LOC: unresponsive  Language: Intubated  Pupils (CN II, III): PERRL  Motor: withdraws from pain on L (upper>lower)  Plantar reflexes neutral  Tone:  Normal tone in all extremities     Laboratory:  Recent Results (from the past 24 hour(s))   POCT glucose    Collection Time: 02/04/18  9:03 AM   Result Value Ref Range    POCT Glucose 164 (H) 70 - 110 mg/dL   Potassium    Collection Time: 02/04/18  9:04 AM   Result Value Ref Range    Potassium 3.7 3.5 - 5.1 mmol/L   POCT glucose    Collection Time: 02/04/18 10:05 AM   Result Value Ref Range    POCT Glucose 146 (H) 70 - 110 mg/dL   POCT glucose    Collection Time: 02/04/18 10:59 AM   Result Value Ref Range    POCT Glucose 164 (H) 70 - 110 mg/dL   POCT glucose    Collection Time: 02/04/18 12:18 PM   Result Value Ref Range    POCT Glucose 167 (H) 70 - 110 mg/dL   Sodium    Collection Time: 02/04/18 12:20 PM   Result Value Ref Range    Sodium 146 (H) 136 - 145 mmol/L   POCT glucose    Collection Time: 02/04/18  1:07 PM   Result Value Ref Range    POCT Glucose 141 (H) 70 - 110 mg/dL   POCT glucose    Collection Time: 02/04/18  2:14 PM   Result Value Ref Range    POCT Glucose 136 (H) 70 - 110 mg/dL   POCT glucose    Collection Time: 02/04/18  3:07 PM   Result Value Ref Range    POCT Glucose 159 (H) 70 - 110 mg/dL   POCT glucose    Collection Time: 02/04/18  4:05 PM    Result Value Ref Range    POCT Glucose 209 (H) 70 - 110 mg/dL   Sodium    Collection Time: 02/04/18  4:06 PM   Result Value Ref Range    Sodium 145 136 - 145 mmol/L   POCT glucose    Collection Time: 02/04/18  5:04 PM   Result Value Ref Range    POCT Glucose 190 (H) 70 - 110 mg/dL   POCT glucose    Collection Time: 02/04/18  6:02 PM   Result Value Ref Range    POCT Glucose 188 (H) 70 - 110 mg/dL   Potassium    Collection Time: 02/04/18  6:04 PM   Result Value Ref Range    Potassium 3.8 3.5 - 5.1 mmol/L   POCT glucose    Collection Time: 02/04/18  7:17 PM   Result Value Ref Range    POCT Glucose 186 (H) 70 - 110 mg/dL   POCT glucose    Collection Time: 02/04/18  8:01 PM   Result Value Ref Range    POCT Glucose 166 (H) 70 - 110 mg/dL   POCT glucose    Collection Time: 02/04/18  8:58 PM   Result Value Ref Range    POCT Glucose 158 (H) 70 - 110 mg/dL   POCT glucose    Collection Time: 02/04/18 10:07 PM   Result Value Ref Range    POCT Glucose 139 (H) 70 - 110 mg/dL   POCT glucose    Collection Time: 02/04/18 11:01 PM   Result Value Ref Range    POCT Glucose 160 (H) 70 - 110 mg/dL   POCT glucose    Collection Time: 02/05/18 12:16 AM   Result Value Ref Range    POCT Glucose 152 (H) 70 - 110 mg/dL   Sodium    Collection Time: 02/05/18 12:26 AM   Result Value Ref Range    Sodium 147 (H) 136 - 145 mmol/L   Sodium    Collection Time: 02/05/18 12:26 AM   Result Value Ref Range    Sodium 147 (H) 136 - 145 mmol/L   POCT glucose    Collection Time: 02/05/18  1:02 AM   Result Value Ref Range    POCT Glucose 193 (H) 70 - 110 mg/dL   POCT glucose    Collection Time: 02/05/18  2:12 AM   Result Value Ref Range    POCT Glucose 185 (H) 70 - 110 mg/dL   Comprehensive metabolic panel    Collection Time: 02/05/18  2:30 AM   Result Value Ref Range    Sodium 145 136 - 145 mmol/L    Potassium 3.8 3.5 - 5.1 mmol/L    Chloride 109 95 - 110 mmol/L    CO2 28 23 - 29 mmol/L    Glucose 203 (H) 70 - 110 mg/dL    BUN, Bld 22 (H) 6 - 20 mg/dL     Creatinine 0.9 0.5 - 1.4 mg/dL    Calcium 8.6 (L) 8.7 - 10.5 mg/dL    Total Protein 6.1 6.0 - 8.4 g/dL    Albumin 2.4 (L) 3.5 - 5.2 g/dL    Total Bilirubin 0.5 0.1 - 1.0 mg/dL    Alkaline Phosphatase 71 55 - 135 U/L    AST 26 10 - 40 U/L    ALT 27 10 - 44 U/L    Anion Gap 8 8 - 16 mmol/L    eGFR if African American >60.0 >60 mL/min/1.73 m^2    eGFR if non African American >60.0 >60 mL/min/1.73 m^2   CBC auto differential    Collection Time: 02/05/18  2:30 AM   Result Value Ref Range    WBC 9.87 3.90 - 12.70 K/uL    RBC 4.03 (L) 4.60 - 6.20 M/uL    Hemoglobin 11.9 (L) 14.0 - 18.0 g/dL    Hematocrit 35.5 (L) 40.0 - 54.0 %    MCV 88 82 - 98 fL    MCH 29.5 27.0 - 31.0 pg    MCHC 33.5 32.0 - 36.0 g/dL    RDW 12.4 11.5 - 14.5 %    Platelets 277 150 - 350 K/uL    MPV 9.4 9.2 - 12.9 fL    Immature Granulocytes 0.9 (H) 0.0 - 0.5 %    Gran # (ANC) 6.9 1.8 - 7.7 K/uL    Immature Grans (Abs) 0.09 (H) 0.00 - 0.04 K/uL    Lymph # 2.0 1.0 - 4.8 K/uL    Mono # 0.7 0.3 - 1.0 K/uL    Eos # 0.1 0.0 - 0.5 K/uL    Baso # 0.03 0.00 - 0.20 K/uL    nRBC 0 0 /100 WBC    Gran% 70.3 38.0 - 73.0 %    Lymph% 20.0 18.0 - 48.0 %    Mono% 7.3 4.0 - 15.0 %    Eosinophil% 1.2 0.0 - 8.0 %    Basophil% 0.3 0.0 - 1.9 %    Differential Method Automated    Magnesium    Collection Time: 02/05/18  2:30 AM   Result Value Ref Range    Magnesium 2.0 1.6 - 2.6 mg/dL   Phosphorus    Collection Time: 02/05/18  2:30 AM   Result Value Ref Range    Phosphorus 3.1 2.7 - 4.5 mg/dL   Protime-INR    Collection Time: 02/05/18  2:30 AM   Result Value Ref Range    Prothrombin Time 9.9 9.0 - 12.5 sec    INR 0.9 0.8 - 1.2   Sodium    Collection Time: 02/05/18  2:30 AM   Result Value Ref Range    Sodium 145 136 - 145 mmol/L   POCT glucose    Collection Time: 02/05/18  2:58 AM   Result Value Ref Range    POCT Glucose 194 (H) 70 - 110 mg/dL   POCT glucose    Collection Time: 02/05/18  4:13 AM   Result Value Ref Range    POCT Glucose 153 (H) 70 - 110 mg/dL   POCT glucose     Collection Time: 02/05/18  5:09 AM   Result Value Ref Range    POCT Glucose 162 (H) 70 - 110 mg/dL   ISTAT PROCEDURE    Collection Time: 02/05/18  5:52 AM   Result Value Ref Range    POC PH 7.498 (H) 7.35 - 7.45    POC PCO2 39.5 35 - 45 mmHg    POC PO2 123 (H) 80 - 100 mmHg    POC HCO3 30.7 (H) 24 - 28 mmol/L    POC BE 7 -2 to 2 mmol/L    POC SATURATED O2 99 95 - 100 %    POC TCO2 32 (H) 23 - 27 mmol/L    Sample ARTERIAL     Site [facility]     Allens Test N/A    POCT glucose    Collection Time: 02/05/18  6:10 AM   Result Value Ref Range    POCT Glucose 186 (H) 70 - 110 mg/dL   POCT glucose    Collection Time: 02/05/18  7:14 AM   Result Value Ref Range    POCT Glucose 182 (H) 70 - 110 mg/dL   POCT glucose    Collection Time: 02/05/18  8:09 AM   Result Value Ref Range    POCT Glucose 177 (H) 70 - 110 mg/dL   Sodium    Collection Time: 02/05/18  8:10 AM   Result Value Ref Range    Sodium 144 136 - 145 mmol/L     Diagnostic Results     Brain Imaging   - CT head 1/31:  Stable L MCA territory infarct  Cytotoxic cerebral edema     - CTH 1/27:   L MCA infarction s/p thrombectomy and stenting of the residual stenosis.  No sign of hemorrhagic transformation.  R basal ganglia and cerebellar infarct.       MRI Brain w/o 1/26:  Restricted diffusion in L MCA territory  Cytotoxic cerebral edema           Vessel Imaging      IR cerebral Angiogram 1/25:   L M1 occlusion.          Cardiac Imaging      EKG 1/26:   Sinus tachycardia.  .  QTc 500.  Q waves in inferior leads     TTE 1/26:   EF 65-70%  LA size WNL  No regional wall motion abnormalities  Diastolic function normal

## 2018-02-05 NOTE — PROGRESS NOTES
[facility]  Vascular Neurology  Comprehensive Stroke Center  Progress Note    Assessment/Plan:     * Embolic stroke involving left middle cerebral artery    48 y/o M who p/w L MCA syndrome. No tPA candidate due to unknown LKN.    - L M1 occlusion on CTA ().    - MRI showed most of the L MCA effected by infarct  - IR thrombectomy, + angioplasty and stenting of the residual stenosis, TICI2C reperfusion Etiology   At this ponit the impression is ESUS versus L M1 atherosclerosis.    Antithrombotics for secondary stroke prevention: DAPT with asa 325mg qd and plavix 75mg qd  Statins: atorvastatin 80 in interim  Aggressive risk factor modification: Obesity  Rehab efforts: PT/OT/SLP to evaluate and treat  Diagnostics ordered/pending: none  A1C 10, TSH 1.8, LDL immeasurable 2/2 trigylcerides (460), Chol 200  VTE prophylaxis: Heparin 5000 units SQ every 8 hours  BP parameters: Infarct: Post sucessful thrombectomy, SBP <140     - EE- focal left sided slowing, suggestive of underlying structural lesion 2-  mild generalized slowing, suggestive of mild diffuse or multifocal cerebral dysfunction.   - off of 2% HTS  - was planned to try extubation today, however remains intubated at the time of the visit this morning  -will continue to follow up on the patient along with the primary team, no further recommendations at this time       Acute respiratory failure with hypoxia    Intubated, now spontaneous  Decadron x3 d/t swelling after difficult intubation  Possible plans for extubation 2/3/18, however has remained intubated for now  Off sedation, Exam slightly improved  CXR improving      Cytotoxic cerebral edema    Due to stroke  Evidence of mass effect and brain compression on imaging  Repeat CTH stable      Type 2 diabetes mellitus    - A1C 10  - glucose ~200s, remains on insulin gtt   - Will need endocrine consult when stepped down to stroke service      Hyperlipidemia    - Chol 200, LDL immeasurable,    - atorvastatin 80 mg  - consider repeating lipid panel      Essential hypertension    Stroke risk factor  - goal SBP post thrombectomy <140  - management for primary team      Obstructive sleep apnea    Stroke risk factor         1/25: Brought in for aphasia, RSW with L gaze preferance. LKN unknown, not tPA candidate. Went to IR for angiogram and stent.  1/29: Off Cardene, remains on Insulin gtt. Concern for sepsis, respiratory source. Imaging with mass effect and some brain compression; maycol crani watch.  1/30: EEG consistent with focal L slowing 2/2 lesion and mild generalized slowing, no seizures. CT head stable, no hemorrhagic conversion  02/01/18 emergent intubation likely due to upper airway obstruction per NCC.    2/2/18: Pt off Precedex, moving left side spontaneously and opening eyes. Intubated, on spontaneous today. NCC giving steroids in hopes to extubate tomorrow.  2/3: no acute events over night, remains intubated at the time of the visit this morning, not responsive to verbal stimuli, withdraws from pain on LUE, BP raging ~135-230. No significant change in the lab values, glucose ~200. Continued on insulin gtt, SQH for DVT ppx, and captopril.   02/05: NAEON. Remains intubated.     STROKE DOCUMENTATION   Acute Stroke Times   Stroke Team Called Date: 01/25/18  Stroke Team Called Time: 1852  Stroke Team Arrival Date: 01/25/18  Stroke Team Arrival Time: 0652  CT Interpretation Time: 1910  Decision to Treat Time for Alteplase:  (n/a unknown last known normal )  Decision to Treat Time for IR: 1915    NIH Scale:  1a. Level Of Consciousness: 2-->Not alert: requires repeated stimulation to attend, or is obtunded and requires strong or painful stimulation to make movements (not stereotyped)  1b. LOC Questions: 2-->Answers neither question correctly  1c. LOC Commands: 2-->Performs neither task correctly  2. Best Gaze: 2-->Forced deviation, or total gaze paresis not overcome by the oculocephalic  maneuver  3. Visual: 2-->Complete hemianopia  4. Facial Palsy: 0-->Normal symmetrical movements  5a. Motor Arm, Left: 4-->No movement  5b. Motor Arm, Right: 4-->Nomovement  6a. Motor Leg, Left: 4-->Nomovement  6b. Motor Leg, Right: 4-->Nomovement  7. Limb Ataxia: 0-->Absent  8. Sensory: 0-->Normal: no sensory loss  9. Best Language: 3-->Mute, global aphasia: no usable speech or auditory comprehension  10. Dysarthria: (UN) Intubated or other physical barrier  11. Extinction and Inattention (formerly Neglect): 0-->No abnormality  Total (NIH Stroke Scale): 27    Modified Jasmin    Jeff Coma Scale:    ABCD2 Score:    [id number] Score:   HAS -BLED Score:   ICH Score:   Hunt & Laguerre Classification:      Hemorrhagic change of an Ischemic Stroke: Does this patient have an ischemic stroke with hemorrhagic changes? No     Neurologic Chief Complaint:  L MCA infarct     Subjective:     Interval History: Patient is seen for follow-up neurological assessment and treatment recommendations: NAEON, patient remains intubated, unresponsive to verbal stimuli, withdraws from pain on LUE.     HPI, Past Medical, Family, and Social History remains the same as documented in the initial encounter.     Review of Systems   Unable to perform ROS: Other     Scheduled Meds:   acetaminophen  650 mg Per NG tube Q6H    albuterol-ipratropium 2.5mg-0.5mg/3mL  3 mL Nebulization Q6H    aspirin  325 mg Per NG tube Daily    atorvastatin  80 mg Per NG tube Daily    captopril  25 mg Oral TID    chlorhexidine  15 mL Mouth/Throat BID    clopidogrel  75 mg Per NG tube Daily    famotidine  20 mg Per NG tube BID    heparin (porcine)  5,000 Units Subcutaneous Q8H    metoprolol tartrate  25 mg Oral BID    senna-docusate 8.6-50 mg  1 tablet Per NG tube BID    sodium chloride 0.9%  3 mL Intravenous Q8H    sodium chloride 3%  4 mL Nebulization Q6H     Continuous Infusions:   sodium chloride 0.9% 50 mL/hr at 02/05/18 0800    insulin (HUMAN R)  infusion (adults) 8.35 Units/hr (02/05/18 0800)    buffered 2% sodium acetate 86meq, sodium chloride 86meq, sterile water for inj IV soln 20 mL/hr at 02/05/18 0800     PRN Meds:albuterol-ipratropium 2.5mg-0.5mg/3mL, bisacodyl, dextrose 50%, dextrose 50%, dextrose 50%, fentaNYL, glucagon (human recombinant), hydrALAZINE, labetalol, magnesium sulfate IVPB, magnesium sulfate IVPB, ondansetron, potassium chloride 10%, potassium chloride 10%, sodium chloride 0.9%    Objective:     Vital Signs (Most Recent):  Temp: 98.6 °F (37 °C) (02/05/18 0701)  Pulse: 91 (02/05/18 0800)  Resp: (!) 24 (02/05/18 0800)  BP: (!) 164/91 (02/05/18 0800)  SpO2: 98 % (02/05/18 0800)  BP Location: Right arm    Vital Signs Range (Last 24H):  Temp:  [98 °F (36.7 °C)-99 °F (37.2 °C)]   Pulse:  [65-92]   Resp:  [16-26]   BP: (115-185)/()   SpO2:  [95 %-99 %]   Arterial Line BP: (105-180)/(54-93)   BP Location: Right arm    Physical Exam   Constitutional: He appears well-developed and well-nourished.   HENT:   Head: Normocephalic and atraumatic.   Eyes: Pupils are equal, round, and reactive to light.   Brisk,   Left gaze deviation    Cardiovascular: Normal rate and regular rhythm.    Pulmonary/Chest: Effort normal. No tachypnea. No respiratory distress. He has no wheezes.   Intubated    Abdominal: He exhibits no distension.   Neurological: He is unresponsive. GCS eye subscore is 1. GCS verbal subscore is 1. GCS motor subscore is 4.     Neurological Exam:   LOC: unresponsive  Language: Intubated  Pupils (CN II, III): PERRL  Motor: withdraws from pain on L (upper>lower)  Plantar reflexes neutral  Tone:  Normal tone in all extremities     Laboratory:  Recent Results (from the past 24 hour(s))   POCT glucose    Collection Time: 02/04/18  9:03 AM   Result Value Ref Range    POCT Glucose 164 (H) 70 - 110 mg/dL   Potassium    Collection Time: 02/04/18  9:04 AM   Result Value Ref Range    Potassium 3.7 3.5 - 5.1 mmol/L   POCT glucose    Collection  Time: 02/04/18 10:05 AM   Result Value Ref Range    POCT Glucose 146 (H) 70 - 110 mg/dL   POCT glucose    Collection Time: 02/04/18 10:59 AM   Result Value Ref Range    POCT Glucose 164 (H) 70 - 110 mg/dL   POCT glucose    Collection Time: 02/04/18 12:18 PM   Result Value Ref Range    POCT Glucose 167 (H) 70 - 110 mg/dL   Sodium    Collection Time: 02/04/18 12:20 PM   Result Value Ref Range    Sodium 146 (H) 136 - 145 mmol/L   POCT glucose    Collection Time: 02/04/18  1:07 PM   Result Value Ref Range    POCT Glucose 141 (H) 70 - 110 mg/dL   POCT glucose    Collection Time: 02/04/18  2:14 PM   Result Value Ref Range    POCT Glucose 136 (H) 70 - 110 mg/dL   POCT glucose    Collection Time: 02/04/18  3:07 PM   Result Value Ref Range    POCT Glucose 159 (H) 70 - 110 mg/dL   POCT glucose    Collection Time: 02/04/18  4:05 PM   Result Value Ref Range    POCT Glucose 209 (H) 70 - 110 mg/dL   Sodium    Collection Time: 02/04/18  4:06 PM   Result Value Ref Range    Sodium 145 136 - 145 mmol/L   POCT glucose    Collection Time: 02/04/18  5:04 PM   Result Value Ref Range    POCT Glucose 190 (H) 70 - 110 mg/dL   POCT glucose    Collection Time: 02/04/18  6:02 PM   Result Value Ref Range    POCT Glucose 188 (H) 70 - 110 mg/dL   Potassium    Collection Time: 02/04/18  6:04 PM   Result Value Ref Range    Potassium 3.8 3.5 - 5.1 mmol/L   POCT glucose    Collection Time: 02/04/18  7:17 PM   Result Value Ref Range    POCT Glucose 186 (H) 70 - 110 mg/dL   POCT glucose    Collection Time: 02/04/18  8:01 PM   Result Value Ref Range    POCT Glucose 166 (H) 70 - 110 mg/dL   POCT glucose    Collection Time: 02/04/18  8:58 PM   Result Value Ref Range    POCT Glucose 158 (H) 70 - 110 mg/dL   POCT glucose    Collection Time: 02/04/18 10:07 PM   Result Value Ref Range    POCT Glucose 139 (H) 70 - 110 mg/dL   POCT glucose    Collection Time: 02/04/18 11:01 PM   Result Value Ref Range    POCT Glucose 160 (H) 70 - 110 mg/dL   POCT glucose     Collection Time: 02/05/18 12:16 AM   Result Value Ref Range    POCT Glucose 152 (H) 70 - 110 mg/dL   Sodium    Collection Time: 02/05/18 12:26 AM   Result Value Ref Range    Sodium 147 (H) 136 - 145 mmol/L   Sodium    Collection Time: 02/05/18 12:26 AM   Result Value Ref Range    Sodium 147 (H) 136 - 145 mmol/L   POCT glucose    Collection Time: 02/05/18  1:02 AM   Result Value Ref Range    POCT Glucose 193 (H) 70 - 110 mg/dL   POCT glucose    Collection Time: 02/05/18  2:12 AM   Result Value Ref Range    POCT Glucose 185 (H) 70 - 110 mg/dL   Comprehensive metabolic panel    Collection Time: 02/05/18  2:30 AM   Result Value Ref Range    Sodium 145 136 - 145 mmol/L    Potassium 3.8 3.5 - 5.1 mmol/L    Chloride 109 95 - 110 mmol/L    CO2 28 23 - 29 mmol/L    Glucose 203 (H) 70 - 110 mg/dL    BUN, Bld 22 (H) 6 - 20 mg/dL    Creatinine 0.9 0.5 - 1.4 mg/dL    Calcium 8.6 (L) 8.7 - 10.5 mg/dL    Total Protein 6.1 6.0 - 8.4 g/dL    Albumin 2.4 (L) 3.5 - 5.2 g/dL    Total Bilirubin 0.5 0.1 - 1.0 mg/dL    Alkaline Phosphatase 71 55 - 135 U/L    AST 26 10 - 40 U/L    ALT 27 10 - 44 U/L    Anion Gap 8 8 - 16 mmol/L    eGFR if African American >60.0 >60 mL/min/1.73 m^2    eGFR if non African American >60.0 >60 mL/min/1.73 m^2   CBC auto differential    Collection Time: 02/05/18  2:30 AM   Result Value Ref Range    WBC 9.87 3.90 - 12.70 K/uL    RBC 4.03 (L) 4.60 - 6.20 M/uL    Hemoglobin 11.9 (L) 14.0 - 18.0 g/dL    Hematocrit 35.5 (L) 40.0 - 54.0 %    MCV 88 82 - 98 fL    MCH 29.5 27.0 - 31.0 pg    MCHC 33.5 32.0 - 36.0 g/dL    RDW 12.4 11.5 - 14.5 %    Platelets 277 150 - 350 K/uL    MPV 9.4 9.2 - 12.9 fL    Immature Granulocytes 0.9 (H) 0.0 - 0.5 %    Gran # (ANC) 6.9 1.8 - 7.7 K/uL    Immature Grans (Abs) 0.09 (H) 0.00 - 0.04 K/uL    Lymph # 2.0 1.0 - 4.8 K/uL    Mono # 0.7 0.3 - 1.0 K/uL    Eos # 0.1 0.0 - 0.5 K/uL    Baso # 0.03 0.00 - 0.20 K/uL    nRBC 0 0 /100 WBC    Gran% 70.3 38.0 - 73.0 %    Lymph% 20.0 18.0 - 48.0 %     Mono% 7.3 4.0 - 15.0 %    Eosinophil% 1.2 0.0 - 8.0 %    Basophil% 0.3 0.0 - 1.9 %    Differential Method Automated    Magnesium    Collection Time: 02/05/18  2:30 AM   Result Value Ref Range    Magnesium 2.0 1.6 - 2.6 mg/dL   Phosphorus    Collection Time: 02/05/18  2:30 AM   Result Value Ref Range    Phosphorus 3.1 2.7 - 4.5 mg/dL   Protime-INR    Collection Time: 02/05/18  2:30 AM   Result Value Ref Range    Prothrombin Time 9.9 9.0 - 12.5 sec    INR 0.9 0.8 - 1.2   Sodium    Collection Time: 02/05/18  2:30 AM   Result Value Ref Range    Sodium 145 136 - 145 mmol/L   POCT glucose    Collection Time: 02/05/18  2:58 AM   Result Value Ref Range    POCT Glucose 194 (H) 70 - 110 mg/dL   POCT glucose    Collection Time: 02/05/18  4:13 AM   Result Value Ref Range    POCT Glucose 153 (H) 70 - 110 mg/dL   POCT glucose    Collection Time: 02/05/18  5:09 AM   Result Value Ref Range    POCT Glucose 162 (H) 70 - 110 mg/dL   ISTAT PROCEDURE    Collection Time: 02/05/18  5:52 AM   Result Value Ref Range    POC PH 7.498 (H) 7.35 - 7.45    POC PCO2 39.5 35 - 45 mmHg    POC PO2 123 (H) 80 - 100 mmHg    POC HCO3 30.7 (H) 24 - 28 mmol/L    POC BE 7 -2 to 2 mmol/L    POC SATURATED O2 99 95 - 100 %    POC TCO2 32 (H) 23 - 27 mmol/L    Sample ARTERIAL     Site [facility]     Allens Test N/A    POCT glucose    Collection Time: 02/05/18  6:10 AM   Result Value Ref Range    POCT Glucose 186 (H) 70 - 110 mg/dL   POCT glucose    Collection Time: 02/05/18  7:14 AM   Result Value Ref Range    POCT Glucose 182 (H) 70 - 110 mg/dL   POCT glucose    Collection Time: 02/05/18  8:09 AM   Result Value Ref Range    POCT Glucose 177 (H) 70 - 110 mg/dL   Sodium    Collection Time: 02/05/18  8:10 AM   Result Value Ref Range    Sodium 144 136 - 145 mmol/L     Diagnostic Results     Brain Imaging   - CT head 1/31:  Stable L MCA territory infarct  Cytotoxic cerebral edema     - CTH 1/27:   L MCA infarction s/p thrombectomy and stenting of the residual  stenosis.  No sign of hemorrhagic transformation.  R basal ganglia and cerebellar infarct.       MRI Brain w/o 1/26:  Restricted diffusion in L MCA territory  Cytotoxic cerebral edema           Vessel Imaging      IR cerebral Angiogram 1/25:   L M1 occlusion.          Cardiac Imaging      EKG 1/26:   Sinus tachycardia.  .  QTc 500.  Q waves in inferior leads     TTE 1/26:   EF 65-70%  LA size WNL  No regional wall motion abnormalities  Diastolic function normal      Jarred Henderson MD  Comprehensive Stroke Center  Department of Vascular Neurology   [facility]

## 2018-02-05 NOTE — PHYSICIAN QUERY
PT Name: Todd Quevedo  MR #: [id number]    Physician Query Form - Neurological Condition Clarification       CDS/: Diane Robles RN, CDS              Contact information: [email or web address]    This form is a permanent document in the medical record.     Query Date: February 5, 2018    By submitting this query, we are merely seeking further clarification of documentation. Please utilize your independent clinical judgment when addressing the question(s) below.    The Medical record contains the following:   Indicators   Supporting Clinical Findings Location in Medical Record   x AMS, Confusion, LOC, etc. --which suggest a mild degree of encephalopathy.  Evidence of seizures was not Seen.    --Encephalopathy out of proportion to ischemic stroke injury        -Monitoring progression of cerebral edema    --Abnormal EEG due to mild diffuse encephalopathy    EEG 1/27      NCC Note 1/28        EEG 1/29   x Acute / Chronic Illness --Chief Complaint: Embolic stroke involving left middle cerebral artery    --concern for sinusitis/resp infection 2/2 low grade fever, leukocytosis, and increasing thick secretions    --cont abx for likely pneumonia   NCC H&P 1/26      NCC Note 1/27      NCC Note 1/30    Radiology Findings     x Electrolyte Imbalance --hypokalemia,  NCC NOte 2/3   x Medication Ceftriaxone, Decadron MAR   x Treatment S/p thrombectomy and stenting of left MCA Op-Note 1/25    Other       Provider, please specify the diagnosis or diagnoses associated with above clinical findings.      [ x ] Metabolic Encephalopathy    [  ] Other Encephalopathy    [  ] Unspecified Encephalopathy    [  ] Other (please specify): _____________________________________    [  ] Clinically Undetermined      Please document in your progress notes daily for the duration of treatment until resolved, and  include in your discharge summary.

## 2018-02-06 LAB
ALBUMIN SERPL BCP-MCNC: 2.4 G/DL
ALLENS TEST: ABNORMAL
ALP SERPL-CCNC: 75 U/L
ALT SERPL W/O P-5'-P-CCNC: 25 U/L
ANION GAP SERPL CALC-SCNC: 8 MMOL/L
AST SERPL-CCNC: 21 U/L
BASOPHILS # BLD AUTO: 0.03 K/UL
BASOPHILS NFR BLD: 0.3 %
BILIRUB SERPL-MCNC: 0.6 MG/DL
BUN SERPL-MCNC: 21 MG/DL
CALCIUM SERPL-MCNC: 8.9 MG/DL
CHLORIDE SERPL-SCNC: 106 MMOL/L
CO2 SERPL-SCNC: 28 MMOL/L
CREAT SERPL-MCNC: 0.8 MG/DL
DELSYS: ABNORMAL
DIFFERENTIAL METHOD: ABNORMAL
EOSINOPHIL # BLD AUTO: 0.2 K/UL
EOSINOPHIL NFR BLD: 1.7 %
ERYTHROCYTE [DISTWIDTH] IN BLOOD BY AUTOMATED COUNT: 12.2 %
EST. GFR  (AFRICAN AMERICAN): >60 ML/MIN/1.73 M^2
EST. GFR  (NON AFRICAN AMERICAN): >60 ML/MIN/1.73 M^2
FIO2: 40
FLOW: 60
GLUCOSE SERPL-MCNC: 181 MG/DL
HCO3 UR-SCNC: 33.8 MMOL/L (ref 24–28)
HCT VFR BLD AUTO: 35.5 %
HGB BLD-MCNC: 12.1 G/DL
IMM GRANULOCYTES # BLD AUTO: 0.08 K/UL
IMM GRANULOCYTES NFR BLD AUTO: 0.7 %
INR PPP: 0.9
LYMPHOCYTES # BLD AUTO: 1.8 K/UL
LYMPHOCYTES NFR BLD: 16.7 %
MAGNESIUM SERPL-MCNC: 1.8 MG/DL
MCH RBC QN AUTO: 28.9 PG
MCHC RBC AUTO-ENTMCNC: 34.1 G/DL
MCV RBC AUTO: 85 FL
MODE: ABNORMAL
MONOCYTES # BLD AUTO: 0.8 K/UL
MONOCYTES NFR BLD: 7.8 %
NEUTROPHILS # BLD AUTO: 7.9 K/UL
NEUTROPHILS NFR BLD: 72.8 %
NRBC BLD-RTO: 0 /100 WBC
PCO2 BLDA: 39.4 MMHG (ref 35–45)
PH SMN: 7.54 [PH] (ref 7.35–7.45)
PHOSPHATE SERPL-MCNC: 3 MG/DL
PLATELET # BLD AUTO: 294 K/UL
PMV BLD AUTO: 9.6 FL
PO2 BLDA: 117 MMHG (ref 80–100)
POC BE: 11 MMOL/L
POC SATURATED O2: 99 % (ref 95–100)
POC TCO2: 35 MMOL/L (ref 23–27)
POCT GLUCOSE: 149 MG/DL (ref 70–110)
POCT GLUCOSE: 162 MG/DL (ref 70–110)
POCT GLUCOSE: 163 MG/DL (ref 70–110)
POCT GLUCOSE: 188 MG/DL (ref 70–110)
POCT GLUCOSE: 190 MG/DL (ref 70–110)
POCT GLUCOSE: 230 MG/DL (ref 70–110)
POTASSIUM SERPL-SCNC: 3.8 MMOL/L
PROT SERPL-MCNC: 6.5 G/DL
PROTHROMBIN TIME: 9.9 SEC
RBC # BLD AUTO: 4.18 M/UL
SAMPLE: ABNORMAL
SITE: ABNORMAL
SODIUM SERPL-SCNC: 141 MMOL/L
SODIUM SERPL-SCNC: 142 MMOL/L
SP02: 99
WBC # BLD AUTO: 10.81 K/UL

## 2018-02-06 PROCEDURE — 82803 BLOOD GASES ANY COMBINATION: CPT

## 2018-02-06 PROCEDURE — 80053 COMPREHEN METABOLIC PANEL: CPT

## 2018-02-06 PROCEDURE — 97110 THERAPEUTIC EXERCISES: CPT

## 2018-02-06 PROCEDURE — 27000221 HC OXYGEN, UP TO 24 HOURS

## 2018-02-06 PROCEDURE — 25000003 PHARM REV CODE 250: Performed by: PSYCHIATRY & NEUROLOGY

## 2018-02-06 PROCEDURE — 94003 VENT MGMT INPAT SUBQ DAY: CPT

## 2018-02-06 PROCEDURE — A4216 STERILE WATER/SALINE, 10 ML: HCPCS | Performed by: NURSE PRACTITIONER

## 2018-02-06 PROCEDURE — 25000003 PHARM REV CODE 250: Performed by: PHYSICIAN ASSISTANT

## 2018-02-06 PROCEDURE — 84100 ASSAY OF PHOSPHORUS: CPT

## 2018-02-06 PROCEDURE — 99233 SBSQ HOSP IP/OBS HIGH 50: CPT | Mod: ,,, | Performed by: PHYSICIAN ASSISTANT

## 2018-02-06 PROCEDURE — 85025 COMPLETE CBC W/AUTO DIFF WBC: CPT

## 2018-02-06 PROCEDURE — 83735 ASSAY OF MAGNESIUM: CPT

## 2018-02-06 PROCEDURE — 85610 PROTHROMBIN TIME: CPT

## 2018-02-06 PROCEDURE — 84295 ASSAY OF SERUM SODIUM: CPT

## 2018-02-06 PROCEDURE — 99900026 HC AIRWAY MAINTENANCE (STAT)

## 2018-02-06 PROCEDURE — 27200966 HC CLOSED SUCTION SYSTEM

## 2018-02-06 PROCEDURE — 37799 UNLISTED PX VASCULAR SURGERY: CPT

## 2018-02-06 PROCEDURE — 63600175 PHARM REV CODE 636 W HCPCS: Performed by: PSYCHIATRY & NEUROLOGY

## 2018-02-06 PROCEDURE — 94761 N-INVAS EAR/PLS OXIMETRY MLT: CPT

## 2018-02-06 PROCEDURE — 25000242 PHARM REV CODE 250 ALT 637 W/ HCPCS: Performed by: PSYCHIATRY & NEUROLOGY

## 2018-02-06 PROCEDURE — 99900035 HC TECH TIME PER 15 MIN (STAT)

## 2018-02-06 PROCEDURE — 25000003 PHARM REV CODE 250: Performed by: NURSE PRACTITIONER

## 2018-02-06 PROCEDURE — 20000000 HC ICU ROOM

## 2018-02-06 PROCEDURE — 94640 AIRWAY INHALATION TREATMENT: CPT

## 2018-02-06 RX ORDER — MODAFINIL 100 MG/1
100 TABLET ORAL DAILY
Status: DISCONTINUED | OUTPATIENT
Start: 2018-02-06 | End: 2018-03-19

## 2018-02-06 RX ORDER — BISACODYL 10 MG
10 SUPPOSITORY, RECTAL RECTAL DAILY
Status: DISCONTINUED | OUTPATIENT
Start: 2018-02-06 | End: 2018-02-07

## 2018-02-06 RX ORDER — INSULIN ASPART 100 [IU]/ML
11 INJECTION, SOLUTION INTRAVENOUS; SUBCUTANEOUS EVERY 4 HOURS
Status: DISCONTINUED | OUTPATIENT
Start: 2018-02-06 | End: 2018-02-07

## 2018-02-06 RX ORDER — MODAFINIL 100 MG/1
200 TABLET ORAL DAILY
Status: DISCONTINUED | OUTPATIENT
Start: 2018-02-07 | End: 2018-03-19

## 2018-02-06 RX ADMIN — INSULIN ASPART 10 UNITS: 100 INJECTION, SOLUTION INTRAVENOUS; SUBCUTANEOUS at 10:02

## 2018-02-06 RX ADMIN — INSULIN ASPART 10 UNITS: 100 INJECTION, SOLUTION INTRAVENOUS; SUBCUTANEOUS at 05:02

## 2018-02-06 RX ADMIN — SODIUM CHLORIDE SOLN NEBU 3% 4 ML: 3 NEBU SOLN at 01:02

## 2018-02-06 RX ADMIN — METOPROLOL TARTRATE 25 MG: 25 TABLET ORAL at 09:02

## 2018-02-06 RX ADMIN — ATORVASTATIN CALCIUM 80 MG: 20 TABLET, FILM COATED ORAL at 09:02

## 2018-02-06 RX ADMIN — INSULIN ASPART 4 UNITS: 100 INJECTION, SOLUTION INTRAVENOUS; SUBCUTANEOUS at 10:02

## 2018-02-06 RX ADMIN — HEPARIN SODIUM 5000 UNITS: 5000 INJECTION, SOLUTION INTRAVENOUS; SUBCUTANEOUS at 05:02

## 2018-02-06 RX ADMIN — INSULIN DETEMIR 30 UNITS: 100 INJECTION, SOLUTION SUBCUTANEOUS at 09:02

## 2018-02-06 RX ADMIN — CHLORHEXIDINE GLUCONATE 15 ML: 1.2 RINSE ORAL at 09:02

## 2018-02-06 RX ADMIN — Medication 3 ML: at 10:02

## 2018-02-06 RX ADMIN — INSULIN ASPART 2 UNITS: 100 INJECTION, SOLUTION INTRAVENOUS; SUBCUTANEOUS at 01:02

## 2018-02-06 RX ADMIN — CAPTOPRIL 25 MG: 25 TABLET ORAL at 01:02

## 2018-02-06 RX ADMIN — IPRATROPIUM BROMIDE AND ALBUTEROL SULFATE 3 ML: .5; 3 SOLUTION RESPIRATORY (INHALATION) at 07:02

## 2018-02-06 RX ADMIN — Medication 3 ML: at 02:02

## 2018-02-06 RX ADMIN — ASPIRIN 81 MG CHEWABLE TABLET 81 MG: 81 TABLET CHEWABLE at 09:02

## 2018-02-06 RX ADMIN — Medication 3 ML: at 05:02

## 2018-02-06 RX ADMIN — CAPTOPRIL 25 MG: 25 TABLET ORAL at 10:02

## 2018-02-06 RX ADMIN — STANDARDIZED SENNA CONCENTRATE AND DOCUSATE SODIUM 1 TABLET: 8.6; 5 TABLET, FILM COATED ORAL at 09:02

## 2018-02-06 RX ADMIN — INSULIN ASPART 10 UNITS: 100 INJECTION, SOLUTION INTRAVENOUS; SUBCUTANEOUS at 02:02

## 2018-02-06 RX ADMIN — INSULIN ASPART 11 UNITS: 100 INJECTION, SOLUTION INTRAVENOUS; SUBCUTANEOUS at 10:02

## 2018-02-06 RX ADMIN — INSULIN ASPART 10 UNITS: 100 INJECTION, SOLUTION INTRAVENOUS; SUBCUTANEOUS at 01:02

## 2018-02-06 RX ADMIN — HEPARIN SODIUM 5000 UNITS: 5000 INJECTION, SOLUTION INTRAVENOUS; SUBCUTANEOUS at 01:02

## 2018-02-06 RX ADMIN — IPRATROPIUM BROMIDE AND ALBUTEROL SULFATE 3 ML: .5; 3 SOLUTION RESPIRATORY (INHALATION) at 01:02

## 2018-02-06 RX ADMIN — MODAFINIL 100 MG: 100 TABLET ORAL at 05:02

## 2018-02-06 RX ADMIN — CHLORHEXIDINE GLUCONATE 15 ML: 1.2 RINSE ORAL at 10:02

## 2018-02-06 RX ADMIN — CAPTOPRIL 25 MG: 25 TABLET ORAL at 05:02

## 2018-02-06 RX ADMIN — FAMOTIDINE 20 MG: 20 TABLET, FILM COATED ORAL at 09:02

## 2018-02-06 RX ADMIN — BISACODYL 10 MG: 10 SUPPOSITORY RECTAL at 04:02

## 2018-02-06 RX ADMIN — IPRATROPIUM BROMIDE AND ALBUTEROL SULFATE 3 ML: .5; 3 SOLUTION RESPIRATORY (INHALATION) at 08:02

## 2018-02-06 RX ADMIN — INSULIN ASPART 11 UNITS: 100 INJECTION, SOLUTION INTRAVENOUS; SUBCUTANEOUS at 06:02

## 2018-02-06 RX ADMIN — INSULIN ASPART 2 UNITS: 100 INJECTION, SOLUTION INTRAVENOUS; SUBCUTANEOUS at 06:02

## 2018-02-06 RX ADMIN — SODIUM CHLORIDE SOLN NEBU 3% 4 ML: 3 NEBU SOLN at 08:02

## 2018-02-06 RX ADMIN — MODAFINIL 100 MG: 100 TABLET ORAL at 01:02

## 2018-02-06 RX ADMIN — POLYETHYLENE GLYCOL 3350 17 G: 17 POWDER, FOR SOLUTION ORAL at 09:02

## 2018-02-06 RX ADMIN — CLOPIDOGREL 75 MG: 75 TABLET, FILM COATED ORAL at 09:02

## 2018-02-06 RX ADMIN — SODIUM CHLORIDE SOLN NEBU 3% 4 ML: 3 NEBU SOLN at 07:02

## 2018-02-06 RX ADMIN — HEPARIN SODIUM 5000 UNITS: 5000 INJECTION, SOLUTION INTRAVENOUS; SUBCUTANEOUS at 10:02

## 2018-02-06 NOTE — PROGRESS NOTES
[facility]  Neurocritical Care  Progress Note    Admit Date: 1/25/2018  Service Date: 02/06/2018  Length of Stay: 12    Subjective:     Chief Complaint: Embolic stroke involving left middle cerebral artery    History of Present Illness: The patient is a 47 year old male with no known PMHx admitted to [facility]   s/p thrombectomy of L M1 occlusion. Per chart review, he   walked into [place] and was acting abnormal.  EMS was called and brought to the ED for concern of L MCA syndrome. CT MP revealed short segment occlusion of left M1.  Patient went to IR for thrombectomy of L M1 occlusion seen on CTA MP.  TICI2C reperfusion and angioplasty. Patient admitted to [facility] for close monitoring and higher level of care.   History obtained from chart review only            Hospital Course: 1/25: Pt admitted to [facility] s/p L M1 thrombectomy, TICI2C reperfusion and angioplasty   1/27: large L MCA, hemicrani watch, NSGY following, insulin ggt, cardene ggt, L sided intermittent slowing eeg but no sz, +nasal trumpet for copious thick secretions, wheezing this am - improved with duo nebs, 3% nebs, and cough assist, concern for sepsis 2/2 hemodynamic changes - increased HR and BP, worsening leukocytosis, +ceftriaxone, pan cx, adrian track  1/28: continued respiratory challenges due to secretions. Aggressive pulmonary toileting. Continue monitoring for neuro worsening. Add moxifloxacin.   1/29: CTH to eval for increased edema/shift, EEG afterwards. Concern for decrease exam, no following/decreased alertness). CXR and Procal to follow leukocytosis. Hold TF until eval decreasing EXAM  1/30: neuro exam stable, increase 2%, current amount of sodium iv not suffuicient to meet target 145-155, cont abx for likely pneumonia, repeat ct in am  1/31: abrupt desaturation today requiring emergent intubation followed by bronchoscopy  Etiology likely upper airway obstruction from dried secretions  02/01: stable on vent overnight, marked  improvement on today's chest x ray, switch to spontaneous today, sodium stable at 150, start tfs  02/02: moves left side spontaneously and opens eyes off sedation,  02/03: 2% buffered NS re-started at 20mL/hr for rapid decrease in serum sodium. Enteral free water flushes discontinued. Patient has copious secretions likely pneumonia. Holding dexamethasone  2/4:  No acute events overnight.  Sodium stable.  2/5: transition insulin infusion to subcutaneous insulin     Interval History: Increase modafinil today.  Increase scheduled detemir and aspart.  Discontinue IVF.      Review of Systems: FRIEDA     Vitals:   Temp: 99.1 °F (37.3 °C)  Pulse: 81  Rhythm: normal sinus rhythm  BP: (!) 158/81  MAP (mmHg): 112  CI (L/min/m2): 1.4 L/min/m2  SVI: 18  SVV: 25 %  Resp: 17  SpO2: 99 %  Oxygen Concentration (%): 40  O2 Device (Oxygen Therapy): ventilator  Vent Mode: Spont  Set Rate: 0 bmp  Vt Set: 700 mL  Pressure Support: 5 cmH20  PEEP/CPAP: 5 cmH20  Peak Airway Pressure: 11 cmH2O  Mean Airway Pressure: 7 cmH20  Plateau Pressure: 0 cmH20    Temp  Min: 98.6 °F (37 °C)  Max: 99.3 °F (37.4 °C)  Pulse  Min: 70  Max: 100  BP  Min: 120/67  Max: 171/99  MAP (mmHg)  Min: 88  Max: 130  CI (L/min/m2)  Min: 1.4 L/min/m2  Max: 2.7 L/min/m2  SVI  Min: 18  Max: 31  SVV  Min: 9 %  Max: 25 %  Resp  Min: 17  Max: 31  SpO2  Min: 96 %  Max: 100 %  Oxygen Concentration (%)  Min: 40  Max: 40    02/05 0701 - 02/06 0700  In: 2390.3 [I.V.:1310.3]  Out: 3955 [Urine:3955]   Unmeasured Output  Urine Occurrence: 1  Stool Occurrence: 0  Pad Count: 1     Examination:   Constitutional: Well-nourished and -developed. No apparent distress.   Eyes: Conjunctiva clear, anicteric. Lids no lesions.  Head/Ears/Nose/Mouth/Throat/Neck: Moist mucous membranes. External ears, nose atraumatic.   Cardiovascular: Regular rhythm. No murmurs. No leg edema.  Respiratory: Comfortable respirations. Clear to auscultation.  Gastrointestinal: No hernia. Soft, nondistended, nontender.  + bowel sounds.     Neurologic:  -GCS E3VtM5  -LUE/LLE move spontaneously, RUE/RLE withdraws to pain  -pupils 3 mm, reactive     Medications:   Continuous  sodium chloride 0.9% Last Rate: 50 mL/hr at 02/06/18 1400   Scheduled  albuterol-ipratropium 2.5mg-0.5mg/3mL 3 mL Q6H   aspirin 81 mg Daily   atorvastatin 80 mg Daily   captopril 25 mg TID   chlorhexidine 15 mL BID   clopidogrel 75 mg Daily   famotidine 20 mg BID   heparin (porcine) 5,000 Units Q8H   insulin aspart 11 Units Q4H   insulin detemir 33 Units BID   metoprolol tartrate 25 mg BID   [START ON 2/7/2018] modafinil 200 mg Daily   And     modafinil 100 mg Daily   polyethylene glycol 17 g Daily   senna-docusate 8.6-50 mg 1 tablet BID   sodium chloride 0.9% 3 mL Q8H   sodium chloride 3% 4 mL Q6H   PRN  acetaminophen 650 mg Q6H PRN   albuterol-ipratropium 2.5mg-0.5mg/3mL 3 mL Q4H PRN   bisacodyl 10 mg Daily PRN   dextrose 50% 12.5 g PRN   fentaNYL 50 mcg Q2H PRN   glucagon (human recombinant) 1 mg PRN   glucagon (human recombinant) 1 mg PRN   hydrALAZINE 10 mg Q4H PRN   insulin aspart 1-10 Units Q4H PRN   labetalol 10 mg Q4H PRN   magnesium sulfate IVPB 2 g PRN   magnesium sulfate IVPB 4 g PRN   ondansetron 4 mg Q8H PRN   potassium chloride 10% 40 mEq PRN   potassium chloride 10% 40 mEq PRN   sodium chloride 0.9% 5 mL PRN      Today I independently reviewed pertinent medications, lines/drains/airways, imaging, cardiology, lab results, microbiology results,    Assessment/Plan:     Neuro   * Embolic stroke involving left middle cerebral artery    -s/p L M1 thrombectomy  --160  -repeat imaging stable   -ASA, plavix  -statin   -SQH  -increase modafinil               Pulmonary   Acute respiratory failure with hypoxia    -Due to airway obstruction form hardened secretions overlying epiglottis and soft palate, improved after intubation and bronchoscopy  -CXR and ABG prn           Cardiac/Vascular   Hyperlipidemia    -atorvastatin 80 daily           Essential  hypertension    --160  -metoprolol 25 mg bid  -captopril 25 mg tid            Endocrine   Type 2 diabetes mellitus    -A1C-10  -poorly controlled diabetes  -BG >400 on arrival  -insulin infusion discontinued  -detemir 33 units bid  -aspart 11 units q 4 hours               Prophylaxis:  Venous Thromboembolism: chemical  Stress Ulcer: H2B  Ventilator Pneumonia: yes     Activity Orders          Activity as tolerated starting at 02/05 1432        Full Code    Barbara Tripp PA-C  Neurocritical Care  [facility]

## 2018-02-06 NOTE — ASSESSMENT & PLAN NOTE
-s/p L M1 thrombectomy  --160  -repeat imaging stable   -ASA, plavix  -statin   -SQH  -increase modafinil

## 2018-02-06 NOTE — PLAN OF CARE
Problem: Patient Care Overview  Goal: Plan of Care Review  Outcome: Ongoing (interventions implemented as appropriate)  POC reviewed with patient; patient unable to acknowledge understanding of POC. Patient has remained free of falls, injuries and skin breakdown for the duration of the shift. VSS. No acute distress noted. No evidence of pain. Plans: increase Modafinil tomorrow to improve alertness. Will continue to monitor and update as needed.

## 2018-02-06 NOTE — PLAN OF CARE
Problem: Occupational Therapy Goal  Goal: Occupational Therapy Goal  Goals to be met by: 2/9/18    Patient will increase functional independence with ADLs by performing:    UE Dressing with Moderate Assistance.  Grooming while seated at sink with Moderate Assistance.  Toileting from bedside commode with Moderate Assistance for hygiene and clothing management.   Supine to sit with Maximum Assistance.  Stand pivot transfers with Maximum Assistance.  CG demo independence with upper extremity exercise program per handout.   Pt follow 50% of simple one-step commands  Eyes open 50% of session.      Outcome: Ongoing (interventions implemented as appropriate)  Continue with POC.   Anne swenson OT  2/6/2018

## 2018-02-06 NOTE — ASSESSMENT & PLAN NOTE
-A1C-10  -poorly controlled diabetes  -BG >400 on arrival  -insulin infusion discontinued  -detemir 33 units bid  -aspart 11 units q 4 hours

## 2018-02-06 NOTE — PT/OT/SLP PROGRESS
Occupational Therapy   Treatment    Name: Todd Quevedo  MRN: [id number]  Admitting Diagnosis:  Embolic stroke involving left middle cerebral artery       Recommendations:     Discharge Recommendations:  (TBD-- pending OOB assessment )  Discharge Equipment Recommendations:   (TBD at next level of care)  Barriers to discharge:   (Increased level of skilled assistance required at this time)    Subjective     Communicated with: RN prior to session.  Pain/Comfort:  · Pain Rating 1:  (Unable to verbalize)  · Pain Rating Post-Intervention 1: 0/10    Objective:     Patient found with: arterial line, blood pressure cuff, telemetry, pulse ox (continuous), peripheral IV, SCD, Condom Catheter, restraints    General Precautions: Standard, aspiration, fall, NPO, respiratory   Orthopedic Precautions:N/A   Braces: N/A     Activities of Daily Living:  · Grooming: dependence to wash face    Patient left with bed in chair position with all lines intact, call button in reach and RN notified    [facility] 6 Click:  [facility] Total Score: 6    Treatment & Education:  -Family edu on OT role/POC  - Communication board updated  -Pt tolerated BUEBLE AROM/PROM exercises while supine including: 1x15 reps in shoulder flexion, elbow flexion/extension, chest press, shoulder IR/ER, and digit/wrist flexion/extension; hip flexion/abduction/adduction/ IR and ER, ankle flexion/dorsiflexion. He tolerated well with VSS throughout.   - Positioning of pt supine, BUE elevated with pillow to decrease edema, BLE's in neutral position, pressure relief boots repositioned    * pt opened eyes 3x this date with max cues  * no active, purposeful movement noted in LUE ( Only demo's associated movements)     Education:    Assessment:     Todd Quevedo is a 47 y.o. male with a medical diagnosis of Embolic stroke involving left middle cerebral artery.  He presents lethargic, unable to maintain eyes open and participate in therapy session at this time.  Performance deficits  affecting function are weakness, impaired endurance, impaired self care skills, impaired sensation, decreased upper extremity function, decreased lower extremity function, decreased safety awareness, edema, impaired cognition, impaired functional mobilty.      Rehab Prognosis:  Fair; patient would benefit from acute skilled OT services to address these deficits and reach maximum level of function.       Plan:     Patient to be seen 3 x/week to address the above listed problems via self-care/home management, therapeutic activities, therapeutic exercises, neuromuscular re-education, cognitive retraining  · Plan of Care Expires: 02/23/18  · Plan of Care Reviewed with: patient    This Plan of care has been discussed with the patient who was involved in its development and understands and is in agreement with the identified goals and treatment plan    GOALS:    Occupational Therapy Goals        Problem: Occupational Therapy Goal    Goal Priority Disciplines Outcome Interventions   Occupational Therapy Goal     OT, PT/OT Ongoing (interventions implemented as appropriate)    Description:  Goals to be met by: 2/9/18    Patient will increase functional independence with ADLs by performing:    UE Dressing with Moderate Assistance.  Grooming while seated at sink with Moderate Assistance.  Toileting from bedside commode with Moderate Assistance for hygiene and clothing management.   Supine to sit with Maximum Assistance.  Stand pivot transfers with Maximum Assistance.  CG demo independence with upper extremity exercise program per handout.   Pt follow 50% of simple one-step commands  Eyes open 50% of session.                       Time Tracking:     OT Date of Treatment: 02/06/18  OT Start Time: 1245  OT Stop Time: 1308  OT Total Time (min): 23 min    Billable Minutes:Therapeutic Exercise 23    Anne swenson OT  2/6/2018

## 2018-02-07 LAB
ALBUMIN SERPL BCP-MCNC: 2.5 G/DL
ALLENS TEST: ABNORMAL
ALP SERPL-CCNC: 79 U/L
ALT SERPL W/O P-5'-P-CCNC: 25 U/L
ANION GAP SERPL CALC-SCNC: 8 MMOL/L
ANISOCYTOSIS BLD QL SMEAR: SLIGHT
AST SERPL-CCNC: 27 U/L
BASOPHILS # BLD AUTO: 0.04 K/UL
BASOPHILS NFR BLD: 0.3 %
BILIRUB SERPL-MCNC: 0.7 MG/DL
BUN SERPL-MCNC: 20 MG/DL
CALCIUM SERPL-MCNC: 8.9 MG/DL
CHLORIDE SERPL-SCNC: 105 MMOL/L
CO2 SERPL-SCNC: 27 MMOL/L
CREAT SERPL-MCNC: 0.8 MG/DL
DELSYS: ABNORMAL
DIFFERENTIAL METHOD: ABNORMAL
EOSINOPHIL # BLD AUTO: 0.2 K/UL
EOSINOPHIL NFR BLD: 1.6 %
ERYTHROCYTE [DISTWIDTH] IN BLOOD BY AUTOMATED COUNT: 12.1 %
EST. GFR  (AFRICAN AMERICAN): >60 ML/MIN/1.73 M^2
EST. GFR  (NON AFRICAN AMERICAN): >60 ML/MIN/1.73 M^2
FIO2: 40
GLUCOSE SERPL-MCNC: 109 MG/DL
HCO3 UR-SCNC: 31.6 MMOL/L (ref 24–28)
HCT VFR BLD AUTO: 37.4 %
HGB BLD-MCNC: 12.9 G/DL
IMM GRANULOCYTES # BLD AUTO: 0.11 K/UL
IMM GRANULOCYTES NFR BLD AUTO: 0.8 %
INR PPP: 0.9
LYMPHOCYTES # BLD AUTO: 2.4 K/UL
LYMPHOCYTES NFR BLD: 18.3 %
MAGNESIUM SERPL-MCNC: 2 MG/DL
MCH RBC QN AUTO: 29.7 PG
MCHC RBC AUTO-ENTMCNC: 34.5 G/DL
MCV RBC AUTO: 86 FL
MODE: ABNORMAL
MONOCYTES # BLD AUTO: 1.1 K/UL
MONOCYTES NFR BLD: 8.4 %
NEUTROPHILS # BLD AUTO: 9.2 K/UL
NEUTROPHILS NFR BLD: 70.6 %
NRBC BLD-RTO: 0 /100 WBC
PCO2 BLDA: 42.8 MMHG (ref 35–45)
PH SMN: 7.48 [PH] (ref 7.35–7.45)
PHOSPHATE SERPL-MCNC: 3.2 MG/DL
PLATELET # BLD AUTO: 337 K/UL
PLATELET BLD QL SMEAR: ABNORMAL
PMV BLD AUTO: 9.9 FL
PO2 BLDA: 124 MMHG (ref 80–100)
POC BE: 8 MMOL/L
POC SATURATED O2: 99 % (ref 95–100)
POC TCO2: 33 MMOL/L (ref 23–27)
POCT GLUCOSE: 108 MG/DL (ref 70–110)
POCT GLUCOSE: 115 MG/DL (ref 70–110)
POCT GLUCOSE: 120 MG/DL (ref 70–110)
POCT GLUCOSE: 152 MG/DL (ref 70–110)
POCT GLUCOSE: 165 MG/DL (ref 70–110)
POCT GLUCOSE: 190 MG/DL (ref 70–110)
POCT GLUCOSE: 77 MG/DL (ref 70–110)
POCT GLUCOSE: 83 MG/DL (ref 70–110)
POTASSIUM SERPL-SCNC: 3.8 MMOL/L
POTASSIUM SERPL-SCNC: 4 MMOL/L
PROT SERPL-MCNC: 6.9 G/DL
PROTHROMBIN TIME: 9.9 SEC
RBC # BLD AUTO: 4.35 M/UL
SAMPLE: ABNORMAL
SITE: ABNORMAL
SODIUM SERPL-SCNC: 140 MMOL/L
WBC # BLD AUTO: 13.08 K/UL

## 2018-02-07 PROCEDURE — 85610 PROTHROMBIN TIME: CPT

## 2018-02-07 PROCEDURE — 63600175 PHARM REV CODE 636 W HCPCS: Performed by: PSYCHIATRY & NEUROLOGY

## 2018-02-07 PROCEDURE — 20000000 HC ICU ROOM

## 2018-02-07 PROCEDURE — 94003 VENT MGMT INPAT SUBQ DAY: CPT

## 2018-02-07 PROCEDURE — 25000242 PHARM REV CODE 250 ALT 637 W/ HCPCS: Performed by: PSYCHIATRY & NEUROLOGY

## 2018-02-07 PROCEDURE — 25000003 PHARM REV CODE 250: Performed by: PSYCHIATRY & NEUROLOGY

## 2018-02-07 PROCEDURE — 63600175 PHARM REV CODE 636 W HCPCS: Performed by: NURSE PRACTITIONER

## 2018-02-07 PROCEDURE — 84132 ASSAY OF SERUM POTASSIUM: CPT

## 2018-02-07 PROCEDURE — 27000221 HC OXYGEN, UP TO 24 HOURS

## 2018-02-07 PROCEDURE — 83735 ASSAY OF MAGNESIUM: CPT

## 2018-02-07 PROCEDURE — 82803 BLOOD GASES ANY COMBINATION: CPT

## 2018-02-07 PROCEDURE — 80053 COMPREHEN METABOLIC PANEL: CPT

## 2018-02-07 PROCEDURE — 25000003 PHARM REV CODE 250: Performed by: PHYSICIAN ASSISTANT

## 2018-02-07 PROCEDURE — 99900026 HC AIRWAY MAINTENANCE (STAT)

## 2018-02-07 PROCEDURE — 25000003 PHARM REV CODE 250: Performed by: NURSE PRACTITIONER

## 2018-02-07 PROCEDURE — 84100 ASSAY OF PHOSPHORUS: CPT

## 2018-02-07 PROCEDURE — 37799 UNLISTED PX VASCULAR SURGERY: CPT

## 2018-02-07 PROCEDURE — 94640 AIRWAY INHALATION TREATMENT: CPT

## 2018-02-07 PROCEDURE — 85025 COMPLETE CBC W/AUTO DIFF WBC: CPT

## 2018-02-07 PROCEDURE — 94761 N-INVAS EAR/PLS OXIMETRY MLT: CPT

## 2018-02-07 PROCEDURE — 99233 SBSQ HOSP IP/OBS HIGH 50: CPT | Mod: ,,, | Performed by: PHYSICIAN ASSISTANT

## 2018-02-07 PROCEDURE — A4216 STERILE WATER/SALINE, 10 ML: HCPCS | Performed by: NURSE PRACTITIONER

## 2018-02-07 RX ORDER — INSULIN ASPART 100 [IU]/ML
8 INJECTION, SOLUTION INTRAVENOUS; SUBCUTANEOUS EVERY 4 HOURS
Status: DISCONTINUED | OUTPATIENT
Start: 2018-02-07 | End: 2018-02-09

## 2018-02-07 RX ADMIN — INSULIN ASPART 8 UNITS: 100 INJECTION, SOLUTION INTRAVENOUS; SUBCUTANEOUS at 05:02

## 2018-02-07 RX ADMIN — IPRATROPIUM BROMIDE AND ALBUTEROL SULFATE 3 ML: .5; 3 SOLUTION RESPIRATORY (INHALATION) at 01:02

## 2018-02-07 RX ADMIN — INSULIN ASPART 11 UNITS: 100 INJECTION, SOLUTION INTRAVENOUS; SUBCUTANEOUS at 02:02

## 2018-02-07 RX ADMIN — ATORVASTATIN CALCIUM 80 MG: 20 TABLET, FILM COATED ORAL at 08:02

## 2018-02-07 RX ADMIN — SODIUM CHLORIDE SOLN NEBU 3% 4 ML: 3 NEBU SOLN at 07:02

## 2018-02-07 RX ADMIN — HEPARIN SODIUM 5000 UNITS: 5000 INJECTION, SOLUTION INTRAVENOUS; SUBCUTANEOUS at 05:02

## 2018-02-07 RX ADMIN — CAPTOPRIL 25 MG: 25 TABLET ORAL at 09:02

## 2018-02-07 RX ADMIN — ACETAMINOPHEN 650 MG: 325 TABLET, FILM COATED ORAL at 10:02

## 2018-02-07 RX ADMIN — HEPARIN SODIUM 5000 UNITS: 5000 INJECTION, SOLUTION INTRAVENOUS; SUBCUTANEOUS at 09:02

## 2018-02-07 RX ADMIN — INSULIN ASPART 5 UNITS: 100 INJECTION, SOLUTION INTRAVENOUS; SUBCUTANEOUS at 12:02

## 2018-02-07 RX ADMIN — ASPIRIN 81 MG CHEWABLE TABLET 81 MG: 81 TABLET CHEWABLE at 08:02

## 2018-02-07 RX ADMIN — INSULIN ASPART 8 UNITS: 100 INJECTION, SOLUTION INTRAVENOUS; SUBCUTANEOUS at 10:02

## 2018-02-07 RX ADMIN — IPRATROPIUM BROMIDE AND ALBUTEROL SULFATE 3 ML: .5; 3 SOLUTION RESPIRATORY (INHALATION) at 07:02

## 2018-02-07 RX ADMIN — Medication 3 ML: at 12:02

## 2018-02-07 RX ADMIN — STANDARDIZED SENNA CONCENTRATE AND DOCUSATE SODIUM 1 TABLET: 8.6; 5 TABLET, FILM COATED ORAL at 09:02

## 2018-02-07 RX ADMIN — Medication 3 ML: at 06:02

## 2018-02-07 RX ADMIN — INSULIN ASPART 1 UNITS: 100 INJECTION, SOLUTION INTRAVENOUS; SUBCUTANEOUS at 10:02

## 2018-02-07 RX ADMIN — METOPROLOL TARTRATE 25 MG: 25 TABLET ORAL at 08:02

## 2018-02-07 RX ADMIN — METOPROLOL TARTRATE 25 MG: 25 TABLET ORAL at 09:02

## 2018-02-07 RX ADMIN — MODAFINIL 200 MG: 100 TABLET ORAL at 05:02

## 2018-02-07 RX ADMIN — INSULIN ASPART 11 UNITS: 100 INJECTION, SOLUTION INTRAVENOUS; SUBCUTANEOUS at 06:02

## 2018-02-07 RX ADMIN — SODIUM CHLORIDE SOLN NEBU 3% 4 ML: 3 NEBU SOLN at 01:02

## 2018-02-07 RX ADMIN — HYDRALAZINE HYDROCHLORIDE 10 MG: 20 INJECTION INTRAMUSCULAR; INTRAVENOUS at 02:02

## 2018-02-07 RX ADMIN — FAMOTIDINE 20 MG: 20 TABLET, FILM COATED ORAL at 08:02

## 2018-02-07 RX ADMIN — CHLORHEXIDINE GLUCONATE 15 ML: 1.2 RINSE ORAL at 09:02

## 2018-02-07 RX ADMIN — FAMOTIDINE 20 MG: 20 TABLET, FILM COATED ORAL at 09:02

## 2018-02-07 RX ADMIN — CAPTOPRIL 25 MG: 25 TABLET ORAL at 05:02

## 2018-02-07 RX ADMIN — HEPARIN SODIUM 5000 UNITS: 5000 INJECTION, SOLUTION INTRAVENOUS; SUBCUTANEOUS at 01:02

## 2018-02-07 RX ADMIN — CHLORHEXIDINE GLUCONATE 15 ML: 1.2 RINSE ORAL at 08:02

## 2018-02-07 RX ADMIN — CLOPIDOGREL 75 MG: 75 TABLET, FILM COATED ORAL at 08:02

## 2018-02-07 RX ADMIN — CAPTOPRIL 25 MG: 25 TABLET ORAL at 01:02

## 2018-02-07 RX ADMIN — POTASSIUM CHLORIDE 40 MEQ: 20 SOLUTION ORAL at 05:02

## 2018-02-07 RX ADMIN — MODAFINIL 100 MG: 100 TABLET ORAL at 01:02

## 2018-02-07 RX ADMIN — Medication 3 ML: at 10:02

## 2018-02-07 NOTE — PROGRESS NOTES
[facility]  Neurocritical Care  Progress Note    Admit Date: 1/25/2018  Service Date: 02/07/2018  Length of Stay: 13    Subjective:     Chief Complaint: Embolic stroke involving left middle cerebral artery    History of Present Illness: The patient is a 47 year old male with no known PMHx admitted to [facility]   s/p thrombectomy of L M1 occlusion. Per chart review, he   walked into [place] and was acting abnormal.  EMS was called and brought to the ED for concern of L MCA syndrome. CT MP revealed short segment occlusion of left M1.  Patient went to IR for thrombectomy of L M1 occlusion seen on CTA MP.  TICI2C reperfusion and angioplasty. Patient admitted to [facility] for close monitoring and higher level of care.   History obtained from chart review only            Hospital Course: 1/25: Pt admitted to [facility] s/p L M1 thrombectomy, TICI2C reperfusion and angioplasty   1/27: large L MCA, hemicrani watch, NSGY following, insulin ggt, cardene ggt, L sided intermittent slowing eeg but no sz, +nasal trumpet for copious thick secretions, wheezing this am - improved with duo nebs, 3% nebs, and cough assist, concern for sepsis 2/2 hemodynamic changes - increased HR and BP, worsening leukocytosis, +ceftriaxone, pan cx, adrian track  1/28: continued respiratory challenges due to secretions. Aggressive pulmonary toileting. Continue monitoring for neuro worsening. Add moxifloxacin.   1/29: CTH to eval for increased edema/shift, EEG afterwards. Concern for decrease exam, no following/decreased alertness). CXR and Procal to follow leukocytosis. Hold TF until eval decreasing EXAM  1/30: neuro exam stable, increase 2%, current amount of sodium iv not suffuicient to meet target 145-155, cont abx for likely pneumonia, repeat ct in am  1/31: abrupt desaturation today requiring emergent intubation followed by bronchoscopy  Etiology likely upper airway obstruction from dried secretions  02/01: stable on vent overnight, marked  improvement on today's chest x ray, switch to spontaneous today, sodium stable at 150, start tfs  02/02: moves left side spontaneously and opens eyes off sedation,  02/03: 2% buffered NS re-started at 20mL/hr for rapid decrease in serum sodium. Enteral free water flushes discontinued. Patient has copious secretions likely pneumonia. Holding dexamethasone  2/4:  No acute events overnight.  Sodium stable.  2/5: transition insulin infusion to subcutaneous insulin   2/7: gen surg consulted for trach/peg    Interval History: General surgery consulted for trach/peg.  Continue modafinil at current dose.      Review of Systems: FRIEDA 2/2 aphasia     Vitals:   Temp: 98.7 °F (37.1 °C)  Pulse: 79  Rhythm: normal sinus rhythm  BP: 116/68  MAP (mmHg): 86  CI (L/min/m2): 1.7 L/min/m2  SVI: 25  SVV: 7 %  Resp: (!) 23  SpO2: 99 %  Oxygen Concentration (%): 40  O2 Device (Oxygen Therapy): ventilator  Vent Mode: Spont  Set Rate: 0 bmp  Vt Set: 700 mL  Pressure Support: 5 cmH20  PEEP/CPAP: 5 cmH20  Peak Airway Pressure: 11 cmH2O  Mean Airway Pressure: 7.2 cmH20  Plateau Pressure: 0 cmH20    Temp  Min: 98.5 °F (36.9 °C)  Max: 100 °F (37.8 °C)  Pulse  Min: 63  Max: 96  BP  Min: 113/69  Max: 171/96  MAP (mmHg)  Min: 86  Max: 123  CI (L/min/m2)  Min: 1.7 L/min/m2  Max: 2.2 L/min/m2  SVI  Min: 25  Max: 28  SVV  Min: 7 %  Max: 19 %  Resp  Min: 13  Max: 32  SpO2  Min: 95 %  Max: 100 %  Oxygen Concentration (%)  Min: 40  Max: 40    02/06 0701 - 02/07 0700  In: 1720 [I.V.:550]  Out: 2265 [Urine:2265]   Unmeasured Output  Urine Occurrence: 1  Stool Occurrence: 1  Pad Count: 1     Examination:   Constitutional: Well-nourished and -developed. No apparent distress.   Eyes: Conjunctiva clear, anicteric. Lids no lesions.  Head/Ears/Nose/Mouth/Throat/Neck: Moist mucous membranes. External ears, nose atraumatic.   Cardiovascular: Regular rhythm. No murmurs. No leg edema.  Respiratory: Comfortable respirations. Clear to auscultation.  Gastrointestinal:  No hernia. Soft, nondistended, nontender. + bowel sounds.     Neurologic:  -GCS E3VtM5  -LUE/LLE move spontaneously, RUE/RLE withdraws to pain  -pupils 3 mm, reactive     Medications:   Continuous Scheduled  albuterol-ipratropium 2.5mg-0.5mg/3mL 3 mL Q6H   aspirin 81 mg Daily   atorvastatin 80 mg Daily   captopril 25 mg TID   chlorhexidine 15 mL BID   clopidogrel 75 mg Daily   famotidine 20 mg BID   heparin (porcine) 5,000 Units Q8H   insulin aspart 11 Units Q4H   insulin detemir 33 Units BID   metoprolol tartrate 25 mg BID   modafinil 200 mg Daily   And     modafinil 100 mg Daily   polyethylene glycol 17 g Daily   senna-docusate 8.6-50 mg 1 tablet BID   sodium chloride 0.9% 3 mL Q8H   sodium chloride 3% 4 mL Q6H   PRN  acetaminophen 650 mg Q6H PRN   albuterol-ipratropium 2.5mg-0.5mg/3mL 3 mL Q4H PRN   bisacodyl 10 mg Daily PRN   dextrose 50% 12.5 g PRN   fentaNYL 50 mcg Q2H PRN   glucagon (human recombinant) 1 mg PRN   hydrALAZINE 10 mg Q4H PRN   insulin aspart 1-10 Units Q4H PRN   labetalol 10 mg Q4H PRN   magnesium sulfate IVPB 2 g PRN   magnesium sulfate IVPB 4 g PRN   ondansetron 4 mg Q8H PRN   potassium chloride 10% 40 mEq PRN   potassium chloride 10% 40 mEq PRN   sodium chloride 0.9% 5 mL PRN      Today I independently reviewed pertinent medications, lines/drains/airways, imaging, cardiology, lab results, microbiology results,     Assessment/Plan:     Neuro   * Embolic stroke involving left middle cerebral artery    -s/p L M1 thrombectomy  --160  -repeat imaging stable   -ASA, plavix  -statin   -SQH  -continue modafinil               Pulmonary   Acute respiratory failure with hypoxia    -Due to airway obstruction form hardened secretions overlying epiglottis and soft palate, improved after intubation and bronchoscopy  -CXR and ABG prn   -general surgery consulted for trach/peg        Cardiac/Vascular   Hyperlipidemia    -atorvastatin 80 daily           Essential hypertension    -SBP  100-160  -metoprolol 25 mg bid  -captopril 25 mg tid            Endocrine   Type 2 diabetes mellitus    -A1C-10  -poorly controlled diabetes  -BG >400 on arrival  -insulin infusion discontinued  -detemir 33 units bid  -aspart 11 units q 4 hours               Prophylaxis:  Venous Thromboembolism: chemical  Stress Ulcer: H2B  Ventilator Pneumonia: yes     Activity Orders          Activity as tolerated starting at 02/05 1432        Full Code    Barbara Tripp PA-C  Neurocritical Care  [facility]

## 2018-02-07 NOTE — ASSESSMENT & PLAN NOTE
-Due to airway obstruction form hardened secretions overlying epiglottis and soft palate, improved after intubation and bronchoscopy  -CXR and ABG prn   -general surgery consulted for trach/peg

## 2018-02-07 NOTE — PLAN OF CARE
Problem: Patient Care Overview  Goal: Plan of Care Review  Outcome: Ongoing (interventions implemented as appropriate)  No acute events throughout day. Arterial line removed per order during shift. Ventilator continues to be weaned as pt tolerates. Tube feedings increased to a rate of 60 ml/hr per order; pt tolerating well. VS and assessment per flow sheet. Patient progressing towards goals as tolerated. Plan of care reviewed with Todd Quevedo and spouse. Unable to determine pt level of understanding due to being intubated at this time. All family questions and concerns addressed, will continue to monitor.

## 2018-02-07 NOTE — PLAN OF CARE
Cont mech vent - will consider extubation if alertness improves  D/c a line  New leucocytosis, will monitor   Adjust insulin regimen off tf

## 2018-02-07 NOTE — ASSESSMENT & PLAN NOTE
-s/p L M1 thrombectomy  --160  -repeat imaging stable   -ASA, plavix  -statin   -SQH  -continue modafinil

## 2018-02-08 LAB
ALBUMIN SERPL BCP-MCNC: 2.4 G/DL
ALLENS TEST: ABNORMAL
ALP SERPL-CCNC: 84 U/L
ALT SERPL W/O P-5'-P-CCNC: 23 U/L
ANION GAP SERPL CALC-SCNC: 8 MMOL/L
AST SERPL-CCNC: 25 U/L
BASOPHILS # BLD AUTO: 0.02 K/UL
BASOPHILS NFR BLD: 0.2 %
BILIRUB SERPL-MCNC: 0.7 MG/DL
BUN SERPL-MCNC: 23 MG/DL
CALCIUM SERPL-MCNC: 8.9 MG/DL
CHLORIDE SERPL-SCNC: 102 MMOL/L
CO2 SERPL-SCNC: 28 MMOL/L
CREAT SERPL-MCNC: 0.9 MG/DL
DELSYS: ABNORMAL
DIFFERENTIAL METHOD: ABNORMAL
EOSINOPHIL # BLD AUTO: 0.2 K/UL
EOSINOPHIL NFR BLD: 1.9 %
ERYTHROCYTE [DISTWIDTH] IN BLOOD BY AUTOMATED COUNT: 12.2 %
ERYTHROCYTE [SEDIMENTATION RATE] IN BLOOD BY WESTERGREN METHOD: 16 MM/H
EST. GFR  (AFRICAN AMERICAN): >60 ML/MIN/1.73 M^2
EST. GFR  (NON AFRICAN AMERICAN): >60 ML/MIN/1.73 M^2
FIO2: 40
FLOW: 60
GLUCOSE SERPL-MCNC: 232 MG/DL
HCO3 UR-SCNC: 30.7 MMOL/L (ref 24–28)
HCT VFR BLD AUTO: 33.3 %
HGB BLD-MCNC: 11.7 G/DL
IMM GRANULOCYTES # BLD AUTO: 0.08 K/UL
IMM GRANULOCYTES NFR BLD AUTO: 0.7 %
INR PPP: 1
LYMPHOCYTES # BLD AUTO: 1.6 K/UL
LYMPHOCYTES NFR BLD: 14.9 %
MAGNESIUM SERPL-MCNC: 1.9 MG/DL
MCH RBC QN AUTO: 29.8 PG
MCHC RBC AUTO-ENTMCNC: 35.1 G/DL
MCV RBC AUTO: 85 FL
MODE: ABNORMAL
MONOCYTES # BLD AUTO: 0.9 K/UL
MONOCYTES NFR BLD: 8.8 %
NEUTROPHILS # BLD AUTO: 7.8 K/UL
NEUTROPHILS NFR BLD: 73.5 %
NRBC BLD-RTO: 0 /100 WBC
PCO2 BLDA: 41.2 MMHG (ref 35–45)
PH SMN: 7.48 [PH] (ref 7.35–7.45)
PHOSPHATE SERPL-MCNC: 3.4 MG/DL
PLATELET # BLD AUTO: 389 K/UL
PMV BLD AUTO: 10 FL
PO2 BLDA: 118 MMHG (ref 80–100)
POC BE: 7 MMOL/L
POC SATURATED O2: 99 % (ref 95–100)
POC TCO2: 32 MMOL/L (ref 23–27)
POCT GLUCOSE: 152 MG/DL (ref 70–110)
POCT GLUCOSE: 174 MG/DL (ref 70–110)
POCT GLUCOSE: 189 MG/DL (ref 70–110)
POCT GLUCOSE: 197 MG/DL (ref 70–110)
POCT GLUCOSE: 198 MG/DL (ref 70–110)
POCT GLUCOSE: 202 MG/DL (ref 70–110)
POCT GLUCOSE: 206 MG/DL (ref 70–110)
POTASSIUM SERPL-SCNC: 4.1 MMOL/L
PROT SERPL-MCNC: 6.6 G/DL
PROTHROMBIN TIME: 10.1 SEC
RBC # BLD AUTO: 3.92 M/UL
SAMPLE: ABNORMAL
SITE: ABNORMAL
SODIUM SERPL-SCNC: 138 MMOL/L
SP02: 98
WBC # BLD AUTO: 10.67 K/UL

## 2018-02-08 PROCEDURE — 25000003 PHARM REV CODE 250: Performed by: PHYSICIAN ASSISTANT

## 2018-02-08 PROCEDURE — 80053 COMPREHEN METABOLIC PANEL: CPT

## 2018-02-08 PROCEDURE — 94761 N-INVAS EAR/PLS OXIMETRY MLT: CPT

## 2018-02-08 PROCEDURE — 25000003 PHARM REV CODE 250: Performed by: PSYCHIATRY & NEUROLOGY

## 2018-02-08 PROCEDURE — 82800 BLOOD PH: CPT

## 2018-02-08 PROCEDURE — 20000000 HC ICU ROOM

## 2018-02-08 PROCEDURE — 97530 THERAPEUTIC ACTIVITIES: CPT

## 2018-02-08 PROCEDURE — 63600175 PHARM REV CODE 636 W HCPCS: Performed by: PHYSICIAN ASSISTANT

## 2018-02-08 PROCEDURE — 63600175 PHARM REV CODE 636 W HCPCS: Performed by: PSYCHIATRY & NEUROLOGY

## 2018-02-08 PROCEDURE — 84100 ASSAY OF PHOSPHORUS: CPT

## 2018-02-08 PROCEDURE — 99223 1ST HOSP IP/OBS HIGH 75: CPT | Mod: ,,, | Performed by: SURGERY

## 2018-02-08 PROCEDURE — 25000003 PHARM REV CODE 250: Performed by: NURSE PRACTITIONER

## 2018-02-08 PROCEDURE — 94640 AIRWAY INHALATION TREATMENT: CPT

## 2018-02-08 PROCEDURE — 25000242 PHARM REV CODE 250 ALT 637 W/ HCPCS: Performed by: PSYCHIATRY & NEUROLOGY

## 2018-02-08 PROCEDURE — A4216 STERILE WATER/SALINE, 10 ML: HCPCS | Performed by: NURSE PRACTITIONER

## 2018-02-08 PROCEDURE — 85610 PROTHROMBIN TIME: CPT

## 2018-02-08 PROCEDURE — 27000221 HC OXYGEN, UP TO 24 HOURS

## 2018-02-08 PROCEDURE — 94003 VENT MGMT INPAT SUBQ DAY: CPT

## 2018-02-08 PROCEDURE — 36600 WITHDRAWAL OF ARTERIAL BLOOD: CPT

## 2018-02-08 PROCEDURE — 85025 COMPLETE CBC W/AUTO DIFF WBC: CPT

## 2018-02-08 PROCEDURE — 83735 ASSAY OF MAGNESIUM: CPT

## 2018-02-08 PROCEDURE — 99233 SBSQ HOSP IP/OBS HIGH 50: CPT | Mod: ,,, | Performed by: PHYSICIAN ASSISTANT

## 2018-02-08 PROCEDURE — 82803 BLOOD GASES ANY COMBINATION: CPT

## 2018-02-08 PROCEDURE — 99900026 HC AIRWAY MAINTENANCE (STAT)

## 2018-02-08 RX ORDER — ENOXAPARIN SODIUM 100 MG/ML
40 INJECTION SUBCUTANEOUS EVERY 24 HOURS
Status: DISCONTINUED | OUTPATIENT
Start: 2018-02-08 | End: 2018-02-13

## 2018-02-08 RX ADMIN — CAPTOPRIL 25 MG: 25 TABLET ORAL at 01:02

## 2018-02-08 RX ADMIN — CAPTOPRIL 25 MG: 25 TABLET ORAL at 09:02

## 2018-02-08 RX ADMIN — INSULIN ASPART 8 UNITS: 100 INJECTION, SOLUTION INTRAVENOUS; SUBCUTANEOUS at 05:02

## 2018-02-08 RX ADMIN — STANDARDIZED SENNA CONCENTRATE AND DOCUSATE SODIUM 1 TABLET: 8.6; 5 TABLET, FILM COATED ORAL at 09:02

## 2018-02-08 RX ADMIN — IPRATROPIUM BROMIDE AND ALBUTEROL SULFATE 3 ML: .5; 3 SOLUTION RESPIRATORY (INHALATION) at 07:02

## 2018-02-08 RX ADMIN — ASPIRIN 81 MG CHEWABLE TABLET 81 MG: 81 TABLET CHEWABLE at 08:02

## 2018-02-08 RX ADMIN — ATORVASTATIN CALCIUM 80 MG: 20 TABLET, FILM COATED ORAL at 08:02

## 2018-02-08 RX ADMIN — CLOPIDOGREL 75 MG: 75 TABLET, FILM COATED ORAL at 08:02

## 2018-02-08 RX ADMIN — CHLORHEXIDINE GLUCONATE 15 ML: 1.2 RINSE ORAL at 08:02

## 2018-02-08 RX ADMIN — SODIUM CHLORIDE SOLN NEBU 3% 4 ML: 3 NEBU SOLN at 01:02

## 2018-02-08 RX ADMIN — IPRATROPIUM BROMIDE AND ALBUTEROL SULFATE 3 ML: .5; 3 SOLUTION RESPIRATORY (INHALATION) at 08:02

## 2018-02-08 RX ADMIN — INSULIN ASPART 2 UNITS: 100 INJECTION, SOLUTION INTRAVENOUS; SUBCUTANEOUS at 05:02

## 2018-02-08 RX ADMIN — Medication 3 ML: at 05:02

## 2018-02-08 RX ADMIN — INSULIN ASPART 2 UNITS: 100 INJECTION, SOLUTION INTRAVENOUS; SUBCUTANEOUS at 09:02

## 2018-02-08 RX ADMIN — SODIUM CHLORIDE SOLN NEBU 3% 4 ML: 3 NEBU SOLN at 12:02

## 2018-02-08 RX ADMIN — INSULIN ASPART 8 UNITS: 100 INJECTION, SOLUTION INTRAVENOUS; SUBCUTANEOUS at 09:02

## 2018-02-08 RX ADMIN — CAPTOPRIL 25 MG: 25 TABLET ORAL at 05:02

## 2018-02-08 RX ADMIN — STANDARDIZED SENNA CONCENTRATE AND DOCUSATE SODIUM 1 TABLET: 8.6; 5 TABLET, FILM COATED ORAL at 08:02

## 2018-02-08 RX ADMIN — ACETAMINOPHEN 650 MG: 325 TABLET, FILM COATED ORAL at 08:02

## 2018-02-08 RX ADMIN — Medication 3 ML: at 01:02

## 2018-02-08 RX ADMIN — POLYETHYLENE GLYCOL 3350 17 G: 17 POWDER, FOR SOLUTION ORAL at 08:02

## 2018-02-08 RX ADMIN — INSULIN ASPART 8 UNITS: 100 INJECTION, SOLUTION INTRAVENOUS; SUBCUTANEOUS at 02:02

## 2018-02-08 RX ADMIN — CHLORHEXIDINE GLUCONATE 15 ML: 1.2 RINSE ORAL at 09:02

## 2018-02-08 RX ADMIN — FENTANYL CITRATE 50 MCG: 50 INJECTION INTRAMUSCULAR; INTRAVENOUS at 01:02

## 2018-02-08 RX ADMIN — METOPROLOL TARTRATE 25 MG: 25 TABLET ORAL at 08:02

## 2018-02-08 RX ADMIN — ENOXAPARIN SODIUM 40 MG: 100 INJECTION SUBCUTANEOUS at 04:02

## 2018-02-08 RX ADMIN — METOPROLOL TARTRATE 25 MG: 25 TABLET ORAL at 09:02

## 2018-02-08 RX ADMIN — SODIUM CHLORIDE SOLN NEBU 3% 4 ML: 3 NEBU SOLN at 07:02

## 2018-02-08 RX ADMIN — SODIUM CHLORIDE SOLN NEBU 3% 4 ML: 3 NEBU SOLN at 08:02

## 2018-02-08 RX ADMIN — FAMOTIDINE 20 MG: 20 TABLET, FILM COATED ORAL at 09:02

## 2018-02-08 RX ADMIN — MODAFINIL 100 MG: 100 TABLET ORAL at 01:02

## 2018-02-08 RX ADMIN — IPRATROPIUM BROMIDE AND ALBUTEROL SULFATE 3 ML: .5; 3 SOLUTION RESPIRATORY (INHALATION) at 01:02

## 2018-02-08 RX ADMIN — FAMOTIDINE 20 MG: 20 TABLET, FILM COATED ORAL at 08:02

## 2018-02-08 RX ADMIN — INSULIN ASPART 8 UNITS: 100 INJECTION, SOLUTION INTRAVENOUS; SUBCUTANEOUS at 01:02

## 2018-02-08 RX ADMIN — Medication 3 ML: at 09:02

## 2018-02-08 RX ADMIN — IPRATROPIUM BROMIDE AND ALBUTEROL SULFATE 3 ML: .5; 3 SOLUTION RESPIRATORY (INHALATION) at 12:02

## 2018-02-08 RX ADMIN — HEPARIN SODIUM 5000 UNITS: 5000 INJECTION, SOLUTION INTRAVENOUS; SUBCUTANEOUS at 05:02

## 2018-02-08 RX ADMIN — MODAFINIL 200 MG: 100 TABLET ORAL at 05:02

## 2018-02-08 RX ADMIN — INSULIN ASPART 2 UNITS: 100 INJECTION, SOLUTION INTRAVENOUS; SUBCUTANEOUS at 02:02

## 2018-02-08 RX ADMIN — ACETAMINOPHEN 650 MG: 325 TABLET, FILM COATED ORAL at 10:02

## 2018-02-08 NOTE — PLAN OF CARE
Plan to proceed with trach and peg.  I discussed with his wife 2/7/18 concerning decision of trach and peg if his alertness does not improve. Will plan to reconnect with her today

## 2018-02-08 NOTE — ASSESSMENT & PLAN NOTE
-A1C-10  -poorly controlled diabetes  -BG >400 on arrival  -insulin infusion discontinued  -detemir 24 units bid  -aspart 8 units q 4 hours

## 2018-02-08 NOTE — PLAN OF CARE
Problem: Patient Care Overview  Goal: Plan of Care Review  Outcome: Outcome(s) achieved Date Met: 02/08/18  POC reviewed with pt at 0200. Pt displayed no evidence of learning due to being intubated. Questions and concerns addressed. No acute events today. Pt progressing toward goals. Will continue to monitor. See flowsheets for full assessment and VS info.

## 2018-02-08 NOTE — PLAN OF CARE
Patient's chart was reviewed by a stroke team provider.  Patient intubated and non neurologic issues with greater urgency.  There is no new imaging to review.  Pending diagnostics to follow up on include: None.  For other recommendations please see our previous note completed on: 02/05/2018.    There are no new recommendations at this time. Will continue to follow. Discussed patient with staff. Please contact stroke team for any questions or concerns.     Jarred Henderson MD  Internal Medicine PGY-1  [facility]

## 2018-02-08 NOTE — PLAN OF CARE
Problem: Occupational Therapy Goal  Goal: Occupational Therapy Goal  Goals to be met by: 2/9/18    Patient will increase functional independence with ADLs by performing:    UE Dressing with Moderate Assistance.  Grooming while seated at sink with Moderate Assistance.  Toileting from bedside commode with Moderate Assistance for hygiene and clothing management.   Supine to sit with Maximum Assistance.  Stand pivot transfers with Maximum Assistance.  CG demo independence with upper extremity exercise program per handout.   Pt follow 50% of simple one-step commands  Eyes open 50% of session.        POC remains appropriate.    ISAMAR Chang  2/8/2018

## 2018-02-08 NOTE — PLAN OF CARE
DAMIAN advised during huddle yesterday that the Pt may get a trache/peg. Waiting on family to consent.     [facility]  [phone] regarding this Pt. She is out through today, but has another person covering. DAMIAN contacted Nelly Christie ext [phone] to discuss obtaining an LTAC list in network. Spoke with Kristin who is covering those Pt today. She reported she will check on LTAC that they cover and contact DAMIAN with the list.    DAMIAN [facility] (DAMIAN- ext [phone]) who reported they will come up with a list and the assigned SW will be back tomorrow. She will have her fax the list once tomorrow to this SW.     Kristin Sal, CHAVEZ  Neurocritical Care   [facility]  [phone]

## 2018-02-08 NOTE — PLAN OF CARE
Problem: Patient Care Overview  Goal: Plan of Care Review  Outcome: Ongoing (interventions implemented as appropriate)  No acute events throughout day. VS and assessment per flow sheet. Pt remains in left wrist nonviolent restraint due to attempting to pull at lines- no injuries present. Patient progressing towards goals as tolerated. Bath completed during shift. Plan of care reviewed with Todd Quevedo and sister. Unable to determine pt level of understanding due to pt being intubated at this time. All family question and concerns addressed, will continue to monitor.

## 2018-02-08 NOTE — CONSULTS
[facility]  General Surgery  History & Physical    Patient Name: Todd Quevedo  MRN: [id number]  Admission Date: 1/25/2018  Attending Physician: Elvis Echavarria MD   Primary Care Provider: Primary Doctor No    Patient information was obtained from past medical records.     Subjective:     Chief Complaint/Reason for Admission: Stroke    History of Present Illness:  Patient is a 47 y.o. male who was admitted on 1/25/18 following an episode of altered mental status that was caused by a L M1 occlusion. He underwent a L M1 thrombectomy, TICI2C reperfusion and angioplasty. As a result of his large MCA stroke, he has had a change in mental status requiring prolonged intubation and decreased levels of consciousness. During his hospital course he was extubated and then emergently reintubated and bronched due to excessive secretions. Currently he is on a ventillator with minimal settings. He spontaneously moves his left side but does not follow commands. General surgery was consulted for trach/peg placement.    No current facility-administered medications on file prior to encounter.      No current outpatient prescriptions on file prior to encounter.       Review of patient's allergies indicates:  No Known Allergies    History reviewed. No pertinent past medical history.  History reviewed. No pertinent surgical history.  Family History     None        Social History Main Topics    Smoking status: Unknown If Ever Smoked    Smokeless tobacco: Not on file    Alcohol use Not on file    Drug use: Unknown    Sexual activity: Not on file     Review of Systems   Unable to perform ROS: Intubated     Objective:     Vital Signs (Most Recent):  Temp: 99.8 °F (37.7 °C) (02/08/18 1100)  Pulse: 76 (02/08/18 1317)  Resp: 19 (02/08/18 1317)  BP: 124/79 (02/08/18 1300)  SpO2: 100 % (02/08/18 1317) Vital Signs (24h Range):  Temp:  [98.1 °F (36.7 °C)-100 °F (37.8 °C)] 99.8 °F (37.7 °C)  Pulse:  [72-88] 76  Resp:   [11-31] 19  SpO2:  [97 %-100 %] 100 %  BP: (113-165)/() 124/79     Weight: 107.8 kg (237 lb 10.5 oz)  Body mass index is 34.1 kg/m².    Physical Exam   Constitutional: He is oriented to person, place, and time. He appears well-developed and well-nourished. No distress. He is intubated.   Cardiovascular: Normal rate and regular rhythm.    Pulmonary/Chest: He is intubated.   Abdominal: Soft. He exhibits no distension. There is no rebound and no guarding. No hernia (No apparent scars on belly from any previous abdominal surgeries).   Neurological: He is alert and oriented to person, place, and time.   Skin: Skin is warm and dry.       Significant Labs:  CBC:   Recent Labs  Lab 02/08/18  0242   WBC 10.67   RBC 3.92*   HGB 11.7*   HCT 33.3*   *   MCV 85   MCH 29.8   MCHC 35.1     BMP:   Recent Labs  Lab 02/08/18  0242   *      K 4.1      CO2 28   BUN 23*   CREATININE 0.9   CALCIUM 8.9   MG 1.9         Assessment/Plan:     Active Diagnoses:    Diagnosis Date Noted POA    PRINCIPAL PROBLEM:  Embolic stroke involving left middle cerebral artery [I63.412] 01/26/2018 Yes    Pneumonia [J18.9] 02/03/2018 Unknown    Respiratory distress, acute [R06.03] 02/01/2018 Unknown    Acute respiratory failure with hypoxia [J96.01] 01/31/2018 Unknown    Cytotoxic cerebral edema [G93.6] 01/28/2018 Yes    Altered mental status [R41.82]  Unknown    Leukocytosis [D72.829] 01/27/2018 Yes    Essential hypertension [I10] 01/26/2018 Yes    Hyperlipidemia [E78.5] 01/26/2018 Yes    Type 2 diabetes mellitus [E11.9] 01/26/2018 Yes    Obstructive sleep apnea [G47.33] 01/25/2018 Yes      Problems Resolved During this Admission:    Diagnosis Date Noted Date Resolved POA    Ischemic stroke [I63.9] 01/30/2018 01/31/2018 Yes    Respiratory distress [R06.03] 01/27/2018 01/31/2018 Yes    Stroke [I63.9] 01/25/2018 01/26/2018 Yes     VTE Risk Mitigation         Ordered     enoxaparin injection 40 mg  Daily      Route:  Subcutaneous        02/08/18 0949     High Risk of VTE  Once      01/27/18 1406        Will plan for open trach and peg in the OR next week with Dr. Mueller. Will need to hold Plavix for 5 days prior to procedure.     Karrie Cooper MD  General Surgery  [facility]

## 2018-02-08 NOTE — PT/OT/SLP PROGRESS
Occupational Therapy   Treatment    Name: Todd Quevedo  MRN: [id number]  Admitting Diagnosis:  Embolic stroke involving left middle cerebral artery       Recommendations:     Discharge Recommendations:  (TBD pending OOB assessment)  Discharge Equipment Recommendations:   (TBD at next level of care)  Barriers to discharge:   (Increased assist required at this time)    Subjective     Communicated with: RN prior to and after session.  Pain/Comfort:  · Pain Rating 1:  (No signs of distress noted)  · Pain Rating Post-Intervention 1: 0/10    Patients cultural, spiritual, Taoism conflicts given the current situation:      Objective:     Patient found with: arterial line, blood pressure cuff, telemetry, pulse ox (continuous), peripheral IV, SCD, Condom Catheter    General Precautions: Standard, aspiration, fall, NPO, respiratory   Orthopedic Precautions:N/A   Braces: N/A     Occupational Performance:    Bed Mobility:    · Not assessed 2* sessio performed at bed level.    Functional Mobility/Transfers:  · Not appropriate for task this date    Activities of Daily Living:  · UB Dressing: total assistance for adjusting gown and pulling up towards shoulder while supine with HOB elevated.    Patient left supine with all lines intact and call button in reach    [facility] 6 Click:  [facility] Total Score: 6    Treatment & Education:  *Verbal and tactile stimuli provided to help increase arousal, but pt remained lethargic with eyes closed.  *PROM performed on (B) UE and (B) LE incorporating all joints: 4 sets x 15 reps on each extremity while pt supine with HOB elevated.  *POC reviewed with pt  Education:    Assessment:     Todd Quevedo is a 47 y.o. male with a medical diagnosis of Embolic stroke involving left middle cerebral artery.  He presents with lethargy.  Performance deficits affecting function are weakness, impaired endurance, impaired self care skills, impaired functional mobilty, decreased lower extremity function, impaired  balance, decreased safety awareness, edema.  Session performed at bed level 2* pt intubated and sedated.  Spontaneous movement noted in LUE and LLE; however, pt unable to follow commands and kept eyes closed throughout session.  Pt is not at PLOF and would continue to benefit from skilled OT services to address problems listed below and increase independence with ADLs.    Rehab Prognosis:  Good; patient would benefit from acute skilled OT services to address these deficits and reach maximum level of function.       Plan:     Patient to be seen 3 x/week to address the above listed problems via self-care/home management, therapeutic activities, therapeutic exercises, neuromuscular re-education, cognitive retraining  · Plan of Care Expires: 02/23/18  · Plan of Care Reviewed with: patient    This Plan of care has been discussed with the patient who was involved in its development and understands and is in agreement with the identified goals and treatment plan    GOALS:    Occupational Therapy Goals        Problem: Occupational Therapy Goal    Goal Priority Disciplines Outcome Interventions   Occupational Therapy Goal     OT, PT/OT Ongoing (interventions implemented as appropriate)    Description:  Goals to be met by: 2/9/18    Patient will increase functional independence with ADLs by performing:    UE Dressing with Moderate Assistance.  Grooming while seated at sink with Moderate Assistance.  Toileting from bedside commode with Moderate Assistance for hygiene and clothing management.   Supine to sit with Maximum Assistance.  Stand pivot transfers with Maximum Assistance.  CG demo independence with upper extremity exercise program per handout.   Pt follow 50% of simple one-step commands  Eyes open 50% of session.                       Time Tracking:     OT Date of Treatment: 02/08/18  OT Start Time: 1315  OT Stop Time: 1330  OT Total Time (min): 15 min    Billable Minutes:Therapeutic Activity 15    Thania Lala  LOTR  2/8/2018

## 2018-02-09 LAB
ALBUMIN SERPL BCP-MCNC: 2.6 G/DL
ALLENS TEST: ABNORMAL
ALP SERPL-CCNC: 88 U/L
ALT SERPL W/O P-5'-P-CCNC: 21 U/L
ANION GAP SERPL CALC-SCNC: 7 MMOL/L
ANISOCYTOSIS BLD QL SMEAR: SLIGHT
AST SERPL-CCNC: 25 U/L
BASOPHILS # BLD AUTO: 0.03 K/UL
BASOPHILS NFR BLD: 0.3 %
BILIRUB SERPL-MCNC: 0.7 MG/DL
BUN SERPL-MCNC: 20 MG/DL
CALCIUM SERPL-MCNC: 9.2 MG/DL
CHLORIDE SERPL-SCNC: 100 MMOL/L
CO2 SERPL-SCNC: 29 MMOL/L
CREAT SERPL-MCNC: 0.9 MG/DL
DELSYS: ABNORMAL
DIFFERENTIAL METHOD: ABNORMAL
EOSINOPHIL # BLD AUTO: 0.2 K/UL
EOSINOPHIL NFR BLD: 1.6 %
ERYTHROCYTE [DISTWIDTH] IN BLOOD BY AUTOMATED COUNT: 12.6 %
EST. GFR  (AFRICAN AMERICAN): >60 ML/MIN/1.73 M^2
EST. GFR  (NON AFRICAN AMERICAN): >60 ML/MIN/1.73 M^2
FIO2: 40
GLUCOSE SERPL-MCNC: 187 MG/DL
HCO3 UR-SCNC: 31.5 MMOL/L (ref 24–28)
HCT VFR BLD AUTO: 35.7 %
HGB BLD-MCNC: 12.4 G/DL
IMM GRANULOCYTES # BLD AUTO: 0.06 K/UL
IMM GRANULOCYTES NFR BLD AUTO: 0.5 %
INR PPP: 1
LYMPHOCYTES # BLD AUTO: 1.7 K/UL
LYMPHOCYTES NFR BLD: 15.1 %
MAGNESIUM SERPL-MCNC: 2 MG/DL
MCH RBC QN AUTO: 29.6 PG
MCHC RBC AUTO-ENTMCNC: 34.7 G/DL
MCV RBC AUTO: 85 FL
MIN VOL: 19.3
MODE: ABNORMAL
MONOCYTES # BLD AUTO: 1 K/UL
MONOCYTES NFR BLD: 9 %
NEUTROPHILS # BLD AUTO: 8.2 K/UL
NEUTROPHILS NFR BLD: 73.5 %
NRBC BLD-RTO: 0 /100 WBC
PCO2 BLDA: 45.1 MMHG (ref 35–45)
PEEP: 5
PH SMN: 7.45 [PH] (ref 7.35–7.45)
PHOSPHATE SERPL-MCNC: 3.7 MG/DL
PLATELET # BLD AUTO: 370 K/UL
PLATELET BLD QL SMEAR: ABNORMAL
PMV BLD AUTO: 11.2 FL
PO2 BLDA: 121 MMHG (ref 80–100)
POC BE: 8 MMOL/L
POC SATURATED O2: 99 % (ref 95–100)
POC TCO2: 33 MMOL/L (ref 23–27)
POCT GLUCOSE: 159 MG/DL (ref 70–110)
POCT GLUCOSE: 171 MG/DL (ref 70–110)
POCT GLUCOSE: 199 MG/DL (ref 70–110)
POCT GLUCOSE: 205 MG/DL (ref 70–110)
POCT GLUCOSE: 207 MG/DL (ref 70–110)
POCT GLUCOSE: 229 MG/DL (ref 70–110)
POTASSIUM SERPL-SCNC: 4.1 MMOL/L
PROT SERPL-MCNC: 7.2 G/DL
PROTHROMBIN TIME: 10.3 SEC
PS: 5
RBC # BLD AUTO: 4.19 M/UL
SAMPLE: ABNORMAL
SITE: ABNORMAL
SODIUM SERPL-SCNC: 136 MMOL/L
SP02: 100
SPONT RATE: 24
WBC # BLD AUTO: 11.13 K/UL

## 2018-02-09 PROCEDURE — A4216 STERILE WATER/SALINE, 10 ML: HCPCS | Performed by: NURSE PRACTITIONER

## 2018-02-09 PROCEDURE — 63600175 PHARM REV CODE 636 W HCPCS: Performed by: PHYSICIAN ASSISTANT

## 2018-02-09 PROCEDURE — 36600 WITHDRAWAL OF ARTERIAL BLOOD: CPT

## 2018-02-09 PROCEDURE — 99900026 HC AIRWAY MAINTENANCE (STAT)

## 2018-02-09 PROCEDURE — 25000242 PHARM REV CODE 250 ALT 637 W/ HCPCS: Performed by: PSYCHIATRY & NEUROLOGY

## 2018-02-09 PROCEDURE — 94640 AIRWAY INHALATION TREATMENT: CPT

## 2018-02-09 PROCEDURE — 27000221 HC OXYGEN, UP TO 24 HOURS

## 2018-02-09 PROCEDURE — 25000003 PHARM REV CODE 250: Performed by: PHYSICIAN ASSISTANT

## 2018-02-09 PROCEDURE — 85025 COMPLETE CBC W/AUTO DIFF WBC: CPT

## 2018-02-09 PROCEDURE — 25000003 PHARM REV CODE 250: Performed by: PSYCHIATRY & NEUROLOGY

## 2018-02-09 PROCEDURE — 82803 BLOOD GASES ANY COMBINATION: CPT

## 2018-02-09 PROCEDURE — 97110 THERAPEUTIC EXERCISES: CPT

## 2018-02-09 PROCEDURE — 84100 ASSAY OF PHOSPHORUS: CPT

## 2018-02-09 PROCEDURE — 94003 VENT MGMT INPAT SUBQ DAY: CPT

## 2018-02-09 PROCEDURE — 27200966 HC CLOSED SUCTION SYSTEM

## 2018-02-09 PROCEDURE — 80053 COMPREHEN METABOLIC PANEL: CPT

## 2018-02-09 PROCEDURE — 83735 ASSAY OF MAGNESIUM: CPT

## 2018-02-09 PROCEDURE — 85610 PROTHROMBIN TIME: CPT

## 2018-02-09 PROCEDURE — 99233 SBSQ HOSP IP/OBS HIGH 50: CPT | Mod: ,,, | Performed by: NURSE PRACTITIONER

## 2018-02-09 PROCEDURE — 20000000 HC ICU ROOM

## 2018-02-09 PROCEDURE — 25000003 PHARM REV CODE 250: Performed by: NURSE PRACTITIONER

## 2018-02-09 RX ORDER — INSULIN ASPART 100 [IU]/ML
9 INJECTION, SOLUTION INTRAVENOUS; SUBCUTANEOUS EVERY 4 HOURS
Status: DISCONTINUED | OUTPATIENT
Start: 2018-02-09 | End: 2018-02-11

## 2018-02-09 RX ADMIN — INSULIN ASPART 2 UNITS: 100 INJECTION, SOLUTION INTRAVENOUS; SUBCUTANEOUS at 01:02

## 2018-02-09 RX ADMIN — Medication 3 ML: at 02:02

## 2018-02-09 RX ADMIN — INSULIN ASPART 4 UNITS: 100 INJECTION, SOLUTION INTRAVENOUS; SUBCUTANEOUS at 09:02

## 2018-02-09 RX ADMIN — IPRATROPIUM BROMIDE AND ALBUTEROL SULFATE 3 ML: .5; 3 SOLUTION RESPIRATORY (INHALATION) at 06:02

## 2018-02-09 RX ADMIN — INSULIN DETEMIR 30 UNITS: 100 INJECTION, SOLUTION SUBCUTANEOUS at 09:02

## 2018-02-09 RX ADMIN — SODIUM CHLORIDE SOLN NEBU 3% 4 ML: 3 NEBU SOLN at 12:02

## 2018-02-09 RX ADMIN — FAMOTIDINE 20 MG: 20 TABLET, FILM COATED ORAL at 08:02

## 2018-02-09 RX ADMIN — FAMOTIDINE 20 MG: 20 TABLET, FILM COATED ORAL at 09:02

## 2018-02-09 RX ADMIN — INSULIN ASPART 2 UNITS: 100 INJECTION, SOLUTION INTRAVENOUS; SUBCUTANEOUS at 05:02

## 2018-02-09 RX ADMIN — IPRATROPIUM BROMIDE AND ALBUTEROL SULFATE 3 ML: .5; 3 SOLUTION RESPIRATORY (INHALATION) at 01:02

## 2018-02-09 RX ADMIN — SODIUM CHLORIDE SOLN NEBU 3% 4 ML: 3 NEBU SOLN at 06:02

## 2018-02-09 RX ADMIN — MODAFINIL 100 MG: 100 TABLET ORAL at 02:02

## 2018-02-09 RX ADMIN — ENOXAPARIN SODIUM 40 MG: 100 INJECTION SUBCUTANEOUS at 04:02

## 2018-02-09 RX ADMIN — STANDARDIZED SENNA CONCENTRATE AND DOCUSATE SODIUM 1 TABLET: 8.6; 5 TABLET, FILM COATED ORAL at 08:02

## 2018-02-09 RX ADMIN — INSULIN ASPART 9 UNITS: 100 INJECTION, SOLUTION INTRAVENOUS; SUBCUTANEOUS at 02:02

## 2018-02-09 RX ADMIN — Medication 3 ML: at 05:02

## 2018-02-09 RX ADMIN — IPRATROPIUM BROMIDE AND ALBUTEROL SULFATE 3 ML: .5; 3 SOLUTION RESPIRATORY (INHALATION) at 12:02

## 2018-02-09 RX ADMIN — CLOPIDOGREL 75 MG: 75 TABLET, FILM COATED ORAL at 08:02

## 2018-02-09 RX ADMIN — Medication 3 ML: at 09:02

## 2018-02-09 RX ADMIN — INSULIN ASPART 9 UNITS: 100 INJECTION, SOLUTION INTRAVENOUS; SUBCUTANEOUS at 05:02

## 2018-02-09 RX ADMIN — INSULIN ASPART 8 UNITS: 100 INJECTION, SOLUTION INTRAVENOUS; SUBCUTANEOUS at 09:02

## 2018-02-09 RX ADMIN — CHLORHEXIDINE GLUCONATE 15 ML: 1.2 RINSE ORAL at 09:02

## 2018-02-09 RX ADMIN — INSULIN ASPART 2 UNITS: 100 INJECTION, SOLUTION INTRAVENOUS; SUBCUTANEOUS at 02:02

## 2018-02-09 RX ADMIN — SODIUM CHLORIDE SOLN NEBU 3% 4 ML: 3 NEBU SOLN at 01:02

## 2018-02-09 RX ADMIN — ASPIRIN 81 MG CHEWABLE TABLET 81 MG: 81 TABLET CHEWABLE at 08:02

## 2018-02-09 RX ADMIN — INSULIN ASPART 8 UNITS: 100 INJECTION, SOLUTION INTRAVENOUS; SUBCUTANEOUS at 05:02

## 2018-02-09 RX ADMIN — CAPTOPRIL 25 MG: 25 TABLET ORAL at 02:02

## 2018-02-09 RX ADMIN — INSULIN ASPART 8 UNITS: 100 INJECTION, SOLUTION INTRAVENOUS; SUBCUTANEOUS at 01:02

## 2018-02-09 RX ADMIN — INSULIN ASPART 9 UNITS: 100 INJECTION, SOLUTION INTRAVENOUS; SUBCUTANEOUS at 09:02

## 2018-02-09 RX ADMIN — METOPROLOL TARTRATE 25 MG: 25 TABLET ORAL at 08:02

## 2018-02-09 RX ADMIN — CAPTOPRIL 25 MG: 25 TABLET ORAL at 09:02

## 2018-02-09 RX ADMIN — ATORVASTATIN CALCIUM 80 MG: 20 TABLET, FILM COATED ORAL at 08:02

## 2018-02-09 RX ADMIN — STANDARDIZED SENNA CONCENTRATE AND DOCUSATE SODIUM 1 TABLET: 8.6; 5 TABLET, FILM COATED ORAL at 09:02

## 2018-02-09 RX ADMIN — CAPTOPRIL 25 MG: 25 TABLET ORAL at 05:02

## 2018-02-09 RX ADMIN — ACETAMINOPHEN 650 MG: 325 TABLET, FILM COATED ORAL at 06:02

## 2018-02-09 RX ADMIN — SODIUM CHLORIDE SOLN NEBU 3% 4 ML: 3 NEBU SOLN at 07:02

## 2018-02-09 RX ADMIN — POLYETHYLENE GLYCOL 3350 17 G: 17 POWDER, FOR SOLUTION ORAL at 08:02

## 2018-02-09 RX ADMIN — MODAFINIL 200 MG: 100 TABLET ORAL at 05:02

## 2018-02-09 RX ADMIN — INSULIN ASPART 2 UNITS: 100 INJECTION, SOLUTION INTRAVENOUS; SUBCUTANEOUS at 09:02

## 2018-02-09 RX ADMIN — IPRATROPIUM BROMIDE AND ALBUTEROL SULFATE 3 ML: .5; 3 SOLUTION RESPIRATORY (INHALATION) at 07:02

## 2018-02-09 RX ADMIN — METOPROLOL TARTRATE 25 MG: 25 TABLET ORAL at 09:02

## 2018-02-09 RX ADMIN — CHLORHEXIDINE GLUCONATE 15 ML: 1.2 RINSE ORAL at 08:02

## 2018-02-09 NOTE — SUBJECTIVE & OBJECTIVE
Review of Systems   Unable to obtain a complete ROS due to level of consciousness, pt intubated  Objective:     Vitals:  Temp: 99.4 °F (37.4 °C)  Pulse: 81  Rhythm: normal sinus rhythm  BP: 135/88  MAP (mmHg): 105  Resp: 20  SpO2: 98 %  Oxygen Concentration (%): 40  O2 Device (Oxygen Therapy): ventilator  Vent Mode: Spont  Set Rate: 0 bmp  Vt Set: 700 mL  Pressure Support: 5 cmH20  PEEP/CPAP: 5 cmH20  Peak Airway Pressure: 11 cmH2O  Mean Airway Pressure: 6.8 cmH20  Plateau Pressure: 0 cmH20    Temp  Min: 99.2 °F (37.3 °C)  Max: 99.9 °F (37.7 °C)  Pulse  Min: 74  Max: 98  BP  Min: 110/59  Max: 182/117  MAP (mmHg)  Min: 77  Max: 140  Resp  Min: 12  Max: 33  SpO2  Min: 98 %  Max: 100 %  Oxygen Concentration (%)  Min: 40  Max: 40    02/08 0701 - 02/09 0700  In: 1990   Out: 1915 [Urine:1915]   Unmeasured Output  Urine Occurrence: 1  Stool Occurrence: 0  Pad Count: 1       Physical Exam    Physical Exam:  GA:  Intubated, no acute distress, well nourished, well developed  HEENT: No scleral icterus or JVD.   Pulmonary: Course to auscultation A.  Cardiac: RRR S1 & S2 w/o rubs/murmurs/gallops.   Abdominal: Bowel sounds present x 4. No appreciable hepatosplenomegaly.  Skin: No jaundice, rashes, or visible lesions.  Neuro:  --GCS: E3 VT1 M5  --Mental Status:  Opens eyes to voice, does not follow commands  --CN II-XII grossly intact.   --Pupils 3mm, PERRL.   --Corneal reflex, gag, cough intact.  --LUE -moves spontaneously  --RUE -extensor posturing  --LLE -moves spontaneously  --RLE -moves spontaneously    Medications:  Continuous Scheduled  albuterol-ipratropium 2.5mg-0.5mg/3mL 3 mL Q6H   aspirin 81 mg Daily   atorvastatin 80 mg Daily   captopril 25 mg TID   chlorhexidine 15 mL BID   clopidogrel 75 mg Daily   enoxaparin 40 mg Daily   famotidine 20 mg BID   insulin aspart 9 Units Q4H   insulin detemir 30 Units BID   metoprolol tartrate 25 mg BID   modafinil 200 mg Daily   And     modafinil 100 mg Daily   polyethylene glycol  17 g Daily   senna-docusate 8.6-50 mg 1 tablet BID   sodium chloride 0.9% 3 mL Q8H   sodium chloride 3% 4 mL Q6H   PRN  acetaminophen 650 mg Q6H PRN   albuterol-ipratropium 2.5mg-0.5mg/3mL 3 mL Q4H PRN   bisacodyl 10 mg Daily PRN   dextrose 50% 12.5 g PRN   glucagon (human recombinant) 1 mg PRN   hydrALAZINE 10 mg Q4H PRN   insulin aspart 1-10 Units Q4H PRN   labetalol 10 mg Q4H PRN   magnesium sulfate IVPB 2 g PRN   magnesium sulfate IVPB 4 g PRN   ondansetron 4 mg Q8H PRN   potassium chloride 10% 40 mEq PRN   potassium chloride 10% 40 mEq PRN   sodium chloride 0.9% 5 mL PRN     Today I personally reviewed pertinent medications, lines/drains/airways, imaging, lab results,

## 2018-02-09 NOTE — ASSESSMENT & PLAN NOTE
-A1C-10  -poorly controlled diabetes  -BG >400 on arrival  -insulin infusion discontinued  -Detemir increased from 24 to 30 units bid  -Aspart increased from  8 to 09units q 4 hours   -PRN moderate dose sliding scale

## 2018-02-09 NOTE — PLAN OF CARE
Problem: Patient Care Overview  Goal: Plan of Care Review  Outcome: Ongoing (interventions implemented as appropriate)  POC reviewed with pt and sibling at 2030. Pt displayed no evidence of learning due to being intubated. Sister verbalized understanding. Questions and concerns addressed. No acute events today. Pt progressing toward goals. Will continue to monitor. See flowsheets for full assessment and VS info.

## 2018-02-09 NOTE — PROGRESS NOTES
[facility]  Neurocritical Care  Progress Note    Admit Date: 1/25/2018  Service Date: 02/09/2018  Length of Stay: 15    Subjective:     Chief Complaint: Embolic stroke involving left middle cerebral artery    History of Present Illness: The patient is a 47 year old male with no known PMHx admitted to [facility]   s/p thrombectomy of L M1 occlusion. Per chart review, he   walked into [place] and was acting abnormal.  EMS was called and brought to the ED for concern of L MCA syndrome. CT MP revealed short segment occlusion of left M1.  Patient went to IR for thrombectomy of L M1 occlusion seen on CTA MP.  TICI2C reperfusion and angioplasty. Patient admitted to [facility] for close monitoring and higher level of care.   History obtained from chart review only            Hospital Course: 1/25: Pt admitted to [facility] s/p L M1 thrombectomy, TICI2C reperfusion and angioplasty   1/27: large L MCA, hemicrani watch, NSGY following, insulin ggt, cardene ggt, L sided intermittent slowing eeg but no sz, +nasal trumpet for copious thick secretions, wheezing this am - improved with duo nebs, 3% nebs, and cough assist, concern for sepsis 2/2 hemodynamic changes - increased HR and BP, worsening leukocytosis, +ceftriaxone, pan cx, adrian track  1/28: continued respiratory challenges due to secretions. Aggressive pulmonary toileting. Continue monitoring for neuro worsening. Add moxifloxacin.   1/29: CTH to eval for increased edema/shift, EEG afterwards. Concern for decrease exam, no following/decreased alertness). CXR and Procal to follow leukocytosis. Hold TF until eval decreasing EXAM  1/30: neuro exam stable, increase 2%, current amount of sodium iv not suffuicient to meet target 145-155, cont abx for likely pneumonia, repeat ct in am  1/31: abrupt desaturation today requiring emergent intubation followed by bronchoscopy  Etiology likely upper airway obstruction from dried secretions  02/01: stable on vent overnight, marked  improvement on today's chest x ray, switch to spontaneous today, sodium stable at 150, start tfs  02/02: moves left side spontaneously and opens eyes off sedation,  02/03: 2% buffered NS re-started at 20mL/hr for rapid decrease in serum sodium. Enteral free water flushes discontinued. Patient has copious secretions likely pneumonia. Holding dexamethasone  2/4:  No acute events overnight.  Sodium stable.  2/5: transition insulin infusion to subcutaneous insulin   2/7: gen surg consulted for trach/peg  2/9: increased Aspart and Detemir, pending Trach/Peg placement next week        Review of Systems   Unable to obtain a complete ROS due to level of consciousness, pt intubated  Objective:     Vitals:  Temp: 99.4 °F (37.4 °C)  Pulse: 81  Rhythm: normal sinus rhythm  BP: 135/88  MAP (mmHg): 105  Resp: 20  SpO2: 98 %  Oxygen Concentration (%): 40  O2 Device (Oxygen Therapy): ventilator  Vent Mode: Spont  Set Rate: 0 bmp  Vt Set: 700 mL  Pressure Support: 5 cmH20  PEEP/CPAP: 5 cmH20  Peak Airway Pressure: 11 cmH2O  Mean Airway Pressure: 6.8 cmH20  Plateau Pressure: 0 cmH20    Temp  Min: 99.2 °F (37.3 °C)  Max: 99.9 °F (37.7 °C)  Pulse  Min: 74  Max: 98  BP  Min: 110/59  Max: 182/117  MAP (mmHg)  Min: 77  Max: 140  Resp  Min: 12  Max: 33  SpO2  Min: 98 %  Max: 100 %  Oxygen Concentration (%)  Min: 40  Max: 40    02/08 0701 - 02/09 0700  In: 1990   Out: 1915 [Urine:1915]   Unmeasured Output  Urine Occurrence: 1  Stool Occurrence: 0  Pad Count: 1       Physical Exam    Physical Exam:  GA:  Intubated, no acute distress, well nourished, well developed  HEENT: No scleral icterus or JVD.   Pulmonary: Course to auscultation A.  Cardiac: RRR S1 & S2 w/o rubs/murmurs/gallops.   Abdominal: Bowel sounds present x 4. No appreciable hepatosplenomegaly.  Skin: No jaundice, rashes, or visible lesions.  Neuro:  --GCS: E3 VT1 M5  --Mental Status:  Opens eyes to voice, does not follow commands  --CN II-XII grossly intact.   --Pupils 3mm,  PERRL.   --Corneal reflex, gag, cough intact.  --LUE -moves spontaneously  --RUE -extensor posturing  --LLE -moves spontaneously  --RLE -moves spontaneously    Medications:  Continuous Scheduled  albuterol-ipratropium 2.5mg-0.5mg/3mL 3 mL Q6H   aspirin 81 mg Daily   atorvastatin 80 mg Daily   captopril 25 mg TID   chlorhexidine 15 mL BID   clopidogrel 75 mg Daily   enoxaparin 40 mg Daily   famotidine 20 mg BID   insulin aspart 9 Units Q4H   insulin detemir 30 Units BID   metoprolol tartrate 25 mg BID   modafinil 200 mg Daily   And     modafinil 100 mg Daily   polyethylene glycol 17 g Daily   senna-docusate 8.6-50 mg 1 tablet BID   sodium chloride 0.9% 3 mL Q8H   sodium chloride 3% 4 mL Q6H   PRN  acetaminophen 650 mg Q6H PRN   albuterol-ipratropium 2.5mg-0.5mg/3mL 3 mL Q4H PRN   bisacodyl 10 mg Daily PRN   dextrose 50% 12.5 g PRN   glucagon (human recombinant) 1 mg PRN   hydrALAZINE 10 mg Q4H PRN   insulin aspart 1-10 Units Q4H PRN   labetalol 10 mg Q4H PRN   magnesium sulfate IVPB 2 g PRN   magnesium sulfate IVPB 4 g PRN   ondansetron 4 mg Q8H PRN   potassium chloride 10% 40 mEq PRN   potassium chloride 10% 40 mEq PRN   sodium chloride 0.9% 5 mL PRN     Today I personally reviewed pertinent medications, lines/drains/airways, imaging, lab results,           Assessment/Plan:     Neuro   * Embolic stroke involving left middle cerebral artery    -s/p L M1 thrombectomy  --160  -repeat imaging stable   -ASA  -Plavix d/c for peg/trach placement   -statin   -SQH  -continue modafinil   -PT/OT  -pending peg/trach with gen. surg next week  with Dr. Mueller              Pulmonary   Pneumonia    - Completed 7d course of ceftriaxone on 2/2  - will hold on extubation until secretions decrease          Acute respiratory failure with hypoxia    -Due to airway obstruction form hardened secretions overlying epiglottis and soft palate, improved after intubation and bronchoscopy  -CXR and ABG prn   -general surgery plans for  trach/peg next week        Cardiac/Vascular   Hyperlipidemia    -atorvastatin 80 daily           Essential hypertension    -SBP <180  -metoprolol 25 mg bid  -captopril 25 mg tid  -PRN Labetalol & Hydralazine   -Echo 1/26:1 - Normal left ventricular systolic function (EF 65-70%).     2 - Concentric remodeling.     3 - No wall motion abnormalities.     4 - Normal left ventricular diastolic function.     5 - Normal right ventricular systolic function .            Endocrine   Type 2 diabetes mellitus    -A1C-10  -poorly controlled diabetes  -BG >400 on arrival  -insulin infusion discontinued  -Detemir increased from 24 to 30 units bid  -Aspart increased from  8 to 09units q 4 hours   -PRN moderate dose sliding scale            Prophylaxis:  Venous Thromboembolism: chemical  Stress Ulcer: H2B  Ventilator Pneumonia: yes       Full Code    Isha Gauthier NP  Neurocritical Care  [facility]

## 2018-02-09 NOTE — PLAN OF CARE
Problem: Occupational Therapy Goal  Goal: Occupational Therapy Goal  Goals to be met by: 2/9/18    Patient will increase functional independence with ADLs by performing:    UE Dressing with Moderate Assistance.  Grooming while seated at sink with Moderate Assistance.  Toileting from bedside commode with Moderate Assistance for hygiene and clothing management.   Supine to sit with Maximum Assistance.  Stand pivot transfers with Maximum Assistance.  CG demo independence with upper extremity exercise program per handout.   Pt follow 50% of simple one-step commands  Eyes open 50% of session.      Goals remain appropriate.  ISAMAR Almonte  2/9/2018

## 2018-02-09 NOTE — PLAN OF CARE
Problem: Patient Care Overview  Goal: Plan of Care Review  Outcome: Revised  POC reviewed with pt and family at 1400. Sister Arminda verbalized understanding. Questions and concerns addressed. No acute events today. Pt progressing toward goals. Will continue to monitor. See flowsheets for full assessment and VS info. Plan for PEG, trach next week.

## 2018-02-09 NOTE — PLAN OF CARE
Patient's chart was reviewed by a stroke team provider.  Patient intubated and non neurologic issues with greater urgency.  There is no new imaging to review.  Pending diagnostics to follow up on include: None.  For other recommendations please see our previous note completed on: 02/05/2018.     There are no new recommendations at this time. Will continue to follow. Discussed patient with staff. Please contact stroke team for any questions or concerns.      Jarred Henderson MD  Internal Medicine PGY-1  [facility]

## 2018-02-09 NOTE — ASSESSMENT & PLAN NOTE
-s/p L M1 thrombectomy  --160  -repeat imaging stable   -ASA, plavix  -statin   -SQH  -continue modafinil   -PT/OT  -pending peg/trach with gen. surg next week  with Dr. Mueller

## 2018-02-09 NOTE — PT/OT/SLP PROGRESS
Occupational Therapy   Treatment/ Goals revised    Name: Todd Quevedo  MRN: [id number]  Admitting Diagnosis:  Embolic stroke involving left middle cerebral artery       Recommendations:     Discharge Recommendations: LTACH (long term acute care hospital)  Discharge Equipment Recommendations:  hospital bed  Barriers to discharge:  Inaccessible home environment, Decreased caregiver support    Subjective   Patient:  Nonverbal; intubated  Communicated with: nurse prior to session.  Pain/Comfort:  · Pain Rating 1: 0/10  · Pain Rating Post-Intervention 1: 0/10    Patients cultural, spiritual, Synagogue conflicts given the current situation:      Objective:     Patient found with: blood pressure cuff, ventilator, peripheral IV, telemetry, NG tube, pulse ox (continuous), SCD, pressure relief boots, Condom Catheter  Family not present.  General Precautions: Standard, aspiration, fall, NPO, respiratory   Orthopedic Precautions:N/A   Braces: N/A     Occupational Performance:    Bed Mobility:    · Patient completed Rolling/Turning to Left with  total assistance  · Patient completed Rolling/Turning to Right with total assistance     Functional Mobility/Transfers:  · Dependent drawsheet transfers    Activities of Daily Living:  · Feeding:  NPO    · Grooming: dependence while supine  · Toileting: dependence       Patient left supine with all lines intact and call button in reach    [facility] 6 Click:  [facility] Total Score: 6    Treatment & Education:  Patient education provided on role of OT and need for continued OT.    Continued education, patient/ family training recommended. Daily orientation provided.  PROM performed right UE/LEs and AA/PROM left UE/LE one set x 10 rep in all planes of motion with stretches provided at end range; sustained stretch provided for right ankle dorsiflexion.  Assistance and facilitation provided for upward rotation of the scapula during shoulder flexion and abduction.  Patient kept eyes closed 100% of the  session.  Patient unable to follow commands.  Positioning provided for midline orientation with bilateral UEs elevated and heels lifted off mattress.  Gentle cervical rotation provided.   Family not present during the session.  White board updated in patient's room.  OT asked if there were any other questions; patient/ family had no further questions.       Education:    Assessment:     Todd Quevedo is a 47 y.o. male with a medical diagnosis of Embolic stroke involving left middle cerebral artery.  He presents with performance deficits of physical skills including impaired respiration, balance, mobility, strength, dexterity, fine motor coordination, gross motor coordination, sensation and endurance; demonstrating performance deficits of cognitive skills including impaired attention, perception, understanding, problem solving, sequencing and memory all resulting in inability organizing occupational performance in a timely and safe manner; demonstrating performance deficits of psychosocial skills including impairments of interpersonal interactions and coping strategies which are skills necessary to successfully and appropriately participate in everyday tasks and social situations.  These performance deficits have resulted in activity limitations including but not limited to:  bed mobility, transfers, ascending/ descending stairs, walking short and long distances, walking around obstacles, transitional movement patterns (kneeling, bending); eating, upper body dressing, lower body dressing, brushing teeth, toileting, bathing, carrying objects, meal preparation, and working ().  Patient's role as , father,   and independent caretaker for self has been affected. Patient will benefit from skilled OT services to maximize level of independence with self-care skills and functional mobility.      Rehab Prognosis:  Good; patient would benefit from acute skilled OT services  to address these deficits and reach maximum level of function.       Plan:     Patient to be seen 3 x/week to address the above listed problems via self-care/home management, therapeutic exercises, sensory integration, neuromuscular re-education, therapeutic activities, cognitive retraining  · Plan of Care Expires: 02/23/18  · Plan of Care Reviewed with: patient    This Plan of care has been discussed with the patient who was involved in its development and understands and is in agreement with the identified goals and treatment plan    GOALS:    Occupational Therapy Goals        Problem: Occupational Therapy Goal    Goal Priority Disciplines Outcome Interventions   Occupational Therapy Goal     OT, PT/OT Ongoing (interventions implemented as appropriate)    Description:  Goals set 2/9 to be addressed for 14 days with expiration date, 2/23:  Patient will increase functional independence with ADLs by performing:    Patient will demonstrate rolling to the right with mod assist.  Not met   Patient will demonstrate rolling to the left with max assist.   Not met  Patient will demonstrate supine -sit with max assist.   Not met  Patient will demonstrate squat pivot transfers with max assist.   Not met  Patient will demonstrate grooming while seated with max assist.   Not met  Patient will demonstrate upper body dressing with max assist while seated EOB.   Not met  Patient will demonstrate lower body dressing with max assist while seated EOB.   Not met  Patient will demonstrate toileting with max assist.   Not met  Patient will demonstrate ability to follow 3/5 commands.   Not met  Patient will demonstrate independence with HEP for right UE positioning.    Not met  Patient's family / caregiver will demonstrate independence and safety with assisting patient with self-care skills and functional mobility.     Not met  Patient's family / caregiver will demonstrate independence with providing ROM and changes in bed positioning.    Not met                        Time Tracking:     OT Date of Treatment: 02/09/18  OT Start Time: 0802  OT Stop Time: 0826  OT Total Time (min): 24 min    Billable Minutes:Therapeutic Exercise 24    ISAMAR Almonte  2/9/2018

## 2018-02-09 NOTE — ASSESSMENT & PLAN NOTE
-Due to airway obstruction form hardened secretions overlying epiglottis and soft palate, improved after intubation and bronchoscopy  -CXR and ABG prn   -general surgery plans for trach/peg next week

## 2018-02-10 LAB
ALBUMIN SERPL BCP-MCNC: 2.7 G/DL
ALLENS TEST: ABNORMAL
ALP SERPL-CCNC: 90 U/L
ALT SERPL W/O P-5'-P-CCNC: 21 U/L
ANION GAP SERPL CALC-SCNC: 10 MMOL/L
AST SERPL-CCNC: 26 U/L
BASOPHILS # BLD AUTO: 0.03 K/UL
BASOPHILS NFR BLD: 0.2 %
BILIRUB SERPL-MCNC: 0.7 MG/DL
BUN SERPL-MCNC: 21 MG/DL
CALCIUM SERPL-MCNC: 9.4 MG/DL
CHLORIDE SERPL-SCNC: 98 MMOL/L
CO2 SERPL-SCNC: 28 MMOL/L
CREAT SERPL-MCNC: 0.9 MG/DL
DELSYS: ABNORMAL
DIFFERENTIAL METHOD: ABNORMAL
EOSINOPHIL # BLD AUTO: 0.1 K/UL
EOSINOPHIL NFR BLD: 1 %
ERYTHROCYTE [DISTWIDTH] IN BLOOD BY AUTOMATED COUNT: 12.3 %
EST. GFR  (AFRICAN AMERICAN): >60 ML/MIN/1.73 M^2
EST. GFR  (NON AFRICAN AMERICAN): >60 ML/MIN/1.73 M^2
FIO2: 40
GLUCOSE SERPL-MCNC: 257 MG/DL
HCO3 UR-SCNC: 30.5 MMOL/L (ref 24–28)
HCT VFR BLD AUTO: 36.1 %
HGB BLD-MCNC: 12.5 G/DL
IMM GRANULOCYTES # BLD AUTO: 0.09 K/UL
IMM GRANULOCYTES NFR BLD AUTO: 0.7 %
INR PPP: 1
LYMPHOCYTES # BLD AUTO: 1.5 K/UL
LYMPHOCYTES NFR BLD: 12.6 %
MAGNESIUM SERPL-MCNC: 1.8 MG/DL
MCH RBC QN AUTO: 30 PG
MCHC RBC AUTO-ENTMCNC: 34.6 G/DL
MCV RBC AUTO: 87 FL
MODE: ABNORMAL
MONOCYTES # BLD AUTO: 1 K/UL
MONOCYTES NFR BLD: 8.1 %
NEUTROPHILS # BLD AUTO: 9.3 K/UL
NEUTROPHILS NFR BLD: 77.4 %
NRBC BLD-RTO: 0 /100 WBC
PCO2 BLDA: 42.8 MMHG (ref 35–45)
PEEP: 5
PH SMN: 7.46 [PH] (ref 7.35–7.45)
PHOSPHATE SERPL-MCNC: 3.4 MG/DL
PLATELET # BLD AUTO: 507 K/UL
PMV BLD AUTO: 9.9 FL
PO2 BLDA: 115 MMHG (ref 80–100)
POC BE: 7 MMOL/L
POC SATURATED O2: 99 % (ref 95–100)
POC TCO2: 32 MMOL/L (ref 23–27)
POCT GLUCOSE: 171 MG/DL (ref 70–110)
POCT GLUCOSE: 177 MG/DL (ref 70–110)
POCT GLUCOSE: 207 MG/DL (ref 70–110)
POCT GLUCOSE: 232 MG/DL (ref 70–110)
POCT GLUCOSE: 239 MG/DL (ref 70–110)
POCT GLUCOSE: 244 MG/DL (ref 70–110)
POTASSIUM SERPL-SCNC: 4.1 MMOL/L
PROCALCITONIN SERPL IA-MCNC: 0.12 NG/ML
PROT SERPL-MCNC: 7.4 G/DL
PROTHROMBIN TIME: 10.1 SEC
PS: 5
RBC # BLD AUTO: 4.17 M/UL
SAMPLE: ABNORMAL
SITE: ABNORMAL
SODIUM SERPL-SCNC: 136 MMOL/L
SP02: 99
WBC # BLD AUTO: 12.04 K/UL

## 2018-02-10 PROCEDURE — 85025 COMPLETE CBC W/AUTO DIFF WBC: CPT

## 2018-02-10 PROCEDURE — 80053 COMPREHEN METABOLIC PANEL: CPT

## 2018-02-10 PROCEDURE — 63600175 PHARM REV CODE 636 W HCPCS: Performed by: PSYCHIATRY & NEUROLOGY

## 2018-02-10 PROCEDURE — 85610 PROTHROMBIN TIME: CPT

## 2018-02-10 PROCEDURE — 83735 ASSAY OF MAGNESIUM: CPT

## 2018-02-10 PROCEDURE — 25000003 PHARM REV CODE 250: Performed by: PHYSICIAN ASSISTANT

## 2018-02-10 PROCEDURE — 82803 BLOOD GASES ANY COMBINATION: CPT

## 2018-02-10 PROCEDURE — 20000000 HC ICU ROOM

## 2018-02-10 PROCEDURE — 63600175 PHARM REV CODE 636 W HCPCS: Performed by: PHYSICIAN ASSISTANT

## 2018-02-10 PROCEDURE — 25000242 PHARM REV CODE 250 ALT 637 W/ HCPCS: Performed by: PSYCHIATRY & NEUROLOGY

## 2018-02-10 PROCEDURE — 99291 CRITICAL CARE FIRST HOUR: CPT | Mod: ,,, | Performed by: PSYCHIATRY & NEUROLOGY

## 2018-02-10 PROCEDURE — 84145 PROCALCITONIN (PCT): CPT

## 2018-02-10 PROCEDURE — 25000003 PHARM REV CODE 250: Performed by: PSYCHIATRY & NEUROLOGY

## 2018-02-10 PROCEDURE — 99900035 HC TECH TIME PER 15 MIN (STAT)

## 2018-02-10 PROCEDURE — A4216 STERILE WATER/SALINE, 10 ML: HCPCS | Performed by: NURSE PRACTITIONER

## 2018-02-10 PROCEDURE — 99900026 HC AIRWAY MAINTENANCE (STAT)

## 2018-02-10 PROCEDURE — 94640 AIRWAY INHALATION TREATMENT: CPT

## 2018-02-10 PROCEDURE — 94003 VENT MGMT INPAT SUBQ DAY: CPT

## 2018-02-10 PROCEDURE — 25000003 PHARM REV CODE 250: Performed by: NURSE PRACTITIONER

## 2018-02-10 PROCEDURE — 84100 ASSAY OF PHOSPHORUS: CPT

## 2018-02-10 PROCEDURE — 36600 WITHDRAWAL OF ARTERIAL BLOOD: CPT

## 2018-02-10 RX ADMIN — INSULIN ASPART 9 UNITS: 100 INJECTION, SOLUTION INTRAVENOUS; SUBCUTANEOUS at 09:02

## 2018-02-10 RX ADMIN — INSULIN ASPART 9 UNITS: 100 INJECTION, SOLUTION INTRAVENOUS; SUBCUTANEOUS at 05:02

## 2018-02-10 RX ADMIN — IPRATROPIUM BROMIDE AND ALBUTEROL SULFATE 3 ML: .5; 3 SOLUTION RESPIRATORY (INHALATION) at 08:02

## 2018-02-10 RX ADMIN — ENOXAPARIN SODIUM 40 MG: 100 INJECTION SUBCUTANEOUS at 04:02

## 2018-02-10 RX ADMIN — SODIUM CHLORIDE SOLN NEBU 3% 4 ML: 3 NEBU SOLN at 01:02

## 2018-02-10 RX ADMIN — Medication 3 ML: at 02:02

## 2018-02-10 RX ADMIN — CAPTOPRIL 25 MG: 25 TABLET ORAL at 02:02

## 2018-02-10 RX ADMIN — STANDARDIZED SENNA CONCENTRATE AND DOCUSATE SODIUM 1 TABLET: 8.6; 5 TABLET, FILM COATED ORAL at 08:02

## 2018-02-10 RX ADMIN — ASPIRIN 81 MG CHEWABLE TABLET 81 MG: 81 TABLET CHEWABLE at 08:02

## 2018-02-10 RX ADMIN — SODIUM CHLORIDE SOLN NEBU 3% 4 ML: 3 NEBU SOLN at 08:02

## 2018-02-10 RX ADMIN — MODAFINIL 100 MG: 100 TABLET ORAL at 02:02

## 2018-02-10 RX ADMIN — MODAFINIL 200 MG: 100 TABLET ORAL at 06:02

## 2018-02-10 RX ADMIN — INSULIN DETEMIR 30 UNITS: 100 INJECTION, SOLUTION SUBCUTANEOUS at 09:02

## 2018-02-10 RX ADMIN — POLYETHYLENE GLYCOL 3350 17 G: 17 POWDER, FOR SOLUTION ORAL at 08:02

## 2018-02-10 RX ADMIN — INSULIN ASPART 4 UNITS: 100 INJECTION, SOLUTION INTRAVENOUS; SUBCUTANEOUS at 02:02

## 2018-02-10 RX ADMIN — ACETAMINOPHEN 650 MG: 325 TABLET, FILM COATED ORAL at 09:02

## 2018-02-10 RX ADMIN — ATORVASTATIN CALCIUM 80 MG: 20 TABLET, FILM COATED ORAL at 08:02

## 2018-02-10 RX ADMIN — INSULIN ASPART 4 UNITS: 100 INJECTION, SOLUTION INTRAVENOUS; SUBCUTANEOUS at 06:02

## 2018-02-10 RX ADMIN — INSULIN ASPART 4 UNITS: 100 INJECTION, SOLUTION INTRAVENOUS; SUBCUTANEOUS at 10:02

## 2018-02-10 RX ADMIN — STANDARDIZED SENNA CONCENTRATE AND DOCUSATE SODIUM 1 TABLET: 8.6; 5 TABLET, FILM COATED ORAL at 09:02

## 2018-02-10 RX ADMIN — CAPTOPRIL 25 MG: 25 TABLET ORAL at 06:02

## 2018-02-10 RX ADMIN — CHLORHEXIDINE GLUCONATE 15 ML: 1.2 RINSE ORAL at 08:02

## 2018-02-10 RX ADMIN — INSULIN ASPART 9 UNITS: 100 INJECTION, SOLUTION INTRAVENOUS; SUBCUTANEOUS at 03:02

## 2018-02-10 RX ADMIN — FAMOTIDINE 20 MG: 20 TABLET, FILM COATED ORAL at 09:02

## 2018-02-10 RX ADMIN — Medication 3 ML: at 09:02

## 2018-02-10 RX ADMIN — IPRATROPIUM BROMIDE AND ALBUTEROL SULFATE 3 ML: .5; 3 SOLUTION RESPIRATORY (INHALATION) at 01:02

## 2018-02-10 RX ADMIN — INSULIN ASPART 9 UNITS: 100 INJECTION, SOLUTION INTRAVENOUS; SUBCUTANEOUS at 10:02

## 2018-02-10 RX ADMIN — CHLORHEXIDINE GLUCONATE 15 ML: 1.2 RINSE ORAL at 09:02

## 2018-02-10 RX ADMIN — CAPTOPRIL 25 MG: 25 TABLET ORAL at 09:02

## 2018-02-10 RX ADMIN — METOPROLOL TARTRATE 25 MG: 25 TABLET ORAL at 08:02

## 2018-02-10 RX ADMIN — ACETAMINOPHEN 650 MG: 325 TABLET, FILM COATED ORAL at 03:02

## 2018-02-10 RX ADMIN — INSULIN DETEMIR 30 UNITS: 100 INJECTION, SOLUTION SUBCUTANEOUS at 08:02

## 2018-02-10 RX ADMIN — INSULIN ASPART 9 UNITS: 100 INJECTION, SOLUTION INTRAVENOUS; SUBCUTANEOUS at 02:02

## 2018-02-10 RX ADMIN — INSULIN ASPART 1 UNITS: 100 INJECTION, SOLUTION INTRAVENOUS; SUBCUTANEOUS at 09:02

## 2018-02-10 RX ADMIN — INSULIN ASPART 9 UNITS: 100 INJECTION, SOLUTION INTRAVENOUS; SUBCUTANEOUS at 06:02

## 2018-02-10 RX ADMIN — INSULIN ASPART 2 UNITS: 100 INJECTION, SOLUTION INTRAVENOUS; SUBCUTANEOUS at 05:02

## 2018-02-10 RX ADMIN — FAMOTIDINE 20 MG: 20 TABLET, FILM COATED ORAL at 08:02

## 2018-02-10 RX ADMIN — Medication 3 ML: at 06:02

## 2018-02-10 RX ADMIN — IPRATROPIUM BROMIDE AND ALBUTEROL SULFATE 3 ML: .5; 3 SOLUTION RESPIRATORY (INHALATION) at 07:02

## 2018-02-10 RX ADMIN — INSULIN ASPART 2 UNITS: 100 INJECTION, SOLUTION INTRAVENOUS; SUBCUTANEOUS at 03:02

## 2018-02-10 RX ADMIN — METOPROLOL TARTRATE 25 MG: 25 TABLET ORAL at 09:02

## 2018-02-10 RX ADMIN — SODIUM CHLORIDE SOLN NEBU 3% 4 ML: 3 NEBU SOLN at 07:02

## 2018-02-10 NOTE — PLAN OF CARE
Problem: Patient Care Overview  Goal: Plan of Care Review  Outcome: Ongoing (interventions implemented as appropriate)  POC reviewed with pt 0200. Pt displayed no evidence of learning due to being intubated. Questions and concerns addressed. No acute events today. Pt progressing toward goals. Will continue to monitor. See flowsheets for full assessment and VS info.

## 2018-02-10 NOTE — PLAN OF CARE
Problem: Patient Care Overview  Goal: Plan of Care Review  Outcome: Ongoing (interventions implemented as appropriate)  POC reviewed with pt and family at 1500. Pt unable to verbalize understanding. Questions and concerns addressed with pt's spouse and sister. No acute events today. Pt progressing toward goals. Will continue to monitor. See flowsheets for full assessment and VS info.

## 2018-02-11 LAB
ALBUMIN SERPL BCP-MCNC: 2.7 G/DL
ALLENS TEST: ABNORMAL
ALP SERPL-CCNC: 90 U/L
ALT SERPL W/O P-5'-P-CCNC: 22 U/L
ANION GAP SERPL CALC-SCNC: 9 MMOL/L
AST SERPL-CCNC: 25 U/L
BASOPHILS # BLD AUTO: 0.05 K/UL
BASOPHILS NFR BLD: 0.4 %
BILIRUB SERPL-MCNC: 0.8 MG/DL
BUN SERPL-MCNC: 20 MG/DL
CALCIUM SERPL-MCNC: 9.6 MG/DL
CHLORIDE SERPL-SCNC: 98 MMOL/L
CO2 SERPL-SCNC: 28 MMOL/L
CREAT SERPL-MCNC: 0.9 MG/DL
DELSYS: ABNORMAL
DIFFERENTIAL METHOD: ABNORMAL
EOSINOPHIL # BLD AUTO: 0.1 K/UL
EOSINOPHIL NFR BLD: 0.9 %
ERYTHROCYTE [DISTWIDTH] IN BLOOD BY AUTOMATED COUNT: 12.6 %
EST. GFR  (AFRICAN AMERICAN): >60 ML/MIN/1.73 M^2
EST. GFR  (NON AFRICAN AMERICAN): >60 ML/MIN/1.73 M^2
FIO2: 40
GLUCOSE SERPL-MCNC: 224 MG/DL
HCO3 UR-SCNC: 31.8 MMOL/L (ref 24–28)
HCT VFR BLD AUTO: 36.1 %
HGB BLD-MCNC: 12.3 G/DL
IMM GRANULOCYTES # BLD AUTO: 0.07 K/UL
IMM GRANULOCYTES NFR BLD AUTO: 0.5 %
INR PPP: 1
LYMPHOCYTES # BLD AUTO: 2.1 K/UL
LYMPHOCYTES NFR BLD: 15.2 %
MAGNESIUM SERPL-MCNC: 1.9 MG/DL
MCH RBC QN AUTO: 29.4 PG
MCHC RBC AUTO-ENTMCNC: 34.1 G/DL
MCV RBC AUTO: 86 FL
MIN VOL: 17
MODE: ABNORMAL
MONOCYTES # BLD AUTO: 1.2 K/UL
MONOCYTES NFR BLD: 8.3 %
NEUTROPHILS # BLD AUTO: 10.4 K/UL
NEUTROPHILS NFR BLD: 74.7 %
NRBC BLD-RTO: 0 /100 WBC
PCO2 BLDA: 43.2 MMHG (ref 35–45)
PEEP: 5
PH SMN: 7.47 [PH] (ref 7.35–7.45)
PHOSPHATE SERPL-MCNC: 3.7 MG/DL
PLATELET # BLD AUTO: 538 K/UL
PMV BLD AUTO: 9.6 FL
PO2 BLDA: 86 MMHG (ref 80–100)
POC BE: 8 MMOL/L
POC SATURATED O2: 97 % (ref 95–100)
POC TCO2: 33 MMOL/L (ref 23–27)
POCT GLUCOSE: 188 MG/DL (ref 70–110)
POCT GLUCOSE: 194 MG/DL (ref 70–110)
POCT GLUCOSE: 210 MG/DL (ref 70–110)
POCT GLUCOSE: 217 MG/DL (ref 70–110)
POCT GLUCOSE: 218 MG/DL (ref 70–110)
POCT GLUCOSE: 220 MG/DL (ref 70–110)
POTASSIUM SERPL-SCNC: 4.4 MMOL/L
PROT SERPL-MCNC: 7.5 G/DL
PROTHROMBIN TIME: 10.3 SEC
PS: 5
RBC # BLD AUTO: 4.18 M/UL
SAMPLE: ABNORMAL
SITE: ABNORMAL
SODIUM SERPL-SCNC: 135 MMOL/L
SP02: 100
SPONT RATE: 21
WBC # BLD AUTO: 13.92 K/UL

## 2018-02-11 PROCEDURE — 84100 ASSAY OF PHOSPHORUS: CPT

## 2018-02-11 PROCEDURE — 94003 VENT MGMT INPAT SUBQ DAY: CPT

## 2018-02-11 PROCEDURE — 25000242 PHARM REV CODE 250 ALT 637 W/ HCPCS: Performed by: PSYCHIATRY & NEUROLOGY

## 2018-02-11 PROCEDURE — 85610 PROTHROMBIN TIME: CPT

## 2018-02-11 PROCEDURE — 85025 COMPLETE CBC W/AUTO DIFF WBC: CPT

## 2018-02-11 PROCEDURE — 97110 THERAPEUTIC EXERCISES: CPT

## 2018-02-11 PROCEDURE — 94640 AIRWAY INHALATION TREATMENT: CPT

## 2018-02-11 PROCEDURE — 25000003 PHARM REV CODE 250: Performed by: PSYCHIATRY & NEUROLOGY

## 2018-02-11 PROCEDURE — 63600175 PHARM REV CODE 636 W HCPCS: Performed by: PHYSICIAN ASSISTANT

## 2018-02-11 PROCEDURE — 99291 CRITICAL CARE FIRST HOUR: CPT | Mod: ,,, | Performed by: PSYCHIATRY & NEUROLOGY

## 2018-02-11 PROCEDURE — 25000003 PHARM REV CODE 250: Performed by: PHYSICIAN ASSISTANT

## 2018-02-11 PROCEDURE — 63600175 PHARM REV CODE 636 W HCPCS: Performed by: PSYCHIATRY & NEUROLOGY

## 2018-02-11 PROCEDURE — 80053 COMPREHEN METABOLIC PANEL: CPT

## 2018-02-11 PROCEDURE — 20000000 HC ICU ROOM

## 2018-02-11 PROCEDURE — 83735 ASSAY OF MAGNESIUM: CPT

## 2018-02-11 PROCEDURE — 25000003 PHARM REV CODE 250: Performed by: NURSE PRACTITIONER

## 2018-02-11 PROCEDURE — A4216 STERILE WATER/SALINE, 10 ML: HCPCS | Performed by: NURSE PRACTITIONER

## 2018-02-11 RX ORDER — INSULIN ASPART 100 [IU]/ML
10 INJECTION, SOLUTION INTRAVENOUS; SUBCUTANEOUS EVERY 4 HOURS
Status: DISCONTINUED | OUTPATIENT
Start: 2018-02-11 | End: 2018-02-12

## 2018-02-11 RX ADMIN — Medication 3 ML: at 02:02

## 2018-02-11 RX ADMIN — SODIUM CHLORIDE SOLN NEBU 3% 4 ML: 3 NEBU SOLN at 01:02

## 2018-02-11 RX ADMIN — INSULIN ASPART 4 UNITS: 100 INJECTION, SOLUTION INTRAVENOUS; SUBCUTANEOUS at 02:02

## 2018-02-11 RX ADMIN — STANDARDIZED SENNA CONCENTRATE AND DOCUSATE SODIUM 1 TABLET: 8.6; 5 TABLET, FILM COATED ORAL at 08:02

## 2018-02-11 RX ADMIN — INSULIN ASPART 1 UNITS: 100 INJECTION, SOLUTION INTRAVENOUS; SUBCUTANEOUS at 09:02

## 2018-02-11 RX ADMIN — INSULIN ASPART 9 UNITS: 100 INJECTION, SOLUTION INTRAVENOUS; SUBCUTANEOUS at 10:02

## 2018-02-11 RX ADMIN — ACETAMINOPHEN 650 MG: 325 TABLET, FILM COATED ORAL at 08:02

## 2018-02-11 RX ADMIN — METOPROLOL TARTRATE 25 MG: 25 TABLET ORAL at 08:02

## 2018-02-11 RX ADMIN — INSULIN ASPART 4 UNITS: 100 INJECTION, SOLUTION INTRAVENOUS; SUBCUTANEOUS at 10:02

## 2018-02-11 RX ADMIN — CAPTOPRIL 25 MG: 25 TABLET ORAL at 09:02

## 2018-02-11 RX ADMIN — INSULIN ASPART 10 UNITS: 100 INJECTION, SOLUTION INTRAVENOUS; SUBCUTANEOUS at 06:02

## 2018-02-11 RX ADMIN — Medication 3 ML: at 05:02

## 2018-02-11 RX ADMIN — MODAFINIL 200 MG: 100 TABLET ORAL at 05:02

## 2018-02-11 RX ADMIN — CAPTOPRIL 25 MG: 25 TABLET ORAL at 02:02

## 2018-02-11 RX ADMIN — FAMOTIDINE 20 MG: 20 TABLET, FILM COATED ORAL at 08:02

## 2018-02-11 RX ADMIN — INSULIN DETEMIR 30 UNITS: 100 INJECTION, SOLUTION SUBCUTANEOUS at 08:02

## 2018-02-11 RX ADMIN — INSULIN ASPART 10 UNITS: 100 INJECTION, SOLUTION INTRAVENOUS; SUBCUTANEOUS at 09:02

## 2018-02-11 RX ADMIN — CAPTOPRIL 25 MG: 25 TABLET ORAL at 05:02

## 2018-02-11 RX ADMIN — INSULIN ASPART 9 UNITS: 100 INJECTION, SOLUTION INTRAVENOUS; SUBCUTANEOUS at 01:02

## 2018-02-11 RX ADMIN — INSULIN ASPART 10 UNITS: 100 INJECTION, SOLUTION INTRAVENOUS; SUBCUTANEOUS at 02:02

## 2018-02-11 RX ADMIN — SODIUM CHLORIDE SOLN NEBU 3% 4 ML: 3 NEBU SOLN at 07:02

## 2018-02-11 RX ADMIN — POLYETHYLENE GLYCOL 3350 17 G: 17 POWDER, FOR SOLUTION ORAL at 08:02

## 2018-02-11 RX ADMIN — ACETAMINOPHEN 650 MG: 325 TABLET, FILM COATED ORAL at 03:02

## 2018-02-11 RX ADMIN — CHLORHEXIDINE GLUCONATE 15 ML: 1.2 RINSE ORAL at 08:02

## 2018-02-11 RX ADMIN — Medication 3 ML: at 09:02

## 2018-02-11 RX ADMIN — ATORVASTATIN CALCIUM 80 MG: 20 TABLET, FILM COATED ORAL at 08:02

## 2018-02-11 RX ADMIN — IPRATROPIUM BROMIDE AND ALBUTEROL SULFATE 3 ML: .5; 3 SOLUTION RESPIRATORY (INHALATION) at 07:02

## 2018-02-11 RX ADMIN — IPRATROPIUM BROMIDE AND ALBUTEROL SULFATE 3 ML: .5; 3 SOLUTION RESPIRATORY (INHALATION) at 01:02

## 2018-02-11 RX ADMIN — MODAFINIL 100 MG: 100 TABLET ORAL at 02:02

## 2018-02-11 RX ADMIN — INSULIN ASPART 9 UNITS: 100 INJECTION, SOLUTION INTRAVENOUS; SUBCUTANEOUS at 05:02

## 2018-02-11 RX ADMIN — INSULIN ASPART 4 UNITS: 100 INJECTION, SOLUTION INTRAVENOUS; SUBCUTANEOUS at 05:02

## 2018-02-11 RX ADMIN — INSULIN DETEMIR 32 UNITS: 100 INJECTION, SOLUTION SUBCUTANEOUS at 08:02

## 2018-02-11 RX ADMIN — ENOXAPARIN SODIUM 40 MG: 100 INJECTION SUBCUTANEOUS at 06:02

## 2018-02-11 RX ADMIN — INSULIN ASPART 1 UNITS: 100 INJECTION, SOLUTION INTRAVENOUS; SUBCUTANEOUS at 01:02

## 2018-02-11 RX ADMIN — INSULIN ASPART 4 UNITS: 100 INJECTION, SOLUTION INTRAVENOUS; SUBCUTANEOUS at 06:02

## 2018-02-11 RX ADMIN — ASPIRIN 81 MG CHEWABLE TABLET 81 MG: 81 TABLET CHEWABLE at 08:02

## 2018-02-11 NOTE — PROGRESS NOTES
[facility]  Neurocritical Care  Progress Note    Admit Date: 1/25/2018  Service Date: 02/10/2018  Length of Stay: 16    Subjective:     Chief Complaint: Embolic stroke involving left middle cerebral artery    History of Present Illness: The patient is a 47 year old male with no known PMHx admitted to [facility]   s/p thrombectomy of L M1 occlusion. Per chart review, he   walked into [place] and was acting abnormal.  EMS was called and brought to the ED for concern of L MCA syndrome. CT MP revealed short segment occlusion of left M1.  Patient went to IR for thrombectomy of L M1 occlusion seen on CTA MP.  TICI2C reperfusion and angioplasty. Patient admitted to [facility] for close monitoring and higher level of care.   History obtained from chart review only            Hospital Course: 1/25: Pt admitted to [facility] s/p L M1 thrombectomy, TICI2C reperfusion and angioplasty   1/27: large L MCA, hemicrani watch, NSGY following, insulin ggt, cardene ggt, L sided intermittent slowing eeg but no sz, +nasal trumpet for copious thick secretions, wheezing this am - improved with duo nebs, 3% nebs, and cough assist, concern for sepsis 2/2 hemodynamic changes - increased HR and BP, worsening leukocytosis, +ceftriaxone, pan cx, adrian track  1/28: continued respiratory challenges due to secretions. Aggressive pulmonary toileting. Continue monitoring for neuro worsening. Add moxifloxacin.   1/29: CTH to eval for increased edema/shift, EEG afterwards. Concern for decrease exam, no following/decreased alertness). CXR and Procal to follow leukocytosis. Hold TF until eval decreasing EXAM  1/30: neuro exam stable, increase 2%, current amount of sodium iv not suffuicient to meet target 145-155, cont abx for likely pneumonia, repeat ct in am  1/31: abrupt desaturation today requiring emergent intubation followed by bronchoscopy  Etiology likely upper airway obstruction from dried secretions  02/01: stable on vent overnight, marked  improvement on today's chest x ray, switch to spontaneous today, sodium stable at 150, start tfs  02/02: moves left side spontaneously and opens eyes off sedation,  02/03: 2% buffered NS re-started at 20mL/hr for rapid decrease in serum sodium. Enteral free water flushes discontinued. Patient has copious secretions likely pneumonia. Holding dexamethasone  2/4:  No acute events overnight.  Sodium stable.  2/5: transition insulin infusion to subcutaneous insulin   2/7: gen surg consulted for trach/peg  2/9: increased Aspart and Detemir, pending Trach/Peg placement next week.  2/10: No changes overnight . Pending PEG and trach, WBC mildly elevated  12K     Interval History:  >4 elements OR status of 3 inpatient conditions  No changes overnight . Pending PEG and trach, WBC mildly elevated  12K  Review of Systems   Unable to perform ROS: Intubated     2 systems OR Unable to obtain a complete ROS due to level of consciousness.  Objective:     Vitals:  Temp: 99.7 °F (37.6 °C)  Pulse: 80  Rhythm: normal sinus rhythm  BP: (!) 144/73  MAP (mmHg): 100  Resp: 20  SpO2: 98 %  Oxygen Concentration (%): 40  O2 Device (Oxygen Therapy): ventilator  Vent Mode: Spont  Set Rate: 0 bmp  Vt Set: 700 mL  Pressure Support: 5 cmH20  PEEP/CPAP: 5 cmH20  Peak Airway Pressure: 11 cmH2O  Mean Airway Pressure: 7.2 cmH20  Plateau Pressure: 0 cmH20    Temp  Min: 99 °F (37.2 °C)  Max: 100.1 °F (37.8 °C)  Pulse  Min: 75  Max: 101  BP  Min: 116/70  Max: 182/91  MAP (mmHg)  Min: 88  Max: 135  Resp  Min: 18  Max: 40  SpO2  Min: 96 %  Max: 100 %  Oxygen Concentration (%)  Min: 40  Max: 40    02/10 0701 - 02/11 0700  In: 1290   Out: 1105 [Urine:1105]   Unmeasured Output  Urine Occurrence: 1  Stool Occurrence: 0  Pad Count: 1       Physical Exam  Unable to test orientation, language, memory, judgment, insight, fund of knowledge, hearing, shoulder shrug, tongue protrusion, coordination, gait due to level of consciousness.    General   HEENT:   Chest  Heart RRR / Lungs Clear to auscultation  Abdomen: Soft nontender + BS  Extremities: OK distal pulses.  Skin: UK  Neurological Exam:  MS; Lethargic and not following commands.  CN: II-XII  R-UMN VII.  Motor: LUE  5 /5 / RUE 0 /5  Tone normal bilaterally RUE extensor posturing.             LLE   5/5 /  RLE 2 /5  Tone normal bilaterally Withdraws with RLE  Sensory: LT/PP/T/ Vibration UK                 Complex sensory modalities: not tested  DTR:  normal throughout.  Coordination /Fine motor:   Gait: Not tested.  Meningeal signs: Absent  Medications:  Continuous Scheduled  albuterol-ipratropium 2.5mg-0.5mg/3mL 3 mL Q6H   aspirin 81 mg Daily   atorvastatin 80 mg Daily   captopril 25 mg TID   chlorhexidine 15 mL BID   enoxaparin 40 mg Daily   famotidine 20 mg BID   insulin aspart 9 Units Q4H   insulin detemir 30 Units BID   metoprolol tartrate 25 mg BID   modafinil 200 mg Daily   And     modafinil 100 mg Daily   polyethylene glycol 17 g Daily   senna-docusate 8.6-50 mg 1 tablet BID   sodium chloride 0.9% 3 mL Q8H   sodium chloride 3% 4 mL Q6H   PRN  acetaminophen 650 mg Q6H PRN   albuterol-ipratropium 2.5mg-0.5mg/3mL 3 mL Q4H PRN   bisacodyl 10 mg Daily PRN   dextrose 50% 12.5 g PRN   glucagon (human recombinant) 1 mg PRN   hydrALAZINE 10 mg Q4H PRN   insulin aspart 1-10 Units Q4H PRN   labetalol 10 mg Q4H PRN   magnesium sulfate IVPB 2 g PRN   magnesium sulfate IVPB 4 g PRN   ondansetron 4 mg Q8H PRN   potassium chloride 10% 40 mEq PRN   potassium chloride 10% 40 mEq PRN   sodium chloride 0.9% 5 mL PRN     Today I personally reviewed pertinent medications, lines/drains/airways, imaging, cardiology, lab results, microbiology results, notably:    Diet  Diet NPO  Diet NPO  CMP:   Recent Labs  Lab 02/10/18  0321   CALCIUM 9.4   ALBUMIN 2.7*   PROT 7.4      K 4.1   CO2 28   CL 98   BUN 21*   CREATININE 0.9   ALKPHOS 90   ALT 21   AST 26   BILITOT 0.7      BMP:   Recent Labs  Lab 02/10/18  0321      K 4.1   CL  98   CO2 28   BUN 21*   CREATININE 0.9   CALCIUM 9.4      CBC:   Recent Labs  Lab 02/10/18  0321   WBC 12.04   RBC 4.17*   HGB 12.5*   HCT 36.1*   *   MCV 87   MCH 30.0   MCHC 34.6      Lipid Panel: No results for input(s): CHOL, LDLCALC, HDL, TRIG in the last 168 hours.  Coagulation:   Recent Labs  Lab 02/10/18  0321   INR 1.0      Platelet Aggregation Study: No results for input(s): PLTAGG, PLTAGINTERP, PLTAGREGLACO, ADPPLTAGGREG in the last 168 hours.  Hgb A1C: No results for input(s): HGBA1C in the last 168 hours.  TSH: No results for input(s): TSH in the last 168 hours.              Assessment/Plan:     No new Assessment & Plan notes have been filed under this hospital service since the last note was generated.  Service: Neuro Critical Care         Active Problem List:   1.L-MCA stroke s/p thrombectomy of M1 occlusion  L-anterior frontal territory preserved.   2. HTN  3. DM II  4. On ventilatory support.  5. Right hemiparesis.       Assessment / Plan:     Neuro:  -ASA 81mg qd  -Lipitor 80 mg qd.  -Modafenil 200mg q 6am and 100mg q 2PM  -PT/OT  Resp:  -PS/CPAP 5/5 FIO2 40%  -Dual nebs and 3% nebs PRN.  -VAP  -Pending trach.  CV: TTE: nl. Keep SBP < 180 mmHg  -Metoprolol 25mg bid  -Captopril 25mg tid.  -ASA 81mg qd  -Lipitor 80 mg qd.  -PRN hydralazine and Labetalol  IVF/nutrition/Renal/GI:  -BR senna and Myralax.  -TF at 60cc/hr Diabetisource.  -BR  Hem / ID: Low grade fever, WBC 12K  -Procal serum  -No antibiotics.  Endo: HgA1c 10., Trigl 460  -Detemir 30 U bid  -Aspart 9 U q 4hrs  -SSI q4hrs.  Prophylaxis:  -VAP  -Pepcid bid.  -SC Enoxaparin 40 mg qd.  Advance Directives and Disposition:    Full Code. Pending PEG and trach              Uninterrupted Critical Care/Counseling Time (directly spent today by me not including procedures 30-74 min ([id number])): = 35   min    Activity Orders          Activity as tolerated starting at 02/05 1432        Full Code    Leonid Polanco MD  Neurocritical Care  [facility]

## 2018-02-11 NOTE — PLAN OF CARE
Problem: Patient Care Overview  Goal: Plan of Care Review  Outcome: Ongoing (interventions implemented as appropriate)  POC reviewed with pt at 1600. Family not present at bedside. Unable to assess pt understanding d/t intubation. Pt progressing towards goals. Pt febrile, tylenol given as needed. No acute events today. Will continue to monitor. Refer to flowsheets for full assessment and VS info.

## 2018-02-11 NOTE — PROGRESS NOTES
Pt transported to CT via bed w/ cardiac monitoring and O2; accompanied by RN and PCT. Pt tolerated transport well. VSS. Return to unit w/ no evidence of distress. Will continue to monitor.

## 2018-02-11 NOTE — PT/OT/SLP PROGRESS
Occupational Therapy   Treatment    Name: Todd Quevedo  MRN: [id number]  Admitting Diagnosis:  Embolic stroke involving left middle cerebral artery       Recommendations:     Discharge Recommendations: LTACH (long term acute care hospital)  Discharge Equipment Recommendations:  hospital bed  Barriers to discharge:  Inaccessible home environment, Decreased caregiver support    Subjective   Patient:  Nonverbal; intubated  Nurse reported plans are for PEG and trach next week.   Communicated with: Nurse prior to session.  Pain/Comfort:  · Pain Rating 1: 0/10  · Pain Rating Post-Intervention 1: 0/10    Patients cultural, spiritual, Jewish conflicts given the current situation:  Alevism    Objective:     Patient found with: blood pressure cuff, ventilator, peripheral IV, telemetry, NG tube, pulse ox (continuous), SCD, pressure relief boots, Condom Catheter  Family asleep at bedside  General Precautions: Standard, aspiration, NPO, respiratory, fall   Orthopedic Precautions:N/A   Braces: N/A     Occupational Performance:    Bed Mobility:    · Patient completed Rolling/Turning to Left with  total assistance  · Patient completed Rolling/Turning to Right with maximal assistance     Functional Mobility/Transfers:  · Dependent drawsheet transfers    Activities of Daily Living:  · Feeding:  NPO  · Grooming: Total assist while supine in bed      Patient left right sidelying with all lines intact, call button in reach and restraints reapplied at end of session    AMPA 6 Click:  AMPA Total Score: 6    Treatment & Education:  Patient education provided on role of OT and need for continued OT. Continued education, patient/ family training recommended. Daily orientation provided.  PROM performed right UE/LEs and AA/PROM left UE/LE one set x 10 rep in all planes of motion with stretches provided at end range; sustained stretch provided for right ankle dorsiflexion.  Assistance and facilitation provided for upward rotation of the  scapula during shoulder flexion and abduction.  Patient kept eyes closed 100% of the session.  Patient unable to follow commands.  Positioning provided for midline orientation with bilateral UEs elevated and heels lifted off mattress.  Gentle cervical rotation provided.    White board updated in patient's room.  OT asked if there were any other questions; patient/ family had no further questions.  Education:    Assessment:     Todd Quevedo is a 47 y.o. male with a medical diagnosis of Embolic stroke involving left middle cerebral artery.  He presents with performance deficits of physical skills including impaired respiration, balance, mobility, strength, dexterity, fine motor coordination, gross motor coordination, sensation and endurance; demonstrating performance deficits of cognitive skills including impaired attention, perception, understanding, problem solving, sequencing and memory all resulting in inability organizing occupational performance in a timely and safe manner; demonstrating performance deficits of psychosocial skills including impairments of interpersonal interactions and coping strategies which are skills necessary to successfully and appropriately participate in everyday tasks and social situations.  These performance deficits have resulted in activity limitations including but not limited to:  bed mobility, transfers, ascending/ descending stairs, walking short and long distances, walking around obstacles, transitional movement patterns (kneeling, bending); eating, upper body dressing, lower body dressing, brushing teeth, toileting, bathing, carrying objects, meal preparation, and working ().  Patient's role as , father, ,   and independent caretaker for self has been affected. Patient will benefit from skilled OT services to maximize level of independence with self-care skills and functional mobility.       Rehab Prognosis:  Good;  patient would benefit from acute skilled OT services to address these deficits and reach maximum level of function.             Plan:     Patient to be seen 3 x/week to address the above listed problems via self-care/home management, therapeutic exercises, sensory integration, neuromuscular re-education, cognitive retraining, therapeutic activities  · Plan of Care Expires: 02/23/18  · Plan of Care Reviewed with: patient    This Plan of care has been discussed with the patient who was involved in its development and understands and is in agreement with the identified goals and treatment plan    GOALS:    Occupational Therapy Goals        Problem: Occupational Therapy Goal    Goal Priority Disciplines Outcome Interventions   Occupational Therapy Goal     OT, PT/OT Ongoing (interventions implemented as appropriate)    Description:  Goals set 2/9 to be addressed for 14 days with expiration date, 2/23:  Patient will increase functional independence with ADLs by performing:    Patient will demonstrate rolling to the right with mod assist.  Not met   Patient will demonstrate rolling to the left with max assist.   Not met  Patient will demonstrate supine -sit with max assist.   Not met  Patient will demonstrate squat pivot transfers with max assist.   Not met  Patient will demonstrate grooming while seated with max assist.   Not met  Patient will demonstrate upper body dressing with max assist while seated EOB.   Not met  Patient will demonstrate lower body dressing with max assist while seated EOB.   Not met  Patient will demonstrate toileting with max assist.   Not met  Patient will demonstrate ability to follow 3/5 commands.   Not met  Patient will demonstrate independence with HEP for right UE positioning.    Not met  Patient's family / caregiver will demonstrate independence and safety with assisting patient with self-care skills and functional mobility.     Not met  Patient's family / caregiver will demonstrate  independence with providing ROM and changes in bed positioning.   Not met                        Time Tracking:     OT Date of Treatment: 02/11/18  OT Start Time: 0555  OT Stop Time: 0619  OT Total Time (min): 24 min    Billable Minutes:Therapeutic Exercise 24    ISAMAR Almonte  2/11/2018

## 2018-02-11 NOTE — PLAN OF CARE
Problem: Occupational Therapy Goal  Goal: Occupational Therapy Goal  Goals set 2/9 to be addressed for 14 days with expiration date, 2/23:  Patient will increase functional independence with ADLs by performing:    Patient will demonstrate rolling to the right with mod assist.  Not met   Patient will demonstrate rolling to the left with max assist.   Not met  Patient will demonstrate supine -sit with max assist.   Not met  Patient will demonstrate squat pivot transfers with max assist.   Not met  Patient will demonstrate grooming while seated with max assist.   Not met  Patient will demonstrate upper body dressing with max assist while seated EOB.   Not met  Patient will demonstrate lower body dressing with max assist while seated EOB.   Not met  Patient will demonstrate toileting with max assist.   Not met  Patient will demonstrate ability to follow 3/5 commands.   Not met  Patient will demonstrate independence with HEP for right UE positioning.    Not met  Patient's family / caregiver will demonstrate independence and safety with assisting patient with self-care skills and functional mobility.     Not met  Patient's family / caregiver will demonstrate independence with providing ROM and changes in bed positioning.   Not met       Goals remain appropriate.  ISAMAR Almonte  2/11/2018

## 2018-02-11 NOTE — PLAN OF CARE
Problem: Patient Care Overview  Goal: Plan of Care Review  Outcome: Ongoing (interventions implemented as appropriate)  POC reviewed w/ . Todd Quevedo and wife at 0100. Pt's wife verbalized understanding. Questions and concerns addressed. Pt has no evidence of learning at this time. VSS. No acute events overnight. Will continue to monitor. See flowsheet for further assessment and VS info.

## 2018-02-12 ENCOUNTER — ANESTHESIA EVENT (OUTPATIENT)
Dept: SURGERY | Facility: HOSPITAL | Age: 48
End: 2018-02-12

## 2018-02-12 LAB
ALBUMIN SERPL BCP-MCNC: 2.5 G/DL
ALLENS TEST: ABNORMAL
ALP SERPL-CCNC: 93 U/L
ALT SERPL W/O P-5'-P-CCNC: 21 U/L
ANION GAP SERPL CALC-SCNC: 10 MMOL/L
AST SERPL-CCNC: 24 U/L
BASOPHILS # BLD AUTO: 0.03 K/UL
BASOPHILS NFR BLD: 0.2 %
BILIRUB SERPL-MCNC: 0.7 MG/DL
BUN SERPL-MCNC: 22 MG/DL
CALCIUM SERPL-MCNC: 9.6 MG/DL
CHLORIDE SERPL-SCNC: 98 MMOL/L
CO2 SERPL-SCNC: 29 MMOL/L
CREAT SERPL-MCNC: 0.9 MG/DL
DELSYS: ABNORMAL
DIFFERENTIAL METHOD: ABNORMAL
EOSINOPHIL # BLD AUTO: 0.1 K/UL
EOSINOPHIL NFR BLD: 0.9 %
ERYTHROCYTE [DISTWIDTH] IN BLOOD BY AUTOMATED COUNT: 12.6 %
EST. GFR  (AFRICAN AMERICAN): >60 ML/MIN/1.73 M^2
EST. GFR  (NON AFRICAN AMERICAN): >60 ML/MIN/1.73 M^2
FIO2: 99
GLUCOSE SERPL-MCNC: 238 MG/DL
HCO3 UR-SCNC: 33.9 MMOL/L (ref 24–28)
HCT VFR BLD AUTO: 37.1 %
HGB BLD-MCNC: 12.8 G/DL
IMM GRANULOCYTES # BLD AUTO: 0.05 K/UL
IMM GRANULOCYTES NFR BLD AUTO: 0.4 %
INR PPP: 1
LYMPHOCYTES # BLD AUTO: 1.5 K/UL
LYMPHOCYTES NFR BLD: 10.8 %
MAGNESIUM SERPL-MCNC: 2.1 MG/DL
MCH RBC QN AUTO: 29.8 PG
MCHC RBC AUTO-ENTMCNC: 34.5 G/DL
MCV RBC AUTO: 86 FL
MIN VOL: 11
MODE: ABNORMAL
MONOCYTES # BLD AUTO: 1.1 K/UL
MONOCYTES NFR BLD: 8 %
NEUTROPHILS # BLD AUTO: 10.7 K/UL
NEUTROPHILS NFR BLD: 79.7 %
NRBC BLD-RTO: 0 /100 WBC
PCO2 BLDA: 45.3 MMHG (ref 35–45)
PEEP: 5
PH SMN: 7.48 [PH] (ref 7.35–7.45)
PHOSPHATE SERPL-MCNC: 3.8 MG/DL
PLATELET # BLD AUTO: 550 K/UL
PMV BLD AUTO: 9.3 FL
PO2 BLDA: 121 MMHG (ref 80–100)
POC BE: 10 MMOL/L
POC SATURATED O2: 99 % (ref 95–100)
POC TCO2: 35 MMOL/L (ref 23–27)
POCT GLUCOSE: 158 MG/DL (ref 70–110)
POCT GLUCOSE: 161 MG/DL (ref 70–110)
POCT GLUCOSE: 201 MG/DL (ref 70–110)
POCT GLUCOSE: 202 MG/DL (ref 70–110)
POCT GLUCOSE: 217 MG/DL (ref 70–110)
POCT GLUCOSE: 226 MG/DL (ref 70–110)
POTASSIUM SERPL-SCNC: 4.4 MMOL/L
PROT SERPL-MCNC: 7.5 G/DL
PROTHROMBIN TIME: 10.5 SEC
RBC # BLD AUTO: 4.3 M/UL
SAMPLE: ABNORMAL
SITE: ABNORMAL
SODIUM SERPL-SCNC: 137 MMOL/L
SP02: 40
SPONT RATE: 25
WBC # BLD AUTO: 13.44 K/UL

## 2018-02-12 PROCEDURE — 25000003 PHARM REV CODE 250: Performed by: NURSE PRACTITIONER

## 2018-02-12 PROCEDURE — 82803 BLOOD GASES ANY COMBINATION: CPT

## 2018-02-12 PROCEDURE — 36600 WITHDRAWAL OF ARTERIAL BLOOD: CPT

## 2018-02-12 PROCEDURE — 85610 PROTHROMBIN TIME: CPT

## 2018-02-12 PROCEDURE — 63600175 PHARM REV CODE 636 W HCPCS: Performed by: PHYSICIAN ASSISTANT

## 2018-02-12 PROCEDURE — 80053 COMPREHEN METABOLIC PANEL: CPT

## 2018-02-12 PROCEDURE — 94003 VENT MGMT INPAT SUBQ DAY: CPT

## 2018-02-12 PROCEDURE — 25000242 PHARM REV CODE 250 ALT 637 W/ HCPCS: Performed by: PSYCHIATRY & NEUROLOGY

## 2018-02-12 PROCEDURE — 85025 COMPLETE CBC W/AUTO DIFF WBC: CPT

## 2018-02-12 PROCEDURE — 25000003 PHARM REV CODE 250: Performed by: PSYCHIATRY & NEUROLOGY

## 2018-02-12 PROCEDURE — 94640 AIRWAY INHALATION TREATMENT: CPT

## 2018-02-12 PROCEDURE — 25000003 PHARM REV CODE 250: Performed by: PHYSICIAN ASSISTANT

## 2018-02-12 PROCEDURE — A4216 STERILE WATER/SALINE, 10 ML: HCPCS | Performed by: NURSE PRACTITIONER

## 2018-02-12 PROCEDURE — 84100 ASSAY OF PHOSPHORUS: CPT

## 2018-02-12 PROCEDURE — 99233 SBSQ HOSP IP/OBS HIGH 50: CPT | Mod: ,,, | Performed by: NURSE PRACTITIONER

## 2018-02-12 PROCEDURE — 99900035 HC TECH TIME PER 15 MIN (STAT)

## 2018-02-12 PROCEDURE — 83735 ASSAY OF MAGNESIUM: CPT

## 2018-02-12 PROCEDURE — 27000221 HC OXYGEN, UP TO 24 HOURS

## 2018-02-12 PROCEDURE — 20000000 HC ICU ROOM

## 2018-02-12 RX ORDER — INSULIN ASPART 100 [IU]/ML
12 INJECTION, SOLUTION INTRAVENOUS; SUBCUTANEOUS EVERY 4 HOURS
Status: DISCONTINUED | OUTPATIENT
Start: 2018-02-12 | End: 2018-02-14

## 2018-02-12 RX ORDER — LISINOPRIL 20 MG/1
20 TABLET ORAL DAILY
Status: DISCONTINUED | OUTPATIENT
Start: 2018-02-13 | End: 2018-03-01

## 2018-02-12 RX ORDER — CAPTOPRIL 25 MG/1
25 TABLET ORAL 3 TIMES DAILY
Status: COMPLETED | OUTPATIENT
Start: 2018-02-12 | End: 2018-02-12

## 2018-02-12 RX ADMIN — SODIUM CHLORIDE SOLN NEBU 3% 4 ML: 3 NEBU SOLN at 08:02

## 2018-02-12 RX ADMIN — Medication 3 ML: at 05:02

## 2018-02-12 RX ADMIN — CHLORHEXIDINE GLUCONATE 15 ML: 1.2 RINSE ORAL at 08:02

## 2018-02-12 RX ADMIN — METOPROLOL TARTRATE 25 MG: 25 TABLET ORAL at 08:02

## 2018-02-12 RX ADMIN — SODIUM CHLORIDE SOLN NEBU 3% 4 ML: 3 NEBU SOLN at 07:02

## 2018-02-12 RX ADMIN — IPRATROPIUM BROMIDE AND ALBUTEROL SULFATE 3 ML: .5; 3 SOLUTION RESPIRATORY (INHALATION) at 08:02

## 2018-02-12 RX ADMIN — INSULIN ASPART 2 UNITS: 100 INJECTION, SOLUTION INTRAVENOUS; SUBCUTANEOUS at 06:02

## 2018-02-12 RX ADMIN — Medication 3 ML: at 01:02

## 2018-02-12 RX ADMIN — STANDARDIZED SENNA CONCENTRATE AND DOCUSATE SODIUM 1 TABLET: 8.6; 5 TABLET, FILM COATED ORAL at 08:02

## 2018-02-12 RX ADMIN — CAPTOPRIL 25 MG: 25 TABLET ORAL at 05:02

## 2018-02-12 RX ADMIN — INSULIN ASPART 10 UNITS: 100 INJECTION, SOLUTION INTRAVENOUS; SUBCUTANEOUS at 01:02

## 2018-02-12 RX ADMIN — INSULIN DETEMIR 32 UNITS: 100 INJECTION, SOLUTION SUBCUTANEOUS at 08:02

## 2018-02-12 RX ADMIN — ASPIRIN 81 MG CHEWABLE TABLET 81 MG: 81 TABLET CHEWABLE at 08:02

## 2018-02-12 RX ADMIN — POLYETHYLENE GLYCOL 3350 17 G: 17 POWDER, FOR SOLUTION ORAL at 08:02

## 2018-02-12 RX ADMIN — IPRATROPIUM BROMIDE AND ALBUTEROL SULFATE 3 ML: .5; 3 SOLUTION RESPIRATORY (INHALATION) at 02:02

## 2018-02-12 RX ADMIN — CAPTOPRIL 25 MG: 25 TABLET ORAL at 09:02

## 2018-02-12 RX ADMIN — INSULIN ASPART 2 UNITS: 100 INJECTION, SOLUTION INTRAVENOUS; SUBCUTANEOUS at 01:02

## 2018-02-12 RX ADMIN — MODAFINIL 100 MG: 100 TABLET ORAL at 01:02

## 2018-02-12 RX ADMIN — INSULIN ASPART 1 UNITS: 100 INJECTION, SOLUTION INTRAVENOUS; SUBCUTANEOUS at 09:02

## 2018-02-12 RX ADMIN — INSULIN ASPART 12 UNITS: 100 INJECTION, SOLUTION INTRAVENOUS; SUBCUTANEOUS at 02:02

## 2018-02-12 RX ADMIN — IPRATROPIUM BROMIDE AND ALBUTEROL SULFATE 3 ML: .5; 3 SOLUTION RESPIRATORY (INHALATION) at 07:02

## 2018-02-12 RX ADMIN — FAMOTIDINE 20 MG: 20 TABLET, FILM COATED ORAL at 08:02

## 2018-02-12 RX ADMIN — Medication 3 ML: at 09:02

## 2018-02-12 RX ADMIN — INSULIN ASPART 12 UNITS: 100 INJECTION, SOLUTION INTRAVENOUS; SUBCUTANEOUS at 09:02

## 2018-02-12 RX ADMIN — INSULIN ASPART 12 UNITS: 100 INJECTION, SOLUTION INTRAVENOUS; SUBCUTANEOUS at 06:02

## 2018-02-12 RX ADMIN — SODIUM CHLORIDE SOLN NEBU 3% 4 ML: 3 NEBU SOLN at 02:02

## 2018-02-12 RX ADMIN — INSULIN ASPART 4 UNITS: 100 INJECTION, SOLUTION INTRAVENOUS; SUBCUTANEOUS at 10:02

## 2018-02-12 RX ADMIN — ACETAMINOPHEN 650 MG: 325 TABLET, FILM COATED ORAL at 08:02

## 2018-02-12 RX ADMIN — INSULIN ASPART 4 UNITS: 100 INJECTION, SOLUTION INTRAVENOUS; SUBCUTANEOUS at 02:02

## 2018-02-12 RX ADMIN — INSULIN ASPART 10 UNITS: 100 INJECTION, SOLUTION INTRAVENOUS; SUBCUTANEOUS at 05:02

## 2018-02-12 RX ADMIN — INSULIN ASPART 10 UNITS: 100 INJECTION, SOLUTION INTRAVENOUS; SUBCUTANEOUS at 10:02

## 2018-02-12 RX ADMIN — INSULIN ASPART 4 UNITS: 100 INJECTION, SOLUTION INTRAVENOUS; SUBCUTANEOUS at 05:02

## 2018-02-12 RX ADMIN — ENOXAPARIN SODIUM 40 MG: 100 INJECTION SUBCUTANEOUS at 05:02

## 2018-02-12 RX ADMIN — ATORVASTATIN CALCIUM 80 MG: 20 TABLET, FILM COATED ORAL at 08:02

## 2018-02-12 RX ADMIN — CAPTOPRIL 25 MG: 25 TABLET ORAL at 01:02

## 2018-02-12 RX ADMIN — INSULIN DETEMIR 32 UNITS: 100 INJECTION, SOLUTION SUBCUTANEOUS at 09:02

## 2018-02-12 RX ADMIN — MODAFINIL 200 MG: 100 TABLET ORAL at 05:02

## 2018-02-12 NOTE — PROGRESS NOTES
[facility]  Neurocritical Care  Progress Note    Admit Date: 1/25/2018  Service Date: 02/12/2018  Length of Stay: 18    Subjective:     Chief Complaint: Embolic stroke involving left middle cerebral artery    History of Present Illness: The patient is a 47 year old male with no known PMHx admitted to [facility]   s/p thrombectomy of L M1 occlusion. Per chart review, he   walked into [place] and was acting abnormal.  EMS was called and brought to the ED for concern of L MCA syndrome. CT MP revealed short segment occlusion of left M1.  Patient went to IR for thrombectomy of L M1 occlusion seen on CTA MP.  TICI2C reperfusion and angioplasty. Patient admitted to [facility] for close monitoring and higher level of care.   History obtained from chart review only            Hospital Course: 1/25: Pt admitted to [facility] s/p L M1 thrombectomy, TICI2C reperfusion and angioplasty   1/27: large L MCA, hemicrani watch, NSGY following, insulin ggt, cardene ggt, L sided intermittent slowing eeg but no sz, +nasal trumpet for copious thick secretions, wheezing this am - improved with duo nebs, 3% nebs, and cough assist, concern for sepsis 2/2 hemodynamic changes - increased HR and BP, worsening leukocytosis, +ceftriaxone, pan cx, adrian track  1/28: continued respiratory challenges due to secretions. Aggressive pulmonary toileting. Continue monitoring for neuro worsening. Add moxifloxacin.   1/29: CTH to eval for increased edema/shift, EEG afterwards. Concern for decrease exam, no following/decreased alertness). CXR and Procal to follow leukocytosis. Hold TF until eval decreasing EXAM  1/30: neuro exam stable, increase 2%, current amount of sodium iv not suffuicient to meet target 145-155, cont abx for likely pneumonia, repeat ct in am  1/31: abrupt desaturation today requiring emergent intubation followed by bronchoscopy  Etiology likely upper airway obstruction from dried secretions  02/01: stable on vent overnight, marked  improvement on today's chest x ray, switch to spontaneous today, sodium stable at 150, start tfs  02/02: moves left side spontaneously and opens eyes off sedation,  02/03: 2% buffered NS re-started at 20mL/hr for rapid decrease in serum sodium. Enteral free water flushes discontinued. Patient has copious secretions likely pneumonia. Holding dexamethasone  2/4:  No acute events overnight.  Sodium stable.  2/5: transition insulin infusion to subcutaneous insulin   2/7: gen surg consulted for trach/peg  2/9: increased Aspart and Detemir, pending Trach/Peg placement next week.  2/10: No changes overnight . Pending PEG and trach, WBC mildly elevated  12K  2/11: No changes overnight . Pending PEG and trach, WBC mildly elevated  13K. Procal was low and lower than prior. Patient got 16 units of SSI yesterday.    Interval History:  No significant events over night    Review of Systems  Unable to obtain a complete ROS due to level of consciousness.  Objective:     Vitals:  Temp: 99.5 °F (37.5 °C)  Pulse: 83  Rhythm: normal sinus rhythm, sinus tachycardia  BP: (!) 139/92  MAP (mmHg): 108  Resp: (!) 23  SpO2: 97 %  Oxygen Concentration (%): 40  O2 Device (Oxygen Therapy): ventilator  Vent Mode: Spont  Set Rate: 0 bmp  Vt Set: 500 mL  Pressure Support: 5 cmH20  PEEP/CPAP: 5 cmH20  Peak Airway Pressure: 11 cmH2O  Mean Airway Pressure: 7.1 cmH20  Plateau Pressure: 0 cmH20    Temp  Min: 98.8 °F (37.1 °C)  Max: 100.2 °F (37.9 °C)  Pulse  Min: 81  Max: 100  BP  Min: 103/59  Max: 152/85  MAP (mmHg)  Min: 75  Max: 114  Resp  Min: 17  Max: 30  SpO2  Min: 96 %  Max: 100 %  Oxygen Concentration (%)  Min: 40  Max: 40    02/11 0701 - 02/12 0700  In: 1710   Out: 1450 [Urine:1450]   Unmeasured Output  Urine Occurrence: 1 (condom cath came off)  Stool Occurrence: 0  Pad Count: 2       Physical Exam      Physical Exam:  GA: Lethargic, comfortable, no acute distress.   HEENT: No scleral icterus or JVD.   Pulmonary: Clear to auscultation A/L.    Cardiac: RRR S1 & S2 w/o rubs/murmurs/gallops.   Abdominal: Bowel sounds present x 4.   Skin: No jaundice, rashes, or visible lesions.  Neuro:  --GCS: E2 V1t M6  --Mental Status:  Arouses to tactile stimuli follows simple commands occasionally   --Pupils 3mm, PERRL.   --Corneal reflex, gag, cough intact.  --LUE strength: 5/5  --RUE strength: 0/5  --LLE strength: 5/5  --RLE strength: 0/5    Unable to test orientation, language, memory, judgment, insight, fund of knowledge, gait due to level of consciousness.    Medications:  Continuous Scheduled  albuterol-ipratropium 2.5mg-0.5mg/3mL 3 mL Q6H   aspirin 81 mg Daily   atorvastatin 80 mg Daily   captopril 25 mg TID   chlorhexidine 15 mL BID   enoxaparin 40 mg Daily   famotidine 20 mg BID   insulin aspart 12 Units Q4H   insulin detemir 32 Units BID   [START ON 2/13/2018] lisinopril 20 mg Daily   metoprolol tartrate 25 mg BID   modafinil 200 mg Daily   And     modafinil 100 mg Daily   polyethylene glycol 17 g Daily   senna-docusate 8.6-50 mg 1 tablet BID   sodium chloride 0.9% 3 mL Q8H   sodium chloride 3% 4 mL Q6H   PRN  acetaminophen 650 mg Q6H PRN   albuterol-ipratropium 2.5mg-0.5mg/3mL 3 mL Q4H PRN   bisacodyl 10 mg Daily PRN   dextrose 50% 12.5 g PRN   glucagon (human recombinant) 1 mg PRN   hydrALAZINE 10 mg Q4H PRN   insulin aspart 1-10 Units Q4H PRN   labetalol 10 mg Q4H PRN   magnesium sulfate IVPB 2 g PRN   magnesium sulfate IVPB 4 g PRN   ondansetron 4 mg Q8H PRN   potassium chloride 10% 40 mEq PRN   potassium chloride 10% 40 mEq PRN   sodium chloride 0.9% 5 mL PRN     Today I personally reviewed pertinent medications, lines/drains/airways, imaging, cardiology, lab results,     Diet  Diet NPO  Diet NPO        Assessment/Plan:     Neuro   * Embolic stroke involving left middle cerebral artery    -s/p L M1 thrombectomy  --160  -repeat imaging stable   -ASA, plavix  -statin   -SQH  -continue modafinil   -PT/OT  -pending peg/trach with gen. surg  Wednesday              Pulmonary   Pneumonia    - Completed 7d course of ceftriaxone on 2/2  - T/P pending           Acute respiratory failure with hypoxia    -Due to airway obstruction form hardened secretions overlying epiglottis and soft palate, improved after intubation and bronchoscopy  -CXR and ABG prn   -general surgery plans for trach/peg Wed        Cardiac/Vascular   Hyperlipidemia    -atorvastatin 80 daily           Essential hypertension    --160  -metoprolol 25 mg bid  -captopril 25 mg tid  -PRN Labetalol & Hydralazine   -Echo 1/26:1 - Normal left ventricular systolic function (EF 65-70%).     2 - Concentric remodeling.     3 - No wall motion abnormalities.     4 - Normal left ventricular diastolic function.     5 - Normal right ventricular systolic function .            Endocrine   Type 2 diabetes mellitus    -A1C-10  -poorly controlled diabetes  -BG >400 on arrival  -insulin infusion discontinued  -Detemir increased from 24 to 30 units bid  -Aspart increased from  8 to 09units q 4 hours   -PRN moderate dose sliding scale            Prophylaxis:  Venous Thromboembolism: mechanical  Stress Ulcer: H2B  Ventilator Pneumonia: yes     Activity Orders          Activity as tolerated starting at 02/05 1432        Full Code    Jarred Adams NP  Neurocritical Care  [facility]

## 2018-02-12 NOTE — SUBJECTIVE & OBJECTIVE
Interval History:  No significant events over night    Review of Systems  Unable to obtain a complete ROS due to level of consciousness.  Objective:     Vitals:  Temp: 99.5 °F (37.5 °C)  Pulse: 83  Rhythm: normal sinus rhythm, sinus tachycardia  BP: (!) 139/92  MAP (mmHg): 108  Resp: (!) 23  SpO2: 97 %  Oxygen Concentration (%): 40  O2 Device (Oxygen Therapy): ventilator  Vent Mode: Spont  Set Rate: 0 bmp  Vt Set: 500 mL  Pressure Support: 5 cmH20  PEEP/CPAP: 5 cmH20  Peak Airway Pressure: 11 cmH2O  Mean Airway Pressure: 7.1 cmH20  Plateau Pressure: 0 cmH20    Temp  Min: 98.8 °F (37.1 °C)  Max: 100.2 °F (37.9 °C)  Pulse  Min: 81  Max: 100  BP  Min: 103/59  Max: 152/85  MAP (mmHg)  Min: 75  Max: 114  Resp  Min: 17  Max: 30  SpO2  Min: 96 %  Max: 100 %  Oxygen Concentration (%)  Min: 40  Max: 40    02/11 0701 - 02/12 0700  In: 1710   Out: 1450 [Urine:1450]   Unmeasured Output  Urine Occurrence: 1 (condom cath came off)  Stool Occurrence: 0  Pad Count: 2       Physical Exam      Physical Exam:  GA: Lethargic, comfortable, no acute distress.   HEENT: No scleral icterus or JVD.   Pulmonary: Clear to auscultation A/L.   Cardiac: RRR S1 & S2 w/o rubs/murmurs/gallops.   Abdominal: Bowel sounds present x 4.   Skin: No jaundice, rashes, or visible lesions.  Neuro:  --GCS: E2 V1t M6  --Mental Status:  Arouses to tactile stimuli follows simple commands occasionally   --Pupils 3mm, PERRL.   --Corneal reflex, gag, cough intact.  --LUE strength: 5/5  --RUE strength: 0/5  --LLE strength: 5/5  --RLE strength: 0/5    Unable to test orientation, language, memory, judgment, insight, fund of knowledge, gait due to level of consciousness.    Medications:  Continuous Scheduled  albuterol-ipratropium 2.5mg-0.5mg/3mL 3 mL Q6H   aspirin 81 mg Daily   atorvastatin 80 mg Daily   captopril 25 mg TID   chlorhexidine 15 mL BID   enoxaparin 40 mg Daily   famotidine 20 mg BID   insulin aspart 12 Units Q4H   insulin detemir 32 Units BID   [START ON  2/13/2018] lisinopril 20 mg Daily   metoprolol tartrate 25 mg BID   modafinil 200 mg Daily   And     modafinil 100 mg Daily   polyethylene glycol 17 g Daily   senna-docusate 8.6-50 mg 1 tablet BID   sodium chloride 0.9% 3 mL Q8H   sodium chloride 3% 4 mL Q6H   PRN  acetaminophen 650 mg Q6H PRN   albuterol-ipratropium 2.5mg-0.5mg/3mL 3 mL Q4H PRN   bisacodyl 10 mg Daily PRN   dextrose 50% 12.5 g PRN   glucagon (human recombinant) 1 mg PRN   hydrALAZINE 10 mg Q4H PRN   insulin aspart 1-10 Units Q4H PRN   labetalol 10 mg Q4H PRN   magnesium sulfate IVPB 2 g PRN   magnesium sulfate IVPB 4 g PRN   ondansetron 4 mg Q8H PRN   potassium chloride 10% 40 mEq PRN   potassium chloride 10% 40 mEq PRN   sodium chloride 0.9% 5 mL PRN     Today I personally reviewed pertinent medications, lines/drains/airways, imaging, cardiology, lab results,     Diet  Diet NPO  Diet NPO

## 2018-02-12 NOTE — PLAN OF CARE
Problem: Patient Care Overview  Goal: Plan of Care Review  Outcome: Ongoing (interventions implemented as appropriate)  POC reviewed with pt and spouse at 0500. Pt unable to verbalize understanding due to intubation. Questions and concerns addressed. No acute events overnight. Pt progressing toward goals. Will continue to monitor. See flowsheets for full assessment and VS info

## 2018-02-12 NOTE — ASSESSMENT & PLAN NOTE
--160  -metoprolol 25 mg bid  -captopril 25 mg tid  -PRN Labetalol & Hydralazine   -Echo 1/26:1 - Normal left ventricular systolic function (EF 65-70%).     2 - Concentric remodeling.     3 - No wall motion abnormalities.     4 - Normal left ventricular diastolic function.     5 - Normal right ventricular systolic function .

## 2018-02-12 NOTE — ASSESSMENT & PLAN NOTE
-Due to airway obstruction form hardened secretions overlying epiglottis and soft palate, improved after intubation and bronchoscopy  -CXR and ABG prn   -general surgery plans for trach/peg Wed

## 2018-02-12 NOTE — ANESTHESIA PREPROCEDURE EVALUATION
[facility]  Anesthesia Pre-Operative Evaluation         Patient Name: Todd Quevedo  YOB: 1970  MRN: [id number]    SUBJECTIVE:     Pre-operative evaluation for Procedure(s) (LRB):  TRACHEOSTOMY (N/A)  PLACEMENT-TUBE-PEG (N/A)     02/12/2018    Todd Quevedo is a 47 y.o. male w/ a significant PMHx of HTN , DMII and L M1 occlusion s/p thrombectomy, reperfusion and angioplasty. He has had prolonged intubation and excessive secretions. At baseline, patient moves L side spontaneously but does not follow commands.    Patient now presents for the above procedure(s).      LDA:        Peripheral IV - Single Lumen 02/10/18 0315 Right Forearm (Active)   Site Assessment Clean;Dry;Intact 2/12/2018  3:05 AM   Line Status Flushed;Saline locked 2/12/2018  3:05 AM   Dressing Status Clean;Dry;Intact 2/12/2018  3:05 AM   Dressing Intervention Dressing reinforced 2/10/2018  3:15 AM   Dressing Change Due 02/14/18 2/12/2018  3:05 AM   Site Change Due 02/14/18 2/11/2018  7:05 PM   Reason Not Rotated Not due 2/12/2018  3:05 AM   Number of days: 2            Peripheral IV - Single Lumen 02/11/18 1123 Right Forearm (Active)   Site Assessment Clean;Dry;Intact 2/12/2018  3:05 AM   Line Status Blood return noted;Flushed;Saline locked 2/12/2018  3:05 AM   Dressing Status Clean;Dry;Intact 2/12/2018  3:05 AM   Dressing Intervention New dressing 2/12/2018  3:05 AM   Dressing Change Due 02/15/18 2/12/2018  3:05 AM   Site Change Due 02/15/18 2/11/2018  7:05 PM   Reason Not Rotated Not due 2/12/2018  3:05 AM   Number of days: 0            NG/OG Tube 01/25/18 2325 nasogastric Right nostril (Active)   Placement Check placement verified by x-ray 2/12/2018  3:05 AM   Tube advanced (cm) 5 1/29/2018  4:00 PM   Distal Tube Length (cm) 68 2/11/2018  7:05 PM   Tolerance no signs/symptoms of discomfort 2/12/2018  3:05 AM   Securement anchored to nostril center w/ adhesive device 2/12/2018  3:05 AM   Clamp Status/Tolerance  unclamped 2/11/2018  3:01 PM   Insertion Site Appearance no redness, warmth, tenderness, skin breakdown, drainage 2/12/2018  3:05 AM   Flush/Irrigation flushed w/;water;no resistance met 2/12/2018  3:05 AM   Feeding Method continuous 2/12/2018  3:05 AM   Current Rate (mL/hr) 60 mL/hr 2/11/2018  7:05 PM   Goal Rate (mL/hr) 60 mL/hr 2/11/2018  7:05 PM   Intake (mL) 60 mL 2/11/2018  2:01 PM   Water Bolus (mL) 150 mL 2/11/2018 11:01 AM   Intake (mL) - Formula Tube Feeding 60 2/12/2018  6:06 AM   Residual Amount (ml) 0 ml 2/11/2018  7:01 AM   Number of days: 17       Male External Urinary Catheter 01/25/18 2200 Small (Active)   Collection Container Urimeter 2/12/2018  3:05 AM   Securement Method secured to top of thigh w/ adhesive device 2/12/2018  3:05 AM   Skin no redness;no breakdown 2/12/2018  3:05 AM   Tolerance no signs/symptoms of discomfort 2/12/2018  3:05 AM   Output (mL) 750 mL 2/12/2018  5:05 AM   Catheter Change Date 02/11/18 2/12/2018  3:05 AM   Catheter Change Time 1935 2/12/2018  3:05 AM   Number of days: 17       Prev airway: Intubated in neuro ICU with Mac 3 blade, 8.0 ETT    Drips:           Patient Active Problem List   Diagnosis    Embolic stroke involving left middle cerebral artery    Obstructive sleep apnea    Essential hypertension    Hyperlipidemia    Type 2 diabetes mellitus    Leukocytosis    Cytotoxic cerebral edema    Altered mental status    Acute respiratory failure with hypoxia    Respiratory distress, acute    Pneumonia       Review of patient's allergies indicates:  No Known Allergies    Current Inpatient Medications:   albuterol-ipratropium 2.5mg-0.5mg/3mL  3 mL Nebulization Q6H    aspirin  81 mg Per NG tube Daily    atorvastatin  80 mg Per NG tube Daily    captopril  25 mg Per NG tube TID    chlorhexidine  15 mL Mouth/Throat BID    enoxaparin  40 mg Subcutaneous Daily    famotidine  20 mg Per NG tube BID    insulin aspart  10 Units Subcutaneous Q4H    insulin  detemir  32 Units Subcutaneous BID    metoprolol tartrate  25 mg Per NG tube BID    modafinil  200 mg Per NG tube Daily    And    modafinil  100 mg Per NG tube Daily    polyethylene glycol  17 g Per NG tube Daily    senna-docusate 8.6-50 mg  1 tablet Per NG tube BID    sodium chloride 0.9%  3 mL Intravenous Q8H    sodium chloride 3%  4 mL Nebulization Q6H       No current facility-administered medications on file prior to encounter.      No current outpatient prescriptions on file prior to encounter.       History reviewed. No pertinent surgical history.    Social History     Social History    Marital status:      Spouse name: N/A    Number of children: N/A    Years of education: N/A     Occupational History    Not on file.     Social History Main Topics    Smoking status: Unknown If Ever Smoked    Smokeless tobacco: Not on file    Alcohol use Not on file    Drug use: Unknown    Sexual activity: Not on file     Other Topics Concern    Not on file     Social History Narrative    No narrative on file       OBJECTIVE:     Vital Signs Range (Last 24H):  Temp:  [37.1 °C (98.8 °F)-38.1 °C (100.6 °F)]   Pulse:  []   Resp:  [18-27]   BP: (103-152)/(56-99)   SpO2:  [96 %-100 %]       CBC:   Recent Labs      18   01518   011   WBC  13.92*  13.44*   RBC  4.18*  4.30*   HGB  12.3*  12.8*   HCT  36.1*  37.1*   PLT  538*  550*   MCV  86  86   MCH  29.4  29.8   MCHC  34.1  34.5       CMP:   Recent Labs      18   01518   011   NA  135*  137   K  4.4  4.4   CL  98  98   CO2  28  29   BUN  20  22*   CREATININE  0.9  0.9   GLU  224*  238*   MG  1.9  2.1   PHOS  3.7  3.8   CALCIUM  9.6  9.6   ALBUMIN  2.7*  2.5*   PROT  7.5  7.5   ALKPHOS  90  93   ALT  22  21   AST  25  24   BILITOT  0.8  0.7       INR:  Recent Labs      02/10/18   0321  18   0159  18   0110   INR  1.0  1.0  1.0       Diagnostic Studies: No relevant studies.    EK2018  Vent. Rate : 110  BPM     Atrial Rate : 110 BPM     P-R Int : 158 ms          QRS Dur : 106 ms      QT Int : 370 ms       P-R-T Axes : 051 -13 053 degrees     QTc Int : 500 ms    Sinus tachycardia with Fusion complexes  Inferior infarct (cited on or before 25-JAN-2018)  Abnormal ECG  When compared with ECG of 25-JAN-2018 21:59,  Fusion complexes are now Present  The axis Shifted right  Confirmed by AGNIESZKA GALAN, HOMEYAR ([id number]) on 1/27/2018 9:24:36 AM    2D ECHO:  Results for orders placed or performed during the hospital encounter of 01/25/18   Echo doppler color flow   Result Value Ref Range    EF 68 55 - 65    Diastolic Dysfunction No      CONCLUSIONS     1 - Normal left ventricular systolic function (EF 65-70%).     2 - Concentric remodeling.     3 - No wall motion abnormalities.     4 - Normal left ventricular diastolic function.     5 - Normal right ventricular systolic function .     ASSESSMENT/PLAN:         Anesthesia Evaluation    I have reviewed the Patient Summary Reports.     I have reviewed the Medications.     Review of Systems  Anesthesia Hx:  Neg history of prior surgery. Denies Family Hx of Anesthesia complications.    Cardiovascular:   Hypertension    Pulmonary:   Sleep Apnea    Renal/:  Renal/ Normal     Neurological:   CVA, residual symptoms    Endocrine:   Diabetes, type 2, using insulin        Physical Exam  General:  Obesity    Airway/Jaw/Neck:  Airway Findings: Pre-Existing Airway Tube(s): Oral Endotracheal tube Size: 8.0  General Airway Assessment: Adult       Chest/Lungs:  Chest/Lungs Findings: (mechanical ventilatory sounds)    Heart/Vascular:  Heart Findings: Rate: Normal  Rhythm: Regular Rhythm  Sounds: Normal        Mental Status:  Mental Status Findings:  Somnolent         Anesthesia Plan  Type of Anesthesia, risks & benefits discussed:  Anesthesia Type:  general, MAC  Patient's Preference:   Intra-op Monitoring Plan: standard ASA monitors  Intra-op Monitoring Plan Comments:   Post Op Pain Control Plan:  per primary service following discharge from PACU  Post Op Pain Control Plan Comments:   Induction:   IV  Beta Blocker:  Patient is on a Beta-Blocker and has received one dose within the past 24 hours (No further documentation required).       Informed Consent: Patient representative understands risks and agrees with Anesthesia plan.  Questions answered. Anesthesia consent signed with patient representative.  ASA Score: 4     Day of Surgery Review of History & Physical:  There are no significant changes.  H&P update referred to the provider.         Ready For Surgery From Anesthesia Perspective.

## 2018-02-12 NOTE — PROGRESS NOTES
[facility]  Adult Nutrition  Progress Note    SUMMARY     Recommendations  Recommendation/Intervention:   1. Recommend increasing TF goal rate. Diabetisource @ 75mL/hr. - Provides 2160kcals, 108g protein and 1472mL free water. Additional fluid per Md.   2. RD to monitor.    Goals: % EEN, EPN  Nutrition Goal Status: goal not met  Communication of RD Recs: care plan    Reason for Assessment  Reason for Assessment: RD follow-up  Diagnosis: stroke/CVA (Embolic stroke L MCA)  Relevent Medical History: HTN, T2DM   Interdisciplinary Rounds: did not attend     General Information Comments: Pt is intubated. TF-Diabetisource running @ 60ml/hr. Trach and PEG placement scheduled for 2/14/18    Nutrition Discharge Planning: unable to determine at this time    Nutrition Prescription Ordered  Current Diet Order: NPO  Nutrition Order Comments: TF at goal  Current Nutrition Support Formula Ordered: Diabetisource  Current Nutrition Support Rate Ordered: 60 (ml)  Current Nutrition Support Frequency Ordered: ml/hr    Evaluation of Received Nutrients/Fluid Intake  Enteral Calories (kcal): 1728  Enteral Protein (gm): 86  Enteral (Free Water) Fluid (mL): 1178  Energy Calories Required: not meeting needs  % Kcal Needs: 74  Protein Required: not meeting needs  % Protein Needs: 78  I/O: LBM 2/6/18  Fluid Required: not meeting needs  Tolerance: tolerating  % Intake of Estimated Energy Needs: 50 - 75 %  % Meal Intake: NPO     Nutrition Risk Screen  Nutrition Risk Screen: no indicators present    Nutrition/Diet History  Patient Reported Diet/Restrictions/Preferences: other (see comments) (FRIEDA)  Typical Food/Fluid Intake: FRIEDA  Food Preferences: FRIEDA 2/2 pt not awake at the time of visit  Supplemental Drinks or Food Habits: Other (see comments) (FRIEDA)  Factors Affecting Nutritional Intake: NPO, on mechanical ventilation    Labs/Tests/Procedures/Meds  Pertinent Labs Reviewed: reviewed, pertinent  Pertinent Labs Comments:  "BUN 22, Glu 238, Alb 2.5  Pertinent Medications Reviewed: reviewed, pertinent  Pertinent Medications Comments: enoxaparin, statin, senna-docusate, metoprolol tartate, polyethylene glycol, aspirin, insulin, captopril, famotidine, chlorohexidine    Physical Findings  Overall Physical Appearance: obese, on ventilator support  Tubes: nasogastric tube  Oral/Mouth Cavity: WDL  Skin: intact    Anthropometrics  Temp: 99.5 °F (37.5 °C)  Height: 5' 10" (177.8 cm)  Weight Method: Bed Scale  Weight: 105.6 kg (232 lb 12.9 oz)  Ideal Body Weight (IBW), Male: 166 lb  % Ideal Body Weight, Male (lb): 140.25 lb  BMI (Calculated): 33.5  BMI Grade: 30 - 34.9- obesity - grade I    Estimated/Assessed Needs  Weight Used For Calorie Calculations: 112.9 kg (248 lb 14.4 oz); dosing wt   Energy Calorie Requirements (kcal): 2336.53  Energy Need Method: [facility]  RMR ([place]-St. Jeor Equation): 2005.25  Weight Used For Protein Calculations: 73 kg (160 lb 15 oz)  Protein Requirements: 110-146g/d (1.5-2.0 g/kg)  Fluid Need Method: RDA Method (Per MD or 1 ml/kcal)  RDA Method (mL): 2336.53  CHO Requirement: 50% total kcals     Assessment and Plan   Problem  Inadequate oral intake     Related to (etiology):   Inability to consume sufficient energy     Signs and Symptoms (as evidenced by):   NPO with no alternative means of nutrition     Interventions/Recommendations (treatment strategy):  See recs above     Nutrition Diagnosis Status:   Improving     Monitor and Evaluation  Food and Nutrient Intake: energy intake, food and beverage intake, enteral nutrition intake  Food and Nutrient Adminstration: diet order, enteral and parenteral nutrition administration  Physical Activity and Function: nutrition-related ADLs and IADLs  Anthropometric Measurements: height/length, weight, body mass index, weight change  Biochemical Data, Medical Tests and Procedures: electrolyte and renal panel, gastrointestinal profile, glucose/endocrine profile, inflammatory " profile, lipid profile  Nutrition-Focused Physical Findings: overall appearance    Nutrition Risk  Level of Risk: other (see comments) (f/u 1x/week)    Nutrition Follow-Up  RD Follow-up?: Yes

## 2018-02-12 NOTE — PROGRESS NOTES
[facility]  Neurocritical Care  Progress Note    Admit Date: 1/25/2018  Service Date: 02/11/2018  Length of Stay: 17    Subjective:     Chief Complaint: Embolic stroke involving left middle cerebral artery    History of Present Illness: The patient is a 47 year old male with no known PMHx admitted to [facility]   s/p thrombectomy of L M1 occlusion. Per chart review, he   walked into [place] and was acting abnormal.  EMS was called and brought to the ED for concern of L MCA syndrome. CT MP revealed short segment occlusion of left M1.  Patient went to IR for thrombectomy of L M1 occlusion seen on CTA MP.  TICI2C reperfusion and angioplasty. Patient admitted to [facility] for close monitoring and higher level of care.   History obtained from chart review only            Hospital Course: 1/25: Pt admitted to [facility] s/p L M1 thrombectomy, TICI2C reperfusion and angioplasty   1/27: large L MCA, hemicrani watch, NSGY following, insulin ggt, cardene ggt, L sided intermittent slowing eeg but no sz, +nasal trumpet for copious thick secretions, wheezing this am - improved with duo nebs, 3% nebs, and cough assist, concern for sepsis 2/2 hemodynamic changes - increased HR and BP, worsening leukocytosis, +ceftriaxone, pan cx, adrian track  1/28: continued respiratory challenges due to secretions. Aggressive pulmonary toileting. Continue monitoring for neuro worsening. Add moxifloxacin.   1/29: CTH to eval for increased edema/shift, EEG afterwards. Concern for decrease exam, no following/decreased alertness). CXR and Procal to follow leukocytosis. Hold TF until eval decreasing EXAM  1/30: neuro exam stable, increase 2%, current amount of sodium iv not suffuicient to meet target 145-155, cont abx for likely pneumonia, repeat ct in am  1/31: abrupt desaturation today requiring emergent intubation followed by bronchoscopy  Etiology likely upper airway obstruction from dried secretions  02/01: stable on vent overnight, marked  improvement on today's chest x ray, switch to spontaneous today, sodium stable at 150, start tfs  02/02: moves left side spontaneously and opens eyes off sedation,  02/03: 2% buffered NS re-started at 20mL/hr for rapid decrease in serum sodium. Enteral free water flushes discontinued. Patient has copious secretions likely pneumonia. Holding dexamethasone  2/4:  No acute events overnight.  Sodium stable.  2/5: transition insulin infusion to subcutaneous insulin   2/7: gen surg consulted for trach/peg  2/9: increased Aspart and Detemir, pending Trach/Peg placement next week.  2/10: No changes overnight . Pending PEG and trach, WBC mildly elevated  12K  2/11: No changes overnight . Pending PEG and trach, WBC mildly elevated  13K. Procal was low and lower than prior. Patient got 16 units of SSI yesterday.    Interval History: >4 elements OR status of 3 inpatient conditions  No changes overnight . Pending PEG and trach, WBC mildly elevated  13K. Procal was low and lower than prior. Patient got 16 units of SSI yesterday.  Review of Systems  2 systems OR Unable to obtain a complete ROS due to level of consciousness.  Objective:     Vitals:  Temp: 99 °F (37.2 °C)  Pulse: 91  Rhythm: normal sinus rhythm  BP: (!) 143/90  MAP (mmHg): 111  Resp: 20  SpO2: 99 %  Oxygen Concentration (%): 40  O2 Device (Oxygen Therapy): ventilator  Vent Mode: SIMV  Set Rate: 16 bmp  Vt Set: 500 mL  Pressure Support: 5 cmH20  PEEP/CPAP: 5 cmH20  Peak Airway Pressure: 23 cmH2O  Mean Airway Pressure: 8.5 cmH20  Plateau Pressure: 0 cmH20    Temp  Min: 98.8 °F (37.1 °C)  Max: 100.6 °F (38.1 °C)  Pulse  Min: 82  Max: 107  BP  Min: 103/59  Max: 152/85  MAP (mmHg)  Min: 75  Max: 121  Resp  Min: 18  Max: 27  SpO2  Min: 95 %  Max: 100 %  Oxygen Concentration (%)  Min: 40  Max: 40    02/11 0701 - 02/12 0700  In: 1590   Out: 700 [Urine:700]   Unmeasured Output  Urine Occurrence: 1 (condom cath came off)  Stool Occurrence: 0  Pad Count: 2       Physical  Exam  Unable to test language, memory, judgment, insight, fund of knowledge, tongue protrusion, coordination, gait due to level of consciousness.  General   HEENT:   Chest Heart RRR / Lungs Clear to auscultation  Abdomen: Soft nontender + BS  Extremities: OK distal pulses.  Skin:   Neurological Exam:  MS; Eyes closed but following commands briskly.  CN: II-XII  R-UMN VII.  Motor: LUE   5/5 / RUE 0 /5  Tone normal bilaterally RUE extensor posturing.             LLE   5/5 /  RLE 2 /5  Tone normal bilaterally Withdraws with RLE  Sensory: LT/PP/T/ Vibration                  Complex sensory modalities: not tested  DTR:  normal throughout.  Coordination /Fine motor:   Gait: Not tested.  Meningeal signs: Absent  Medications:  Continuous Scheduled  albuterol-ipratropium 2.5mg-0.5mg/3mL 3 mL Q6H   aspirin 81 mg Daily   atorvastatin 80 mg Daily   captopril 25 mg TID   chlorhexidine 15 mL BID   enoxaparin 40 mg Daily   famotidine 20 mg BID   insulin aspart 10 Units Q4H   insulin detemir 32 Units BID   metoprolol tartrate 25 mg BID   modafinil 200 mg Daily   And     modafinil 100 mg Daily   polyethylene glycol 17 g Daily   senna-docusate 8.6-50 mg 1 tablet BID   sodium chloride 0.9% 3 mL Q8H   sodium chloride 3% 4 mL Q6H   PRN  acetaminophen 650 mg Q6H PRN   albuterol-ipratropium 2.5mg-0.5mg/3mL 3 mL Q4H PRN   bisacodyl 10 mg Daily PRN   dextrose 50% 12.5 g PRN   glucagon (human recombinant) 1 mg PRN   hydrALAZINE 10 mg Q4H PRN   insulin aspart 1-10 Units Q4H PRN   labetalol 10 mg Q4H PRN   magnesium sulfate IVPB 2 g PRN   magnesium sulfate IVPB 4 g PRN   ondansetron 4 mg Q8H PRN   potassium chloride 10% 40 mEq PRN   potassium chloride 10% 40 mEq PRN   sodium chloride 0.9% 5 mL PRN     Today I personally reviewed pertinent medications, lines/drains/airways, imaging, cardiology, lab results, microbiology results, notably:    Diet  Diet NPO  Diet NPO     CMP:      Recent Labs  Lab 02/11/18  0159   CALCIUM 9.6   ALBUMIN  2.7*   PROT 7.5   *   K 4.4   CO2 28   CL 98   BUN 20   CREATININE 0.9   ALKPHOS 90   ALT 22   AST 25   BILITOT 0.8      BMP:      Recent Labs  Lab 02/11/18 0159   *   K 4.4   CL 98   CO2 28   BUN 20   CREATININE 0.9   CALCIUM 9.6      CBC:      Recent Labs  Lab 02/11/18 0159   WBC 13.92*   RBC 4.18*   HGB 12.3*   HCT 36.1*   *   MCV 86   MCH 29.4   MCHC 34.1      Lipid Panel: No results for input(s): CHOL, LDLCALC, HDL, TRIG in the last 168 hours.  Coagulation:      Recent Labs  Lab 02/11/18 0159   INR 1.0      Platelet Aggregation Study: No results for input(s): PLTAGG, PLTAGINTERP, PLTAGREGLACO, ADPPLTAGGREG in the last 168 hours.  Hgb A1C: No results for input(s): HGBA1C in the last 168 hours.  TSH: No results for input(s): TSH in the last 168 hours.       Assessment/Plan:     No new Assessment & Plan notes have been filed under this hospital service since the last note was generated.  Service: Neuro Critical Care    Active Problem List:   1.L-MCA stroke s/p thrombectomy of M1 occlusion  L-anterior frontal territory preserved.   2. HTN  3. DM II  4. On ventilatory support.  5. Right hemiparesis.       Assessment / Plan:     Neuro:  -ASA 81mg qd  -Lipitor 80 mg qd.  -Modafenil 200mg q 6am and 100mg q 2PM  -PT/OT  Resp: Pending trach.  -PS/CPAP 5/5 FIO2 40%  -Dual nebs and 3% nebs PRN.  -VAP  -Pending trach.  CV: TTE: nl. Keep SBP < 180 mmHg  -Metoprolol 25mg bid  -Captopril 25mg tid.  -ASA 81mg qd  -Lipitor 80 mg qd.  -PRN hydralazine and Labetalol  IVF/nutrition/Renal/GI:  -BR senna and Myralax.  -TF at 60cc/hr Diabetisource.  -BR  Hem / ID: Low grade fever, WBC 12K  -Procal serum  -No antibiotics.  Endo: HgA1c 10., Trigl 460  -Detemir 32 U bid. Adjusted.  -Aspart 10 U q 4hrs. Adjusted.  -SSI q4hrs.  Prophylaxis:  -VAP  -Pepcid bid.  -SC Enoxaparin 40 mg qd.  Advance Directives and Disposition:    Full Code. Pending PEG and trach              Uninterrupted Critical Care/Counseling Time  (directly spent today by me not including procedures 30-74 min ([id number])): = 35   min      Activity Orders          Activity as tolerated starting at 02/05 1432        Full Code    Leonid Polanco MD  Neurocritical Care  [facility]

## 2018-02-12 NOTE — PROGRESS NOTES
SUBJECTIVE:  Pt seen and examined.   Remains on Vent 40% / 5 PEEP  Tolerating TF at 60 cc/hr    OBJECTIVE:  Temp:  [98.8 °F (37.1 °C)-100.6 °F (38.1 °C)]   Pulse:  []   Resp:  [18-30]   BP: (103-152)/(56-99)   SpO2:  [97 %-100 %]     I & O (Last 24H):  Intake/Output Summary (Last 24 hours) at 02/12/18 0858  Last data filed at 02/12/18 0802   Gross per 24 hour   Intake             1760 ml   Output             1440 ml   Net              320 ml       Lab Results   Component Value Date    WBC 13.44 (H) 02/12/2018    HGB 12.8 (L) 02/12/2018    HCT 37.1 (L) 02/12/2018    MCV 86 02/12/2018     (H) 02/12/2018     Lab Results   Component Value Date    CREATININE 0.9 02/12/2018    BUN 22 (H) 02/12/2018     02/12/2018    K 4.4 02/12/2018    CL 98 02/12/2018    CO2 29 02/12/2018    CALCIUM 9.6 02/12/2018    PHOS 3.8 02/12/2018    MG 2.1 02/12/2018       ASSESSMENT/PLAN:   Todd Quevedo is a 47 y.o. male with L MCA stroke who has failed extubation and required re-intubated, difficulty clearing secretions    Plan for PEG / trach on Wedn.   Please continue to hold Plavix  Consents obtained  Please hold TF tomorrow night at midnight

## 2018-02-12 NOTE — PLAN OF CARE
02/12/18 1213   Discharge Reassessment   Assessment Type Discharge Planning Reassessment   Provided patient/caregiver education on the expected discharge date and the discharge plan No   Do you have any problems affording any of your prescribed medications? No   Discharge Plan A Skilled Nursing Facility   Discharge Plan B Rehab   Patient choice form signed by patient/caregiver N/A   Can the patient answer the patient profile reliably? No, cognitively impaired   How does the patient rate their overall health at the present time? (ty)   Describe the patient's ability to walk at the present time. Does not walk or unable to take any steps at all   How often would a person be available to care for the patient? Occasionally   Number of comorbid conditions (as recorded on the chart) Two   During the past month, has the patient often been bothered by feeling down, depressed or hopeless? (ty)   During the past month, has the patient often been bothered by little interest or pleasure in doing things? (ty)       Plan for PEG / trach on Wednesday.    Delaney Mark RN, CCRN-K, [facility]  Neuro-Critical Care   [phone]

## 2018-02-12 NOTE — PROGRESS NOTES
Patient's chart was reviewed by Stroke team and patient was seen by myself this am.  Agree with plan for PEG, trach given patient's current deficits.  Trach placement scheduled 02/14/18.  No new recommendations--continue DAPT, atorvastatin 80 mg qd.  No new studies indicated at this time.  Will continue to follow.    Radha Fernandes MD  PGY2, Neurology  Pager:  [phone]  Spectralink:  [phone]

## 2018-02-12 NOTE — PLAN OF CARE
Problem: Patient Care Overview  Goal: Plan of Care Review  Outcome: Ongoing (interventions implemented as appropriate)  POC reviewed with pt at 1400. Pt intubated and not following commands. No family at bedside. No acute events today. Pt progressing toward goals. Will continue to monitor. See flowsheets for full assessment and VS info. Neuro status remains unchanged. SBP < 180 maintained. No PRNs required. Vent assist and TF continued tolerating well. TREG scheduled for Wednesday, Feb. 14th, around 12. R soft wrist restraint remains in place.

## 2018-02-12 NOTE — ASSESSMENT & PLAN NOTE
-s/p L M1 thrombectomy  --160  -repeat imaging stable   -ASA, plavix  -statin   -SQH  -continue modafinil   -PT/OT  -pending peg/trach with gen. surg Wednesday

## 2018-02-12 NOTE — SUBJECTIVE & OBJECTIVE
Interval History: >4 elements OR status of 3 inpatient conditions  No changes overnight . Pending PEG and trach, WBC mildly elevated  13K. Procal was low and lower than prior. Patient got 16 units of SSI yesterday.  Review of Systems  2 systems OR Unable to obtain a complete ROS due to level of consciousness.  Objective:     Vitals:  Temp: 99 °F (37.2 °C)  Pulse: 91  Rhythm: normal sinus rhythm  BP: (!) 143/90  MAP (mmHg): 111  Resp: 20  SpO2: 99 %  Oxygen Concentration (%): 40  O2 Device (Oxygen Therapy): ventilator  Vent Mode: SIMV  Set Rate: 16 bmp  Vt Set: 500 mL  Pressure Support: 5 cmH20  PEEP/CPAP: 5 cmH20  Peak Airway Pressure: 23 cmH2O  Mean Airway Pressure: 8.5 cmH20  Plateau Pressure: 0 cmH20    Temp  Min: 98.8 °F (37.1 °C)  Max: 100.6 °F (38.1 °C)  Pulse  Min: 82  Max: 107  BP  Min: 103/59  Max: 152/85  MAP (mmHg)  Min: 75  Max: 121  Resp  Min: 18  Max: 27  SpO2  Min: 95 %  Max: 100 %  Oxygen Concentration (%)  Min: 40  Max: 40    02/11 0701 - 02/12 0700  In: 1590   Out: 700 [Urine:700]   Unmeasured Output  Urine Occurrence: 1 (condom cath came off)  Stool Occurrence: 0  Pad Count: 2       Physical Exam  Unable to test language, memory, judgment, insight, fund of knowledge, tongue protrusion, coordination, gait due to level of consciousness.  General   HEENT:   Chest Heart RRR / Lungs Clear to auscultation  Abdomen: Soft nontender + BS  Extremities: OK distal pulses.  Skin: UK  Neurological Exam:  MS; Eyes closed but following commands briskly.  CN: II-XII  R-UMN VII.  Motor: LUE   5/5 / RUE 0 /5  Tone normal bilaterally RUE extensor posturing.             LLE   5/5 /  RLE 2 /5  Tone normal bilaterally Withdraws with RLE  Sensory: LT/PP/T/ Vibration UK                 Complex sensory modalities: not tested  DTR:  normal throughout.  Coordination /Fine motor: UK  Gait: Not tested.  Meningeal signs: Absent  Medications:  Continuous Scheduled  albuterol-ipratropium 2.5mg-0.5mg/3mL 3 mL Q6H   aspirin 81 mg  Daily   atorvastatin 80 mg Daily   captopril 25 mg TID   chlorhexidine 15 mL BID   enoxaparin 40 mg Daily   famotidine 20 mg BID   insulin aspart 10 Units Q4H   insulin detemir 32 Units BID   metoprolol tartrate 25 mg BID   modafinil 200 mg Daily   And     modafinil 100 mg Daily   polyethylene glycol 17 g Daily   senna-docusate 8.6-50 mg 1 tablet BID   sodium chloride 0.9% 3 mL Q8H   sodium chloride 3% 4 mL Q6H   PRN  acetaminophen 650 mg Q6H PRN   albuterol-ipratropium 2.5mg-0.5mg/3mL 3 mL Q4H PRN   bisacodyl 10 mg Daily PRN   dextrose 50% 12.5 g PRN   glucagon (human recombinant) 1 mg PRN   hydrALAZINE 10 mg Q4H PRN   insulin aspart 1-10 Units Q4H PRN   labetalol 10 mg Q4H PRN   magnesium sulfate IVPB 2 g PRN   magnesium sulfate IVPB 4 g PRN   ondansetron 4 mg Q8H PRN   potassium chloride 10% 40 mEq PRN   potassium chloride 10% 40 mEq PRN   sodium chloride 0.9% 5 mL PRN     Today I personally reviewed pertinent medications, lines/drains/airways, imaging, cardiology, lab results, microbiology results, notably:    Diet  Diet NPO  Diet NPO     CMP:      Recent Labs  Lab 02/11/18 0159   CALCIUM 9.6   ALBUMIN 2.7*   PROT 7.5   *   K 4.4   CO2 28   CL 98   BUN 20   CREATININE 0.9   ALKPHOS 90   ALT 22   AST 25   BILITOT 0.8      BMP:      Recent Labs  Lab 02/11/18 0159   *   K 4.4   CL 98   CO2 28   BUN 20   CREATININE 0.9   CALCIUM 9.6      CBC:      Recent Labs  Lab 02/11/18 0159   WBC 13.92*   RBC 4.18*   HGB 12.3*   HCT 36.1*   *   MCV 86   MCH 29.4   MCHC 34.1      Lipid Panel: No results for input(s): CHOL, LDLCALC, HDL, TRIG in the last 168 hours.  Coagulation:      Recent Labs  Lab 02/11/18 0159   INR 1.0      Platelet Aggregation Study: No results for input(s): PLTAGG, PLTAGINTERP, PLTAGREGLACO, ADPPLTAGGREG in the last 168 hours.  Hgb A1C: No results for input(s): HGBA1C in the last 168 hours.  TSH: No results for input(s): TSH in the last 168 hours.

## 2018-02-12 NOTE — PLAN OF CARE
Problem: Patient Care Overview  Goal: Plan of Care Review  Recommendations  Recommendation/Intervention:   1. Recommend increasing TF goal rate. Diabetisource @ 75mL/hr. - Provides 2160kcals, 108g protein and 1472mL free water. Additional fluid per Md.   2. RD to monitor.    Goals: % EEN, EPN  Nutrition Goal Status: goal not met

## 2018-02-13 LAB
ALBUMIN SERPL BCP-MCNC: 2.2 G/DL
ALLENS TEST: ABNORMAL
ALP SERPL-CCNC: 84 U/L
ALT SERPL W/O P-5'-P-CCNC: 18 U/L
ANION GAP SERPL CALC-SCNC: 9 MMOL/L
AST SERPL-CCNC: 22 U/L
BASOPHILS # BLD AUTO: 0.04 K/UL
BASOPHILS NFR BLD: 0.4 %
BILIRUB SERPL-MCNC: 0.6 MG/DL
BUN SERPL-MCNC: 28 MG/DL
CALCIUM SERPL-MCNC: 9.4 MG/DL
CHLORIDE SERPL-SCNC: 98 MMOL/L
CO2 SERPL-SCNC: 32 MMOL/L
CREAT SERPL-MCNC: 0.9 MG/DL
DELSYS: ABNORMAL
DIFFERENTIAL METHOD: ABNORMAL
EOSINOPHIL # BLD AUTO: 0.2 K/UL
EOSINOPHIL NFR BLD: 2.2 %
ERYTHROCYTE [DISTWIDTH] IN BLOOD BY AUTOMATED COUNT: 12.4 %
EST. GFR  (AFRICAN AMERICAN): >60 ML/MIN/1.73 M^2
EST. GFR  (NON AFRICAN AMERICAN): >60 ML/MIN/1.73 M^2
FIO2: 40
GLUCOSE SERPL-MCNC: 166 MG/DL
HCO3 UR-SCNC: 35.9 MMOL/L (ref 24–28)
HCT VFR BLD AUTO: 36 %
HGB BLD-MCNC: 12.2 G/DL
IMM GRANULOCYTES # BLD AUTO: 0.04 K/UL
IMM GRANULOCYTES NFR BLD AUTO: 0.4 %
INR PPP: 1
LYMPHOCYTES # BLD AUTO: 1.9 K/UL
LYMPHOCYTES NFR BLD: 17.3 %
MAGNESIUM SERPL-MCNC: 2.2 MG/DL
MCH RBC QN AUTO: 29.5 PG
MCHC RBC AUTO-ENTMCNC: 33.9 G/DL
MCV RBC AUTO: 87 FL
MIN VOL: 11
MODE: ABNORMAL
MONOCYTES # BLD AUTO: 1 K/UL
MONOCYTES NFR BLD: 8.9 %
NEUTROPHILS # BLD AUTO: 7.9 K/UL
NEUTROPHILS NFR BLD: 70.8 %
NRBC BLD-RTO: 0 /100 WBC
PCO2 BLDA: 46.4 MMHG (ref 35–45)
PEEP: 5
PH SMN: 7.5 [PH] (ref 7.35–7.45)
PHOSPHATE SERPL-MCNC: 4.6 MG/DL
PLATELET # BLD AUTO: 551 K/UL
PMV BLD AUTO: 9.6 FL
PO2 BLDA: 114 MMHG (ref 80–100)
POC BE: 13 MMOL/L
POC SATURATED O2: 99 % (ref 95–100)
POC TCO2: 37 MMOL/L (ref 23–27)
POCT GLUCOSE: 145 MG/DL (ref 70–110)
POCT GLUCOSE: 181 MG/DL (ref 70–110)
POCT GLUCOSE: 182 MG/DL (ref 70–110)
POCT GLUCOSE: 188 MG/DL (ref 70–110)
POCT GLUCOSE: 191 MG/DL (ref 70–110)
POCT GLUCOSE: 194 MG/DL (ref 70–110)
POTASSIUM SERPL-SCNC: 4.2 MMOL/L
PROT SERPL-MCNC: 7.4 G/DL
PROTHROMBIN TIME: 10.4 SEC
PS: 5
RBC # BLD AUTO: 4.13 M/UL
SAMPLE: ABNORMAL
SITE: ABNORMAL
SODIUM SERPL-SCNC: 139 MMOL/L
SP02: 100
SPONT RATE: 21
WBC # BLD AUTO: 11.13 K/UL

## 2018-02-13 PROCEDURE — 83735 ASSAY OF MAGNESIUM: CPT

## 2018-02-13 PROCEDURE — 94003 VENT MGMT INPAT SUBQ DAY: CPT

## 2018-02-13 PROCEDURE — 99900026 HC AIRWAY MAINTENANCE (STAT)

## 2018-02-13 PROCEDURE — 25000003 PHARM REV CODE 250: Performed by: PHYSICIAN ASSISTANT

## 2018-02-13 PROCEDURE — 63600175 PHARM REV CODE 636 W HCPCS: Performed by: PSYCHIATRY & NEUROLOGY

## 2018-02-13 PROCEDURE — 85610 PROTHROMBIN TIME: CPT

## 2018-02-13 PROCEDURE — 25000003 PHARM REV CODE 250: Performed by: NURSE PRACTITIONER

## 2018-02-13 PROCEDURE — 85025 COMPLETE CBC W/AUTO DIFF WBC: CPT

## 2018-02-13 PROCEDURE — 25000242 PHARM REV CODE 250 ALT 637 W/ HCPCS: Performed by: PSYCHIATRY & NEUROLOGY

## 2018-02-13 PROCEDURE — 27000221 HC OXYGEN, UP TO 24 HOURS

## 2018-02-13 PROCEDURE — 80053 COMPREHEN METABOLIC PANEL: CPT

## 2018-02-13 PROCEDURE — A4216 STERILE WATER/SALINE, 10 ML: HCPCS | Performed by: NURSE PRACTITIONER

## 2018-02-13 PROCEDURE — 94640 AIRWAY INHALATION TREATMENT: CPT

## 2018-02-13 PROCEDURE — 31720 CLEARANCE OF AIRWAYS: CPT

## 2018-02-13 PROCEDURE — 25000003 PHARM REV CODE 250: Performed by: PSYCHIATRY & NEUROLOGY

## 2018-02-13 PROCEDURE — 84100 ASSAY OF PHOSPHORUS: CPT

## 2018-02-13 PROCEDURE — 27200966 HC CLOSED SUCTION SYSTEM

## 2018-02-13 PROCEDURE — 20000000 HC ICU ROOM

## 2018-02-13 PROCEDURE — 94761 N-INVAS EAR/PLS OXIMETRY MLT: CPT

## 2018-02-13 PROCEDURE — 99233 SBSQ HOSP IP/OBS HIGH 50: CPT | Mod: ,,, | Performed by: PSYCHIATRY & NEUROLOGY

## 2018-02-13 RX ADMIN — SODIUM CHLORIDE SOLN NEBU 3% 4 ML: 3 NEBU SOLN at 07:02

## 2018-02-13 RX ADMIN — INSULIN ASPART 2 UNITS: 100 INJECTION, SOLUTION INTRAVENOUS; SUBCUTANEOUS at 10:02

## 2018-02-13 RX ADMIN — IPRATROPIUM BROMIDE AND ALBUTEROL SULFATE 3 ML: .5; 3 SOLUTION RESPIRATORY (INHALATION) at 07:02

## 2018-02-13 RX ADMIN — SODIUM CHLORIDE SOLN NEBU 3% 4 ML: 3 NEBU SOLN at 02:02

## 2018-02-13 RX ADMIN — CHLORHEXIDINE GLUCONATE 15 ML: 1.2 RINSE ORAL at 08:02

## 2018-02-13 RX ADMIN — INSULIN ASPART 2 UNITS: 100 INJECTION, SOLUTION INTRAVENOUS; SUBCUTANEOUS at 02:02

## 2018-02-13 RX ADMIN — INSULIN ASPART 2 UNITS: 100 INJECTION, SOLUTION INTRAVENOUS; SUBCUTANEOUS at 05:02

## 2018-02-13 RX ADMIN — INSULIN ASPART 12 UNITS: 100 INJECTION, SOLUTION INTRAVENOUS; SUBCUTANEOUS at 02:02

## 2018-02-13 RX ADMIN — INSULIN ASPART 12 UNITS: 100 INJECTION, SOLUTION INTRAVENOUS; SUBCUTANEOUS at 05:02

## 2018-02-13 RX ADMIN — FAMOTIDINE 20 MG: 20 TABLET, FILM COATED ORAL at 08:02

## 2018-02-13 RX ADMIN — Medication 3 ML: at 05:02

## 2018-02-13 RX ADMIN — ASPIRIN 81 MG CHEWABLE TABLET 81 MG: 81 TABLET CHEWABLE at 08:02

## 2018-02-13 RX ADMIN — LISINOPRIL 20 MG: 20 TABLET ORAL at 08:02

## 2018-02-13 RX ADMIN — STANDARDIZED SENNA CONCENTRATE AND DOCUSATE SODIUM 1 TABLET: 8.6; 5 TABLET, FILM COATED ORAL at 08:02

## 2018-02-13 RX ADMIN — POLYETHYLENE GLYCOL 3350 17 G: 17 POWDER, FOR SOLUTION ORAL at 08:02

## 2018-02-13 RX ADMIN — Medication 3 ML: at 10:02

## 2018-02-13 RX ADMIN — INSULIN ASPART 12 UNITS: 100 INJECTION, SOLUTION INTRAVENOUS; SUBCUTANEOUS at 10:02

## 2018-02-13 RX ADMIN — IPRATROPIUM BROMIDE AND ALBUTEROL SULFATE 3 ML: .5; 3 SOLUTION RESPIRATORY (INHALATION) at 02:02

## 2018-02-13 RX ADMIN — IPRATROPIUM BROMIDE AND ALBUTEROL SULFATE 3 ML: .5; 3 SOLUTION RESPIRATORY (INHALATION) at 01:02

## 2018-02-13 RX ADMIN — MODAFINIL 200 MG: 100 TABLET ORAL at 05:02

## 2018-02-13 RX ADMIN — INSULIN DETEMIR 32 UNITS: 100 INJECTION, SOLUTION SUBCUTANEOUS at 08:02

## 2018-02-13 RX ADMIN — MODAFINIL 100 MG: 100 TABLET ORAL at 02:02

## 2018-02-13 RX ADMIN — INSULIN DETEMIR 32 UNITS: 100 INJECTION, SOLUTION SUBCUTANEOUS at 09:02

## 2018-02-13 RX ADMIN — SODIUM CHLORIDE SOLN NEBU 3% 4 ML: 3 NEBU SOLN at 01:02

## 2018-02-13 RX ADMIN — METOPROLOL TARTRATE 25 MG: 25 TABLET ORAL at 08:02

## 2018-02-13 RX ADMIN — ATORVASTATIN CALCIUM 80 MG: 20 TABLET, FILM COATED ORAL at 08:02

## 2018-02-13 RX ADMIN — INSULIN ASPART 12 UNITS: 100 INJECTION, SOLUTION INTRAVENOUS; SUBCUTANEOUS at 01:02

## 2018-02-13 RX ADMIN — Medication 3 ML: at 02:02

## 2018-02-13 RX ADMIN — ACETAMINOPHEN 650 MG: 325 TABLET, FILM COATED ORAL at 08:02

## 2018-02-13 NOTE — PROGRESS NOTES
Pt self extubated.  Pt placed on aerosol mask 8L 35% per MD order.  Nasal trumpet placed.  NT suction per MD order.  Will continue to monitor.

## 2018-02-13 NOTE — PROGRESS NOTES
Patient's chart was reviewed by a stroke team provider.  Patient planned for PEG and trach .  There is no new imaging to review.  For other recommendations please see our previous note completed on: 2/5/18.    There are no new recommendations at this time. Will continue to follow. Discussed patient with staff. Please contact stroke team for any questions or concerns.       Do Murphy PA-C  Vascular Neurology  [phone]

## 2018-02-13 NOTE — ASSESSMENT & PLAN NOTE
-A1C-10  -poorly controlled diabetes  -BG >400 on arrival, s/p insulin gtt  -Detemir/aspart + SSI

## 2018-02-13 NOTE — ASSESSMENT & PLAN NOTE
-s/p L M1 thrombectomy  --160  -repeat imaging stable   -ASA, plavix ---> HOLDING PLAVIX in preparation for peg/trach on 2/14 w/ gen surg.  -statin   -lovenox ---> HOLDING in preparation for peg/trach on 2/14 w/ gen surg.   -continue modafinil   -PT/OT  -pending peg/trach with gen. surg Wednesday

## 2018-02-13 NOTE — SUBJECTIVE & OBJECTIVE
Interval History:   No acute events overnight.  Pending trach/peg tomorrow.      Review of Systems   Constitutional: Negative for fever.   Neurological: Positive for weakness.   Psychiatric/Behavioral: Positive for confusion.     Unable to obtain a complete ROS due to level of consciousness.  Objective:     Vitals:  Temp: 99.5 °F (37.5 °C)  Pulse: 95  Rhythm: normal sinus rhythm, sinus tachycardia  BP: (!) 147/84  MAP (mmHg): 108  Resp: (!) 22  SpO2: 95 %  Oxygen Concentration (%): 40  O2 Device (Oxygen Therapy): ventilator  Vent Mode: Spont  Set Rate: 0 bmp  Vt Set: 500 mL  Pressure Support: 5 cmH20  PEEP/CPAP: 5 cmH20  Peak Airway Pressure: 11 cmH2O  Mean Airway Pressure: 7.5 cmH20  Plateau Pressure: 0 cmH20    Temp  Min: 98.6 °F (37 °C)  Max: 100 °F (37.8 °C)  Pulse  Min: 73  Max: 100  BP  Min: 120/77  Max: 158/100  MAP (mmHg)  Min: 88  Max: 124  Resp  Min: 17  Max: 32  SpO2  Min: 95 %  Max: 100 %  Oxygen Concentration (%)  Min: 40  Max: 40    02/12 0701 - 02/13 0700  In: 1850   Out: 1445 [Urine:1445]   Unmeasured Output  Urine Occurrence: 1 (condom cath came off)  Stool Occurrence: 0  Pad Count: 2       Physical Exam   Constitutional: He appears well-developed.   Cardiovascular: Normal rate and regular rhythm.  Exam reveals no friction rub.    No murmur heard.  Pulmonary/Chest:   Intubated, mechanical breath sounds b/l lung fields   Abdominal: Soft. Bowel sounds are normal. He exhibits no distension. There is no tenderness.   Musculoskeletal: He exhibits no edema.       --GCS: E2 V1t M6  --Mental Status:  Arouses to tactile stimuli, does NOT follow commands on resident rounds  --Pupils 3mm, PERRL.   --Corneal reflex, gag, cough intact.  --LUE strength: 4/5  --RUE strength: 0/5  --LLE strength: 5/5  --RLE strength: 0/5    Unable to test orientation, language, memory, judgment, insight, fund of knowledge, gait due to level of consciousness.    Medications:  Continuous Scheduled    albuterol-ipratropium  2.5mg-0.5mg/3mL 3 mL Q6H   aspirin 81 mg Daily   atorvastatin 80 mg Daily   chlorhexidine 15 mL BID   enoxaparin 40 mg Daily   famotidine 20 mg BID   insulin aspart 12 Units Q4H   insulin detemir 32 Units BID   lisinopril 20 mg Daily   metoprolol tartrate 25 mg BID   modafinil 200 mg Daily   And     modafinil 100 mg Daily   polyethylene glycol 17 g Daily   senna-docusate 8.6-50 mg 1 tablet BID   sodium chloride 0.9% 3 mL Q8H   sodium chloride 3% 4 mL Q6H   PRN    acetaminophen 650 mg Q6H PRN   albuterol-ipratropium 2.5mg-0.5mg/3mL 3 mL Q4H PRN   bisacodyl 10 mg Daily PRN   dextrose 50% 12.5 g PRN   glucagon (human recombinant) 1 mg PRN   hydrALAZINE 10 mg Q4H PRN   insulin aspart 1-10 Units Q4H PRN   labetalol 10 mg Q4H PRN   magnesium sulfate IVPB 2 g PRN   magnesium sulfate IVPB 4 g PRN   ondansetron 4 mg Q8H PRN   potassium chloride 10% 40 mEq PRN   potassium chloride 10% 40 mEq PRN   sodium chloride 0.9% 5 mL PRN     Today I personally reviewed pertinent medications, lines/drains/airways, imaging, cardiology, lab results,     Diet  Diet NPO  Diet NPO     Recent Results (from the past 24 hour(s))   POCT glucose    Collection Time: 02/12/18  6:09 PM   Result Value Ref Range    POCT Glucose 161 (H) 70 - 110 mg/dL   POCT glucose    Collection Time: 02/12/18  9:15 PM   Result Value Ref Range    POCT Glucose 158 (H) 70 - 110 mg/dL   Comprehensive metabolic panel    Collection Time: 02/13/18  1:28 AM   Result Value Ref Range    Sodium 139 136 - 145 mmol/L    Potassium 4.2 3.5 - 5.1 mmol/L    Chloride 98 95 - 110 mmol/L    CO2 32 (H) 23 - 29 mmol/L    Glucose 166 (H) 70 - 110 mg/dL    BUN, Bld 28 (H) 6 - 20 mg/dL    Creatinine 0.9 0.5 - 1.4 mg/dL    Calcium 9.4 8.7 - 10.5 mg/dL    Total Protein 7.4 6.0 - 8.4 g/dL    Albumin 2.2 (L) 3.5 - 5.2 g/dL    Total Bilirubin 0.6 0.1 - 1.0 mg/dL    Alkaline Phosphatase 84 55 - 135 U/L    AST 22 10 - 40 U/L    ALT 18 10 - 44 U/L    Anion Gap 9 8 - 16 mmol/L    eGFR if African  American >60.0 >60 mL/min/1.73 m^2    eGFR if non African American >60.0 >60 mL/min/1.73 m^2   CBC auto differential    Collection Time: 02/13/18  1:28 AM   Result Value Ref Range    WBC 11.13 3.90 - 12.70 K/uL    RBC 4.13 (L) 4.60 - 6.20 M/uL    Hemoglobin 12.2 (L) 14.0 - 18.0 g/dL    Hematocrit 36.0 (L) 40.0 - 54.0 %    MCV 87 82 - 98 fL    MCH 29.5 27.0 - 31.0 pg    MCHC 33.9 32.0 - 36.0 g/dL    RDW 12.4 11.5 - 14.5 %    Platelets 551 (H) 150 - 350 K/uL    MPV 9.6 9.2 - 12.9 fL    Immature Granulocytes 0.4 0.0 - 0.5 %    Gran # (ANC) 7.9 (H) 1.8 - 7.7 K/uL    Immature Grans (Abs) 0.04 0.00 - 0.04 K/uL    Lymph # 1.9 1.0 - 4.8 K/uL    Mono # 1.0 0.3 - 1.0 K/uL    Eos # 0.2 0.0 - 0.5 K/uL    Baso # 0.04 0.00 - 0.20 K/uL    nRBC 0 0 /100 WBC    Gran% 70.8 38.0 - 73.0 %    Lymph% 17.3 (L) 18.0 - 48.0 %    Mono% 8.9 4.0 - 15.0 %    Eosinophil% 2.2 0.0 - 8.0 %    Basophil% 0.4 0.0 - 1.9 %    Differential Method Automated    Magnesium    Collection Time: 02/13/18  1:28 AM   Result Value Ref Range    Magnesium 2.2 1.6 - 2.6 mg/dL   Phosphorus    Collection Time: 02/13/18  1:28 AM   Result Value Ref Range    Phosphorus 4.6 (H) 2.7 - 4.5 mg/dL   Protime-INR    Collection Time: 02/13/18  1:28 AM   Result Value Ref Range    Prothrombin Time 10.4 9.0 - 12.5 sec    INR 1.0 0.8 - 1.2   POCT glucose    Collection Time: 02/13/18  1:43 AM   Result Value Ref Range    POCT Glucose 145 (H) 70 - 110 mg/dL   POCT glucose    Collection Time: 02/13/18  5:30 AM   Result Value Ref Range    POCT Glucose 194 (H) 70 - 110 mg/dL   ISTAT PROCEDURE    Collection Time: 02/13/18  6:21 AM   Result Value Ref Range    POC PH 7.496 (H) 7.35 - 7.45    POC PCO2 46.4 (H) 35 - 45 mmHg    POC PO2 114 (H) 80 - 100 mmHg    POC HCO3 35.9 (H) 24 - 28 mmol/L    POC BE 13 -2 to 2 mmol/L    POC SATURATED O2 99 95 - 100 %    POC TCO2 37 (H) 23 - 27 mmol/L    Sample ARTERIAL     Site RR     Allens Test Pass     DelSys Adult Vent     Mode CPAP     PEEP 5     PS 5      FiO2 40     Spont Rate 21     Min Vol 11     Sp02 100    POCT glucose    Collection Time: 02/13/18 10:15 AM   Result Value Ref Range    POCT Glucose 182 (H) 70 - 110 mg/dL   POCT glucose    Collection Time: 02/13/18  2:15 PM   Result Value Ref Range    POCT Glucose 181 (H) 70 - 110 mg/dL

## 2018-02-13 NOTE — PROGRESS NOTES
[facility]  Neurocritical Care  Progress Note    Admit Date: 1/25/2018  Service Date: 02/13/2018  Length of Stay: 19    Subjective:     Chief Complaint: Embolic stroke involving left middle cerebral artery    History of Present Illness: The patient is a 47 year old male with no known PMHx admitted to [facility]   s/p thrombectomy of L M1 occlusion. Per chart review, he   walked into [place] and was acting abnormal.  EMS was called and brought to the ED for concern of L MCA syndrome. CT MP revealed short segment occlusion of left M1.  Patient went to IR for thrombectomy of L M1 occlusion seen on CTA MP.  TICI2C reperfusion and angioplasty. Patient admitted to [facility] for close monitoring and higher level of care.   History obtained from chart review only            Hospital Course: 1/25: Pt admitted to [facility] s/p L M1 thrombectomy, TICI2C reperfusion and angioplasty   1/27: large L MCA, hemicrani watch, NSGY following, insulin ggt, cardene ggt, L sided intermittent slowing eeg but no sz, +nasal trumpet for copious thick secretions, wheezing this am - improved with duo nebs, 3% nebs, and cough assist, concern for sepsis 2/2 hemodynamic changes - increased HR and BP, worsening leukocytosis, +ceftriaxone, pan cx, adrian track  1/28: continued respiratory challenges due to secretions. Aggressive pulmonary toileting. Continue monitoring for neuro worsening. Add moxifloxacin.   1/29: CTH to eval for increased edema/shift, EEG afterwards. Concern for decrease exam, no following/decreased alertness). CXR and Procal to follow leukocytosis. Hold TF until eval decreasing EXAM  1/30: neuro exam stable, increase 2%, current amount of sodium iv not suffuicient to meet target 145-155, cont abx for likely pneumonia, repeat ct in am  1/31: abrupt desaturation today requiring emergent intubation followed by bronchoscopy  Etiology likely upper airway obstruction from dried secretions  02/01: stable on vent overnight, marked  improvement on today's chest x ray, switch to spontaneous today, sodium stable at 150, start tfs  02/02: moves left side spontaneously and opens eyes off sedation,  02/03: 2% buffered NS re-started at 20mL/hr for rapid decrease in serum sodium. Enteral free water flushes discontinued. Patient has copious secretions likely pneumonia. Holding dexamethasone  2/4:  No acute events overnight.  Sodium stable.  2/5: transition insulin infusion to subcutaneous insulin   2/7: gen surg consulted for trach/peg  2/9: increased Aspart and Detemir, pending Trach/Peg placement next week.  2/10: No changes overnight . Pending PEG and trach, WBC mildly elevated  12K  2/11: No changes overnight . Pending PEG and trach, WBC mildly elevated  13K. Procal was low and lower than prior. Patient got 16 units of SSI yesterday.  2/13: NAEO.  Pending peg/trach tomorrow.    Interval History:   No acute events overnight.  Pending trach/peg tomorrow.      Review of Systems   Constitutional: Negative for fever.   Neurological: Positive for weakness.   Psychiatric/Behavioral: Positive for confusion.     Unable to obtain a complete ROS due to level of consciousness.  Objective:     Vitals:  Temp: 99.5 °F (37.5 °C)  Pulse: 95  Rhythm: normal sinus rhythm, sinus tachycardia  BP: (!) 147/84  MAP (mmHg): 108  Resp: (!) 22  SpO2: 95 %  Oxygen Concentration (%): 40  O2 Device (Oxygen Therapy): ventilator  Vent Mode: Spont  Set Rate: 0 bmp  Vt Set: 500 mL  Pressure Support: 5 cmH20  PEEP/CPAP: 5 cmH20  Peak Airway Pressure: 11 cmH2O  Mean Airway Pressure: 7.5 cmH20  Plateau Pressure: 0 cmH20    Temp  Min: 98.6 °F (37 °C)  Max: 100 °F (37.8 °C)  Pulse  Min: 73  Max: 100  BP  Min: 120/77  Max: 158/100  MAP (mmHg)  Min: 88  Max: 124  Resp  Min: 17  Max: 32  SpO2  Min: 95 %  Max: 100 %  Oxygen Concentration (%)  Min: 40  Max: 40    02/12 0701 - 02/13 0700  In: 1850   Out: 1445 [Urine:1445]   Unmeasured Output  Urine Occurrence: 1 (condom cath came off)  Stool  Occurrence: 0  Pad Count: 2       Physical Exam   Constitutional: He appears well-developed.   Cardiovascular: Normal rate and regular rhythm.  Exam reveals no friction rub.    No murmur heard.  Pulmonary/Chest:   Intubated, mechanical breath sounds b/l lung fields   Abdominal: Soft. Bowel sounds are normal. He exhibits no distension. There is no tenderness.   Musculoskeletal: He exhibits no edema.       --GCS: E2 V1t M6  --Mental Status:  Arouses to tactile stimuli, does NOT follow commands on resident rounds  --Pupils 3mm, PERRL.   --Corneal reflex, gag, cough intact.  --LUE strength: 4/5  --RUE strength: 0/5  --LLE strength: 5/5  --RLE strength: 0/5    Unable to test orientation, language, memory, judgment, insight, fund of knowledge, gait due to level of consciousness.    Medications:  Continuous Scheduled    albuterol-ipratropium 2.5mg-0.5mg/3mL 3 mL Q6H   aspirin 81 mg Daily   atorvastatin 80 mg Daily   chlorhexidine 15 mL BID   enoxaparin 40 mg Daily   famotidine 20 mg BID   insulin aspart 12 Units Q4H   insulin detemir 32 Units BID   lisinopril 20 mg Daily   metoprolol tartrate 25 mg BID   modafinil 200 mg Daily   And     modafinil 100 mg Daily   polyethylene glycol 17 g Daily   senna-docusate 8.6-50 mg 1 tablet BID   sodium chloride 0.9% 3 mL Q8H   sodium chloride 3% 4 mL Q6H   PRN    acetaminophen 650 mg Q6H PRN   albuterol-ipratropium 2.5mg-0.5mg/3mL 3 mL Q4H PRN   bisacodyl 10 mg Daily PRN   dextrose 50% 12.5 g PRN   glucagon (human recombinant) 1 mg PRN   hydrALAZINE 10 mg Q4H PRN   insulin aspart 1-10 Units Q4H PRN   labetalol 10 mg Q4H PRN   magnesium sulfate IVPB 2 g PRN   magnesium sulfate IVPB 4 g PRN   ondansetron 4 mg Q8H PRN   potassium chloride 10% 40 mEq PRN   potassium chloride 10% 40 mEq PRN   sodium chloride 0.9% 5 mL PRN     Today I personally reviewed pertinent medications, lines/drains/airways, imaging, cardiology, lab results,     Diet  Diet NPO  Diet NPO     Recent Results (from the  past 24 hour(s))   POCT glucose    Collection Time: 02/12/18  6:09 PM   Result Value Ref Range    POCT Glucose 161 (H) 70 - 110 mg/dL   POCT glucose    Collection Time: 02/12/18  9:15 PM   Result Value Ref Range    POCT Glucose 158 (H) 70 - 110 mg/dL   Comprehensive metabolic panel    Collection Time: 02/13/18  1:28 AM   Result Value Ref Range    Sodium 139 136 - 145 mmol/L    Potassium 4.2 3.5 - 5.1 mmol/L    Chloride 98 95 - 110 mmol/L    CO2 32 (H) 23 - 29 mmol/L    Glucose 166 (H) 70 - 110 mg/dL    BUN, Bld 28 (H) 6 - 20 mg/dL    Creatinine 0.9 0.5 - 1.4 mg/dL    Calcium 9.4 8.7 - 10.5 mg/dL    Total Protein 7.4 6.0 - 8.4 g/dL    Albumin 2.2 (L) 3.5 - 5.2 g/dL    Total Bilirubin 0.6 0.1 - 1.0 mg/dL    Alkaline Phosphatase 84 55 - 135 U/L    AST 22 10 - 40 U/L    ALT 18 10 - 44 U/L    Anion Gap 9 8 - 16 mmol/L    eGFR if African American >60.0 >60 mL/min/1.73 m^2    eGFR if non African American >60.0 >60 mL/min/1.73 m^2   CBC auto differential    Collection Time: 02/13/18  1:28 AM   Result Value Ref Range    WBC 11.13 3.90 - 12.70 K/uL    RBC 4.13 (L) 4.60 - 6.20 M/uL    Hemoglobin 12.2 (L) 14.0 - 18.0 g/dL    Hematocrit 36.0 (L) 40.0 - 54.0 %    MCV 87 82 - 98 fL    MCH 29.5 27.0 - 31.0 pg    MCHC 33.9 32.0 - 36.0 g/dL    RDW 12.4 11.5 - 14.5 %    Platelets 551 (H) 150 - 350 K/uL    MPV 9.6 9.2 - 12.9 fL    Immature Granulocytes 0.4 0.0 - 0.5 %    Gran # (ANC) 7.9 (H) 1.8 - 7.7 K/uL    Immature Grans (Abs) 0.04 0.00 - 0.04 K/uL    Lymph # 1.9 1.0 - 4.8 K/uL    Mono # 1.0 0.3 - 1.0 K/uL    Eos # 0.2 0.0 - 0.5 K/uL    Baso # 0.04 0.00 - 0.20 K/uL    nRBC 0 0 /100 WBC    Gran% 70.8 38.0 - 73.0 %    Lymph% 17.3 (L) 18.0 - 48.0 %    Mono% 8.9 4.0 - 15.0 %    Eosinophil% 2.2 0.0 - 8.0 %    Basophil% 0.4 0.0 - 1.9 %    Differential Method Automated    Magnesium    Collection Time: 02/13/18  1:28 AM   Result Value Ref Range    Magnesium 2.2 1.6 - 2.6 mg/dL   Phosphorus    Collection Time: 02/13/18  1:28 AM   Result  Value Ref Range    Phosphorus 4.6 (H) 2.7 - 4.5 mg/dL   Protime-INR    Collection Time: 02/13/18  1:28 AM   Result Value Ref Range    Prothrombin Time 10.4 9.0 - 12.5 sec    INR 1.0 0.8 - 1.2   POCT glucose    Collection Time: 02/13/18  1:43 AM   Result Value Ref Range    POCT Glucose 145 (H) 70 - 110 mg/dL   POCT glucose    Collection Time: 02/13/18  5:30 AM   Result Value Ref Range    POCT Glucose 194 (H) 70 - 110 mg/dL   ISTAT PROCEDURE    Collection Time: 02/13/18  6:21 AM   Result Value Ref Range    POC PH 7.496 (H) 7.35 - 7.45    POC PCO2 46.4 (H) 35 - 45 mmHg    POC PO2 114 (H) 80 - 100 mmHg    POC HCO3 35.9 (H) 24 - 28 mmol/L    POC BE 13 -2 to 2 mmol/L    POC SATURATED O2 99 95 - 100 %    POC TCO2 37 (H) 23 - 27 mmol/L    Sample ARTERIAL     Site RR     Allens Test Pass     DelSys Adult Vent     Mode CPAP     PEEP 5     PS 5     FiO2 40     Spont Rate 21     Min Vol 11     Sp02 100    POCT glucose    Collection Time: 02/13/18 10:15 AM   Result Value Ref Range    POCT Glucose 182 (H) 70 - 110 mg/dL   POCT glucose    Collection Time: 02/13/18  2:15 PM   Result Value Ref Range    POCT Glucose 181 (H) 70 - 110 mg/dL         Assessment/Plan:     Neuro   * Embolic stroke involving left middle cerebral artery    -s/p L M1 thrombectomy  --160  -repeat imaging stable   -ASA, plavix ---> HOLDING PLAVIX in preparation for peg/trach on 2/14 w/ gen surg.  -statin   -lovenox ---> HOLDING in preparation for peg/trach on 2/14 w/ gen surg.   -continue modafinil   -PT/OT  -pending peg/trach with gen. surg Wednesday              Pulmonary   Acute respiratory failure with hypoxia    -Due to airway obstruction form hardened secretions overlying epiglottis and soft palate, improved after intubation and bronchoscopy  -CXR and ABG prn   -general surgery plans for trach/peg Wed        Cardiac/Vascular   Hyperlipidemia    -atorvastatin 80 daily           Essential hypertension    --160  -metoprolol 25 mg  bid  -lisinopril 20 mg Qdaily  -PRN Labetalol & Hydralazine   -Echo 1/26:1 - Normal left ventricular systolic function (EF 65-70%).     2 - Concentric remodeling.     3 - No wall motion abnormalities.     4 - Normal left ventricular diastolic function.     5 - Normal right ventricular systolic function .            Endocrine   Type 2 diabetes mellitus    -A1C-10  -poorly controlled diabetes  -BG >400 on arrival, s/p insulin gtt  -Detemir/aspart + SSI              Prophylaxis:  Venous Thromboembolism: mechanical  Stress Ulcer: H2B  Ventilator Pneumonia: no     Activity Orders          Activity as tolerated starting at 02/05 1432        Full Code    Raymundo Shanks MD  Neurocritical Care  [facility]

## 2018-02-13 NOTE — ASSESSMENT & PLAN NOTE
--160  -metoprolol 25 mg bid  -lisinopril 20 mg Qdaily  -PRN Labetalol & Hydralazine   -Echo 1/26:1 - Normal left ventricular systolic function (EF 65-70%).     2 - Concentric remodeling.     3 - No wall motion abnormalities.     4 - Normal left ventricular diastolic function.     5 - Normal right ventricular systolic function .

## 2018-02-13 NOTE — PLAN OF CARE
Problem: Patient Care Overview  Goal: Plan of Care Review  Outcome: Ongoing (interventions implemented as appropriate)  POC reviewed with pt at 0320. Pt unable to verbalize understanding. Questions and concerns addressed. No acute events overnight. Pt progressing toward goals. Will continue to monitor. See flowsheets for full assessment and VS info

## 2018-02-13 NOTE — SIGNIFICANT EVENT
Pt self-extubated while tube feeds were running.  Placed on face mask with humidified air and tolerating well.    Plan:  -Hold tube feeds  -Face mask w/ humidified O2  -Nasal trump w/ deep NT suctioning  -Updated general surgery -> IF tolerating face mask overnight, may consider only PEG placement tomorrow.  Otherwise, may need to proceed with both trach/PEG.  Please call [phone] to discuss prior to taking to surgery.        Raymundo Shanks MD

## 2018-02-14 ENCOUNTER — ANESTHESIA (OUTPATIENT)
Dept: SURGERY | Facility: HOSPITAL | Age: 48
End: 2018-02-14

## 2018-02-14 PROBLEM — R13.11 ORAL PHASE DYSPHAGIA: Status: ACTIVE | Noted: 2018-02-14

## 2018-02-14 LAB
ALBUMIN SERPL BCP-MCNC: 2.7 G/DL
ALLENS TEST: ABNORMAL
ALP SERPL-CCNC: 105 U/L
ALT SERPL W/O P-5'-P-CCNC: 25 U/L
ANION GAP SERPL CALC-SCNC: 13 MMOL/L
AST SERPL-CCNC: 30 U/L
BASOPHILS # BLD AUTO: 0.05 K/UL
BASOPHILS NFR BLD: 0.3 %
BILIRUB SERPL-MCNC: 0.9 MG/DL
BUN SERPL-MCNC: 26 MG/DL
CALCIUM SERPL-MCNC: 9.8 MG/DL
CHLORIDE SERPL-SCNC: 98 MMOL/L
CO2 SERPL-SCNC: 29 MMOL/L
CREAT SERPL-MCNC: 0.9 MG/DL
DELSYS: ABNORMAL
DIFFERENTIAL METHOD: ABNORMAL
EOSINOPHIL # BLD AUTO: 0.2 K/UL
EOSINOPHIL NFR BLD: 1.1 %
ERYTHROCYTE [DISTWIDTH] IN BLOOD BY AUTOMATED COUNT: 12.4 %
EST. GFR  (AFRICAN AMERICAN): >60 ML/MIN/1.73 M^2
EST. GFR  (NON AFRICAN AMERICAN): >60 ML/MIN/1.73 M^2
GLUCOSE SERPL-MCNC: 196 MG/DL
HCO3 UR-SCNC: 32.2 MMOL/L (ref 24–28)
HCT VFR BLD AUTO: 38.9 %
HGB BLD-MCNC: 13.2 G/DL
IMM GRANULOCYTES # BLD AUTO: 0.06 K/UL
IMM GRANULOCYTES NFR BLD AUTO: 0.4 %
INR PPP: 1
LYMPHOCYTES # BLD AUTO: 1.8 K/UL
LYMPHOCYTES NFR BLD: 11.1 %
MAGNESIUM SERPL-MCNC: 1.9 MG/DL
MCH RBC QN AUTO: 29.5 PG
MCHC RBC AUTO-ENTMCNC: 33.9 G/DL
MCV RBC AUTO: 87 FL
MONOCYTES # BLD AUTO: 1.2 K/UL
MONOCYTES NFR BLD: 7.2 %
NEUTROPHILS # BLD AUTO: 12.7 K/UL
NEUTROPHILS NFR BLD: 79.9 %
NRBC BLD-RTO: 0 /100 WBC
PCO2 BLDA: 42.8 MMHG (ref 35–45)
PH SMN: 7.48 [PH] (ref 7.35–7.45)
PHOSPHATE SERPL-MCNC: 3.9 MG/DL
PLATELET # BLD AUTO: 661 K/UL
PMV BLD AUTO: 9.2 FL
PO2 BLDA: 78 MMHG (ref 80–100)
POC BE: 9 MMOL/L
POC SATURATED O2: 96 % (ref 95–100)
POC TCO2: 33 MMOL/L (ref 23–27)
POCT GLUCOSE: 174 MG/DL (ref 70–110)
POCT GLUCOSE: 175 MG/DL (ref 70–110)
POCT GLUCOSE: 178 MG/DL (ref 70–110)
POCT GLUCOSE: 216 MG/DL (ref 70–110)
POTASSIUM SERPL-SCNC: 4.1 MMOL/L
PROT SERPL-MCNC: 8.3 G/DL
PROTHROMBIN TIME: 10.9 SEC
RBC # BLD AUTO: 4.48 M/UL
SAMPLE: ABNORMAL
SITE: ABNORMAL
SODIUM SERPL-SCNC: 140 MMOL/L
WBC # BLD AUTO: 15.89 K/UL

## 2018-02-14 PROCEDURE — 99900035 HC TECH TIME PER 15 MIN (STAT)

## 2018-02-14 PROCEDURE — A4216 STERILE WATER/SALINE, 10 ML: HCPCS | Performed by: NURSE PRACTITIONER

## 2018-02-14 PROCEDURE — 84100 ASSAY OF PHOSPHORUS: CPT

## 2018-02-14 PROCEDURE — 94761 N-INVAS EAR/PLS OXIMETRY MLT: CPT

## 2018-02-14 PROCEDURE — 25000242 PHARM REV CODE 250 ALT 637 W/ HCPCS: Performed by: PSYCHIATRY & NEUROLOGY

## 2018-02-14 PROCEDURE — 85610 PROTHROMBIN TIME: CPT

## 2018-02-14 PROCEDURE — 20000000 HC ICU ROOM

## 2018-02-14 PROCEDURE — 80053 COMPREHEN METABOLIC PANEL: CPT

## 2018-02-14 PROCEDURE — 94640 AIRWAY INHALATION TREATMENT: CPT

## 2018-02-14 PROCEDURE — 25000003 PHARM REV CODE 250: Performed by: NURSE PRACTITIONER

## 2018-02-14 PROCEDURE — 31720 CLEARANCE OF AIRWAYS: CPT

## 2018-02-14 PROCEDURE — 36600 WITHDRAWAL OF ARTERIAL BLOOD: CPT

## 2018-02-14 PROCEDURE — 99233 SBSQ HOSP IP/OBS HIGH 50: CPT | Mod: ,,, | Performed by: PSYCHIATRY & NEUROLOGY

## 2018-02-14 PROCEDURE — 83735 ASSAY OF MAGNESIUM: CPT

## 2018-02-14 PROCEDURE — 99900026 HC AIRWAY MAINTENANCE (STAT)

## 2018-02-14 PROCEDURE — 82803 BLOOD GASES ANY COMBINATION: CPT

## 2018-02-14 PROCEDURE — 27100171 HC OXYGEN HIGH FLOW UP TO 24 HOURS

## 2018-02-14 PROCEDURE — 63600175 PHARM REV CODE 636 W HCPCS: Performed by: STUDENT IN AN ORGANIZED HEALTH CARE EDUCATION/TRAINING PROGRAM

## 2018-02-14 PROCEDURE — 27000221 HC OXYGEN, UP TO 24 HOURS

## 2018-02-14 PROCEDURE — 85025 COMPLETE CBC W/AUTO DIFF WBC: CPT

## 2018-02-14 RX ORDER — HEPARIN SODIUM 5000 [USP'U]/ML
5000 INJECTION, SOLUTION INTRAVENOUS; SUBCUTANEOUS EVERY 8 HOURS
Status: DISPENSED | OUTPATIENT
Start: 2018-02-14 | End: 2018-02-19

## 2018-02-14 RX ORDER — ACETAMINOPHEN 650 MG/1
650 SUPPOSITORY RECTAL EVERY 6 HOURS PRN
Status: DISCONTINUED | OUTPATIENT
Start: 2018-02-14 | End: 2018-02-18

## 2018-02-14 RX ORDER — GLUCAGON 1 MG
1 KIT INJECTION
Status: DISCONTINUED | OUTPATIENT
Start: 2018-02-14 | End: 2018-02-14 | Stop reason: SDUPTHER

## 2018-02-14 RX ORDER — FAMOTIDINE 10 MG/ML
20 INJECTION INTRAVENOUS 2 TIMES DAILY
Status: DISCONTINUED | OUTPATIENT
Start: 2018-02-14 | End: 2018-02-14

## 2018-02-14 RX ORDER — INSULIN ASPART 100 [IU]/ML
1-10 INJECTION, SOLUTION INTRAVENOUS; SUBCUTANEOUS EVERY 6 HOURS PRN
Status: DISCONTINUED | OUTPATIENT
Start: 2018-02-14 | End: 2018-02-14

## 2018-02-14 RX ADMIN — SODIUM CHLORIDE SOLN NEBU 3% 4 ML: 3 NEBU SOLN at 12:02

## 2018-02-14 RX ADMIN — ACETAMINOPHEN 650 MG: 650 SUPPOSITORY RECTAL at 11:02

## 2018-02-14 RX ADMIN — INSULIN ASPART 1 UNITS: 100 INJECTION, SOLUTION INTRAVENOUS; SUBCUTANEOUS at 09:02

## 2018-02-14 RX ADMIN — INSULIN ASPART 2 UNITS: 100 INJECTION, SOLUTION INTRAVENOUS; SUBCUTANEOUS at 01:02

## 2018-02-14 RX ADMIN — IPRATROPIUM BROMIDE AND ALBUTEROL SULFATE 3 ML: .5; 3 SOLUTION RESPIRATORY (INHALATION) at 08:02

## 2018-02-14 RX ADMIN — SODIUM CHLORIDE SOLN NEBU 3% 4 ML: 3 NEBU SOLN at 02:02

## 2018-02-14 RX ADMIN — HEPARIN SODIUM 5000 UNITS: 5000 INJECTION, SOLUTION INTRAVENOUS; SUBCUTANEOUS at 09:02

## 2018-02-14 RX ADMIN — INSULIN ASPART 4 UNITS: 100 INJECTION, SOLUTION INTRAVENOUS; SUBCUTANEOUS at 09:02

## 2018-02-14 RX ADMIN — SODIUM CHLORIDE SOLN NEBU 3% 4 ML: 3 NEBU SOLN at 08:02

## 2018-02-14 RX ADMIN — INSULIN DETEMIR 32 UNITS: 100 INJECTION, SOLUTION SUBCUTANEOUS at 09:02

## 2018-02-14 RX ADMIN — Medication 3 ML: at 09:02

## 2018-02-14 RX ADMIN — IPRATROPIUM BROMIDE AND ALBUTEROL SULFATE 3 ML: .5; 3 SOLUTION RESPIRATORY (INHALATION) at 07:02

## 2018-02-14 RX ADMIN — HEPARIN SODIUM 5000 UNITS: 5000 INJECTION, SOLUTION INTRAVENOUS; SUBCUTANEOUS at 01:02

## 2018-02-14 RX ADMIN — CHLORHEXIDINE GLUCONATE 15 ML: 1.2 RINSE ORAL at 09:02

## 2018-02-14 RX ADMIN — IPRATROPIUM BROMIDE AND ALBUTEROL SULFATE 3 ML: .5; 3 SOLUTION RESPIRATORY (INHALATION) at 02:02

## 2018-02-14 RX ADMIN — INSULIN ASPART 2 UNITS: 100 INJECTION, SOLUTION INTRAVENOUS; SUBCUTANEOUS at 05:02

## 2018-02-14 RX ADMIN — SODIUM CHLORIDE SOLN NEBU 3% 4 ML: 3 NEBU SOLN at 07:02

## 2018-02-14 RX ADMIN — IPRATROPIUM BROMIDE AND ALBUTEROL SULFATE 3 ML: .5; 3 SOLUTION RESPIRATORY (INHALATION) at 12:02

## 2018-02-14 NOTE — PROGRESS NOTES
Spoke to NCC on call regarding Ng tube and holding of medication. Ok to hold meds and NG tube insertion due to the possible  Need to re intubate. Will contiune to monitor .

## 2018-02-14 NOTE — PLAN OF CARE
Problem: Patient Care Overview  Goal: Plan of Care Review  Outcome: Ongoing (interventions implemented as appropriate)  POC reviewed with pt and family at 1400. Pt unable to verbalize understanding r/t pt nonverbal. Pt's sister at bedside and verbalized understanding. Questions and concerns addressed. Pt progressing toward goals. Will continue to monitor. See flowsheets for full assessment and VS info.

## 2018-02-14 NOTE — PROGRESS NOTES
PT self extubated and removed NGT. L soft wrist restraint present. MD Jina with NCC notified and at bedside assessing. Aerosol mask and nasal trumpet placed per RT. Will continue to monitor closely.

## 2018-02-14 NOTE — ASSESSMENT & PLAN NOTE
48 yo male s/p stroke, with acute respiratory failure and dysphagia    -will cancel surgery today given doing relatively well s/p self extubation  -if patient gets reintubated, will be happy to proceed with trach/PEG  -if necessary, PEG can be placed at later date when respiratory function is stable  -medical management per primary

## 2018-02-14 NOTE — PROGRESS NOTES
[facility]  General Surgery  Progress Note    Subjective:     Post-Op Info:  Procedure(s) (LRB):  TRACHEOSTOMY (N/A)  PLACEMENT-TUBE-PEG (N/A)         Interval History:   Patient self extubated 2/13/18, currently on aerosol mask, satting well  Afebrile/VSS    Medications:  Continuous Infusions:  Scheduled Meds:   albuterol-ipratropium 2.5mg-0.5mg/3mL  3 mL Nebulization Q6H    aspirin  81 mg Per NG tube Daily    atorvastatin  80 mg Per NG tube Daily    chlorhexidine  15 mL Mouth/Throat BID    famotidine  20 mg Per NG tube BID    insulin aspart  12 Units Subcutaneous Q4H    insulin detemir  32 Units Subcutaneous BID    lisinopril  20 mg Per NG tube Daily    metoprolol tartrate  25 mg Per NG tube BID    modafinil  200 mg Per NG tube Daily    And    modafinil  100 mg Per NG tube Daily    polyethylene glycol  17 g Per NG tube Daily    senna-docusate 8.6-50 mg  1 tablet Per NG tube BID    sodium chloride 0.9%  3 mL Intravenous Q8H    sodium chloride 3%  4 mL Nebulization Q6H     PRN Meds:acetaminophen, albuterol-ipratropium 2.5mg-0.5mg/3mL, bisacodyl, dextrose 50%, glucagon (human recombinant), hydrALAZINE, insulin aspart, labetalol, magnesium sulfate IVPB, magnesium sulfate IVPB, ondansetron, potassium chloride 10%, potassium chloride 10%, sodium chloride 0.9%     Review of patient's allergies indicates:  No Known Allergies  Objective:     Vital Signs (Most Recent):  Temp: 99 °F (37.2 °C) (02/14/18 0305)  Pulse: 103 (02/14/18 0604)  Resp: (!) 30 (02/14/18 0604)  BP: (!) 146/95 (02/14/18 0604)  SpO2: 98 % (02/14/18 0604) Vital Signs (24h Range):  Temp:  [98.4 °F (36.9 °C)-100 °F (37.8 °C)] 99 °F (37.2 °C)  Pulse:  [] 103  Resp:  [15-42] 30  SpO2:  [91 %-100 %] 98 %  BP: (102-165)/() 146/95     Weight: 105.6 kg (232 lb 12.9 oz)  Body mass index is 33.4 kg/m².    Intake/Output - Last 3 Shifts       02/12 0700 - 02/13 0659 02/13 0700 - 02/14 0659 02/14 0700 - 02/15 0659    NG/GT  1850 870     Total Intake(mL/kg) 1850 (17.5) 870 (8.2)     Urine (mL/kg/hr) 1445 (0.6) 1150 (0.5)     Other       Stool 0 (0)      Total Output 1445 1150      Net +405 -280             Stool Occurrence 0 x            Physical Exam   Constitutional: He is oriented to person, place, and time. He appears well-developed and well-nourished. No distress.   HENT:   Head: Normocephalic and atraumatic.   Neck: Neck supple.   Cardiovascular: Normal rate and regular rhythm.    Pulmonary/Chest: Effort normal and breath sounds normal.   Abdominal:   Soft, NTND, no previous abdominal surgical scars noted   Neurological: He is alert and oriented to person, place, and time.       Significant Labs:  CBC:   Recent Labs  Lab 02/14/18  0129   WBC 15.89*   RBC 4.48*   HGB 13.2*   HCT 38.9*   *   MCV 87   MCH 29.5   MCHC 33.9     BMP:   Recent Labs  Lab 02/14/18  0129   *      K 4.1   CL 98   CO2 29   BUN 26*   CREATININE 0.9   CALCIUM 9.8   MG 1.9     CMP:   Recent Labs  Lab 02/14/18  0129   *   CALCIUM 9.8   ALBUMIN 2.7*   PROT 8.3      K 4.1   CO2 29   CL 98   BUN 26*   CREATININE 0.9   ALKPHOS 105   ALT 25   AST 30   BILITOT 0.9     LFTs:   Recent Labs  Lab 02/14/18  0129   ALT 25   AST 30   ALKPHOS 105   BILITOT 0.9   PROT 8.3   ALBUMIN 2.7*     Coagulation:   Recent Labs  Lab 02/14/18  0129   LABPROT 10.9   INR 1.0     ABGs:   Recent Labs  Lab 02/13/18  0621   PH 7.496*   PCO2 46.4*   PO2 114*   HCO3 35.9*   POCSATURATED 99   BE 13     Assessment/Plan:     * Embolic stroke involving left middle cerebral artery    46 yo male s/p stroke, with acute respiratory failure and dysphagia    -will cancel surgery today given doing relatively well s/p self extubation  -if patient gets reintubated, will be happy to proceed with trach/PEG  -if necessary, PEG can be placed at later date when respiratory function is stable  -medical management per primary            Florecita Gutierrez PA-C   [id number]  General  Surgery  [facility]

## 2018-02-14 NOTE — PLAN OF CARE
Problem: Patient Care Overview  Goal: Plan of Care Review  Outcome: Ongoing (interventions implemented as appropriate)  POC reviewed with pt at 1500. Pt self extubated but nonverbal. No family at bedside. Will continue to monitor. See flowsheets for full assessment and VS info. SBP < 180 maintained. No PRNs required. Self extubation and NGT removal occurred. General surgery informed. NGT replacement on hold d/t reintubation watch per Dimitris, resident with NCC. L soft wrist restraint continued.

## 2018-02-14 NOTE — SUBJECTIVE & OBJECTIVE
Interval History:   Patient self extubated 2/13/18, currently on aerosol mask, satting well  Afebrile/VSS    Medications:  Continuous Infusions:  Scheduled Meds:   albuterol-ipratropium 2.5mg-0.5mg/3mL  3 mL Nebulization Q6H    aspirin  81 mg Per NG tube Daily    atorvastatin  80 mg Per NG tube Daily    chlorhexidine  15 mL Mouth/Throat BID    famotidine  20 mg Per NG tube BID    insulin aspart  12 Units Subcutaneous Q4H    insulin detemir  32 Units Subcutaneous BID    lisinopril  20 mg Per NG tube Daily    metoprolol tartrate  25 mg Per NG tube BID    modafinil  200 mg Per NG tube Daily    And    modafinil  100 mg Per NG tube Daily    polyethylene glycol  17 g Per NG tube Daily    senna-docusate 8.6-50 mg  1 tablet Per NG tube BID    sodium chloride 0.9%  3 mL Intravenous Q8H    sodium chloride 3%  4 mL Nebulization Q6H     PRN Meds:acetaminophen, albuterol-ipratropium 2.5mg-0.5mg/3mL, bisacodyl, dextrose 50%, glucagon (human recombinant), hydrALAZINE, insulin aspart, labetalol, magnesium sulfate IVPB, magnesium sulfate IVPB, ondansetron, potassium chloride 10%, potassium chloride 10%, sodium chloride 0.9%     Review of patient's allergies indicates:  No Known Allergies  Objective:     Vital Signs (Most Recent):  Temp: 99 °F (37.2 °C) (02/14/18 0305)  Pulse: 103 (02/14/18 0604)  Resp: (!) 30 (02/14/18 0604)  BP: (!) 146/95 (02/14/18 0604)  SpO2: 98 % (02/14/18 0604) Vital Signs (24h Range):  Temp:  [98.4 °F (36.9 °C)-100 °F (37.8 °C)] 99 °F (37.2 °C)  Pulse:  [] 103  Resp:  [15-42] 30  SpO2:  [91 %-100 %] 98 %  BP: (102-165)/() 146/95     Weight: 105.6 kg (232 lb 12.9 oz)  Body mass index is 33.4 kg/m².    Intake/Output - Last 3 Shifts       02/12 0700 - 02/13 0659 02/13 0700 - 02/14 0659 02/14 0700 - 02/15 0659    NG/GT 1850 870     Total Intake(mL/kg) 1850 (17.5) 870 (8.2)     Urine (mL/kg/hr) 1445 (0.6) 1150 (0.5)     Other       Stool 0 (0)      Total Output 1445 1150      Net +405  -280             Stool Occurrence 0 x            Physical Exam   Constitutional: He is oriented to person, place, and time. He appears well-developed and well-nourished. No distress.   HENT:   Head: Normocephalic and atraumatic.   Neck: Neck supple.   Cardiovascular: Normal rate and regular rhythm.    Pulmonary/Chest: Effort normal and breath sounds normal.   Abdominal:   Soft, NTND, no previous abdominal surgical scars noted   Neurological: He is alert and oriented to person, place, and time.       Significant Labs:  CBC:   Recent Labs  Lab 02/14/18 0129   WBC 15.89*   RBC 4.48*   HGB 13.2*   HCT 38.9*   *   MCV 87   MCH 29.5   MCHC 33.9     BMP:   Recent Labs  Lab 02/14/18  0129   *      K 4.1   CL 98   CO2 29   BUN 26*   CREATININE 0.9   CALCIUM 9.8   MG 1.9     CMP:   Recent Labs  Lab 02/14/18  0129   *   CALCIUM 9.8   ALBUMIN 2.7*   PROT 8.3      K 4.1   CO2 29   CL 98   BUN 26*   CREATININE 0.9   ALKPHOS 105   ALT 25   AST 30   BILITOT 0.9     LFTs:   Recent Labs  Lab 02/14/18  0129   ALT 25   AST 30   ALKPHOS 105   BILITOT 0.9   PROT 8.3   ALBUMIN 2.7*     Coagulation:   Recent Labs  Lab 02/14/18  0129   LABPROT 10.9   INR 1.0     ABGs:   Recent Labs  Lab 02/13/18  0621   PH 7.496*   PCO2 46.4*   PO2 114*   HCO3 35.9*   POCSATURATED 99   BE 13

## 2018-02-14 NOTE — PLAN OF CARE
Problem: Patient Care Overview  Goal: Plan of Care Review  Outcome: Ongoing (interventions implemented as appropriate)  POC reviewed with pt at 0500. Pt unable to verbalize understanding. Questions and concerns addressed. Pt required frequent suctioning trough out the night.  Pt progressing toward goals. Will continue to monitor. See flowsheets for full assessment and VS info

## 2018-02-15 LAB
ALBUMIN SERPL BCP-MCNC: 2.7 G/DL
ALP SERPL-CCNC: 110 U/L
ALT SERPL W/O P-5'-P-CCNC: 28 U/L
ANION GAP SERPL CALC-SCNC: 10 MMOL/L
AST SERPL-CCNC: 31 U/L
BASOPHILS # BLD AUTO: 0.05 K/UL
BASOPHILS NFR BLD: 0.4 %
BILIRUB SERPL-MCNC: 1 MG/DL
BUN SERPL-MCNC: 27 MG/DL
CALCIUM SERPL-MCNC: 9.9 MG/DL
CHLORIDE SERPL-SCNC: 99 MMOL/L
CO2 SERPL-SCNC: 30 MMOL/L
CREAT SERPL-MCNC: 0.9 MG/DL
DIFFERENTIAL METHOD: ABNORMAL
EOSINOPHIL # BLD AUTO: 0.1 K/UL
EOSINOPHIL NFR BLD: 0.9 %
ERYTHROCYTE [DISTWIDTH] IN BLOOD BY AUTOMATED COUNT: 12.3 %
EST. GFR  (AFRICAN AMERICAN): >60 ML/MIN/1.73 M^2
EST. GFR  (NON AFRICAN AMERICAN): >60 ML/MIN/1.73 M^2
GLUCOSE SERPL-MCNC: 185 MG/DL
HCT VFR BLD AUTO: 39.6 %
HGB BLD-MCNC: 13.2 G/DL
IMM GRANULOCYTES # BLD AUTO: 0.05 K/UL
IMM GRANULOCYTES NFR BLD AUTO: 0.4 %
INR PPP: 1
LYMPHOCYTES # BLD AUTO: 2.1 K/UL
LYMPHOCYTES NFR BLD: 16.2 %
MAGNESIUM SERPL-MCNC: 2.1 MG/DL
MCH RBC QN AUTO: 28.8 PG
MCHC RBC AUTO-ENTMCNC: 33.3 G/DL
MCV RBC AUTO: 87 FL
MONOCYTES # BLD AUTO: 1.1 K/UL
MONOCYTES NFR BLD: 8.2 %
NEUTROPHILS # BLD AUTO: 9.7 K/UL
NEUTROPHILS NFR BLD: 73.9 %
NRBC BLD-RTO: 0 /100 WBC
PHOSPHATE SERPL-MCNC: 4.4 MG/DL
PLATELET # BLD AUTO: 676 K/UL
PMV BLD AUTO: 9.8 FL
POCT GLUCOSE: 163 MG/DL (ref 70–110)
POCT GLUCOSE: 174 MG/DL (ref 70–110)
POCT GLUCOSE: 182 MG/DL (ref 70–110)
POCT GLUCOSE: 196 MG/DL (ref 70–110)
POCT GLUCOSE: 207 MG/DL (ref 70–110)
POCT GLUCOSE: 218 MG/DL (ref 70–110)
POTASSIUM SERPL-SCNC: 4.2 MMOL/L
PROT SERPL-MCNC: 8.3 G/DL
PROTHROMBIN TIME: 10.6 SEC
RBC # BLD AUTO: 4.58 M/UL
SODIUM SERPL-SCNC: 139 MMOL/L
WBC # BLD AUTO: 13.1 K/UL

## 2018-02-15 PROCEDURE — 94761 N-INVAS EAR/PLS OXIMETRY MLT: CPT

## 2018-02-15 PROCEDURE — 25000003 PHARM REV CODE 250: Performed by: NURSE PRACTITIONER

## 2018-02-15 PROCEDURE — 84100 ASSAY OF PHOSPHORUS: CPT

## 2018-02-15 PROCEDURE — 83735 ASSAY OF MAGNESIUM: CPT

## 2018-02-15 PROCEDURE — 85610 PROTHROMBIN TIME: CPT

## 2018-02-15 PROCEDURE — 97530 THERAPEUTIC ACTIVITIES: CPT

## 2018-02-15 PROCEDURE — 27100171 HC OXYGEN HIGH FLOW UP TO 24 HOURS

## 2018-02-15 PROCEDURE — 20000000 HC ICU ROOM

## 2018-02-15 PROCEDURE — 94640 AIRWAY INHALATION TREATMENT: CPT

## 2018-02-15 PROCEDURE — 85025 COMPLETE CBC W/AUTO DIFF WBC: CPT

## 2018-02-15 PROCEDURE — 63600175 PHARM REV CODE 636 W HCPCS: Performed by: STUDENT IN AN ORGANIZED HEALTH CARE EDUCATION/TRAINING PROGRAM

## 2018-02-15 PROCEDURE — 31720 CLEARANCE OF AIRWAYS: CPT

## 2018-02-15 PROCEDURE — 99233 SBSQ HOSP IP/OBS HIGH 50: CPT | Mod: ,,, | Performed by: NURSE PRACTITIONER

## 2018-02-15 PROCEDURE — 25000003 PHARM REV CODE 250: Performed by: PSYCHIATRY & NEUROLOGY

## 2018-02-15 PROCEDURE — 25000003 PHARM REV CODE 250: Performed by: PHYSICIAN ASSISTANT

## 2018-02-15 PROCEDURE — 63600175 PHARM REV CODE 636 W HCPCS: Performed by: NURSE PRACTITIONER

## 2018-02-15 PROCEDURE — A4216 STERILE WATER/SALINE, 10 ML: HCPCS | Performed by: NURSE PRACTITIONER

## 2018-02-15 PROCEDURE — 25000242 PHARM REV CODE 250 ALT 637 W/ HCPCS: Performed by: PSYCHIATRY & NEUROLOGY

## 2018-02-15 PROCEDURE — 97112 NEUROMUSCULAR REEDUCATION: CPT

## 2018-02-15 PROCEDURE — 80053 COMPREHEN METABOLIC PANEL: CPT

## 2018-02-15 RX ORDER — INSULIN ASPART 100 [IU]/ML
4 INJECTION, SOLUTION INTRAVENOUS; SUBCUTANEOUS EVERY 4 HOURS
Status: DISCONTINUED | OUTPATIENT
Start: 2018-02-15 | End: 2018-02-15

## 2018-02-15 RX ORDER — INSULIN ASPART 100 [IU]/ML
5 INJECTION, SOLUTION INTRAVENOUS; SUBCUTANEOUS EVERY 4 HOURS
Status: DISCONTINUED | OUTPATIENT
Start: 2018-02-15 | End: 2018-02-16

## 2018-02-15 RX ADMIN — INSULIN ASPART 2 UNITS: 100 INJECTION, SOLUTION INTRAVENOUS; SUBCUTANEOUS at 05:02

## 2018-02-15 RX ADMIN — SODIUM CHLORIDE SOLN NEBU 3% 4 ML: 3 NEBU SOLN at 08:02

## 2018-02-15 RX ADMIN — METOPROLOL TARTRATE 25 MG: 25 TABLET ORAL at 01:02

## 2018-02-15 RX ADMIN — INSULIN ASPART 1 UNITS: 100 INJECTION, SOLUTION INTRAVENOUS; SUBCUTANEOUS at 02:02

## 2018-02-15 RX ADMIN — Medication 3 ML: at 09:02

## 2018-02-15 RX ADMIN — Medication 3 ML: at 02:02

## 2018-02-15 RX ADMIN — BISACODYL 10 MG: 10 SUPPOSITORY RECTAL at 10:02

## 2018-02-15 RX ADMIN — IPRATROPIUM BROMIDE AND ALBUTEROL SULFATE 3 ML: .5; 3 SOLUTION RESPIRATORY (INHALATION) at 08:02

## 2018-02-15 RX ADMIN — HEPARIN SODIUM 5000 UNITS: 5000 INJECTION, SOLUTION INTRAVENOUS; SUBCUTANEOUS at 01:02

## 2018-02-15 RX ADMIN — INSULIN ASPART 4 UNITS: 100 INJECTION, SOLUTION INTRAVENOUS; SUBCUTANEOUS at 09:02

## 2018-02-15 RX ADMIN — INSULIN ASPART 5 UNITS: 100 INJECTION, SOLUTION INTRAVENOUS; SUBCUTANEOUS at 09:02

## 2018-02-15 RX ADMIN — IPRATROPIUM BROMIDE AND ALBUTEROL SULFATE 3 ML: .5; 3 SOLUTION RESPIRATORY (INHALATION) at 12:02

## 2018-02-15 RX ADMIN — MODAFINIL 100 MG: 100 TABLET ORAL at 01:02

## 2018-02-15 RX ADMIN — METOPROLOL TARTRATE 25 MG: 25 TABLET ORAL at 09:02

## 2018-02-15 RX ADMIN — HEPARIN SODIUM 5000 UNITS: 5000 INJECTION, SOLUTION INTRAVENOUS; SUBCUTANEOUS at 05:02

## 2018-02-15 RX ADMIN — IPRATROPIUM BROMIDE AND ALBUTEROL SULFATE 3 ML: .5; 3 SOLUTION RESPIRATORY (INHALATION) at 01:02

## 2018-02-15 RX ADMIN — SODIUM CHLORIDE SOLN NEBU 3% 4 ML: 3 NEBU SOLN at 12:02

## 2018-02-15 RX ADMIN — STANDARDIZED SENNA CONCENTRATE AND DOCUSATE SODIUM 1 TABLET: 8.6; 5 TABLET, FILM COATED ORAL at 09:02

## 2018-02-15 RX ADMIN — INSULIN ASPART 4 UNITS: 100 INJECTION, SOLUTION INTRAVENOUS; SUBCUTANEOUS at 05:02

## 2018-02-15 RX ADMIN — INSULIN ASPART 4 UNITS: 100 INJECTION, SOLUTION INTRAVENOUS; SUBCUTANEOUS at 01:02

## 2018-02-15 RX ADMIN — SODIUM CHLORIDE SOLN NEBU 3% 4 ML: 3 NEBU SOLN at 01:02

## 2018-02-15 RX ADMIN — INSULIN ASPART 1 UNITS: 100 INJECTION, SOLUTION INTRAVENOUS; SUBCUTANEOUS at 09:02

## 2018-02-15 RX ADMIN — INSULIN DETEMIR 32 UNITS: 100 INJECTION, SOLUTION SUBCUTANEOUS at 09:02

## 2018-02-15 RX ADMIN — Medication 3 ML: at 05:02

## 2018-02-15 RX ADMIN — ACETAMINOPHEN 650 MG: 650 SUPPOSITORY RECTAL at 11:02

## 2018-02-15 RX ADMIN — HEPARIN SODIUM 5000 UNITS: 5000 INJECTION, SOLUTION INTRAVENOUS; SUBCUTANEOUS at 09:02

## 2018-02-15 NOTE — ASSESSMENT & PLAN NOTE
-s/p L M1 thrombectomy  --160  -repeat imaging stable   -ASA, plavix ---> HOLDING PLAVIX in preparation for potential PEG/Trach  -statin   -heparin dvt ppx  -continue modafinil   -PT/OT  -Monitor respiratory status, decision on peg/trach to follow depending on how pt tolerates face mask and if he is able to transition to lower form of oxygen delivery.

## 2018-02-15 NOTE — PROGRESS NOTES
[facility]  Neurocritical Care  Progress Note    Admit Date: 1/25/2018  Service Date: 02/14/2018  Length of Stay: 20    Subjective:     Chief Complaint: Embolic stroke involving left middle cerebral artery    History of Present Illness: The patient is a 47 year old male with no known PMHx admitted to [facility]   s/p thrombectomy of L M1 occlusion. Per chart review, he   walked into [place] and was acting abnormal.  EMS was called and brought to the ED for concern of L MCA syndrome. CT MP revealed short segment occlusion of left M1.  Patient went to IR for thrombectomy of L M1 occlusion seen on CTA MP.  TICI2C reperfusion and angioplasty. Patient admitted to [facility] for close monitoring and higher level of care.   History obtained from chart review only            Hospital Course: 1/25: Pt admitted to [facility] s/p L M1 thrombectomy, TICI2C reperfusion and angioplasty   1/27: large L MCA, hemicrani watch, NSGY following, insulin ggt, cardene ggt, L sided intermittent slowing eeg but no sz, +nasal trumpet for copious thick secretions, wheezing this am - improved with duo nebs, 3% nebs, and cough assist, concern for sepsis 2/2 hemodynamic changes - increased HR and BP, worsening leukocytosis, +ceftriaxone, pan cx, adrian track  1/28: continued respiratory challenges due to secretions. Aggressive pulmonary toileting. Continue monitoring for neuro worsening. Add moxifloxacin.   1/29: CTH to eval for increased edema/shift, EEG afterwards. Concern for decrease exam, no following/decreased alertness). CXR and Procal to follow leukocytosis. Hold TF until eval decreasing EXAM  1/30: neuro exam stable, increase 2%, current amount of sodium iv not suffuicient to meet target 145-155, cont abx for likely pneumonia, repeat ct in am  1/31: abrupt desaturation today requiring emergent intubation followed by bronchoscopy  Etiology likely upper airway obstruction from dried secretions  02/01: stable on vent overnight, marked  improvement on today's chest x ray, switch to spontaneous today, sodium stable at 150, start tfs  02/02: moves left side spontaneously and opens eyes off sedation,  02/03: 2% buffered NS re-started at 20mL/hr for rapid decrease in serum sodium. Enteral free water flushes discontinued. Patient has copious secretions likely pneumonia. Holding dexamethasone  2/4:  No acute events overnight.  Sodium stable.  2/5: transition insulin infusion to subcutaneous insulin   2/7: gen surg consulted for trach/peg  2/9: increased Aspart and Detemir, pending Trach/Peg placement next week.  2/10: No changes overnight . Pending PEG and trach, WBC mildly elevated  12K  2/11: No changes overnight . Pending PEG and trach, WBC mildly elevated  13K. Procal was low and lower than prior. Patient got 16 units of SSI yesterday.  2/13: NAEO.  Pending peg/trach tomorrow. But then self-extubated.  2/14: Tolerating face mask well.  Holding tube feeds.  Trach/peg surgery put on hold pending further evaluation of respiratory status.    Interval History:   No acute events overnight, tolerated face mask well.  Trach/peg canceled today.  Continue to monitor respiratory status and hold TFs.  Afebrile and BP well-controlled.    Review of Systems   Constitutional: Negative for fever.   Respiratory:        On face mask   Neurological: Positive for weakness.   Psychiatric/Behavioral: Positive for confusion.     Unable to obtain a complete ROS due to level of consciousness.  Objective:     Vitals:  Temp: 99.3 °F (37.4 °C)  Pulse: 103  Rhythm: normal sinus rhythm  BP: (!) 147/96  MAP (mmHg): 117  Resp: (!) 21  SpO2: 97 %  Oxygen Concentration (%): 40  O2 Device (Oxygen Therapy): Aerosol Mask    Temp  Min: 98.4 °F (36.9 °C)  Max: 100 °F (37.8 °C)  Pulse  Min: 91  Max: 111  BP  Min: 123/78  Max: 165/107  MAP (mmHg)  Min: 87  Max: 131  Resp  Min: 15  Max: 42  SpO2  Min: 91 %  Max: 100 %  Oxygen Concentration (%)  Min: 35  Max: 98    02/13 0701 - 02/14 0700  In:  870   Out: 1150 [Urine:1150]   Unmeasured Output  Urine Occurrence: 1 (condom cath came off)  Stool Occurrence: 0  Pad Count: 2       Physical Exam   Constitutional: He appears well-developed.   Cardiovascular: Normal rate and regular rhythm.  Exam reveals no friction rub.    No murmur heard.  Pulmonary/Chest:   Very coarse crackles heard throughout b/l lung fields   Abdominal: Soft. Bowel sounds are normal. He exhibits no distension. There is no tenderness.   Musculoskeletal: He exhibits no edema.       --GCS: E2 V1t M6  --Mental Status:  Arouses to tactile stimuli, does NOT follow commands on resident rounds  --Pupils 3mm, PERRL.   --Corneal reflex, gag, cough intact.  --LUE strength: 4/5  --RUE strength: 0/5  --LLE strength: 5/5  --RLE strength: 0/5    Unable to test orientation, language, memory, judgment, insight, fund of knowledge, gait due to level of consciousness.    Medications:  Continuous Scheduled    albuterol-ipratropium 2.5mg-0.5mg/3mL 3 mL Q6H   aspirin 81 mg Daily   atorvastatin 80 mg Daily   heparin (porcine) 5,000 Units Q8H   insulin detemir 32 Units BID   lisinopril 20 mg Daily   metoprolol tartrate 25 mg BID   modafinil 200 mg Daily   And     modafinil 100 mg Daily   polyethylene glycol 17 g Daily   senna-docusate 8.6-50 mg 1 tablet BID   sodium chloride 0.9% 3 mL Q8H   sodium chloride 3% 4 mL Q6H   PRN    acetaminophen 650 mg Q6H PRN   acetaminophen 650 mg Q6H PRN   albuterol-ipratropium 2.5mg-0.5mg/3mL 3 mL Q4H PRN   bisacodyl 10 mg Daily PRN   dextrose 50% 12.5 g PRN   glucagon (human recombinant) 1 mg PRN   hydrALAZINE 10 mg Q4H PRN   insulin aspart 1-10 Units Q4H PRN   labetalol 10 mg Q4H PRN   magnesium sulfate IVPB 2 g PRN   magnesium sulfate IVPB 4 g PRN   ondansetron 4 mg Q8H PRN   potassium chloride 10% 40 mEq PRN   potassium chloride 10% 40 mEq PRN   sodium chloride 0.9% 5 mL PRN     Today I personally reviewed pertinent medications, lines/drains/airways, imaging, cardiology, lab  results,     Diet  Diet NPO  Diet NPO     Recent Results (from the past 24 hour(s))   POCT glucose    Collection Time: 02/13/18  9:06 PM   Result Value Ref Range    POCT Glucose 191 (H) 70 - 110 mg/dL   Comprehensive metabolic panel    Collection Time: 02/14/18  1:29 AM   Result Value Ref Range    Sodium 140 136 - 145 mmol/L    Potassium 4.1 3.5 - 5.1 mmol/L    Chloride 98 95 - 110 mmol/L    CO2 29 23 - 29 mmol/L    Glucose 196 (H) 70 - 110 mg/dL    BUN, Bld 26 (H) 6 - 20 mg/dL    Creatinine 0.9 0.5 - 1.4 mg/dL    Calcium 9.8 8.7 - 10.5 mg/dL    Total Protein 8.3 6.0 - 8.4 g/dL    Albumin 2.7 (L) 3.5 - 5.2 g/dL    Total Bilirubin 0.9 0.1 - 1.0 mg/dL    Alkaline Phosphatase 105 55 - 135 U/L    AST 30 10 - 40 U/L    ALT 25 10 - 44 U/L    Anion Gap 13 8 - 16 mmol/L    eGFR if African American >60.0 >60 mL/min/1.73 m^2    eGFR if non African American >60.0 >60 mL/min/1.73 m^2   CBC auto differential    Collection Time: 02/14/18  1:29 AM   Result Value Ref Range    WBC 15.89 (H) 3.90 - 12.70 K/uL    RBC 4.48 (L) 4.60 - 6.20 M/uL    Hemoglobin 13.2 (L) 14.0 - 18.0 g/dL    Hematocrit 38.9 (L) 40.0 - 54.0 %    MCV 87 82 - 98 fL    MCH 29.5 27.0 - 31.0 pg    MCHC 33.9 32.0 - 36.0 g/dL    RDW 12.4 11.5 - 14.5 %    Platelets 661 (H) 150 - 350 K/uL    MPV 9.2 9.2 - 12.9 fL    Immature Granulocytes 0.4 0.0 - 0.5 %    Gran # (ANC) 12.7 (H) 1.8 - 7.7 K/uL    Immature Grans (Abs) 0.06 (H) 0.00 - 0.04 K/uL    Lymph # 1.8 1.0 - 4.8 K/uL    Mono # 1.2 (H) 0.3 - 1.0 K/uL    Eos # 0.2 0.0 - 0.5 K/uL    Baso # 0.05 0.00 - 0.20 K/uL    nRBC 0 0 /100 WBC    Gran% 79.9 (H) 38.0 - 73.0 %    Lymph% 11.1 (L) 18.0 - 48.0 %    Mono% 7.2 4.0 - 15.0 %    Eosinophil% 1.1 0.0 - 8.0 %    Basophil% 0.3 0.0 - 1.9 %    Differential Method Automated    Magnesium    Collection Time: 02/14/18  1:29 AM   Result Value Ref Range    Magnesium 1.9 1.6 - 2.6 mg/dL   Phosphorus    Collection Time: 02/14/18  1:29 AM   Result Value Ref Range    Phosphorus 3.9 2.7 -  4.5 mg/dL   Protime-INR    Collection Time: 02/14/18  1:29 AM   Result Value Ref Range    Prothrombin Time 10.9 9.0 - 12.5 sec    INR 1.0 0.8 - 1.2   POCT glucose    Collection Time: 02/14/18  9:27 AM   Result Value Ref Range    POCT Glucose 216 (H) 70 - 110 mg/dL   ISTAT PROCEDURE    Collection Time: 02/14/18 10:38 AM   Result Value Ref Range    POC PH 7.484 (H) 7.35 - 7.45    POC PCO2 42.8 35 - 45 mmHg    POC PO2 78 (L) 80 - 100 mmHg    POC HCO3 32.2 (H) 24 - 28 mmol/L    POC BE 9 -2 to 2 mmol/L    POC SATURATED O2 96 95 - 100 %    POC TCO2 33 (H) 23 - 27 mmol/L    Sample ARTERIAL     Site RR     Allens Test Pass     DelSys Aero Mask    POCT glucose    Collection Time: 02/14/18  1:08 PM   Result Value Ref Range    POCT Glucose 178 (H) 70 - 110 mg/dL   POCT glucose    Collection Time: 02/14/18  4:56 PM   Result Value Ref Range    POCT Glucose 175 (H) 70 - 110 mg/dL         Assessment/Plan:     Neuro   * Embolic stroke involving left middle cerebral artery    -s/p L M1 thrombectomy  --160  -repeat imaging stable   -ASA, plavix ---> HOLDING PLAVIX in preparation for potential PEG/Trach  -statin   -heparin dvt ppx  -continue modafinil   -PT/OT  -Monitor respiratory status, decision on peg/trach to follow depending on how pt tolerates face mask and if he is able to transition to lower form of oxygen delivery.              Pulmonary   Acute respiratory failure with hypoxia    -Due to airway obstruction form hardened secretions overlying epiglottis and soft palate, improved after intubation and bronchoscopy  -Self-extubated on 2/13, subsequently placed on face mask which he has been tolerating well.  -Will monitor respiratory status and hold tube feeds an additional 24 hours on face mask before deciding on PEG/trach.        Cardiac/Vascular   Hyperlipidemia    -atorvastatin 80 daily           Essential hypertension    --160  -metoprolol 25 mg bid  -lisinopril 20 mg Qdaily  -PRN Labetalol & Hydralazine    -Echo 1/26:1 - Normal left ventricular systolic function (EF 65-70%).     2 - Concentric remodeling.     3 - No wall motion abnormalities.     4 - Normal left ventricular diastolic function.     5 - Normal right ventricular systolic function .            Oncology   Leukocytosis    -WBC to 15 on 2/14 after self-extubation on 2/13 (tube feeds were running at the time, possible aspiration?)  -Consider aspiration pneumonia coverage/cultures if pt develops fever or if WBC continues to increase.  -In interim, CXR on 2/15 AM        Endocrine   Type 2 diabetes mellitus    -A1C-10  -poorly controlled diabetes  -BG >400 on arrival, s/p insulin gtt  -Detemir/aspart + SSI - holding aspart while off of tube feeds.              Prophylaxis:  Venous Thromboembolism: mechanical chemical  Stress Ulcer: H2B  Ventilator Pneumonia: no     Activity Orders          Activity as tolerated starting at 02/05 1432        Full Code    Raymundo Shanks MD  Neurocritical Care  [facility]

## 2018-02-15 NOTE — SUBJECTIVE & OBJECTIVE
Interval History:   -IR consult for PEG, planned for Monday  -ABD CTA and ng placed preparation for procedure          Review of Systems   Reason unable to perform ROS: aphasic.               Objective:     Vitals:  Temp: 99.8 °F (37.7 °C)  Pulse: (!) 116  Rhythm: sinus tachycardia  BP: (!) 152/103  MAP (mmHg): 118  Resp: 16  SpO2: 96 %  Oxygen Concentration (%): 40  O2 Device (Oxygen Therapy): Aerosol Mask  Vent Mode: Spont  Pressure Support: 5 cmH20  PEEP/CPAP: 5 cmH20  Peak Airway Pressure: 11 cmH2O  Mean Airway Pressure: 10.3 cmH20    Temp  Min: 97.9 °F (36.6 °C)  Max: 100.1 °F (37.8 °C)  Pulse  Min: 91  Max: 116  BP  Min: 125/109  Max: 171/111  MAP (mmHg)  Min: 87  Max: 137  Resp  Min: 7  Max: 30  SpO2  Min: 95 %  Max: 100 %  Oxygen Concentration (%)  Min: 40  Max: 40    02/14 0701 - 02/15 0700  In: 60 [I.V.:60]  Out: 670 [Urine:670]   Unmeasured Output  Urine Occurrence: 1 (condom cath came off)  Stool Occurrence: 0  Pad Count: 2       Physical Exam   Cardiovascular: Normal rate, regular rhythm, normal heart sounds and intact distal pulses.    Pulmonary/Chest: Effort normal and breath sounds normal.   Abdominal: Soft. Bowel sounds are normal.   Neurological:   E2 V1 M5  Open eyes at times to deep pain  Perrla,   Right facial droop  Localize to pain with left, not across midline  Right hemiparesis   Skin: Skin is warm.   Vitals reviewed.        Medications:  Continuous Scheduled  albuterol-ipratropium 2.5mg-0.5mg/3mL 3 mL Q6H   aspirin 81 mg Daily   atorvastatin 80 mg Daily   heparin (porcine) 5,000 Units Q8H   insulin detemir 32 Units BID   lisinopril 20 mg Daily   metoprolol tartrate 25 mg BID   modafinil 200 mg Daily   And     modafinil 100 mg Daily   polyethylene glycol 17 g Daily   senna-docusate 8.6-50 mg 1 tablet BID   sodium chloride 0.9% 3 mL Q8H   sodium chloride 3% 4 mL Q6H   PRN  acetaminophen 650 mg Q6H PRN   acetaminophen 650 mg Q6H PRN   albuterol-ipratropium 2.5mg-0.5mg/3mL 3 mL Q4H PRN    bisacodyl 10 mg Daily PRN   dextrose 50% 12.5 g PRN   glucagon (human recombinant) 1 mg PRN   hydrALAZINE 10 mg Q4H PRN   insulin aspart 1-10 Units Q4H PRN   labetalol 10 mg Q4H PRN   magnesium sulfate IVPB 2 g PRN   magnesium sulfate IVPB 4 g PRN   ondansetron 4 mg Q8H PRN   potassium chloride 10% 40 mEq PRN   potassium chloride 10% 40 mEq PRN   sodium chloride 0.9% 5 mL PRN     Today I personally reviewed pertinent medications, lines/drains/airways, imaging, cardiology, lab results, microbiology results, notably:    Diet  Diet NPO  Diet NPO

## 2018-02-15 NOTE — PLAN OF CARE
Problem: Patient Care Overview  Goal: Plan of Care Review  Outcome: Ongoing (interventions implemented as appropriate)  POC reviewed with pt at 0400. Pt cannot verbalize an understanding. Questions and concerns addressed. No acute events overnight. Pt progressing toward goals. Will continue to monitor. See flowsheets for full assessment and VS info

## 2018-02-15 NOTE — PROGRESS NOTES
Called CT for CT abdomen order- stated have stats on table. Gave spectra link for return call. Will continue to monitor.    1420- Called CT for updated- they stated would call back, have stroke codes and stat on table. Will await for their call.     1445- CT stated pt does not have to be NPO for CT of abdomen. [facility] notified and updated on CT schedule. States ok. Will start TF as per MD order. Will continue to monitor.

## 2018-02-15 NOTE — PROGRESS NOTES
[facility]  General Surgery  Progress Note    Subjective:     History of Present Illness:  No notes on file    Post-Op Info:  Procedure(s) (LRB):  TRACHEOSTOMY (N/A)  PLACEMENT-TUBE-PEG (N/A)         Interval History:   Maintaining saturations on 10 L / 40%  RT and nursing able to keep up with secretions      Medications:  Continuous Infusions:  Scheduled Meds:   albuterol-ipratropium 2.5mg-0.5mg/3mL  3 mL Nebulization Q6H    aspirin  81 mg Per NG tube Daily    atorvastatin  80 mg Per NG tube Daily    heparin (porcine)  5,000 Units Subcutaneous Q8H    insulin detemir  32 Units Subcutaneous BID    lisinopril  20 mg Per NG tube Daily    metoprolol tartrate  25 mg Per NG tube BID    modafinil  200 mg Per NG tube Daily    And    modafinil  100 mg Per NG tube Daily    polyethylene glycol  17 g Per NG tube Daily    senna-docusate 8.6-50 mg  1 tablet Per NG tube BID    sodium chloride 0.9%  3 mL Intravenous Q8H    sodium chloride 3%  4 mL Nebulization Q6H     PRN Meds:acetaminophen, acetaminophen, albuterol-ipratropium 2.5mg-0.5mg/3mL, bisacodyl, dextrose 50%, glucagon (human recombinant), hydrALAZINE, insulin aspart, labetalol, magnesium sulfate IVPB, magnesium sulfate IVPB, ondansetron, potassium chloride 10%, potassium chloride 10%, sodium chloride 0.9%     Review of patient's allergies indicates:  No Known Allergies  Objective:     Vital Signs (Most Recent):  Temp: 98.2 °F (36.8 °C) (02/15/18 0305)  Pulse: 104 (02/15/18 0605)  Resp: (!) 21 (02/15/18 0605)  BP: (!) 158/95 (02/15/18 0605)  SpO2: 98 % (02/15/18 0605) Vital Signs (24h Range):  Temp:  [97.9 °F (36.6 °C)-100.1 °F (37.8 °C)] 98.2 °F (36.8 °C)  Pulse:  [] 104  Resp:  [7-30] 21  SpO2:  [94 %-100 %] 98 %  BP: (125-171)/() 158/95     Weight: 105.6 kg (232 lb 12.9 oz)  Body mass index is 33.4 kg/m².    Intake/Output - Last 3 Shifts       02/13 0700 - 02/14 0659 02/14 0700 - 02/15 0659    I.V. (mL/kg)  55 (0.5)    NG/GT  870     Total Intake(mL/kg) 870 (8.2) 55 (0.5)    Urine (mL/kg/hr) 1150 (0.5) 670 (0.3)    Total Output 1150 670    Net -280 -615                Physical Exam   Cardiovascular: Normal rate and regular rhythm.    Pulmonary/Chest: Effort normal. No respiratory distress. He has rales in the right lower field and the left lower field. He exhibits no tenderness.   Abdominal: Soft. He exhibits distension. There is no tenderness.   Musculoskeletal: He exhibits edema.   Neurological:   Does not follow commands on my exam   Nursing note and vitals reviewed.      Significant Labs:  CBC:   Recent Labs  Lab 02/15/18  0213   WBC 13.10*   RBC 4.58*   HGB 13.2*   HCT 39.6*   *   MCV 87   MCH 28.8   MCHC 33.3     BMP:   Recent Labs  Lab 02/15/18  0213   *      K 4.2   CL 99   CO2 30*   BUN 27*   CREATININE 0.9   CALCIUM 9.9   MG 2.1     CMP:   Recent Labs  Lab 02/15/18  0213   *   CALCIUM 9.9   ALBUMIN 2.7*   PROT 8.3      K 4.2   CO2 30*   CL 99   BUN 27*   CREATININE 0.9   ALKPHOS 110   ALT 28   AST 31   BILITOT 1.0     LFTs:   Recent Labs  Lab 02/15/18  0213   ALT 28   AST 31   ALKPHOS 110   BILITOT 1.0   PROT 8.3   ALBUMIN 2.7*       Significant Diagnostics:  I have reviewed all pertinent imaging results/findings within the past 24 hours.    Assessment/Plan:     * Embolic stroke involving left middle cerebral artery    Todd Quevedo is a 47 y.o. with L MCA stroke      - Recommend IR consult for PEG placement which would not require re-intubation / significant sedation as a surgical procedure would  - Currently extubated and stable from respiratory stand point -- plan to hold off on tracheostomy   - Call with questions / concerns            Boris Osborn MD  General Surgery  [facility]

## 2018-02-15 NOTE — PROGRESS NOTES
Chart for this patient reviewed.  No changes on neurologic exam this am.  Patient was originally scheduled for PEG, trach today but this was cancelled to further evaluate respiratory status prior to committing pt to trach.  He had self-extubated 02/13/18 and has been doing well today on face mask, satting % on 8-10 L.    No new studies or imaging since previous note.  Recommendations at this time to continue ASA 81 mg qd, atorvastatin 80 mg qd and control risk factors for stroke--HTN, DM II.  Continue PT/OT/SLP as patient is able to tolerate.  Decision on placement pending decision on PEG/trach.    Please call for any further questions or concerns.  Will continue to follow patient.    Radha Fernandes MD  PGY2, Neurology  Pager:  [phone]  Spectralink:  [phone]

## 2018-02-15 NOTE — ASSESSMENT & PLAN NOTE
Todd Quevedo is a 47 y.o. with L MCA stroke      - Recommend IR consult for PEG placement which would not require re-intubation / significant sedation as a surgical procedure would  - Currently extubated and stable from respiratory stand point -- plan to hold off on tracheostomy   - Call with questions / concerns

## 2018-02-15 NOTE — PROGRESS NOTES
47 year old male with no known PMHx admitted to [facility]   s/p thrombectomy of L M1 occlusion. Per chart review, he walked into [place] and was acting abnormal.  EMS was called and brought to the ED for concern of L MCA syndrome. CT MP revealed short segment occlusion of left M1.  Patient went to IR for thrombectomy of L M1.  TICI2C reperfusion and angioplasty.     No new studies or imaging since previous note.  Recommendations at this time to continue ASA 81 mg qd, atorvastatin 80 mg qd and control risk factors for stroke--HTN, DM II.  Continue PT/OT/SLP as patient is able to tolerate.  Decision on placement pending decision on PEG/trach.     Please call for any further questions or concerns.  Will continue to follow patient.     Radha Fernandes MD  PGY2, Neurology  Pager:  [phone]  Spectralink:  [phone]

## 2018-02-15 NOTE — PROGRESS NOTES
[facility]  Neurocritical Care  Progress Note    Admit Date: 1/25/2018  Service Date: 02/15/2018  Length of Stay: 21    Subjective:     Chief Complaint: Embolic stroke involving left middle cerebral artery    History of Present Illness: The patient is a 47 year old male with no known PMHx admitted to [facility]   s/p thrombectomy of L M1 occlusion. Per chart review, he   walked into [place] and was acting abnormal.  EMS was called and brought to the ED for concern of L MCA syndrome. CT MP revealed short segment occlusion of left M1.  Patient went to IR for thrombectomy of L M1 occlusion seen on CTA MP.  TICI2C reperfusion and angioplasty. Patient admitted to [facility] for close monitoring and higher level of care.   History obtained from chart review only            Hospital Course: 1/25: Pt admitted to [facility] s/p L M1 thrombectomy, TICI2C reperfusion and angioplasty   1/27: large L MCA, hemicrani watch, NSGY following, insulin ggt, cardene ggt, L sided intermittent slowing eeg but no sz, +nasal trumpet for copious thick secretions, wheezing this am - improved with duo nebs, 3% nebs, and cough assist, concern for sepsis 2/2 hemodynamic changes - increased HR and BP, worsening leukocytosis, +ceftriaxone, pan cx, adrian track  1/28: continued respiratory challenges due to secretions. Aggressive pulmonary toileting. Continue monitoring for neuro worsening. Add moxifloxacin.   1/29: CTH to eval for increased edema/shift, EEG afterwards. Concern for decrease exam, no following/decreased alertness). CXR and Procal to follow leukocytosis. Hold TF until eval decreasing EXAM  1/30: neuro exam stable, increase 2%, current amount of sodium iv not suffuicient to meet target 145-155, cont abx for likely pneumonia, repeat ct in am  1/31: abrupt desaturation today requiring emergent intubation followed by bronchoscopy  Etiology likely upper airway obstruction from dried secretions  02/01: stable on vent overnight, marked  improvement on today's chest x ray, switch to spontaneous today, sodium stable at 150, start tfs  02/02: moves left side spontaneously and opens eyes off sedation,  02/03: 2% buffered NS re-started at 20mL/hr for rapid decrease in serum sodium. Enteral free water flushes discontinued. Patient has copious secretions likely pneumonia. Holding dexamethasone  2/4:  No acute events overnight.  Sodium stable.  2/5: transition insulin infusion to subcutaneous insulin   2/7: gen surg consulted for trach/peg  2/9: increased Aspart and Detemir, pending Trach/Peg placement next week.  2/10: No changes overnight . Pending PEG and trach, WBC mildly elevated  12K  2/11: No changes overnight . Pending PEG and trach, WBC mildly elevated  13K. Procal was low and lower than prior. Patient got 16 units of SSI yesterday.  2/13: NAEO.  Pending peg/trach tomorrow. But then self-extubated.  2/14: Tolerating face mask well.  Holding tube feeds.   2/15: IR consult placed, for PEG. ABD ct and NG placed in preparation for procedure.    Interval History:   -IR consult for PEG, planned for Monday  -ABD CTA and ng placed preparation for procedure          Review of Systems   Reason unable to perform ROS: aphasic.               Objective:     Vitals:  Temp: 99.8 °F (37.7 °C)  Pulse: (!) 116  Rhythm: sinus tachycardia  BP: (!) 152/103  MAP (mmHg): 118  Resp: 16  SpO2: 96 %  Oxygen Concentration (%): 40  O2 Device (Oxygen Therapy): Aerosol Mask  Vent Mode: Spont  Pressure Support: 5 cmH20  PEEP/CPAP: 5 cmH20  Peak Airway Pressure: 11 cmH2O  Mean Airway Pressure: 10.3 cmH20    Temp  Min: 97.9 °F (36.6 °C)  Max: 100.1 °F (37.8 °C)  Pulse  Min: 91  Max: 116  BP  Min: 125/109  Max: 171/111  MAP (mmHg)  Min: 87  Max: 137  Resp  Min: 7  Max: 30  SpO2  Min: 95 %  Max: 100 %  Oxygen Concentration (%)  Min: 40  Max: 40    02/14 0701 - 02/15 0700  In: 60 [I.V.:60]  Out: 670 [Urine:670]   Unmeasured Output  Urine Occurrence: 1 (condom cath came off)  Stool  Occurrence: 0  Pad Count: 2       Physical Exam   Cardiovascular: Normal rate, regular rhythm, normal heart sounds and intact distal pulses.    Pulmonary/Chest: Effort normal and breath sounds normal.   Abdominal: Soft. Bowel sounds are normal.   Neurological:   E2 V1 M5  Open eyes at times to deep pain  Perrla,   Right facial droop  Localize to pain with left, not across midline  Right hemiparesis   Skin: Skin is warm.   Vitals reviewed.        Medications:  Continuous Scheduled  albuterol-ipratropium 2.5mg-0.5mg/3mL 3 mL Q6H   aspirin 81 mg Daily   atorvastatin 80 mg Daily   heparin (porcine) 5,000 Units Q8H   insulin detemir 32 Units BID   lisinopril 20 mg Daily   metoprolol tartrate 25 mg BID   modafinil 200 mg Daily   And     modafinil 100 mg Daily   polyethylene glycol 17 g Daily   senna-docusate 8.6-50 mg 1 tablet BID   sodium chloride 0.9% 3 mL Q8H   sodium chloride 3% 4 mL Q6H   PRN  acetaminophen 650 mg Q6H PRN   acetaminophen 650 mg Q6H PRN   albuterol-ipratropium 2.5mg-0.5mg/3mL 3 mL Q4H PRN   bisacodyl 10 mg Daily PRN   dextrose 50% 12.5 g PRN   glucagon (human recombinant) 1 mg PRN   hydrALAZINE 10 mg Q4H PRN   insulin aspart 1-10 Units Q4H PRN   labetalol 10 mg Q4H PRN   magnesium sulfate IVPB 2 g PRN   magnesium sulfate IVPB 4 g PRN   ondansetron 4 mg Q8H PRN   potassium chloride 10% 40 mEq PRN   potassium chloride 10% 40 mEq PRN   sodium chloride 0.9% 5 mL PRN     Today I personally reviewed pertinent medications, lines/drains/airways, imaging, cardiology, lab results, microbiology results, notably:    Diet  Diet NPO  Diet NPO        Assessment/Plan:     Neuro   * Embolic stroke involving left middle cerebral artery    -s/p L M1 thrombectomy  --160  -repeat imaging stable   -ASA, plavix ---> HOLDING PLAVIX in preparation for potential PEG  -statin   -heparin dvt ppx  -continue modafinil, consider increase to 200 bid  -PT/OT                Pulmonary   Acute respiratory failure with hypoxia     -Due to airway obstruction form hardened secretions overlying epiglottis and soft palate, improved after intubation and bronchoscopy  -Self-extubated on 2/13, subsequently placed on face mask which he has been tolerating well.          Cardiac/Vascular   Hyperlipidemia    -atorvastatin 80 daily           Oncology   Leukocytosis    -WBC to 15 on 2/14 after self-extubation on 2/13 (tube feeds were running at the time, possible aspiration?)  -Consider aspiration pneumonia coverage/cultures if pt develops fever or if WBC continues to increase.  -CXR on 2/15 AM, no acute changes. Leukocytosis improving        Endocrine   Type 2 diabetes mellitus    -A1C-10  -poorly controlled diabetes  -BG >400 on arrival, s/p insulin gtt  -Detemir/aspart + SSI -starting scheduled aspart with restart of tube feeds            Prophylaxis:  Venous Thromboembolism: mechanical chemical  Stress Ulcer: None  Ventilator Pneumonia: not applicable     Activity Orders          Activity as tolerated starting at 02/05 1432        Full Code   Level 3  I spent >35 minutes reviewing patient records, examining, and counseling the patient with greater than 50% of the time spent with direct patient care and coordination.       Scar Bear NP  Neurocritical Care  [facility]

## 2018-02-15 NOTE — ASSESSMENT & PLAN NOTE
-s/p L M1 thrombectomy  --160  -repeat imaging stable   -ASA, plavix ---> HOLDING PLAVIX in preparation for potential PEG  -statin   -heparin dvt ppx  -continue modafinil, consider increase to 200 bid  -PT/OT

## 2018-02-15 NOTE — PLAN OF CARE
VLAD received a phone [facility] from [facility] [place] [phone].  Per Ceci, the patient is not service connected, so he is not entitled to SNF placement or rehab placement.  Per Ceci, they can assist with DME and home health only.  Ceci suggested that the patient's wife apply for [place] Medicaid or [facility] apply for [place] medicaid.     VLAD spoke with Brunilda in the [facility] Medicaid Dept  [id number].   Per Brunilda, [facility] does not apply for [place] Medicaid and patient will not qualify for [place] Medicaid without being a resident of [place].    VLAD phoned patient's wife,  Victoriano Quevedo,  [phone].  Per Mrs Quevedo, her sister in law is assisting her on applying for [place] Medicaid, but she has not completed the process.  Per patient's wife, [facility] is also appealing with [facility].  CM advised wife to apply for Medicaid now so that the patient can receive the rehab care needed. Wife stated she will speak with her sister in law.     Delaney Mark RN, CCRN-K, [facility]  Neuro-Critical Care   [phone]

## 2018-02-15 NOTE — PROGRESS NOTES
Pt transported to CT on continuous monitoring and venti mask 14 L, 55% by RN and PCT. Ambu bag accompanied pt. Pt tolerated well. 1740-Pt transported back to room 7075 and placed back on bedside monitoring and 10 L 40% aerosol mask. VSS. Wife at bedside. Will continue to monitor.

## 2018-02-15 NOTE — ASSESSMENT & PLAN NOTE
-WBC to 15 on 2/14 after self-extubation on 2/13 (tube feeds were running at the time, possible aspiration?)  -Consider aspiration pneumonia coverage/cultures if pt develops fever or if WBC continues to increase.  -CXR on 2/15 AM, no acute changes. Leukocytosis improving

## 2018-02-15 NOTE — PLAN OF CARE
Problem: Patient Care Overview  Goal: Plan of Care Review  Outcome: Ongoing (interventions implemented as appropriate)  POC reviewed with pt and family at 1700. Pt unable to verbalize understanding. Pt wife at bedside and verbalized understanding. Questions and concerns addressed. Pt progressing toward goals. Will continue to monitor. See flowsheets for full assessment and VS info.

## 2018-02-15 NOTE — PT/OT/SLP PROGRESS
"Occupational Therapy   Treatment    Name: Todd Quevedo  MRN: [id number]  Admitting Diagnosis:  Embolic stroke involving left middle cerebral artery       Recommendations:     Discharge Recommendations:  (LTAC vs long term SNF, pending medical status)  Discharge Equipment Recommendations:  hospital bed  Barriers to discharge:  Inaccessible home environment, Decreased caregiver support    Subjective   Patient:  Nonverbal  Sister:  "My dad had a stroke so I am familiar with some of this."  He is heavy and probably the strongest may you'll ever meet."  Communicated with: Nurse prior to session.  Pain/Comfort:  · Pain Rating 1: 0/10  · Pain Rating Post-Intervention 1: 0/10    Patients cultural, spiritual, Caodaism conflicts given the current situation: Christianity    Objective:     Patient found with: peripheral IV, Condom Catheter, ventilator, SCD, telemetry, blood pressure cuff, pulse ox (continuous), restraints, pressure relief boots; oxygen  Sister present.  General Precautions: Standard, aspiration, fall, NPO, aphasia   Orthopedic Precautions:N/A   Braces: N/A     Occupational Performance:    Bed Mobility:    · Patient completed Rolling/Turning to Left with  total assistance  · Patient completed Rolling/Turning to Right with total assistance  · Patient completed Scooting/Bridging with total assistance  · Patient completed Supine to Sit with total assistance  · Patient completed Sit to Supine with total assistance   Total assist x 2 required with bed mobility    Functional Mobility/Transfers:  · Dependent drawsheet transfers    Activities of Daily Living:  · Feeding:  NPO    · Grooming: total assistance while seated EOB and while supine    Patient left supine with all lines intact, call button in reach and restraints reapplied at end of session    AMPA 6 Click:  [facility] Total Score: 6    Treatment & Education:  Patient education provided on role of OT, ROM, positioning, bed mobility and postural control.  Continued " education, patient/ family training recommended. Daily orientation provided.  PROM performed right UE/LEs and AAROM left UE/LE one set x 10 rep in all planes of motion with stretches provided at end range; sustained stretch provided for right ankle dorsiflexion.  Assistance and facilitation provided for upward rotation of the scapula during shoulder flexion and abduction.  Patient kept eyes closed 100% of the session.  Total assist required with postural control while seated EOB.  Patient unable to follow commands.  Positioning provided for midline orientation with bilateral UEs elevated and heels lifted off mattress.  Gentle cervical rotation provided.    White board updated in patient's room.  OT asked if there were any other questions; patient/ family had no further questions.  Education:    Assessment:     Todd Quevedo is a 47 y.o. male with a medical diagnosis of Embolic stroke involving left middle cerebral artery.  He presents with performance deficits of physical skills including impaired respiration, balance, mobility, strength, dexterity, fine motor coordination, gross motor coordination, sensation and endurance; demonstrating performance deficits of cognitive skills including impaired attention, perception, understanding, problem solving, sequencing and memory all resulting in inability organizing occupational performance in a timely and safe manner; demonstrating performance deficits of psychosocial skills including impairments of interpersonal interactions and coping strategies which are skills necessary to successfully and appropriately participate in everyday tasks and social situations.  These performance deficits have resulted in activity limitations including but not limited to:  bed mobility, transfers, ascending/ descending stairs, walking short and long distances, walking around obstacles, transitional movement patterns (kneeling, bending); eating, upper body dressing, lower body dressing,  brushing teeth, toileting, bathing, carrying objects, meal preparation, and working ().  Patient's role as , father, ,   and independent caretaker for self has been affected. Patient will benefit from skilled OT services to maximize level of independence with self-care skills and functional mobility.       Rehab Prognosis:  Good; patient would benefit from acute skilled OT services to address these deficits and reach maximum level of function.                   Plan:     Patient to be seen 3 x/week to address the above listed problems via self-care/home management, therapeutic exercises, therapeutic activities, neuromuscular re-education, sensory integration, cognitive retraining  · Plan of Care Expires: 02/23/18  · Plan of Care Reviewed with: patient, sibling    This Plan of care has been discussed with the patient who was involved in its development and understands and is in agreement with the identified goals and treatment plan    GOALS:    Occupational Therapy Goals        Problem: Occupational Therapy Goal    Goal Priority Disciplines Outcome Interventions   Occupational Therapy Goal     OT, PT/OT Ongoing (interventions implemented as appropriate)    Description:  Goals set 2/9 to be addressed for 14 days with expiration date, 2/23:  Patient will increase functional independence with ADLs by performing:    Patient will demonstrate rolling to the right with mod assist.  Not met   Patient will demonstrate rolling to the left with max assist.   Not met  Patient will demonstrate supine -sit with max assist.   Not met  Patient will demonstrate squat pivot transfers with max assist.   Not met  Patient will demonstrate grooming while seated with max assist.   Not met  Patient will demonstrate upper body dressing with max assist while seated EOB.   Not met  Patient will demonstrate lower body dressing with max assist while seated EOB.   Not met  Patient  will demonstrate toileting with max assist.   Not met  Patient will demonstrate ability to follow 3/5 commands.   Not met  Patient will demonstrate independence with HEP for right UE positioning.    Not met  Patient's family / caregiver will demonstrate independence and safety with assisting patient with self-care skills and functional mobility.     Not met  Patient's family / caregiver will demonstrate independence with providing ROM and changes in bed positioning.   Not met                        Time Tracking:     OT Date of Treatment: 02/15/18  OT Start Time: 0541  OT Stop Time: 0610  OT Total Time (min): 29 min    Billable Minutes:Therapeutic Activity 9  Neuromuscular Re-education 30    ISAMAR Almonte  2/15/2018

## 2018-02-15 NOTE — PROGRESS NOTES
[facility] of pt HR sustaining 123-126. BP and O2 stable. No new orders received at this time. Will continue to monitor.

## 2018-02-15 NOTE — ASSESSMENT & PLAN NOTE
-Due to airway obstruction form hardened secretions overlying epiglottis and soft palate, improved after intubation and bronchoscopy  -Self-extubated on 2/13, subsequently placed on face mask which he has been tolerating well.  -Will monitor respiratory status and hold tube feeds an additional 24 hours on face mask before deciding on PEG/trach.

## 2018-02-15 NOTE — PLAN OF CARE
02/15/18 1207   Discharge Reassessment   Assessment Type Discharge Planning Reassessment   Provided patient/caregiver education on the expected discharge date and the discharge plan No   Do you have any problems affording any of your prescribed medications? No   Discharge Plan A Skilled Nursing Facility   Discharge Plan B Rehab   Patient choice form signed by patient/caregiver N/A   Can the patient answer the patient profile reliably? No, cognitively impaired   How does the patient rate their overall health at the present time? (ty)   Describe the patient's ability to walk at the present time. Does not walk or unable to take any steps at all   How often would a person be available to care for the patient? Occasionally   Number of comorbid conditions (as recorded on the chart) Two   During the past month, has the patient often been bothered by feeling down, depressed or hopeless? (ty)   During the past month, has the patient often been bothered by little interest or pleasure in doing things? (ty)         Patient self extubated 02/13/18  Gastrostomy tube placement scheduled for 2/19/2018  Not medically stable for discharge.     [facility]  [phone].  Per Ceci, once discharge plan is known, a Discharge Worksheet Packet must be completed and faxed to [phone] along with a consent to transfer signed by the patent or next of kin.  Per Ceci, there are rehab facilities in [place] and Zainab Mark RN, CCRN-K, [facility]  Neuro-Critical Care   [phone]

## 2018-02-15 NOTE — SUBJECTIVE & OBJECTIVE
Interval History:   No acute events overnight, tolerated face mask well.  Trach/peg canceled today.  Continue to monitor respiratory status and hold TFs.  Afebrile and BP well-controlled.    Review of Systems   Constitutional: Negative for fever.   Respiratory:        On face mask   Neurological: Positive for weakness.   Psychiatric/Behavioral: Positive for confusion.     Unable to obtain a complete ROS due to level of consciousness.  Objective:     Vitals:  Temp: 99.3 °F (37.4 °C)  Pulse: 103  Rhythm: normal sinus rhythm  BP: (!) 147/96  MAP (mmHg): 117  Resp: (!) 21  SpO2: 97 %  Oxygen Concentration (%): 40  O2 Device (Oxygen Therapy): Aerosol Mask    Temp  Min: 98.4 °F (36.9 °C)  Max: 100 °F (37.8 °C)  Pulse  Min: 91  Max: 111  BP  Min: 123/78  Max: 165/107  MAP (mmHg)  Min: 87  Max: 131  Resp  Min: 15  Max: 42  SpO2  Min: 91 %  Max: 100 %  Oxygen Concentration (%)  Min: 35  Max: 98    02/13 0701 - 02/14 0700  In: 870   Out: 1150 [Urine:1150]   Unmeasured Output  Urine Occurrence: 1 (condom cath came off)  Stool Occurrence: 0  Pad Count: 2       Physical Exam   Constitutional: He appears well-developed.   Cardiovascular: Normal rate and regular rhythm.  Exam reveals no friction rub.    No murmur heard.  Pulmonary/Chest:   Very coarse crackles heard throughout b/l lung fields   Abdominal: Soft. Bowel sounds are normal. He exhibits no distension. There is no tenderness.   Musculoskeletal: He exhibits no edema.       --GCS: E2 V1t M6  --Mental Status:  Arouses to tactile stimuli, does NOT follow commands on resident rounds  --Pupils 3mm, PERRL.   --Corneal reflex, gag, cough intact.  --LUE strength: 4/5  --RUE strength: 0/5  --LLE strength: 5/5  --RLE strength: 0/5    Unable to test orientation, language, memory, judgment, insight, fund of knowledge, gait due to level of consciousness.    Medications:  Continuous Scheduled    albuterol-ipratropium 2.5mg-0.5mg/3mL 3 mL Q6H   aspirin 81 mg Daily   atorvastatin 80 mg  Daily   heparin (porcine) 5,000 Units Q8H   insulin detemir 32 Units BID   lisinopril 20 mg Daily   metoprolol tartrate 25 mg BID   modafinil 200 mg Daily   And     modafinil 100 mg Daily   polyethylene glycol 17 g Daily   senna-docusate 8.6-50 mg 1 tablet BID   sodium chloride 0.9% 3 mL Q8H   sodium chloride 3% 4 mL Q6H   PRN    acetaminophen 650 mg Q6H PRN   acetaminophen 650 mg Q6H PRN   albuterol-ipratropium 2.5mg-0.5mg/3mL 3 mL Q4H PRN   bisacodyl 10 mg Daily PRN   dextrose 50% 12.5 g PRN   glucagon (human recombinant) 1 mg PRN   hydrALAZINE 10 mg Q4H PRN   insulin aspart 1-10 Units Q4H PRN   labetalol 10 mg Q4H PRN   magnesium sulfate IVPB 2 g PRN   magnesium sulfate IVPB 4 g PRN   ondansetron 4 mg Q8H PRN   potassium chloride 10% 40 mEq PRN   potassium chloride 10% 40 mEq PRN   sodium chloride 0.9% 5 mL PRN     Today I personally reviewed pertinent medications, lines/drains/airways, imaging, cardiology, lab results,     Diet  Diet NPO  Diet NPO     Recent Results (from the past 24 hour(s))   POCT glucose    Collection Time: 02/13/18  9:06 PM   Result Value Ref Range    POCT Glucose 191 (H) 70 - 110 mg/dL   Comprehensive metabolic panel    Collection Time: 02/14/18  1:29 AM   Result Value Ref Range    Sodium 140 136 - 145 mmol/L    Potassium 4.1 3.5 - 5.1 mmol/L    Chloride 98 95 - 110 mmol/L    CO2 29 23 - 29 mmol/L    Glucose 196 (H) 70 - 110 mg/dL    BUN, Bld 26 (H) 6 - 20 mg/dL    Creatinine 0.9 0.5 - 1.4 mg/dL    Calcium 9.8 8.7 - 10.5 mg/dL    Total Protein 8.3 6.0 - 8.4 g/dL    Albumin 2.7 (L) 3.5 - 5.2 g/dL    Total Bilirubin 0.9 0.1 - 1.0 mg/dL    Alkaline Phosphatase 105 55 - 135 U/L    AST 30 10 - 40 U/L    ALT 25 10 - 44 U/L    Anion Gap 13 8 - 16 mmol/L    eGFR if African American >60.0 >60 mL/min/1.73 m^2    eGFR if non African American >60.0 >60 mL/min/1.73 m^2   CBC auto differential    Collection Time: 02/14/18  1:29 AM   Result Value Ref Range    WBC 15.89 (H) 3.90 - 12.70 K/uL    RBC 4.48  (L) 4.60 - 6.20 M/uL    Hemoglobin 13.2 (L) 14.0 - 18.0 g/dL    Hematocrit 38.9 (L) 40.0 - 54.0 %    MCV 87 82 - 98 fL    MCH 29.5 27.0 - 31.0 pg    MCHC 33.9 32.0 - 36.0 g/dL    RDW 12.4 11.5 - 14.5 %    Platelets 661 (H) 150 - 350 K/uL    MPV 9.2 9.2 - 12.9 fL    Immature Granulocytes 0.4 0.0 - 0.5 %    Gran # (ANC) 12.7 (H) 1.8 - 7.7 K/uL    Immature Grans (Abs) 0.06 (H) 0.00 - 0.04 K/uL    Lymph # 1.8 1.0 - 4.8 K/uL    Mono # 1.2 (H) 0.3 - 1.0 K/uL    Eos # 0.2 0.0 - 0.5 K/uL    Baso # 0.05 0.00 - 0.20 K/uL    nRBC 0 0 /100 WBC    Gran% 79.9 (H) 38.0 - 73.0 %    Lymph% 11.1 (L) 18.0 - 48.0 %    Mono% 7.2 4.0 - 15.0 %    Eosinophil% 1.1 0.0 - 8.0 %    Basophil% 0.3 0.0 - 1.9 %    Differential Method Automated    Magnesium    Collection Time: 02/14/18  1:29 AM   Result Value Ref Range    Magnesium 1.9 1.6 - 2.6 mg/dL   Phosphorus    Collection Time: 02/14/18  1:29 AM   Result Value Ref Range    Phosphorus 3.9 2.7 - 4.5 mg/dL   Protime-INR    Collection Time: 02/14/18  1:29 AM   Result Value Ref Range    Prothrombin Time 10.9 9.0 - 12.5 sec    INR 1.0 0.8 - 1.2   POCT glucose    Collection Time: 02/14/18  9:27 AM   Result Value Ref Range    POCT Glucose 216 (H) 70 - 110 mg/dL   ISTAT PROCEDURE    Collection Time: 02/14/18 10:38 AM   Result Value Ref Range    POC PH 7.484 (H) 7.35 - 7.45    POC PCO2 42.8 35 - 45 mmHg    POC PO2 78 (L) 80 - 100 mmHg    POC HCO3 32.2 (H) 24 - 28 mmol/L    POC BE 9 -2 to 2 mmol/L    POC SATURATED O2 96 95 - 100 %    POC TCO2 33 (H) 23 - 27 mmol/L    Sample ARTERIAL     Site RR     Allens Test Pass     DelSys Aero Mask    POCT glucose    Collection Time: 02/14/18  1:08 PM   Result Value Ref Range    POCT Glucose 178 (H) 70 - 110 mg/dL   POCT glucose    Collection Time: 02/14/18  4:56 PM   Result Value Ref Range    POCT Glucose 175 (H) 70 - 110 mg/dL

## 2018-02-15 NOTE — ASSESSMENT & PLAN NOTE
-WBC to 15 on 2/14 after self-extubation on 2/13 (tube feeds were running at the time, possible aspiration?)  -Consider aspiration pneumonia coverage/cultures if pt develops fever or if WBC continues to increase.  -In interim, CXR on 2/15 AM

## 2018-02-15 NOTE — ASSESSMENT & PLAN NOTE
-Due to airway obstruction form hardened secretions overlying epiglottis and soft palate, improved after intubation and bronchoscopy  -Self-extubated on 2/13, subsequently placed on face mask which he has been tolerating well.

## 2018-02-15 NOTE — ASSESSMENT & PLAN NOTE
-A1C-10  -poorly controlled diabetes  -BG >400 on arrival, s/p insulin gtt  -Detemir/aspart + SSI -starting scheduled aspart with restart of tube feeds

## 2018-02-15 NOTE — SUBJECTIVE & OBJECTIVE
Interval History:   Maintaining saturations on 10 L / 40%  RT and nursing able to keep up with secretions      Medications:  Continuous Infusions:  Scheduled Meds:   albuterol-ipratropium 2.5mg-0.5mg/3mL  3 mL Nebulization Q6H    aspirin  81 mg Per NG tube Daily    atorvastatin  80 mg Per NG tube Daily    heparin (porcine)  5,000 Units Subcutaneous Q8H    insulin detemir  32 Units Subcutaneous BID    lisinopril  20 mg Per NG tube Daily    metoprolol tartrate  25 mg Per NG tube BID    modafinil  200 mg Per NG tube Daily    And    modafinil  100 mg Per NG tube Daily    polyethylene glycol  17 g Per NG tube Daily    senna-docusate 8.6-50 mg  1 tablet Per NG tube BID    sodium chloride 0.9%  3 mL Intravenous Q8H    sodium chloride 3%  4 mL Nebulization Q6H     PRN Meds:acetaminophen, acetaminophen, albuterol-ipratropium 2.5mg-0.5mg/3mL, bisacodyl, dextrose 50%, glucagon (human recombinant), hydrALAZINE, insulin aspart, labetalol, magnesium sulfate IVPB, magnesium sulfate IVPB, ondansetron, potassium chloride 10%, potassium chloride 10%, sodium chloride 0.9%     Review of patient's allergies indicates:  No Known Allergies  Objective:     Vital Signs (Most Recent):  Temp: 98.2 °F (36.8 °C) (02/15/18 0305)  Pulse: 104 (02/15/18 0605)  Resp: (!) 21 (02/15/18 0605)  BP: (!) 158/95 (02/15/18 0605)  SpO2: 98 % (02/15/18 0605) Vital Signs (24h Range):  Temp:  [97.9 °F (36.6 °C)-100.1 °F (37.8 °C)] 98.2 °F (36.8 °C)  Pulse:  [] 104  Resp:  [7-30] 21  SpO2:  [94 %-100 %] 98 %  BP: (125-171)/() 158/95     Weight: 105.6 kg (232 lb 12.9 oz)  Body mass index is 33.4 kg/m².    Intake/Output - Last 3 Shifts       02/13 0700 - 02/14 0659 02/14 0700 - 02/15 0659    I.V. (mL/kg)  55 (0.5)    NG/     Total Intake(mL/kg) 870 (8.2) 55 (0.5)    Urine (mL/kg/hr) 1150 (0.5) 670 (0.3)    Total Output 1150 670    Net -280 -615                Physical Exam   Cardiovascular: Normal rate and regular rhythm.     Pulmonary/Chest: Effort normal. No respiratory distress. He has rales in the right lower field and the left lower field. He exhibits no tenderness.   Abdominal: Soft. He exhibits distension. There is no tenderness.   Musculoskeletal: He exhibits edema.   Neurological:   Does not follow commands on my exam   Nursing note and vitals reviewed.      Significant Labs:  CBC:   Recent Labs  Lab 02/15/18  0213   WBC 13.10*   RBC 4.58*   HGB 13.2*   HCT 39.6*   *   MCV 87   MCH 28.8   MCHC 33.3     BMP:   Recent Labs  Lab 02/15/18  0213   *      K 4.2   CL 99   CO2 30*   BUN 27*   CREATININE 0.9   CALCIUM 9.9   MG 2.1     CMP:   Recent Labs  Lab 02/15/18  0213   *   CALCIUM 9.9   ALBUMIN 2.7*   PROT 8.3      K 4.2   CO2 30*   CL 99   BUN 27*   CREATININE 0.9   ALKPHOS 110   ALT 28   AST 31   BILITOT 1.0     LFTs:   Recent Labs  Lab 02/15/18  0213   ALT 28   AST 31   ALKPHOS 110   BILITOT 1.0   PROT 8.3   ALBUMIN 2.7*       Significant Diagnostics:  I have reviewed all pertinent imaging results/findings within the past 24 hours.

## 2018-02-15 NOTE — PLAN OF CARE
SW is following this Pt for DC planning needs. There are no identified needs at this time. Pt was extubated, so once therapy is appropriate and has recs, SW and CM will [facility] help with [facility].    Kristin Sal LMSW  Neurocritical Care   [facility]  [phone]

## 2018-02-15 NOTE — CONSULTS
Radiology Consult    Todd Quevedo is a 47 y.o. male with a history of L MCA stroke and dysphagia. IR consulted for gastrostomy tube placement.     History reviewed. No pertinent past medical history.  History reviewed. No pertinent surgical history.    Discussed with primary team.    Imaging reviewed with Radiology staff, Dr. Quezada.     Procedure: Gastrostomy tube placement    Scheduled Meds:    albuterol-ipratropium 2.5mg-0.5mg/3mL  3 mL Nebulization Q6H    aspirin  81 mg Per NG tube Daily    atorvastatin  80 mg Per NG tube Daily    heparin (porcine)  5,000 Units Subcutaneous Q8H    insulin detemir  32 Units Subcutaneous BID    lisinopril  20 mg Per NG tube Daily    metoprolol tartrate  25 mg Per NG tube BID    modafinil  200 mg Per NG tube Daily    And    modafinil  100 mg Per NG tube Daily    polyethylene glycol  17 g Per NG tube Daily    senna-docusate 8.6-50 mg  1 tablet Per NG tube BID    sodium chloride 0.9%  3 mL Intravenous Q8H    sodium chloride 3%  4 mL Nebulization Q6H     Continuous Infusions:   PRN Meds:acetaminophen, acetaminophen, albuterol-ipratropium 2.5mg-0.5mg/3mL, bisacodyl, dextrose 50%, glucagon (human recombinant), hydrALAZINE, insulin aspart, labetalol, magnesium sulfate IVPB, magnesium sulfate IVPB, ondansetron, potassium chloride 10%, potassium chloride 10%, sodium chloride 0.9%    Allergies: Review of patient's allergies indicates:  No Known Allergies    Labs:    Recent Labs  Lab 02/15/18  0213   INR 1.0       Recent Labs  Lab 02/15/18  0213   WBC 13.10*   HGB 13.2*   HCT 39.6*   MCV 87   *      Recent Labs  Lab 02/15/18  0213   *      K 4.2   CL 99   CO2 30*   BUN 27*   CREATININE 0.9   CALCIUM 9.9   MG 2.1   ALT 28   AST 31   ALBUMIN 2.7*   BILITOT 1.0         Vitals (Most Recent):  Temp: 98.3 °F (36.8 °C) (02/15/18 0705)  Pulse: (!) 113 (02/15/18 1000)  Resp: 20 (02/15/18 1000)  BP: (!) 161/90 (02/15/18 1000)  SpO2: 96 % (02/15/18 1000)    Plan:   1.  Gastrostomy tube placement scheduled for 2/19/2018  2. Obtain CT abd/pelvis prior to procedure.  3. Please keep NG tube in place.   4. Discontinue anti-coagulation as appropriate.  5. Make NPO @ midnight 2/18/2018.    Raymundo Mason MD  PGY-III  Dept of Radiology   Pager: [phone]

## 2018-02-15 NOTE — PLAN OF CARE
ACS note    Patient remains extubated at this time. Stable since self-extubating, satting high 90's on venti mask, with frequent suctioning. If patient continues to be stable and is still in need of PEG would recommend PEG placement by IR, as surgical PEG placement would risk re-intubation. Surgery will sign off at this time, please call for questions.    Florecita Gutierrez PA-C  [id number]

## 2018-02-15 NOTE — ASSESSMENT & PLAN NOTE
-A1C-10  -poorly controlled diabetes  -BG >400 on arrival, s/p insulin gtt  -Detemir/aspart + SSI - holding aspart while off of tube feeds.

## 2018-02-16 PROBLEM — G93.5 BRAIN COMPRESSION: Status: ACTIVE | Noted: 2018-02-16

## 2018-02-16 PROBLEM — R91.1 NODULE OF RIGHT LUNG: Status: ACTIVE | Noted: 2018-02-16

## 2018-02-16 LAB
ALBUMIN SERPL BCP-MCNC: 2.7 G/DL
ALP SERPL-CCNC: 151 U/L
ALT SERPL W/O P-5'-P-CCNC: 42 U/L
ANION GAP SERPL CALC-SCNC: 11 MMOL/L
AST SERPL-CCNC: 41 U/L
BASOPHILS # BLD AUTO: 0.06 K/UL
BASOPHILS NFR BLD: 0.4 %
BILIRUB SERPL-MCNC: 1.1 MG/DL
BUN SERPL-MCNC: 30 MG/DL
CALCIUM SERPL-MCNC: 10.1 MG/DL
CHLORIDE SERPL-SCNC: 101 MMOL/L
CO2 SERPL-SCNC: 29 MMOL/L
CREAT SERPL-MCNC: 1.1 MG/DL
DIFFERENTIAL METHOD: ABNORMAL
EOSINOPHIL # BLD AUTO: 0.1 K/UL
EOSINOPHIL NFR BLD: 0.3 %
ERYTHROCYTE [DISTWIDTH] IN BLOOD BY AUTOMATED COUNT: 12.3 %
EST. GFR  (AFRICAN AMERICAN): >60 ML/MIN/1.73 M^2
EST. GFR  (NON AFRICAN AMERICAN): >60 ML/MIN/1.73 M^2
GLUCOSE SERPL-MCNC: 224 MG/DL
HCT VFR BLD AUTO: 40.9 %
HGB BLD-MCNC: 13.6 G/DL
IMM GRANULOCYTES # BLD AUTO: 0.09 K/UL
IMM GRANULOCYTES NFR BLD AUTO: 0.5 %
INR PPP: 1.1
LYMPHOCYTES # BLD AUTO: 2 K/UL
LYMPHOCYTES NFR BLD: 12.2 %
MAGNESIUM SERPL-MCNC: 2.1 MG/DL
MCH RBC QN AUTO: 29.4 PG
MCHC RBC AUTO-ENTMCNC: 33.3 G/DL
MCV RBC AUTO: 89 FL
MONOCYTES # BLD AUTO: 1.4 K/UL
MONOCYTES NFR BLD: 8.1 %
NEUTROPHILS # BLD AUTO: 13 K/UL
NEUTROPHILS NFR BLD: 78.5 %
NRBC BLD-RTO: 0 /100 WBC
PHOSPHATE SERPL-MCNC: 4 MG/DL
PLATELET # BLD AUTO: 685 K/UL
PMV BLD AUTO: 9.6 FL
POCT GLUCOSE: 161 MG/DL (ref 70–110)
POCT GLUCOSE: 188 MG/DL (ref 70–110)
POCT GLUCOSE: 192 MG/DL (ref 70–110)
POCT GLUCOSE: 192 MG/DL (ref 70–110)
POCT GLUCOSE: 206 MG/DL (ref 70–110)
POCT GLUCOSE: 222 MG/DL (ref 70–110)
POTASSIUM SERPL-SCNC: 3.9 MMOL/L
PROT SERPL-MCNC: 8.3 G/DL
PROTHROMBIN TIME: 10.9 SEC
RBC # BLD AUTO: 4.62 M/UL
SODIUM SERPL-SCNC: 141 MMOL/L
WBC # BLD AUTO: 16.59 K/UL

## 2018-02-16 PROCEDURE — 25000003 PHARM REV CODE 250: Performed by: STUDENT IN AN ORGANIZED HEALTH CARE EDUCATION/TRAINING PROGRAM

## 2018-02-16 PROCEDURE — 84100 ASSAY OF PHOSPHORUS: CPT

## 2018-02-16 PROCEDURE — 25000003 PHARM REV CODE 250: Performed by: NURSE PRACTITIONER

## 2018-02-16 PROCEDURE — 27000221 HC OXYGEN, UP TO 24 HOURS

## 2018-02-16 PROCEDURE — 99233 SBSQ HOSP IP/OBS HIGH 50: CPT | Mod: ,,, | Performed by: PSYCHIATRY & NEUROLOGY

## 2018-02-16 PROCEDURE — 94640 AIRWAY INHALATION TREATMENT: CPT

## 2018-02-16 PROCEDURE — 25000242 PHARM REV CODE 250 ALT 637 W/ HCPCS: Performed by: PSYCHIATRY & NEUROLOGY

## 2018-02-16 PROCEDURE — 25000003 PHARM REV CODE 250: Performed by: PSYCHIATRY & NEUROLOGY

## 2018-02-16 PROCEDURE — 25000003 PHARM REV CODE 250: Performed by: PHYSICIAN ASSISTANT

## 2018-02-16 PROCEDURE — 63600175 PHARM REV CODE 636 W HCPCS: Performed by: STUDENT IN AN ORGANIZED HEALTH CARE EDUCATION/TRAINING PROGRAM

## 2018-02-16 PROCEDURE — 80053 COMPREHEN METABOLIC PANEL: CPT

## 2018-02-16 PROCEDURE — 83735 ASSAY OF MAGNESIUM: CPT

## 2018-02-16 PROCEDURE — 85025 COMPLETE CBC W/AUTO DIFF WBC: CPT

## 2018-02-16 PROCEDURE — 31720 CLEARANCE OF AIRWAYS: CPT

## 2018-02-16 PROCEDURE — A4216 STERILE WATER/SALINE, 10 ML: HCPCS | Performed by: NURSE PRACTITIONER

## 2018-02-16 PROCEDURE — 20000000 HC ICU ROOM

## 2018-02-16 PROCEDURE — 94761 N-INVAS EAR/PLS OXIMETRY MLT: CPT

## 2018-02-16 PROCEDURE — 51798 US URINE CAPACITY MEASURE: CPT

## 2018-02-16 PROCEDURE — 85610 PROTHROMBIN TIME: CPT

## 2018-02-16 PROCEDURE — 92523 SPEECH SOUND LANG COMPREHEN: CPT

## 2018-02-16 RX ORDER — INSULIN ASPART 100 [IU]/ML
8 INJECTION, SOLUTION INTRAVENOUS; SUBCUTANEOUS EVERY 4 HOURS
Status: ACTIVE | OUTPATIENT
Start: 2018-02-16 | End: 2018-02-19

## 2018-02-16 RX ADMIN — INSULIN ASPART 1 UNITS: 100 INJECTION, SOLUTION INTRAVENOUS; SUBCUTANEOUS at 09:02

## 2018-02-16 RX ADMIN — SODIUM CHLORIDE 500 ML: 0.9 INJECTION, SOLUTION INTRAVENOUS at 11:02

## 2018-02-16 RX ADMIN — ACETAMINOPHEN 650 MG: 325 TABLET, FILM COATED ORAL at 11:02

## 2018-02-16 RX ADMIN — STANDARDIZED SENNA CONCENTRATE AND DOCUSATE SODIUM 1 TABLET: 8.6; 5 TABLET, FILM COATED ORAL at 09:02

## 2018-02-16 RX ADMIN — IPRATROPIUM BROMIDE AND ALBUTEROL SULFATE 3 ML: .5; 3 SOLUTION RESPIRATORY (INHALATION) at 09:02

## 2018-02-16 RX ADMIN — IPRATROPIUM BROMIDE AND ALBUTEROL SULFATE 3 ML: .5; 3 SOLUTION RESPIRATORY (INHALATION) at 08:02

## 2018-02-16 RX ADMIN — INSULIN ASPART 8 UNITS: 100 INJECTION, SOLUTION INTRAVENOUS; SUBCUTANEOUS at 01:02

## 2018-02-16 RX ADMIN — METOPROLOL TARTRATE 25 MG: 25 TABLET ORAL at 09:02

## 2018-02-16 RX ADMIN — MODAFINIL 200 MG: 100 TABLET ORAL at 05:02

## 2018-02-16 RX ADMIN — INSULIN ASPART 2 UNITS: 100 INJECTION, SOLUTION INTRAVENOUS; SUBCUTANEOUS at 05:02

## 2018-02-16 RX ADMIN — Medication 3 ML: at 01:02

## 2018-02-16 RX ADMIN — HEPARIN SODIUM 5000 UNITS: 5000 INJECTION, SOLUTION INTRAVENOUS; SUBCUTANEOUS at 09:02

## 2018-02-16 RX ADMIN — INSULIN ASPART 8 UNITS: 100 INJECTION, SOLUTION INTRAVENOUS; SUBCUTANEOUS at 09:02

## 2018-02-16 RX ADMIN — INSULIN ASPART 5 UNITS: 100 INJECTION, SOLUTION INTRAVENOUS; SUBCUTANEOUS at 02:02

## 2018-02-16 RX ADMIN — SODIUM CHLORIDE SOLN NEBU 3% 4 ML: 3 NEBU SOLN at 08:02

## 2018-02-16 RX ADMIN — INSULIN ASPART 1 UNITS: 100 INJECTION, SOLUTION INTRAVENOUS; SUBCUTANEOUS at 02:02

## 2018-02-16 RX ADMIN — ACETAMINOPHEN 650 MG: 325 TABLET, FILM COATED ORAL at 03:02

## 2018-02-16 RX ADMIN — LISINOPRIL 20 MG: 20 TABLET ORAL at 09:02

## 2018-02-16 RX ADMIN — SODIUM CHLORIDE SOLN NEBU 3% 4 ML: 3 NEBU SOLN at 09:02

## 2018-02-16 RX ADMIN — IPRATROPIUM BROMIDE AND ALBUTEROL SULFATE 3 ML: .5; 3 SOLUTION RESPIRATORY (INHALATION) at 01:02

## 2018-02-16 RX ADMIN — INSULIN ASPART 8 UNITS: 100 INJECTION, SOLUTION INTRAVENOUS; SUBCUTANEOUS at 05:02

## 2018-02-16 RX ADMIN — SODIUM CHLORIDE SOLN NEBU 3% 4 ML: 3 NEBU SOLN at 01:02

## 2018-02-16 RX ADMIN — MODAFINIL 100 MG: 100 TABLET ORAL at 01:02

## 2018-02-16 RX ADMIN — HEPARIN SODIUM 5000 UNITS: 5000 INJECTION, SOLUTION INTRAVENOUS; SUBCUTANEOUS at 05:02

## 2018-02-16 RX ADMIN — HEPARIN SODIUM 5000 UNITS: 5000 INJECTION, SOLUTION INTRAVENOUS; SUBCUTANEOUS at 01:02

## 2018-02-16 RX ADMIN — INSULIN ASPART 4 UNITS: 100 INJECTION, SOLUTION INTRAVENOUS; SUBCUTANEOUS at 09:02

## 2018-02-16 RX ADMIN — Medication 3 ML: at 09:02

## 2018-02-16 RX ADMIN — INSULIN ASPART 5 UNITS: 100 INJECTION, SOLUTION INTRAVENOUS; SUBCUTANEOUS at 09:02

## 2018-02-16 RX ADMIN — INSULIN ASPART 5 UNITS: 100 INJECTION, SOLUTION INTRAVENOUS; SUBCUTANEOUS at 05:02

## 2018-02-16 RX ADMIN — INSULIN DETEMIR 32 UNITS: 100 INJECTION, SOLUTION SUBCUTANEOUS at 09:02

## 2018-02-16 RX ADMIN — INSULIN DETEMIR 32 UNITS: 100 INJECTION, SOLUTION SUBCUTANEOUS at 08:02

## 2018-02-16 RX ADMIN — Medication 3 ML: at 05:02

## 2018-02-16 RX ADMIN — ASPIRIN 81 MG CHEWABLE TABLET 81 MG: 81 TABLET CHEWABLE at 09:02

## 2018-02-16 RX ADMIN — ATORVASTATIN CALCIUM 80 MG: 20 TABLET, FILM COATED ORAL at 09:02

## 2018-02-16 NOTE — ASSESSMENT & PLAN NOTE
-WBC to 15 on 2/14 after self-extubation on 2/13 (tube feeds were running at the time, possible aspiration?)  -Consider aspiration pneumonia coverage/cultures if pt develops fever or if WBC continues to increase.  -CXR on 2/15 AM, no acute changes.  -Continue to monitor closely

## 2018-02-16 NOTE — SUBJECTIVE & OBJECTIVE
Neurologic Chief Complaint: L MCA Stroke    Subjective:     Interval History: Patient is seen for follow-up neurological assessment and treatment recommendations:   Patient is non-verbal this am.  Family member at bedside did note some attempt to communicate with her earlier when she was asking him some simple questions.  Noted that he would open his eyes and tried to mouth what she thought was 'ok.'  He has been somnolent throughout the day otherwise.    HPI, Past Medical, Family, and Social History remains the same as documented in the initial encounter.     Review of Systems   Unable to perform ROS: Patient nonverbal     Scheduled Meds:   albuterol-ipratropium 2.5mg-0.5mg/3mL  3 mL Nebulization Q6H    aspirin  81 mg Per NG tube Daily    atorvastatin  80 mg Per NG tube Daily    heparin (porcine)  5,000 Units Subcutaneous Q8H    insulin aspart  8 Units Subcutaneous Q4H    insulin detemir  32 Units Subcutaneous BID    lisinopril  20 mg Per NG tube Daily    metoprolol tartrate  25 mg Per NG tube BID    modafinil  200 mg Per NG tube Daily    And    modafinil  100 mg Per NG tube Daily    polyethylene glycol  17 g Per NG tube Daily    senna-docusate 8.6-50 mg  1 tablet Per NG tube BID    sodium chloride 0.9%  3 mL Intravenous Q8H    sodium chloride 3%  4 mL Nebulization Q6H     Continuous Infusions:  PRN Meds:acetaminophen, acetaminophen, albuterol-ipratropium 2.5mg-0.5mg/3mL, bisacodyl, dextrose 50%, glucagon (human recombinant), hydrALAZINE, insulin aspart, labetalol, magnesium sulfate IVPB, magnesium sulfate IVPB, ondansetron, potassium chloride 10%, potassium chloride 10%, sodium chloride 0.9%    Objective:     Vital Signs (Most Recent):  Temp: 98.5 °F (36.9 °C) (02/16/18 1105)  Pulse: 99 (02/16/18 1315)  Resp: 18 (02/16/18 1315)  BP: 107/62 (02/16/18 1205)  SpO2: 98 % (02/16/18 1315)  BP Location: Left arm    Vital Signs Range (Last 24H):  Temp:  [97.6 °F (36.4 °C)-99.9 °F (37.7 °C)]   Pulse:   []   Resp:  [2-32]   BP: ()/()   SpO2:  [90 %-100 %]   BP Location: Left arm    Physical Exam  General:  Well-developed, well-nourished, not opening eyes to voice or tactile stimuli, moving LUE/LLE intermittently  HEENT:  NCAT, PERRLA, EOMI with tracking, oropharyngeal membranes non-erythematous/without exudate  Neck:  Supple, normal ROM without nuchal rigidity  Resp:  Symmetric expansion, no increased wob  CVS:  No LE edema, peripheral pulses 2+ (radial, dorsalis pedis)  GI:  Abd soft, non-distended, non-tender to palpation  Neurologic Exam:  Mental Status:  Somnolent, opens eyes to noxious stimuli.  Does not follow commands.  Cranial Nerves:  Face appears symmetric.  Grimace bilaterally to noxious stimuli.  PERRLA, EOMI with tracking.  Motor:  Normal bulk and tone. LUE, LLE moving spontaneously.  RLE with some withdrawal to noxious stimuli.  No movement of RUE.  Sensory:   Withdrawal to noxious stimuli at LUE and BLE.  No withdrawal RUE.  Reflexes:  Biceps, brachioradialis, patellar 2+ and symmetric.  No ankle clonus.  Downgoing toe bilaterally.  Coordination:  No resting tremor or myoclonus.    Gait:  Deferred 2/2 fall precautions, AMS    Laboratory:  CMP:   Recent Labs  Lab 02/16/18  0208   CALCIUM 10.1   ALBUMIN 2.7*   PROT 8.3      K 3.9   CO2 29      BUN 30*   CREATININE 1.1   ALKPHOS 151*   ALT 42   AST 41*   BILITOT 1.1*     CBC:   Recent Labs  Lab 02/16/18  0208   WBC 16.59*   RBC 4.62   HGB 13.6*   HCT 40.9   *   MCV 89   MCH 29.4   MCHC 33.3     Lipid Panel: Invalid due to excessively high triglycerides    Coagulation:   Recent Labs  Lab 02/16/18  0208   INR 1.1     Hgb A1C: 10%    TSH: PENDING    Diagnostic Results     Brain Imaging   01/26/18 CT head w/o contrast:  No detrimental change.  Evolving subtle left MCA territory infarct.       01/26/18 MRI Brain w/o contrast:  1. Large acute left MCA territory infarction as above.  No associated hemorrhage or significant  mass effect at this time.  2. Mild chronic ischemic changes.        02/04/18 CT head w/o contrast:  Redemonstration of large left MCA territory infarction without evidence of hemorrhagic conversion.  Slight interval increase in local mass effect with rightward midline shift of approximately 0.6 cm. Stable postoperative change from left MCA endovascular stenting. Sinus disease as above.       Vessel Imaging   01/25/18 CTA Stroke Multiphase:  Short segment occlusion of left M1.  There is good collateral flow in the ischemic territory as detailed above.  Noncontrast images demonstrate subtle changes reflecting left MCA territory acute infarct.       01/25/18 IR Angiogram:  Cerebral angiogram demonstrates occlusion of the M1 segment of the left middle cerebral artery with good collateral flow from pial collaterals.  This was successfully removed and residual stenosis at the site of occlusion was angioplastied and stented with improvement in flow.  Slow flow to the left parietal lobe suggests area of infarcted tissue.  This was felt to represent TICI 2C flow.    Cardiac Imaging   01/26/18 2D Echo w/ CFD:  CONCLUSIONS     1 - Normal left ventricular systolic function (EF 65-70%).     2 - Concentric remodeling.     3 - No wall motion abnormalities.     4 - Normal left ventricular diastolic function.     5 - Normal right ventricular systolic function .    6 - No LA enlargement.

## 2018-02-16 NOTE — PROGRESS NOTES
[facility]  Neurocritical Care  Progress Note    Admit Date: 1/25/2018  Service Date: 02/16/2018  Length of Stay: 22    Subjective:     Chief Complaint: Embolic stroke involving left middle cerebral artery    History of Present Illness: The patient is a 47 year old male with no known PMHx admitted to [facility]   s/p thrombectomy of L M1 occlusion. Per chart review, he   walked into [place] and was acting abnormal.  EMS was called and brought to the ED for concern of L MCA syndrome. CT MP revealed short segment occlusion of left M1.  Patient went to IR for thrombectomy of L M1 occlusion seen on CTA MP.  TICI2C reperfusion and angioplasty. Patient admitted to [facility] for close monitoring and higher level of care.   History obtained from chart review only            Hospital Course: 1/25: Pt admitted to [facility] s/p L M1 thrombectomy, TICI2C reperfusion and angioplasty   1/27: large L MCA, hemicrani watch, NSGY following, insulin ggt, cardene ggt, L sided intermittent slowing eeg but no sz, +nasal trumpet for copious thick secretions, wheezing this am - improved with duo nebs, 3% nebs, and cough assist, concern for sepsis 2/2 hemodynamic changes - increased HR and BP, worsening leukocytosis, +ceftriaxone, pan cx, adrian track  1/28: continued respiratory challenges due to secretions. Aggressive pulmonary toileting. Continue monitoring for neuro worsening. Add moxifloxacin.   1/29: CTH to eval for increased edema/shift, EEG afterwards. Concern for decrease exam, no following/decreased alertness). CXR and Procal to follow leukocytosis. Hold TF until eval decreasing EXAM  1/30: neuro exam stable, increase 2%, current amount of sodium iv not suffuicient to meet target 145-155, cont abx for likely pneumonia, repeat ct in am  1/31: abrupt desaturation today requiring emergent intubation followed by bronchoscopy  Etiology likely upper airway obstruction from dried secretions  02/01: stable on vent overnight, marked  improvement on today's chest x ray, switch to spontaneous today, sodium stable at 150, start tfs  02/02: moves left side spontaneously and opens eyes off sedation,  02/03: 2% buffered NS re-started at 20mL/hr for rapid decrease in serum sodium. Enteral free water flushes discontinued. Patient has copious secretions likely pneumonia. Holding dexamethasone  2/4:  No acute events overnight.  Sodium stable.  2/5: transition insulin infusion to subcutaneous insulin   2/7: gen surg consulted for trach/peg  2/9: increased Aspart and Detemir, pending Trach/Peg placement next week.  2/10: No changes overnight . Pending PEG and trach, WBC mildly elevated  12K  2/11: No changes overnight . Pending PEG and trach, WBC mildly elevated  13K. Procal was low and lower than prior. Patient got 16 units of SSI yesterday.  2/13: NAEO.  Pending peg/trach tomorrow. But then self-extubated.  2/14: Tolerating face mask well.  Holding tube feeds.   2/15: IR consult placed, for PEG. ABD ct and NG placed in preparation for procedure.  2/16: NAEO.  Pending PEG.    Interval History:   No acute events overnight.  Afebrile.  BUN elevated -> increase fluids and enteral water feedings.      Review of Systems   Reason unable to perform ROS: aphasic.   Constitutional: Negative for fever.   Neurological: Positive for speech difficulty and weakness.     Unable to obtain complete ROS 2/2 nonverbal and AMS.    Objective:     Vitals:  Temp: 98.5 °F (36.9 °C)  Pulse: 102  Rhythm: sinus tachycardia  BP: 114/71  MAP (mmHg): 88  Resp: (!) 27  SpO2: (!) 93 %  Oxygen Concentration (%): 40  O2 Device (Oxygen Therapy): Aerosol Mask    Temp  Min: 97.6 °F (36.4 °C)  Max: 99.9 °F (37.7 °C)  Pulse  Min: 92  Max: 117  BP  Min: 98/60  Max: 160/80  MAP (mmHg)  Min: 75  Max: 122  Resp  Min: 2  Max: 32  SpO2  Min: 90 %  Max: 100 %  Oxygen Concentration (%)  Min: 40  Max: 40    02/15 0701 - 02/16 0700  In: 480 [I.V.:10]  Out: 975 [Urine:975]   Unmeasured Output  Urine  Occurrence: 1  Stool Occurrence: 1  Pad Count: 1       Physical Exam   Constitutional: He appears well-developed.   Cardiovascular: Normal rate, regular rhythm and normal heart sounds.    Pulmonary/Chest: Effort normal. No respiratory distress.   Coarse breath sounds b/l lung fields   Abdominal: Soft. Bowel sounds are normal.   Neurological:   E2 V1 M5  Open eyes at times to deep pain  PERRL  Oculocephalic intact  Blinks to threat b/l  Right facial droop  Localize to pain with left, not across midline  Right hemiparesis   Skin: Skin is warm.   Vitals reviewed.        Medications:  Continuous Scheduled    albuterol-ipratropium 2.5mg-0.5mg/3mL 3 mL Q6H   aspirin 81 mg Daily   atorvastatin 80 mg Daily   heparin (porcine) 5,000 Units Q8H   insulin aspart 8 Units Q4H   insulin detemir 32 Units BID   lisinopril 20 mg Daily   metoprolol tartrate 25 mg BID   modafinil 200 mg Daily   And     modafinil 100 mg Daily   polyethylene glycol 17 g Daily   senna-docusate 8.6-50 mg 1 tablet BID   sodium chloride 0.9% 3 mL Q8H   sodium chloride 3% 4 mL Q6H   PRN    acetaminophen 650 mg Q6H PRN   acetaminophen 650 mg Q6H PRN   albuterol-ipratropium 2.5mg-0.5mg/3mL 3 mL Q4H PRN   bisacodyl 10 mg Daily PRN   dextrose 50% 12.5 g PRN   glucagon (human recombinant) 1 mg PRN   hydrALAZINE 10 mg Q4H PRN   insulin aspart 1-10 Units Q4H PRN   labetalol 10 mg Q4H PRN   magnesium sulfate IVPB 2 g PRN   magnesium sulfate IVPB 4 g PRN   ondansetron 4 mg Q8H PRN   potassium chloride 10% 40 mEq PRN   potassium chloride 10% 40 mEq PRN   sodium chloride 0.9% 5 mL PRN     Today I personally reviewed pertinent medications, lines/drains/airways, imaging, cardiology, lab results, microbiology results, notably:    Diet  Diet NPO  Diet NPO    Recent Results (from the past 24 hour(s))   POCT glucose    Collection Time: 02/15/18  5:49 PM   Result Value Ref Range    POCT Glucose 182 (H) 70 - 110 mg/dL   POCT glucose    Collection Time: 02/15/18  9:21 PM    Result Value Ref Range    POCT Glucose 196 (H) 70 - 110 mg/dL   Comprehensive metabolic panel    Collection Time: 02/16/18  2:08 AM   Result Value Ref Range    Sodium 141 136 - 145 mmol/L    Potassium 3.9 3.5 - 5.1 mmol/L    Chloride 101 95 - 110 mmol/L    CO2 29 23 - 29 mmol/L    Glucose 224 (H) 70 - 110 mg/dL    BUN, Bld 30 (H) 6 - 20 mg/dL    Creatinine 1.1 0.5 - 1.4 mg/dL    Calcium 10.1 8.7 - 10.5 mg/dL    Total Protein 8.3 6.0 - 8.4 g/dL    Albumin 2.7 (L) 3.5 - 5.2 g/dL    Total Bilirubin 1.1 (H) 0.1 - 1.0 mg/dL    Alkaline Phosphatase 151 (H) 55 - 135 U/L    AST 41 (H) 10 - 40 U/L    ALT 42 10 - 44 U/L    Anion Gap 11 8 - 16 mmol/L    eGFR if African American >60.0 >60 mL/min/1.73 m^2    eGFR if non African American >60.0 >60 mL/min/1.73 m^2   CBC auto differential    Collection Time: 02/16/18  2:08 AM   Result Value Ref Range    WBC 16.59 (H) 3.90 - 12.70 K/uL    RBC 4.62 4.60 - 6.20 M/uL    Hemoglobin 13.6 (L) 14.0 - 18.0 g/dL    Hematocrit 40.9 40.0 - 54.0 %    MCV 89 82 - 98 fL    MCH 29.4 27.0 - 31.0 pg    MCHC 33.3 32.0 - 36.0 g/dL    RDW 12.3 11.5 - 14.5 %    Platelets 685 (H) 150 - 350 K/uL    MPV 9.6 9.2 - 12.9 fL    Immature Granulocytes 0.5 0.0 - 0.5 %    Gran # (ANC) 13.0 (H) 1.8 - 7.7 K/uL    Immature Grans (Abs) 0.09 (H) 0.00 - 0.04 K/uL    Lymph # 2.0 1.0 - 4.8 K/uL    Mono # 1.4 (H) 0.3 - 1.0 K/uL    Eos # 0.1 0.0 - 0.5 K/uL    Baso # 0.06 0.00 - 0.20 K/uL    nRBC 0 0 /100 WBC    Gran% 78.5 (H) 38.0 - 73.0 %    Lymph% 12.2 (L) 18.0 - 48.0 %    Mono% 8.1 4.0 - 15.0 %    Eosinophil% 0.3 0.0 - 8.0 %    Basophil% 0.4 0.0 - 1.9 %    Differential Method Automated    Magnesium    Collection Time: 02/16/18  2:08 AM   Result Value Ref Range    Magnesium 2.1 1.6 - 2.6 mg/dL   Phosphorus    Collection Time: 02/16/18  2:08 AM   Result Value Ref Range    Phosphorus 4.0 2.7 - 4.5 mg/dL   Protime-INR    Collection Time: 02/16/18  2:08 AM   Result Value Ref Range    Prothrombin Time 10.9 9.0 - 12.5 sec     "INR 1.1 0.8 - 1.2   POCT glucose    Collection Time: 02/16/18  2:49 AM   Result Value Ref Range    POCT Glucose 192 (H) 70 - 110 mg/dL   POCT glucose    Collection Time: 02/16/18  5:46 AM   Result Value Ref Range    POCT Glucose 192 (H) 70 - 110 mg/dL   POCT glucose    Collection Time: 02/16/18  8:57 AM   Result Value Ref Range    POCT Glucose 222 (H) 70 - 110 mg/dL   POCT glucose    Collection Time: 02/16/18  1:22 PM   Result Value Ref Range    POCT Glucose 206 (H) 70 - 110 mg/dL       Imaging  2/15 CT Abd/pelvis: Per report,  "Segment of the anterior gastric wall closely apposed to the adjacent abdominal wall with suspected intervening diaphragm but no overlying liver, bowel or fluid.  Cholelithiasis.  Marked dense coronary artery atherosclerosis.  0.8 cm right lower lobe pulmonary nodule. Per Fleischner Society 2017 guidelines; CT 6-12 months, if present followup every 2 years until 5 year followup complete."    2/15 XR Abd: Per independent resident review and interpretation,  NGT in stomach.      Assessment/Plan:     Neuro   * Embolic stroke involving left middle cerebral artery    -s/p L M1 thrombectomy  --160  -repeat imaging stable   -ASA, plavix ---> HOLDING PLAVIX in preparation for potential PEG  -statin   -heparin dvt ppx  -continue modafinil, consider increase to 200 bid  -PT/OT                Pulmonary   Nodule of right lung    Noted on 2/15 CT Abd/pelvis: "0.8 cm right lower lobe pulmonary nodule. Per Fleischner Society 2017 guidelines; CT 6-12 months, if present followup every 2 years until 5 year followup complete."  -Repeat CT in 6-12 months        Acute respiratory failure with hypoxia    -Due to airway obstruction form hardened secretions overlying epiglottis and soft palate, improved after intubation and bronchoscopy  -Self-extubated on 2/13, subsequently placed on face mask which he has been tolerating well.          Cardiac/Vascular   Hyperlipidemia    -atorvastatin 80 daily         "   Essential hypertension    --160  -metoprolol 25 mg bid  -lisinopril 20 mg Qdaily  -PRN Labetalol & Hydralazine   -Echo 1/26:1 - Normal left ventricular systolic function (EF 65-70%).     2 - Concentric remodeling.     3 - No wall motion abnormalities.     4 - Normal left ventricular diastolic function.     5 - Normal right ventricular systolic function .            Oncology   Leukocytosis    -WBC to 15 on 2/14 after self-extubation on 2/13 (tube feeds were running at the time, possible aspiration?)  -Consider aspiration pneumonia coverage/cultures if pt develops fever or if WBC continues to increase.  -CXR on 2/15 AM, no acute changes.  -Continue to monitor closely        Endocrine   Type 2 diabetes mellitus    -A1C-10  -poorly controlled diabetes  -BG >400 on arrival, s/p insulin gtt  -Detemir/aspart + SSI   -Continue to titrate tube feeds to goal            Prophylaxis:  Venous Thromboembolism: mechanical chemical  Stress Ulcer: None  Ventilator Pneumonia: no     Activity Orders          Activity as tolerated starting at 02/05 1432        Full Code    Raymundo Shanks MD  Neurocritical Care  [facility]

## 2018-02-16 NOTE — PT/OT/SLP PROGRESS
Physical Therapy  Missed Visit    Patient Name:  Todd Quevedo   MRN:  [id number]  Admitting Diagnosis:  Embolic stroke involving left middle cerebral artery   Recent Surgery: Procedure(s) (LRB):  TRACHEOSTOMY (N/A)  PLACEMENT-TUBE-PEG (N/A)      Patient not seen today secondary to Unavailable (Comment) (Attempted in pm, pt recieving nursing care. PT unable to return.). Will follow-up as POC allows.    Yvonne Perea PT, DPT  2/16/2018  Pager: [phone]

## 2018-02-16 NOTE — PLAN OF CARE
Problem: SLP Goal  Goal: SLP Goal  Speech Language Pathology Goals  Goals expected to be met by 2/23  1. Pt will participate in bedside assessment of swallow to determine least restrictive diet as appropriate.  2. Pt will open eyes to stim x5/session given max cues.  3. Pt will follow simple commands x2/session given max cues.  4. Pt will answer basic y/n question via any modality x2/session given max cues.  5. Pt will vocalize in response to any stim x2/session given max cues.         Outcome: Ongoing (interventions implemented as appropriate)  Speech, language, cognitive evaluation completed.  POC updated.  EDWARD Key, CCC/SLP  2/16/2018

## 2018-02-16 NOTE — PLAN OF CARE
Problem: Patient Care Overview  Goal: Plan of Care Review  Outcome: Ongoing (interventions implemented as appropriate)  POC reviewed with pt and family at 1400. Pt unable to verbalize understanding. Pt's wife at bedside and verbalized understanding. Questions and concerns addressed. No acute events today. Pt progressing toward goals. Will continue to monitor. See flowsheets for full assessment and VS info.

## 2018-02-16 NOTE — PROGRESS NOTES
"[facility]  Vascular Neurology  Comprehensive Stroke Center  Progress Note    Assessment/Plan:     * Embolic stroke involving left middle cerebral artery    46 yo M with PMHx HLD, LOYDA, poorly controlled DM II presents to [facility] 01/25/18 after noted "strange behavior" for which EMS was called.  Pt was found to have a L M1 occlusion and was taken to IR for thrombectomy with TICI 2C reperfusion and stent placement due to L MCA stenosis.  Etiology possibly atheroembolic with note of mild calcification in bilateral carotid bulbs on CTA.  Echo normal with no hx or signs of A fib.  Plan currently for PEG placement 02/19/18.  PT/OT/SLP will continue to evaluate to make placement recommendations.  Will resume DAPT 2/2 recent stent placement in angiogram after PEG placement.    Antithrombotics for secondary stroke prevention: ASA 81 mg po qd (clopidogrel held for PEG placement)   Statins: atorvastatin 80 mg po qd  Aggressive risk factor modification: HLD, DM II (Hgb A1c 10%), Obesity  Rehab efforts: PT/OT/SLP to evaluate and treat  Diagnostics ordered/pending: None  VTE prophylaxis: Heparin 5000 U q8h  BP parameters: Infarct: Post sucessful thrombectomy, SBP <140       Cytotoxic cerebral edema    -2/2 stroke, evident on imaging.  Stable on repeat CT head imaging.      Type 2 diabetes mellitus    -Stroke risk factor, Hgb A1c 10%  -Currently on detemir 32 U bid, aspart 8 U q4h (TFs)  -Glucose 182-224 on current regimen.  Titrate up insulin as needed.      Hyperlipidemia    -Stroke risk factor, LDL invalid as TG > 400  -Continue atorvastatin 80 mg qd, repeat lipid panel as an outpatient      Essential hypertension    -Stroke risk factor  -Continue lisinopril 20 mg qd, metoprolol 25 mg bid  -BP / over past 24 h      Obstructive sleep apnea    -Stroke risk factor, trial CPAP overnight      1/25: Brought in for aphasia, RSW with L gaze preferance. LKN unknown, not tPA candidate. Went to IR for angiogram " and stent.  1/29: Off Cardene, remains on Insulin gtt. Concern for sepsis, respiratory source. Imaging with mass effect and some brain compression; maycol crani watch.  1/30: EEG consistent with focal L slowing 2/2 lesion and mild generalized slowing, no seizures. CT head stable, no hemorrhagic conversion  02/01/18 emergent intubation likely due to upper airway obstruction per NCC.    2/2/18: Pt off Precedex, moving left side spontaneously and opening eyes. Intubated, on spontaneous today. NCC giving steroids in hopes to extubate tomorrow.  2/3: no acute events over night, remains intubated at the time of the visit this morning, not responsive to verbal stimuli, withdraws from pain on LUE, BP raging ~135-230. No significant change in the lab values, glucose ~200. Continued on insulin gtt, SQH for DVT ppx, and captopril.     02/16/18:  Few changes on exam, continues to have significant RUE/RLE weakness with global aphasia.  Family member at bedside noted attempt to communicate with her.    STROKE DOCUMENTATION   Acute Stroke Times   Stroke Team Called Date: 01/25/18  Stroke Team Called Time: 1852  Stroke Team Arrival Date: 01/25/18  Stroke Team Arrival Time: 0652  CT Interpretation Time: 1910  Decision to Treat Time for Alteplase:  (n/a unknown last known normal )  Decision to Treat Time for IR: 1915    NIH Scale:  1a. Level Of Consciousness: 1-->Not alert: but arousable by minor stimulation to obey, answer, or respond  1b. LOC Questions: 2-->Answers neither question correctly  1c. LOC Commands: 2-->Performs neither task correctly  2. Best Gaze: 1-->Partial gaze palsy: gaze is abnormal in one or both eyes, but forced deviation or total gaze paresis is not present  3. Visual: 0-->No visual loss  4. Facial Palsy: 0-->Normal symmetrical movements  5a. Motor Arm, Left: 0-->No drift: limb holds 90 (or 45) degrees for full 10 secs  5b. Motor Arm, Right: 3-->No effort against gravity: limb falls  6a. Motor Leg, Left: 2-->Some  effort against gravity: leg falls to bed by 5 secs, but has some effort against gravity  6b. Motor Leg, Right: 3-->No effort against gravity: leg falls to bed immediately  7. Limb Ataxia: 0-->Absent  8. Sensory: 0-->Normal: no sensory loss  9. Best Language: 3-->Mute, global aphasia: no usable speech or auditory comprehension  10. Dysarthria: 2-->Severe dysarthria: patients speech is so slurred as to be unintelligible in the absence of or out of proportion to any dysphasia, or is mute/anarthric  11. Extinction and Inattention (formerly Neglect): 0-->No abnormality  Total (NIH Stroke Scale): 19     Modified Jewell Score: 0  [place] Coma Scale:7   ABCD2 Score:    [id number] Score:   HAS -BLED Score:   ICH Score:   Hunt & Laguerre Classification:      Hemorrhagic change of an Ischemic Stroke: Does this patient have an ischemic stroke with hemorrhagic changes? No     Neurologic Chief Complaint: L MCA Stroke    Subjective:     Interval History: Patient is seen for follow-up neurological assessment and treatment recommendations:   Patient is non-verbal this am.  Family member at bedside did note some attempt to communicate with her earlier when she was asking him some simple questions.  Noted that he would open his eyes and tried to mouth what she thought was 'ok.'  He has been somnolent throughout the day otherwise.    HPI, Past Medical, Family, and Social History remains the same as documented in the initial encounter.     Review of Systems   Unable to perform ROS: Patient nonverbal     Scheduled Meds:   albuterol-ipratropium 2.5mg-0.5mg/3mL  3 mL Nebulization Q6H    aspirin  81 mg Per NG tube Daily    atorvastatin  80 mg Per NG tube Daily    heparin (porcine)  5,000 Units Subcutaneous Q8H    insulin aspart  8 Units Subcutaneous Q4H    insulin detemir  32 Units Subcutaneous BID    lisinopril  20 mg Per NG tube Daily    metoprolol tartrate  25 mg Per NG tube BID    modafinil  200 mg Per NG tube Daily    And     modafinil  100 mg Per NG tube Daily    polyethylene glycol  17 g Per NG tube Daily    senna-docusate 8.6-50 mg  1 tablet Per NG tube BID    sodium chloride 0.9%  3 mL Intravenous Q8H    sodium chloride 3%  4 mL Nebulization Q6H     Continuous Infusions:  PRN Meds:acetaminophen, acetaminophen, albuterol-ipratropium 2.5mg-0.5mg/3mL, bisacodyl, dextrose 50%, glucagon (human recombinant), hydrALAZINE, insulin aspart, labetalol, magnesium sulfate IVPB, magnesium sulfate IVPB, ondansetron, potassium chloride 10%, potassium chloride 10%, sodium chloride 0.9%    Objective:     Vital Signs (Most Recent):  Temp: 98.5 °F (36.9 °C) (02/16/18 1105)  Pulse: 99 (02/16/18 1315)  Resp: 18 (02/16/18 1315)  BP: 107/62 (02/16/18 1205)  SpO2: 98 % (02/16/18 1315)  BP Location: Left arm    Vital Signs Range (Last 24H):  Temp:  [97.6 °F (36.4 °C)-99.9 °F (37.7 °C)]   Pulse:  []   Resp:  [2-32]   BP: ()/()   SpO2:  [90 %-100 %]   BP Location: Left arm    Physical Exam  General:  Well-developed, well-nourished, not opening eyes to voice or tactile stimuli, moving LUE/LLE intermittently  HEENT:  NCAT, PERRLA, EOMI with tracking, oropharyngeal membranes non-erythematous/without exudate  Neck:  Supple, normal ROM without nuchal rigidity  Resp:  Symmetric expansion, no increased wob  CVS:  No LE edema, peripheral pulses 2+ (radial, dorsalis pedis)  GI:  Abd soft, non-distended, non-tender to palpation  Neurologic Exam:  Mental Status:  Somnolent, opens eyes to noxious stimuli.  Does not follow commands.  Cranial Nerves:  Face appears symmetric.  Grimace bilaterally to noxious stimuli.  PERRLA, EOMI with tracking.  Motor:  Normal bulk and tone. LUE, LLE moving spontaneously.  RLE with some withdrawal to noxious stimuli.  No movement of RUE.  Sensory:   Withdrawal to noxious stimuli at LUE and BLE.  No withdrawal RUE.  Reflexes:  Biceps, brachioradialis, patellar 2+ and symmetric.  No ankle clonus.  Downgoing toe  bilaterally.  Coordination:  No resting tremor or myoclonus.    Gait:  Deferred 2/2 fall precautions, AMS    Laboratory:  CMP:   Recent Labs  Lab 02/16/18  0208   CALCIUM 10.1   ALBUMIN 2.7*   PROT 8.3      K 3.9   CO2 29      BUN 30*   CREATININE 1.1   ALKPHOS 151*   ALT 42   AST 41*   BILITOT 1.1*     CBC:   Recent Labs  Lab 02/16/18  0208   WBC 16.59*   RBC 4.62   HGB 13.6*   HCT 40.9   *   MCV 89   MCH 29.4   MCHC 33.3     Lipid Panel: Invalid due to excessively high triglycerides    Coagulation:   Recent Labs  Lab 02/16/18  0208   INR 1.1     Hgb A1C: 10%    TSH: PENDING    Diagnostic Results     Brain Imaging   01/26/18 CT head w/o contrast:  No detrimental change.  Evolving subtle left MCA territory infarct.       01/26/18 MRI Brain w/o contrast:  1. Large acute left MCA territory infarction as above.  No associated hemorrhage or significant mass effect at this time.  2. Mild chronic ischemic changes.        02/04/18 CT head w/o contrast:  Redemonstration of large left MCA territory infarction without evidence of hemorrhagic conversion.  Slight interval increase in local mass effect with rightward midline shift of approximately 0.6 cm. Stable postoperative change from left MCA endovascular stenting. Sinus disease as above.       Vessel Imaging   01/25/18 CTA Stroke Multiphase:  Short segment occlusion of left M1.  There is good collateral flow in the ischemic territory as detailed above.  Noncontrast images demonstrate subtle changes reflecting left MCA territory acute infarct.       01/25/18 IR Angiogram:  Cerebral angiogram demonstrates occlusion of the M1 segment of the left middle cerebral artery with good collateral flow from pial collaterals.  This was successfully removed and residual stenosis at the site of occlusion was angioplastied and stented with improvement in flow.  Slow flow to the left parietal lobe suggests area of infarcted tissue.  This was felt to represent TICI 2C  flow.    Cardiac Imaging   01/26/18 2D Echo w/ CFD:  CONCLUSIONS     1 - Normal left ventricular systolic function (EF 65-70%).     2 - Concentric remodeling.     3 - No wall motion abnormalities.     4 - Normal left ventricular diastolic function.     5 - Normal right ventricular systolic function .    6 - No LA enlargement.    Radha Fernandes MD  Comprehensive Stroke Center  Department of Vascular Neurology   [facility]

## 2018-02-16 NOTE — ASSESSMENT & PLAN NOTE
-Stroke risk factor, Hgb A1c 10%  -Currently on detemir 32 U bid, aspart 8 U q4h (TFs)  -Glucose 182-224 on current regimen.  Titrate up insulin as needed.

## 2018-02-16 NOTE — ASSESSMENT & PLAN NOTE
"48 yo M with PMHx HLD, LOYDA, poorly controlled DM II presents to [facility] 01/25/18 after noted "strange behavior" for which EMS was called.  Pt was found to have a L M1 occlusion and was taken to IR for thrombectomy with TICI 2C reperfusion and stent placement due to L MCA stenosis.  Etiology possibly atheroembolic with note of mild calcification in bilateral carotid bulbs on CTA.  Echo normal with no hx or signs of A fib.  Plan currently for PEG placement 02/19/18.  PT/OT/SLP will continue to evaluate to make placement recommendations.  Will resume DAPT 2/2 recent stent placement in angiogram after PEG placement.    Antithrombotics for secondary stroke prevention: ASA 81 mg po qd (clopidogrel held for PEG placement)   Statins: atorvastatin 80 mg po qd  Aggressive risk factor modification: HLD, DM II (Hgb A1c 10%), Obesity  Rehab efforts: PT/OT/SLP to evaluate and treat  Diagnostics ordered/pending: None  VTE prophylaxis: Heparin 5000 U q8h  BP parameters: Infarct: Post sucessful thrombectomy, SBP <140   "

## 2018-02-16 NOTE — ASSESSMENT & PLAN NOTE
"Noted on 2/15 CT Abd/pelvis: "0.8 cm right lower lobe pulmonary nodule. Per Fleischner Society 2017 guidelines; CT 6-12 months, if present followup every 2 years until 5 year followup complete."  -Repeat CT in 6-12 months  "

## 2018-02-16 NOTE — PT/OT/SLP EVAL
"Speech Language Pathology Evaluation  Cognitive Communication    Patient Name:  Todd Quevedo   MRN:  [id number]  Admitting Diagnosis: Embolic stroke involving left middle cerebral artery    Recommendations:     Recommendations:                General Recommendations:  Speech/language therapy, Cognitive-linguistic therapy and bedside swallow eval as appropriate  Diet recommendations:  NPO, NPO   Aspiration Precautions: Continue alternate means of nutrition and Strict aspiration precautions   General Precautions: Standard, aspiration, fall, NPO, aphasia  Communication strategies:  go to room if call light pushed    History:     History reviewed. No pertinent past medical history.    History reviewed. No pertinent surgical history.    Social History: Patient lives with spouse.    Prior Intubation HX:  1/31-2/13    Prior diet: reg/thin pta, npo prior to intubation.      Subjective   "He pulled that tube out"  Wife indicating pt self extubated a couple days ago, she reports no attempts to communicate with her    Nursing reports excessive secretions with poor management.  Pt somnolent with limited response to stim.  Bedside swallow assessment held 2/2 high aspiration risk.     Objective:   Cognitive Status:    Unable to assess 2/2 profound language impairment, poor alertness                 Receptive Language:   Comprehension:   severely impaired  Questions Simple yes/no no response via any modality  Commands  One step 0%     Pragmatics:    Max stim for pt to maintain eye opening for approx 30 seconds, no eye contact established     Expressive Language:  Verbal:    Severely impaired, no vocalizations observed in response to any stim  Nonverbal:   Pt with spontaneous movement of L hand and foot though not on command to aid nonverbal communication                 Motor Speech:  cont to assess, pt nonverbal     Voice:   cont to assess, pt nonverbal     Visual-Spatial:  tba     Reading:   tba      Written Expression: "   tba    Treatment: Education provided to pt's wife re: role of SLP and POC.  She indicated understanding.  Nursing alerted re: results of evaluation.     Assessment:   Todd Quevedo is a 47 y.o. male with an SLP diagnosis of severe Aphasia and Cognitive-Linguistic Impairment.  He presents with poor alertness with minimal response to stim.    Goals:    SLP Goals        Problem: SLP Goal    Goal Priority Disciplines Outcome   SLP Goal     SLP Ongoing (interventions implemented as appropriate)   Description:  Speech Language Pathology Goals  Goals expected to be met by 2/23  1. Pt will participate in bedside assessment of swallow to determine least restrictive diet as appropriate.  2. Pt will open eyes to stim x5/session given max cues.  3. Pt will follow simple commands x2/session given max cues.  4. Pt will answer basic y/n question via any modality x2/session given max cues.  5. Pt will vocalize in response to any stim x2/session given max cues.                           Plan:   · Patient to be seen:  3 x/week   · Plan of Care expires:  03/18/18  · Plan of Care reviewed with:  spouse, patient   · SLP Follow-Up:  Yes       Discharge recommendations:  Discharge Facility/Level Of Care Needs: nursing facility, skilled (pending progress, ?LTAC)   Barriers to Discharge:  Level of Skilled Assistance Needed      Time Tracking:   SLP Treatment Date:   02/16/18  Speech Start Time:  0813  Speech Stop Time:  0821     Speech Total Time (min):  8 min    Billable Minutes: EDWARD Restrepo, CCC-SLP  02/16/2018

## 2018-02-16 NOTE — ASSESSMENT & PLAN NOTE
-A1C-10  -poorly controlled diabetes  -BG >400 on arrival, s/p insulin gtt  -Detemir/aspart + SSI   -Continue to titrate tube feeds to goal

## 2018-02-16 NOTE — ASSESSMENT & PLAN NOTE
-Stroke risk factor, LDL invalid as TG > 400  -Continue atorvastatin 80 mg qd, repeat lipid panel as an outpatient

## 2018-02-16 NOTE — PLAN OF CARE
Problem: Patient Care Overview  Goal: Plan of Care Review  Outcome: Ongoing (interventions implemented as appropriate)  POC reviewed with pt at 0400. Pt cannot verbalize an understanding.  Pt's wife did verbalize an understanding. Questions and concerns addressed. No acute events overnight. Pt progressing toward goals. Will continue to monitor. See flowsheets for full assessment and VS info

## 2018-02-16 NOTE — SUBJECTIVE & OBJECTIVE
Interval History:   No acute events overnight.  Afebrile.  BUN elevated -> increase fluids and enteral water feedings.      Review of Systems   Reason unable to perform ROS: aphasic.   Constitutional: Negative for fever.   Neurological: Positive for speech difficulty and weakness.     Unable to obtain complete ROS 2/2 nonverbal and AMS.    Objective:     Vitals:  Temp: 98.5 °F (36.9 °C)  Pulse: 102  Rhythm: sinus tachycardia  BP: 114/71  MAP (mmHg): 88  Resp: (!) 27  SpO2: (!) 93 %  Oxygen Concentration (%): 40  O2 Device (Oxygen Therapy): Aerosol Mask    Temp  Min: 97.6 °F (36.4 °C)  Max: 99.9 °F (37.7 °C)  Pulse  Min: 92  Max: 117  BP  Min: 98/60  Max: 160/80  MAP (mmHg)  Min: 75  Max: 122  Resp  Min: 2  Max: 32  SpO2  Min: 90 %  Max: 100 %  Oxygen Concentration (%)  Min: 40  Max: 40    02/15 0701 - 02/16 0700  In: 480 [I.V.:10]  Out: 975 [Urine:975]   Unmeasured Output  Urine Occurrence: 1  Stool Occurrence: 1  Pad Count: 1       Physical Exam   Constitutional: He appears well-developed.   Cardiovascular: Normal rate, regular rhythm and normal heart sounds.    Pulmonary/Chest: Effort normal. No respiratory distress.   Coarse breath sounds b/l lung fields   Abdominal: Soft. Bowel sounds are normal.   Neurological:   E2 V1 M5  Open eyes at times to deep pain  PERRL  Oculocephalic intact  Blinks to threat b/l  Right facial droop  Localize to pain with left, not across midline  Right hemiparesis   Skin: Skin is warm.   Vitals reviewed.        Medications:  Continuous Scheduled    albuterol-ipratropium 2.5mg-0.5mg/3mL 3 mL Q6H   aspirin 81 mg Daily   atorvastatin 80 mg Daily   heparin (porcine) 5,000 Units Q8H   insulin aspart 8 Units Q4H   insulin detemir 32 Units BID   lisinopril 20 mg Daily   metoprolol tartrate 25 mg BID   modafinil 200 mg Daily   And     modafinil 100 mg Daily   polyethylene glycol 17 g Daily   senna-docusate 8.6-50 mg 1 tablet BID   sodium chloride 0.9% 3 mL Q8H   sodium chloride 3% 4 mL Q6H    PRN    acetaminophen 650 mg Q6H PRN   acetaminophen 650 mg Q6H PRN   albuterol-ipratropium 2.5mg-0.5mg/3mL 3 mL Q4H PRN   bisacodyl 10 mg Daily PRN   dextrose 50% 12.5 g PRN   glucagon (human recombinant) 1 mg PRN   hydrALAZINE 10 mg Q4H PRN   insulin aspart 1-10 Units Q4H PRN   labetalol 10 mg Q4H PRN   magnesium sulfate IVPB 2 g PRN   magnesium sulfate IVPB 4 g PRN   ondansetron 4 mg Q8H PRN   potassium chloride 10% 40 mEq PRN   potassium chloride 10% 40 mEq PRN   sodium chloride 0.9% 5 mL PRN     Today I personally reviewed pertinent medications, lines/drains/airways, imaging, cardiology, lab results, microbiology results, notably:    Diet  Diet NPO  Diet NPO    Recent Results (from the past 24 hour(s))   POCT glucose    Collection Time: 02/15/18  5:49 PM   Result Value Ref Range    POCT Glucose 182 (H) 70 - 110 mg/dL   POCT glucose    Collection Time: 02/15/18  9:21 PM   Result Value Ref Range    POCT Glucose 196 (H) 70 - 110 mg/dL   Comprehensive metabolic panel    Collection Time: 02/16/18  2:08 AM   Result Value Ref Range    Sodium 141 136 - 145 mmol/L    Potassium 3.9 3.5 - 5.1 mmol/L    Chloride 101 95 - 110 mmol/L    CO2 29 23 - 29 mmol/L    Glucose 224 (H) 70 - 110 mg/dL    BUN, Bld 30 (H) 6 - 20 mg/dL    Creatinine 1.1 0.5 - 1.4 mg/dL    Calcium 10.1 8.7 - 10.5 mg/dL    Total Protein 8.3 6.0 - 8.4 g/dL    Albumin 2.7 (L) 3.5 - 5.2 g/dL    Total Bilirubin 1.1 (H) 0.1 - 1.0 mg/dL    Alkaline Phosphatase 151 (H) 55 - 135 U/L    AST 41 (H) 10 - 40 U/L    ALT 42 10 - 44 U/L    Anion Gap 11 8 - 16 mmol/L    eGFR if African American >60.0 >60 mL/min/1.73 m^2    eGFR if non African American >60.0 >60 mL/min/1.73 m^2   CBC auto differential    Collection Time: 02/16/18  2:08 AM   Result Value Ref Range    WBC 16.59 (H) 3.90 - 12.70 K/uL    RBC 4.62 4.60 - 6.20 M/uL    Hemoglobin 13.6 (L) 14.0 - 18.0 g/dL    Hematocrit 40.9 40.0 - 54.0 %    MCV 89 82 - 98 fL    MCH 29.4 27.0 - 31.0 pg    MCHC 33.3 32.0 - 36.0  "g/dL    RDW 12.3 11.5 - 14.5 %    Platelets 685 (H) 150 - 350 K/uL    MPV 9.6 9.2 - 12.9 fL    Immature Granulocytes 0.5 0.0 - 0.5 %    Gran # (ANC) 13.0 (H) 1.8 - 7.7 K/uL    Immature Grans (Abs) 0.09 (H) 0.00 - 0.04 K/uL    Lymph # 2.0 1.0 - 4.8 K/uL    Mono # 1.4 (H) 0.3 - 1.0 K/uL    Eos # 0.1 0.0 - 0.5 K/uL    Baso # 0.06 0.00 - 0.20 K/uL    nRBC 0 0 /100 WBC    Gran% 78.5 (H) 38.0 - 73.0 %    Lymph% 12.2 (L) 18.0 - 48.0 %    Mono% 8.1 4.0 - 15.0 %    Eosinophil% 0.3 0.0 - 8.0 %    Basophil% 0.4 0.0 - 1.9 %    Differential Method Automated    Magnesium    Collection Time: 02/16/18  2:08 AM   Result Value Ref Range    Magnesium 2.1 1.6 - 2.6 mg/dL   Phosphorus    Collection Time: 02/16/18  2:08 AM   Result Value Ref Range    Phosphorus 4.0 2.7 - 4.5 mg/dL   Protime-INR    Collection Time: 02/16/18  2:08 AM   Result Value Ref Range    Prothrombin Time 10.9 9.0 - 12.5 sec    INR 1.1 0.8 - 1.2   POCT glucose    Collection Time: 02/16/18  2:49 AM   Result Value Ref Range    POCT Glucose 192 (H) 70 - 110 mg/dL   POCT glucose    Collection Time: 02/16/18  5:46 AM   Result Value Ref Range    POCT Glucose 192 (H) 70 - 110 mg/dL   POCT glucose    Collection Time: 02/16/18  8:57 AM   Result Value Ref Range    POCT Glucose 222 (H) 70 - 110 mg/dL   POCT glucose    Collection Time: 02/16/18  1:22 PM   Result Value Ref Range    POCT Glucose 206 (H) 70 - 110 mg/dL       Imaging  2/15 CT Abd/pelvis: Per report,  "Segment of the anterior gastric wall closely apposed to the adjacent abdominal wall with suspected intervening diaphragm but no overlying liver, bowel or fluid.  Cholelithiasis.  Marked dense coronary artery atherosclerosis.  0.8 cm right lower lobe pulmonary nodule. Per Fleischner Society 2017 guidelines; CT 6-12 months, if present followup every 2 years until 5 year followup complete."    2/15 XR Abd: Per independent resident review and interpretation,  NGT in stomach.    "

## 2018-02-17 PROBLEM — R91.1 NODULE OF RIGHT LUNG: Status: ACTIVE | Noted: 2018-02-17

## 2018-02-17 LAB
ALBUMIN SERPL BCP-MCNC: 2.4 G/DL
ALP SERPL-CCNC: 143 U/L
ALT SERPL W/O P-5'-P-CCNC: 36 U/L
ANION GAP SERPL CALC-SCNC: 12 MMOL/L
AST SERPL-CCNC: 29 U/L
BASOPHILS # BLD AUTO: 0.04 K/UL
BASOPHILS NFR BLD: 0.2 %
BILIRUB SERPL-MCNC: 0.6 MG/DL
BUN SERPL-MCNC: 28 MG/DL
CALCIUM SERPL-MCNC: 9.6 MG/DL
CHLORIDE SERPL-SCNC: 102 MMOL/L
CO2 SERPL-SCNC: 28 MMOL/L
CREAT SERPL-MCNC: 1 MG/DL
DIFFERENTIAL METHOD: ABNORMAL
EOSINOPHIL # BLD AUTO: 0.1 K/UL
EOSINOPHIL NFR BLD: 0.7 %
ERYTHROCYTE [DISTWIDTH] IN BLOOD BY AUTOMATED COUNT: 12.2 %
EST. GFR  (AFRICAN AMERICAN): >60 ML/MIN/1.73 M^2
EST. GFR  (NON AFRICAN AMERICAN): >60 ML/MIN/1.73 M^2
GLUCOSE SERPL-MCNC: 190 MG/DL
HCT VFR BLD AUTO: 38.5 %
HGB BLD-MCNC: 12.9 G/DL
IMM GRANULOCYTES # BLD AUTO: 0.07 K/UL
IMM GRANULOCYTES NFR BLD AUTO: 0.4 %
INR PPP: 1
LYMPHOCYTES # BLD AUTO: 2.7 K/UL
LYMPHOCYTES NFR BLD: 16.6 %
MAGNESIUM SERPL-MCNC: 1.8 MG/DL
MCH RBC QN AUTO: 29.2 PG
MCHC RBC AUTO-ENTMCNC: 33.5 G/DL
MCV RBC AUTO: 87 FL
MONOCYTES # BLD AUTO: 1.2 K/UL
MONOCYTES NFR BLD: 7.4 %
NEUTROPHILS # BLD AUTO: 11.9 K/UL
NEUTROPHILS NFR BLD: 74.7 %
NRBC BLD-RTO: 0 /100 WBC
PHOSPHATE SERPL-MCNC: 4 MG/DL
PLATELET # BLD AUTO: 583 K/UL
PMV BLD AUTO: 9.8 FL
POCT GLUCOSE: 157 MG/DL (ref 70–110)
POCT GLUCOSE: 165 MG/DL (ref 70–110)
POCT GLUCOSE: 171 MG/DL (ref 70–110)
POCT GLUCOSE: 188 MG/DL (ref 70–110)
POCT GLUCOSE: 190 MG/DL (ref 70–110)
POCT GLUCOSE: 209 MG/DL (ref 70–110)
POTASSIUM SERPL-SCNC: 3.9 MMOL/L
PROT SERPL-MCNC: 7.8 G/DL
PROTHROMBIN TIME: 10.6 SEC
RBC # BLD AUTO: 4.42 M/UL
SODIUM SERPL-SCNC: 142 MMOL/L
WBC # BLD AUTO: 16.01 K/UL

## 2018-02-17 PROCEDURE — 83735 ASSAY OF MAGNESIUM: CPT | Mod: 91

## 2018-02-17 PROCEDURE — 85610 PROTHROMBIN TIME: CPT

## 2018-02-17 PROCEDURE — 80053 COMPREHEN METABOLIC PANEL: CPT

## 2018-02-17 PROCEDURE — 25000003 PHARM REV CODE 250: Performed by: PHYSICIAN ASSISTANT

## 2018-02-17 PROCEDURE — 97530 THERAPEUTIC ACTIVITIES: CPT

## 2018-02-17 PROCEDURE — 25000242 PHARM REV CODE 250 ALT 637 W/ HCPCS: Performed by: PSYCHIATRY & NEUROLOGY

## 2018-02-17 PROCEDURE — 85025 COMPLETE CBC W/AUTO DIFF WBC: CPT

## 2018-02-17 PROCEDURE — 94640 AIRWAY INHALATION TREATMENT: CPT

## 2018-02-17 PROCEDURE — 25000003 PHARM REV CODE 250: Performed by: PSYCHIATRY & NEUROLOGY

## 2018-02-17 PROCEDURE — G8979 MOBILITY GOAL STATUS: HCPCS | Mod: CN

## 2018-02-17 PROCEDURE — G8978 MOBILITY CURRENT STATUS: HCPCS | Mod: CN

## 2018-02-17 PROCEDURE — 31720 CLEARANCE OF AIRWAYS: CPT

## 2018-02-17 PROCEDURE — 20000000 HC ICU ROOM

## 2018-02-17 PROCEDURE — G8980 MOBILITY D/C STATUS: HCPCS | Mod: CN

## 2018-02-17 PROCEDURE — A4216 STERILE WATER/SALINE, 10 ML: HCPCS | Performed by: NURSE PRACTITIONER

## 2018-02-17 PROCEDURE — 27000221 HC OXYGEN, UP TO 24 HOURS

## 2018-02-17 PROCEDURE — 99233 SBSQ HOSP IP/OBS HIGH 50: CPT | Mod: ,,, | Performed by: NURSE PRACTITIONER

## 2018-02-17 PROCEDURE — 25000003 PHARM REV CODE 250: Performed by: NURSE PRACTITIONER

## 2018-02-17 PROCEDURE — 94761 N-INVAS EAR/PLS OXIMETRY MLT: CPT

## 2018-02-17 PROCEDURE — 63600175 PHARM REV CODE 636 W HCPCS: Performed by: STUDENT IN AN ORGANIZED HEALTH CARE EDUCATION/TRAINING PROGRAM

## 2018-02-17 PROCEDURE — 84100 ASSAY OF PHOSPHORUS: CPT

## 2018-02-17 PROCEDURE — 97110 THERAPEUTIC EXERCISES: CPT

## 2018-02-17 RX ORDER — IPRATROPIUM BROMIDE AND ALBUTEROL SULFATE 2.5; .5 MG/3ML; MG/3ML
3 SOLUTION RESPIRATORY (INHALATION) EVERY 6 HOURS
Status: DISCONTINUED | OUTPATIENT
Start: 2018-02-17 | End: 2018-02-22 | Stop reason: RX

## 2018-02-17 RX ORDER — IPRATROPIUM BROMIDE AND ALBUTEROL SULFATE 2.5; .5 MG/3ML; MG/3ML
3 SOLUTION RESPIRATORY (INHALATION) EVERY 4 HOURS
Status: DISCONTINUED | OUTPATIENT
Start: 2018-02-17 | End: 2018-02-17

## 2018-02-17 RX ORDER — ALBUTEROL SULFATE 0.83 MG/ML
2.5 SOLUTION RESPIRATORY (INHALATION) EVERY 4 HOURS PRN
Status: DISCONTINUED | OUTPATIENT
Start: 2018-02-17 | End: 2018-02-22 | Stop reason: SDUPTHER

## 2018-02-17 RX ORDER — IPRATROPIUM BROMIDE 0.5 MG/2.5ML
0.5 SOLUTION RESPIRATORY (INHALATION) EVERY 4 HOURS PRN
Status: DISCONTINUED | OUTPATIENT
Start: 2018-02-17 | End: 2018-03-21 | Stop reason: HOSPADM

## 2018-02-17 RX ADMIN — INSULIN DETEMIR 32 UNITS: 100 INJECTION, SOLUTION SUBCUTANEOUS at 09:02

## 2018-02-17 RX ADMIN — INSULIN ASPART 4 UNITS: 100 INJECTION, SOLUTION INTRAVENOUS; SUBCUTANEOUS at 02:02

## 2018-02-17 RX ADMIN — SODIUM CHLORIDE SOLN NEBU 3% 4 ML: 3 NEBU SOLN at 08:02

## 2018-02-17 RX ADMIN — INSULIN ASPART 8 UNITS: 100 INJECTION, SOLUTION INTRAVENOUS; SUBCUTANEOUS at 10:02

## 2018-02-17 RX ADMIN — LISINOPRIL 20 MG: 20 TABLET ORAL at 09:02

## 2018-02-17 RX ADMIN — INSULIN ASPART 8 UNITS: 100 INJECTION, SOLUTION INTRAVENOUS; SUBCUTANEOUS at 09:02

## 2018-02-17 RX ADMIN — ATORVASTATIN CALCIUM 80 MG: 20 TABLET, FILM COATED ORAL at 09:02

## 2018-02-17 RX ADMIN — STANDARDIZED SENNA CONCENTRATE AND DOCUSATE SODIUM 1 TABLET: 8.6; 5 TABLET, FILM COATED ORAL at 09:02

## 2018-02-17 RX ADMIN — MODAFINIL 200 MG: 100 TABLET ORAL at 05:02

## 2018-02-17 RX ADMIN — METOPROLOL TARTRATE 25 MG: 25 TABLET ORAL at 09:02

## 2018-02-17 RX ADMIN — INSULIN ASPART 2 UNITS: 100 INJECTION, SOLUTION INTRAVENOUS; SUBCUTANEOUS at 05:02

## 2018-02-17 RX ADMIN — INSULIN ASPART 8 UNITS: 100 INJECTION, SOLUTION INTRAVENOUS; SUBCUTANEOUS at 01:02

## 2018-02-17 RX ADMIN — HEPARIN SODIUM 5000 UNITS: 5000 INJECTION, SOLUTION INTRAVENOUS; SUBCUTANEOUS at 02:02

## 2018-02-17 RX ADMIN — SODIUM CHLORIDE SOLN NEBU 3% 4 ML: 3 NEBU SOLN at 01:02

## 2018-02-17 RX ADMIN — Medication 3 ML: at 02:02

## 2018-02-17 RX ADMIN — ACETAMINOPHEN 650 MG: 325 TABLET, FILM COATED ORAL at 05:02

## 2018-02-17 RX ADMIN — INSULIN ASPART 2 UNITS: 100 INJECTION, SOLUTION INTRAVENOUS; SUBCUTANEOUS at 10:02

## 2018-02-17 RX ADMIN — HEPARIN SODIUM 5000 UNITS: 5000 INJECTION, SOLUTION INTRAVENOUS; SUBCUTANEOUS at 09:02

## 2018-02-17 RX ADMIN — ASPIRIN 81 MG CHEWABLE TABLET 81 MG: 81 TABLET CHEWABLE at 09:02

## 2018-02-17 RX ADMIN — INSULIN ASPART 8 UNITS: 100 INJECTION, SOLUTION INTRAVENOUS; SUBCUTANEOUS at 02:02

## 2018-02-17 RX ADMIN — Medication 3 ML: at 09:02

## 2018-02-17 RX ADMIN — IPRATROPIUM BROMIDE AND ALBUTEROL SULFATE 3 ML: .5; 3 SOLUTION RESPIRATORY (INHALATION) at 01:02

## 2018-02-17 RX ADMIN — INSULIN ASPART 8 UNITS: 100 INJECTION, SOLUTION INTRAVENOUS; SUBCUTANEOUS at 05:02

## 2018-02-17 RX ADMIN — INSULIN ASPART 1 UNITS: 100 INJECTION, SOLUTION INTRAVENOUS; SUBCUTANEOUS at 01:02

## 2018-02-17 RX ADMIN — IPRATROPIUM BROMIDE AND ALBUTEROL SULFATE 3 ML: .5; 3 SOLUTION RESPIRATORY (INHALATION) at 08:02

## 2018-02-17 RX ADMIN — Medication 3 ML: at 05:02

## 2018-02-17 RX ADMIN — MODAFINIL 100 MG: 100 TABLET ORAL at 02:02

## 2018-02-17 RX ADMIN — HEPARIN SODIUM 5000 UNITS: 5000 INJECTION, SOLUTION INTRAVENOUS; SUBCUTANEOUS at 05:02

## 2018-02-17 RX ADMIN — ACETAMINOPHEN 650 MG: 325 TABLET, FILM COATED ORAL at 09:02

## 2018-02-17 RX ADMIN — ALBUTEROL SULFATE 2.5 MG: 2.5 SOLUTION RESPIRATORY (INHALATION) at 01:02

## 2018-02-17 RX ADMIN — POLYETHYLENE GLYCOL 3350 17 G: 17 POWDER, FOR SOLUTION ORAL at 09:02

## 2018-02-17 RX ADMIN — IPRATROPIUM BROMIDE 0.5 MG: 0.5 SOLUTION RESPIRATORY (INHALATION) at 08:02

## 2018-02-17 RX ADMIN — INSULIN ASPART 1 UNITS: 100 INJECTION, SOLUTION INTRAVENOUS; SUBCUTANEOUS at 09:02

## 2018-02-17 NOTE — ASSESSMENT & PLAN NOTE
-A1C-10  -poorly controlled diabetes  -BG >400 on arrival, s/p insulin gtt  -Detemir/aspart + SSI

## 2018-02-17 NOTE — PLAN OF CARE
Problem: Physical Therapy Goal  Goal: Physical Therapy Goal  Outcome: Outcome(s) achieved Date Met: 02/17/18    Pt is not appropriate for acute skilled physical therapy services at this time 2/2 to dependent for all functional mobility, inability to follow commands, and inability to open eyes to light touch, sternal rub, or pain stimulus. Decreased POC to OT only. PT will follow up when medically appropriate.     Ivette Rodriges, SPT   2/17/18

## 2018-02-17 NOTE — PT/OT/SLP EVAL
Physical Therapy Reassement / Discharge Summary     Patient Name:  Todd Quevedo   MRN:  [id number]    Recommendations:     Discharge Recommendations:  SNF   Discharge Equipment Recommendations: wheelchair and hospital bed   Barriers to discharge: Decreased caregiver support    Assessment:     Todd Quevedo is a 47 y.o. male admitted with a medical diagnosis of Embolic stroke involving left middle cerebral artery.  He presents with the following impairments/functional limitations:  weakness, impaired endurance, impaired sensation, impaired self care skills, impaired functional mobilty, gait instability, impaired balance, visual deficits, impaired cognition, decreased upper extremity function, decreased lower extremity function, impaired coordination. Pt unable to arouse during bed mobility, therapeutic exercise, or with auditory, tactile and noxious stimuli. Pt requiring total dependence for bed mobility this date. Due to the above, pt is not appropriate for acute skilled PT services; Will defer to OT until pt is medically appropriate.     Rehab Prognosis:  Fair; patient would benefit from acute skilled PT services to address these deficits and reach maximum level of function.        Plan:      D/C from PT services this date 2/2 to level of arousal    Plan of Care Reviewed with: patient    Subjective     Communicated with RN prior to session.  Patient found with bed in chair position upon PT entry to room, agreeable to evaluation.      Chief Complaint: pt is nonverbal   Pain/Comfort:  · Pain Rating 1: 0/10    Patients cultural, spiritual, Anabaptism conflicts given the current situation: none noted    Living Environment:  Per previous documentation, pt lives with his wife near [place], [place] in a [address] [facility] with 0 [address].  Pt was independent, working and driving prior to hospitalization. Pt's wife works nights at hospital and therefore has limited ability to assist.     Patient has the following equipment: none.  DME owned  (not currently used): none.  Upon discharge, family assistance will be limited.     Objective:     Patient found with: telemetry, pressure relief boots, bed alarm, blood pressure cuff, Condom Catheter, NG tube, SCD, peripheral IV, pulse ox (continuous), oxygen     General Precautions: Standard, aspiration, fall, aphasia   Orthopedic Precautions:N/A   Braces: N/A     Exams:  · Postural Exam:  Patient presented with the following abnormalities:    · -       Pt demo increased lateral lean to R while laying with bed in chair position. RLE presents in hip abduction, external rotation with knee flexion. LLE in external rotation.   · RUE ROM: PROM WFL  · LUE ROM: PROM WFL; spontaneous movement noted   · RLE ROM:PROM WFL  · LLE ROM: PROM WFL; spontaneous movement noted   · Unable to assess cognition, coordination, strength or sensation this date, secondary to pt inability to arouse to auditory, tactile or noxious stimuli.    Functional Mobility:  · Bed Mobility:     · Rolling Left:  dependence x2  · Rolling Right: dependence x2  · HOB Scooting: dependence x2 utilizing drawsheet to obtain midline position   · Transfers: NT 2/2 to pt inability to arouse/follow commands    AM-PAC 6 CLICK MOBILITY  Total Score:6     Therapeutic Activities and Exercises:  PT arrived to pt room to find pt resting with bed in chair position. Pt unable to arouse this date, despite multiple auditory, tactile and noxious stimuli.  Noted spontaneous movement of LUE/LLE through out session. PT applied PROM to bilateral UE and LE within all planes of motion; pt continue to demo inability to arouse. VSS through duration of session. RN notified of pt functional mobility needs and response to treatment.     Patient left HOB elevated with all lines intact, call button in reach, bed alarm on, restraints reapplied at end of session and RN notified.      History:     History reviewed. No pertinent past medical history.    History reviewed. No pertinent  surgical history.      Time Tracking:     PT Received On: 02/17/18  PT Start Time: 0754     PT Stop Time: 0822  PT Total Time (min): 28 min     Billable Minutes: Therapeutic Activity 20 and Therapeutic Exercise 8      DAVID Crockett  02/17/2018

## 2018-02-17 NOTE — SUBJECTIVE & OBJECTIVE
Review of Systems  Unable to obtain a complete ROS due to -aphasic  Objective:     Vitals:  Temp: 99.3 °F (37.4 °C)  Pulse: 97  Rhythm: normal sinus rhythm  BP: 117/79  MAP (mmHg): 95  Resp: (!) 23  SpO2: 100 %  Oxygen Concentration (%): 40  O2 Device (Oxygen Therapy): Aerosol Mask    Temp  Min: 99.3 °F (37.4 °C)  Max: 100 °F (37.8 °C)  Pulse  Min: 88  Max: 106  BP  Min: 108/70  Max: 144/81  MAP (mmHg)  Min: 83  Max: 108  Resp  Min: 10  Max: 27  SpO2  Min: 90 %  Max: 100 %  Oxygen Concentration (%)  Min: 40  Max: 40    02/16 0701 - 02/17 0700  In: 2640   Out: 905 [Urine:905]   Unmeasured Output  Urine Occurrence: 1  Stool Occurrence: 0  Pad Count: 1       Physical Exam    Physical Exam:  GA:  comfortable, no acute distress, R facial droop, well developed  HEENT: No scleral icterus or JVD.   Pulmonary: Course to auscultation A.   Cardiac: RRR S1 & S2 w/o rubs/murmurs/gallops.   Abdominal: Bowel sounds present x 4. No appreciable hepatosplenomegaly.  Skin: No jaundice, rashes, or visible lesions.  Neuro:  --GCS: E2 V1 M4  --Mental Status:  Intermittently opens eyes to painful stimuli, aphasic  --CN II-XII grossly intact.   --Pupils 3mm, PERRL.   --Corneal reflex, gag, cough intact.  --LUE: spontanous  --RUE: withdraws to pain  --LLE: spontaneous  --RLE: withdraws to pain  -- Sensation: unable to assess  Medications:  Continuous Scheduled  albuterol-ipratropium 2.5mg-0.5mg/3mL 3 mL Q6H   aspirin 81 mg Daily   atorvastatin 80 mg Daily   heparin (porcine) 5,000 Units Q8H   insulin aspart 8 Units Q4H   insulin detemir 32 Units BID   lisinopril 20 mg Daily   metoprolol tartrate 25 mg BID   modafinil 200 mg Daily   And     modafinil 100 mg Daily   polyethylene glycol 17 g Daily   senna-docusate 8.6-50 mg 1 tablet BID   sodium chloride 0.9% 3 mL Q8H   sodium chloride 3% 4 mL Q6H   PRN  acetaminophen 650 mg Q6H PRN   acetaminophen 650 mg Q6H PRN   albuterol sulfate 2.5 mg Q4H PRN   albuterol-ipratropium 2.5mg-0.5mg/3mL 3  mL Q4H PRN   bisacodyl 10 mg Daily PRN   dextrose 50% 12.5 g PRN   glucagon (human recombinant) 1 mg PRN   hydrALAZINE 10 mg Q4H PRN   insulin aspart 1-10 Units Q4H PRN   ipratropium 0.5 mg Q4H PRN   labetalol 10 mg Q4H PRN   magnesium sulfate IVPB 2 g PRN   magnesium sulfate IVPB 4 g PRN   ondansetron 4 mg Q8H PRN   potassium chloride 10% 40 mEq PRN   potassium chloride 10% 40 mEq PRN   sodium chloride 0.9% 5 mL PRN     Today I personally reviewed pertinent medications, lines/drains/airways, imaging, lab results, microbiology results,

## 2018-02-17 NOTE — PLAN OF CARE
Problem: Patient Care Overview  Goal: Plan of Care Review  Outcome: Ongoing (interventions implemented as appropriate)  POC reviewed with patient and sibling at bedside at 1200. Patient's sister acknowledged understanding of POC; patient is unable. Patient has remained free of falls, injuries and skin breakdown for the duration of the shift. VSS. No acute distress noted. No evidence of pain. Plans: Continue TF, bowel regimen; PEG scheduled for Monday. Will continue to monitor and update as needed.

## 2018-02-17 NOTE — PROGRESS NOTES
[facility]  Neurocritical Care  Progress Note    Admit Date: 1/25/2018  Service Date: 02/17/2018  Length of Stay: 23    Subjective:     Chief Complaint: Embolic stroke involving left middle cerebral artery    History of Present Illness: The patient is a 47 year old male with no known PMHx admitted to [facility]   s/p thrombectomy of L M1 occlusion. Per chart review, he   walked into [place] and was acting abnormal.  EMS was called and brought to the ED for concern of L MCA syndrome. CT MP revealed short segment occlusion of left M1.  Patient went to IR for thrombectomy of L M1 occlusion seen on CTA MP.  TICI2C reperfusion and angioplasty. Patient admitted to [facility] for close monitoring and higher level of care.   History obtained from chart review only            Hospital Course: 1/25: Pt admitted to [facility] s/p L M1 thrombectomy, TICI2C reperfusion and angioplasty   1/27: large L MCA, hemicrani watch, NSGY following, insulin ggt, cardene ggt, L sided intermittent slowing eeg but no sz, +nasal trumpet for copious thick secretions, wheezing this am - improved with duo nebs, 3% nebs, and cough assist, concern for sepsis 2/2 hemodynamic changes - increased HR and BP, worsening leukocytosis, +ceftriaxone, pan cx, adrian track  1/28: continued respiratory challenges due to secretions. Aggressive pulmonary toileting. Continue monitoring for neuro worsening. Add moxifloxacin.   1/29: CTH to eval for increased edema/shift, EEG afterwards. Concern for decrease exam, no following/decreased alertness). CXR and Procal to follow leukocytosis. Hold TF until eval decreasing EXAM  1/30: neuro exam stable, increase 2%, current amount of sodium iv not suffuicient to meet target 145-155, cont abx for likely pneumonia, repeat ct in am  1/31: abrupt desaturation today requiring emergent intubation followed by bronchoscopy  Etiology likely upper airway obstruction from dried secretions  02/01: stable on vent overnight, marked  improvement on today's chest x ray, switch to spontaneous today, sodium stable at 150, start tfs  02/02: moves left side spontaneously and opens eyes off sedation,  02/03: 2% buffered NS re-started at 20mL/hr for rapid decrease in serum sodium. Enteral free water flushes discontinued. Patient has copious secretions likely pneumonia. Holding dexamethasone  2/4:  No acute events overnight.  Sodium stable.  2/5: transition insulin infusion to subcutaneous insulin   2/7: gen surg consulted for trach/peg  2/9: increased Aspart and Detemir, pending Trach/Peg placement next week.  2/10: No changes overnight . Pending PEG and trach, WBC mildly elevated  12K  2/11: No changes overnight . Pending PEG and trach, WBC mildly elevated  13K. Procal was low and lower than prior. Patient got 16 units of SSI yesterday.  2/13: NAEO.  Pending peg/trach tomorrow. But then self-extubated.  2/14: Tolerating face mask well.  Holding tube feeds.   2/15: IR consult placed, for PEG. ABD ct and NG placed in preparation for procedure.  2/16: NAEO.  Pending PEG.  2/17: NAEON, Pending PEG placement on Monday        Review of Systems  Unable to obtain a complete ROS due to -aphasic  Objective:     Vitals:  Temp: 99.3 °F (37.4 °C)  Pulse: 97  Rhythm: normal sinus rhythm  BP: 117/79  MAP (mmHg): 95  Resp: (!) 23  SpO2: 100 %  Oxygen Concentration (%): 40  O2 Device (Oxygen Therapy): Aerosol Mask    Temp  Min: 99.3 °F (37.4 °C)  Max: 100 °F (37.8 °C)  Pulse  Min: 88  Max: 106  BP  Min: 108/70  Max: 144/81  MAP (mmHg)  Min: 83  Max: 108  Resp  Min: 10  Max: 27  SpO2  Min: 90 %  Max: 100 %  Oxygen Concentration (%)  Min: 40  Max: 40    02/16 0701 - 02/17 0700  In: 2640   Out: 905 [Urine:905]   Unmeasured Output  Urine Occurrence: 1  Stool Occurrence: 0  Pad Count: 1       Physical Exam    Physical Exam:  GA:  comfortable, no acute distress, R facial droop, well developed  HEENT: No scleral icterus or JVD.   Pulmonary: Course to auscultation A.    Cardiac: RRR S1 & S2 w/o rubs/murmurs/gallops.   Abdominal: Bowel sounds present x 4. No appreciable hepatosplenomegaly.  Skin: No jaundice, rashes, or visible lesions.  Neuro:  --GCS: E2 V1 M4  --Mental Status:  Intermittently opens eyes to painful stimuli, aphasic  --CN II-XII grossly intact.   --Pupils 3mm, PERRL.   --Corneal reflex, gag, cough intact.  --LUE: spontanous  --RUE: withdraws to pain  --LLE: spontaneous  --RLE: withdraws to pain  -- Sensation: unable to assess  Medications:  Continuous Scheduled  albuterol-ipratropium 2.5mg-0.5mg/3mL 3 mL Q6H   aspirin 81 mg Daily   atorvastatin 80 mg Daily   heparin (porcine) 5,000 Units Q8H   insulin aspart 8 Units Q4H   insulin detemir 32 Units BID   lisinopril 20 mg Daily   metoprolol tartrate 25 mg BID   modafinil 200 mg Daily   And     modafinil 100 mg Daily   polyethylene glycol 17 g Daily   senna-docusate 8.6-50 mg 1 tablet BID   sodium chloride 0.9% 3 mL Q8H   sodium chloride 3% 4 mL Q6H   PRN  acetaminophen 650 mg Q6H PRN   acetaminophen 650 mg Q6H PRN   albuterol sulfate 2.5 mg Q4H PRN   albuterol-ipratropium 2.5mg-0.5mg/3mL 3 mL Q4H PRN   bisacodyl 10 mg Daily PRN   dextrose 50% 12.5 g PRN   glucagon (human recombinant) 1 mg PRN   hydrALAZINE 10 mg Q4H PRN   insulin aspart 1-10 Units Q4H PRN   ipratropium 0.5 mg Q4H PRN   labetalol 10 mg Q4H PRN   magnesium sulfate IVPB 2 g PRN   magnesium sulfate IVPB 4 g PRN   ondansetron 4 mg Q8H PRN   potassium chloride 10% 40 mEq PRN   potassium chloride 10% 40 mEq PRN   sodium chloride 0.9% 5 mL PRN     Today I personally reviewed pertinent medications, lines/drains/airways, imaging, lab results, microbiology results,         Assessment/Plan:     Neuro   * Embolic stroke involving left middle cerebral artery    -s/p L M1 thrombectomy  --160  -repeat imaging stable   -ASA, plavix ---> HOLDING PLAVIX in preparation for potential PEG  -statin   -heparin dvt ppx  -continue modafinil, consider increase to 200  "bid  -PT/OT                Cytotoxic cerebral edema    monitor CTh and exam for increasing edema  1/31: sodium improved to goal, cont 2%  02/01: equibrated sodium 150-151, cont 2% at current rate  2/2: 2% discontinued and enteral free water boluses started at 200mL QID  2/3: 2% re-started and enteral free water discontinued  2/4: Na 146          Pulmonary   Nodule of right lung    Noted on 2/15 CT Abd/pelvis: "0.8 cm right lower lobe pulmonary nodule. Per Fleischner Society 2017 guidelines; CT 6-12 months, if present followup every 2 years until 5 year followup complete."  -Repeat CT in 6-12 months        Pneumonia    - Completed 7d course of ceftriaxone on 2/2  - Peg pending           Acute respiratory failure with hypoxia    -Due to airway obstruction form hardened secretions overlying epiglottis and soft palate, improved after intubation and bronchoscopy  -Self-extubated on 2/13, subsequently placed on face mask which he has been tolerating well.          Cardiac/Vascular   Hyperlipidemia    -atorvastatin 80 daily           Essential hypertension    --160  -metoprolol 25 mg bid  -lisinopril 20 mg Qdaily  -PRN Labetalol & Hydralazine   -Echo 1/26:1 - Normal left ventricular systolic function (EF 65-70%).     2 - Concentric remodeling.     3 - No wall motion abnormalities.     4 - Normal left ventricular diastolic function.     5 - Normal right ventricular systolic function .            Oncology   Leukocytosis    -WBC to 15 on 2/14 after self-extubation on 2/13 (tube feeds were running at the time, possible aspiration?)  -Consider aspiration pneumonia coverage/cultures if pt develops fever or if WBC continues to increase.  -CXR on 2/15 AM, no acute changes.  -Continue to monitor closely        Endocrine   Type 2 diabetes mellitus    -A1C-10  -poorly controlled diabetes  -BG >400 on arrival, s/p insulin gtt  -Detemir/aspart + SSI               Prophylaxis:  Venous Thromboembolism: chemical  Stress Ulcer: " NA  Ventilator Pneumonia: not applicable       Full Code    Isha Gauthier NP  Neurocritical Care  [facility]

## 2018-02-18 LAB
ALBUMIN SERPL BCP-MCNC: 2.3 G/DL
ALP SERPL-CCNC: 141 U/L
ALT SERPL W/O P-5'-P-CCNC: 29 U/L
ANION GAP SERPL CALC-SCNC: 12 MMOL/L
AST SERPL-CCNC: 26 U/L
BASOPHILS # BLD AUTO: 0.05 K/UL
BASOPHILS NFR BLD: 0.4 %
BILIRUB SERPL-MCNC: 0.5 MG/DL
BUN SERPL-MCNC: 24 MG/DL
CALCIUM SERPL-MCNC: 9.6 MG/DL
CHLORIDE SERPL-SCNC: 99 MMOL/L
CO2 SERPL-SCNC: 30 MMOL/L
CREAT SERPL-MCNC: 0.8 MG/DL
DIFFERENTIAL METHOD: ABNORMAL
EOSINOPHIL # BLD AUTO: 0.1 K/UL
EOSINOPHIL NFR BLD: 0.6 %
ERYTHROCYTE [DISTWIDTH] IN BLOOD BY AUTOMATED COUNT: 12.3 %
EST. GFR  (AFRICAN AMERICAN): >60 ML/MIN/1.73 M^2
EST. GFR  (NON AFRICAN AMERICAN): >60 ML/MIN/1.73 M^2
GLUCOSE SERPL-MCNC: 157 MG/DL
HCT VFR BLD AUTO: 37.8 %
HGB BLD-MCNC: 12.5 G/DL
IMM GRANULOCYTES # BLD AUTO: 0.04 K/UL
IMM GRANULOCYTES NFR BLD AUTO: 0.3 %
INR PPP: 1
LYMPHOCYTES # BLD AUTO: 2.2 K/UL
LYMPHOCYTES NFR BLD: 16 %
MAGNESIUM SERPL-MCNC: 1.9 MG/DL
MCH RBC QN AUTO: 29.2 PG
MCHC RBC AUTO-ENTMCNC: 33.1 G/DL
MCV RBC AUTO: 88 FL
MONOCYTES # BLD AUTO: 1 K/UL
MONOCYTES NFR BLD: 7.3 %
NEUTROPHILS # BLD AUTO: 10.5 K/UL
NEUTROPHILS NFR BLD: 75.4 %
NRBC BLD-RTO: 0 /100 WBC
PHOSPHATE SERPL-MCNC: 4 MG/DL
PLATELET # BLD AUTO: 538 K/UL
PMV BLD AUTO: 9.9 FL
POCT GLUCOSE: 139 MG/DL (ref 70–110)
POCT GLUCOSE: 173 MG/DL (ref 70–110)
POCT GLUCOSE: 175 MG/DL (ref 70–110)
POCT GLUCOSE: 177 MG/DL (ref 70–110)
POCT GLUCOSE: 196 MG/DL (ref 70–110)
POCT GLUCOSE: 216 MG/DL (ref 70–110)
POTASSIUM SERPL-SCNC: 3.8 MMOL/L
POTASSIUM SERPL-SCNC: 4.5 MMOL/L
PROT SERPL-MCNC: 7.7 G/DL
PROTHROMBIN TIME: 10.6 SEC
RBC # BLD AUTO: 4.28 M/UL
SODIUM SERPL-SCNC: 141 MMOL/L
WBC # BLD AUTO: 13.87 K/UL

## 2018-02-18 PROCEDURE — 31720 CLEARANCE OF AIRWAYS: CPT

## 2018-02-18 PROCEDURE — 84132 ASSAY OF SERUM POTASSIUM: CPT

## 2018-02-18 PROCEDURE — 25000003 PHARM REV CODE 250: Performed by: PHYSICIAN ASSISTANT

## 2018-02-18 PROCEDURE — 25000003 PHARM REV CODE 250: Performed by: PSYCHIATRY & NEUROLOGY

## 2018-02-18 PROCEDURE — 99233 SBSQ HOSP IP/OBS HIGH 50: CPT | Mod: ,,, | Performed by: PSYCHIATRY & NEUROLOGY

## 2018-02-18 PROCEDURE — 25000242 PHARM REV CODE 250 ALT 637 W/ HCPCS: Performed by: PSYCHIATRY & NEUROLOGY

## 2018-02-18 PROCEDURE — 27000221 HC OXYGEN, UP TO 24 HOURS

## 2018-02-18 PROCEDURE — 94640 AIRWAY INHALATION TREATMENT: CPT

## 2018-02-18 PROCEDURE — A4216 STERILE WATER/SALINE, 10 ML: HCPCS | Performed by: NURSE PRACTITIONER

## 2018-02-18 PROCEDURE — 63600175 PHARM REV CODE 636 W HCPCS: Performed by: STUDENT IN AN ORGANIZED HEALTH CARE EDUCATION/TRAINING PROGRAM

## 2018-02-18 PROCEDURE — 94761 N-INVAS EAR/PLS OXIMETRY MLT: CPT

## 2018-02-18 PROCEDURE — 85025 COMPLETE CBC W/AUTO DIFF WBC: CPT

## 2018-02-18 PROCEDURE — 20000000 HC ICU ROOM

## 2018-02-18 PROCEDURE — 25000003 PHARM REV CODE 250: Performed by: NURSE PRACTITIONER

## 2018-02-18 RX ADMIN — Medication 3 ML: at 02:02

## 2018-02-18 RX ADMIN — STANDARDIZED SENNA CONCENTRATE AND DOCUSATE SODIUM 1 TABLET: 8.6; 5 TABLET, FILM COATED ORAL at 09:02

## 2018-02-18 RX ADMIN — METOPROLOL TARTRATE 25 MG: 25 TABLET ORAL at 09:02

## 2018-02-18 RX ADMIN — ALBUTEROL SULFATE 2.5 MG: 2.5 SOLUTION RESPIRATORY (INHALATION) at 09:02

## 2018-02-18 RX ADMIN — INSULIN ASPART 8 UNITS: 100 INJECTION, SOLUTION INTRAVENOUS; SUBCUTANEOUS at 01:02

## 2018-02-18 RX ADMIN — ASPIRIN 81 MG CHEWABLE TABLET 81 MG: 81 TABLET CHEWABLE at 08:02

## 2018-02-18 RX ADMIN — ALBUTEROL SULFATE 2.5 MG: 2.5 SOLUTION RESPIRATORY (INHALATION) at 12:02

## 2018-02-18 RX ADMIN — IPRATROPIUM BROMIDE AND ALBUTEROL SULFATE 3 ML: .5; 3 SOLUTION RESPIRATORY (INHALATION) at 01:02

## 2018-02-18 RX ADMIN — IPRATROPIUM BROMIDE AND ALBUTEROL SULFATE 3 ML: .5; 3 SOLUTION RESPIRATORY (INHALATION) at 07:02

## 2018-02-18 RX ADMIN — INSULIN ASPART 8 UNITS: 100 INJECTION, SOLUTION INTRAVENOUS; SUBCUTANEOUS at 05:02

## 2018-02-18 RX ADMIN — ALBUTEROL SULFATE 2.5 MG: 2.5 SOLUTION RESPIRATORY (INHALATION) at 08:02

## 2018-02-18 RX ADMIN — BISACODYL 10 MG: 10 SUPPOSITORY RECTAL at 08:02

## 2018-02-18 RX ADMIN — IPRATROPIUM BROMIDE 0.5 MG: 0.5 SOLUTION RESPIRATORY (INHALATION) at 09:02

## 2018-02-18 RX ADMIN — INSULIN ASPART 2 UNITS: 100 INJECTION, SOLUTION INTRAVENOUS; SUBCUTANEOUS at 01:02

## 2018-02-18 RX ADMIN — LISINOPRIL 20 MG: 20 TABLET ORAL at 08:02

## 2018-02-18 RX ADMIN — HEPARIN SODIUM 5000 UNITS: 5000 INJECTION, SOLUTION INTRAVENOUS; SUBCUTANEOUS at 05:02

## 2018-02-18 RX ADMIN — MODAFINIL 100 MG: 100 TABLET ORAL at 02:02

## 2018-02-18 RX ADMIN — INSULIN ASPART 8 UNITS: 100 INJECTION, SOLUTION INTRAVENOUS; SUBCUTANEOUS at 02:02

## 2018-02-18 RX ADMIN — METOPROLOL TARTRATE 25 MG: 25 TABLET ORAL at 08:02

## 2018-02-18 RX ADMIN — INSULIN ASPART 4 UNITS: 100 INJECTION, SOLUTION INTRAVENOUS; SUBCUTANEOUS at 10:02

## 2018-02-18 RX ADMIN — POTASSIUM CHLORIDE 40 MEQ: 20 SOLUTION ORAL at 05:02

## 2018-02-18 RX ADMIN — INSULIN ASPART 1 UNITS: 100 INJECTION, SOLUTION INTRAVENOUS; SUBCUTANEOUS at 09:02

## 2018-02-18 RX ADMIN — INSULIN DETEMIR 32 UNITS: 100 INJECTION, SOLUTION SUBCUTANEOUS at 09:02

## 2018-02-18 RX ADMIN — INSULIN ASPART 8 UNITS: 100 INJECTION, SOLUTION INTRAVENOUS; SUBCUTANEOUS at 10:02

## 2018-02-18 RX ADMIN — SODIUM CHLORIDE SOLN NEBU 3% 4 ML: 3 NEBU SOLN at 08:02

## 2018-02-18 RX ADMIN — Medication 3 ML: at 05:02

## 2018-02-18 RX ADMIN — INSULIN ASPART 2 UNITS: 100 INJECTION, SOLUTION INTRAVENOUS; SUBCUTANEOUS at 05:02

## 2018-02-18 RX ADMIN — HEPARIN SODIUM 5000 UNITS: 5000 INJECTION, SOLUTION INTRAVENOUS; SUBCUTANEOUS at 01:02

## 2018-02-18 RX ADMIN — SODIUM CHLORIDE SOLN NEBU 3% 4 ML: 3 NEBU SOLN at 12:02

## 2018-02-18 RX ADMIN — MODAFINIL 200 MG: 100 TABLET ORAL at 05:02

## 2018-02-18 RX ADMIN — ATORVASTATIN CALCIUM 80 MG: 20 TABLET, FILM COATED ORAL at 08:02

## 2018-02-18 NOTE — PLAN OF CARE
Problem: Patient Care Overview  Goal: Plan of Care Review  Outcome: Ongoing (interventions implemented as appropriate)  POC reviewed with pt at 0500. Pt did not verbalize an understanding. Questions and concerns not  addressed. No acute events overnight. Pt progressing toward goals. Will continue to monitor. See flowsheets for full assessment and VS info

## 2018-02-18 NOTE — ASSESSMENT & PLAN NOTE
-s/p L M1 thrombectomy  --160  -repeat imaging stable   -ASA, plavix ---> HOLDING ASA, plavix in preparation for PEG 2/19  -statin   -heparin dvt ppx ---> HOLD after MN for PEG on 2/19  -continue modafinil  -PT/OT

## 2018-02-18 NOTE — SUBJECTIVE & OBJECTIVE
Interval History:   No acute events overnight.  Afebrile.  Pending PEG on Monday.  Hold tube feeds, ASA, meds, scheduled aspart after MN.        Review of Systems   Reason unable to perform ROS: aphasic.   Constitutional: Negative for fever.   Neurological: Positive for facial asymmetry, speech difficulty and weakness.     Unable to obtain complete ROS 2/2 nonverbal and AMS.    Objective:     Vitals:  Temp: 98.9 °F (37.2 °C)  Pulse: 85  Rhythm: normal sinus rhythm  BP: 123/77  MAP (mmHg): 96  Resp: (!) 23  SpO2: 100 %  Oxygen Concentration (%): 40  O2 Device (Oxygen Therapy): Aerosol Mask    Temp  Min: 98.2 °F (36.8 °C)  Max: 99.9 °F (37.7 °C)  Pulse  Min: 84  Max: 106  BP  Min: 100/59  Max: 138/88  MAP (mmHg)  Min: 82  Max: 104  Resp  Min: 11  Max: 31  SpO2  Min: 93 %  Max: 100 %  Oxygen Concentration (%)  Min: 40  Max: 40    02/17 0701 - 02/18 0700  In: 2150   Out: 1140 [Urine:1140]   Unmeasured Output  Urine Occurrence: 1  Stool Occurrence: 0  Pad Count: 1       Physical Exam   Constitutional: He appears well-developed.   Cardiovascular: Normal rate, regular rhythm and normal heart sounds.    Pulmonary/Chest: No respiratory distress.   Sparse coarse breath sounds b/l lung fields.    On aerosol mask.   Abdominal: Soft. Bowel sounds are normal.   Skin: Skin is warm.   Vitals reviewed.    E2 V1 M5   Does not open eyes to painful stimuli.  PERRL  Oculocephalic intact  Blinks to threat b/l  Right facial droop  Localize to pain with left, not across midline  Right hemiparesis      Medications:  Continuous Scheduled    albuterol-ipratropium 2.5mg-0.5mg/3mL 3 mL Q6H   atorvastatin 80 mg Daily   heparin (porcine) 5,000 Units Q8H   insulin aspart U-100 8 Units Q4H   insulin detemir U-100 32 Units BID   lisinopril 20 mg Daily   metoprolol tartrate 25 mg BID   modafinil 200 mg Daily   And     modafinil 100 mg Daily   polyethylene glycol 17 g Daily   senna-docusate 8.6-50 mg 1 tablet BID   sodium chloride 0.9% 3 mL Q8H    sodium chloride 3% 4 mL Q6H   PRN    acetaminophen 650 mg Q6H PRN   albuterol sulfate 2.5 mg Q4H PRN   albuterol-ipratropium 2.5mg-0.5mg/3mL 3 mL Q4H PRN   bisacodyl 10 mg Daily PRN   dextrose 50% 12.5 g PRN   glucagon (human recombinant) 1 mg PRN   hydrALAZINE 10 mg Q4H PRN   insulin aspart U-100 1-10 Units Q4H PRN   ipratropium 0.5 mg Q4H PRN   labetalol 10 mg Q4H PRN   magnesium sulfate IVPB 2 g PRN   magnesium sulfate IVPB 4 g PRN   ondansetron 4 mg Q8H PRN   potassium chloride 10% 40 mEq PRN   potassium chloride 10% 40 mEq PRN   sodium chloride 0.9% 5 mL PRN     Today I personally reviewed pertinent medications, lines/drains/airways, imaging, cardiology, lab results, microbiology results, notably:    Diet  Diet NPO  Diet NPO    Recent Results (from the past 24 hour(s))   POCT glucose    Collection Time: 02/17/18  2:13 PM   Result Value Ref Range    POCT Glucose 209 (H) 70 - 110 mg/dL   POCT glucose    Collection Time: 02/17/18  5:48 PM   Result Value Ref Range    POCT Glucose 165 (H) 70 - 110 mg/dL   POCT glucose    Collection Time: 02/17/18  9:25 PM   Result Value Ref Range    POCT Glucose 157 (H) 70 - 110 mg/dL   POCT glucose    Collection Time: 02/18/18  1:10 AM   Result Value Ref Range    POCT Glucose 139 (H) 70 - 110 mg/dL   Comprehensive metabolic panel    Collection Time: 02/18/18  1:25 AM   Result Value Ref Range    Sodium 141 136 - 145 mmol/L    Potassium 3.8 3.5 - 5.1 mmol/L    Chloride 99 95 - 110 mmol/L    CO2 30 (H) 23 - 29 mmol/L    Glucose 157 (H) 70 - 110 mg/dL    BUN, Bld 24 (H) 6 - 20 mg/dL    Creatinine 0.8 0.5 - 1.4 mg/dL    Calcium 9.6 8.7 - 10.5 mg/dL    Total Protein 7.7 6.0 - 8.4 g/dL    Albumin 2.3 (L) 3.5 - 5.2 g/dL    Total Bilirubin 0.5 0.1 - 1.0 mg/dL    Alkaline Phosphatase 141 (H) 55 - 135 U/L    AST 26 10 - 40 U/L    ALT 29 10 - 44 U/L    Anion Gap 12 8 - 16 mmol/L    eGFR if African American >60.0 >60 mL/min/1.73 m^2    eGFR if non African American >60.0 >60 mL/min/1.73 m^2    CBC auto differential    Collection Time: 02/18/18  1:25 AM   Result Value Ref Range    WBC 13.87 (H) 3.90 - 12.70 K/uL    RBC 4.28 (L) 4.60 - 6.20 M/uL    Hemoglobin 12.5 (L) 14.0 - 18.0 g/dL    Hematocrit 37.8 (L) 40.0 - 54.0 %    MCV 88 82 - 98 fL    MCH 29.2 27.0 - 31.0 pg    MCHC 33.1 32.0 - 36.0 g/dL    RDW 12.3 11.5 - 14.5 %    Platelets 538 (H) 150 - 350 K/uL    MPV 9.9 9.2 - 12.9 fL    Immature Granulocytes 0.3 0.0 - 0.5 %    Gran # (ANC) 10.5 (H) 1.8 - 7.7 K/uL    Immature Grans (Abs) 0.04 0.00 - 0.04 K/uL    Lymph # 2.2 1.0 - 4.8 K/uL    Mono # 1.0 0.3 - 1.0 K/uL    Eos # 0.1 0.0 - 0.5 K/uL    Baso # 0.05 0.00 - 0.20 K/uL    nRBC 0 0 /100 WBC    Gran% 75.4 (H) 38.0 - 73.0 %    Lymph% 16.0 (L) 18.0 - 48.0 %    Mono% 7.3 4.0 - 15.0 %    Eosinophil% 0.6 0.0 - 8.0 %    Basophil% 0.4 0.0 - 1.9 %    Differential Method Automated    Magnesium    Collection Time: 02/18/18  1:25 AM   Result Value Ref Range    Magnesium 1.9 1.6 - 2.6 mg/dL   Phosphorus    Collection Time: 02/18/18  1:25 AM   Result Value Ref Range    Phosphorus 4.0 2.7 - 4.5 mg/dL   Protime-INR    Collection Time: 02/18/18  1:25 AM   Result Value Ref Range    Prothrombin Time 10.6 9.0 - 12.5 sec    INR 1.0 0.8 - 1.2   POCT glucose    Collection Time: 02/18/18  5:51 AM   Result Value Ref Range    POCT Glucose 196 (H) 70 - 110 mg/dL   POCT glucose    Collection Time: 02/18/18 10:04 AM   Result Value Ref Range    POCT Glucose 216 (H) 70 - 110 mg/dL       Imaging  No new pertinent imaging

## 2018-02-18 NOTE — PLAN OF CARE
Problem: Patient Care Overview  Goal: Plan of Care Review  Outcome: Ongoing (interventions implemented as appropriate)  POC reviewed with patient and family at bedside at 1045. Patient's family acknowledged understanding of POC; patient is unable. Patient has remained free of falls, injuries and skin breakdown for the duration of the shift. VSS. No acute distress noted. No evidence of pain. Plans: NPO/stop TF, ASA, Heparin and insulin at midnight for PEG tomorrow. No consent for procedure currently in chart; IR paged. Will continue to monitor and update as needed.

## 2018-02-18 NOTE — PROGRESS NOTES
[facility]  Neurocritical Care  Progress Note    Admit Date: 1/25/2018  Service Date: 02/18/2018  Length of Stay: 24    Subjective:     Chief Complaint: Embolic stroke involving left middle cerebral artery    History of Present Illness: The patient is a 47 year old male with no known PMHx admitted to [facility]   s/p thrombectomy of L M1 occlusion. Per chart review, he   walked into [place] and was acting abnormal.  EMS was called and brought to the ED for concern of L MCA syndrome. CT MP revealed short segment occlusion of left M1.  Patient went to IR for thrombectomy of L M1 occlusion seen on CTA MP.  TICI2C reperfusion and angioplasty. Patient admitted to [facility] for close monitoring and higher level of care.   History obtained from chart review only            Hospital Course: 1/25: Pt admitted to [facility] s/p L M1 thrombectomy, TICI2C reperfusion and angioplasty   1/27: large L MCA, hemicrani watch, NSGY following, insulin ggt, cardene ggt, L sided intermittent slowing eeg but no sz, +nasal trumpet for copious thick secretions, wheezing this am - improved with duo nebs, 3% nebs, and cough assist, concern for sepsis 2/2 hemodynamic changes - increased HR and BP, worsening leukocytosis, +ceftriaxone, pan cx, adrian track  1/28: continued respiratory challenges due to secretions. Aggressive pulmonary toileting. Continue monitoring for neuro worsening. Add moxifloxacin.   1/29: CTH to eval for increased edema/shift, EEG afterwards. Concern for decrease exam, no following/decreased alertness). CXR and Procal to follow leukocytosis. Hold TF until eval decreasing EXAM  1/30: neuro exam stable, increase 2%, current amount of sodium iv not suffuicient to meet target 145-155, cont abx for likely pneumonia, repeat ct in am  1/31: abrupt desaturation today requiring emergent intubation followed by bronchoscopy  Etiology likely upper airway obstruction from dried secretions  02/01: stable on vent overnight, marked  improvement on today's chest x ray, switch to spontaneous today, sodium stable at 150, start tfs  02/02: moves left side spontaneously and opens eyes off sedation,  02/03: 2% buffered NS re-started at 20mL/hr for rapid decrease in serum sodium. Enteral free water flushes discontinued. Patient has copious secretions likely pneumonia. Holding dexamethasone  2/4:  No acute events overnight.  Sodium stable.  2/5: transition insulin infusion to subcutaneous insulin   2/7: gen surg consulted for trach/peg  2/9: increased Aspart and Detemir, pending Trach/Peg placement next week.  2/10: No changes overnight . Pending PEG and trach, WBC mildly elevated  12K  2/11: No changes overnight . Pending PEG and trach, WBC mildly elevated  13K. Procal was low and lower than prior. Patient got 16 units of SSI yesterday.  2/13: NAEO.  Pending peg/trach tomorrow. But then self-extubated.  2/14: Tolerating face mask well.  Holding tube feeds.   2/15: IR consult placed, for PEG. ABD ct and NG placed in preparation for procedure.  2/16: NAEO.  Pending PEG.  2/17: NAEON, Pending PEG placement on Monday 2/18: NAEON.  Pending PEG on Monday.  Hold tube feeds, ASA, meds, scheduled aspart after MN.    Interval History:   No acute events overnight.  Afebrile.  Pending PEG on Monday.  Hold tube feeds, ASA, meds, scheduled aspart after MN.        Review of Systems   Reason unable to perform ROS: aphasic.   Constitutional: Negative for fever.   Neurological: Positive for facial asymmetry, speech difficulty and weakness.     Unable to obtain complete ROS 2/2 nonverbal and AMS.    Objective:     Vitals:  Temp: 98.9 °F (37.2 °C)  Pulse: 85  Rhythm: normal sinus rhythm  BP: 123/77  MAP (mmHg): 96  Resp: (!) 23  SpO2: 100 %  Oxygen Concentration (%): 40  O2 Device (Oxygen Therapy): Aerosol Mask    Temp  Min: 98.2 °F (36.8 °C)  Max: 99.9 °F (37.7 °C)  Pulse  Min: 84  Max: 106  BP  Min: 100/59  Max: 138/88  MAP (mmHg)  Min: 82  Max: 104  Resp  Min: 11   Max: 31  SpO2  Min: 93 %  Max: 100 %  Oxygen Concentration (%)  Min: 40  Max: 40    02/17 0701 - 02/18 0700  In: 2150   Out: 1140 [Urine:1140]   Unmeasured Output  Urine Occurrence: 1  Stool Occurrence: 0  Pad Count: 1       Physical Exam   Constitutional: He appears well-developed.   Cardiovascular: Normal rate, regular rhythm and normal heart sounds.    Pulmonary/Chest: No respiratory distress.   Sparse coarse breath sounds b/l lung fields.    On aerosol mask.   Abdominal: Soft. Bowel sounds are normal.   Skin: Skin is warm.   Vitals reviewed.    E2 V1 M5   Does not open eyes to painful stimuli.  PERRL  Oculocephalic intact  Blinks to threat b/l  Right facial droop  Localize to pain with left, not across midline  Right hemiparesis      Medications:  Continuous Scheduled    albuterol-ipratropium 2.5mg-0.5mg/3mL 3 mL Q6H   atorvastatin 80 mg Daily   heparin (porcine) 5,000 Units Q8H   insulin aspart U-100 8 Units Q4H   insulin detemir U-100 32 Units BID   lisinopril 20 mg Daily   metoprolol tartrate 25 mg BID   modafinil 200 mg Daily   And     modafinil 100 mg Daily   polyethylene glycol 17 g Daily   senna-docusate 8.6-50 mg 1 tablet BID   sodium chloride 0.9% 3 mL Q8H   sodium chloride 3% 4 mL Q6H   PRN    acetaminophen 650 mg Q6H PRN   albuterol sulfate 2.5 mg Q4H PRN   albuterol-ipratropium 2.5mg-0.5mg/3mL 3 mL Q4H PRN   bisacodyl 10 mg Daily PRN   dextrose 50% 12.5 g PRN   glucagon (human recombinant) 1 mg PRN   hydrALAZINE 10 mg Q4H PRN   insulin aspart U-100 1-10 Units Q4H PRN   ipratropium 0.5 mg Q4H PRN   labetalol 10 mg Q4H PRN   magnesium sulfate IVPB 2 g PRN   magnesium sulfate IVPB 4 g PRN   ondansetron 4 mg Q8H PRN   potassium chloride 10% 40 mEq PRN   potassium chloride 10% 40 mEq PRN   sodium chloride 0.9% 5 mL PRN     Today I personally reviewed pertinent medications, lines/drains/airways, imaging, cardiology, lab results, microbiology results, notably:    Diet  Diet NPO  Diet NPO    Recent Results  (from the past 24 hour(s))   POCT glucose    Collection Time: 02/17/18  2:13 PM   Result Value Ref Range    POCT Glucose 209 (H) 70 - 110 mg/dL   POCT glucose    Collection Time: 02/17/18  5:48 PM   Result Value Ref Range    POCT Glucose 165 (H) 70 - 110 mg/dL   POCT glucose    Collection Time: 02/17/18  9:25 PM   Result Value Ref Range    POCT Glucose 157 (H) 70 - 110 mg/dL   POCT glucose    Collection Time: 02/18/18  1:10 AM   Result Value Ref Range    POCT Glucose 139 (H) 70 - 110 mg/dL   Comprehensive metabolic panel    Collection Time: 02/18/18  1:25 AM   Result Value Ref Range    Sodium 141 136 - 145 mmol/L    Potassium 3.8 3.5 - 5.1 mmol/L    Chloride 99 95 - 110 mmol/L    CO2 30 (H) 23 - 29 mmol/L    Glucose 157 (H) 70 - 110 mg/dL    BUN, Bld 24 (H) 6 - 20 mg/dL    Creatinine 0.8 0.5 - 1.4 mg/dL    Calcium 9.6 8.7 - 10.5 mg/dL    Total Protein 7.7 6.0 - 8.4 g/dL    Albumin 2.3 (L) 3.5 - 5.2 g/dL    Total Bilirubin 0.5 0.1 - 1.0 mg/dL    Alkaline Phosphatase 141 (H) 55 - 135 U/L    AST 26 10 - 40 U/L    ALT 29 10 - 44 U/L    Anion Gap 12 8 - 16 mmol/L    eGFR if African American >60.0 >60 mL/min/1.73 m^2    eGFR if non African American >60.0 >60 mL/min/1.73 m^2   CBC auto differential    Collection Time: 02/18/18  1:25 AM   Result Value Ref Range    WBC 13.87 (H) 3.90 - 12.70 K/uL    RBC 4.28 (L) 4.60 - 6.20 M/uL    Hemoglobin 12.5 (L) 14.0 - 18.0 g/dL    Hematocrit 37.8 (L) 40.0 - 54.0 %    MCV 88 82 - 98 fL    MCH 29.2 27.0 - 31.0 pg    MCHC 33.1 32.0 - 36.0 g/dL    RDW 12.3 11.5 - 14.5 %    Platelets 538 (H) 150 - 350 K/uL    MPV 9.9 9.2 - 12.9 fL    Immature Granulocytes 0.3 0.0 - 0.5 %    Gran # (ANC) 10.5 (H) 1.8 - 7.7 K/uL    Immature Grans (Abs) 0.04 0.00 - 0.04 K/uL    Lymph # 2.2 1.0 - 4.8 K/uL    Mono # 1.0 0.3 - 1.0 K/uL    Eos # 0.1 0.0 - 0.5 K/uL    Baso # 0.05 0.00 - 0.20 K/uL    nRBC 0 0 /100 WBC    Gran% 75.4 (H) 38.0 - 73.0 %    Lymph% 16.0 (L) 18.0 - 48.0 %    Mono% 7.3 4.0 - 15.0 %     "Eosinophil% 0.6 0.0 - 8.0 %    Basophil% 0.4 0.0 - 1.9 %    Differential Method Automated    Magnesium    Collection Time: 02/18/18  1:25 AM   Result Value Ref Range    Magnesium 1.9 1.6 - 2.6 mg/dL   Phosphorus    Collection Time: 02/18/18  1:25 AM   Result Value Ref Range    Phosphorus 4.0 2.7 - 4.5 mg/dL   Protime-INR    Collection Time: 02/18/18  1:25 AM   Result Value Ref Range    Prothrombin Time 10.6 9.0 - 12.5 sec    INR 1.0 0.8 - 1.2   POCT glucose    Collection Time: 02/18/18  5:51 AM   Result Value Ref Range    POCT Glucose 196 (H) 70 - 110 mg/dL   POCT glucose    Collection Time: 02/18/18 10:04 AM   Result Value Ref Range    POCT Glucose 216 (H) 70 - 110 mg/dL       Imaging  No new pertinent imaging      Assessment/Plan:     Neuro   * Embolic stroke involving left middle cerebral artery    -s/p L M1 thrombectomy  --160  -repeat imaging stable   -ASA, plavix ---> HOLDING ASA, plavix in preparation for PEG 2/19  -statin   -heparin dvt ppx ---> HOLD after MN for PEG on 2/19  -continue modafinil  -PT/OT                Pulmonary   Nodule of right lung    Noted on 2/15 CT Abd/pelvis: "0.8 cm right lower lobe pulmonary nodule. Per Fleischner Society 2017 guidelines; CT 6-12 months, if present followup every 2 years until 5 year followup complete."  -Repeat CT in 6-12 months        Acute respiratory failure with hypoxia    -Due to airway obstruction form hardened secretions overlying epiglottis and soft palate, improved after intubation and bronchoscopy  -Self-extubated on 2/13, subsequently placed on face mask which he has been tolerating well since.  No need for trach at this time.          Cardiac/Vascular   Hyperlipidemia    -atorvastatin 80 daily           Essential hypertension    --160  -metoprolol 25 mg bid  -lisinopril 20 mg Qdaily  -PRN Labetalol & Hydralazine   -Echo 1/26:1 - Normal left ventricular systolic function (EF 65-70%).     2 - Concentric remodeling.     3 - No wall motion " abnormalities.     4 - Normal left ventricular diastolic function.     5 - Normal right ventricular systolic function .            Oncology   Leukocytosis    -WBC to 15 on 2/14 after self-extubation on 2/13 (tube feeds were running at the time, possible aspiration?)  -Consider aspiration pneumonia coverage/cultures if pt develops fever or if WBC continues to increase.  -CXR on 2/15 AM, no acute changes.  -Continue to monitor closely        Endocrine   Type 2 diabetes mellitus    -A1C-10  -poorly controlled diabetes  -BG >400 on arrival, s/p insulin gtt  -Detemir/aspart + SSI   -HOLD tube feeds, scheduled aspart after midnight for PEG on 2/19              Prophylaxis:  Venous Thromboembolism: mechanical chemical  Stress Ulcer: None  Ventilator Pneumonia: no     Activity Orders          Activity as tolerated starting at 02/05 1432        Full Code    Raymundo Shanks MD  Neurocritical Care  [facility]

## 2018-02-18 NOTE — ASSESSMENT & PLAN NOTE
-A1C-10  -poorly controlled diabetes  -BG >400 on arrival, s/p insulin gtt  -Detemir/aspart + SSI   -HOLD tube feeds, scheduled aspart after midnight for PEG on 2/19

## 2018-02-18 NOTE — ASSESSMENT & PLAN NOTE
-Due to airway obstruction form hardened secretions overlying epiglottis and soft palate, improved after intubation and bronchoscopy  -Self-extubated on 2/13, subsequently placed on face mask which he has been tolerating well since.  No need for trach at this time.

## 2018-02-19 PROBLEM — G81.11 RIGHT SPASTIC HEMIPARESIS: Status: ACTIVE | Noted: 2018-02-19

## 2018-02-19 LAB
ABO + RH BLD: NORMAL
ALBUMIN SERPL BCP-MCNC: 2.3 G/DL
ALP SERPL-CCNC: 136 U/L
ALT SERPL W/O P-5'-P-CCNC: 27 U/L
ANION GAP SERPL CALC-SCNC: 13 MMOL/L
AST SERPL-CCNC: 24 U/L
BASOPHILS # BLD AUTO: 0.04 K/UL
BASOPHILS NFR BLD: 0.3 %
BILIRUB SERPL-MCNC: 0.6 MG/DL
BLD GP AB SCN CELLS X3 SERPL QL: NORMAL
BUN SERPL-MCNC: 20 MG/DL
CALCIUM SERPL-MCNC: 9.8 MG/DL
CHLORIDE SERPL-SCNC: 98 MMOL/L
CO2 SERPL-SCNC: 30 MMOL/L
CREAT SERPL-MCNC: 0.9 MG/DL
DIFFERENTIAL METHOD: ABNORMAL
EOSINOPHIL # BLD AUTO: 0.2 K/UL
EOSINOPHIL NFR BLD: 1 %
ERYTHROCYTE [DISTWIDTH] IN BLOOD BY AUTOMATED COUNT: 12.1 %
EST. GFR  (AFRICAN AMERICAN): >60 ML/MIN/1.73 M^2
EST. GFR  (NON AFRICAN AMERICAN): >60 ML/MIN/1.73 M^2
GLUCOSE SERPL-MCNC: 177 MG/DL
HCT VFR BLD AUTO: 38 %
HGB BLD-MCNC: 12.8 G/DL
IMM GRANULOCYTES # BLD AUTO: 0.07 K/UL
IMM GRANULOCYTES NFR BLD AUTO: 0.4 %
INR PPP: 1
LYMPHOCYTES # BLD AUTO: 2.6 K/UL
LYMPHOCYTES NFR BLD: 16.7 %
MAGNESIUM SERPL-MCNC: 1.9 MG/DL
MCH RBC QN AUTO: 29.1 PG
MCHC RBC AUTO-ENTMCNC: 33.7 G/DL
MCV RBC AUTO: 86 FL
MONOCYTES # BLD AUTO: 1.2 K/UL
MONOCYTES NFR BLD: 7.8 %
NEUTROPHILS # BLD AUTO: 11.7 K/UL
NEUTROPHILS NFR BLD: 73.8 %
NRBC BLD-RTO: 0 /100 WBC
PHOSPHATE SERPL-MCNC: 4.1 MG/DL
PLATELET # BLD AUTO: 480 K/UL
PMV BLD AUTO: 9.7 FL
POCT GLUCOSE: 154 MG/DL (ref 70–110)
POCT GLUCOSE: 176 MG/DL (ref 70–110)
POCT GLUCOSE: 177 MG/DL (ref 70–110)
POCT GLUCOSE: 179 MG/DL (ref 70–110)
POCT GLUCOSE: 180 MG/DL (ref 70–110)
POCT GLUCOSE: 190 MG/DL (ref 70–110)
POTASSIUM SERPL-SCNC: 4.2 MMOL/L
PROT SERPL-MCNC: 8.1 G/DL
PROTHROMBIN TIME: 10.8 SEC
RBC # BLD AUTO: 4.4 M/UL
SODIUM SERPL-SCNC: 141 MMOL/L
WBC # BLD AUTO: 15.79 K/UL

## 2018-02-19 PROCEDURE — 85025 COMPLETE CBC W/AUTO DIFF WBC: CPT

## 2018-02-19 PROCEDURE — 25000003 PHARM REV CODE 250: Performed by: PSYCHIATRY & NEUROLOGY

## 2018-02-19 PROCEDURE — 20000000 HC ICU ROOM

## 2018-02-19 PROCEDURE — 80053 COMPREHEN METABOLIC PANEL: CPT

## 2018-02-19 PROCEDURE — 97110 THERAPEUTIC EXERCISES: CPT

## 2018-02-19 PROCEDURE — 85610 PROTHROMBIN TIME: CPT

## 2018-02-19 PROCEDURE — 25000003 PHARM REV CODE 250: Performed by: PHYSICIAN ASSISTANT

## 2018-02-19 PROCEDURE — 99232 SBSQ HOSP IP/OBS MODERATE 35: CPT | Mod: ,,, | Performed by: NURSE PRACTITIONER

## 2018-02-19 PROCEDURE — 0DH63UZ INSERTION OF FEEDING DEVICE INTO STOMACH, PERCUTANEOUS APPROACH: ICD-10-PCS | Performed by: RADIOLOGY

## 2018-02-19 PROCEDURE — 31720 CLEARANCE OF AIRWAYS: CPT

## 2018-02-19 PROCEDURE — 99233 SBSQ HOSP IP/OBS HIGH 50: CPT | Mod: ,,, | Performed by: PSYCHIATRY & NEUROLOGY

## 2018-02-19 PROCEDURE — 25500020 PHARM REV CODE 255: Performed by: PSYCHIATRY & NEUROLOGY

## 2018-02-19 PROCEDURE — 25000003 PHARM REV CODE 250: Performed by: NURSE PRACTITIONER

## 2018-02-19 PROCEDURE — 84100 ASSAY OF PHOSPHORUS: CPT

## 2018-02-19 PROCEDURE — 92507 TX SP LANG VOICE COMM INDIV: CPT

## 2018-02-19 PROCEDURE — 94761 N-INVAS EAR/PLS OXIMETRY MLT: CPT

## 2018-02-19 PROCEDURE — 94640 AIRWAY INHALATION TREATMENT: CPT

## 2018-02-19 PROCEDURE — 43752 NASAL/OROGASTRIC W/TUBE PLMT: CPT

## 2018-02-19 PROCEDURE — 86901 BLOOD TYPING SEROLOGIC RH(D): CPT

## 2018-02-19 PROCEDURE — 63600175 PHARM REV CODE 636 W HCPCS: Performed by: RADIOLOGY

## 2018-02-19 PROCEDURE — 97803 MED NUTRITION INDIV SUBSEQ: CPT

## 2018-02-19 PROCEDURE — 27000221 HC OXYGEN, UP TO 24 HOURS

## 2018-02-19 PROCEDURE — A4216 STERILE WATER/SALINE, 10 ML: HCPCS | Performed by: NURSE PRACTITIONER

## 2018-02-19 PROCEDURE — 27100171 HC OXYGEN HIGH FLOW UP TO 24 HOURS

## 2018-02-19 PROCEDURE — 25000242 PHARM REV CODE 250 ALT 637 W/ HCPCS: Performed by: PSYCHIATRY & NEUROLOGY

## 2018-02-19 PROCEDURE — 83735 ASSAY OF MAGNESIUM: CPT

## 2018-02-19 RX ORDER — MIDAZOLAM HYDROCHLORIDE 1 MG/ML
INJECTION INTRAMUSCULAR; INTRAVENOUS CODE/TRAUMA/SEDATION MEDICATION
Status: COMPLETED | OUTPATIENT
Start: 2018-02-19 | End: 2018-02-19

## 2018-02-19 RX ORDER — SODIUM CHLORIDE 9 MG/ML
INJECTION, SOLUTION INTRAVENOUS CONTINUOUS
Status: DISCONTINUED | OUTPATIENT
Start: 2018-02-19 | End: 2018-02-20

## 2018-02-19 RX ORDER — GLUCAGON 1 MG
KIT INJECTION CODE/TRAUMA/SEDATION MEDICATION
Status: COMPLETED | OUTPATIENT
Start: 2018-02-19 | End: 2018-02-19

## 2018-02-19 RX ORDER — CEFAZOLIN SODIUM 1 G/50ML
SOLUTION INTRAVENOUS
Status: COMPLETED | OUTPATIENT
Start: 2018-02-19 | End: 2018-02-19

## 2018-02-19 RX ADMIN — MODAFINIL 200 MG: 100 TABLET ORAL at 05:02

## 2018-02-19 RX ADMIN — IPRATROPIUM BROMIDE AND ALBUTEROL SULFATE 3 ML: .5; 3 SOLUTION RESPIRATORY (INHALATION) at 07:02

## 2018-02-19 RX ADMIN — IPRATROPIUM BROMIDE AND ALBUTEROL SULFATE 3 ML: .5; 3 SOLUTION RESPIRATORY (INHALATION) at 01:02

## 2018-02-19 RX ADMIN — METOPROLOL TARTRATE 25 MG: 25 TABLET ORAL at 09:02

## 2018-02-19 RX ADMIN — POLYETHYLENE GLYCOL 3350 17 G: 17 POWDER, FOR SOLUTION ORAL at 08:02

## 2018-02-19 RX ADMIN — INSULIN DETEMIR 32 UNITS: 100 INJECTION, SOLUTION SUBCUTANEOUS at 08:02

## 2018-02-19 RX ADMIN — SODIUM CHLORIDE SOLN NEBU 3% 4 ML: 3 NEBU SOLN at 07:02

## 2018-02-19 RX ADMIN — SODIUM CHLORIDE SOLN NEBU 3% 4 ML: 3 NEBU SOLN at 01:02

## 2018-02-19 RX ADMIN — IOHEXOL 25 ML: 300 INJECTION, SOLUTION INTRAVENOUS at 02:02

## 2018-02-19 RX ADMIN — GLUCAGON HYDROCHLORIDE 1 MG: KIT at 01:02

## 2018-02-19 RX ADMIN — Medication 3 ML: at 02:02

## 2018-02-19 RX ADMIN — ATORVASTATIN CALCIUM 80 MG: 20 TABLET, FILM COATED ORAL at 08:02

## 2018-02-19 RX ADMIN — STANDARDIZED SENNA CONCENTRATE AND DOCUSATE SODIUM 1 TABLET: 8.6; 5 TABLET, FILM COATED ORAL at 08:02

## 2018-02-19 RX ADMIN — INSULIN ASPART 2 UNITS: 100 INJECTION, SOLUTION INTRAVENOUS; SUBCUTANEOUS at 05:02

## 2018-02-19 RX ADMIN — CEFAZOLIN SODIUM 1 G: 1 SOLUTION INTRAVENOUS at 01:02

## 2018-02-19 RX ADMIN — INSULIN ASPART 2 UNITS: 100 INJECTION, SOLUTION INTRAVENOUS; SUBCUTANEOUS at 10:02

## 2018-02-19 RX ADMIN — METOPROLOL TARTRATE 25 MG: 25 TABLET ORAL at 08:02

## 2018-02-19 RX ADMIN — INSULIN ASPART 1 UNITS: 100 INJECTION, SOLUTION INTRAVENOUS; SUBCUTANEOUS at 09:02

## 2018-02-19 RX ADMIN — LISINOPRIL 20 MG: 20 TABLET ORAL at 08:02

## 2018-02-19 RX ADMIN — STANDARDIZED SENNA CONCENTRATE AND DOCUSATE SODIUM 1 TABLET: 8.6; 5 TABLET, FILM COATED ORAL at 09:02

## 2018-02-19 RX ADMIN — MODAFINIL 100 MG: 100 TABLET ORAL at 04:02

## 2018-02-19 RX ADMIN — INSULIN ASPART 1 UNITS: 100 INJECTION, SOLUTION INTRAVENOUS; SUBCUTANEOUS at 01:02

## 2018-02-19 RX ADMIN — INSULIN ASPART 2 UNITS: 100 INJECTION, SOLUTION INTRAVENOUS; SUBCUTANEOUS at 02:02

## 2018-02-19 RX ADMIN — SODIUM CHLORIDE SOLN NEBU 3% 4 ML: 3 NEBU SOLN at 08:02

## 2018-02-19 RX ADMIN — IPRATROPIUM BROMIDE AND ALBUTEROL SULFATE 3 ML: .5; 3 SOLUTION RESPIRATORY (INHALATION) at 08:02

## 2018-02-19 RX ADMIN — MIDAZOLAM HYDROCHLORIDE 1 MG: 1 INJECTION, SOLUTION INTRAMUSCULAR; INTRAVENOUS at 01:02

## 2018-02-19 RX ADMIN — SODIUM CHLORIDE: 0.9 INJECTION, SOLUTION INTRAVENOUS at 10:02

## 2018-02-19 NOTE — PLAN OF CARE
Problem: SLP Goal  Goal: SLP Goal  Speech Language Pathology Goals  Goals expected to be met by 2/23  1. Pt will participate in bedside assessment of swallow to determine least restrictive diet as appropriate.  2. Pt will open eyes to stim x5/session given max cues.  3. Pt will follow simple commands x2/session given max cues.  4. Pt will answer basic y/n question via any modality x2/session given max cues.  5. Pt will vocalize in response to any stim x2/session given max cues.          Outcome: Ongoing (interventions implemented as appropriate)  Goals remain appropriate, cont POC. EDWARD Key, CCC/SLP  2/19/2018

## 2018-02-19 NOTE — ASSESSMENT & PLAN NOTE
"46 yo M with PMHx HLD, LOYDA, poorly controlled DM II presents to [facility] 01/25/18 after noted "strange behavior" for which EMS was called.  Pt was found to have a L M1 occlusion and was taken to IR for thrombectomy with TICI 2C reperfusion and stent placement due to L MCA stenosis.  Etiology possibly atheroembolic with note of mild calcification in bilateral carotid bulbs on CTA.  Echo normal with no hx or signs of A fib.  Plan currently for PEG placement 02/19/18.  PT/OT/SLP will continue to evaluate to make placement recommendations.  Will resume DAPT 2/2 recent stent placement in angiogram after PEG placement--likely 02/19/18.    Antithrombotics for secondary stroke prevention: ASA 81 mg po qd (clopidogrel held for PEG placement)   Statins: atorvastatin 80 mg po qd  Aggressive risk factor modification: HLD, DM II (Hgb A1c 10%), Obesity  Rehab efforts: PT/OT/SLP to evaluate and treat  Diagnostics ordered/pending: None  VTE prophylaxis: Heparin 5000 U q8h  BP parameters: Infarct: Post sucessful thrombectomy, SBP <140   "

## 2018-02-19 NOTE — SUBJECTIVE & OBJECTIVE
Neurologic Chief Complaint: L MCA Stroke    Subjective:     Interval History: Patient is seen for follow-up neurological assessment and treatment recommendations:   Patient is     HPI, Past Medical, Family, and Social History remains the same as documented in the initial encounter.     Review of Systems   Constitutional: Negative for chills and fever.   Eyes: Negative for visual disturbance.   Respiratory: Negative for cough.    Skin: Negative for rash and wound.   Neurological: Positive for speech difficulty. Negative for weakness and numbness.   Psychiatric/Behavioral: Positive for confusion and decreased concentration.     Scheduled Meds:   albuterol-ipratropium 2.5mg-0.5mg/3mL  3 mL Nebulization Q6H    atorvastatin  80 mg Per NG tube Daily    heparin (porcine)  5,000 Units Subcutaneous Q8H    insulin aspart U-100  8 Units Subcutaneous Q4H    insulin detemir U-100  32 Units Subcutaneous BID    lisinopril  20 mg Per NG tube Daily    metoprolol tartrate  25 mg Per NG tube BID    modafinil  200 mg Per NG tube Daily    And    modafinil  100 mg Per NG tube Daily    polyethylene glycol  17 g Per NG tube Daily    senna-docusate 8.6-50 mg  1 tablet Per NG tube BID    sodium chloride 0.9%  3 mL Intravenous Q8H    sodium chloride 3%  4 mL Nebulization Q6H     Continuous Infusions:  PRN Meds:acetaminophen, albuterol sulfate, albuterol-ipratropium 2.5mg-0.5mg/3mL, bisacodyl, dextrose 50%, glucagon (human recombinant), hydrALAZINE, insulin aspart U-100, ipratropium, labetalol, magnesium sulfate IVPB, magnesium sulfate IVPB, ondansetron, potassium chloride 10%, potassium chloride 10%, sodium chloride 0.9%    Objective:     Vital Signs (Most Recent):  Temp: 99.1 °F (37.3 °C) (02/18/18 1901)  Pulse: 104 (02/18/18 2124)  Resp: (!) 24 (02/18/18 2124)  BP: 128/85 (02/18/18 2000)  SpO2: 95 % (02/18/18 2000)  BP Location: Right arm    Vital Signs Range (Last 24H):  Temp:  [98.2 °F (36.8 °C)-99.5 °F (37.5 °C)]   Pulse:   []   Resp:  [11-27]   BP: (100-138)/(58-89)   SpO2:  [92 %-100 %]   BP Location: Right arm    Physical Exam  General:  Well-developed, well-nourished, not opening eyes to voice/tactile stimuli--opens eyes to noxious stimuli, moving LUE/LLE intermittently  HEENT:  NCAT, PERRLA, EOMI with tracking, oropharyngeal membranes non-erythematous/without exudate  Neck:  Supple, normal ROM without nuchal rigidity  Resp:  Symmetric expansion, no increased wob  CVS:  No LE edema, peripheral pulses 2+ (radial, dorsalis pedis)  GI:  Abd soft, non-distended, non-tender to palpation  Neurologic Exam:  Mental Status:  Somnolent, opens eyes to noxious stimuli only.  Does not follow commands well, intermittently mimicking commands.  Cranial Nerves:  Face appears symmetric.  Grimace bilaterally to noxious stimuli.  PERRLA, EOMI with tracking.  Motor:  Normal bulk and tone. LUE, LLE moving spontaneously.  RLE with some withdrawal to noxious stimuli.  No movement of RUE.  Sensory:   Withdrawal to noxious stimuli at LUE and BLE.  No withdrawal RUE.  Reflexes:  Biceps, brachioradialis, patellar 2+ and symmetric.  No ankle clonus.  Downgoing toe bilaterally.  Coordination:  No resting tremor or myoclonus.    Gait:  Deferred 2/2 fall precautions, AMS    Laboratory:  CMP:     Recent Labs  Lab 02/18/18 0125 02/18/18  1006   CALCIUM 9.6  --    ALBUMIN 2.3*  --    PROT 7.7  --      --    K 3.8 4.5   CO2 30*  --    CL 99  --    BUN 24*  --    CREATININE 0.8  --    ALKPHOS 141*  --    ALT 29  --    AST 26  --    BILITOT 0.5  --      CBC:     Recent Labs  Lab 02/18/18 0125   WBC 13.87*   RBC 4.28*   HGB 12.5*   HCT 37.8*   *   MCV 88   MCH 29.2   MCHC 33.1     Lipid Panel: Invalid due to excessively high triglycerides  Coagulation:     Recent Labs  Lab 02/18/18 0125   INR 1.0     Hgb A1C: 10%  TSH: PENDING    Diagnostic Results     Brain Imaging   01/26/18 CT head w/o contrast:  No detrimental change.  Evolving subtle left MCA  territory infarct.       01/26/18 MRI Brain w/o contrast:  1. Large acute left MCA territory infarction as above.  No associated hemorrhage or significant mass effect at this time.  2. Mild chronic ischemic changes.        02/04/18 CT head w/o contrast:  Redemonstration of large left MCA territory infarction without evidence of hemorrhagic conversion.  Slight interval increase in local mass effect with rightward midline shift of approximately 0.6 cm. Stable postoperative change from left MCA endovascular stenting. Sinus disease as above.       Vessel Imaging   01/25/18 CTA Stroke Multiphase:  Short segment occlusion of left M1.  There is good collateral flow in the ischemic territory as detailed above.  Noncontrast images demonstrate subtle changes reflecting left MCA territory acute infarct.       01/25/18 IR Angiogram:  Cerebral angiogram demonstrates occlusion of the M1 segment of the left middle cerebral artery with good collateral flow from pial collaterals.  This was successfully removed and residual stenosis at the site of occlusion was angioplastied and stented with improvement in flow.  Slow flow to the left parietal lobe suggests area of infarcted tissue.  This was felt to represent TICI 2C flow.    Cardiac Imaging   01/26/18 2D Echo w/ CFD:  CONCLUSIONS     1 - Normal left ventricular systolic function (EF 65-70%).     2 - Concentric remodeling.     3 - No wall motion abnormalities.     4 - Normal left ventricular diastolic function.     5 - Normal right ventricular systolic function .    6 - No LA enlargement.

## 2018-02-19 NOTE — PROGRESS NOTES
"[facility]  Vascular Neurology  Comprehensive Stroke Center  Progress Note    Assessment/Plan:     * Embolic stroke involving left middle cerebral artery    46 yo M with PMHx HLD, LOYDA, poorly controlled DM II [place] to [facility] [place]25/18 after noted "strange behavior" for which EMS was called.  Pt was found to have a L M1 occlusion and was taken to IR for thrombectomy with TICI 2C reperfusion and stent placement due to L MCA stenosis.  Etiology possibly atheroembolic with note of mild calcification in bilateral carotid bulbs on CTA.  Echo normal with no hx or signs of A fib.  Plan currently for PEG placement 02/19/18.  PT/OT/SLP will continue to evaluate to make placement recommendations.  Will resume DAPT 2/2 recent stent placement in angiogram after PEG placement--likely 02/19/18.    Antithrombotics for secondary stroke prevention: ASA 81 mg po qd (clopidogrel held for PEG placement)   Statins: atorvastatin 80 mg po qd  Aggressive risk factor modification: HLD, DM II (Hgb A1c 10%), Obesity  Rehab efforts: PT/OT/SLP to evaluate and treat  Diagnostics ordered/pending: None  VTE prophylaxis: Heparin 5000 U q8h  BP parameters: Infarct: Post sucessful thrombectomy, SBP <140         Right spastic hemiparesis    Due to stroke  Aggressive therapy          Essential hypertension    -Stroke risk factor  -Continue lisinopril 20 mg qd, metoprolol 25 mg bid  --138/58-89 over past 24 h        Hyperlipidemia    -Stroke risk factor, LDL invalid as TG > 400  -Continue atorvastatin 80 mg qd, repeat lipid panel as an outpatient        Obstructive sleep apnea    -Stroke risk factor, trial CPAP overnight        Type 2 diabetes mellitus    -Stroke risk factor, Hgb A1c 10%  -Currently on detemir 32 U bid, aspart 8 U q4h (TFs)  -Glucose 157-196 on current regimen.  Titrate up insulin as needed.        Respiratory distress, acute    Requiring intubation   NCC managing         Acute respiratory failure with hypoxia    " Intubated, now spontaneous  Decadron x3 d/t swelling after difficult intubation  Possible extubation 2/3/18  Off sedation, Exam slightly improved  CXR improving        Cytotoxic cerebral edema    -2/2 stroke, evident on imaging.  Stable on repeat CT head imaging.        Leukocytosis    Resolved  Cxs with no growth  On Rocephin  -Management per primary             1/25: Brought in for aphasia, RSW with L gaze preferance. LKN unknown, not tPA candidate. Went to IR for angiogram and stent.  1/29: Off Cardene, remains on Insulin gtt. Concern for sepsis, respiratory source. Imaging with mass effect and some brain compression; maycol crani watch.  1/30: EEG consistent with focal L slowing 2/2 lesion and mild generalized slowing, no seizures. CT head stable, no hemorrhagic conversion  02/01/18 emergent intubation likely due to upper airway obstruction per NCC.    2/2/18: Pt off Precedex, moving left side spontaneously and opening eyes. Intubated, on spontaneous today. NCC giving steroids in hopes to extubate tomorrow.  2/3: no acute events over night, remains intubated at the time of the visit this morning, not responsive to verbal stimuli, withdraws from pain on LUE, BP raging ~135-230. No significant change in the lab values, glucose ~200. Continued on insulin gtt, SQH for DVT ppx, and captopril.     02/16/18:  Few changes on exam, continues to have significant RUE/RLE weakness with global aphasia.  Family member at bedside noted attempt to communicate with her.  02/18/18:  Pt largely unchanged on exam this am.  No family member present during am rounds.  2-19-18 tolerating facemask for PEG by IR this afternoon    STROKE DOCUMENTATION   Acute Stroke Times   Stroke Team Called Date: 01/25/18  Stroke Team Called Time: 1852  Stroke Team Arrival Date: 01/25/18  Stroke Team Arrival Time: 0652  CT Interpretation Time: 1910  Decision to Treat Time for Alteplase:  (n/a unknown last known normal )  Decision to Treat Time for IR:  1915    NIH Scale:  1a. Level Of Consciousness: 2-->Not alert: requires repeated stimulation to attend, or is obtunded and requires strong or painful stimulation to make movements (not stereotyped)  1b. LOC Questions: 2-->Answers neither question correctly  1c. LOC Commands: 2-->Performs neither task correctly  2. Best Gaze: 1-->Partial gaze palsy: gaze is abnormal in one or both eyes, but forced deviation or total gaze paresis is not present  3. Visual: 0-->No visual loss  4. Facial Palsy: 0-->Normal symmetrical movements  5a. Motor Arm, Left: 3-->No effort against gravity: limb falls  5b. Motor Arm, Right: 4-->No movement  6a. Motor Leg, Left: 3-->No effort against gravity: leg falls to bed immediately  6b. Motor Leg, Right: 4-->No movement  7. Limb Ataxia: 0-->Absent  8. Sensory: 0-->Normal: no sensory loss  9. Best Language: 2-->Severe aphasia: all communication is through fragmentary expression: great need for inference, questioning, and guessing by the listener. Range of information that can be exchanged is limited: listener carries burden of. . . (see row details)  10. Dysarthria: 1-->Mild-to-moderate dysarthria: patient slurs at least some words and, at worst, can be understood with some difficulty  11. Extinction and Inattention (formerly Neglect): 0-->No abnormality  Total (NIH Stroke Scale): 24       Modified [place] Score: 0  [place] Coma Scale:    ABCD2 Score:    [id number] Score:   HAS -BLED Score:   ICH Score:   Hunt & Laguerre Classification:      Hemorrhagic change of an Ischemic Stroke: Does this patient have an ischemic stroke with hemorrhagic changes? No     Neurologic Chief Complaint: L MCA Stroke    Subjective:     Interval History: Patient is seen for follow-up neurological assessment and treatment recommendations: No issues overnight, moves the left spontaneously no movement on the right, for PEG today          HPI, Past Medical, Family, and Social History remains the same as documented in the  initial encounter.     Review of Systems   Constitutional: Negative for chills and fever.   Skin: Negative for rash and wound.   Neurological: Positive for speech difficulty. Negative for weakness and numbness.   Psychiatric/Behavioral: Positive for confusion and decreased concentration.     Scheduled Meds:   albuterol-ipratropium 2.5mg-0.5mg/3mL  3 mL Nebulization Q6H    atorvastatin  80 mg Per NG tube Daily    lisinopril  20 mg Per NG tube Daily    metoprolol tartrate  25 mg Per NG tube BID    modafinil  200 mg Per NG tube Daily    And    modafinil  100 mg Per NG tube Daily    polyethylene glycol  17 g Per NG tube Daily    senna-docusate 8.6-50 mg  1 tablet Per NG tube BID    sodium chloride 0.9%  3 mL Intravenous Q8H    sodium chloride 3%  4 mL Nebulization Q6H     Continuous Infusions:   sodium chloride 0.9% 75 mL/hr at 02/19/18 1017     PRN Meds:acetaminophen, albuterol sulfate, albuterol-ipratropium 2.5mg-0.5mg/3mL, bisacodyl, dextrose 50%, glucagon (human recombinant), hydrALAZINE, insulin aspart U-100, ipratropium, labetalol, magnesium sulfate IVPB, magnesium sulfate IVPB, ondansetron, potassium chloride 10%, potassium chloride 10%, sodium chloride 0.9%    Objective:     Vital Signs (Most Recent):  Temp: 98.9 °F (37.2 °C) (02/19/18 1100)  Pulse: 88 (02/19/18 1100)  Resp: (!) 27 (02/19/18 1100)  BP: 132/89 (02/19/18 1100)  SpO2: 95 % (02/19/18 1100)  BP Location: Right arm    Vital Signs Range (Last 24H):  Temp:  [98.9 °F (37.2 °C)-99.5 °F (37.5 °C)]   Pulse:  []   Resp:  [19-27]   BP: (112-164)/(70-98)   SpO2:  [92 %-100 %]   BP Location: Right arm    Physical Exam  General:  Well-developed, well-nourished, not opening eyes to voice/tactile stimuli--opens eyes to noxious stimuli, moving LUE/LLE intermittently  Resp:  Symmetric expansion, no increased wob  CVS:  No LE edema, peripheral pulses 2+ (radial, dorsalis pedis)  GI:  Abd soft, non-distended, non-tender to palpation    Neurologic  Exam:  Mental Status:  Somnolent, opens eyes to noxious stimuli only.  Does not follow commands well, intermittently mimicking commands.  Cranial Nerves:  Face appears symmetric.  Grimace bilaterally to noxious stimuli.  PERRLA, EOMI with tracking.  Motor:  Normal bulk and tone. LUE, LLE moving spontaneously.  RLE with some withdrawal to noxious stimuli.  No movement of RUE.  Sensory:   Withdrawal to noxious stimuli at LUE and BLE.  No withdrawal RUE.      Laboratory:  CMP:     Recent Labs  Lab 02/19/18  0140   CALCIUM 9.8   ALBUMIN 2.3*   PROT 8.1      K 4.2   CO2 30*   CL 98   BUN 20   CREATININE 0.9   ALKPHOS 136*   ALT 27   AST 24   BILITOT 0.6     CBC:     Recent Labs  Lab 02/19/18  0140   WBC 15.79*   RBC 4.40*   HGB 12.8*   HCT 38.0*   *   MCV 86   MCH 29.1   MCHC 33.7     Lipid Panel: Invalid due to excessively high triglycerides  Coagulation:     Recent Labs  Lab 02/19/18  0140   INR 1.0     Hgb A1C: 10%  TSH: PENDING    Diagnostic Results     Brain Imaging   01/26/18 CT head w/o contrast:  No detrimental change.  Evolving subtle left MCA territory infarct.       01/26/18 MRI Brain w/o contrast:  1. Large acute left MCA territory infarction as above.  No associated hemorrhage or significant mass effect at this time.  2. Mild chronic ischemic changes.        02/04/18 CT head w/o contrast:  Redemonstration of large left MCA territory infarction without evidence of hemorrhagic conversion.  Slight interval increase in local mass effect with rightward midline shift of approximately 0.6 cm. Stable postoperative change from left MCA endovascular stenting. Sinus disease as above.       Vessel Imaging   01/25/18 CTA Stroke Multiphase:  Short segment occlusion of left M1.  There is good collateral flow in the ischemic territory as detailed above.  Noncontrast images demonstrate subtle changes reflecting left MCA territory acute infarct.       01/25/18 IR Angiogram:  Cerebral angiogram demonstrates  occlusion of the M1 segment of the left middle cerebral artery with good collateral flow from pial collaterals.  This was successfully removed and residual stenosis at the site of occlusion was angioplastied and stented with improvement in flow.  Slow flow to the left parietal lobe suggests area of infarcted tissue.  This was felt to represent TICI 2C flow.    Cardiac Imaging   01/26/18 2D Echo w/ CFD:  CONCLUSIONS     1 - Normal left ventricular systolic function (EF 65-70%).     2 - Concentric remodeling.     3 - No wall motion abnormalities.     4 - Normal left ventricular diastolic function.     5 - Normal right ventricular systolic function .    6 - No LA enlargement.      Ericka Mason, NP  Comprehensive Stroke Center  Department of Vascular Neurology   [facility]

## 2018-02-19 NOTE — PLAN OF CARE
ICU Attending Note  Neurocritical Care    [id number]  Flaccid R hemiplegia.    -restart antiplatelets as able  -atorvastatin  -modafanil  -wean O2 by VM  --180  -lisinopril, metoprolol  -NS 75 mL/h while NPO  -hold pm detemir

## 2018-02-19 NOTE — PROGRESS NOTES
[facility]  Adult Nutrition  Consult Note    SUMMARY     Recommendations    1. When medically feasible to use PEG, resume enteral nutrition. Recommend Diabetisource @ 75 mL/hr to provide 2160 calories, 108 g of protein, 1472 mL fluid.   - Hold for residuals >500 mL; additional fluid per MD.  2. RD to monitor & follow-up.    Goals: % EEN, EPN  Nutrition Goal Status: goal not met  Communication of RD Recs: reviewed with RN    Reason for Assessment    Reason for Assessment: RD follow-up  Diagnosis: stroke/CVA (Embolic stroke L MCA)  Relevent Medical History: HTN, T2DM   Interdisciplinary Rounds: did not attend     General Information Comments: ST recommends pt remain NPO. TF off 2/2 PEG placement today. Was tolerating Diabetisource @ 60 mL/hr via NGT.  Nutrition Discharge Planning: Tolerance of TF    Nutrition Prescription Ordered    Current Diet Order: NPO  Nutrition Order Comments: TF stopped 2/2 PEG placement; was tolerating Diabetisource @ 60 mL/hr.     Current Nutrition Support Formula Ordered: Diabetisource  Current Nutrition Support Rate Ordered: 60 (ml)  Current Nutrition Support Frequency Ordered: ml/hr    Evaluation of Received Nutrients/Fluid Intake    Enteral Calories (kcal): 1728  Enteral Protein (gm): 86  Enteral (Free Water) Fluid (mL): 1178  Free Water Flush Fluid (mL): 800     Energy Calories Required: not meeting needs  % Kcal Needs: 75%     Protein Required: not meeting needs  % Protein Needs: 81%     I/O: LBM: not recorded      Fluid Required: meeting needs     Tolerance: tolerating    Nutrition/Diet History    Patient Reported Diet/Restrictions/Preferences: other (see comments) (FRIEDA)     Typical Food/Fluid Intake: FRIEDA     Food Preferences: FRIEDA 2/2 pt not awake at the time of visit     Factors Affecting Nutritional Intake: NPO    Labs/Tests/Procedures/Meds    Pertinent Labs Reviewed: reviewed, pertinent  Pertinent Labs Comments: Gluc 177, A1C 10  Pertinent Medications  "Reviewed: reviewed, pertinent  Pertinent Medications Comments: Statin    Physical Findings    Overall Physical Appearance: obese  Tubes: nasogastric tube  Oral/Mouth Cavity: WDL  Skin: intact    Anthropometrics    Temp: 99.2 °F (37.3 °C)     Height: 5' 10" (177.8 cm)  Weight Method: Bed Scale  Weight: 105.6 kg (232 lb 12.9 oz)    Ideal Body Weight (IBW), Male: 166 lb  % Ideal Body Weight, Male (lb): 140.25 lb     BMI (Calculated): 33.5  BMI Grade: 30 - 34.9- obesity - grade I    Estimated/Assessed Needs    Weight Used For Calorie Calculations: 105.6 kg (232 lb 12.9 oz)      Energy Calorie Requirements (kcal): 2318 kcal/d  Energy Need Method: [place]-St Jeor (1.2 PAL)     Weight Used For Protein Calculations: 105.6 kg (232 lb 12.9 oz)  Protein Requirements: 106 g/d (1 g/kg)     Fluid Need Method: RDA Method (Per MD or 1 ml/kcal)     CHO Requirement: 50% total kcals     Assessment and Plan    * Embolic stroke involving left middle cerebral artery    Problem  Inadequate oral intake    Related to (etiology):   Inability to consume sufficient energy    Signs and Symptoms (as evidenced by):   NPO with no alternative means of nutrition    Nutrition Diagnosis Status:   Continues        Monitor and Evaluation    Food and Nutrient Intake: enteral nutrition intake  Food and Nutrient Adminstration: enteral and parenteral nutrition administration     Physical Activity and Function: nutrition-related ADLs and IADLs  Anthropometric Measurements: weight, weight change  Biochemical Data, Medical Tests and Procedures: electrolyte and renal panel, gastrointestinal profile, glucose/endocrine profile, inflammatory profile, lipid profile  Nutrition-Focused Physical Findings: overall appearance    Nutrition Risk    Level of Risk: other (see comments) (f/u 2x/week)    Nutrition Follow-Up    RD Follow-up?: Yes    "

## 2018-02-19 NOTE — PROGRESS NOTES
"[facility]  Vascular Neurology  Comprehensive Stroke Center  Progress Note    Assessment/Plan:     * Embolic stroke involving left middle cerebral artery    48 yo M with PMHx HLD, LOYDA, poorly controlled DM II presents to [facility] [place] 01/25/18 after noted "strange behavior" for which EMS was called.  Pt was found to have a L M1 occlusion and was taken to IR for thrombectomy with TICI 2C reperfusion and stent placement due to L MCA stenosis.  Etiology possibly atheroembolic with note of mild calcification in bilateral carotid bulbs on CTA.  Echo normal with no hx or signs of A fib.  Plan currently for PEG placement 02/19/18.  PT/OT/SLP will continue to evaluate to make placement recommendations--likely SNF.  Will resume DAPT 2/2 recent stent placement in angiogram after PEG placement--likely 02/19/18.    Antithrombotics for secondary stroke prevention: ASA 81 mg po qd (clopidogrel held for PEG placement)   Statins: atorvastatin 80 mg po qd  Aggressive risk factor modification: HLD, DM II (Hgb A1c 10%), Obesity  Rehab efforts: PT/OT/SLP to evaluate and treat--recommendation for SNF  Diagnostics ordered/pending: None  VTE prophylaxis: Heparin 5000 U q8h  BP parameters: Infarct: Post sucessful thrombectomy, SBP <140       Cytotoxic cerebral edema    -2/2 stroke, evident on imaging.  Stable on repeat CT head imaging.      Type 2 diabetes mellitus    -Stroke risk factor, Hgb A1c 10%  -Currently on detemir 32 U bid, aspart 8 U q4h (TFs)  -Glucose 157-196 on current regimen.  Titrate up insulin as needed.      Hyperlipidemia    -Stroke risk factor, LDL invalid as TG > 400  -Continue atorvastatin 80 mg qd, repeat lipid panel as an outpatient      Essential hypertension    -Stroke risk factor  -Continue lisinopril 20 mg qd, metoprolol 25 mg bid  --138/58-89 over past 24 h      Obstructive sleep apnea    -Stroke risk factor, trial CPAP overnight      1/25: Brought in for aphasia, RSW with L gaze preferance. LKN " unknown, not tPA candidate. Went to IR for angiogram and stent.  1/29: Off Cardene, remains on Insulin gtt. Concern for sepsis, respiratory source. Imaging with mass effect and some brain compression; maycol crani watch.  1/30: EEG consistent with focal L slowing 2/2 lesion and mild generalized slowing, no seizures. CT head stable, no hemorrhagic conversion  02/01/18 emergent intubation likely due to upper airway obstruction per NCC.    2/2/18: Pt off Precedex, moving left side spontaneously and opening eyes. Intubated, on spontaneous today. NCC giving steroids in hopes to extubate tomorrow.  2/3: no acute events over night, remains intubated at the time of the visit this morning, not responsive to verbal stimuli, withdraws from pain on LUE, BP raging ~135-230. No significant change in the lab values, glucose ~200. Continued on insulin gtt, SQH for DVT ppx, and captopril.     02/16/18:  Few changes on exam, continues to have significant RUE/RLE weakness with global aphasia.  Family member at bedside noted attempt to communicate with her.  02/18/18:  Pt largely unchanged on exam this am.  No family member present during am rounds.    STROKE DOCUMENTATION   Acute Stroke Times   Stroke Team Called Date: 01/25/18  Stroke Team Called Time: 1852  Stroke Team Arrival Date: 01/25/18  Stroke Team Arrival Time: 0652  CT Interpretation Time: 1910  Decision to Treat Time for Alteplase:  (n/a unknown last known normal )  Decision to Treat Time for IR: 1915    NIH Scale:  1a. Level Of Consciousness: 2-->Not alert: requires repeated stimulation to attend, or is obtunded and requires strong or painful stimulation to make movements (not stereotyped)  1b. LOC Questions: 2-->Answers neither question correctly  1c. LOC Commands: 1-->Performs one task correctly  2. Best Gaze: 1-->Partial gaze palsy: gaze is abnormal in one or both eyes, but forced deviation or total gaze paresis is not present  3. Visual: 0-->No visual loss  4. Facial  Palsy: 0-->Normal symmetrical movements  5a. Motor Arm, Left: 0-->No drift: limb holds 90 (or 45) degrees for full 10 secs  5b. Motor Arm, Right: 3-->No effort against gravity: limb falls  6a. Motor Leg, Left: 3-->No effort against gravity: leg falls to bed immediately  6b. Motor Leg, Right: 3-->No effort against gravity: leg falls to bed immediately  7. Limb Ataxia: 0-->Absent  8. Sensory: 0-->Normal: no sensory loss  9. Best Language: 3-->Mute, global aphasia: no usable speech or auditory comprehension  10. Dysarthria: 2-->Severe dysarthria: patients speech is so slurred as to be unintelligible in the absence of or out of proportion to any dysphasia, or is mute/anarthric  11. Extinction and Inattention (formerly Neglect): 0-->No abnormality  Total (NIH Stroke Scale): 20     Modified Barber Score: 0  Jeff Coma Scale:8   ABCD2 Score:    [id number] Score:   HAS -BLED Score:   ICH Score:   Hunt & Laguerre Classification:      Hemorrhagic change of an Ischemic Stroke: Does this patient have an ischemic stroke with hemorrhagic changes? No     Neurologic Chief Complaint: L MCA Stroke    Subjective:     Interval History: Patient is seen for follow-up neurological assessment and treatment recommendations:   Patient is     HPI, Past Medical, Family, and Social History remains the same as documented in the initial encounter.     Review of Systems   Constitutional: Negative for chills and fever.   Eyes: Negative for visual disturbance.   Respiratory: Negative for cough.    Skin: Negative for rash and wound.   Neurological: Positive for speech difficulty. Negative for weakness and numbness.   Psychiatric/Behavioral: Positive for confusion and decreased concentration.     Scheduled Meds:   albuterol-ipratropium 2.5mg-0.5mg/3mL  3 mL Nebulization Q6H    atorvastatin  80 mg Per NG tube Daily    heparin (porcine)  5,000 Units Subcutaneous Q8H    insulin aspart U-100  8 Units Subcutaneous Q4H    insulin detemir U-100  32  Units Subcutaneous BID    lisinopril  20 mg Per NG tube Daily    metoprolol tartrate  25 mg Per NG tube BID    modafinil  200 mg Per NG tube Daily    And    modafinil  100 mg Per NG tube Daily    polyethylene glycol  17 g Per NG tube Daily    senna-docusate 8.6-50 mg  1 tablet Per NG tube BID    sodium chloride 0.9%  3 mL Intravenous Q8H    sodium chloride 3%  4 mL Nebulization Q6H     Continuous Infusions:  PRN Meds:acetaminophen, albuterol sulfate, albuterol-ipratropium 2.5mg-0.5mg/3mL, bisacodyl, dextrose 50%, glucagon (human recombinant), hydrALAZINE, insulin aspart U-100, ipratropium, labetalol, magnesium sulfate IVPB, magnesium sulfate IVPB, ondansetron, potassium chloride 10%, potassium chloride 10%, sodium chloride 0.9%    Objective:     Vital Signs (Most Recent):  Temp: 99.1 °F (37.3 °C) (02/18/18 1901)  Pulse: 104 (02/18/18 2124)  Resp: (!) 24 (02/18/18 2124)  BP: 128/85 (02/18/18 2000)  SpO2: 95 % (02/18/18 2000)  BP Location: Right arm    Vital Signs Range (Last 24H):  Temp:  [98.2 °F (36.8 °C)-99.5 °F (37.5 °C)]   Pulse:  []   Resp:  [11-27]   BP: (100-138)/(58-89)   SpO2:  [92 %-100 %]   BP Location: Right arm    Physical Exam  General:  Well-developed, well-nourished, not opening eyes to voice/tactile stimuli--opens eyes to noxious stimuli, moving LUE/LLE intermittently  HEENT:  NCAT, PERRLA, EOMI with tracking, oropharyngeal membranes non-erythematous/without exudate  Neck:  Supple, normal ROM without nuchal rigidity  Resp:  Symmetric expansion, no increased wob  CVS:  No LE edema, peripheral pulses 2+ (radial, dorsalis pedis)  GI:  Abd soft, non-distended, non-tender to palpation  Neurologic Exam:  Mental Status:  Somnolent, opens eyes to noxious stimuli only.  Does not follow commands well, intermittently mimicking commands.  Cranial Nerves:  Face appears symmetric.  Grimace bilaterally to noxious stimuli.  PERRLA, EOMI with tracking.  Motor:  Normal bulk and tone. LUE, LLE moving  spontaneously.  RLE with some withdrawal to noxious stimuli.  No movement of RUE.  Sensory:   Withdrawal to noxious stimuli at LUE and BLE.  No withdrawal RUE.  Reflexes:  Biceps, brachioradialis, patellar 2+ and symmetric.  No ankle clonus.  Downgoing toe bilaterally.  Coordination:  No resting tremor or myoclonus.    Gait:  Deferred 2/2 fall precautions, AMS    Laboratory:  CMP:     Recent Labs  Lab 02/18/18  0125 02/18/18  1006   CALCIUM 9.6  --    ALBUMIN 2.3*  --    PROT 7.7  --      --    K 3.8 4.5   CO2 30*  --    CL 99  --    BUN 24*  --    CREATININE 0.8  --    ALKPHOS 141*  --    ALT 29  --    AST 26  --    BILITOT 0.5  --      CBC:     Recent Labs  Lab 02/18/18  0125   WBC 13.87*   RBC 4.28*   HGB 12.5*   HCT 37.8*   *   MCV 88   MCH 29.2   MCHC 33.1     Lipid Panel: Invalid due to excessively high triglycerides  Coagulation:     Recent Labs  Lab 02/18/18 0125   INR 1.0     Hgb A1C: 10%  TSH: PENDING    Diagnostic Results     Brain Imaging   01/26/18 CT head w/o contrast:  No detrimental change.  Evolving subtle left MCA territory infarct.       01/26/18 MRI Brain w/o contrast:  1. Large acute left MCA territory infarction as above.  No associated hemorrhage or significant mass effect at this time.  2. Mild chronic ischemic changes.        02/04/18 CT head w/o contrast:  Redemonstration of large left MCA territory infarction without evidence of hemorrhagic conversion.  Slight interval increase in local mass effect with rightward midline shift of approximately 0.6 cm. Stable postoperative change from left MCA endovascular stenting. Sinus disease as above.       Vessel Imaging   01/25/18 CTA Stroke Multiphase:  Short segment occlusion of left M1.  There is good collateral flow in the ischemic territory as detailed above.  Noncontrast images demonstrate subtle changes reflecting left MCA territory acute infarct.       01/25/18 IR Angiogram:  Cerebral angiogram demonstrates occlusion of the M1  segment of the left middle cerebral artery with good collateral flow from pial collaterals.  This was successfully removed and residual stenosis at the site of occlusion was angioplastied and stented with improvement in flow.  Slow flow to the left parietal lobe suggests area of infarcted tissue.  This was felt to represent TICI 2C flow.    Cardiac Imaging   01/26/18 2D Echo w/ CFD:  CONCLUSIONS     1 - Normal left ventricular systolic function (EF 65-70%).     2 - Concentric remodeling.     3 - No wall motion abnormalities.     4 - Normal left ventricular diastolic function.     5 - Normal right ventricular systolic function .    6 - No LA enlargement.    Radha Fernandes MD  Comprehensive Stroke Center  Department of Vascular Neurology   [facility]

## 2018-02-19 NOTE — PLAN OF CARE
02/19/18 1604   Discharge Reassessment   Assessment Type Discharge Planning Reassessment   Provided patient/caregiver education on the expected discharge date and the discharge plan No   Do you have any problems affording any of your prescribed medications? No   Discharge Plan A New Nursing Home placement - nursing home care facility  (Patient has no insurance, [facility] will not cover post acute inpatient services)   Discharge Plan B Rehab   Patient choice form signed by patient/caregiver N/A   Can the patient answer the patient profile reliably? No, cognitively impaired   How does the patient rate their overall health at the present time? (ty)   Describe the patient's ability to walk at the present time. Does not walk or unable to take any steps at all   How often would a person be available to care for the patient? Occasionally   Number of comorbid conditions (as recorded on the chart) Two   During the past month, has the patient often been bothered by feeling down, depressed or hopeless? (ty)   During the past month, has the patient often been bothered by little interest or pleasure in doing things? (ty)       Patient S/p peg tube placement in IR today.     Per MD, may step down to floor tomorrow.    Patient with no insurance/ [facility] stated that he is not service connected and has no post acute inpatient rehab/snf benefit.   Patient's wife to complete [place] Medicaid [place].,    Delaney Mark RN, CCRN-K, [facility]  Neuro-Critical Care   [phone]

## 2018-02-19 NOTE — PT/OT/SLP PROGRESS
Occupational Therapy   Treatment    Name: Todd Quevedo  MRN: [id number]  Admitting Diagnosis:  Embolic stroke involving left middle cerebral artery       Recommendations:     Discharge Recommendations: nursing facility, skilled  Discharge Equipment Recommendations:  hospital bed, lift device  Barriers to discharge:  Inaccessible home environment, Decreased caregiver support    Subjective   Patient: Nonverbal  Communicated with: nurse prior to session.  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  General Precautions: Standard, aphasia, aspiration, fall, NPO   Orthopedic Precautions:N/A   Braces: N/A     Occupational Performance:    Bed Mobility:    · Patient completed Rolling/Turning to Left with  total assistance  · Patient completed Rolling/Turning to Right with total assistance     Functional Mobility/Transfers:  · Dependent drawsheet transfers     Activities of Daily Living:  · Feeding:  NPO    · Grooming: dependence while supine    Patient left supine with all lines intact and call button in reach    [facility] 6 Click:  [facility] Total Score: 6    Treatment & Education:  Patient education provided on role of OT, ROM, positioning, bed mobility and postural control.  Continued education, patient/ family training recommended. Daily orientation provided.  PROM performed right UE/LEs and AAROM left UE/LE one set x 10 rep in all planes of motion with stretches provided at end range; sustained stretch provided for right ankle dorsiflexion.  Assistance and facilitation provided for upward rotation of the scapula during shoulder flexion and abduction.  Patient kept eyes closed 100% of the session.   Patient unable  to follow commands.  Positioning provided for midline orientation with bilateral UEs elevated and heels lifted off mattress.  Gentle cervical rotation provided.    White board updated in patient's room.  OT asked if there were any other questions; patient/ family had no further questions.  Education:    Assessment:     Todd Quevedo is a 47 y.o. male with a medical diagnosis of Embolic stroke involving left middle cerebral artery.  He presents with performance deficits of physical skills including impaired respiration, balance, mobility, strength, dexterity, fine motor coordination, gross motor coordination, sensation and endurance; demonstrating performance deficits of cognitive skills including impaired attention, perception, understanding, problem solving, sequencing and memory all resulting in inability organizing occupational performance in a timely and safe manner; demonstrating performance deficits of psychosocial skills including impairments of interpersonal interactions and coping strategies which are skills necessary to successfully and appropriately participate in everyday tasks and social situations.  These performance deficits have resulted in activity limitations including but not limited to:  bed mobility, transfers, ascending/ descending stairs, walking short and long distances, walking around obstacles, transitional movement patterns (kneeling, bending); eating, upper body dressing, lower body dressing, brushing teeth, toileting, bathing, carrying objects, meal preparation, and working ().  Patient's role as , father, ,   and independent caretaker for self has been affected. Patient will benefit from skilled OT services to maximize level of independence with self-care skills and functional mobility.       Rehab Prognosis:  Good; patient would benefit from acute skilled OT services to address these deficits and reach maximum level of  function.       Plan:     Patient to be seen 3 x/week to address the above listed problems via self-care/home management, therapeutic activities, therapeutic exercises, cognitive retraining, neuromuscular re-education, sensory integration  · Plan of Care Expires: 02/23/18  · Plan of Care Reviewed with: patient    This Plan of care has been discussed with the patient who was involved in its development and understands and is in agreement with the identified goals and treatment plan    GOALS:    Occupational Therapy Goals        Problem: Occupational Therapy Goal    Goal Priority Disciplines Outcome Interventions   Occupational Therapy Goal     OT, PT/OT Ongoing (interventions implemented as appropriate)    Description:  Goals set 2/9 to be addressed for 14 days with expiration date, 2/23:  Patient will increase functional independence with ADLs by performing:    Patient will demonstrate rolling to the right with mod assist.  Not met   Patient will demonstrate rolling to the left with max assist.   Not met  Patient will demonstrate supine -sit with max assist.   Not met  Patient will demonstrate squat pivot transfers with max assist.   Not met  Patient will demonstrate grooming while seated with max assist.   Not met  Patient will demonstrate upper body dressing with max assist while seated EOB.   Not met  Patient will demonstrate lower body dressing with max assist while seated EOB.   Not met  Patient will demonstrate toileting with max assist.   Not met  Patient will demonstrate ability to follow 3/5 commands.   Not met  Patient will demonstrate independence with HEP for right UE positioning.    Not met  Patient's family / caregiver will demonstrate independence and safety with assisting patient with self-care skills and functional mobility.     Not met  Patient's family / caregiver will demonstrate independence with providing ROM and changes in bed positioning.   Not met                        Time Tracking:     OT  Date of Treatment: 02/19/18  OT Start Time: 0517  OT Stop Time: 0541  OT Total Time (min): 24 min    Billable Minutes:Therapeutic Exercise 24    ISAMAR Almonte  2/19/2018

## 2018-02-19 NOTE — ASSESSMENT & PLAN NOTE
-Stroke risk factor, Hgb A1c 10%  -Currently on detemir 32 U bid, aspart 8 U q4h (TFs)  -Glucose 157-196 on current regimen.  Titrate up insulin as needed.

## 2018-02-19 NOTE — PLAN OF CARE
Problem: Patient Care Overview  Goal: Plan of Care Review  Outcome: Ongoing (interventions implemented as appropriate)  POC reviewed with pt and family at 2100. Pt's verbalized understanding, as pt is nonverbal at this time. Questions and concerns addressed. No acute events overnight. Pt progressing toward goals. Will continue to monitor. See flowsheets for full assessment and VS info

## 2018-02-19 NOTE — H&P
Inpatient Radiology Pre-procedure Note    History of Present Illness:  Todd Quevedo is a 47 y.o. male who presents for IR Gastric tube placement.  Admission H&P reviewed.  History reviewed. No pertinent past medical history.  History reviewed. No pertinent surgical history.    Review of Systems:   As documented in primary team H&P    Home Meds:   Prior to Admission medications    Not on File     Scheduled Meds:    albuterol-ipratropium 2.5mg-0.5mg/3mL  3 mL Nebulization Q6H    atorvastatin  80 mg Per NG tube Daily    lisinopril  20 mg Per NG tube Daily    metoprolol tartrate  25 mg Per NG tube BID    modafinil  200 mg Per NG tube Daily    And    modafinil  100 mg Per NG tube Daily    polyethylene glycol  17 g Per NG tube Daily    senna-docusate 8.6-50 mg  1 tablet Per NG tube BID    sodium chloride 0.9%  3 mL Intravenous Q8H    sodium chloride 3%  4 mL Nebulization Q6H     Continuous Infusions:    sodium chloride 0.9% 75 mL/hr at 02/19/18 1017     PRN Meds:acetaminophen, albuterol sulfate, albuterol-ipratropium 2.5mg-0.5mg/3mL, bisacodyl, dextrose 50%, glucagon (human recombinant), hydrALAZINE, insulin aspart U-100, ipratropium, labetalol, magnesium sulfate IVPB, magnesium sulfate IVPB, ondansetron, potassium chloride 10%, potassium chloride 10%, sodium chloride 0.9%  Anticoagulants/Antiplatelets: Heparin last given yesterday,     Allergies: Review of patient's allergies indicates:  No Known Allergies  Sedation Hx: have not been any systemic reactions    Labs:    Recent Labs  Lab 02/19/18  0140   INR 1.0       Recent Labs  Lab 02/19/18  0140   WBC 15.79*   HGB 12.8*   HCT 38.0*   MCV 86   *      Recent Labs  Lab 02/19/18  0140   *      K 4.2   CL 98   CO2 30*   BUN 20   CREATININE 0.9   CALCIUM 9.8   MG 1.9   ALT 27   AST 24   ALBUMIN 2.3*   BILITOT 0.6         Vitals:  Temp: 98.9 °F (37.2 °C) (02/19/18 1100)  Pulse: 88 (02/19/18 1100)  Resp: (!) 27 (02/19/18 1100)  BP: 132/89  (02/19/18 1100)  SpO2: 95 % (02/19/18 1100)     Physical Exam:  ASA: 3  Mallampati: 3    General: no acute distress  Mental Status: alert and oriented to person, place and time  HEENT: normocephalic, atraumatic  Chest: unlabored breathing  Abdomen: nondistended  Extremity: moves all extremities    Plan: G-tube placement  Sedation Plan: Moderate    Jori ELLIS-4  Pager: [phone]

## 2018-02-19 NOTE — ASSESSMENT & PLAN NOTE
-Stroke risk factor  -Continue lisinopril 20 mg qd, metoprolol 25 mg bid  --138/58-89 over past 24 h

## 2018-02-19 NOTE — PT/OT/SLP PROGRESS
Speech Language Pathology Treatment    Patient Name:  Todd Quevedo   MRN:  [id number]  Admitting Diagnosis: Embolic stroke involving left middle cerebral artery    Recommendations:                 General Recommendations:  Speech/language therapy, Cognitive-linguistic therapy and Bedside swallow evaluation as appropriate  Diet recommendations:  NPO, Liquid Diet Level: NPO   Aspiration Precautions: Continue alternate means of nutrition and Strict aspiration precautions   General Precautions: Standard, aspiration, fall, aphasia, NPO  Communication strategies:  yes/no questions only, provide increased time to answer and go to room if call light pushed    Subjective     Pt seen bedside, family not present    Pain/Comfort:  · Pain Rating 1: 0/10  · Pain Rating Post-Intervention 1: 0/10 (NAD)    Objective:     Has the patient been evaluated by SLP for swallowing?   Yes  Keep patient NPO? Yes   Current Respiratory Status:  (face mask)      Pt asleep when SLP entered, scheduled for peg tube placement today.  Face mask, NG tube and nasal trumpet noted.  Continued wet breath sounds with intermittent congested cough also observed.  PO trials not attempted.  Pt with minimal eye opening given max verbal/tactile stim though he was unable to maintain or establish eye contact despite max cues.  Some spontaneous L UE/LE movement observed with stim though no simple commands followed/modeled.  No vocalizations elicited.  No response to simple y/n questions via any modality.  White board updated.      Assessment:     Todd Quevedo is a 47 y.o. male with an SLP diagnosis of Aphasia, Dysphagia and Cognitive-Linguistic Impairment.  He presents with continued limited repsonsivesness/somnolence.      Goals:    SLP Goals        Problem: SLP Goal    Goal Priority Disciplines Outcome   SLP Goal     SLP Ongoing (interventions implemented as appropriate)   Description:  Speech Language Pathology Goals  Goals expected to be met by 2/23  1. Pt will  participate in bedside assessment of swallow to determine least restrictive diet as appropriate.  2. Pt will open eyes to stim x5/session given max cues.  3. Pt will follow simple commands x2/session given max cues.  4. Pt will answer basic y/n question via any modality x2/session given max cues.  5. Pt will vocalize in response to any stim x2/session given max cues.                           Plan:     · Patient to be seen:  3 x/week   · Plan of Care expires:  03/18/18  · Plan of Care reviewed with:  patient   · SLP Follow-Up:  Yes       Discharge recommendations:  nursing facility, skilled   Barriers to Discharge:  Level of Skilled Assistance Needed      Time Tracking:     SLP Treatment Date:   02/19/18  Speech Start Time:  0752  Speech Stop Time:  0800     Speech Total Time (min):  8 min    Billable Minutes: Speech Therapy Individual 8    EDWARD Key, CCC-SLP  02/19/2018

## 2018-02-19 NOTE — PLAN OF CARE
Problem: Occupational Therapy Goal  Goal: Occupational Therapy Goal  Goals set 2/9 to be addressed for 14 days with expiration date, 2/23:  Patient will increase functional independence with ADLs by performing:    Patient will demonstrate rolling to the right with mod assist.  Not met   Patient will demonstrate rolling to the left with max assist.   Not met  Patient will demonstrate supine -sit with max assist.   Not met  Patient will demonstrate squat pivot transfers with max assist.   Not met  Patient will demonstrate grooming while seated with max assist.   Not met  Patient will demonstrate upper body dressing with max assist while seated EOB.   Not met  Patient will demonstrate lower body dressing with max assist while seated EOB.   Not met  Patient will demonstrate toileting with max assist.   Not met  Patient will demonstrate ability to follow 3/5 commands.   Not met  Patient will demonstrate independence with HEP for right UE positioning.    Not met  Patient's family / caregiver will demonstrate independence and safety with assisting patient with self-care skills and functional mobility.     Not met  Patient's family / caregiver will demonstrate independence with providing ROM and changes in bed positioning.   Not met       Goals remain appropriate.  ISAMAR Almonte  2/19/2018

## 2018-02-19 NOTE — SUBJECTIVE & OBJECTIVE
Neurologic Chief Complaint: L MCA Stroke    Subjective:     Interval History: Patient is seen for follow-up neurological assessment and treatment recommendations: No issues overnight, moves the left spontaneously no movement on the right, for PEG today          HPI, Past Medical, Family, and Social History remains the same as documented in the initial encounter.     Review of Systems   Constitutional: Negative for chills and fever.   Skin: Negative for rash and wound.   Neurological: Positive for speech difficulty. Negative for weakness and numbness.   Psychiatric/Behavioral: Positive for confusion and decreased concentration.     Scheduled Meds:   albuterol-ipratropium 2.5mg-0.5mg/3mL  3 mL Nebulization Q6H    atorvastatin  80 mg Per NG tube Daily    lisinopril  20 mg Per NG tube Daily    metoprolol tartrate  25 mg Per NG tube BID    modafinil  200 mg Per NG tube Daily    And    modafinil  100 mg Per NG tube Daily    polyethylene glycol  17 g Per NG tube Daily    senna-docusate 8.6-50 mg  1 tablet Per NG tube BID    sodium chloride 0.9%  3 mL Intravenous Q8H    sodium chloride 3%  4 mL Nebulization Q6H     Continuous Infusions:   sodium chloride 0.9% 75 mL/hr at 02/19/18 1017     PRN Meds:acetaminophen, albuterol sulfate, albuterol-ipratropium 2.5mg-0.5mg/3mL, bisacodyl, dextrose 50%, glucagon (human recombinant), hydrALAZINE, insulin aspart U-100, ipratropium, labetalol, magnesium sulfate IVPB, magnesium sulfate IVPB, ondansetron, potassium chloride 10%, potassium chloride 10%, sodium chloride 0.9%    Objective:     Vital Signs (Most Recent):  Temp: 98.9 °F (37.2 °C) (02/19/18 1100)  Pulse: 88 (02/19/18 1100)  Resp: (!) 27 (02/19/18 1100)  BP: 132/89 (02/19/18 1100)  SpO2: 95 % (02/19/18 1100)  BP Location: Right arm    Vital Signs Range (Last 24H):  Temp:  [98.9 °F (37.2 °C)-99.5 °F (37.5 °C)]   Pulse:  []   Resp:  [19-27]   BP: (112-164)/(70-98)   SpO2:  [92 %-100 %]   BP Location: Right  arm    Physical Exam  General:  Well-developed, well-nourished, not opening eyes to voice/tactile stimuli--opens eyes to noxious stimuli, moving LUE/LLE intermittently  Resp:  Symmetric expansion, no increased wob  CVS:  No LE edema, peripheral pulses 2+ (radial, dorsalis pedis)  GI:  Abd soft, non-distended, non-tender to palpation    Neurologic Exam:  Mental Status:  Somnolent, opens eyes to noxious stimuli only.  Does not follow commands well, intermittently mimicking commands.  Cranial Nerves:  Face appears symmetric.  Grimace bilaterally to noxious stimuli.  PERRLA, EOMI with tracking.  Motor:  Normal bulk and tone. LUE, LLE moving spontaneously.  RLE with some withdrawal to noxious stimuli.  No movement of RUE.  Sensory:   Withdrawal to noxious stimuli at LUE and BLE.  No withdrawal RUE.      Laboratory:  CMP:     Recent Labs  Lab 02/19/18 0140   CALCIUM 9.8   ALBUMIN 2.3*   PROT 8.1      K 4.2   CO2 30*   CL 98   BUN 20   CREATININE 0.9   ALKPHOS 136*   ALT 27   AST 24   BILITOT 0.6     CBC:     Recent Labs  Lab 02/19/18 0140   WBC 15.79*   RBC 4.40*   HGB 12.8*   HCT 38.0*   *   MCV 86   MCH 29.1   MCHC 33.7     Lipid Panel: Invalid due to excessively high triglycerides  Coagulation:     Recent Labs  Lab 02/19/18 0140   INR 1.0     Hgb A1C: 10%  TSH: PENDING    Diagnostic Results     Brain Imaging   01/26/18 CT head w/o contrast:  No detrimental change.  Evolving subtle left MCA territory infarct.       01/26/18 MRI Brain w/o contrast:  1. Large acute left MCA territory infarction as above.  No associated hemorrhage or significant mass effect at this time.  2. Mild chronic ischemic changes.        02/04/18 CT head w/o contrast:  Redemonstration of large left MCA territory infarction without evidence of hemorrhagic conversion.  Slight interval increase in local mass effect with rightward midline shift of approximately 0.6 cm. Stable postoperative change from left MCA endovascular stenting.  Sinus disease as above.       Vessel Imaging   01/25/18 CTA Stroke Multiphase:  Short segment occlusion of left M1.  There is good collateral flow in the ischemic territory as detailed above.  Noncontrast images demonstrate subtle changes reflecting left MCA territory acute infarct.       01/25/18 IR Angiogram:  Cerebral angiogram demonstrates occlusion of the M1 segment of the left middle cerebral artery with good collateral flow from pial collaterals.  This was successfully removed and residual stenosis at the site of occlusion was angioplastied and stented with improvement in flow.  Slow flow to the left parietal lobe suggests area of infarcted tissue.  This was felt to represent TICI 2C flow.    Cardiac Imaging   01/26/18 2D Echo w/ CFD:  CONCLUSIONS     1 - Normal left ventricular systolic function (EF 65-70%).     2 - Concentric remodeling.     3 - No wall motion abnormalities.     4 - Normal left ventricular diastolic function.     5 - Normal right ventricular systolic function .    6 - No LA enlargement.

## 2018-02-20 PROBLEM — R06.03 RESPIRATORY DISTRESS, ACUTE: Status: RESOLVED | Noted: 2018-02-01 | Resolved: 2018-02-20

## 2018-02-20 LAB
ALBUMIN SERPL BCP-MCNC: 2.1 G/DL
ALP SERPL-CCNC: 125 U/L
ALT SERPL W/O P-5'-P-CCNC: 23 U/L
ANION GAP SERPL CALC-SCNC: 12 MMOL/L
AST SERPL-CCNC: 21 U/L
BASOPHILS # BLD AUTO: 0.03 K/UL
BASOPHILS NFR BLD: 0.3 %
BILIRUB SERPL-MCNC: 0.7 MG/DL
BUN SERPL-MCNC: 20 MG/DL
CALCIUM SERPL-MCNC: 9.5 MG/DL
CHLORIDE SERPL-SCNC: 103 MMOL/L
CO2 SERPL-SCNC: 26 MMOL/L
CREAT SERPL-MCNC: 0.8 MG/DL
DIFFERENTIAL METHOD: ABNORMAL
EOSINOPHIL # BLD AUTO: 0.2 K/UL
EOSINOPHIL NFR BLD: 1.8 %
ERYTHROCYTE [DISTWIDTH] IN BLOOD BY AUTOMATED COUNT: 12.2 %
EST. GFR  (AFRICAN AMERICAN): >60 ML/MIN/1.73 M^2
EST. GFR  (NON AFRICAN AMERICAN): >60 ML/MIN/1.73 M^2
GLUCOSE SERPL-MCNC: 150 MG/DL
HCT VFR BLD AUTO: 36.9 %
HGB BLD-MCNC: 12.2 G/DL
IMM GRANULOCYTES # BLD AUTO: 0.03 K/UL
IMM GRANULOCYTES NFR BLD AUTO: 0.3 %
INR PPP: 1.1
LYMPHOCYTES # BLD AUTO: 2.4 K/UL
LYMPHOCYTES NFR BLD: 19.8 %
MAGNESIUM SERPL-MCNC: 1.7 MG/DL
MAGNESIUM SERPL-MCNC: 1.8 MG/DL
MAGNESIUM SERPL-MCNC: 2 MG/DL
MCH RBC QN AUTO: 29.5 PG
MCHC RBC AUTO-ENTMCNC: 33.1 G/DL
MCV RBC AUTO: 89 FL
MONOCYTES # BLD AUTO: 0.9 K/UL
MONOCYTES NFR BLD: 7.3 %
NEUTROPHILS # BLD AUTO: 8.4 K/UL
NEUTROPHILS NFR BLD: 70.5 %
NRBC BLD-RTO: 0 /100 WBC
PHOSPHATE SERPL-MCNC: 3.7 MG/DL
PLATELET # BLD AUTO: 404 K/UL
PMV BLD AUTO: 9.5 FL
POCT GLUCOSE: 152 MG/DL (ref 70–110)
POCT GLUCOSE: 170 MG/DL (ref 70–110)
POCT GLUCOSE: 171 MG/DL (ref 70–110)
POCT GLUCOSE: 173 MG/DL (ref 70–110)
POCT GLUCOSE: 181 MG/DL (ref 70–110)
POCT GLUCOSE: 192 MG/DL (ref 70–110)
POTASSIUM SERPL-SCNC: 4 MMOL/L
PROT SERPL-MCNC: 7.5 G/DL
PROTHROMBIN TIME: 11.2 SEC
RBC # BLD AUTO: 4.14 M/UL
SODIUM SERPL-SCNC: 141 MMOL/L
WBC # BLD AUTO: 11.84 K/UL

## 2018-02-20 PROCEDURE — 25000003 PHARM REV CODE 250: Performed by: PHYSICIAN ASSISTANT

## 2018-02-20 PROCEDURE — 20000000 HC ICU ROOM

## 2018-02-20 PROCEDURE — 83735 ASSAY OF MAGNESIUM: CPT | Mod: 91

## 2018-02-20 PROCEDURE — 31720 CLEARANCE OF AIRWAYS: CPT

## 2018-02-20 PROCEDURE — 25000003 PHARM REV CODE 250: Performed by: NURSE PRACTITIONER

## 2018-02-20 PROCEDURE — 80053 COMPREHEN METABOLIC PANEL: CPT

## 2018-02-20 PROCEDURE — 85025 COMPLETE CBC W/AUTO DIFF WBC: CPT

## 2018-02-20 PROCEDURE — 99233 SBSQ HOSP IP/OBS HIGH 50: CPT | Mod: ,,, | Performed by: NURSE PRACTITIONER

## 2018-02-20 PROCEDURE — 85610 PROTHROMBIN TIME: CPT

## 2018-02-20 PROCEDURE — 27000221 HC OXYGEN, UP TO 24 HOURS

## 2018-02-20 PROCEDURE — 94761 N-INVAS EAR/PLS OXIMETRY MLT: CPT

## 2018-02-20 PROCEDURE — 84100 ASSAY OF PHOSPHORUS: CPT

## 2018-02-20 PROCEDURE — 25000242 PHARM REV CODE 250 ALT 637 W/ HCPCS: Performed by: PSYCHIATRY & NEUROLOGY

## 2018-02-20 PROCEDURE — 63600175 PHARM REV CODE 636 W HCPCS: Performed by: PHYSICIAN ASSISTANT

## 2018-02-20 PROCEDURE — 25000003 PHARM REV CODE 250: Performed by: PSYCHIATRY & NEUROLOGY

## 2018-02-20 PROCEDURE — 99233 SBSQ HOSP IP/OBS HIGH 50: CPT | Mod: ,,, | Performed by: PSYCHIATRY & NEUROLOGY

## 2018-02-20 PROCEDURE — 94640 AIRWAY INHALATION TREATMENT: CPT

## 2018-02-20 PROCEDURE — 63600175 PHARM REV CODE 636 W HCPCS: Performed by: NURSE PRACTITIONER

## 2018-02-20 PROCEDURE — 83735 ASSAY OF MAGNESIUM: CPT

## 2018-02-20 RX ORDER — HEPARIN SODIUM 5000 [USP'U]/ML
5000 INJECTION, SOLUTION INTRAVENOUS; SUBCUTANEOUS EVERY 8 HOURS
Status: DISCONTINUED | OUTPATIENT
Start: 2018-02-20 | End: 2018-03-21 | Stop reason: HOSPADM

## 2018-02-20 RX ORDER — NAPROXEN SODIUM 220 MG/1
81 TABLET, FILM COATED ORAL DAILY
Status: DISCONTINUED | OUTPATIENT
Start: 2018-02-20 | End: 2018-02-21

## 2018-02-20 RX ADMIN — MAGNESIUM SULFATE HEPTAHYDRATE 2 G: 40 INJECTION, SOLUTION INTRAVENOUS at 03:02

## 2018-02-20 RX ADMIN — SODIUM CHLORIDE SOLN NEBU 3% 4 ML: 3 NEBU SOLN at 08:02

## 2018-02-20 RX ADMIN — METOPROLOL TARTRATE 25 MG: 25 TABLET ORAL at 09:02

## 2018-02-20 RX ADMIN — INSULIN ASPART 2 UNITS: 100 INJECTION, SOLUTION INTRAVENOUS; SUBCUTANEOUS at 05:02

## 2018-02-20 RX ADMIN — INSULIN ASPART 1 UNITS: 100 INJECTION, SOLUTION INTRAVENOUS; SUBCUTANEOUS at 09:02

## 2018-02-20 RX ADMIN — HEPARIN SODIUM 5000 UNITS: 5000 INJECTION, SOLUTION INTRAVENOUS; SUBCUTANEOUS at 01:02

## 2018-02-20 RX ADMIN — STANDARDIZED SENNA CONCENTRATE AND DOCUSATE SODIUM 1 TABLET: 8.6; 5 TABLET, FILM COATED ORAL at 09:02

## 2018-02-20 RX ADMIN — INSULIN ASPART 2 UNITS: 100 INJECTION, SOLUTION INTRAVENOUS; SUBCUTANEOUS at 10:02

## 2018-02-20 RX ADMIN — IPRATROPIUM BROMIDE AND ALBUTEROL SULFATE 3 ML: .5; 3 SOLUTION RESPIRATORY (INHALATION) at 01:02

## 2018-02-20 RX ADMIN — INSULIN ASPART 2 UNITS: 100 INJECTION, SOLUTION INTRAVENOUS; SUBCUTANEOUS at 06:02

## 2018-02-20 RX ADMIN — IPRATROPIUM BROMIDE AND ALBUTEROL SULFATE 3 ML: .5; 3 SOLUTION RESPIRATORY (INHALATION) at 08:02

## 2018-02-20 RX ADMIN — INSULIN ASPART 1 UNITS: 100 INJECTION, SOLUTION INTRAVENOUS; SUBCUTANEOUS at 02:02

## 2018-02-20 RX ADMIN — SODIUM CHLORIDE SOLN NEBU 3% 4 ML: 3 NEBU SOLN at 12:02

## 2018-02-20 RX ADMIN — SODIUM CHLORIDE SOLN NEBU 3% 4 ML: 3 NEBU SOLN at 01:02

## 2018-02-20 RX ADMIN — ASPIRIN 81 MG CHEWABLE TABLET 81 MG: 81 TABLET CHEWABLE at 01:02

## 2018-02-20 RX ADMIN — POLYETHYLENE GLYCOL 3350 17 G: 17 POWDER, FOR SOLUTION ORAL at 09:02

## 2018-02-20 RX ADMIN — MAGNESIUM SULFATE HEPTAHYDRATE 2 G: 40 INJECTION, SOLUTION INTRAVENOUS at 06:02

## 2018-02-20 RX ADMIN — LISINOPRIL 20 MG: 20 TABLET ORAL at 09:02

## 2018-02-20 RX ADMIN — IPRATROPIUM BROMIDE AND ALBUTEROL SULFATE 3 ML: .5; 3 SOLUTION RESPIRATORY (INHALATION) at 12:02

## 2018-02-20 RX ADMIN — INSULIN ASPART 2 UNITS: 100 INJECTION, SOLUTION INTRAVENOUS; SUBCUTANEOUS at 02:02

## 2018-02-20 RX ADMIN — ATORVASTATIN CALCIUM 80 MG: 20 TABLET, FILM COATED ORAL at 09:02

## 2018-02-20 RX ADMIN — HEPARIN SODIUM 5000 UNITS: 5000 INJECTION, SOLUTION INTRAVENOUS; SUBCUTANEOUS at 09:02

## 2018-02-20 RX ADMIN — MODAFINIL 100 MG: 100 TABLET ORAL at 01:02

## 2018-02-20 RX ADMIN — MODAFINIL 200 MG: 100 TABLET ORAL at 05:02

## 2018-02-20 NOTE — PLAN OF CARE
SW advised by CM that the Pt is not service connected. Pt wife will have to apply for [place] Medicaid asap to receive benefits towards placement. Until then, Pt cannot be placed. Pt wife was advised and is working towards submitting the application. SW to continue to follow.    Kristin Sal, CHAVEZ  Neurocritical Care   [facility]  [id number]

## 2018-02-20 NOTE — PLAN OF CARE
Problem: Patient Care Overview  Goal: Plan of Care Review  Outcome: Ongoing (interventions implemented as appropriate)  POC reviewed with pt and family at 2200. Pt's family verbalized understanding as pt is nonverbal at this time. Questions and concerns addressed. No acute events overnight. Pt progressing toward goals. Will continue to monitor. See flowsheets for full assessment and VS info

## 2018-02-20 NOTE — PLAN OF CARE
SW following for DC needs. Pt wife still working on [place] Medicaid application. Will continue to follow.    Kristin Sal LMSW  Neurocritical Care   [facility]  [id number]

## 2018-02-20 NOTE — SUBJECTIVE & OBJECTIVE
Review of Systems  Unable to obtain a complete ROS due to level of consciousness.  Objective:     Vitals:  Temp: 99.1 °F (37.3 °C)  Pulse: 94  Rhythm: normal sinus rhythm  BP: 136/89  MAP (mmHg): 108  Resp: (!) 22  SpO2: 97 %  O2 Device (Oxygen Therapy): nasal cannula    Temp  Min: 97.9 °F (36.6 °C)  Max: 99.1 °F (37.3 °C)  Pulse  Min: 78  Max: 105  BP  Min: 112/71  Max: 162/100  MAP (mmHg)  Min: 84  Max: 125  Resp  Min: 14  Max: 28  SpO2  Min: 94 %  Max: 100 %    02/19 0701 - 02/20 0700  In: 2335 [I.V.:1415]  Out: 1225 [Urine:675; Drains:550]   Unmeasured Output  Urine Occurrence: 1  Stool Occurrence: 1  Pad Count: 1       Physical Exam  GA: Alert, comfortable, no acute distress.   HEENT: No scleral icterus or JVD.   Pulmonary: Clear to auscultation A/L. No wheezing, crackles, or rhonchi.  Cardiac: RRR S1 & S2 w/o rubs/murmurs/gallops.   Abdominal: Bowel sounds present x 4. No appreciable hepatosplenomegaly.  Skin: No jaundice, rashes, or visible lesions.  Neuro:  --GCS: E3 V1 M5  --Mental Status:  AA, moving spontaneously on the left, sometimes purposefully, not to command   --CN II-XII grossly intact.   --Pupils 3mm, PERRL.   --Corneal reflex, gag, cough intact.  --LUE strength: 4/5  --LLE strength: 4/5  --RUE strength: 1/5  --RLE strength: 1/5    Medications:  Continuous Scheduled  albuterol-ipratropium 2.5mg-0.5mg/3mL 3 mL Q6H   aspirin 81 mg Daily   atorvastatin 80 mg Daily   heparin (porcine) 5,000 Units Q8H   lisinopril 20 mg Daily   metoprolol tartrate 25 mg BID   modafinil 200 mg Daily   And     modafinil 100 mg Daily   polyethylene glycol 17 g Daily   senna-docusate 8.6-50 mg 1 tablet BID   sodium chloride 0.9% 3 mL Q8H   sodium chloride 3% 4 mL Q6H   PRN  acetaminophen 650 mg Q6H PRN   albuterol sulfate 2.5 mg Q4H PRN   bisacodyl 10 mg Daily PRN   dextrose 50% 12.5 g PRN   glucagon (human recombinant) 1 mg PRN   hydrALAZINE 10 mg Q4H PRN   insulin aspart U-100 1-10 Units Q4H PRN   ipratropium 0.5 mg  Q4H PRN   labetalol 10 mg Q4H PRN   magnesium sulfate IVPB 2 g PRN   magnesium sulfate IVPB 4 g PRN   ondansetron 4 mg Q8H PRN   potassium chloride 10% 40 mEq PRN   potassium chloride 10% 40 mEq PRN   sodium chloride 0.9% 5 mL PRN     Today I personally reviewed pertinent medications, lines/drains/airways, imaging, lab results, notably:    Diet  Diet NPO  Diet NPO

## 2018-02-20 NOTE — PROGRESS NOTES
[facility]  Neurocritical Care  Progress Note    Admit Date: 1/25/2018  Service Date: 02/19/2018  Length of Stay: 25    Subjective:     Chief Complaint: Embolic stroke involving left middle cerebral artery    History of Present Illness: The patient is a 47 year old male with no known PMHx admitted to [facility]   s/p thrombectomy of L M1 occlusion. Per chart review, he   walked into [place] and was acting abnormal.  EMS was called and brought to the ED for concern of L MCA syndrome. CT MP revealed short segment occlusion of left M1.  Patient went to IR for thrombectomy of L M1 occlusion seen on CTA MP.  TICI2C reperfusion and angioplasty. Patient admitted to [facility] for close monitoring and higher level of care.   History obtained from chart review only            Hospital Course: 1/25: Pt admitted to [facility] s/p L M1 thrombectomy, TICI2C reperfusion and angioplasty   1/27: large L MCA, hemicrani watch, NSGY following, insulin ggt, cardene ggt, L sided intermittent slowing eeg but no sz, +nasal trumpet for copious thick secretions, wheezing this am - improved with duo nebs, 3% nebs, and cough assist, concern for sepsis 2/2 hemodynamic changes - increased HR and BP, worsening leukocytosis, +ceftriaxone, pan cx, adrian track  1/28: continued respiratory challenges due to secretions. Aggressive pulmonary toileting. Continue monitoring for neuro worsening. Add moxifloxacin.   1/29: CTH to eval for increased edema/shift, EEG afterwards. Concern for decrease exam, no following/decreased alertness). CXR and Procal to follow leukocytosis. Hold TF until eval decreasing EXAM  1/30: neuro exam stable, increase 2%, current amount of sodium iv not suffuicient to meet target 145-155, cont abx for likely pneumonia, repeat ct in am  1/31: abrupt desaturation today requiring emergent intubation followed by bronchoscopy  Etiology likely upper airway obstruction from dried secretions  02/01: stable on vent overnight, marked  improvement on today's chest x ray, switch to spontaneous today, sodium stable at 150, start tfs  02/02: moves left side spontaneously and opens eyes off sedation,  02/03: 2% buffered NS re-started at 20mL/hr for rapid decrease in serum sodium. Enteral free water flushes discontinued. Patient has copious secretions likely pneumonia. Holding dexamethasone  2/4:  No acute events overnight.  Sodium stable.  2/5: transition insulin infusion to subcutaneous insulin   2/7: gen surg consulted for trach/peg  2/9: increased Aspart and Detemir, pending Trach/Peg placement next week.  2/10: No changes overnight . Pending PEG and trach, WBC mildly elevated  12K  2/11: No changes overnight . Pending PEG and trach, WBC mildly elevated  13K. Procal was low and lower than prior. Patient got 16 units of SSI yesterday.  2/13: NAEO.  Pending peg/trach tomorrow. But then self-extubated.  2/14: Tolerating face mask well.  Holding tube feeds.   2/15: IR consult placed, for PEG. ABD ct and NG placed in preparation for procedure.  2/16: NAEO.  Pending PEG.  2/17: NAEON, Pending PEG placement on Monday 2/18: NAEON.  Pending PEG on Monday.  Hold tube feeds, ASA, meds, scheduled aspart after MN.  2/19: Peg         Review of Systems  Unable to obtain a complete ROS due to level of consciousness.  Objective:     Vitals:  Temp: 98.7 °F (37.1 °C)  Pulse: 103  Rhythm: normal sinus rhythm  BP: (!) 154/90  MAP (mmHg): 114  Resp: (!) 25  SpO2: (!) 94 %  Oxygen Concentration (%): 40  O2 Device (Oxygen Therapy): nasal cannula w/ humidification    Temp  Min: 98.7 °F (37.1 °C)  Max: 99.3 °F (37.4 °C)  Pulse  Min: 82  Max: 106  BP  Min: 103/60  Max: 164/91  MAP (mmHg)  Min: 77  Max: 125  Resp  Min: 16  Max: 27  SpO2  Min: 91 %  Max: 100 %  Oxygen Concentration (%)  Min: 35  Max: 40    02/18 0701 - 02/19 0700  In: 1445   Out: 1225 [Urine:1225]   Unmeasured Output  Urine Occurrence: 1  Stool Occurrence: 1  Pad Count: 1       Physical Exam  GA: Alert,  comfortable, no acute distress.   HEENT: No scleral icterus or JVD.   Pulmonary: Clear to auscultation A/P/L. No wheezing, crackles, or rhonchi.  Cardiac: RRR S1 & S2 w/o rubs/murmurs/gallops.   Abdominal: Bowel sounds present x 4. No appreciable hepatosplenomegaly.  Skin: No jaundice, rashes, or visible lesions.  Neuro:  --GCS: E3 V1 M5  --Mental Status:  AAO x 3, following commands,   --CN II-XII grossly intact.   --Pupils 3mm, PERRL.   --Corneal reflex, gag, cough intact.  --LUE strength: 4/5  --LLE strength: 4/5  --RUE strength: 1/5  --RLE strength: 1/5    Medications:  Continuous  sodium chloride 0.9% Last Rate: 75 mL/hr at 02/19/18 1901   Scheduled  albuterol-ipratropium 2.5mg-0.5mg/3mL 3 mL Q6H   atorvastatin 80 mg Daily   lisinopril 20 mg Daily   metoprolol tartrate 25 mg BID   modafinil 200 mg Daily   And     modafinil 100 mg Daily   polyethylene glycol 17 g Daily   senna-docusate 8.6-50 mg 1 tablet BID   sodium chloride 0.9% 3 mL Q8H   sodium chloride 3% 4 mL Q6H   PRN  acetaminophen 650 mg Q6H PRN   albuterol sulfate 2.5 mg Q4H PRN   albuterol-ipratropium 2.5mg-0.5mg/3mL 3 mL Q4H PRN   bisacodyl 10 mg Daily PRN   dextrose 50% 12.5 g PRN   glucagon (human recombinant) 1 mg PRN   hydrALAZINE 10 mg Q4H PRN   insulin aspart U-100 1-10 Units Q4H PRN   ipratropium 0.5 mg Q4H PRN   labetalol 10 mg Q4H PRN   magnesium sulfate IVPB 2 g PRN   magnesium sulfate IVPB 4 g PRN   ondansetron 4 mg Q8H PRN   potassium chloride 10% 40 mEq PRN   potassium chloride 10% 40 mEq PRN   sodium chloride 0.9% 5 mL PRN     Today I personally reviewed pertinent medications, lines/drains/airways, imaging, lab results, notably:    Diet  Diet NPO  Diet NPO        Assessment/Plan:     Neuro   * Embolic stroke involving left middle cerebral artery    -s/p L M1 thrombectomy  --160  -repeat imaging stable   -ASA, plavix ---> HOLDING ASA, plavix in preparation for PEG 2/19, consider restarting in the am  -Peg placed at 1400, ok to use  on 02/19/18  -heparin dvt ppx ---> HOLD after MN for PEG on 2/19, restart in the am  -continue modafinil  -PT/OT  -statin   -restarting TF via ngt till tomorrow at 1400              Pulmonary   Acute respiratory failure with hypoxia    - Wean patient to NC at 5L, continue to wean          Cardiac/Vascular   Hyperlipidemia    -atorvastatin 80 daily           Essential hypertension    --160  -metoprolol 25 mg bid  -lisinopril 20 mg Qdaily  -PRN Labetalol & Hydralazine   -Echo 1/26:1 - Normal left ventricular systolic function (EF 65-70%).     2 - Concentric remodeling.     3 - No wall motion abnormalities.     4 - Normal left ventricular diastolic function.     5 - Normal right ventricular systolic function .            Endocrine   Type 2 diabetes mellitus    -A1C-10  -poorly controlled diabetes  -BG >400 on arrival, s/p insulin gtt  -Restart tube feeds, SSI till the am, will restart detemir tomorrow when at goal on TF              Prophylaxis:  Venous Thromboembolism: mechanical  Stress Ulcer: None  Ventilator Pneumonia: no     Activity Orders          Activity as tolerated starting at 02/05 1432        Full Code    Ottoniel Silvestre NP  Neurocritical Care  [facility]

## 2018-02-20 NOTE — PLAN OF CARE
ICU Attending Note  Neurocritical Care    Got PEG yesterday.    -restart antiplatelets as able  -atorvastatin  -modafanil  -NT, O2 by NC  --180  -stop NS  -restart heparin prophylaxis as able

## 2018-02-20 NOTE — PLAN OF CARE
Problem: Patient Care Overview  Goal: Plan of Care Review  Outcome: Ongoing (interventions implemented as appropriate)  POC reviewed with pt and pt's daughter at 1000. Pt's daughter verbalized understanding. Questions and concerns addressed. No acute events today. NGT removed, TF restarted to PEG tube.  O2 weaned to 3 L NC.  Pt progressing toward goals. Will continue to monitor. See flowsheets for full assessment and VS info.

## 2018-02-20 NOTE — ASSESSMENT & PLAN NOTE
-s/p L M1 thrombectomy  --160  -repeat imaging stable   -ASA, plavix ---> HOLDING ASA, plavix in preparation for PEG 2/19, consider restarting in the am  -Peg placed at 1400, ok to use on 02/19/18  -heparin dvt ppx ---> HOLD after MN for PEG on 2/19, restart in the am  -continue modafinil  -PT/OT  -statin   -restarting TF via ngt till tomorrow at 1400

## 2018-02-20 NOTE — PROGRESS NOTES
[facility]  Neurocritical Care  Progress Note    Admit Date: 1/25/2018  Service Date: 02/20/2018  Length of Stay: 26    Subjective:     Chief Complaint: Embolic stroke involving left middle cerebral artery    History of Present Illness: The patient is a 47 year old male with no known PMHx admitted to [facility]   s/p thrombectomy of L M1 occlusion. Per chart review, he   walked into [place] and was acting abnormal.  EMS was called and brought to the ED for concern of L MCA syndrome. CT MP revealed short segment occlusion of left M1.  Patient went to IR for thrombectomy of L M1 occlusion seen on CTA MP.  TICI2C reperfusion and angioplasty. Patient admitted to [facility] for close monitoring and higher level of care.   History obtained from chart review only            Hospital Course: 1/25: Pt admitted to [facility] s/p L M1 thrombectomy, TICI2C reperfusion and angioplasty   1/27: large L MCA, hemicrani watch, NSGY following, insulin ggt, cardene ggt, L sided intermittent slowing eeg but no sz, +nasal trumpet for copious thick secretions, wheezing this am - improved with duo nebs, 3% nebs, and cough assist, concern for sepsis 2/2 hemodynamic changes - increased HR and BP, worsening leukocytosis, +ceftriaxone, pan cx, adrian track  1/28: continued respiratory challenges due to secretions. Aggressive pulmonary toileting. Continue monitoring for neuro worsening. Add moxifloxacin.   1/29: CTH to eval for increased edema/shift, EEG afterwards. Concern for decrease exam, no following/decreased alertness). CXR and Procal to follow leukocytosis. Hold TF until eval decreasing EXAM  1/30: neuro exam stable, increase 2%, current amount of sodium iv not suffuicient to meet target 145-155, cont abx for likely pneumonia, repeat ct in am  1/31: abrupt desaturation today requiring emergent intubation followed by bronchoscopy  Etiology likely upper airway obstruction from dried secretions  02/01: stable on vent overnight, marked  improvement on today's chest x ray, switch to spontaneous today, sodium stable at 150, start tfs  02/02: moves left side spontaneously and opens eyes off sedation,  02/03: 2% buffered NS re-started at 20mL/hr for rapid decrease in serum sodium. Enteral free water flushes discontinued. Patient has copious secretions likely pneumonia. Holding dexamethasone  2/4:  No acute events overnight.  Sodium stable.  2/5: transition insulin infusion to subcutaneous insulin   2/7: gen surg consulted for trach/peg  2/9: increased Aspart and Detemir, pending Trach/Peg placement next week.  2/10: No changes overnight . Pending PEG and trach, WBC mildly elevated  12K  2/11: No changes overnight . Pending PEG and trach, WBC mildly elevated  13K. Procal was low and lower than prior. Patient got 16 units of SSI yesterday.  2/13: NAEO.  Pending peg/trach tomorrow. But then self-extubated.  2/14: Tolerating face mask well.  Holding tube feeds.   2/15: IR consult placed, for PEG. ABD ct and NG placed in preparation for procedure.  2/16: NAEO.  Pending PEG.  2/17: NAEON, Pending PEG placement on Monday 2/18: NAEON.  Pending PEG on Monday.  Hold tube feeds, ASA, meds, scheduled aspart after MN.  2/19: Peg   2/20: Heparin and asa restarted, Ngt pulled and oxygenation improved, possible transfer to stroke nandini        Review of Systems  Unable to obtain a complete ROS due to level of consciousness.  Objective:     Vitals:  Temp: 99.1 °F (37.3 °C)  Pulse: 94  Rhythm: normal sinus rhythm  BP: 136/89  MAP (mmHg): 108  Resp: (!) 22  SpO2: 97 %  O2 Device (Oxygen Therapy): nasal cannula    Temp  Min: 97.9 °F (36.6 °C)  Max: 99.1 °F (37.3 °C)  Pulse  Min: 78  Max: 105  BP  Min: 112/71  Max: 162/100  MAP (mmHg)  Min: 84  Max: 125  Resp  Min: 14  Max: 28  SpO2  Min: 94 %  Max: 100 %    02/19 0701 - 02/20 0700  In: 2335 [I.V.:1415]  Out: 1225 [Urine:675; Drains:550]   Unmeasured Output  Urine Occurrence: 1  Stool Occurrence: 1  Pad Count: 1        Physical Exam  GA: Alert, comfortable, no acute distress.   HEENT: No scleral icterus or JVD.   Pulmonary: Clear to auscultation A/L. No wheezing, crackles, or rhonchi.  Cardiac: RRR S1 & S2 w/o rubs/murmurs/gallops.   Abdominal: Bowel sounds present x 4. No appreciable hepatosplenomegaly.  Skin: No jaundice, rashes, or visible lesions.  Neuro:  --GCS: E3 V1 M5  --Mental Status:  AA,  moving spontaneously on the left, sometimes purposefully, not to command   --CN II-XII grossly intact.   --Pupils 3mm, PERRL.   --Corneal reflex, gag, cough intact.  --LUE strength: 4/5  --LLE strength: 4/5  --RUE strength: 1/5  --RLE strength: 1/5    Medications:  Continuous Scheduled  albuterol-ipratropium 2.5mg-0.5mg/3mL 3 mL Q6H   aspirin 81 mg Daily   atorvastatin 80 mg Daily   heparin (porcine) 5,000 Units Q8H   lisinopril 20 mg Daily   metoprolol tartrate 25 mg BID   modafinil 200 mg Daily   And     modafinil 100 mg Daily   polyethylene glycol 17 g Daily   senna-docusate 8.6-50 mg 1 tablet BID   sodium chloride 0.9% 3 mL Q8H   sodium chloride 3% 4 mL Q6H   PRN  acetaminophen 650 mg Q6H PRN   albuterol sulfate 2.5 mg Q4H PRN   bisacodyl 10 mg Daily PRN   dextrose 50% 12.5 g PRN   glucagon (human recombinant) 1 mg PRN   hydrALAZINE 10 mg Q4H PRN   insulin aspart U-100 1-10 Units Q4H PRN   ipratropium 0.5 mg Q4H PRN   labetalol 10 mg Q4H PRN   magnesium sulfate IVPB 2 g PRN   magnesium sulfate IVPB 4 g PRN   ondansetron 4 mg Q8H PRN   potassium chloride 10% 40 mEq PRN   potassium chloride 10% 40 mEq PRN   sodium chloride 0.9% 5 mL PRN     Today I personally reviewed pertinent medications, lines/drains/airways, imaging, lab results, notably:    Diet  Diet NPO  Diet NPO        Assessment/Plan:     Neuro   * Embolic stroke involving left middle cerebral artery    -s/p L M1 thrombectomy  --180  -repeat imaging stable   -Peg placed at 1400, ok to use on 02/20/18  -heparin and asa restarted  -continue  modafinil  -PT/OT  -statin   -TFs per peg              Pulmonary   Acute respiratory failure with hypoxia    - Ngt removed and sats improved to 99%, will continue to wean. Currently at 4L nc          Cardiac/Vascular   Hyperlipidemia    -atorvastatin 80 daily           Essential hypertension    --160  -metoprolol 25 mg bid  -lisinopril 20 mg Qdaily  -PRN Labetalol & Hydralazine   -Echo 1/26:1 - Normal left ventricular systolic function (EF 65-70%).     2 - Concentric remodeling.     3 - No wall motion abnormalities.     4 - Normal left ventricular diastolic function.     5 - Normal right ventricular systolic function .            Endocrine   Type 2 diabetes mellitus    -A1C-10  -poorly controlled diabetes  -BG >400 on arrival, s/p insulin gtt  -Restart tube feeds, SSI, restarted detemir               Prophylaxis:  Venous Thromboembolism: chemical  Stress Ulcer: None  Ventilator Pneumonia: no     Activity Orders          Activity as tolerated starting at 02/05 1432        Full Code    Ottoniel Silvestre NP  Neurocritical Care  [facility]

## 2018-02-20 NOTE — PROGRESS NOTES
Instructed by NCC team to re-start tube feeds to NGT at 2100 last night. Tube feeds appear to be draining into PEG bag. NCC notified. Orders to hold tube feeds at this time.

## 2018-02-20 NOTE — SUBJECTIVE & OBJECTIVE
Review of Systems  Unable to obtain a complete ROS due to level of consciousness.  Objective:     Vitals:  Temp: 98.7 °F (37.1 °C)  Pulse: 103  Rhythm: normal sinus rhythm  BP: (!) 154/90  MAP (mmHg): 114  Resp: (!) 25  SpO2: (!) 94 %  Oxygen Concentration (%): 40  O2 Device (Oxygen Therapy): nasal cannula w/ humidification    Temp  Min: 98.7 °F (37.1 °C)  Max: 99.3 °F (37.4 °C)  Pulse  Min: 82  Max: 106  BP  Min: 103/60  Max: 164/91  MAP (mmHg)  Min: 77  Max: 125  Resp  Min: 16  Max: 27  SpO2  Min: 91 %  Max: 100 %  Oxygen Concentration (%)  Min: 35  Max: 40    02/18 0701 - 02/19 0700  In: 1445   Out: 1225 [Urine:1225]   Unmeasured Output  Urine Occurrence: 1  Stool Occurrence: 1  Pad Count: 1       Physical Exam  GA: Alert, comfortable, no acute distress.   HEENT: No scleral icterus or JVD.   Pulmonary: Clear to auscultation A/P/L. No wheezing, crackles, or rhonchi.  Cardiac: RRR S1 & S2 w/o rubs/murmurs/gallops.   Abdominal: Bowel sounds present x 4. No appreciable hepatosplenomegaly.  Skin: No jaundice, rashes, or visible lesions.  Neuro:  --GCS: E3 V1 M5  --Mental Status:  AAO x 3, following commands,   --CN II-XII grossly intact.   --Pupils 3mm, PERRL.   --Corneal reflex, gag, cough intact.  --LUE strength: 4/5  --LLE strength: 4/5  --RUE strength: 1/5  --RLE strength: 1/5    Medications:  Continuous  sodium chloride 0.9% Last Rate: 75 mL/hr at 02/19/18 1901   Scheduled  albuterol-ipratropium 2.5mg-0.5mg/3mL 3 mL Q6H   atorvastatin 80 mg Daily   lisinopril 20 mg Daily   metoprolol tartrate 25 mg BID   modafinil 200 mg Daily   And     modafinil 100 mg Daily   polyethylene glycol 17 g Daily   senna-docusate 8.6-50 mg 1 tablet BID   sodium chloride 0.9% 3 mL Q8H   sodium chloride 3% 4 mL Q6H   PRN  acetaminophen 650 mg Q6H PRN   albuterol sulfate 2.5 mg Q4H PRN   albuterol-ipratropium 2.5mg-0.5mg/3mL 3 mL Q4H PRN   bisacodyl 10 mg Daily PRN   dextrose 50% 12.5 g PRN   glucagon (human recombinant) 1 mg PRN    hydrALAZINE 10 mg Q4H PRN   insulin aspart U-100 1-10 Units Q4H PRN   ipratropium 0.5 mg Q4H PRN   labetalol 10 mg Q4H PRN   magnesium sulfate IVPB 2 g PRN   magnesium sulfate IVPB 4 g PRN   ondansetron 4 mg Q8H PRN   potassium chloride 10% 40 mEq PRN   potassium chloride 10% 40 mEq PRN   sodium chloride 0.9% 5 mL PRN     Today I personally reviewed pertinent medications, lines/drains/airways, imaging, lab results, notably:    Diet  Diet NPO  Diet NPO

## 2018-02-20 NOTE — ASSESSMENT & PLAN NOTE
-A1C-10  -poorly controlled diabetes  -BG >400 on arrival, s/p insulin gtt  -Restart tube feeds, SSI, restarted detemir

## 2018-02-20 NOTE — ASSESSMENT & PLAN NOTE
-A1C-10  -poorly controlled diabetes  -BG >400 on arrival, s/p insulin gtt  -Restart tube feeds, SSI till the am, will restart detemir tomorrow when at goal on TF

## 2018-02-20 NOTE — ASSESSMENT & PLAN NOTE
-s/p L M1 thrombectomy  --180  -repeat imaging stable   -Peg placed at 1400, ok to use on 02/20/18  -heparin and asa restarted  -continue modafinil  -PT/OT  -statin   -TFs per peg

## 2018-02-21 PROBLEM — D72.829 LEUKOCYTOSIS: Status: RESOLVED | Noted: 2018-01-27 | Resolved: 2018-02-21

## 2018-02-21 LAB
ALBUMIN SERPL BCP-MCNC: 2.1 G/DL
ALP SERPL-CCNC: 129 U/L
ALT SERPL W/O P-5'-P-CCNC: 24 U/L
ANION GAP SERPL CALC-SCNC: 8 MMOL/L
AST SERPL-CCNC: 27 U/L
BASOPHILS # BLD AUTO: 0.02 K/UL
BASOPHILS NFR BLD: 0.2 %
BILIRUB SERPL-MCNC: 0.6 MG/DL
BUN SERPL-MCNC: 20 MG/DL
CALCIUM SERPL-MCNC: 8.8 MG/DL
CHLORIDE SERPL-SCNC: 103 MMOL/L
CO2 SERPL-SCNC: 27 MMOL/L
CREAT SERPL-MCNC: 0.8 MG/DL
DIFFERENTIAL METHOD: ABNORMAL
EOSINOPHIL # BLD AUTO: 0.2 K/UL
EOSINOPHIL NFR BLD: 1.8 %
ERYTHROCYTE [DISTWIDTH] IN BLOOD BY AUTOMATED COUNT: 12.1 %
EST. GFR  (AFRICAN AMERICAN): >60 ML/MIN/1.73 M^2
EST. GFR  (NON AFRICAN AMERICAN): >60 ML/MIN/1.73 M^2
GLUCOSE SERPL-MCNC: 212 MG/DL
HCT VFR BLD AUTO: 32.6 %
HGB BLD-MCNC: 11 G/DL
IMM GRANULOCYTES # BLD AUTO: 0.04 K/UL
IMM GRANULOCYTES NFR BLD AUTO: 0.4 %
INR PPP: 1.1
LYMPHOCYTES # BLD AUTO: 1.8 K/UL
LYMPHOCYTES NFR BLD: 17 %
MAGNESIUM SERPL-MCNC: 2 MG/DL
MCH RBC QN AUTO: 29.7 PG
MCHC RBC AUTO-ENTMCNC: 33.7 G/DL
MCV RBC AUTO: 88 FL
MONOCYTES # BLD AUTO: 0.8 K/UL
MONOCYTES NFR BLD: 7.4 %
NEUTROPHILS # BLD AUTO: 7.6 K/UL
NEUTROPHILS NFR BLD: 73.2 %
NRBC BLD-RTO: 0 /100 WBC
PHOSPHATE SERPL-MCNC: 3.2 MG/DL
PLATELET # BLD AUTO: 400 K/UL
PMV BLD AUTO: 9.9 FL
POCT GLUCOSE: 163 MG/DL (ref 70–110)
POCT GLUCOSE: 220 MG/DL (ref 70–110)
POCT GLUCOSE: 221 MG/DL (ref 70–110)
POCT GLUCOSE: 227 MG/DL (ref 70–110)
POCT GLUCOSE: 235 MG/DL (ref 70–110)
POTASSIUM SERPL-SCNC: 4 MMOL/L
PROT SERPL-MCNC: 7.3 G/DL
PROTHROMBIN TIME: 11.1 SEC
RBC # BLD AUTO: 3.7 M/UL
SODIUM SERPL-SCNC: 138 MMOL/L
WBC # BLD AUTO: 10.42 K/UL

## 2018-02-21 PROCEDURE — 25000003 PHARM REV CODE 250: Performed by: PSYCHIATRY & NEUROLOGY

## 2018-02-21 PROCEDURE — 25000003 PHARM REV CODE 250: Performed by: PHYSICIAN ASSISTANT

## 2018-02-21 PROCEDURE — 83735 ASSAY OF MAGNESIUM: CPT

## 2018-02-21 PROCEDURE — 92507 TX SP LANG VOICE COMM INDIV: CPT

## 2018-02-21 PROCEDURE — 27000221 HC OXYGEN, UP TO 24 HOURS

## 2018-02-21 PROCEDURE — 94667 MNPJ CHEST WALL 1ST: CPT

## 2018-02-21 PROCEDURE — 94640 AIRWAY INHALATION TREATMENT: CPT

## 2018-02-21 PROCEDURE — 25000003 PHARM REV CODE 250: Performed by: NURSE PRACTITIONER

## 2018-02-21 PROCEDURE — 63600175 PHARM REV CODE 636 W HCPCS: Performed by: NURSE PRACTITIONER

## 2018-02-21 PROCEDURE — 20600001 HC STEP DOWN PRIVATE ROOM

## 2018-02-21 PROCEDURE — 97112 NEUROMUSCULAR REEDUCATION: CPT

## 2018-02-21 PROCEDURE — 97110 THERAPEUTIC EXERCISES: CPT

## 2018-02-21 PROCEDURE — 80053 COMPREHEN METABOLIC PANEL: CPT

## 2018-02-21 PROCEDURE — 63600175 PHARM REV CODE 636 W HCPCS: Performed by: PHYSICIAN ASSISTANT

## 2018-02-21 PROCEDURE — 99233 SBSQ HOSP IP/OBS HIGH 50: CPT | Mod: ,,, | Performed by: NURSE PRACTITIONER

## 2018-02-21 PROCEDURE — 84100 ASSAY OF PHOSPHORUS: CPT

## 2018-02-21 PROCEDURE — 92610 EVALUATE SWALLOWING FUNCTION: CPT

## 2018-02-21 PROCEDURE — 85610 PROTHROMBIN TIME: CPT

## 2018-02-21 PROCEDURE — 97535 SELF CARE MNGMENT TRAINING: CPT

## 2018-02-21 PROCEDURE — 92526 ORAL FUNCTION THERAPY: CPT

## 2018-02-21 PROCEDURE — 85025 COMPLETE CBC W/AUTO DIFF WBC: CPT

## 2018-02-21 PROCEDURE — 25000242 PHARM REV CODE 250 ALT 637 W/ HCPCS: Performed by: PSYCHIATRY & NEUROLOGY

## 2018-02-21 PROCEDURE — 99233 SBSQ HOSP IP/OBS HIGH 50: CPT | Mod: ,,, | Performed by: PSYCHIATRY & NEUROLOGY

## 2018-02-21 RX ORDER — ASPIRIN 325 MG
325 TABLET ORAL DAILY
Status: DISCONTINUED | OUTPATIENT
Start: 2018-02-22 | End: 2018-03-21 | Stop reason: HOSPADM

## 2018-02-21 RX ORDER — CLOPIDOGREL BISULFATE 75 MG/1
75 TABLET ORAL DAILY
Status: DISCONTINUED | OUTPATIENT
Start: 2018-02-21 | End: 2018-03-21 | Stop reason: HOSPADM

## 2018-02-21 RX ADMIN — SODIUM CHLORIDE SOLN NEBU 3% 4 ML: 3 NEBU SOLN at 08:02

## 2018-02-21 RX ADMIN — INSULIN ASPART 4 UNITS: 100 INJECTION, SOLUTION INTRAVENOUS; SUBCUTANEOUS at 02:02

## 2018-02-21 RX ADMIN — ACETAMINOPHEN 650 MG: 325 TABLET, FILM COATED ORAL at 08:02

## 2018-02-21 RX ADMIN — METOPROLOL TARTRATE 25 MG: 25 TABLET ORAL at 08:02

## 2018-02-21 RX ADMIN — INSULIN ASPART 4 UNITS: 100 INJECTION, SOLUTION INTRAVENOUS; SUBCUTANEOUS at 10:02

## 2018-02-21 RX ADMIN — IPRATROPIUM BROMIDE AND ALBUTEROL SULFATE 3 ML: .5; 3 SOLUTION RESPIRATORY (INHALATION) at 08:02

## 2018-02-21 RX ADMIN — LISINOPRIL 20 MG: 20 TABLET ORAL at 08:02

## 2018-02-21 RX ADMIN — INSULIN DETEMIR 20 UNITS: 100 INJECTION, SOLUTION SUBCUTANEOUS at 02:02

## 2018-02-21 RX ADMIN — STANDARDIZED SENNA CONCENTRATE AND DOCUSATE SODIUM 1 TABLET: 8.6; 5 TABLET, FILM COATED ORAL at 08:02

## 2018-02-21 RX ADMIN — STANDARDIZED SENNA CONCENTRATE AND DOCUSATE SODIUM 1 TABLET: 8.6; 5 TABLET, FILM COATED ORAL at 09:02

## 2018-02-21 RX ADMIN — MODAFINIL 200 MG: 100 TABLET ORAL at 05:02

## 2018-02-21 RX ADMIN — INSULIN ASPART 2 UNITS: 100 INJECTION, SOLUTION INTRAVENOUS; SUBCUTANEOUS at 09:02

## 2018-02-21 RX ADMIN — ASPIRIN 81 MG CHEWABLE TABLET 81 MG: 81 TABLET CHEWABLE at 08:02

## 2018-02-21 RX ADMIN — INSULIN ASPART 2 UNITS: 100 INJECTION, SOLUTION INTRAVENOUS; SUBCUTANEOUS at 05:02

## 2018-02-21 RX ADMIN — HEPARIN SODIUM 5000 UNITS: 5000 INJECTION, SOLUTION INTRAVENOUS; SUBCUTANEOUS at 05:02

## 2018-02-21 RX ADMIN — IPRATROPIUM BROMIDE AND ALBUTEROL SULFATE 3 ML: .5; 3 SOLUTION RESPIRATORY (INHALATION) at 01:02

## 2018-02-21 RX ADMIN — SODIUM CHLORIDE SOLN NEBU 3% 4 ML: 3 NEBU SOLN at 01:02

## 2018-02-21 RX ADMIN — SODIUM CHLORIDE SOLN NEBU 3% 4 ML: 3 NEBU SOLN at 12:02

## 2018-02-21 RX ADMIN — POLYETHYLENE GLYCOL 3350 17 G: 17 POWDER, FOR SOLUTION ORAL at 08:02

## 2018-02-21 RX ADMIN — METOPROLOL TARTRATE 25 MG: 25 TABLET ORAL at 09:02

## 2018-02-21 RX ADMIN — MODAFINIL 100 MG: 100 TABLET ORAL at 02:02

## 2018-02-21 RX ADMIN — IPRATROPIUM BROMIDE AND ALBUTEROL SULFATE 3 ML: .5; 3 SOLUTION RESPIRATORY (INHALATION) at 12:02

## 2018-02-21 RX ADMIN — ATORVASTATIN CALCIUM 80 MG: 20 TABLET, FILM COATED ORAL at 08:02

## 2018-02-21 RX ADMIN — CLOPIDOGREL BISULFATE 75 MG: 75 TABLET, FILM COATED ORAL at 02:02

## 2018-02-21 RX ADMIN — HEPARIN SODIUM 5000 UNITS: 5000 INJECTION, SOLUTION INTRAVENOUS; SUBCUTANEOUS at 09:02

## 2018-02-21 RX ADMIN — INSULIN ASPART 2 UNITS: 100 INJECTION, SOLUTION INTRAVENOUS; SUBCUTANEOUS at 01:02

## 2018-02-21 RX ADMIN — HEPARIN SODIUM 5000 UNITS: 5000 INJECTION, SOLUTION INTRAVENOUS; SUBCUTANEOUS at 02:02

## 2018-02-21 NOTE — ASSESSMENT & PLAN NOTE
-s/p L M1 thrombectomy  --180  -repeat imaging stable   -Peg placed at 1400, ok to use on 02/20/18  -heparin and asa restarted  -continue modafinil  -PT/OT  -statin   -TFs per peg  -Transfer to stroke

## 2018-02-21 NOTE — PLAN OF CARE
Problem: SLP Goal  Goal: SLP Goal  Speech Language Pathology Goals  Goals expected to be met by 2/28  1. Pt will participate in ongoing bedside assessment of swallow to determine least restrictive diet.  2. Pt will open eyes to stim x5/session given max cues.  3. Pt will follow simple commands x2/session given max cues.  4. Pt will answer basic y/n question via any modality x2/session given max cues.  5. Pt will vocalize in response to any stim x2/session given max cues.            Swallow evaluation completed with updated POC to 2x/week.    Amina Romero M.A. CCC-SLP  Speech Language Pathologist  [phone]  2/21/2018

## 2018-02-21 NOTE — SUBJECTIVE & OBJECTIVE
Neurologic Chief Complaint: L MCA Stroke    Subjective:     Interval History: Patient is seen for follow-up neurological assessment and treatment recommendations: Pt sating % on 3L NC. Restarted DAPT, including . Plans for step down to stroke service.    HPI, Past Medical, Family, and Social History remains the same as documented in the initial encounter.     Review of Systems   Constitutional: Positive for fatigue. Negative for chills and fever.   Respiratory: Negative for cough and choking.    Skin: Negative for rash and wound.   Neurological: Positive for speech difficulty. Negative for weakness and numbness.   Psychiatric/Behavioral: Positive for decreased concentration. Negative for agitation.     Scheduled Meds:   albuterol-ipratropium 2.5mg-0.5mg/3mL  3 mL Nebulization Q6H    [START ON 2/22/2018] aspirin  325 mg Per G Tube Daily    atorvastatin  80 mg Per NG tube Daily    clopidogrel  75 mg Per G Tube Daily    heparin (porcine)  5,000 Units Subcutaneous Q8H    [START ON 2/22/2018] insulin detemir U-100  20 Units Subcutaneous BID    insulin detemir U-100  20 Units Subcutaneous Once    lisinopril  20 mg Per NG tube Daily    metoprolol tartrate  25 mg Per NG tube BID    modafinil  200 mg Per NG tube Daily    And    modafinil  100 mg Per NG tube Daily    polyethylene glycol  17 g Per NG tube Daily    senna-docusate 8.6-50 mg  1 tablet Per NG tube BID    sodium chloride 0.9%  3 mL Intravenous Q8H    sodium chloride 3%  4 mL Nebulization Q6H     Continuous Infusions:    PRN Meds:acetaminophen, albuterol sulfate, bisacodyl, dextrose 50%, glucagon (human recombinant), hydrALAZINE, insulin aspart U-100, ipratropium, labetalol, magnesium sulfate IVPB, magnesium sulfate IVPB, ondansetron, potassium chloride 10%, potassium chloride 10%, sodium chloride 0.9%    Objective:     Vital Signs (Most Recent):  Temp: 98.9 °F (37.2 °C) (02/21/18 1105)  Pulse: 88 (02/21/18 1305)  Resp: (!) 34 (02/21/18  1305)  BP: (!) 140/89 (02/21/18 1305)  SpO2: 100 % (02/21/18 1305)  BP Location: Right arm    Vital Signs Range (Last 24H):  Temp:  [98.5 °F (36.9 °C)-100.1 °F (37.8 °C)]   Pulse:  []   Resp:  [16-34]   BP: ()/(63-94)   SpO2:  [94 %-100 %]   BP Location: Right arm    Physical Exam   Constitutional: He appears well-developed and well-nourished. He appears lethargic. No distress.   HENT:   Head: Normocephalic and atraumatic.   Eyes: Conjunctivae are normal. Pupils are equal, round, and reactive to light.   Cardiovascular: Normal rate.    Pulmonary/Chest: Effort normal. No respiratory distress.   On NC   Musculoskeletal: He exhibits no edema or deformity.   Neurological: He appears lethargic. A cranial nerve deficit is present. No sensory deficit.   Skin: Skin is warm and dry.       Neurologic Exam:  Mental Status:  Obtunded, does not open eyes to voice or follow commands today  Cranial Nerves:  R facial droop. PERRLA. L gaze  Motor: Normal bulk and tone. LUE, LLE move spontaneously. RLE with some withdrawal to noxious stimuli. No movement of RUE.  Sensory: Sensation loss in RUE. Withdraws to pain in remaining extremities      Laboratory:  CMP:     Recent Labs  Lab 02/21/18  0122   CALCIUM 8.8   ALBUMIN 2.1*   PROT 7.3      K 4.0   CO2 27      BUN 20   CREATININE 0.8   ALKPHOS 129   ALT 24   AST 27   BILITOT 0.6     CBC:     Recent Labs  Lab 02/21/18  0122   WBC 10.42   RBC 3.70*   HGB 11.0*   HCT 32.6*   *   MCV 88   MCH 29.7   MCHC 33.7     Lipid Panel: Invalid due to excessively high triglycerides  Coagulation:     Recent Labs  Lab 02/21/18 0122   INR 1.1     Hgb A1C: 10%  TSH: PENDING    Diagnostic Results     Brain Imaging     02/04/18 CT head w/o contrast:  Redemonstration of large left MCA territory infarction without evidence of hemorrhagic conversion.  Slight interval increase in local mass effect with rightward midline shift of approximately 0.6 cm. Stable postoperative change  from left MCA endovascular stenting. Sinus disease as above.       01/26/18 CT head w/o contrast:  No detrimental change.  Evolving subtle left MCA territory infarct.       01/26/18 MRI Brain w/o contrast:  1. Large acute left MCA territory infarction as above.  No associated hemorrhage or significant mass effect at this time.  2. Mild chronic ischemic changes.          Vessel Imaging     01/25/18 CTA Stroke Multiphase:  Short segment occlusion of left M1.  There is good collateral flow in the ischemic territory as detailed above.  Noncontrast images demonstrate subtle changes reflecting left MCA territory acute infarct.       01/25/18 IR Angiogram:  Cerebral angiogram demonstrates occlusion of the M1 segment of the left middle cerebral artery with good collateral flow from pial collaterals.  This was successfully removed and residual stenosis at the site of occlusion was angioplastied and stented with improvement in flow.  Slow flow to the left parietal lobe suggests area of infarcted tissue.  This was felt to represent TICI 2C flow.      Cardiac Imaging     01/26/18 2D Echo w/ CFD:  CONCLUSIONS     1 - Normal left ventricular systolic function (EF 65-70%).     2 - Concentric remodeling.     3 - No wall motion abnormalities.     4 - Normal left ventricular diastolic function.     5 - Normal right ventricular systolic function .    6 - No LA enlargement.

## 2018-02-21 NOTE — SUBJECTIVE & OBJECTIVE
Neurologic Chief Complaint: L MCA Stroke    Subjective:     Interval History: Patient is seen for follow-up neurological assessment and treatment recommendations: s/p PEG. O2 % on 5L NC, still requiring frequent suctioning. Primary team considering transfer to Medicine tomorrow.    HPI, Past Medical, Family, and Social History remains the same as documented in the initial encounter.     Review of Systems   Constitutional: Negative for chills and fever.   Skin: Negative for rash and wound.   Neurological: Positive for speech difficulty. Negative for weakness and numbness.   Psychiatric/Behavioral: Positive for decreased concentration. Negative for agitation.     Scheduled Meds:   albuterol-ipratropium 2.5mg-0.5mg/3mL  3 mL Nebulization Q6H    aspirin  81 mg Oral Daily    atorvastatin  80 mg Per NG tube Daily    heparin (porcine)  5,000 Units Subcutaneous Q8H    lisinopril  20 mg Per NG tube Daily    metoprolol tartrate  25 mg Per NG tube BID    modafinil  200 mg Per NG tube Daily    And    modafinil  100 mg Per NG tube Daily    polyethylene glycol  17 g Per NG tube Daily    senna-docusate 8.6-50 mg  1 tablet Per NG tube BID    sodium chloride 0.9%  3 mL Intravenous Q8H    sodium chloride 3%  4 mL Nebulization Q6H     Continuous Infusions:    PRN Meds:acetaminophen, albuterol sulfate, bisacodyl, dextrose 50%, glucagon (human recombinant), hydrALAZINE, insulin aspart U-100, ipratropium, labetalol, magnesium sulfate IVPB, magnesium sulfate IVPB, ondansetron, potassium chloride 10%, potassium chloride 10%, sodium chloride 0.9%    Objective:     Vital Signs (Most Recent):  Temp: 99.1 °F (37.3 °C) (02/20/18 1500)  Pulse: 93 (02/20/18 1700)  Resp: (!) 21 (02/20/18 1700)  BP: (!) 141/84 (02/20/18 1700)  SpO2: 97 % (02/20/18 1700)  BP Location: Right arm    Vital Signs Range (Last 24H):  Temp:  [97.9 °F (36.6 °C)-99.1 °F (37.3 °C)]   Pulse:  []   Resp:  [14-28]   BP: (112-154)/(67-93)   SpO2:  [94  %-100 %]   BP Location: Right arm    Physical Exam   Constitutional: He appears well-developed and well-nourished. No distress.   HENT:   Head: Normocephalic and atraumatic.   Eyes: Conjunctivae are normal. Pupils are equal, round, and reactive to light.   Cardiovascular: Normal rate.    Pulmonary/Chest: Effort normal. No respiratory distress.   Musculoskeletal: He exhibits no edema or deformity.   Neurological: A cranial nerve deficit is present. No sensory deficit.   Skin: Skin is warm and dry.       Neurologic Exam:  Mental Status:  Drowsy, opens eyes to voice. Intermittently follows commands.  Cranial Nerves:  Face appears symmetric. PERRLA. EOMI with tracking, L gaze  Motor:  Normal bulk and tone. LUE, LLE moving spontaneously. RLE with some withdrawal to noxious stimuli. No movement of RUE.  Sensory: Sensation loss in RUE. Withdraws to pain in remaining extremities      Laboratory:  CMP:     Recent Labs  Lab 02/20/18  0153   CALCIUM 9.5   ALBUMIN 2.1*   PROT 7.5      K 4.0   CO2 26      BUN 20   CREATININE 0.8   ALKPHOS 125   ALT 23   AST 21   BILITOT 0.7     CBC:     Recent Labs  Lab 02/20/18  0153   WBC 11.84   RBC 4.14*   HGB 12.2*   HCT 36.9*   *   MCV 89   MCH 29.5   MCHC 33.1     Lipid Panel: Invalid due to excessively high triglycerides  Coagulation:     Recent Labs  Lab 02/20/18  0153   INR 1.1     Hgb A1C: 10%  TSH: PENDING    Diagnostic Results     Brain Imaging     02/04/18 CT head w/o contrast:  Redemonstration of large left MCA territory infarction without evidence of hemorrhagic conversion.  Slight interval increase in local mass effect with rightward midline shift of approximately 0.6 cm. Stable postoperative change from left MCA endovascular stenting. Sinus disease as above.       01/26/18 CT head w/o contrast:  No detrimental change.  Evolving subtle left MCA territory infarct.       01/26/18 MRI Brain w/o contrast:  1. Large acute left MCA territory infarction as above.  No  associated hemorrhage or significant mass effect at this time.  2. Mild chronic ischemic changes.          Vessel Imaging     01/25/18 CTA Stroke Multiphase:  Short segment occlusion of left M1.  There is good collateral flow in the ischemic territory as detailed above.  Noncontrast images demonstrate subtle changes reflecting left MCA territory acute infarct.       01/25/18 IR Angiogram:  Cerebral angiogram demonstrates occlusion of the M1 segment of the left middle cerebral artery with good collateral flow from pial collaterals.  This was successfully removed and residual stenosis at the site of occlusion was angioplastied and stented with improvement in flow.  Slow flow to the left parietal lobe suggests area of infarcted tissue.  This was felt to represent TICI 2C flow.      Cardiac Imaging     01/26/18 2D Echo w/ CFD:  CONCLUSIONS     1 - Normal left ventricular systolic function (EF 65-70%).     2 - Concentric remodeling.     3 - No wall motion abnormalities.     4 - Normal left ventricular diastolic function.     5 - Normal right ventricular systolic function .    6 - No LA enlargement.

## 2018-02-21 NOTE — ASSESSMENT & PLAN NOTE
-Stroke risk factor, Hgb A1c 10%  -Currently on detemir 32 U bid, aspart 8 U q4h (TFs)  -Glucose 157-196 on current regimen. Titrate up insulin as needed.  -Small vessel disease likely contributing factor of stroke

## 2018-02-21 NOTE — NURSING TRANSFER
Nursing Transfer Note      2/21/2018     Transfer To: [id number]    Transfer via bed    Transfer with 4L to O2, cardiac monitoring    Transported by RN    Medicines sent: insulin    Chart send with patient: Yes    Notified: daughter    Patient reassessed at: 2/21/18, 1600 (date, time)    Upon arrival to floor: cardiac monitor applied, patient oriented to room, call bell in reach and bed in lowest position

## 2018-02-21 NOTE — ASSESSMENT & PLAN NOTE
-Stroke risk factor  -Continue lisinopril 20 mg qd, metoprolol 25 mg bid, Hydralazine/Labetalol PRN

## 2018-02-21 NOTE — PT/OT/SLP PROGRESS
Occupational Therapy   Treatment    Name: Todd Quevedo  MRN: [id number]  Admitting Diagnosis:  Embolic stroke involving left middle cerebral artery       Recommendations:     Discharge Recommendations:  (SW working on NH placement (no insurance))  Discharge Equipment Recommendations:  hospital bed  Barriers to discharge:  Inaccessible home environment, Decreased caregiver support    Subjective   Patient:  Nonverbal  Communicated with: nurse prior to session.  Pain/Comfort:  · Pain Rating 1: 0/10  · Pain Rating Post-Intervention 1: 0/10    Patients cultural, spiritual, Sabianism conflicts given the current situation: Denominational    Objective:     Patient found with: telemetry, peripheral IV, restraints, pulse ox (continuous), oxygen, PEG Tube, Condom Catheter, pressure relief boots, bed alarm, blood pressure cuff  Famiily not present.  General Precautions: Standard, aphasia, fall, aspiration, NPO   Orthopedic Precautions:N/A   Braces: N/A     Occupational Performance:    Bed Mobility:    · Patient completed Rolling/Turning to Left with  dependent  · Patient completed Rolling/Turning to Right with dependent  · Patient completed Scooting/Bridging with dependent  · Patient completed Supine to Sit with dependent  · Patient completed Sit to Supine with dependent     Functional Mobility/Transfers:  · Dependent drawsheet transfers    Activities of Daily Living:  · Feeding:  NPO  · Grooming: Dependent while seated EOB      Patient left supine with all lines intact, call button in reach, bed alarm on and restraints reapplied at end of session    AMPA 6 Click:  AMPA Total Score: 6    Treatment & Education:  Patient education provided on role of OT, ROM, positioning, bed mobility and postural control.  Continued education, patient/ family training recommended. Daily orientation provided.  PROM performed right UE/LEs and AAROM left UE/LE one set x 10 rep in all planes of motion with stretches provided at end range; sustained  stretch provided for right ankle dorsiflexion.  Assistance and facilitation provided for upward rotation of the scapula during shoulder flexion and abduction.  Patient kept eyes open 100% of the session.   Patient unable to follow commands.  Total assist required with postural control while seated EOB with right UE in weight bearing.  Patient with gaze to the left.  Keeping head turned to the left; stretches provided for rotation to the right.  Positioning provided for midline orientation with bilateral UEs elevated and heels lifted off mattress.    White board updated in patient's room.  OT asked if there were any other questions; patient/ family had no further questions.  Education:    Assessment:     Todd Quevedo is a 47 y.o. male with a medical diagnosis of Embolic stroke involving left middle cerebral artery.  He presents with performance deficits of physical skills including impaired respiration, balance, mobility, strength, dexterity, fine motor coordination, gross motor coordination, sensation and endurance; demonstrating performance deficits of cognitive skills including impaired attention, perception, understanding, problem solving, sequencing and memory all resulting in inability organizing occupational performance in a timely and safe manner; demonstrating performance deficits of psychosocial skills including impairments of interpersonal interactions and coping strategies which are skills necessary to successfully and appropriately participate in everyday tasks and social situations.  These performance deficits have resulted in activity limitations including but not limited to:  bed mobility, transfers, ascending/ descending stairs, walking short and long distances, walking around obstacles, transitional movement patterns (kneeling, bending); eating, upper body dressing, lower body dressing, brushing teeth, toileting, bathing, carrying objects, meal preparation, and working (heavy equipment  ).  Patient's role as , father, ,   and independent caretaker for self has been affected. Patient will benefit from skilled OT services to maximize level of independence with self-care skills and functional mobility.  Improvements noted with alertness.     Rehab Prognosis:  Good; patient would benefit from acute skilled OT services to address these deficits and reach maximum level of function.       Plan:     Patient to be seen 3 x/week to address the above listed problems via self-care/home management, therapeutic activities, therapeutic exercises, cognitive retraining, neuromuscular re-education, sensory integration  · Plan of Care Expires: 02/23/18  · Plan of Care Reviewed with: patient    This Plan of care has been discussed with the patient who was involved in its development and understands and is in agreement with the identified goals and treatment plan    GOALS:    Occupational Therapy Goals        Problem: Occupational Therapy Goal    Goal Priority Disciplines Outcome Interventions   Occupational Therapy Goal     OT, PT/OT Ongoing (interventions implemented as appropriate)    Description:  Goals set 2/9 to be addressed for 14 days with expiration date, 2/23:  Patient will increase functional independence with ADLs by performing:    Patient will demonstrate rolling to the right with mod assist.  Not met   Patient will demonstrate rolling to the left with max assist.   Not met  Patient will demonstrate supine -sit with max assist.   Not met  Patient will demonstrate squat pivot transfers with max assist.   Not met  Patient will demonstrate grooming while seated with max assist.   Not met  Patient will demonstrate upper body dressing with max assist while seated EOB.   Not met  Patient will demonstrate lower body dressing with max assist while seated EOB.   Not met  Patient will demonstrate toileting with max assist.   Not met  Patient will demonstrate ability to  follow 3/5 commands.   Not met  Patient will demonstrate independence with HEP for right UE positioning.    Not met  Patient's family / caregiver will demonstrate independence and safety with assisting patient with self-care skills and functional mobility.     Not met  Patient's family / caregiver will demonstrate independence with providing ROM and changes in bed positioning.   Not met                        Time Tracking:     OT Date of Treatment: 02/21/18  OT Start Time: 0515  OT Stop Time: 0539  OT Total Time (min): 24 min    Billable Minutes:Self Care/Home Management 10  Neuromuscular Re-education 14    ISAMAR Almonte  2/21/2018

## 2018-02-21 NOTE — ASSESSMENT & PLAN NOTE
-Stroke risk factor  LDL invalid as TG > 400  -Continue atorvastatin 80 mg qd, repeat lipid panel as an outpatient

## 2018-02-21 NOTE — ASSESSMENT & PLAN NOTE
-A1C-10  -poorly controlled diabetes  -BG >400 on arrival, s/p insulin gtt  -Continue tube feeds, SSI, restarted detemir

## 2018-02-21 NOTE — PLAN OF CARE
Problem: Patient Care Overview  Goal: Plan of Care Review  Outcome: Ongoing (interventions implemented as appropriate)  POC reviewed with pt and family at 2100. Pt's family verbalized understanding as pt is nonverbal at this time. Questions and concerns addressed. No acute events overnight. Pt progressing toward goals. Will continue to monitor. See flowsheets for full assessment and VS info

## 2018-02-21 NOTE — PT/OT/SLP EVAL
Speech Language Pathology Evaluation  Bedside Swallow  Speech Treatment    Patient Name:  Todd Quevedo   MRN:  [id number]  Admitting Diagnosis: Embolic stroke involving left middle cerebral artery    Recommendations:                 General Recommendations:  Dysphagia therapy, Speech/language therapy and Cognitive-linguistic therapy  Diet recommendations:  NPO, NPO   Aspiration Precautions: Frequent oral care and Strict aspiration precautions   General Precautions: Standard, aspiration, aphasia  Communication strategies:  yes/no questions only and go to room if call light pushed    History:     History reviewed. No pertinent past medical history.    History reviewed. No pertinent surgical history.      Subjective     Pt awake; crying; eyes shut  Patient goals: not stated     Objective:     Oral Musculature Evaluation  · Oral Musculature: unable to assess due to poor participation/comprehension  · Dentition: present and adequate  · Secretion Management: coughing on secretions  · Volitional Cough: not elicited  · Volitional Swallow: not elicited  · Voice Prior to PO Intake: not elicited    Bedside Swallow Eval:   Consistencies Assessed:  · Thin liquids ice x1     Oral Phase:   · Ice placed into anterior  Oral cavity   · no oral acceptance or bolus manipulation     Pharyngeal Phase:   · not demonstrated    Compensatory Strategies  · None      Treatment: Oral suction provided pre and post PO trials that was remarkable for oral stasis of saliva and/or ice bolus. Pt with wet breath sounds suggesting poor tolerance of saliva. Pt opened eyes x1 provided max cues. Pt followed simple commands and imitated rote speech with 0% acc provided max cues. Skilled education provided on aspiration precautions, diet recommendations, aphasia, linguistic prognosis, ways to facilitate language rehab, and oral care. Sister with questions on natural  Remedies for rehab and massage; education provided as appropriate. At end of session, pt with  02 desat to 83%; RN  Notified and entered room. Whiteboard updated with diet recommendations/aspiration precautions.  No further questions.        Assessment:     Todd Quevedo is a 47 y.o. male with an SLP diagnosis of severe Aphasia, Dysphagia .  He presents with high aspiration risk. Fair progress towards goals.    Goals:    SLP Goals        Problem: SLP Goal    Goal Priority Disciplines Outcome   SLP Goal     SLP Ongoing (interventions implemented as appropriate)   Description:  Speech Language Pathology Goals  Goals expected to be met by 2/28  1. Pt will participate in ongoing bedside assessment of swallow to determine least restrictive diet.  2. Pt will open eyes to stim x5/session given max cues.  3. Pt will follow simple commands x2/session given max cues.  4. Pt will answer basic y/n question via any modality x2/session given max cues.  5. Pt will vocalize in response to any stim x2/session given max cues.                          Plan:     · Patient to be seen:  2 x/week   · Plan of Care expires:  03/18/18  · Plan of Care reviewed with:  patient, family   · SLP Follow-Up:  Yes       Discharge recommendations:  nursing facility, basic   Barriers to Discharge:  see PT OT note    Time Tracking:     SLP Treatment Date:   02/21/18  Speech Start Time:  0941  Speech Stop Time:  1001     Speech Total Time (min):  20 min    Billable Minutes: Speech Therapy Individual 10 and Eval Swallow and Oral Function 10      Amina Romero M.A. CCC-SLP  Speech Language Pathologist  [phone]  2/21/2018

## 2018-02-21 NOTE — PROGRESS NOTES
[facility]  Neurocritical Care  Progress Note    Admit Date: 1/25/2018  Service Date: 02/21/2018  Length of Stay: 27    Subjective:     Chief Complaint: Embolic stroke involving left middle cerebral artery    History of Present Illness: The patient is a 47 year old male with no known PMHx admitted to [facility]   s/p thrombectomy of L M1 occlusion. Per chart review, he   walked into [place] and was acting abnormal.  EMS was called and brought to the ED for concern of L MCA syndrome. CT MP revealed short segment occlusion of left M1.  Patient went to IR for thrombectomy of L M1 occlusion seen on CTA MP.  TICI2C reperfusion and angioplasty. Patient admitted to [facility] for close monitoring and higher level of care.   History obtained from chart review only            Hospital Course: 1/25: Pt admitted to [facility] s/p L M1 thrombectomy, TICI2C reperfusion and angioplasty   1/27: large L MCA, hemicrani watch, NSGY following, insulin ggt, cardene ggt, L sided intermittent slowing eeg but no sz, +nasal trumpet for copious thick secretions, wheezing this am - improved with duo nebs, 3% nebs, and cough assist, concern for sepsis 2/2 hemodynamic changes - increased HR and BP, worsening leukocytosis, +ceftriaxone, pan cx, adrian track  1/28: continued respiratory challenges due to secretions. Aggressive pulmonary toileting. Continue monitoring for neuro worsening. Add moxifloxacin.   1/29: CTH to eval for increased edema/shift, EEG afterwards. Concern for decrease exam, no following/decreased alertness). CXR and Procal to follow leukocytosis. Hold TF until eval decreasing EXAM  1/30: neuro exam stable, increase 2%, current amount of sodium iv not suffuicient to meet target 145-155, cont abx for likely pneumonia, repeat ct in am  1/31: abrupt desaturation today requiring emergent intubation followed by bronchoscopy  Etiology likely upper airway obstruction from dried secretions  02/01: stable on vent overnight, marked  improvement on today's chest x ray, switch to spontaneous today, sodium stable at 150, start tfs  02/02: moves left side spontaneously and opens eyes off sedation,  02/03: 2% buffered NS re-started at 20mL/hr for rapid decrease in serum sodium. Enteral free water flushes discontinued. Patient has copious secretions likely pneumonia. Holding dexamethasone  2/4:  No acute events overnight.  Sodium stable.  2/5: transition insulin infusion to subcutaneous insulin   2/7: gen surg consulted for trach/peg  2/9: increased Aspart and Detemir, pending Trach/Peg placement next week.  2/10: No changes overnight . Pending PEG and trach, WBC mildly elevated  12K  2/11: No changes overnight . Pending PEG and trach, WBC mildly elevated  13K. Procal was low and lower than prior. Patient got 16 units of SSI yesterday.  2/13: NAEO.  Pending peg/trach tomorrow. But then self-extubated.  2/14: Tolerating face mask well.  Holding tube feeds.   2/15: IR consult placed, for PEG. ABD ct and NG placed in preparation for procedure.  2/16: NAEO.  Pending PEG.  2/17: NAEON, Pending PEG placement on Monday 2/18: NAEON.  Pending PEG on Monday.  Hold tube feeds, ASA, meds, scheduled aspart after MN.  2/19: Peg   2/20: Heparin and asa restarted, Ngt pulled and oxygenation improved  2/21: Transfer to [facility], nasal trumpet dced        Review of Systems  Unable to obtain a complete ROS due to level of consciousness.  Objective:     Vitals:  Temp: 98.9 °F (37.2 °C)  Pulse: 88  Rhythm: normal sinus rhythm  BP: (!) 140/89  MAP (mmHg): 108  Resp: (!) 34  SpO2: 100 %  O2 Device (Oxygen Therapy): nasal cannula    Temp  Min: 98.5 °F (36.9 °C)  Max: 100.1 °F (37.8 °C)  Pulse  Min: 79  Max: 102  BP  Min: 98/63  Max: 166/94  MAP (mmHg)  Min: 76  Max: 119  Resp  Min: 16  Max: 34  SpO2  Min: 94 %  Max: 100 %    02/20 0701 - 02/21 0700  In: 1737.5 [I.V.:437.5]  Out: 1325 [Urine:1325]   Unmeasured Output  Urine Occurrence: 1  Stool Occurrence: 1  Pad Count: 1        Physical Exam  GA: Alert, comfortable, no acute distress.   HEENT: No scleral icterus or JVD.   Pulmonary: Clear to auscultation A/L. No wheezing, crackles, or rhonchi. Upper airway secretion greatly improved after removal of ngt and nasal trumpet   Cardiac: RRR S1 & S2 w/o rubs/murmurs/gallops.   Abdominal: Bowel sounds present x 4. No appreciable hepatosplenomegaly.  Skin: No jaundice, rashes, or visible lesions.  Neuro:  --GCS: E4 V1 M5  --Mental Status:  AA,  moving spontaneously on the left, sometimes purposefully, not to command   --CN II-XII grossly intact.   --Pupils 3mm, PERRL.   --Corneal reflex, gag, cough intact.  --LUE strength: 4/5  --LLE strength: 4/5  --RUE strength: 1/5  --RLE strength: 1/5    Medications:  Continuous Scheduled  albuterol-ipratropium 2.5mg-0.5mg/3mL 3 mL Q6H   aspirin 81 mg Daily   atorvastatin 80 mg Daily   heparin (porcine) 5,000 Units Q8H   lisinopril 20 mg Daily   metoprolol tartrate 25 mg BID   modafinil 200 mg Daily   And     modafinil 100 mg Daily   polyethylene glycol 17 g Daily   senna-docusate 8.6-50 mg 1 tablet BID   sodium chloride 0.9% 3 mL Q8H   sodium chloride 3% 4 mL Q6H   PRN  acetaminophen 650 mg Q6H PRN   albuterol sulfate 2.5 mg Q4H PRN   bisacodyl 10 mg Daily PRN   dextrose 50% 12.5 g PRN   glucagon (human recombinant) 1 mg PRN   hydrALAZINE 10 mg Q4H PRN   insulin aspart U-100 1-10 Units Q4H PRN   ipratropium 0.5 mg Q4H PRN   labetalol 10 mg Q4H PRN   magnesium sulfate IVPB 2 g PRN   magnesium sulfate IVPB 4 g PRN   ondansetron 4 mg Q8H PRN   potassium chloride 10% 40 mEq PRN   potassium chloride 10% 40 mEq PRN   sodium chloride 0.9% 5 mL PRN     Today I personally reviewed pertinent medications, lines/drains/airways, imaging, lab results, notably:    Diet  Diet NPO  Diet NPO        Assessment/Plan:     Neuro   * Embolic stroke involving left middle cerebral artery    -s/p L M1 thrombectomy  --180  -repeat imaging stable   -Peg placed at 1400, ok  to use on 02/20/18  -heparin and asa restarted  -continue modafinil  -PT/OT  -statin   -TFs per peg  -Transfer to stroke               Pulmonary   Acute respiratory failure with hypoxia    - Ngt removed and trumpet out, currently at 2L NC sating 95%, will continue to wean.           Cardiac/Vascular   Hyperlipidemia    -atorvastatin 80 daily           Essential hypertension    --160  -metoprolol 25 mg bid  -lisinopril 20 mg Qdaily  -PRN Labetalol & Hydralazine not required and dced   -Echo 1/26:1 - Normal left ventricular systolic function (EF 65-70%).     2 - Concentric remodeling.     3 - No wall motion abnormalities.     4 - Normal left ventricular diastolic function.     5 - Normal right ventricular systolic function .            Endocrine   Type 2 diabetes mellitus    -A1C-10  -poorly controlled diabetes  -BG >400 on arrival, s/p insulin gtt  -Continue tube feeds, SSI, restarted detemir               Prophylaxis:  Venous Thromboembolism: chemical  Stress Ulcer: None  Ventilator Pneumonia: no     Activity Orders          Activity as tolerated starting at 02/05 1432        Full Code    Ottoniel Silvestre NP  Neurocritical Care  [facility]

## 2018-02-21 NOTE — PROGRESS NOTES
"[facility]  Vascular Neurology  Comprehensive Stroke Center  Progress Note    Assessment/Plan:     * Embolic stroke involving left middle cerebral artery    46 yo M with PMHx HLD, LOYDA, poorly controlled DM II presents to [facility] 01/25/18 after noted "strange behavior" for which EMS was called.  Pt was found to have a L M1 occlusion and was taken to IR for thrombectomy with TICI 2C reperfusion and stent placement due to L MCA stenosis. Etiology remains unclear: no obvious signs of cardiac source or atheromatous disease in the aorto/carotid axis. Echo normal with no hx or signs of A fib. PT/OT/SLP will continue to evaluate to make placement recommendations. Should restart DAPT 2/2 recent stent placement in angiogram after PEG placement. Pt on Modafinil.    S/p PEG placement 02/19/18. O2 % on 5L NC, still requiring frequent suctioning. Primary team considering transfer to Medicine tomorrow.    Antithrombotics for secondary stroke prevention: Restart DAPT in setting of intracranial stent, including increase ASA to 325mg Daily  Statins: atorvastatin 80 mg po qd  Aggressive risk factor modification: HLD, DM II (Hgb A1c 10%), Obesity  Rehab efforts: PT/OT/SLP to evaluate and treat, Recommending SNF  Diagnostics ordered/pending: None  VTE prophylaxis: Heparin 5000 U q8h  BP parameters: Infarct: Post sucessful thrombectomy, SBP <140     Will consider evaluating for PFO once pt is more stable and for a hypercoagulable panel as an outpatient.        Right spastic hemiparesis    Due to stroke  Continue aggressive therapy        Brain compression    Evidence on imaging, likely 2/2 cerebral edema  Likely contributing to pt's decreased alertness        Acute respiratory failure with hypoxia    O2 % on 5L NC, pt still requiring frequent suctioning  Most recent CXR stable  Will likely be ready for step down once more stable from a respiratory standpoint        Cytotoxic cerebral edema    -2/2 stroke, " evident on imaging.    Stable on repeat CT head imaging        Leukocytosis    Resolved 2/20  Management per primary        Type 2 diabetes mellitus    -Stroke risk factor, Hgb A1c 10%  -Currently on detemir 32 U bid, aspart 8 U q4h (TFs)  -Glucose 157-196 on current regimen. Titrate up insulin as needed.  -Small vessel disease likely contributing factor of stroke        Hyperlipidemia    -Stroke risk factor  LDL invalid as TG > 400  -Continue atorvastatin 80 mg qd, repeat lipid panel as an outpatient        Essential hypertension    -Stroke risk factor  -Continue lisinopril 20 mg qd, metoprolol 25 mg bid, Hydralazine/Labetalol PRN        Obstructive sleep apnea    -Stroke risk factor  Trial CPAP overnight             1/25: Brought in for aphasia, RSW with L gaze preferance. LKN unknown, not tPA candidate. Went to IR for angiogram and stent.  1/29: Off Cardene, remains on Insulin gtt. Concern for sepsis, respiratory source. Imaging with mass effect and some brain compression; maycol crani watch.  1/30: EEG consistent with focal L slowing 2/2 lesion and mild generalized slowing, no seizures. CT head stable, no hemorrhagic conversion  02/01/18 emergent intubation likely due to upper airway obstruction per NCC.    2/2/18: Pt off Precedex, moving left side spontaneously and opening eyes. Intubated, on spontaneous today. NCC giving steroids in hopes to extubate tomorrow.  2/3: no acute events over night, remains intubated at the time of the visit this morning, not responsive to verbal stimuli, withdraws from pain on LUE, BP raging ~135-230. No significant change in the lab values, glucose ~200. Continued on insulin gtt, SQH for DVT ppx, and captopril.     02/16/18:  Few changes on exam, continues to have significant RUE/RLE weakness with global aphasia.  Family member at bedside noted attempt to communicate with her.  02/18/18:  Pt largely unchanged on exam this am.  No family member present during am rounds.  2-19-18  tolerating facemask for PEG by IR this afternoon  2/20: s/p PEG. O2 % on 5L NC, still requiring frequent suctioning. Primary team considering transfer to Medicine tomorrow.    STROKE DOCUMENTATION   Acute Stroke Times   Stroke Team Called Date: 01/25/18  Stroke Team Called Time: 1852  Stroke Team Arrival Date: 01/25/18  Stroke Team Arrival Time: 0652  CT Interpretation Time: 1910  Decision to Treat Time for Alteplase:  (n/a unknown last known normal )  Decision to Treat Time for IR: 1915    NIH Scale:  1a. Level Of Consciousness: 1-->Not alert: but arousable by minor stimulation to obey, answer, or respond  1b. LOC Questions: 2-->Answers neither question correctly  1c. LOC Commands: 1-->Performs one task correctly  2. Best Gaze: 1-->Partial gaze palsy: gaze is abnormal in one or both eyes, but forced deviation or total gaze paresis is not present  3. Visual: 1-->Partial hemianopia  4. Facial Palsy: 0-->Normal symmetrical movements  5a. Motor Arm, Left: 2-->Some effort against gravity: limb cannot get to or maintain (if cued) 90 (or 45) degrees, drifts down to bed, but has some effort against gravity  5b. Motor Arm, Right: 4-->No movement  6a. Motor Leg, Left: 2-->Some effort against gravity: leg falls to bed by 5 secs, but has some effort against gravity  6b. Motor Leg, Right: 3-->No effort against gravity: leg falls to bed immediately  7. Limb Ataxia: 0-->Absent  8. Sensory: 0-->Normal: no sensory loss  9. Best Language: 3-->Mute, global aphasia: no usable speech or auditory comprehension  10. Dysarthria: 2-->Severe dysarthria: patients speech is so slurred as to be unintelligible in the absence of or out of proportion to any dysphasia, or is mute/anarthric  11. Extinction and Inattention (formerly Neglect): 0-->No abnormality  Total (NIH Stroke Scale): 22       Modified Galata Score: 0  Jeff Coma Scale:    ABCD2 Score:    [id number] Score:   HAS -BLED Score:   ICH Score:   Hunt & Laguerre Classification:       Hemorrhagic change of an Ischemic Stroke: Does this patient have an ischemic stroke with hemorrhagic changes? No     Neurologic Chief Complaint: L MCA Stroke    Subjective:     Interval History: Patient is seen for follow-up neurological assessment and treatment recommendations: s/p PEG. O2 % on 5L NC, still requiring frequent suctioning. Primary team considering transfer to Medicine tomorrow.    HPI, Past Medical, Family, and Social History remains the same as documented in the initial encounter.     Review of Systems   Constitutional: Negative for chills and fever.   Skin: Negative for rash and wound.   Neurological: Positive for speech difficulty. Negative for weakness and numbness.   Psychiatric/Behavioral: Positive for decreased concentration. Negative for agitation.     Scheduled Meds:   albuterol-ipratropium 2.5mg-0.5mg/3mL  3 mL Nebulization Q6H    aspirin  81 mg Oral Daily    atorvastatin  80 mg Per NG tube Daily    heparin (porcine)  5,000 Units Subcutaneous Q8H    lisinopril  20 mg Per NG tube Daily    metoprolol tartrate  25 mg Per NG tube BID    modafinil  200 mg Per NG tube Daily    And    modafinil  100 mg Per NG tube Daily    polyethylene glycol  17 g Per NG tube Daily    senna-docusate 8.6-50 mg  1 tablet Per NG tube BID    sodium chloride 0.9%  3 mL Intravenous Q8H    sodium chloride 3%  4 mL Nebulization Q6H     Continuous Infusions:    PRN Meds:acetaminophen, albuterol sulfate, bisacodyl, dextrose 50%, glucagon (human recombinant), hydrALAZINE, insulin aspart U-100, ipratropium, labetalol, magnesium sulfate IVPB, magnesium sulfate IVPB, ondansetron, potassium chloride 10%, potassium chloride 10%, sodium chloride 0.9%    Objective:     Vital Signs (Most Recent):  Temp: 99.1 °F (37.3 °C) (02/20/18 1500)  Pulse: 93 (02/20/18 1700)  Resp: (!) 21 (02/20/18 1700)  BP: (!) 141/84 (02/20/18 1700)  SpO2: 97 % (02/20/18 1700)  BP Location: Right arm    Vital Signs Range (Last  24H):  Temp:  [97.9 °F (36.6 °C)-99.1 °F (37.3 °C)]   Pulse:  []   Resp:  [14-28]   BP: (112-154)/(67-93)   SpO2:  [94 %-100 %]   BP Location: Right arm    Physical Exam   Constitutional: He appears well-developed and well-nourished. No distress.   HENT:   Head: Normocephalic and atraumatic.   Eyes: Conjunctivae are normal. Pupils are equal, round, and reactive to light.   Cardiovascular: Normal rate.    Pulmonary/Chest: Effort normal. No respiratory distress.   Musculoskeletal: He exhibits no edema or deformity.   Neurological: A cranial nerve deficit is present. No sensory deficit.   Skin: Skin is warm and dry.       Neurologic Exam:  Mental Status:  Drowsy, opens eyes to voice. Intermittently follows commands.  Cranial Nerves:  Face appears symmetric. PERRLA. EOMI with tracking, L gaze  Motor:  Normal bulk and tone. LUE, LLE moving spontaneously. RLE with some withdrawal to noxious stimuli. No movement of RUE.  Sensory: Sensation loss in RUE. Withdraws to pain in remaining extremities      Laboratory:  CMP:     Recent Labs  Lab 02/20/18  0153   CALCIUM 9.5   ALBUMIN 2.1*   PROT 7.5      K 4.0   CO2 26      BUN 20   CREATININE 0.8   ALKPHOS 125   ALT 23   AST 21   BILITOT 0.7     CBC:     Recent Labs  Lab 02/20/18  0153   WBC 11.84   RBC 4.14*   HGB 12.2*   HCT 36.9*   *   MCV 89   MCH 29.5   MCHC 33.1     Lipid Panel: Invalid due to excessively high triglycerides  Coagulation:     Recent Labs  Lab 02/20/18  0153   INR 1.1     Hgb A1C: 10%  TSH: PENDING    Diagnostic Results     Brain Imaging     02/04/18 CT head w/o contrast:  Redemonstration of large left MCA territory infarction without evidence of hemorrhagic conversion.  Slight interval increase in local mass effect with rightward midline shift of approximately 0.6 cm. Stable postoperative change from left MCA endovascular stenting. Sinus disease as above.       01/26/18 CT head w/o contrast:  No detrimental change.  Evolving subtle  left MCA territory infarct.       01/26/18 MRI Brain w/o contrast:  1. Large acute left MCA territory infarction as above.  No associated hemorrhage or significant mass effect at this time.  2. Mild chronic ischemic changes.          Vessel Imaging     01/25/18 CTA Stroke Multiphase:  Short segment occlusion of left M1.  There is good collateral flow in the ischemic territory as detailed above.  Noncontrast images demonstrate subtle changes reflecting left MCA territory acute infarct.       01/25/18 IR Angiogram:  Cerebral angiogram demonstrates occlusion of the M1 segment of the left middle cerebral artery with good collateral flow from pial collaterals.  This was successfully removed and residual stenosis at the site of occlusion was angioplastied and stented with improvement in flow.  Slow flow to the left parietal lobe suggests area of infarcted tissue.  This was felt to represent TICI 2C flow.      Cardiac Imaging     01/26/18 2D Echo w/ CFD:  CONCLUSIONS     1 - Normal left ventricular systolic function (EF 65-70%).     2 - Concentric remodeling.     3 - No wall motion abnormalities.     4 - Normal left ventricular diastolic function.     5 - Normal right ventricular systolic function .    6 - No LA enlargement.      Arlette Hernández PA-C  Comprehensive Stroke Center  Department of Vascular Neurology   [facility]

## 2018-02-21 NOTE — ASSESSMENT & PLAN NOTE
O2 % on 5L NC, pt still requiring frequent suctioning  Most recent CXR stable  Will likely be ready for step down once more stable from a respiratory standpoint

## 2018-02-21 NOTE — PLAN OF CARE
ICU Attending Note  Neurocritical Care    -aspirin, atorvastatin, modafanil  -improved suction needs  -NT out  -antihypertensives  -coordinate transfer to floor with Stroke

## 2018-02-21 NOTE — PLAN OF CARE
Problem: Occupational Therapy Goal  Goal: Occupational Therapy Goal  Goals set 2/9 to be addressed for 14 days with expiration date, 2/23:  Patient will increase functional independence with ADLs by performing:    Patient will demonstrate rolling to the right with mod assist.  Not met   Patient will demonstrate rolling to the left with max assist.   Not met  Patient will demonstrate supine -sit with max assist.   Not met  Patient will demonstrate squat pivot transfers with max assist.   Not met  Patient will demonstrate grooming while seated with max assist.   Not met  Patient will demonstrate upper body dressing with max assist while seated EOB.   Not met  Patient will demonstrate lower body dressing with max assist while seated EOB.   Not met  Patient will demonstrate toileting with max assist.   Not met  Patient will demonstrate ability to follow 3/5 commands.   Not met  Patient will demonstrate independence with HEP for right UE positioning.    Not met  Patient's family / caregiver will demonstrate independence and safety with assisting patient with self-care skills and functional mobility.     Not met  Patient's family / caregiver will demonstrate independence with providing ROM and changes in bed positioning.   Not met       Goals remain appropriate.  ISAMAR Almonte  2/21/2018

## 2018-02-21 NOTE — ASSESSMENT & PLAN NOTE
"48 yo M with PMHx HLD, LOYDA, poorly controlled DM II presents to [place] 01/25/18 after noted "strange behavior" for which EMS was called.  Pt was found to have a L M1 occlusion and was taken to IR for thrombectomy with TICI 2C reperfusion and stent placement due to L MCA stenosis. Etiology remains unclear: no obvious signs of cardiac source or atheromatous disease in the aorto/carotid axis. Echo normal with no hx or signs of A fib. PT/OT/SLP will continue to evaluate to make placement recommendations. Should restart DAPT 2/2 recent stent placement in angiogram after PEG placement. Pt on Modafinil.    S/p PEG placement 02/19/18. O2 % on 5L NC, still requiring frequent suctioning. Primary team considering transfer to Medicine tomorrow.    Antithrombotics for secondary stroke prevention: Restart DAPT in setting of intracranial stent, including increase ASA to 325mg Daily  Statins: atorvastatin 80 mg po qd  Aggressive risk factor modification: HLD, DM II (Hgb A1c 10%), Obesity  Rehab efforts: PT/OT/SLP to evaluate and treat, Recommending SNF  Diagnostics ordered/pending: None  VTE prophylaxis: Heparin 5000 U q8h  BP parameters: Infarct: Post sucessful thrombectomy, SBP <140     Will consider evaluating for PFO once pt is more stable and for a hypercoagulable panel as an outpatient.  "

## 2018-02-22 LAB
ALBUMIN SERPL BCP-MCNC: 2.2 G/DL
ALP SERPL-CCNC: 140 U/L
ALT SERPL W/O P-5'-P-CCNC: 28 U/L
ANION GAP SERPL CALC-SCNC: 11 MMOL/L
AST SERPL-CCNC: 30 U/L
BASOPHILS # BLD AUTO: 0.04 K/UL
BASOPHILS NFR BLD: 0.5 %
BILIRUB SERPL-MCNC: 0.5 MG/DL
BUN SERPL-MCNC: 20 MG/DL
CALCIUM SERPL-MCNC: 9.3 MG/DL
CHLORIDE SERPL-SCNC: 101 MMOL/L
CO2 SERPL-SCNC: 27 MMOL/L
CREAT SERPL-MCNC: 0.8 MG/DL
DIFFERENTIAL METHOD: ABNORMAL
EOSINOPHIL # BLD AUTO: 0.3 K/UL
EOSINOPHIL NFR BLD: 3 %
ERYTHROCYTE [DISTWIDTH] IN BLOOD BY AUTOMATED COUNT: 12.2 %
EST. GFR  (AFRICAN AMERICAN): >60 ML/MIN/1.73 M^2
EST. GFR  (NON AFRICAN AMERICAN): >60 ML/MIN/1.73 M^2
GLUCOSE SERPL-MCNC: 248 MG/DL
HCT VFR BLD AUTO: 36.6 %
HGB BLD-MCNC: 12.1 G/DL
IMM GRANULOCYTES # BLD AUTO: 0.05 K/UL
IMM GRANULOCYTES NFR BLD AUTO: 0.6 %
INR PPP: 1
LYMPHOCYTES # BLD AUTO: 1.8 K/UL
LYMPHOCYTES NFR BLD: 21.8 %
MAGNESIUM SERPL-MCNC: 1.9 MG/DL
MCH RBC QN AUTO: 28.5 PG
MCHC RBC AUTO-ENTMCNC: 33.1 G/DL
MCV RBC AUTO: 86 FL
MONOCYTES # BLD AUTO: 0.7 K/UL
MONOCYTES NFR BLD: 8.8 %
NEUTROPHILS # BLD AUTO: 5.4 K/UL
NEUTROPHILS NFR BLD: 65.3 %
NRBC BLD-RTO: 0 /100 WBC
PHOSPHATE SERPL-MCNC: 3.4 MG/DL
PLATELET # BLD AUTO: 392 K/UL
PMV BLD AUTO: 10.4 FL
POCT GLUCOSE: 209 MG/DL (ref 70–110)
POCT GLUCOSE: 221 MG/DL (ref 70–110)
POCT GLUCOSE: 226 MG/DL (ref 70–110)
POCT GLUCOSE: 266 MG/DL (ref 70–110)
POTASSIUM SERPL-SCNC: 4.2 MMOL/L
PROT SERPL-MCNC: 7.6 G/DL
PROTHROMBIN TIME: 10.2 SEC
RBC # BLD AUTO: 4.24 M/UL
SODIUM SERPL-SCNC: 139 MMOL/L
WBC # BLD AUTO: 8.21 K/UL

## 2018-02-22 PROCEDURE — 27000221 HC OXYGEN, UP TO 24 HOURS

## 2018-02-22 PROCEDURE — 25000242 PHARM REV CODE 250 ALT 637 W/ HCPCS: Performed by: STUDENT IN AN ORGANIZED HEALTH CARE EDUCATION/TRAINING PROGRAM

## 2018-02-22 PROCEDURE — 25000003 PHARM REV CODE 250: Performed by: PHYSICIAN ASSISTANT

## 2018-02-22 PROCEDURE — 80053 COMPREHEN METABOLIC PANEL: CPT

## 2018-02-22 PROCEDURE — 31720 CLEARANCE OF AIRWAYS: CPT

## 2018-02-22 PROCEDURE — 25000003 PHARM REV CODE 250: Performed by: PSYCHIATRY & NEUROLOGY

## 2018-02-22 PROCEDURE — 25000242 PHARM REV CODE 250 ALT 637 W/ HCPCS: Performed by: PSYCHIATRY & NEUROLOGY

## 2018-02-22 PROCEDURE — 94640 AIRWAY INHALATION TREATMENT: CPT

## 2018-02-22 PROCEDURE — 36415 COLL VENOUS BLD VENIPUNCTURE: CPT

## 2018-02-22 PROCEDURE — 94761 N-INVAS EAR/PLS OXIMETRY MLT: CPT

## 2018-02-22 PROCEDURE — 85610 PROTHROMBIN TIME: CPT

## 2018-02-22 PROCEDURE — 25000003 PHARM REV CODE 250: Performed by: NURSE PRACTITIONER

## 2018-02-22 PROCEDURE — 85025 COMPLETE CBC W/AUTO DIFF WBC: CPT

## 2018-02-22 PROCEDURE — 20600001 HC STEP DOWN PRIVATE ROOM

## 2018-02-22 PROCEDURE — A4216 STERILE WATER/SALINE, 10 ML: HCPCS | Performed by: NURSE PRACTITIONER

## 2018-02-22 PROCEDURE — 63600175 PHARM REV CODE 636 W HCPCS: Performed by: NURSE PRACTITIONER

## 2018-02-22 PROCEDURE — 99233 SBSQ HOSP IP/OBS HIGH 50: CPT | Mod: ,,, | Performed by: PSYCHIATRY & NEUROLOGY

## 2018-02-22 PROCEDURE — 83735 ASSAY OF MAGNESIUM: CPT

## 2018-02-22 PROCEDURE — 84100 ASSAY OF PHOSPHORUS: CPT

## 2018-02-22 RX ORDER — ALBUTEROL SULFATE 0.83 MG/ML
2.5 SOLUTION RESPIRATORY (INHALATION) EVERY 4 HOURS PRN
Status: DISCONTINUED | OUTPATIENT
Start: 2018-02-22 | End: 2018-02-22

## 2018-02-22 RX ORDER — ALBUTEROL SULFATE 0.83 MG/ML
2.5 SOLUTION RESPIRATORY (INHALATION) EVERY 6 HOURS
Status: DISCONTINUED | OUTPATIENT
Start: 2018-02-22 | End: 2018-03-03

## 2018-02-22 RX ORDER — IPRATROPIUM BROMIDE 0.5 MG/2.5ML
0.5 SOLUTION RESPIRATORY (INHALATION) EVERY 6 HOURS
Status: DISCONTINUED | OUTPATIENT
Start: 2018-02-22 | End: 2018-03-03

## 2018-02-22 RX ADMIN — SODIUM CHLORIDE SOLN NEBU 3% 4 ML: 3 NEBU SOLN at 11:02

## 2018-02-22 RX ADMIN — INSULIN ASPART 4 UNITS: 100 INJECTION, SOLUTION INTRAVENOUS; SUBCUTANEOUS at 12:02

## 2018-02-22 RX ADMIN — SODIUM CHLORIDE SOLN NEBU 3% 4 ML: 3 NEBU SOLN at 07:02

## 2018-02-22 RX ADMIN — MODAFINIL 200 MG: 100 TABLET ORAL at 05:02

## 2018-02-22 RX ADMIN — STANDARDIZED SENNA CONCENTRATE AND DOCUSATE SODIUM 1 TABLET: 8.6; 5 TABLET, FILM COATED ORAL at 08:02

## 2018-02-22 RX ADMIN — HEPARIN SODIUM 5000 UNITS: 5000 INJECTION, SOLUTION INTRAVENOUS; SUBCUTANEOUS at 01:02

## 2018-02-22 RX ADMIN — SODIUM CHLORIDE SOLN NEBU 3% 4 ML: 3 NEBU SOLN at 08:02

## 2018-02-22 RX ADMIN — Medication 3 ML: at 01:02

## 2018-02-22 RX ADMIN — ATORVASTATIN CALCIUM 80 MG: 20 TABLET, FILM COATED ORAL at 08:02

## 2018-02-22 RX ADMIN — HEPARIN SODIUM 5000 UNITS: 5000 INJECTION, SOLUTION INTRAVENOUS; SUBCUTANEOUS at 05:02

## 2018-02-22 RX ADMIN — HEPARIN SODIUM 5000 UNITS: 5000 INJECTION, SOLUTION INTRAVENOUS; SUBCUTANEOUS at 09:02

## 2018-02-22 RX ADMIN — LISINOPRIL 20 MG: 20 TABLET ORAL at 08:02

## 2018-02-22 RX ADMIN — STANDARDIZED SENNA CONCENTRATE AND DOCUSATE SODIUM 1 TABLET: 8.6; 5 TABLET, FILM COATED ORAL at 09:02

## 2018-02-22 RX ADMIN — IPRATROPIUM BROMIDE 0.5 MG: 0.5 SOLUTION RESPIRATORY (INHALATION) at 11:02

## 2018-02-22 RX ADMIN — ALBUTEROL SULFATE 2.5 MG: 2.5 SOLUTION RESPIRATORY (INHALATION) at 11:02

## 2018-02-22 RX ADMIN — Medication 3 ML: at 10:02

## 2018-02-22 RX ADMIN — CLOPIDOGREL BISULFATE 75 MG: 75 TABLET, FILM COATED ORAL at 08:02

## 2018-02-22 RX ADMIN — IPRATROPIUM BROMIDE AND ALBUTEROL SULFATE 3 ML: .5; 3 SOLUTION RESPIRATORY (INHALATION) at 07:02

## 2018-02-22 RX ADMIN — SODIUM CHLORIDE SOLN NEBU 3% 4 ML: 3 NEBU SOLN at 12:02

## 2018-02-22 RX ADMIN — POLYETHYLENE GLYCOL 3350 17 G: 17 POWDER, FOR SOLUTION ORAL at 08:02

## 2018-02-22 RX ADMIN — MODAFINIL 100 MG: 100 TABLET ORAL at 01:02

## 2018-02-22 RX ADMIN — ASPIRIN 325 MG ORAL TABLET 325 MG: 325 PILL ORAL at 08:02

## 2018-02-22 RX ADMIN — IPRATROPIUM BROMIDE 0.5 MG: 0.5 SOLUTION RESPIRATORY (INHALATION) at 08:02

## 2018-02-22 RX ADMIN — METOPROLOL TARTRATE 25 MG: 25 TABLET ORAL at 08:02

## 2018-02-22 RX ADMIN — ALBUTEROL SULFATE 2.5 MG: 2.5 SOLUTION RESPIRATORY (INHALATION) at 08:02

## 2018-02-22 RX ADMIN — INSULIN ASPART 6 UNITS: 100 INJECTION, SOLUTION INTRAVENOUS; SUBCUTANEOUS at 08:02

## 2018-02-22 RX ADMIN — IPRATROPIUM BROMIDE AND ALBUTEROL SULFATE 3 ML: .5; 3 SOLUTION RESPIRATORY (INHALATION) at 12:02

## 2018-02-22 RX ADMIN — INSULIN ASPART 2 UNITS: 100 INJECTION, SOLUTION INTRAVENOUS; SUBCUTANEOUS at 09:02

## 2018-02-22 RX ADMIN — INSULIN ASPART 4 UNITS: 100 INJECTION, SOLUTION INTRAVENOUS; SUBCUTANEOUS at 05:02

## 2018-02-22 NOTE — ASSESSMENT & PLAN NOTE
-Stroke risk factor  -Continue scheduled lisinopril 20 mg qd, metoprolol 25 mg bid  Hydralazine/Labetalol PRN

## 2018-02-22 NOTE — PROGRESS NOTES
"[facility]  Vascular Neurology  Comprehensive Stroke Center  Progress Note    Assessment/Plan:     * Embolic stroke involving left middle cerebral artery    48 yo M with PMHx HLD, LOYDA, poorly controlled DM II [place] [facility] [place] after noted "strange behavior" for which EMS was called.  Pt was found to have a L M1 occlusion and was taken to IR for thrombectomy with TICI 2C reperfusion and stent placement due to L MCA stenosis. Etiology remains unclear: no obvious signs of cardiac source or atheromatous disease in the aorto/carotid axis. Echo normal with no hx or signs of A fib. PT/OT/SLP will continue to evaluate to make placement recommendations. Should restart DAPT 2/2 recent stent placement in angiogram after PEG placement. Pt on Modafinil.    Sating well on 5L NC. Plans for step down to stroke service.    Antithrombotics for secondary stroke prevention: DAPT (including ) in setting of intracranial stent  Statins: Atorvastatin 80 mg po qd  Aggressive risk factor modification: HLD, DM II (Hgb A1c 10%), Obesity  Rehab efforts: PT/OT/SLP to evaluate and treat, Recommending SNF  Diagnostics ordered/pending: None  VTE prophylaxis: Heparin 5000 U q8h  BP parameters: Infarct: Post sucessful thrombectomy, SBP <140     Will consider evaluating for PFO once pt is more stable and for a hypercoagulable panel as an outpatient.        Right spastic hemiparesis    Due to stroke  Continue aggressive PT/OT        Brain compression    Evidence on imaging, likely 2/2 cerebral edema  Likely contributing to pt's decreased alertness        Acute respiratory failure with hypoxia    Sating well on 5L NC  Pt stable for step down from a respiratory standpoint        Cytotoxic cerebral edema    -2/2 stroke, evident on imaging  Stable on repeat CT head imaging        Type 2 diabetes mellitus    -Stroke risk factor, Hgb A1c 10%  -Increased to detemir 3OU BID, SSI (TFs)  Insulin had been held bj-PEG as TFs " were held  -Glucose 160s-230s on current regimen. Will titrate up insulin as needed.  -Small vessel disease likely contributing factor of stroke  Need to consult Endocrine on 2/22 for further management recs        Hyperlipidemia    -Stroke risk factor  LDL invalid as TG > 400  -Continue atorvastatin 80 mg qd  Repeat lipid panel as an outpatient        Essential hypertension    -Stroke risk factor  -Continue scheduled lisinopril 20 mg qd, metoprolol 25 mg bid  Hydralazine/Labetalol PRN        Obstructive sleep apnea    -Stroke risk factor  Will consider CPAP qHS             1/25: Brought in for aphasia, RSW with L gaze preferance. LKN unknown, not tPA candidate. Went to IR for angiogram and stent.  1/29: Off Cardene, remains on Insulin gtt. Concern for sepsis, respiratory source. Imaging with mass effect and some brain compression; maycol crani watch.  1/30: EEG consistent with focal L slowing 2/2 lesion and mild generalized slowing, no seizures. CT head stable, no hemorrhagic conversion  02/01/18 emergent intubation likely due to upper airway obstruction per NCC.    2/2/18: Pt off Precedex, moving left side spontaneously and opening eyes. Intubated, on spontaneous today. NCC giving steroids in hopes to extubate tomorrow.  2/3: no acute events over night, remains intubated at the time of the visit this morning, not responsive to verbal stimuli, withdraws from pain on LUE, BP raging ~135-230. No significant change in the lab values, glucose ~200. Continued on insulin gtt, SQH for DVT ppx, and captopril.     02/16/18:  Few changes on exam, continues to have significant RUE/RLE weakness with global aphasia.  Family member at bedside noted attempt to communicate with her.  02/18/18:  Pt largely unchanged on exam this am.  No family member present during am rounds.  2-19-18 tolerating facemask for PEG by IR this afternoon  2/20: s/p PEG. O2 % on 5L NC, still requiring frequent suctioning. Primary team considering  transfer to Medicine tomorrow.  2/21: Pt sating % on 3L NC. Restarted DAPT, including . Plans for step down to stroke service.    STROKE DOCUMENTATION   Acute Stroke Times   Stroke Team Called Date: 01/25/18  Stroke Team Called Time: 1852  Stroke Team Arrival Date: 01/25/18  Stroke Team Arrival Time: 0652  CT Interpretation Time: 1910  Decision to Treat Time for Alteplase:  (n/a unknown last known normal )  Decision to Treat Time for IR: 1915    NIH Scale:  1a. Level Of Consciousness: 2-->Not alert: requires repeated stimulation to attend, or is obtunded and requires strong or painful stimulation to make movements (not stereotyped)  1b. LOC Questions: 2-->Answers neither question correctly  1c. LOC Commands: 2-->Performs neither task correctly  2. Best Gaze: 1-->Partial gaze palsy: gaze is abnormal in one or both eyes, but forced deviation or total gaze paresis is not present  3. Visual: 1-->Partial hemianopia  4. Facial Palsy: 1-->Minor paralysis (flattened nasolabial fold, asymmetry on smiling)  5a. Motor Arm, Left: 2-->Some effort against gravity: limb cannot get to or maintain (if cued) 90 (or 45) degrees, drifts down to bed, but has some effort against gravity  5b. Motor Arm, Right: 4-->No movement  6a. Motor Leg, Left: 2-->Some effort against gravity: leg falls to bed by 5 secs, but has some effort against gravity  6b. Motor Leg, Right: 3-->No effort against gravity: leg falls to bed immediately  7. Limb Ataxia: 0-->Absent  8. Sensory: 0-->Normal: no sensory loss  9. Best Language: 3-->Mute, global aphasia: no usable speech or auditory comprehension  10. Dysarthria: 2-->Severe dysarthria: patients speech is so slurred as to be unintelligible in the absence of or out of proportion to any dysphasia, or is mute/anarthric  11. Extinction and Inattention (formerly Neglect): 0-->No abnormality  Total (NIH Stroke Scale): 25       Modified Jasmin Score: 0  Jeff Coma Scale:    ABCD2 Score:     [id number] Score:   HAS -BLED Score:   ICH Score:   Hunt & Laguerre Classification:      Hemorrhagic change of an Ischemic Stroke: Does this patient have an ischemic stroke with hemorrhagic changes? No     Neurologic Chief Complaint: L MCA Stroke    Subjective:     Interval History: Patient is seen for follow-up neurological assessment and treatment recommendations: Pt sating % on 3L NC. Restarted DAPT, including . Plans for step down to stroke service.    HPI, Past Medical, Family, and Social History remains the same as documented in the initial encounter.     Review of Systems   Constitutional: Positive for fatigue. Negative for chills and fever.   Respiratory: Negative for cough and choking.    Skin: Negative for rash and wound.   Neurological: Positive for speech difficulty. Negative for weakness and numbness.   Psychiatric/Behavioral: Positive for decreased concentration. Negative for agitation.     Scheduled Meds:   albuterol-ipratropium 2.5mg-0.5mg/3mL  3 mL Nebulization Q6H    [START ON 2/22/2018] aspirin  325 mg Per G Tube Daily    atorvastatin  80 mg Per NG tube Daily    clopidogrel  75 mg Per G Tube Daily    heparin (porcine)  5,000 Units Subcutaneous Q8H    [START ON 2/22/2018] insulin detemir U-100  20 Units Subcutaneous BID    insulin detemir U-100  20 Units Subcutaneous Once    lisinopril  20 mg Per NG tube Daily    metoprolol tartrate  25 mg Per NG tube BID    modafinil  200 mg Per NG tube Daily    And    modafinil  100 mg Per NG tube Daily    polyethylene glycol  17 g Per NG tube Daily    senna-docusate 8.6-50 mg  1 tablet Per NG tube BID    sodium chloride 0.9%  3 mL Intravenous Q8H    sodium chloride 3%  4 mL Nebulization Q6H     Continuous Infusions:    PRN Meds:acetaminophen, albuterol sulfate, bisacodyl, dextrose 50%, glucagon (human recombinant), hydrALAZINE, insulin aspart U-100, ipratropium, labetalol, magnesium sulfate IVPB, magnesium sulfate IVPB, ondansetron,  potassium chloride 10%, potassium chloride 10%, sodium chloride 0.9%    Objective:     Vital Signs (Most Recent):  Temp: 98.9 °F (37.2 °C) (02/21/18 1105)  Pulse: 88 (02/21/18 1305)  Resp: (!) 34 (02/21/18 1305)  BP: (!) 140/89 (02/21/18 1305)  SpO2: 100 % (02/21/18 1305)  BP Location: Right arm    Vital Signs Range (Last 24H):  Temp:  [98.5 °F (36.9 °C)-100.1 °F (37.8 °C)]   Pulse:  []   Resp:  [16-34]   BP: ()/(63-94)   SpO2:  [94 %-100 %]   BP Location: Right arm    Physical Exam   Constitutional: He appears well-developed and well-nourished. He appears lethargic. No distress.   HENT:   Head: Normocephalic and atraumatic.   Eyes: Conjunctivae are normal. Pupils are equal, round, and reactive to light.   Cardiovascular: Normal rate.    Pulmonary/Chest: Effort normal. No respiratory distress.   On NC   Musculoskeletal: He exhibits no edema or deformity.   Neurological: He appears lethargic. A cranial nerve deficit is present. No sensory deficit.   Skin: Skin is warm and dry.       Neurologic Exam:  Mental Status:  Obtunded, does not open eyes to voice or follow commands today  Cranial Nerves:  R facial droop. PERRLA. L gaze  Motor: Normal bulk and tone. LUE, LLE move spontaneously. RLE with some withdrawal to noxious stimuli. No movement of RUE.  Sensory: Sensation loss in RUE. Withdraws to pain in remaining extremities      Laboratory:  CMP:     Recent Labs  Lab 02/21/18  0122   CALCIUM 8.8   ALBUMIN 2.1*   PROT 7.3      K 4.0   CO2 27      BUN 20   CREATININE 0.8   ALKPHOS 129   ALT 24   AST 27   BILITOT 0.6     CBC:     Recent Labs  Lab 02/21/18 0122   WBC 10.42   RBC 3.70*   HGB 11.0*   HCT 32.6*   *   MCV 88   MCH 29.7   MCHC 33.7     Lipid Panel: Invalid due to excessively high triglycerides  Coagulation:     Recent Labs  Lab 02/21/18  0122   INR 1.1     Hgb A1C: 10%  TSH: PENDING    Diagnostic Results     Brain Imaging     02/04/18 CT head w/o contrast:  Redemonstration of  large left MCA territory infarction without evidence of hemorrhagic conversion.  Slight interval increase in local mass effect with rightward midline shift of approximately 0.6 cm. Stable postoperative change from left MCA endovascular stenting. Sinus disease as above.       01/26/18 CT head w/o contrast:  No detrimental change.  Evolving subtle left MCA territory infarct.       01/26/18 MRI Brain w/o contrast:  1. Large acute left MCA territory infarction as above.  No associated hemorrhage or significant mass effect at this time.  2. Mild chronic ischemic changes.          Vessel Imaging     01/25/18 CTA Stroke Multiphase:  Short segment occlusion of left M1.  There is good collateral flow in the ischemic territory as detailed above.  Noncontrast images demonstrate subtle changes reflecting left MCA territory acute infarct.       01/25/18 IR Angiogram:  Cerebral angiogram demonstrates occlusion of the M1 segment of the left middle cerebral artery with good collateral flow from pial collaterals.  This was successfully removed and residual stenosis at the site of occlusion was angioplastied and stented with improvement in flow.  Slow flow to the left parietal lobe suggests area of infarcted tissue.  This was felt to represent TICI 2C flow.      Cardiac Imaging     01/26/18 2D Echo w/ CFD:  CONCLUSIONS     1 - Normal left ventricular systolic function (EF 65-70%).     2 - Concentric remodeling.     3 - No wall motion abnormalities.     4 - Normal left ventricular diastolic function.     5 - Normal right ventricular systolic function .    6 - No LA enlargement.      Arlette Hernández PA-C  Comprehensive Stroke Center  Department of Vascular Neurology   [facility]

## 2018-02-22 NOTE — ASSESSMENT & PLAN NOTE
-Stroke risk factor, LDL invalid as TG > 400  -Continue atorvastatin 80 mg qd  -Repeat lipid panel as an outpatient

## 2018-02-22 NOTE — ASSESSMENT & PLAN NOTE
"46 yo M with PMHx HLD, LOYDA, poorly controlled DM II [place] [facility] [place]25[place]18 after noted "strange behavior" for which EMS was called.  Pt was found to have a L M1 occlusion and was taken to IR for thrombectomy with TICI 2C reperfusion and stent placement due to L MCA stenosis. Etiology remains unclear: no obvious signs of cardiac source or atheromatous disease in the aorto/carotid axis. Echo normal with no hx or signs of A fib. PT/OT/SLP will continue to evaluate to make placement recommendations. Should restart DAPT 2/2 recent stent placement in angiogram after PEG placement. Pt on Modafinil.    Sating well on 5L NC. Plans for step down to stroke service.    Antithrombotics for secondary stroke prevention: DAPT (including ) in setting of intracranial stent  Statins: Atorvastatin 80 mg po qd  Aggressive risk factor modification: HLD, DM II (Hgb A1c 10%), Obesity  Rehab efforts: PT/OT/SLP to evaluate and treat, Recommending SNF  Diagnostics ordered/pending: None  VTE prophylaxis: Heparin 5000 U q8h  BP parameters: Infarct: Post sucessful thrombectomy, SBP <140     Will consider evaluating for PFO once pt is more stable and for a hypercoagulable panel as an outpatient.  "

## 2018-02-22 NOTE — ASSESSMENT & PLAN NOTE
-Stroke risk factor, Hgb A1c 10%  -Increased to detemir 3OU BID, SSI (TFs)  Insulin had been held bj-PEG as TFs were held  -Glucose 160s-230s on current regimen. Will titrate up insulin as needed.  -Small vessel disease likely contributing factor of stroke  Need to consult Endocrine on 2/22 for further management recs

## 2018-02-22 NOTE — PLAN OF CARE
Chip [facility] regarding [facility] Quevedo's [facility] plan. Santosh is emailing  [facility] forms that need to be filled out by the patient/wife. Cm awaiting email. Chip spoke to Mrs. Quevedo regarding discharge plan. Chip will continue to follow.

## 2018-02-22 NOTE — ASSESSMENT & PLAN NOTE
-Stroke risk factor, -153/75-92 over past 24 h  -Continue scheduled lisinopril 20 mg qd, metoprolol 25 mg bid

## 2018-02-22 NOTE — ASSESSMENT & PLAN NOTE
"48 yo M with PMHx HLD, LOYDA, poorly controlled DM II presents to [facility] [place] 01/25/18 after noted "strange behavior" for which EMS was called.  Pt was found to have a L M1 occlusion and was taken to IR for thrombectomy with TICI 2C reperfusion and stent placement due to L MCA stenosis. Etiology remains unclear: no obvious signs of cardiac source or atheromatous disease in the aorto/carotid axis. Echo normal with no hx or signs of A fib. PT/OT/SLP will continue to evaluate to make placement recommendations. DAPT 2/2 recent stent placement in angiogram after PEG placement. Pt on modafinil, improved interaction this am.    Antithrombotics for secondary stroke prevention:  mg po qd, clopidogrel 75 mg po qd [intracranial stent]  Statins: Atorvastatin 80 mg po qd  Aggressive risk factor modification: HLD, DM II (Hgb A1c 10%), Obesity  Rehab efforts: PT/OT/SLP--Recommending SNF.  Pt denied due to insurance.  Diagnostics ordered/pending: None  VTE prophylaxis: Heparin 5000 U q8h  BP parameters: Infarct: Post sucessful thrombectomy, SBP <140   "

## 2018-02-22 NOTE — ASSESSMENT & PLAN NOTE
-Stroke risk factor  LDL invalid as TG > 400  -Continue atorvastatin 80 mg qd  Repeat lipid panel as an outpatient

## 2018-02-22 NOTE — SUBJECTIVE & OBJECTIVE
Neurologic Chief Complaint: L MCA Stroke    Subjective:     Interval History: Patient is seen for follow-up neurological assessment and treatment recommendations:   Patient is globally aphasic with family at bedside this am.  More awake than when seen on previous exams.  Discussed issues with [facility] causing patient to be denied from SNF.  Family member at bedside notes that patient is going to be home with his wife and will have a daughter and son-in-law moving back in.      HPI, Past Medical, Family, and Social History remains the same as documented in the initial encounter.     Review of Systems   Constitutional: Negative for chills and fever.   Eyes: Negative for visual disturbance.   Respiratory: Negative for cough.    Skin: Negative for rash and wound.   Neurological: Positive for speech difficulty. Negative for weakness and numbness.   Psychiatric/Behavioral: Positive for confusion and decreased concentration.     Scheduled Meds:   albuterol sulfate  2.5 mg Nebulization Q6H    aspirin  325 mg Per G Tube Daily    atorvastatin  80 mg Per NG tube Daily    clopidogrel  75 mg Per G Tube Daily    heparin (porcine)  5,000 Units Subcutaneous Q8H    insulin detemir U-100  32 Units Subcutaneous BID    ipratropium  0.5 mg Nebulization Q6H    lisinopril  20 mg Per NG tube Daily    metoprolol tartrate  25 mg Per NG tube BID    modafinil  200 mg Per NG tube Daily    And    modafinil  100 mg Per NG tube Daily    polyethylene glycol  17 g Per NG tube Daily    senna-docusate 8.6-50 mg  1 tablet Per NG tube BID    sodium chloride 0.9%  3 mL Intravenous Q8H    sodium chloride 3%  4 mL Nebulization Q6H     Continuous Infusions:  PRN Meds:acetaminophen, bisacodyl, dextrose 50%, glucagon (human recombinant), insulin aspart U-100, ipratropium, labetalol, ondansetron    Objective:     Vital Signs (Most Recent):  Temp: 99.8 °F (37.7 °C) (02/22/18 1158)  Pulse: 90 (02/22/18 1158)  Resp: 18 (02/22/18 1158)  BP: (!)  148/90 (02/22/18 1158)  SpO2: 97 % (02/22/18 1158)  BP Location: Right arm    Vital Signs Range (Last 24H):  Temp:  [97.6 °F (36.4 °C)-99.8 °F (37.7 °C)]   Pulse:  [75-98]   Resp:  [16-24]   BP: (119-153)/(75-92)   SpO2:  [93 %-98 %]   BP Location: Right arm    Physical Exam  General:  Well-developed, well-nourished, not opening eyes to voice/tactile stimuli--opens eyes to noxious stimuli, moving LUE/LLE intermittently  HEENT:  NCAT, PERRLA, EOMI with tracking, oropharyngeal membranes non-erythematous/without exudate  Neck:  Supple, normal ROM without nuchal rigidity  Resp:  Symmetric expansion, no increased wob  CVS:  No LE edema, peripheral pulses 2+ (radial, dorsalis pedis)  GI:  Abd soft, non-distended, non-tender to palpation  Neurologic Exam:  Mental Status:  Somnolent, opens eyes to voice (improved from my previous exam).  Does not follow commands well, intermittently mimicking commands.  Attempts to respond with gesture occasionally.  Cranial Nerves:  Face appears symmetric.  PERRLA, EOMI with tracking.  Tongue protrudes midline.  Motor:  Normal bulk and tone. LUE, LLE moving spontaneously. RLE with some withdrawal to noxious stimuli.  No movement of RUE.  Sensory:   Withdrawal to noxious stimuli at LUE and BLE.  No withdrawal RUE.  Reflexes:  Biceps, brachioradialis, patellar 2+ and symmetric.  No ankle clonus.  Downgoing toe bilaterally.  Coordination:  Difficulty following JESSICA.  No resting tremor or myoclonus.    Gait:  Deferred 2/2 fall precautions, AMS    Laboratory:  CMP:     Recent Labs  Lab 02/22/18  0548   CALCIUM 9.3   ALBUMIN 2.2*   PROT 7.6      K 4.2   CO2 27      BUN 20   CREATININE 0.8   ALKPHOS 140*   ALT 28   AST 30   BILITOT 0.5     CBC:     Recent Labs  Lab 02/22/18  0548   WBC 8.21   RBC 4.24*   HGB 12.1*   HCT 36.6*   *   MCV 86   MCH 28.5   MCHC 33.1     Lipid Panel: Invalid due to excessively high triglycerides  Coagulation:     Recent Labs  Lab 02/22/18  0548   INR  1.0     Hgb A1C: 10%  TSH: PENDING    Diagnostic Results     Brain Imaging   01/26/18 CT head w/o contrast:  No detrimental change.  Evolving subtle left MCA territory infarct.       01/26/18 MRI Brain w/o contrast:  1. Large acute left MCA territory infarction as above.  No associated hemorrhage or significant mass effect at this time.  2. Mild chronic ischemic changes.        02/04/18 CT head w/o contrast:  Redemonstration of large left MCA territory infarction without evidence of hemorrhagic conversion.  Slight interval increase in local mass effect with rightward midline shift of approximately 0.6 cm. Stable postoperative change from left MCA endovascular stenting. Sinus disease as above.       Vessel Imaging   01/25/18 CTA Stroke Multiphase:  Short segment occlusion of left M1.  There is good collateral flow in the ischemic territory as detailed above.  Noncontrast images demonstrate subtle changes reflecting left MCA territory acute infarct.       01/25/18 IR Angiogram:  Cerebral angiogram demonstrates occlusion of the M1 segment of the left middle cerebral artery with good collateral flow from pial collaterals.  This was successfully removed and residual stenosis at the site of occlusion was angioplastied and stented with improvement in flow.  Slow flow to the left parietal lobe suggests area of infarcted tissue.  This was felt to represent TICI 2C flow.    Cardiac Imaging   01/26/18 2D Echo w/ CFD:  CONCLUSIONS     1 - Normal left ventricular systolic function (EF 65-70%).     2 - Concentric remodeling.     3 - No wall motion abnormalities.     4 - Normal left ventricular diastolic function.     5 - Normal right ventricular systolic function .    6 - No LA enlargement.

## 2018-02-22 NOTE — PLAN OF CARE
Problem: Patient Care Overview  Goal: Plan of Care Review  Outcome: Ongoing (interventions implemented as appropriate)  POC reviewed with pt and family. Pt's family verbalized understanding as pt is nonverbal at this time. Questions and concerns addressed. No acute events. Pt progressing toward goals. Will continue to monitor. See flowsheets for full assessment and VS info

## 2018-02-22 NOTE — ASSESSMENT & PLAN NOTE
-Stroke risk factor, Hgb A1c 10%  --248 mg/dL over past 24 h  -Continue Diabetasource AC TFs  -Will increase detemir to 32 U bid, titrate up as needed

## 2018-02-23 LAB
ALBUMIN SERPL BCP-MCNC: 2.3 G/DL
ALP SERPL-CCNC: 140 U/L
ALT SERPL W/O P-5'-P-CCNC: 33 U/L
ANION GAP SERPL CALC-SCNC: 9 MMOL/L
AST SERPL-CCNC: 37 U/L
BASOPHILS # BLD AUTO: 0.03 K/UL
BASOPHILS NFR BLD: 0.3 %
BILIRUB SERPL-MCNC: 0.5 MG/DL
BUN SERPL-MCNC: 20 MG/DL
CALCIUM SERPL-MCNC: 9.5 MG/DL
CHLORIDE SERPL-SCNC: 99 MMOL/L
CO2 SERPL-SCNC: 30 MMOL/L
CREAT SERPL-MCNC: 0.8 MG/DL
DIFFERENTIAL METHOD: ABNORMAL
EOSINOPHIL # BLD AUTO: 0.2 K/UL
EOSINOPHIL NFR BLD: 1.6 %
ERYTHROCYTE [DISTWIDTH] IN BLOOD BY AUTOMATED COUNT: 12.1 %
EST. GFR  (AFRICAN AMERICAN): >60 ML/MIN/1.73 M^2
EST. GFR  (NON AFRICAN AMERICAN): >60 ML/MIN/1.73 M^2
GLUCOSE SERPL-MCNC: 246 MG/DL
HCT VFR BLD AUTO: 38.7 %
HGB BLD-MCNC: 12.9 G/DL
IMM GRANULOCYTES # BLD AUTO: 0.05 K/UL
IMM GRANULOCYTES NFR BLD AUTO: 0.5 %
INR PPP: 1
LYMPHOCYTES # BLD AUTO: 1.7 K/UL
LYMPHOCYTES NFR BLD: 17.2 %
MAGNESIUM SERPL-MCNC: 1.7 MG/DL
MCH RBC QN AUTO: 29.1 PG
MCHC RBC AUTO-ENTMCNC: 33.3 G/DL
MCV RBC AUTO: 87 FL
MONOCYTES # BLD AUTO: 0.9 K/UL
MONOCYTES NFR BLD: 8.5 %
NEUTROPHILS # BLD AUTO: 7.3 K/UL
NEUTROPHILS NFR BLD: 71.9 %
NRBC BLD-RTO: 0 /100 WBC
PHOSPHATE SERPL-MCNC: 3.4 MG/DL
PLATELET # BLD AUTO: 379 K/UL
PMV BLD AUTO: 10.4 FL
POCT GLUCOSE: 214 MG/DL (ref 70–110)
POCT GLUCOSE: 222 MG/DL (ref 70–110)
POCT GLUCOSE: 235 MG/DL (ref 70–110)
POCT GLUCOSE: 241 MG/DL (ref 70–110)
POTASSIUM SERPL-SCNC: 4.1 MMOL/L
PROT SERPL-MCNC: 7.7 G/DL
PROTHROMBIN TIME: 10.4 SEC
RBC # BLD AUTO: 4.44 M/UL
SODIUM SERPL-SCNC: 138 MMOL/L
WBC # BLD AUTO: 10.08 K/UL

## 2018-02-23 PROCEDURE — 25000242 PHARM REV CODE 250 ALT 637 W/ HCPCS: Performed by: PSYCHIATRY & NEUROLOGY

## 2018-02-23 PROCEDURE — 85610 PROTHROMBIN TIME: CPT

## 2018-02-23 PROCEDURE — 99233 SBSQ HOSP IP/OBS HIGH 50: CPT | Mod: ,,, | Performed by: PSYCHIATRY & NEUROLOGY

## 2018-02-23 PROCEDURE — A4216 STERILE WATER/SALINE, 10 ML: HCPCS | Performed by: NURSE PRACTITIONER

## 2018-02-23 PROCEDURE — 25000003 PHARM REV CODE 250: Performed by: PHYSICIAN ASSISTANT

## 2018-02-23 PROCEDURE — 83735 ASSAY OF MAGNESIUM: CPT

## 2018-02-23 PROCEDURE — 25000003 PHARM REV CODE 250: Performed by: NURSE PRACTITIONER

## 2018-02-23 PROCEDURE — 94640 AIRWAY INHALATION TREATMENT: CPT

## 2018-02-23 PROCEDURE — 80053 COMPREHEN METABOLIC PANEL: CPT

## 2018-02-23 PROCEDURE — 20600001 HC STEP DOWN PRIVATE ROOM

## 2018-02-23 PROCEDURE — 36415 COLL VENOUS BLD VENIPUNCTURE: CPT

## 2018-02-23 PROCEDURE — 85025 COMPLETE CBC W/AUTO DIFF WBC: CPT

## 2018-02-23 PROCEDURE — 84100 ASSAY OF PHOSPHORUS: CPT

## 2018-02-23 PROCEDURE — 94761 N-INVAS EAR/PLS OXIMETRY MLT: CPT

## 2018-02-23 PROCEDURE — 92526 ORAL FUNCTION THERAPY: CPT

## 2018-02-23 PROCEDURE — 25000242 PHARM REV CODE 250 ALT 637 W/ HCPCS: Performed by: STUDENT IN AN ORGANIZED HEALTH CARE EDUCATION/TRAINING PROGRAM

## 2018-02-23 PROCEDURE — 27000221 HC OXYGEN, UP TO 24 HOURS

## 2018-02-23 PROCEDURE — 92507 TX SP LANG VOICE COMM INDIV: CPT

## 2018-02-23 PROCEDURE — 25000003 PHARM REV CODE 250: Performed by: PSYCHIATRY & NEUROLOGY

## 2018-02-23 PROCEDURE — 63600175 PHARM REV CODE 636 W HCPCS: Performed by: NURSE PRACTITIONER

## 2018-02-23 RX ADMIN — Medication 3 ML: at 06:02

## 2018-02-23 RX ADMIN — INSULIN ASPART 4 UNITS: 100 INJECTION, SOLUTION INTRAVENOUS; SUBCUTANEOUS at 04:02

## 2018-02-23 RX ADMIN — SODIUM CHLORIDE SOLN NEBU 3% 4 ML: 3 NEBU SOLN at 08:02

## 2018-02-23 RX ADMIN — INSULIN ASPART 2 UNITS: 100 INJECTION, SOLUTION INTRAVENOUS; SUBCUTANEOUS at 06:02

## 2018-02-23 RX ADMIN — ALBUTEROL SULFATE 2.5 MG: 2.5 SOLUTION RESPIRATORY (INHALATION) at 07:02

## 2018-02-23 RX ADMIN — ALBUTEROL SULFATE 2.5 MG: 2.5 SOLUTION RESPIRATORY (INHALATION) at 01:02

## 2018-02-23 RX ADMIN — Medication 3 ML: at 09:02

## 2018-02-23 RX ADMIN — ATORVASTATIN CALCIUM 80 MG: 20 TABLET, FILM COATED ORAL at 08:02

## 2018-02-23 RX ADMIN — HEPARIN SODIUM 5000 UNITS: 5000 INJECTION, SOLUTION INTRAVENOUS; SUBCUTANEOUS at 09:02

## 2018-02-23 RX ADMIN — IPRATROPIUM BROMIDE 0.5 MG: 0.5 SOLUTION RESPIRATORY (INHALATION) at 12:02

## 2018-02-23 RX ADMIN — HEPARIN SODIUM 5000 UNITS: 5000 INJECTION, SOLUTION INTRAVENOUS; SUBCUTANEOUS at 01:02

## 2018-02-23 RX ADMIN — STANDARDIZED SENNA CONCENTRATE AND DOCUSATE SODIUM 1 TABLET: 8.6; 5 TABLET, FILM COATED ORAL at 09:02

## 2018-02-23 RX ADMIN — INSULIN ASPART 4 UNITS: 100 INJECTION, SOLUTION INTRAVENOUS; SUBCUTANEOUS at 07:02

## 2018-02-23 RX ADMIN — HEPARIN SODIUM 5000 UNITS: 5000 INJECTION, SOLUTION INTRAVENOUS; SUBCUTANEOUS at 06:02

## 2018-02-23 RX ADMIN — STANDARDIZED SENNA CONCENTRATE AND DOCUSATE SODIUM 1 TABLET: 8.6; 5 TABLET, FILM COATED ORAL at 08:02

## 2018-02-23 RX ADMIN — SODIUM CHLORIDE SOLN NEBU 3% 4 ML: 3 NEBU SOLN at 01:02

## 2018-02-23 RX ADMIN — POLYETHYLENE GLYCOL 3350 17 G: 17 POWDER, FOR SOLUTION ORAL at 08:02

## 2018-02-23 RX ADMIN — IPRATROPIUM BROMIDE 0.5 MG: 0.5 SOLUTION RESPIRATORY (INHALATION) at 08:02

## 2018-02-23 RX ADMIN — ASPIRIN 325 MG ORAL TABLET 325 MG: 325 PILL ORAL at 08:02

## 2018-02-23 RX ADMIN — Medication 3 ML: at 02:02

## 2018-02-23 RX ADMIN — ALBUTEROL SULFATE 2.5 MG: 2.5 SOLUTION RESPIRATORY (INHALATION) at 08:02

## 2018-02-23 RX ADMIN — IPRATROPIUM BROMIDE 0.5 MG: 0.5 SOLUTION RESPIRATORY (INHALATION) at 01:02

## 2018-02-23 RX ADMIN — METOPROLOL TARTRATE 25 MG: 25 TABLET ORAL at 08:02

## 2018-02-23 RX ADMIN — METOPROLOL TARTRATE 25 MG: 25 TABLET ORAL at 09:02

## 2018-02-23 RX ADMIN — MODAFINIL 100 MG: 100 TABLET ORAL at 01:02

## 2018-02-23 RX ADMIN — ALBUTEROL SULFATE 2.5 MG: 2.5 SOLUTION RESPIRATORY (INHALATION) at 12:02

## 2018-02-23 RX ADMIN — SODIUM CHLORIDE SOLN NEBU 3% 4 ML: 3 NEBU SOLN at 12:02

## 2018-02-23 RX ADMIN — LISINOPRIL 20 MG: 20 TABLET ORAL at 08:02

## 2018-02-23 RX ADMIN — CLOPIDOGREL BISULFATE 75 MG: 75 TABLET, FILM COATED ORAL at 08:02

## 2018-02-23 RX ADMIN — IPRATROPIUM BROMIDE 0.5 MG: 0.5 SOLUTION RESPIRATORY (INHALATION) at 07:02

## 2018-02-23 RX ADMIN — INSULIN ASPART 4 UNITS: 100 INJECTION, SOLUTION INTRAVENOUS; SUBCUTANEOUS at 12:02

## 2018-02-23 RX ADMIN — ACETAMINOPHEN 650 MG: 325 TABLET, FILM COATED ORAL at 09:02

## 2018-02-23 RX ADMIN — MODAFINIL 200 MG: 100 TABLET ORAL at 06:02

## 2018-02-23 RX ADMIN — SODIUM CHLORIDE SOLN NEBU 3% 4 ML: 3 NEBU SOLN at 07:02

## 2018-02-23 NOTE — ASSESSMENT & PLAN NOTE
-Stroke risk factor, Hgb A1c 10%  --246 mg/dL over past 24 h  -Continue Diabetasource AC TFs  -Increased detemir to 36 U bid 02/23/18   -Titrate up as needed for goal -140 mg/dL

## 2018-02-23 NOTE — PROGRESS NOTES
"[facility]  Vascular Neurology  Comprehensive Stroke Center  Progress Note    Assessment/Plan:     * Embolic stroke involving left middle cerebral artery    48 yo M with PMHx HLD, LOYDA, poorly controlled DM II [place] [facility] [place] noted "strange behavior" for which EMS was called.  Pt was found to have a L M1 occlusion and was taken to IR for thrombectomy with TICI 2C reperfusion and stent placement due to L MCA stenosis. Etiology remains unclear: no obvious signs of cardiac source or atheromatous disease in the aorto/carotid axis. Echo normal with no hx or signs of A fib. PT/OT/SLP will continue to evaluate to make placement recommendations. DAPT 2/2 recent stent placement in angiogram after PEG placement. Pt on modafinil, improved interaction this am.    Antithrombotics:  mg po qd, clopidogrel 75 mg po qd [intracranial stent]  Statins: Atorvastatin 80 mg po qd  Aggressive risk factor modification: HLD, DM II (Hgb A1c 10%), Obesity  Rehab efforts: PT/OT/SLP--Recommending SNF, placement pending  Diagnostics ordered/pending: None  VTE prophylaxis: Heparin 5000 U q8h  BP parameters: Infarct: Post sucessful thrombectomy, SBP <140       Cytotoxic cerebral edema    -2/2 stroke, evident on imaging  -Stable on repeat CT head imaging      Type 2 diabetes mellitus    -Stroke risk factor, Hgb A1c 10%  --246 mg/dL over past 24 h  -Continue Diabetasource AC TFs  -Increased detemir to 36 U bid 02/23/18   -Titrate up as needed for goal -140 mg/dL      Hyperlipidemia    -Stroke risk factor, LDL invalid as TG > 400  -Continue atorvastatin 80 mg qd  -Repeat lipid panel as an outpatient      Essential hypertension    -Stroke risk factor, -140/70-91 over past 24 h  -Continue scheduled lisinopril 20 mg qd, metoprolol 25 mg bid      Obstructive sleep apnea    -Stroke risk factor  -Recommend CPAP qHS      Right spastic hemiparesis    -Due to stroke  -Continue aggressive PT/OT    "   Acute respiratory failure with hypoxia    -Satting 97% on 3 L O2 NC, will wean as tolerated      01/25/18:  Brought in for aphasia, RSW with L gaze preferance. LKN unknown, not tPA candidate. Went to IR for angiogram and stent.  01/29/18:  Off Cardene, remains on Insulin gtt. Concern for sepsis, respiratory source. Imaging with mass effect and some brain compression; maycol crani watch.  01/30/18:  EEG consistent with focal L slowing 2/2 lesion and mild generalized slowing, no seizures. CT head stable, no hemorrhagic conversion  02/01/18:  Emergent intubation likely due to upper airway obstruction per NCC.    02/02/18:  Pt off Precedex, moving left side spontaneously and opening eyes. Intubated, on spontaneous today. NCC giving steroids in hopes to extubate tomorrow.  02/03/18:  NAEON, intubated, not responsive to verbal stimuli, withdraws from pain on LUE, SBP ~135-230. Continued on insulin gtt, SQH for DVT ppx, and captopril.   02/16/18:  Few changes on exam, continues to have significant RUE/RLE weakness with global aphasia.  Family member at bedside noted attempt to communicate with her.  02/18/18:  Pt largely unchanged on exam this am.  No family member present during am rounds.  02/19/18:  Tolerating facemask. PEG by IR this afternoon.  02/20/18:  s/p PEG. O2 % on 5L NC, still requiring frequent suctioning. Primary team considering transfer to Medicine tomorrow.  02/21/18:  Pt sating % on 3L NC. Restarted DAPT, including . Plans for step down to stroke service.  02/22/18:  Pt with [facility] insurance but not service-connected so unable to be placed at SNF.  Working on insurance coverage of at least C therapy.  02/23/18:  Increased detemir to 36 U bid.  Placement with [facility] SNF pending completion of paperwork by Stroke team and pt's spouse--in process.    STROKE DOCUMENTATION   Acute Stroke Times   Stroke Team Called Date: 01/25/18  Stroke Team Called Time: 1852  Stroke Team Arrival Date:  01/25/18  Stroke Team Arrival Time: 0652  CT Interpretation Time: 1910  Decision to Treat Time for Alteplase:  (n/a unknown last known normal )  Decision to Treat Time for IR: 1915    NIH Scale:  1a. Level Of Consciousness: 1-->Not alert: but arousable by minor stimulation to obey, answer, or respond  1b. LOC Questions: 2-->Answers neither question correctly  1c. LOC Commands: 2-->Performs neither task correctly  2. Best Gaze: 1-->Partial gaze palsy: gaze is abnormal in one or both eyes, but forced deviation or total gaze paresis is not present  3. Visual: 1-->Partial hemianopia  4. Facial Palsy: 1-->Minor paralysis (flattened nasolabial fold, asymmetry on smiling)  5a. Motor Arm, Left: 1-->Drift: limb holds 90 (or 45) degrees, but drifts down before full 10 seconds: does not hit bed or other support  5b. Motor Arm, Right: 4-->No movement  6a. Motor Leg, Left: 2-->Some effort against gravity: leg falls to bed by 5 secs, but has some effort against gravity  6b. Motor Leg, Right: 3-->No effort against gravity: leg falls to bed immediately  7. Limb Ataxia: 0-->Absent  8. Sensory: 0-->Normal: no sensory loss  9. Best Language: 3-->Mute, global aphasia: no usable speech or auditory comprehension  10. Dysarthria: 2-->Severe dysarthria: patients speech is so slurred as to be unintelligible in the absence of or out of proportion to any dysphasia, or is mute/anarthric  11. Extinction and Inattention (formerly Neglect): 0-->No abnormality  Total (NIH Stroke Scale): 23     Modified Prosperity Score: 0  Jeff Coma Scale:10   ABCD2 Score:    [id number] Score:   HAS -BLED Score:   ICH Score:   Hunt & Laguerre Classification:      Hemorrhagic change of an Ischemic Stroke: Does this patient have an ischemic stroke with hemorrhagic changes? No     Neurologic Chief Complaint: L MCA Stroke    Subjective:     Interval History: Patient is seen for follow-up neurological assessment and treatment recommendations:   Patient is more tearful  than when seen previously.  Family member at bedside states he has been intermittently tearful and more when they are talking about having to get him to SNF and talking about deficits, needing therapy, etc.  Wife will be here this afternoon, CM had touched base with her.  [facility]work for [facility] to be [facility] out before Monday so we can submit for placement.     HPI, Past Medical, Family, and Social History remains the same as documented in the initial encounter.     Review of Systems   Constitutional: Negative for chills and fever.   Eyes: Negative for visual disturbance.   Respiratory: Negative for cough.    Skin: Negative for rash and wound.   Neurological: Positive for speech difficulty. Negative for weakness and numbness.   Psychiatric/Behavioral: Positive for confusion and decreased concentration.     Scheduled Meds:   albuterol sulfate  2.5 mg Nebulization Q6H    aspirin  325 mg Per G Tube Daily    atorvastatin  80 mg Per NG tube Daily    clopidogrel  75 mg Per G Tube Daily    heparin (porcine)  5,000 Units Subcutaneous Q8H    insulin detemir U-100  36 Units Subcutaneous BID    ipratropium  0.5 mg Nebulization Q6H    lisinopril  20 mg Per NG tube Daily    metoprolol tartrate  25 mg Per NG tube BID    modafinil  200 mg Per NG tube Daily    And    modafinil  100 mg Per NG tube Daily    polyethylene glycol  17 g Per NG tube Daily    senna-docusate 8.6-50 mg  1 tablet Per NG tube BID    sodium chloride 0.9%  3 mL Intravenous Q8H    sodium chloride 3%  4 mL Nebulization Q6H     Continuous Infusions:  PRN Meds:acetaminophen, bisacodyl, dextrose 50%, glucagon (human recombinant), insulin aspart U-100, ipratropium, labetalol, ondansetron    Objective:     Vital Signs (Most Recent):  Temp: 97 °F (36.1 °C) (02/23/18 1208)  Pulse: 99 (02/23/18 1500)  Resp: 20 (02/23/18 1315)  BP: 132/70 (02/23/18 1208)  SpO2: 97 % (02/23/18 1315)  BP Location: Right arm    Vital Signs Range (Last 24H):  Temp:  [97 °F (36.1  °C)-99.1 °F (37.3 °C)]   Pulse:  []   Resp:  [18-22]   BP: (132-140)/(70-91)   SpO2:  [89 %-97 %]   BP Location: Right arm    Physical Exam  General:  Well-developed, well-nourished, not opening eyes to voice/tactile stimuli--opens eyes to noxious stimuli, moving LUE/LLE intermittently  HEENT:  NCAT, PERRLA, EOMI with tracking, oropharyngeal membranes non-erythematous/without exudate  Neck:  Supple, normal ROM without nuchal rigidity  Resp:  Symmetric expansion, no increased wob  CVS:  No LE edema, peripheral pulses 2+ (radial, dorsalis pedis)  GI:  Abd soft, non-distended, non-tender to palpation  Neurologic Exam:  Mental Status:  Somnolent, opens eyes to voice. Does not follow commands well, intermittently mimicking commands. No attempts to use gestures to communicate this am, tearful.  Cranial Nerves:  Face appears symmetric.  PERRLA, EOMI with tracking.  Tongue protrudes midline.  Motor:  Normal bulk and tone. LUE, LLE moving spontaneously. RLE with some withdrawal to noxious stimuli, non-stereotyped.  No movement RUE.  Sensory:   Withdrawal to noxious stimuli at LUE and BLE.  No withdrawal or grimace to noxious stimuli at RUE.  Reflexes:  Biceps, brachioradialis, patellar 2+ and symmetric.  No ankle clonus.  Downgoing toe bilaterally.  Coordination:  Difficulty following JESSICA.  No resting tremor or myoclonus.    Gait:  Deferred 2/2 fall precautions, AMS    Laboratory:  CMP:     Recent Labs  Lab 02/23/18  0540   CALCIUM 9.5   ALBUMIN 2.3*   PROT 7.7      K 4.1   CO2 30*   CL 99   BUN 20   CREATININE 0.8   ALKPHOS 140*   ALT 33   AST 37   BILITOT 0.5     CBC:     Recent Labs  Lab 02/23/18  0540   WBC 10.08   RBC 4.44*   HGB 12.9*   HCT 38.7*   *   MCV 87   MCH 29.1   MCHC 33.3     Lipid Panel: Invalid due to excessively high triglycerides  Coagulation:     Recent Labs  Lab 02/23/18  0540   INR 1.0     Hgb A1C: 10%  TSH: PENDING    Diagnostic Results     Brain Imaging   01/26/18 CT head w/o  contrast:  No detrimental change.  Evolving subtle left MCA territory infarct.       01/26/18 MRI Brain w/o contrast:  1. Large acute left MCA territory infarction as above.  No associated hemorrhage or significant mass effect at this time.  2. Mild chronic ischemic changes.        02/04/18 CT head w/o contrast:  Redemonstration of large left MCA territory infarction without evidence of hemorrhagic conversion.  Slight interval increase in local mass effect with rightward midline shift of approximately 0.6 cm. Stable postoperative change from left MCA endovascular stenting. Sinus disease as above.       Vessel Imaging   01/25/18 CTA Stroke Multiphase:  Short segment occlusion of left M1.  There is good collateral flow in the ischemic territory as detailed above.  Noncontrast images demonstrate subtle changes reflecting left MCA territory acute infarct.       01/25/18 IR Angiogram:  Cerebral angiogram demonstrates occlusion of the M1 segment of the left middle cerebral artery with good collateral flow from pial collaterals.  This was successfully removed and residual stenosis at the site of occlusion was angioplastied and stented with improvement in flow.  Slow flow to the left parietal lobe suggests area of infarcted tissue.  This was felt to represent TICI 2C flow.    Cardiac Imaging   01/26/18 2D Echo w/ CFD:  CONCLUSIONS     1 - Normal left ventricular systolic function (EF 65-70%).     2 - Concentric remodeling.     3 - No wall motion abnormalities.     4 - Normal left ventricular diastolic function.     5 - Normal right ventricular systolic function .    6 - No LA enlargement.    Radha Fernandes MD  Comprehensive Stroke Center  Department of Vascular Neurology   [facility]

## 2018-02-23 NOTE — ASSESSMENT & PLAN NOTE
"48 yo M with PMHx HLD, LOYDA, poorly controlled DM II presents to [facility] [place] 01/25/18 after noted "strange behavior" for which EMS was called.  Pt was found to have a L M1 occlusion and was taken to IR for thrombectomy with TICI 2C reperfusion and stent placement due to L MCA stenosis. Etiology remains unclear: no obvious signs of cardiac source or atheromatous disease in the aorto/carotid axis. Echo normal with no hx or signs of A fib. PT/OT/SLP will continue to evaluate to make placement recommendations. DAPT 2/2 recent stent placement in angiogram after PEG placement. Pt on modafinil, improved interaction this am.    Antithrombotics:  mg po qd, clopidogrel 75 mg po qd [intracranial stent]  Statins: Atorvastatin 80 mg po qd  Aggressive risk factor modification: HLD, DM II (Hgb A1c 10%), Obesity  Rehab efforts: PT/OT/SLP--Recommending SNF, placement pending  Diagnostics ordered/pending: None  VTE prophylaxis: Heparin 5000 U q8h  BP parameters: Infarct: Post sucessful thrombectomy, SBP <140   "

## 2018-02-23 NOTE — PT/OT/SLP PROGRESS
"Speech Language Pathology Treatment    Patient Name:  Todd Quevedo   MRN:  [id number]  Admitting Diagnosis: Embolic stroke involving left middle cerebral artery    Recommendations:                 General Recommendations:  Dysphagia therapy, Speech/language therapy and Cognitive-linguistic therapy  Diet recommendations:  NPO, NPO   Aspiration Precautions: Frequent oral care and Strict aspiration precautions   General Precautions: Standard, aspiration, aphasia  Communication strategies:  yes/no questions only and go to room if call light pushed    Subjective     "Honestly, does he look any better?" pt's sister    Pain/Comfort:  · Pain Rating 1: 0/10  · Pain Rating Post-Intervention 1: 0/10    Objective:     Has the patient been evaluated by SLP for swallowing?   Yes  Keep patient NPO? Yes   Current Respiratory Status: nasal cannula      Pt seen bedside with sister present.  Pt readily opened eyes given min stim with improved eye contact noted today.  He was able to localize to SLP at midline x1 and on L x1 and R x3.  Tracking from R to midline observed x1.  No vocalizations/mouthing elicited in response to any stim.  Pt reflexively squeezing SLP's hand when grabbed.  He raised his hand in response to command and model though appeared coincidental.  Sister in agreement.  No response to y/n questions.  Although pt remains with limited functional responses to stim, he did appear more engaged in his environment with improved attn to conversational partner.  Gentle oral care provided with some red blood noted with yaunker suction in buccal cavities.  Sister reports this not new.  No visible source of bleeding observed.  Ice chip presented x1.  No oral manipulation of trial noted.  Significantly delayed pharyngeal swallow initiated.  Short period of increased RR/WOB noted with wet breath sounds following trial.  Resolved after less than 1 min.  No further trials attempted 2/2 high aspiration risk.  Sister with questions re: " progress.  All questions answered within SLP's scope.  Pt nodded to cry x2 during discussion with sister.  Encouragement provided to both sister and pt.  No further questions.  White board updated.        Assessment:     Todd Quevedo is a 47 y.o. male with an SLP diagnosis of Aphasia, Dysphagia and Cognitive-Linguistic Impairment.      Goals:    SLP Goals        Problem: SLP Goal    Goal Priority Disciplines Outcome   SLP Goal     SLP Ongoing (interventions implemented as appropriate)   Description:  Speech Language Pathology Goals  Goals expected to be met by 3/2  1. Pt will participate in ongoing bedside assessment of swallow to determine least restrictive diet.  2. Pt will open eyes to stim x5/session given max cues.  3. Pt will follow simple commands x2/session given max cues.  4. Pt will answer basic y/n question via any modality x2/session given max cues.  5. Pt will vocalize in response to any stim x2/session given max cues.                           Plan:     · Patient to be seen:  2 x/week   · Plan of Care expires:  03/18/18  · Plan of Care reviewed with:  patient, sibling   · SLP Follow-Up:  Yes       Discharge recommendations:  nursing facility, basic   Barriers to Discharge:  Level of Skilled Assistance Needed      Time Tracking:     SLP Treatment Date:   02/23/18  Speech Start Time:  1102  Speech Stop Time:  1122     Speech Total Time (min):  20 min    Billable Minutes: Speech Therapy Individual 12 and Treatment Swallowing Dysfunction 8    EDWARD Key, CCC-SLP  02/23/2018

## 2018-02-23 NOTE — SUBJECTIVE & OBJECTIVE
Neurologic Chief Complaint: L MCA Stroke    Subjective:     Interval History: Patient is seen for follow-up neurological assessment and treatment recommendations:   Patient is more tearful than when seen previously.  Family member at bedside states he has been intermittently tearful and more when they are talking about having to get him to SNF and talking about deficits, needing therapy, etc.  Wife will be here this afternoon, CM had touched base with her.  [facility] for [facility] to [facility] before Monday so we can submit for placement.     HPI, Past Medical, Family, and Social History remains the same as documented in the initial encounter.     Review of Systems   Constitutional: Negative for chills and fever.   Eyes: Negative for visual disturbance.   Respiratory: Negative for cough.    Skin: Negative for rash and wound.   Neurological: Positive for speech difficulty. Negative for weakness and numbness.   Psychiatric/Behavioral: Positive for confusion and decreased concentration.     Scheduled Meds:   albuterol sulfate  2.5 mg Nebulization Q6H    aspirin  325 mg Per G Tube Daily    atorvastatin  80 mg Per NG tube Daily    clopidogrel  75 mg Per G Tube Daily    heparin (porcine)  5,000 Units Subcutaneous Q8H    insulin detemir U-100  36 Units Subcutaneous BID    ipratropium  0.5 mg Nebulization Q6H    lisinopril  20 mg Per NG tube Daily    metoprolol tartrate  25 mg Per NG tube BID    modafinil  200 mg Per NG tube Daily    And    modafinil  100 mg Per NG tube Daily    polyethylene glycol  17 g Per NG tube Daily    senna-docusate 8.6-50 mg  1 tablet Per NG tube BID    sodium chloride 0.9%  3 mL Intravenous Q8H    sodium chloride 3%  4 mL Nebulization Q6H     Continuous Infusions:  PRN Meds:acetaminophen, bisacodyl, dextrose 50%, glucagon (human recombinant), insulin aspart U-100, ipratropium, labetalol, ondansetron    Objective:     Vital Signs (Most Recent):  Temp: 97 °F (36.1 °C) (02/23/18  1208)  Pulse: 99 (02/23/18 1500)  Resp: 20 (02/23/18 1315)  BP: 132/70 (02/23/18 1208)  SpO2: 97 % (02/23/18 1315)  BP Location: Right arm    Vital Signs Range (Last 24H):  Temp:  [97 °F (36.1 °C)-99.1 °F (37.3 °C)]   Pulse:  []   Resp:  [18-22]   BP: (132-140)/(70-91)   SpO2:  [89 %-97 %]   BP Location: Right arm    Physical Exam  General:  Well-developed, well-nourished, not opening eyes to voice/tactile stimuli--opens eyes to noxious stimuli, moving LUE/LLE intermittently  HEENT:  NCAT, PERRLA, EOMI with tracking, oropharyngeal membranes non-erythematous/without exudate  Neck:  Supple, normal ROM without nuchal rigidity  Resp:  Symmetric expansion, no increased wob  CVS:  No LE edema, peripheral pulses 2+ (radial, dorsalis pedis)  GI:  Abd soft, non-distended, non-tender to palpation  Neurologic Exam:  Mental Status:  Somnolent, opens eyes to voice. Does not follow commands well, intermittently mimicking commands. No attempts to use gestures to communicate this am, tearful.  Cranial Nerves:  Face appears symmetric.  PERRLA, EOMI with tracking.  Tongue protrudes midline.  Motor:  Normal bulk and tone. LUE, LLE moving spontaneously. RLE with some withdrawal to noxious stimuli, non-stereotyped.  No movement RUE.  Sensory:   Withdrawal to noxious stimuli at LUE and BLE.  No withdrawal or grimace to noxious stimuli at RUE.  Reflexes:  Biceps, brachioradialis, patellar 2+ and symmetric.  No ankle clonus.  Downgoing toe bilaterally.  Coordination:  Difficulty following JESSICA.  No resting tremor or myoclonus.    Gait:  Deferred 2/2 fall precautions, AMS    Laboratory:  CMP:     Recent Labs  Lab 02/23/18  0540   CALCIUM 9.5   ALBUMIN 2.3*   PROT 7.7      K 4.1   CO2 30*   CL 99   BUN 20   CREATININE 0.8   ALKPHOS 140*   ALT 33   AST 37   BILITOT 0.5     CBC:     Recent Labs  Lab 02/23/18  0540   WBC 10.08   RBC 4.44*   HGB 12.9*   HCT 38.7*   *   MCV 87   MCH 29.1   MCHC 33.3     Lipid Panel: Invalid due  to excessively high triglycerides  Coagulation:     Recent Labs  Lab 02/23/18  0540   INR 1.0     Hgb A1C: 10%  TSH: PENDING    Diagnostic Results     Brain Imaging   01/26/18 CT head w/o contrast:  No detrimental change.  Evolving subtle left MCA territory infarct.       01/26/18 MRI Brain w/o contrast:  1. Large acute left MCA territory infarction as above.  No associated hemorrhage or significant mass effect at this time.  2. Mild chronic ischemic changes.        02/04/18 CT head w/o contrast:  Redemonstration of large left MCA territory infarction without evidence of hemorrhagic conversion.  Slight interval increase in local mass effect with rightward midline shift of approximately 0.6 cm. Stable postoperative change from left MCA endovascular stenting. Sinus disease as above.       Vessel Imaging   01/25/18 CTA Stroke Multiphase:  Short segment occlusion of left M1.  There is good collateral flow in the ischemic territory as detailed above.  Noncontrast images demonstrate subtle changes reflecting left MCA territory acute infarct.       01/25/18 IR Angiogram:  Cerebral angiogram demonstrates occlusion of the M1 segment of the left middle cerebral artery with good collateral flow from pial collaterals.  This was successfully removed and residual stenosis at the site of occlusion was angioplastied and stented with improvement in flow.  Slow flow to the left parietal lobe suggests area of infarcted tissue.  This was felt to represent TICI 2C flow.    Cardiac Imaging   01/26/18 2D Echo w/ CFD:  CONCLUSIONS     1 - Normal left ventricular systolic function (EF 65-70%).     2 - Concentric remodeling.     3 - No wall motion abnormalities.     4 - Normal left ventricular diastolic function.     5 - Normal right ventricular systolic function .    6 - No LA enlargement.

## 2018-02-23 NOTE — ASSESSMENT & PLAN NOTE
-Stroke risk factor, -140/70-91 over past 24 h  -Continue scheduled lisinopril 20 mg qd, metoprolol 25 mg bid

## 2018-02-23 NOTE — PROGRESS NOTES
[facility]  Adult Nutrition  Progress Note    SUMMARY     Recommendations    1. Recommend increasing TF to goal rate of Diabetisource @75mL/hr to better meet energy & protein needs. Provides 2160kcals, 108g pro, and 1472mL free water  2. RD to educate on Diabetic diet if/ when appropriate (a1c 10.0%)     RD following     Goals: Meet >90% EEN/EPN from TF   Nutrition Goal Status: new  Communication of RD Recs: reviewed with physician (POC)     Reason for Assessment    Reason for Assessment: RD follow-up  Diagnosis: stroke/CVA (Embolic stroke L MCA)  Relevent Medical History: HTN, T2DM   Interdisciplinary Rounds: did not attend     General Information Comments: PEG placed this week. Pt seen this am, TF running. Residuals of 300mL noted on 2/20.     Nutrition Discharge Planning: Tolerance of TF    Nutrition Prescription Ordered    Current Diet Order: NPO  Current Nutrition Support Formula Ordered: Diabetisource  Current Nutrition Support Rate Ordered: 60 (ml)  Current Nutrition Support Frequency Ordered: ml/hr      Evaluation of Received Nutrients/Fluid Intake    Enteral Calories (kcal): 1728  Enteral Protein (gm): 86  Enteral (Free Water) Fluid (mL): 1178    Energy Calories Required: not meeting needs  % Kcal Needs: 75%     Protein Required: not meeting needs  % Protein Needs: 81%     I/O: LBM 2/19  Fluid Required: meeting needs  Tolerance: tolerating    % Intake of Estimated Energy Needs: 75 - 100 %  % Meal Intake: NPO     Nutrition Risk Screen     Nutrition Risk Screen: no indicators present    Nutrition/Diet History    Patient Reported Diet/Restrictions/Preferences: other (see comments) (FRIEDA)  Typical Food/Fluid Intake: FRIEDA  Food Preferences: N/A   Supplemental Drinks or Food Habits: Other (see comments) (FRIEDA)  Factors Affecting Nutritional Intake: NPO     Labs/Tests/Procedures/Meds    Pertinent Labs Reviewed: reviewed, pertinent  Pertinent Labs Comments: POCT 163-235, Alb 2.3, Alk Phos  "140  Pertinent Medications Reviewed: reviewed, pertinent  Pertinent Medications Comments: statin, insulin, senna, heparin     Physical Findings    Overall Physical Appearance: obese  Tubes: gastrostomy tube  Oral/Mouth Cavity: WDL  Skin: intact    Anthropometrics    Temp: 98 °F (36.7 °C)     Height: 5' 10" (177.8 cm)  Weight Method: Bed Scale  Weight: 105.6 kg (232 lb 12.9 oz)  Ideal Body Weight (IBW), Male: 166 lb     % Ideal Body Weight, Male (lb): 140.25 lb     BMI (Calculated): 33.5  BMI Grade: 30 - 34.9- obesity - grade I    Estimated/Assessed Needs    Weight Used For Calorie Calculations: 105.6 kg (232 lb 12.9 oz)   Energy Calorie Requirements (kcal): 2318 kcal/d  Energy Need Method: Clayton-St Jeor (1.2 PAL)  RMR (Clayton-St. Jeor Equation): 1932.25  Weight Used For Protein Calculations: 75.4 kg (166 lb 3.6 oz) (IBW)  Protein Requirements: 90-105g (1.2-1.4g/kg)  Fluid Need Method: RDA Method (Per MD or 1 ml/kcal)  RDA Method (mL): 2318  CHO Requirement: 50% total kcals     Assessment and Plan    * Embolic stroke involving left middle cerebral artery    Problem  Inadequate oral intake    Related to (etiology):   Inability to consume sufficient energy    Signs and Symptoms (as evidenced by):   TF meeting <85% EEN/EPN     Interventions/Recommendations (treatment strategy):  See recs above    Nutrition Diagnosis Status:   Continues              Monitor and Evaluation    Food and Nutrient Intake: enteral nutrition intake  Food and Nutrient Adminstration: enteral and parenteral nutrition administration     Physical Activity and Function: nutrition-related ADLs and IADLs  Anthropometric Measurements: weight, weight change  Biochemical Data, Medical Tests and Procedures: electrolyte and renal panel, gastrointestinal profile, glucose/endocrine profile, inflammatory profile, lipid profile  Nutrition-Focused Physical Findings: overall appearance    Nutrition Risk    Level of Risk: other (see comments) (f/u " 1x/week)    Nutrition Follow-Up    RD Follow-up?: Yes

## 2018-02-23 NOTE — PLAN OF CARE
Problem: Patient Care Overview  Goal: Plan of Care Review  Recommendations    1. Recommend increasing TF to goal rate of Diabetisource @75mL/hr to better meet energy & protein needs. Provides 2160kcals, 108g pro, and 1472mL free water  2. RD to educate on Diabetic diet if/ when appropriate (a1c 10.0%)     RD following     Goals: Meet >90% EEN/EPN from TF   Nutrition Goal Status: new

## 2018-02-23 NOTE — PLAN OF CARE
Problem: SLP Goal  Goal: SLP Goal  Speech Language Pathology Goals  Goals expected to be met by 3/2  1. Pt will participate in ongoing bedside assessment of swallow to determine least restrictive diet.  2. Pt will open eyes to stim x5/session given max cues.  3. Pt will follow simple commands x2/session given max cues.  4. Pt will answer basic y/n question via any modality x2/session given max cues.  5. Pt will vocalize in response to any stim x2/session given max cues.         Outcome: Ongoing (interventions implemented as appropriate)  Improved alertness noted. EDWARD Key, CCC/SLP  2/23/2018

## 2018-02-24 PROBLEM — Z78.9 ON ENTERAL NUTRITION: Status: ACTIVE | Noted: 2018-02-24

## 2018-02-24 PROBLEM — G93.5 BRAIN COMPRESSION: Status: RESOLVED | Noted: 2018-02-16 | Resolved: 2018-02-24

## 2018-02-24 PROBLEM — E78.2 MIXED HYPERLIPIDEMIA: Status: ACTIVE | Noted: 2018-01-26

## 2018-02-24 LAB
ALBUMIN SERPL BCP-MCNC: 2.3 G/DL
ALP SERPL-CCNC: 141 U/L
ALT SERPL W/O P-5'-P-CCNC: 33 U/L
ANION GAP SERPL CALC-SCNC: 9 MMOL/L
AST SERPL-CCNC: 29 U/L
BASOPHILS # BLD AUTO: 0.04 K/UL
BASOPHILS NFR BLD: 0.3 %
BILIRUB SERPL-MCNC: 0.6 MG/DL
BUN SERPL-MCNC: 20 MG/DL
CALCIUM SERPL-MCNC: 9.3 MG/DL
CHLORIDE SERPL-SCNC: 98 MMOL/L
CO2 SERPL-SCNC: 29 MMOL/L
CREAT SERPL-MCNC: 0.8 MG/DL
DIFFERENTIAL METHOD: ABNORMAL
EOSINOPHIL # BLD AUTO: 0.1 K/UL
EOSINOPHIL NFR BLD: 0.8 %
ERYTHROCYTE [DISTWIDTH] IN BLOOD BY AUTOMATED COUNT: 12.1 %
EST. GFR  (AFRICAN AMERICAN): >60 ML/MIN/1.73 M^2
EST. GFR  (NON AFRICAN AMERICAN): >60 ML/MIN/1.73 M^2
GLUCOSE SERPL-MCNC: 299 MG/DL
HCT VFR BLD AUTO: 37.7 %
HGB BLD-MCNC: 12.8 G/DL
IMM GRANULOCYTES # BLD AUTO: 0.08 K/UL
IMM GRANULOCYTES NFR BLD AUTO: 0.6 %
INR PPP: 1
LYMPHOCYTES # BLD AUTO: 1.7 K/UL
LYMPHOCYTES NFR BLD: 13.1 %
MAGNESIUM SERPL-MCNC: 1.7 MG/DL
MCH RBC QN AUTO: 29.2 PG
MCHC RBC AUTO-ENTMCNC: 34 G/DL
MCV RBC AUTO: 86 FL
MONOCYTES # BLD AUTO: 1 K/UL
MONOCYTES NFR BLD: 7.7 %
NEUTROPHILS # BLD AUTO: 10.1 K/UL
NEUTROPHILS NFR BLD: 77.5 %
NRBC BLD-RTO: 0 /100 WBC
PHOSPHATE SERPL-MCNC: 3.6 MG/DL
PLATELET # BLD AUTO: 375 K/UL
PMV BLD AUTO: 9.8 FL
POCT GLUCOSE: 200 MG/DL (ref 70–110)
POCT GLUCOSE: 227 MG/DL (ref 70–110)
POCT GLUCOSE: 236 MG/DL (ref 70–110)
POCT GLUCOSE: 239 MG/DL (ref 70–110)
POCT GLUCOSE: 249 MG/DL (ref 70–110)
POCT GLUCOSE: 253 MG/DL (ref 70–110)
POCT GLUCOSE: 276 MG/DL (ref 70–110)
POTASSIUM SERPL-SCNC: 4.3 MMOL/L
PROT SERPL-MCNC: 7.8 G/DL
PROTHROMBIN TIME: 10.3 SEC
RBC # BLD AUTO: 4.38 M/UL
SODIUM SERPL-SCNC: 136 MMOL/L
WBC # BLD AUTO: 13.01 K/UL

## 2018-02-24 PROCEDURE — 97530 THERAPEUTIC ACTIVITIES: CPT

## 2018-02-24 PROCEDURE — 20600001 HC STEP DOWN PRIVATE ROOM

## 2018-02-24 PROCEDURE — 97110 THERAPEUTIC EXERCISES: CPT

## 2018-02-24 PROCEDURE — 80053 COMPREHEN METABOLIC PANEL: CPT

## 2018-02-24 PROCEDURE — 25000003 PHARM REV CODE 250: Performed by: PHYSICIAN ASSISTANT

## 2018-02-24 PROCEDURE — 63600175 PHARM REV CODE 636 W HCPCS: Performed by: NURSE PRACTITIONER

## 2018-02-24 PROCEDURE — 25000003 PHARM REV CODE 250: Performed by: PSYCHIATRY & NEUROLOGY

## 2018-02-24 PROCEDURE — 63600175 PHARM REV CODE 636 W HCPCS: Performed by: INTERNAL MEDICINE

## 2018-02-24 PROCEDURE — 25000242 PHARM REV CODE 250 ALT 637 W/ HCPCS: Performed by: PSYCHIATRY & NEUROLOGY

## 2018-02-24 PROCEDURE — 84100 ASSAY OF PHOSPHORUS: CPT

## 2018-02-24 PROCEDURE — 85610 PROTHROMBIN TIME: CPT

## 2018-02-24 PROCEDURE — 97535 SELF CARE MNGMENT TRAINING: CPT

## 2018-02-24 PROCEDURE — 99233 SBSQ HOSP IP/OBS HIGH 50: CPT | Mod: ,,, | Performed by: PSYCHIATRY & NEUROLOGY

## 2018-02-24 PROCEDURE — 99252 IP/OBS CONSLTJ NEW/EST SF 35: CPT | Mod: ,,, | Performed by: INTERNAL MEDICINE

## 2018-02-24 PROCEDURE — 36415 COLL VENOUS BLD VENIPUNCTURE: CPT

## 2018-02-24 PROCEDURE — 83735 ASSAY OF MAGNESIUM: CPT

## 2018-02-24 PROCEDURE — 94761 N-INVAS EAR/PLS OXIMETRY MLT: CPT

## 2018-02-24 PROCEDURE — 27000221 HC OXYGEN, UP TO 24 HOURS

## 2018-02-24 PROCEDURE — 94640 AIRWAY INHALATION TREATMENT: CPT

## 2018-02-24 PROCEDURE — 94668 MNPJ CHEST WALL SBSQ: CPT

## 2018-02-24 PROCEDURE — 85025 COMPLETE CBC W/AUTO DIFF WBC: CPT

## 2018-02-24 PROCEDURE — A4216 STERILE WATER/SALINE, 10 ML: HCPCS | Performed by: NURSE PRACTITIONER

## 2018-02-24 PROCEDURE — 25000003 PHARM REV CODE 250: Performed by: NURSE PRACTITIONER

## 2018-02-24 PROCEDURE — 25000242 PHARM REV CODE 250 ALT 637 W/ HCPCS: Performed by: STUDENT IN AN ORGANIZED HEALTH CARE EDUCATION/TRAINING PROGRAM

## 2018-02-24 PROCEDURE — 31720 CLEARANCE OF AIRWAYS: CPT

## 2018-02-24 RX ORDER — INSULIN ASPART 100 [IU]/ML
6 INJECTION, SOLUTION INTRAVENOUS; SUBCUTANEOUS
Status: DISCONTINUED | OUTPATIENT
Start: 2018-02-24 | End: 2018-02-25

## 2018-02-24 RX ORDER — INSULIN ASPART 100 [IU]/ML
6 INJECTION, SOLUTION INTRAVENOUS; SUBCUTANEOUS
Status: DISCONTINUED | OUTPATIENT
Start: 2018-02-25 | End: 2018-02-25

## 2018-02-24 RX ORDER — GLUCAGON 1 MG
1 KIT INJECTION
Status: DISCONTINUED | OUTPATIENT
Start: 2018-02-24 | End: 2018-03-21 | Stop reason: HOSPADM

## 2018-02-24 RX ORDER — INSULIN ASPART 100 [IU]/ML
1-10 INJECTION, SOLUTION INTRAVENOUS; SUBCUTANEOUS EVERY 4 HOURS PRN
Status: DISCONTINUED | OUTPATIENT
Start: 2018-02-24 | End: 2018-03-21 | Stop reason: HOSPADM

## 2018-02-24 RX ADMIN — ALBUTEROL SULFATE 2.5 MG: 2.5 SOLUTION RESPIRATORY (INHALATION) at 12:02

## 2018-02-24 RX ADMIN — Medication 3 ML: at 02:02

## 2018-02-24 RX ADMIN — ATORVASTATIN CALCIUM 80 MG: 20 TABLET, FILM COATED ORAL at 08:02

## 2018-02-24 RX ADMIN — SODIUM CHLORIDE SOLN NEBU 3% 4 ML: 3 NEBU SOLN at 07:02

## 2018-02-24 RX ADMIN — INSULIN ASPART 6 UNITS: 100 INJECTION, SOLUTION INTRAVENOUS; SUBCUTANEOUS at 11:02

## 2018-02-24 RX ADMIN — INSULIN ASPART 2 UNITS: 100 INJECTION, SOLUTION INTRAVENOUS; SUBCUTANEOUS at 11:02

## 2018-02-24 RX ADMIN — MODAFINIL 200 MG: 100 TABLET ORAL at 06:02

## 2018-02-24 RX ADMIN — Medication 3 ML: at 08:02

## 2018-02-24 RX ADMIN — HEPARIN SODIUM 5000 UNITS: 5000 INJECTION, SOLUTION INTRAVENOUS; SUBCUTANEOUS at 10:02

## 2018-02-24 RX ADMIN — SODIUM CHLORIDE SOLN NEBU 3% 4 ML: 3 NEBU SOLN at 12:02

## 2018-02-24 RX ADMIN — IPRATROPIUM BROMIDE 0.5 MG: 0.5 SOLUTION RESPIRATORY (INHALATION) at 07:02

## 2018-02-24 RX ADMIN — STANDARDIZED SENNA CONCENTRATE AND DOCUSATE SODIUM 1 TABLET: 8.6; 5 TABLET, FILM COATED ORAL at 10:02

## 2018-02-24 RX ADMIN — CLOPIDOGREL BISULFATE 75 MG: 75 TABLET, FILM COATED ORAL at 08:02

## 2018-02-24 RX ADMIN — INSULIN ASPART 3 UNITS: 100 INJECTION, SOLUTION INTRAVENOUS; SUBCUTANEOUS at 11:02

## 2018-02-24 RX ADMIN — HEPARIN SODIUM 5000 UNITS: 5000 INJECTION, SOLUTION INTRAVENOUS; SUBCUTANEOUS at 06:02

## 2018-02-24 RX ADMIN — METOPROLOL TARTRATE 25 MG: 25 TABLET ORAL at 08:02

## 2018-02-24 RX ADMIN — STANDARDIZED SENNA CONCENTRATE AND DOCUSATE SODIUM 1 TABLET: 8.6; 5 TABLET, FILM COATED ORAL at 08:02

## 2018-02-24 RX ADMIN — INSULIN ASPART 6 UNITS: 100 INJECTION, SOLUTION INTRAVENOUS; SUBCUTANEOUS at 10:02

## 2018-02-24 RX ADMIN — INSULIN ASPART 2 UNITS: 100 INJECTION, SOLUTION INTRAVENOUS; SUBCUTANEOUS at 12:02

## 2018-02-24 RX ADMIN — INSULIN ASPART 6 UNITS: 100 INJECTION, SOLUTION INTRAVENOUS; SUBCUTANEOUS at 04:02

## 2018-02-24 RX ADMIN — INSULIN ASPART 4 UNITS: 100 INJECTION, SOLUTION INTRAVENOUS; SUBCUTANEOUS at 07:02

## 2018-02-24 RX ADMIN — ASPIRIN 325 MG ORAL TABLET 325 MG: 325 PILL ORAL at 08:02

## 2018-02-24 RX ADMIN — INSULIN ASPART 6 UNITS: 100 INJECTION, SOLUTION INTRAVENOUS; SUBCUTANEOUS at 08:02

## 2018-02-24 RX ADMIN — LISINOPRIL 20 MG: 20 TABLET ORAL at 08:02

## 2018-02-24 RX ADMIN — IPRATROPIUM BROMIDE 0.5 MG: 0.5 SOLUTION RESPIRATORY (INHALATION) at 12:02

## 2018-02-24 RX ADMIN — METOPROLOL TARTRATE 25 MG: 25 TABLET ORAL at 10:02

## 2018-02-24 RX ADMIN — POLYETHYLENE GLYCOL 3350 17 G: 17 POWDER, FOR SOLUTION ORAL at 08:02

## 2018-02-24 RX ADMIN — MODAFINIL 100 MG: 100 TABLET ORAL at 02:02

## 2018-02-24 RX ADMIN — ALBUTEROL SULFATE 2.5 MG: 2.5 SOLUTION RESPIRATORY (INHALATION) at 07:02

## 2018-02-24 RX ADMIN — INSULIN ASPART 4 UNITS: 100 INJECTION, SOLUTION INTRAVENOUS; SUBCUTANEOUS at 04:02

## 2018-02-24 RX ADMIN — HEPARIN SODIUM 5000 UNITS: 5000 INJECTION, SOLUTION INTRAVENOUS; SUBCUTANEOUS at 02:02

## 2018-02-24 NOTE — PLAN OF CARE
Problem: Patient Care Overview  Goal: Plan of Care Review  Outcome: Ongoing (interventions implemented as appropriate)  Pt remains nonverbal and doesnot follow any commands. He does track left to his wife's voice. Diabetasource infusing at goal of 55ml/hr. Tolerating well. See flowsheet for assessment

## 2018-02-24 NOTE — SUBJECTIVE & OBJECTIVE
Neurologic Chief Complaint: L MCA Stroke    Subjective:     Interval History: Patient is seen for follow-up neurological assessment and treatment recommendations: NAEON. Pts WBC mildly elevated 13.01, pt is afebrile. Will continue to monitor. Endocrine consulted - Recommend levemir 40 units daily to cover basal needs (using ~0.7u/kg); Start novolog 6 units q4hr to cover TF; Moderate correction q4hr. SNF placement pending.       HPI, Past Medical, Family, and Social History remains the same as documented in the initial encounter.     Review of Systems   Constitutional: Negative for chills and fever.   Eyes: Negative for visual disturbance.   Respiratory: Negative for cough and shortness of breath.    Cardiovascular: Negative for chest pain.   Skin: Negative for rash and wound.   Neurological: Positive for speech difficulty and weakness. Negative for numbness.   Psychiatric/Behavioral: Positive for confusion and decreased concentration.     Scheduled Meds:   albuterol sulfate  2.5 mg Nebulization Q6H    aspirin  325 mg Per G Tube Daily    atorvastatin  80 mg Per NG tube Daily    clopidogrel  75 mg Per G Tube Daily    heparin (porcine)  5,000 Units Subcutaneous Q8H    [START ON 2/25/2018] insulin aspart U-100  6 Units Subcutaneous Q24H    [START ON 2/25/2018] insulin aspart U-100  6 Units Subcutaneous Q24H    [START ON 2/25/2018] insulin aspart U-100  6 Units Subcutaneous Q24H    insulin aspart U-100  6 Units Subcutaneous Q24H    insulin aspart U-100  6 Units Subcutaneous Q24H    insulin aspart U-100  6 Units Subcutaneous Q24H    [START ON 2/25/2018] insulin detemir U-100  40 Units Subcutaneous Daily    ipratropium  0.5 mg Nebulization Q6H    lisinopril  20 mg Per NG tube Daily    metoprolol tartrate  25 mg Per NG tube BID    modafinil  200 mg Per NG tube Daily    And    modafinil  100 mg Per NG tube Daily    polyethylene glycol  17 g Per NG tube Daily    senna-docusate 8.6-50 mg  1 tablet Per NG  tube BID    sodium chloride 0.9%  3 mL Intravenous Q8H    sodium chloride 3%  4 mL Nebulization Q6H     Continuous Infusions:  PRN Meds:acetaminophen, bisacodyl, dextrose 50%, glucagon (human recombinant), insulin aspart U-100, ipratropium, labetalol, ondansetron    Objective:     Vital Signs (Most Recent):  Temp: 97.8 °F (36.6 °C) (02/24/18 1147)  Pulse: 104 (02/24/18 1201)  Resp: 20 (02/24/18 1201)  BP: 118/76 (02/24/18 1147)  SpO2: 98 % (02/24/18 1147)  BP Location: Right arm    Vital Signs Range (Last 24H):  Temp:  [97.6 °F (36.4 °C)-98.6 °F (37 °C)]   Pulse:  []   Resp:  [16-20]   BP: (118-145)/(67-97)   SpO2:  [93 %-98 %]   BP Location: Right arm    Physical Exam   General:  Well-developed, well-nourished, not opening eyes to voice/tactile stimuli--opens eyes to noxious stimuli, moving LUE/LLE intermittently  HEENT:  NCAT, PERRLA, EOMI with tracking, oropharyngeal membranes non-erythematous/without exudate  Neck:  Supple, normal ROM without nuchal rigidity  Resp:  Symmetric expansion, no increased wob  CVS: Normal rate/rhytm, No LE edema, peripheral pulses 2+ (radial, dorsalis pedis)  GI:  Abd soft, non-distended, non-tender to palpation  Neurologic Exam:  Mental Status:  Somnolent, opens eyes to voice. Does not follow commands well, intermittently mimicking commands. No attempts to use gestures to communicate this am, tearful.  Cranial Nerves:  Face appears symmetric.  PERRLA, EOMI with tracking.  Tongue protrudes midline.  Motor:  Normal bulk and tone. LUE, LLE moving spontaneously. RLE with some withdrawal to noxious stimuli, non-stereotyped.  No movement RUE.  Sensory:   Withdrawal to noxious stimuli at LUE and BLE.  No withdrawal or grimace to noxious stimuli at RUE.  Reflexes:  Biceps, brachioradialis, patellar 2+ and symmetric.  No ankle clonus.  Downgoing toe bilaterally.  Coordination:  Difficulty following JESSICA.  No resting tremor or myoclonus.    Gait:  Deferred 2/2 fall precautions,  AMS    Laboratory:  CMP:     Recent Labs  Lab 02/24/18  0652   CALCIUM 9.3   ALBUMIN 2.3*   PROT 7.8      K 4.3   CO2 29   CL 98   BUN 20   CREATININE 0.8   ALKPHOS 141*   ALT 33   AST 29   BILITOT 0.6     CBC:     Recent Labs  Lab 02/24/18  0652   WBC 13.01*   RBC 4.38*   HGB 12.8*   HCT 37.7*   *   MCV 86   MCH 29.2   MCHC 34.0     Lipid Panel: Invalid due to excessively high triglycerides  Coagulation:     Recent Labs  Lab 02/24/18  0652   INR 1.0     Hgb A1C: 10%  TSH: PENDING    Diagnostic Results     Brain Imaging   01/26/18 CT head w/o contrast:  No detrimental change.  Evolving subtle left MCA territory infarct.       01/26/18 MRI Brain w/o contrast:  1. Large acute left MCA territory infarction as above.  No associated hemorrhage or significant mass effect at this time.  2. Mild chronic ischemic changes.        02/04/18 CT head w/o contrast:  Redemonstration of large left MCA territory infarction without evidence of hemorrhagic conversion.  Slight interval increase in local mass effect with rightward midline shift of approximately 0.6 cm. Stable postoperative change from left MCA endovascular stenting. Sinus disease as above.       Vessel Imaging   01/25/18 CTA Stroke Multiphase:  Short segment occlusion of left M1.  There is good collateral flow in the ischemic territory as detailed above.  Noncontrast images demonstrate subtle changes reflecting left MCA territory acute infarct.       01/25/18 IR Angiogram:  Cerebral angiogram demonstrates occlusion of the M1 segment of the left middle cerebral artery with good collateral flow from pial collaterals.  This was successfully removed and residual stenosis at the site of occlusion was angioplastied and stented with improvement in flow.  Slow flow to the left parietal lobe suggests area of infarcted tissue.  This was felt to represent TICI 2C flow.    Cardiac Imaging   01/26/18 2D Echo w/ CFD:  CONCLUSIONS     1 - Normal left ventricular systolic  function (EF 65-70%).     2 - Concentric remodeling.     3 - No wall motion abnormalities.     4 - Normal left ventricular diastolic function.     5 - Normal right ventricular systolic function .    6 - No LA enlargement.

## 2018-02-24 NOTE — PLAN OF CARE
Problem: Occupational Therapy Goal  Goal: Occupational Therapy Goal  Goals set 2/9 to be addressed for 14 days with expiration date, 2/23:  Patient will increase functional independence with ADLs by performing:    Patient will demonstrate rolling to the right with mod assist.  Not met   Patient will demonstrate rolling to the left with max assist.   Not met  Patient will demonstrate supine -sit with max assist.   Not met  Patient will demonstrate squat pivot transfers with max assist.   Not met  Patient will demonstrate grooming while seated with max assist.   Not met  Patient will demonstrate upper body dressing with max assist while seated EOB.   Not met  Patient will demonstrate lower body dressing with max assist while seated EOB.   Not met  Patient will demonstrate toileting with max assist.   Not met  Patient will demonstrate ability to follow 3/5 commands.   Not met  Patient will demonstrate independence with HEP for right UE positioning.    Not met  Patient's family / caregiver will demonstrate independence and safety with assisting patient with self-care skills and functional mobility.     Not met  Patient's family / caregiver will demonstrate independence with providing ROM and changes in bed positioning.   Not met       Outcome: Ongoing (interventions implemented as appropriate)  Continue with POC.   Anne swenson OT  2/24/2018

## 2018-02-24 NOTE — ASSESSMENT & PLAN NOTE
-Stroke risk factor, -145/67-97 over past 24 h  -Continue scheduled lisinopril 20 mg qd, metoprolol 25 mg bid

## 2018-02-24 NOTE — ASSESSMENT & PLAN NOTE
-Stroke risk factor, Hgb A1c 10%  -Continue Diabetasource AC TFs  - Endocrine consulted-Recommend levemir 40 units daily to cover basal needs (using ~0.7u/kg)   Start novolog 6 units q4hr to cover TF  Moderate correction q4hr

## 2018-02-24 NOTE — ASSESSMENT & PLAN NOTE
-180  Current regimen of levemir 36 units bid is creating basal/bolus mismatch in setting of continuous enteral feeds      Recommend levemir 40 units daily to cover basal needs (using ~0.7u/kg)   Start novolog 6 units q4hr to cover TF  Moderate correction q4hr

## 2018-02-24 NOTE — HPI
"Reason for Consult: Management of T2DM, Hyperglycemia     Surgical Procedure and Date: thrombectomy with TICI 2C reperfusion and stent placement due to L MCA stenosis  PEG 1/31    Home Diabetes Medications: metformin     How often checking glucose at home? FRIEDA, pt aphasic     Diabetes Complications include:   Hyperglycemia    Complicating diabetes co morbidities: Residual deficits from CVA  and LOYDA    HPI:   Patient is a 47 y.o. male with a diagnosis of poorly controlled DM II, HLD, LOYDA, presents to [facility] 01/25/18 after noted "strange behavior" for which EMS was called.  Pt was found to have a L M1 occlusion and was taken to IR for thrombectomy with TICI 2C reperfusion and stent placement due to L MCA stenosis. DAPT 2/2 recent stent placement in angiogram after PEG placement.  Endo consulted for BG management.       "

## 2018-02-24 NOTE — PT/OT/SLP PROGRESS
Occupational Therapy   Treatment    Name: Todd Quevedo  MRN: [id number]  Admitting Diagnosis:  Embolic stroke involving left middle cerebral artery       Recommendations:     Discharge Recommendations: nursing facility, basic  Discharge Equipment Recommendations:  hospital bed  Barriers to discharge:  Inaccessible home environment, Decreased caregiver support    Subjective     Communicated with: RN prior to session.  Pt somnolent requiring max verbal/tactile cues to arouse    Pain/Comfort:  · Pain Rating 1: 0/10  · Pain Rating Post-Intervention 1: 0/10    Patients cultural, spiritual, Muslim conflicts given the current situation: Worship    Objective:     Patient found with: pulse ox (continuous), telemetry, peripheral IV, Condom Catheter, pressure relief boots, bed alarm, PEG Tube    General Precautions: Standard, aphasia, fall, NPO, aspiration   Orthopedic Precautions:N/A   Braces: N/A     Occupational Performance:    Bed Mobility:    · Patient completed Rolling/Turning to Left with  dependent  · Patient completed Scooting/Bridging with dependent  · Patient completed Supine to Sit with dependent  · Patient completed Sit to Supine with dependent     Functional Mobility/Transfers:  · NT this date 2/2 poor sitting balance    Activities of Daily Living:  · Grooming: maximal assistance to wash face and brush hair using; requiring max tactile/visual cues to initiate/complete self care task   · UB Dressing: dependence to maciej gown like jacket   · LB Dressing: dependence to maciej B socks while supine    Patient left with bed in chair position with all lines intact, call button in reach and RN notified    [facility] 6 Click:  AMPA Total Score: 6    Treatment & Education:  -Pt edu on OT role/POC- sister present for education   - Whiteboard updatEducation:  ed  - Pt tolerated sitting EOB ~20 minutes with TA for sitting balance; he demo's pushing with LUE/leaning to R and cervical forward flexion and rotation. He required  skilled facilitation of thoracic extension, cervical spine in neutral, and weight bearing through RUE  - Multiple self care tasks completed-- assistance level noted above   - Pt tolerated PROM to RUE within all available planes of motion while supine; No active movement noted ( [place])   - Positioning of pt supine with HOB raised, cervical spine/pelvis in neutral, RUE elevated using pillow, and R heel floated    - Family educated on BUE A/PROM-- verbalized understanding  - Family educated on positioning of RUE/cervical spine with forward head gaze      * visually scan to midline; unable to scan to R  * Followed 0% of simple one-step commands  * L visual gaze preference  * Eyes open 100% of session  * Daily re-orientation provided     Assessment:     Todd Quevedo is a 47 y.o. male with a medical diagnosis of Embolic stroke involving left middle cerebral artery.  He requires max- dependence assistance with ADL/functional mobility at this time. He presents with impaired endurance, weakness, impaired balance, impaired self care skills, impaired functional mobilty, impaired cognition, decreased upper extremity function, decreased lower extremity function, decreased coordination, impaired coordination, impaired fine motor, visual deficits, abnormal tone.      Rehab Prognosis:  Fair; patient would benefit from acute skilled OT services to address these deficits and reach maximum level of function.       Plan:     Patient to be seen 3 x/week to address the above listed problems via self-care/home management, therapeutic activities, therapeutic exercises, neuromuscular re-education, cognitive retraining  · Plan of Care Expires: 03/21/18  · Plan of Care Reviewed with: spouse    This Plan of care has been discussed with the patient who was involved in its development and understands and is in agreement with the identified goals and treatment plan    GOALS:    Occupational Therapy Goals        Problem: Occupational Therapy  Goal    Goal Priority Disciplines Outcome Interventions   Occupational Therapy Goal     OT, PT/OT Ongoing (interventions implemented as appropriate)    Description:  Goals set 2/9 to be addressed for 14 days with expiration date, 2/23:  Patient will increase functional independence with ADLs by performing:    Patient will demonstrate rolling to the right with mod assist.  Not met   Patient will demonstrate rolling to the left with max assist.   Not met  Patient will demonstrate supine -sit with max assist.   Not met  Patient will demonstrate squat pivot transfers with max assist.   Not met  Patient will demonstrate grooming while seated with max assist.   Not met  Patient will demonstrate upper body dressing with max assist while seated EOB.   Not met  Patient will demonstrate lower body dressing with max assist while seated EOB.   Not met  Patient will demonstrate toileting with max assist.   Not met  Patient will demonstrate ability to follow 3/5 commands.   Not met  Patient will demonstrate independence with HEP for right UE positioning.    Not met  Patient's family / caregiver will demonstrate independence and safety with assisting patient with self-care skills and functional mobility.     Not met  Patient's family / caregiver will demonstrate independence with providing ROM and changes in bed positioning.   Not met                        Time Tracking:     OT Date of Treatment: 02/24/18  OT Start Time: 0900  OT Stop Time: 0938  OT Total Time (min): 38 min    Billable Minutes:Self Care/Home Management 15  Therapeutic Activity 12  Therapeutic Exercise 11    Anne swenson OT  2/24/2018

## 2018-02-24 NOTE — ASSESSMENT & PLAN NOTE
"48 yo M with PMHx HLD, LOYDA, poorly controlled DM II presents to [facility] 01/25/18 after noted "strange behavior" for which EMS was called.  Pt was found to have a L M1 occlusion and was taken to IR for thrombectomy with TICI 2C reperfusion and stent placement due to L MCA stenosis. Etiology remains unclear: no obvious signs of cardiac source or atheromatous disease in the aorto/carotid axis. Echo normal with no hx or signs of A fib. PT/OT/SLP will continue to evaluate to make placement recommendations. DAPT 2/2 recent stent placement in angiogram after PEG placement.      Antithrombotics:  mg po qd, clopidogrel 75 mg po qd [intracranial stent]  Statins: Atorvastatin 80 mg po qd  Aggressive risk factor modification: HLD, DM II (Hgb A1c 10%), Obesity  Rehab efforts: PT/OT/SLP--Recommending SNF, placement pending  Diagnostics ordered/pending: None  VTE prophylaxis: Heparin 5000 U q8h  BP parameters: Infarct: Post sucessful thrombectomy, SBP <140   "

## 2018-02-24 NOTE — PROGRESS NOTES
"[facility]  Vascular Neurology  Comprehensive Stroke Center  Progress Note    Assessment/Plan:     * Embolic stroke involving left middle cerebral artery    46 yo M with PMHx HLD, LOYDA, poorly controlled DM II presents to [facility] 01/25/18 after noted "strange behavior" for which EMS was called.  Pt was found to have a L M1 occlusion and was taken to IR for thrombectomy with TICI 2C reperfusion and stent placement due to L MCA stenosis. Etiology remains unclear: no obvious signs of cardiac source or atheromatous disease in the aorto/carotid axis. Echo normal with no hx or signs of A fib. PT/OT/SLP will continue to evaluate to make placement recommendations. DAPT 2/2 recent stent placement in angiogram after PEG placement.      Antithrombotics:  mg po qd, clopidogrel 75 mg po qd [intracranial stent]  Statins: Atorvastatin 80 mg po qd  Aggressive risk factor modification: HLD, DM II (Hgb A1c 10%), Obesity  Rehab efforts: PT/OT/SLP--Recommending SNF, placement pending  Diagnostics ordered/pending: None  VTE prophylaxis: Heparin 5000 U q8h  BP parameters: Infarct: Post sucessful thrombectomy, SBP <140         Right spastic hemiparesis    -Due to stroke  -Continue aggressive PT/OT        Acute respiratory failure with hypoxia    -Satting 97% on 3 L O2 NC, will wean as tolerated        Cytotoxic cerebral edema    -2/2 stroke, evident on imaging  -Stable on repeat CT head imaging        Type 2 diabetes mellitus    -Stroke risk factor, Hgb A1c 10%  -Continue Diabetasource AC TFs  - Endocrine consulted-Recommend levemir 40 units daily to cover basal needs (using ~0.7u/kg)   Start novolog 6 units q4hr to cover TF  Moderate correction q4hr            Mixed hyperlipidemia    -Stroke risk factor, LDL invalid as TG > 400  -Continue atorvastatin 80 mg qd  -Repeat lipid panel as an outpatient        Essential hypertension    -Stroke risk factor, -145/67-97 over past 24 h  -Continue scheduled lisinopril 20 " mg qd, metoprolol 25 mg bid        Obstructive sleep apnea    -Stroke risk factor  -Recommend CPAP qHS             01/25/18:  Brought in for aphasia, RSW with L gaze preferance. LKN unknown, not tPA candidate. Went to IR for angiogram and stent.  01/29/18:  Off Cardene, remains on Insulin gtt. Concern for sepsis, respiratory source. Imaging with mass effect and some brain compression; maycol crani watch.  01/30/18:  EEG consistent with focal L slowing 2/2 lesion and mild generalized slowing, no seizures. CT head stable, no hemorrhagic conversion  02/01/18:  Emergent intubation likely due to upper airway obstruction per NCC.    02/02/18:  Pt off Precedex, moving left side spontaneously and opening eyes. Intubated, on spontaneous today. NCC giving steroids in hopes to extubate tomorrow.  02/03/18:  NAEON, intubated, not responsive to verbal stimuli, withdraws from pain on LUE, SBP ~135-230. Continued on insulin gtt, SQH for DVT ppx, and captopril.   02/16/18:  Few changes on exam, continues to have significant RUE/RLE weakness with global aphasia.  Family member at bedside noted attempt to communicate with her.  02/18/18:  Pt largely unchanged on exam this am.  No family member present during am rounds.  02/19/18:  Tolerating facemask. PEG by IR this afternoon.  02/20/18:  s/p PEG. O2 % on 5L NC, still requiring frequent suctioning. Primary team considering transfer to Medicine tomorrow.  02/21/18:  Pt sating % on 3L NC. Restarted DAPT, including . Plans for step down to stroke service.  02/22/18:  Pt with [facility] insurance but not service-connected so unable to be placed at SNF.  Working on insurance coverage of at least [facility] therapy.  02/23/18:  Increased detemir to 36 U bid.  Placement with [facility] SNF pending completion of paperwork by Stroke team and pt's spouse--in process.  2/24/18 - NAEON. Pts WBC mildly elevated 13.01, pt is afebrile. Will continue to monitor. Endocrine consulted - Recommend levemir 40  units daily to cover basal needs (using ~0.7u/kg); Start novolog 6 units q4hr to cover TF; Moderate correction q4hr. SNF placement pending.    STROKE DOCUMENTATION   Acute Stroke Times   Stroke Team Called Date: 01/25/18  Stroke Team Called Time: 1852  Stroke Team Arrival Date: 01/25/18  Stroke Team Arrival Time: 0652  CT Interpretation Time: 1910  Decision to Treat Time for Alteplase:  (n/a unknown last known normal )  Decision to Treat Time for IR: 1915    NIH Scale:  1a. Level Of Consciousness: 0-->Alert: keenly responsive  1b. LOC Questions: 2-->Answers neither question correctly  1c. LOC Commands: 2-->Performs neither task correctly  2. Best Gaze: 1-->Partial gaze palsy: gaze is abnormal in one or both eyes, but forced deviation or total gaze paresis is not present  3. Visual: 1-->Partial hemianopia  4. Facial Palsy: 1-->Minor paralysis (flattened nasolabial fold, asymmetry on smiling)  5a. Motor Arm, Left: 1-->Drift: limb holds 90 (or 45) degrees, but drifts down before full 10 seconds: does not hit bed or other support  5b. Motor Arm, Right: 4-->No movement  6a. Motor Leg, Left: 2-->Some effort against gravity: leg falls to bed by 5 secs, but has some effort against gravity  6b. Motor Leg, Right: 3-->No effort against gravity: leg falls to bed immediately  7. Limb Ataxia: 0-->Absent  8. Sensory: 0-->Normal: no sensory loss  9. Best Language: 3-->Mute, global aphasia: no usable speech or auditory comprehension  10. Dysarthria: 2-->Severe dysarthria: patients speech is so slurred as to be unintelligible in the absence of or out of proportion to any dysphasia, or is mute/anarthric  11. Extinction and Inattention (formerly Neglect): 0-->No abnormality  Total (NIH Stroke Scale): 22       Modified [place] Score: 0  [place] Coma Scale:    ABCD2 Score:    [id number] Score:   HAS -BLED Score:   ICH Score:   Hunt & Laguerre Classification:      Hemorrhagic change of an Ischemic Stroke: Does this patient have an ischemic  stroke with hemorrhagic changes? No     Neurologic Chief Complaint: L MCA Stroke    Subjective:     Interval History: Patient is seen for follow-up neurological assessment and treatment recommendations: NAEON. Pts WBC mildly elevated 13.01, pt is afebrile. Will continue to monitor. Endocrine consulted - Recommend levemir 40 units daily to cover basal needs (using ~0.7u/kg); Start novolog 6 units q4hr to cover TF; Moderate correction q4hr. SNF placement pending.       HPI, Past Medical, Family, and Social History remains the same as documented in the initial encounter.     Review of Systems   Constitutional: Negative for chills and fever.   Eyes: Negative for visual disturbance.   Respiratory: Negative for cough and shortness of breath.    Cardiovascular: Negative for chest pain.   Skin: Negative for rash and wound.   Neurological: Positive for speech difficulty and weakness. Negative for numbness.   Psychiatric/Behavioral: Positive for confusion and decreased concentration.     Scheduled Meds:   albuterol sulfate  2.5 mg Nebulization Q6H    aspirin  325 mg Per G Tube Daily    atorvastatin  80 mg Per NG tube Daily    clopidogrel  75 mg Per G Tube Daily    heparin (porcine)  5,000 Units Subcutaneous Q8H    [START ON 2/25/2018] insulin aspart U-100  6 Units Subcutaneous Q24H    [START ON 2/25/2018] insulin aspart U-100  6 Units Subcutaneous Q24H    [START ON 2/25/2018] insulin aspart U-100  6 Units Subcutaneous Q24H    insulin aspart U-100  6 Units Subcutaneous Q24H    insulin aspart U-100  6 Units Subcutaneous Q24H    insulin aspart U-100  6 Units Subcutaneous Q24H    [START ON 2/25/2018] insulin detemir U-100  40 Units Subcutaneous Daily    ipratropium  0.5 mg Nebulization Q6H    lisinopril  20 mg Per NG tube Daily    metoprolol tartrate  25 mg Per NG tube BID    modafinil  200 mg Per NG tube Daily    And    modafinil  100 mg Per NG tube Daily    polyethylene glycol  17 g Per NG tube Daily     senna-docusate 8.6-50 mg  1 tablet Per NG tube BID    sodium chloride 0.9%  3 mL Intravenous Q8H    sodium chloride 3%  4 mL Nebulization Q6H     Continuous Infusions:  PRN Meds:acetaminophen, bisacodyl, dextrose 50%, glucagon (human recombinant), insulin aspart U-100, ipratropium, labetalol, ondansetron    Objective:     Vital Signs (Most Recent):  Temp: 97.8 °F (36.6 °C) (02/24/18 1147)  Pulse: 104 (02/24/18 1201)  Resp: 20 (02/24/18 1201)  BP: 118/76 (02/24/18 1147)  SpO2: 98 % (02/24/18 1147)  BP Location: Right arm    Vital Signs Range (Last 24H):  Temp:  [97.6 °F (36.4 °C)-98.6 °F (37 °C)]   Pulse:  []   Resp:  [16-20]   BP: (118-145)/(67-97)   SpO2:  [93 %-98 %]   BP Location: Right arm    Physical Exam   General:  Well-developed, well-nourished, not opening eyes to voice/tactile stimuli--opens eyes to noxious stimuli, moving LUE/LLE intermittently  HEENT:  NCAT, PERRLA, EOMI with tracking, oropharyngeal membranes non-erythematous/without exudate  Neck:  Supple, normal ROM without nuchal rigidity  Resp:  Symmetric expansion, no increased wob  CVS: Normal rate/rhytm, No LE edema, peripheral pulses 2+ (radial, dorsalis pedis)  GI:  Abd soft, non-distended, non-tender to palpation  Neurologic Exam:  Mental Status:  Somnolent, opens eyes to voice. Does not follow commands well, intermittently mimicking commands. No attempts to use gestures to communicate this am, tearful.  Cranial Nerves:  Face appears symmetric.  PERRLA, EOMI with tracking.  Tongue protrudes midline.  Motor:  Normal bulk and tone. LUE, LLE moving spontaneously. RLE with some withdrawal to noxious stimuli, non-stereotyped.  No movement RUE.  Sensory:   Withdrawal to noxious stimuli at LUE and BLE.  No withdrawal or grimace to noxious stimuli at RUE.  Reflexes:  Biceps, brachioradialis, patellar 2+ and symmetric.  No ankle clonus.  Downgoing toe bilaterally.  Coordination:  Difficulty following JESSICA.  No resting tremor or myoclonus.     Gait:  Deferred 2/2 fall precautions, AMS    Laboratory:  CMP:     Recent Labs  Lab 02/24/18  0652   CALCIUM 9.3   ALBUMIN 2.3*   PROT 7.8      K 4.3   CO2 29   CL 98   BUN 20   CREATININE 0.8   ALKPHOS 141*   ALT 33   AST 29   BILITOT 0.6     CBC:     Recent Labs  Lab 02/24/18  0652   WBC 13.01*   RBC 4.38*   HGB 12.8*   HCT 37.7*   *   MCV 86   MCH 29.2   MCHC 34.0     Lipid Panel: Invalid due to excessively high triglycerides  Coagulation:     Recent Labs  Lab 02/24/18  0652   INR 1.0     Hgb A1C: 10%  TSH: PENDING    Diagnostic Results     Brain Imaging   01/26/18 CT head w/o contrast:  No detrimental change.  Evolving subtle left MCA territory infarct.       01/26/18 MRI Brain w/o contrast:  1. Large acute left MCA territory infarction as above.  No associated hemorrhage or significant mass effect at this time.  2. Mild chronic ischemic changes.        02/04/18 CT head w/o contrast:  Redemonstration of large left MCA territory infarction without evidence of hemorrhagic conversion.  Slight interval increase in local mass effect with rightward midline shift of approximately 0.6 cm. Stable postoperative change from left MCA endovascular stenting. Sinus disease as above.       Vessel Imaging   01/25/18 CTA Stroke Multiphase:  Short segment occlusion of left M1.  There is good collateral flow in the ischemic territory as detailed above.  Noncontrast images demonstrate subtle changes reflecting left MCA territory acute infarct.       01/25/18 IR Angiogram:  Cerebral angiogram demonstrates occlusion of the M1 segment of the left middle cerebral artery with good collateral flow from pial collaterals.  This was successfully removed and residual stenosis at the site of occlusion was angioplastied and stented with improvement in flow.  Slow flow to the left parietal lobe suggests area of infarcted tissue.  This was felt to represent TICI 2C flow.    Cardiac Imaging   01/26/18 2D Echo w/ CFD:  CONCLUSIONS      1 - Normal left ventricular systolic function (EF 65-70%).     2 - Concentric remodeling.     3 - No wall motion abnormalities.     4 - Normal left ventricular diastolic function.     5 - Normal right ventricular systolic function .    6 - No LA enlargement.      Adriana Monet NP  [facility]  Department of Vascular Neurology   [facility]

## 2018-02-24 NOTE — SUBJECTIVE & OBJECTIVE
PMH, PSH, FH, SH updated and reviewed         Review of Systems  FRIEDA as pt nonverbal s/p CVA       PHYSICAL EXAMINATION:  Vitals:    02/24/18 0836   BP: (!) 145/97   Pulse: 103   Resp: 16   Temp: 98 °F (36.7 °C)     Body mass index is 33.4 kg/m².    Physical Exam   PHYSICAL EXAMINATION  Constitutional:  Well developed, well nourished, NAD.   ENT: External ears no masses with nose patent;    Neck:  Supple; trachea midline; no thyromegaly.   Cardiovascular: Normal heart sounds, no LE edema.     Lungs:  Normal effort; lungs anterior bilaterally clear to auscultation.  Abdomen:  Soft, no masses,  no hernias.  MS: No clubbing or cyanosis of nails noted; unable to assess gait.  Skin: No rashes, lesions, or ulcers; no nodules.  Psychiatric: difficult to assess   Neurological: some EOM tracking noted

## 2018-02-25 LAB
ALBUMIN SERPL BCP-MCNC: 2.2 G/DL
ALLENS TEST: ABNORMAL
ALP SERPL-CCNC: 144 U/L
ALT SERPL W/O P-5'-P-CCNC: 31 U/L
ANION GAP SERPL CALC-SCNC: 11 MMOL/L
AST SERPL-CCNC: 26 U/L
BACTERIA #/AREA URNS AUTO: ABNORMAL /HPF
BASOPHILS # BLD AUTO: 0.02 K/UL
BASOPHILS NFR BLD: 0.1 %
BILIRUB SERPL-MCNC: 0.6 MG/DL
BILIRUB UR QL STRIP: NEGATIVE
BUN SERPL-MCNC: 23 MG/DL
CALCIUM SERPL-MCNC: 9.7 MG/DL
CHLORIDE SERPL-SCNC: 99 MMOL/L
CLARITY UR REFRACT.AUTO: ABNORMAL
CO2 SERPL-SCNC: 27 MMOL/L
COLOR UR AUTO: ABNORMAL
CREAT SERPL-MCNC: 0.8 MG/DL
DELSYS: ABNORMAL
DIFFERENTIAL METHOD: ABNORMAL
EOSINOPHIL # BLD AUTO: 0.1 K/UL
EOSINOPHIL NFR BLD: 0.4 %
ERYTHROCYTE [DISTWIDTH] IN BLOOD BY AUTOMATED COUNT: 12.6 %
EST. GFR  (AFRICAN AMERICAN): >60 ML/MIN/1.73 M^2
EST. GFR  (NON AFRICAN AMERICAN): >60 ML/MIN/1.73 M^2
GLUCOSE SERPL-MCNC: 264 MG/DL
GLUCOSE UR QL STRIP: NEGATIVE
HCO3 UR-SCNC: 30 MMOL/L (ref 24–28)
HCT VFR BLD AUTO: 38.1 %
HGB BLD-MCNC: 13.1 G/DL
HGB UR QL STRIP: NEGATIVE
IMM GRANULOCYTES # BLD AUTO: 0.15 K/UL
IMM GRANULOCYTES NFR BLD AUTO: 0.9 %
INR PPP: 1
KETONES UR QL STRIP: NEGATIVE
LDH SERPL L TO P-CCNC: 0.83 MMOL/L (ref 0.36–1.25)
LEUKOCYTE ESTERASE UR QL STRIP: NEGATIVE
LYMPHOCYTES # BLD AUTO: 1.8 K/UL
LYMPHOCYTES NFR BLD: 10.8 %
MAGNESIUM SERPL-MCNC: 1.8 MG/DL
MCH RBC QN AUTO: 29.5 PG
MCHC RBC AUTO-ENTMCNC: 34.4 G/DL
MCV RBC AUTO: 86 FL
MICROSCOPIC COMMENT: ABNORMAL
MONOCYTES # BLD AUTO: 1.2 K/UL
MONOCYTES NFR BLD: 7.1 %
NEUTROPHILS # BLD AUTO: 13.2 K/UL
NEUTROPHILS NFR BLD: 80.7 %
NITRITE UR QL STRIP: NEGATIVE
NRBC BLD-RTO: 0 /100 WBC
PCO2 BLDA: 34.2 MMHG (ref 35–45)
PH SMN: 7.55 [PH] (ref 7.35–7.45)
PH UR STRIP: 5 [PH] (ref 5–8)
PHOSPHATE SERPL-MCNC: 3.7 MG/DL
PLATELET # BLD AUTO: 349 K/UL
PMV BLD AUTO: 10.2 FL
PO2 BLDA: 69 MMHG (ref 80–100)
POC BE: 8 MMOL/L
POC SATURATED O2: 96 % (ref 95–100)
POC TCO2: 31 MMOL/L (ref 23–27)
POCT GLUCOSE: 143 MG/DL (ref 70–110)
POCT GLUCOSE: 144 MG/DL (ref 70–110)
POCT GLUCOSE: 241 MG/DL (ref 70–110)
POCT GLUCOSE: 265 MG/DL (ref 70–110)
POCT GLUCOSE: 288 MG/DL (ref 70–110)
POTASSIUM SERPL-SCNC: 4 MMOL/L
PROT SERPL-MCNC: 7.9 G/DL
PROT UR QL STRIP: NEGATIVE
PROTHROMBIN TIME: 10.5 SEC
RBC # BLD AUTO: 4.44 M/UL
SAMPLE: ABNORMAL
SITE: ABNORMAL
SODIUM SERPL-SCNC: 137 MMOL/L
SP GR UR STRIP: 1.03 (ref 1–1.03)
URN SPEC COLLECT METH UR: ABNORMAL
UROBILINOGEN UR STRIP-ACNC: 4 EU/DL
WBC # BLD AUTO: 16.3 K/UL
WBC #/AREA URNS AUTO: 4 /HPF (ref 0–5)

## 2018-02-25 PROCEDURE — 31720 CLEARANCE OF AIRWAYS: CPT

## 2018-02-25 PROCEDURE — 87076 CULTURE ANAEROBE IDENT EACH: CPT

## 2018-02-25 PROCEDURE — 87040 BLOOD CULTURE FOR BACTERIA: CPT

## 2018-02-25 PROCEDURE — 63600175 PHARM REV CODE 636 W HCPCS: Performed by: NURSE PRACTITIONER

## 2018-02-25 PROCEDURE — 27000221 HC OXYGEN, UP TO 24 HOURS

## 2018-02-25 PROCEDURE — 36415 COLL VENOUS BLD VENIPUNCTURE: CPT

## 2018-02-25 PROCEDURE — 99232 SBSQ HOSP IP/OBS MODERATE 35: CPT | Mod: ,,, | Performed by: INTERNAL MEDICINE

## 2018-02-25 PROCEDURE — 87077 CULTURE AEROBIC IDENTIFY: CPT

## 2018-02-25 PROCEDURE — 93005 ELECTROCARDIOGRAM TRACING: CPT

## 2018-02-25 PROCEDURE — 93010 ELECTROCARDIOGRAM REPORT: CPT | Mod: ,,, | Performed by: INTERNAL MEDICINE

## 2018-02-25 PROCEDURE — 85025 COMPLETE CBC W/AUTO DIFF WBC: CPT

## 2018-02-25 PROCEDURE — 25000003 PHARM REV CODE 250: Performed by: PHYSICIAN ASSISTANT

## 2018-02-25 PROCEDURE — 99900035 HC TECH TIME PER 15 MIN (STAT)

## 2018-02-25 PROCEDURE — 84100 ASSAY OF PHOSPHORUS: CPT

## 2018-02-25 PROCEDURE — 87075 CULTR BACTERIA EXCEPT BLOOD: CPT

## 2018-02-25 PROCEDURE — 63600175 PHARM REV CODE 636 W HCPCS: Performed by: INTERNAL MEDICINE

## 2018-02-25 PROCEDURE — 94761 N-INVAS EAR/PLS OXIMETRY MLT: CPT

## 2018-02-25 PROCEDURE — 87070 CULTURE OTHR SPECIMN AEROBIC: CPT

## 2018-02-25 PROCEDURE — 94640 AIRWAY INHALATION TREATMENT: CPT

## 2018-02-25 PROCEDURE — 25000003 PHARM REV CODE 250: Performed by: PSYCHIATRY & NEUROLOGY

## 2018-02-25 PROCEDURE — A4216 STERILE WATER/SALINE, 10 ML: HCPCS | Performed by: NURSE PRACTITIONER

## 2018-02-25 PROCEDURE — 25000242 PHARM REV CODE 250 ALT 637 W/ HCPCS: Performed by: PSYCHIATRY & NEUROLOGY

## 2018-02-25 PROCEDURE — 87086 URINE CULTURE/COLONY COUNT: CPT

## 2018-02-25 PROCEDURE — 99233 SBSQ HOSP IP/OBS HIGH 50: CPT | Mod: ,,, | Performed by: PSYCHIATRY & NEUROLOGY

## 2018-02-25 PROCEDURE — 83735 ASSAY OF MAGNESIUM: CPT

## 2018-02-25 PROCEDURE — 20600001 HC STEP DOWN PRIVATE ROOM

## 2018-02-25 PROCEDURE — 81001 URINALYSIS AUTO W/SCOPE: CPT

## 2018-02-25 PROCEDURE — 25500020 PHARM REV CODE 255: Performed by: STUDENT IN AN ORGANIZED HEALTH CARE EDUCATION/TRAINING PROGRAM

## 2018-02-25 PROCEDURE — 25000242 PHARM REV CODE 250 ALT 637 W/ HCPCS: Performed by: STUDENT IN AN ORGANIZED HEALTH CARE EDUCATION/TRAINING PROGRAM

## 2018-02-25 PROCEDURE — 25000003 PHARM REV CODE 250: Performed by: NURSE PRACTITIONER

## 2018-02-25 PROCEDURE — 87186 SC STD MICRODIL/AGAR DIL: CPT

## 2018-02-25 PROCEDURE — 85610 PROTHROMBIN TIME: CPT

## 2018-02-25 PROCEDURE — 94668 MNPJ CHEST WALL SBSQ: CPT

## 2018-02-25 PROCEDURE — 80053 COMPREHEN METABOLIC PANEL: CPT

## 2018-02-25 PROCEDURE — 82803 BLOOD GASES ANY COMBINATION: CPT

## 2018-02-25 PROCEDURE — 87088 URINE BACTERIA CULTURE: CPT

## 2018-02-25 PROCEDURE — 36600 WITHDRAWAL OF ARTERIAL BLOOD: CPT

## 2018-02-25 RX ORDER — INSULIN ASPART 100 [IU]/ML
10 INJECTION, SOLUTION INTRAVENOUS; SUBCUTANEOUS
Status: DISCONTINUED | OUTPATIENT
Start: 2018-02-25 | End: 2018-02-27

## 2018-02-25 RX ORDER — INSULIN ASPART 100 [IU]/ML
10 INJECTION, SOLUTION INTRAVENOUS; SUBCUTANEOUS
Status: DISCONTINUED | OUTPATIENT
Start: 2018-02-26 | End: 2018-02-25

## 2018-02-25 RX ORDER — ACETAMINOPHEN 500 MG
1000 TABLET ORAL ONCE
Status: COMPLETED | OUTPATIENT
Start: 2018-02-25 | End: 2018-02-25

## 2018-02-25 RX ORDER — INSULIN ASPART 100 [IU]/ML
10 INJECTION, SOLUTION INTRAVENOUS; SUBCUTANEOUS
Status: DISCONTINUED | OUTPATIENT
Start: 2018-02-26 | End: 2018-02-27

## 2018-02-25 RX ORDER — SODIUM CHLORIDE 9 MG/ML
INJECTION, SOLUTION INTRAVENOUS CONTINUOUS
Status: DISCONTINUED | OUTPATIENT
Start: 2018-02-25 | End: 2018-02-28

## 2018-02-25 RX ADMIN — HEPARIN SODIUM 5000 UNITS: 5000 INJECTION, SOLUTION INTRAVENOUS; SUBCUTANEOUS at 09:02

## 2018-02-25 RX ADMIN — INSULIN ASPART 10 UNITS: 100 INJECTION, SOLUTION INTRAVENOUS; SUBCUTANEOUS at 10:02

## 2018-02-25 RX ADMIN — HEPARIN SODIUM 5000 UNITS: 5000 INJECTION, SOLUTION INTRAVENOUS; SUBCUTANEOUS at 02:02

## 2018-02-25 RX ADMIN — SODIUM CHLORIDE SOLN NEBU 3% 4 ML: 3 NEBU SOLN at 07:02

## 2018-02-25 RX ADMIN — INSULIN ASPART 10 UNITS: 100 INJECTION, SOLUTION INTRAVENOUS; SUBCUTANEOUS at 06:02

## 2018-02-25 RX ADMIN — INSULIN ASPART 6 UNITS: 100 INJECTION, SOLUTION INTRAVENOUS; SUBCUTANEOUS at 04:02

## 2018-02-25 RX ADMIN — CLOPIDOGREL BISULFATE 75 MG: 75 TABLET, FILM COATED ORAL at 09:02

## 2018-02-25 RX ADMIN — IPRATROPIUM BROMIDE 0.5 MG: 0.5 SOLUTION RESPIRATORY (INHALATION) at 12:02

## 2018-02-25 RX ADMIN — ACETAMINOPHEN 1000 MG: 500 TABLET ORAL at 08:02

## 2018-02-25 RX ADMIN — INSULIN ASPART 6 UNITS: 100 INJECTION, SOLUTION INTRAVENOUS; SUBCUTANEOUS at 01:02

## 2018-02-25 RX ADMIN — SODIUM CHLORIDE: 0.9 INJECTION, SOLUTION INTRAVENOUS at 02:02

## 2018-02-25 RX ADMIN — MODAFINIL 100 MG: 100 TABLET ORAL at 02:02

## 2018-02-25 RX ADMIN — IOHEXOL 15 ML: 350 INJECTION, SOLUTION INTRAVENOUS at 11:02

## 2018-02-25 RX ADMIN — SODIUM CHLORIDE SOLN NEBU 3% 4 ML: 3 NEBU SOLN at 08:02

## 2018-02-25 RX ADMIN — VANCOMYCIN HYDROCHLORIDE 1750 MG: 1 INJECTION, POWDER, LYOPHILIZED, FOR SOLUTION INTRAVENOUS at 09:02

## 2018-02-25 RX ADMIN — MODAFINIL 200 MG: 100 TABLET ORAL at 05:02

## 2018-02-25 RX ADMIN — VANCOMYCIN HYDROCHLORIDE 1750 MG: 1 INJECTION, POWDER, LYOPHILIZED, FOR SOLUTION INTRAVENOUS at 10:02

## 2018-02-25 RX ADMIN — PIPERACILLIN AND TAZOBACTAM 4.5 G: 4; .5 INJECTION, POWDER, LYOPHILIZED, FOR SOLUTION INTRAVENOUS; PARENTERAL at 10:02

## 2018-02-25 RX ADMIN — IPRATROPIUM BROMIDE 0.5 MG: 0.5 SOLUTION RESPIRATORY (INHALATION) at 08:02

## 2018-02-25 RX ADMIN — ALBUTEROL SULFATE 2.5 MG: 2.5 SOLUTION RESPIRATORY (INHALATION) at 12:02

## 2018-02-25 RX ADMIN — Medication 3 ML: at 10:02

## 2018-02-25 RX ADMIN — SODIUM CHLORIDE 1000 ML: 0.9 INJECTION, SOLUTION INTRAVENOUS at 08:02

## 2018-02-25 RX ADMIN — HEPARIN SODIUM 5000 UNITS: 5000 INJECTION, SOLUTION INTRAVENOUS; SUBCUTANEOUS at 05:02

## 2018-02-25 RX ADMIN — SODIUM CHLORIDE 1000 ML: 0.9 INJECTION, SOLUTION INTRAVENOUS at 10:02

## 2018-02-25 RX ADMIN — INSULIN ASPART 10 UNITS: 100 INJECTION, SOLUTION INTRAVENOUS; SUBCUTANEOUS at 01:02

## 2018-02-25 RX ADMIN — ASPIRIN 325 MG ORAL TABLET 325 MG: 325 PILL ORAL at 09:02

## 2018-02-25 RX ADMIN — PIPERACILLIN AND TAZOBACTAM 4.5 G: 4; .5 INJECTION, POWDER, LYOPHILIZED, FOR SOLUTION INTRAVENOUS; PARENTERAL at 06:02

## 2018-02-25 RX ADMIN — SODIUM CHLORIDE SOLN NEBU 3% 4 ML: 3 NEBU SOLN at 12:02

## 2018-02-25 RX ADMIN — STANDARDIZED SENNA CONCENTRATE AND DOCUSATE SODIUM 1 TABLET: 8.6; 5 TABLET, FILM COATED ORAL at 09:02

## 2018-02-25 RX ADMIN — INSULIN ASPART 2 UNITS: 100 INJECTION, SOLUTION INTRAVENOUS; SUBCUTANEOUS at 04:02

## 2018-02-25 RX ADMIN — ALBUTEROL SULFATE 2.5 MG: 2.5 SOLUTION RESPIRATORY (INHALATION) at 08:02

## 2018-02-25 RX ADMIN — Medication 3 ML: at 02:02

## 2018-02-25 RX ADMIN — INSULIN ASPART 2 UNITS: 100 INJECTION, SOLUTION INTRAVENOUS; SUBCUTANEOUS at 06:02

## 2018-02-25 RX ADMIN — METOPROLOL TARTRATE 25 MG: 25 TABLET ORAL at 09:02

## 2018-02-25 RX ADMIN — ALBUTEROL SULFATE 2.5 MG: 2.5 SOLUTION RESPIRATORY (INHALATION) at 07:02

## 2018-02-25 RX ADMIN — INSULIN DETEMIR 36 UNITS: 100 INJECTION, SOLUTION SUBCUTANEOUS at 10:02

## 2018-02-25 RX ADMIN — POLYETHYLENE GLYCOL 3350 17 G: 17 POWDER, FOR SOLUTION ORAL at 09:02

## 2018-02-25 RX ADMIN — ATORVASTATIN CALCIUM 80 MG: 20 TABLET, FILM COATED ORAL at 09:02

## 2018-02-25 RX ADMIN — IPRATROPIUM BROMIDE 0.5 MG: 0.5 SOLUTION RESPIRATORY (INHALATION) at 07:02

## 2018-02-25 NOTE — SUBJECTIVE & OBJECTIVE
"Interval HPI:   Overnight events:  Hyperglycemia persists but improved, still above goal. Remains on continuois TF 55cc/hr    /83 (BP Location: Right arm, Patient Position: Lying)   Pulse (!) 116   Temp 97.5 °F (36.4 °C) (Axillary)   Resp (!) 25   Ht 5' 10" (1.778 m)   Wt 105.6 kg (232 lb 12.9 oz)   SpO2 (!) 91%   BMI 33.40 kg/m²     Labs Reviewed and Include      Recent Labs  Lab 02/25/18  0512   *   CALCIUM 9.7   ALBUMIN 2.2*   PROT 7.9      K 4.0   CO2 27   CL 99   BUN 23*   CREATININE 0.8   ALKPHOS 144*   ALT 31   AST 26   BILITOT 0.6     Lab Results   Component Value Date    WBC 16.30 (H) 02/25/2018    HGB 13.1 (L) 02/25/2018    HCT 38.1 (L) 02/25/2018    MCV 86 02/25/2018     02/25/2018     No results for input(s): TSH, FREET4 in the last 168 hours.  Lab Results   Component Value Date    HGBA1C 10.0 (H) 01/25/2018       Nutritional status:   Body mass index is 33.4 kg/m².  Lab Results   Component Value Date    ALBUMIN 2.2 (L) 02/25/2018    ALBUMIN 2.3 (L) 02/24/2018    ALBUMIN 2.3 (L) 02/23/2018     No results found for: PREALBUMIN    Estimated Creatinine Clearance: 137.4 mL/min (based on SCr of 0.8 mg/dL).    Accu-Checks  Recent Labs      02/23/18   1614  02/23/18   1946  02/24/18   0006  02/24/18   0416  02/24/18   0832  02/24/18   1154  02/24/18   1646  02/24/18   1917  02/24/18   2352  02/25/18   0410   POCTGLUCOSE  214*  222*  239*  249*  276*  200*  227*  236*  253*  241*       Current Medications and/or Treatments Impacting Glycemic Control  Immunotherapy:  Immunosuppressants     None        Steroids:   Hormones     None        Pressors:    Autonomic Drugs     Start     Stop Route Frequency Ordered    01/31/18 0854  succinylcholine (ANECTINE) 20 mg/mL injection     Comments:  Created by cabinet override    01/31 2059 01/31/18 0854        Hyperglycemia/Diabetes Medications: Antihyperglycemics     Start     Stop Route Frequency Ordered    02/26/18 0400  insulin aspart " U-100 pen 10 Units      -- SubQ Every 24 hours (non-standard times) 02/25/18 0802    02/26/18 0000  insulin aspart U-100 pen 10 Units      -- SubQ Every 24 hours (non-standard times) 02/25/18 0802    02/25/18 2000  insulin aspart U-100 pen 10 Units      -- SubQ Every 24 hours (non-standard times) 02/25/18 0802    02/25/18 1600  insulin aspart U-100 pen 10 Units      -- SubQ Every 24 hours (non-standard times) 02/25/18 0802    02/25/18 1200  insulin aspart U-100 pen 10 Units      -- SubQ Every 24 hours (non-standard times) 02/25/18 0802    02/25/18 0900  insulin detemir U-100 pen 36 Units      -- SubQ Daily 02/24/18 1451    02/25/18 0802  insulin aspart U-100 pen 10 Units      -- SubQ Every 24 hours (non-standard times) 02/25/18 0803    02/24/18 1148  insulin aspart U-100 pen 1-10 Units      -- SubQ Every 4 hours PRN 02/24/18 1049    02/16/18 1400  insulin aspart pen 8 Units      02/19 0001 SubQ Every 4 hours 02/16/18 1058

## 2018-02-25 NOTE — ASSESSMENT & PLAN NOTE
Workup ordered (chest xray, UC/UA, blood cultures, ABG, lactic acid)  UC pending  Will treat for pneumonia as documented under pneumonia and follow results for other pending test  NCC notified of pts guarded status, will remain on NSU

## 2018-02-25 NOTE — ASSESSMENT & PLAN NOTE
"46 yo M with PMHx HLD, LOYDA, poorly controlled DM II presents to [facility] 01/25/18 after noted "strange behavior" for which EMS was called.  Pt was found to have a L M1 occlusion and was taken to IR for thrombectomy with TICI 2C reperfusion and stent placement due to L MCA stenosis. Etiology remains unclear: no obvious signs of cardiac source or atheromatous disease in the aorto/carotid axis. Echo normal with no hx or signs of A fib. PT/OT/SLP will continue to evaluate to make placement recommendations. DAPT 2/2 recent stent placement in angiogram after PEG placement.      Antithrombotics:  mg po qd, clopidogrel 75 mg po qd [intracranial stent]  Statins: Atorvastatin 80 mg po qd  Aggressive risk factor modification: HLD, DM II (Hgb A1c 10%), Obesity  Rehab efforts: PT/OT/SLP--Recommending SNF, placement pending  Diagnostics ordered/pending: None  VTE prophylaxis: Heparin 5000 U q8h  BP parameters: Infarct: Post sucessful thrombectomy, SBP <140   "

## 2018-02-25 NOTE — ASSESSMENT & PLAN NOTE
-180    Continue levemir 36 units daily to cover basal needs (using ~0.7u/kg)   Increase novolog 10 units q4hr to cover TF  Moderate correction q4hr   If TF held or decreased, hold or decrease novolog by same %

## 2018-02-25 NOTE — PROGRESS NOTES
Sister notified this RN that she noted some drainage from PEG insertion site, upon examination insertion site is draining a moderate amount of grayish red thick liquid that saturated 4x4. Notified Cristiane with the stroke team and she will notify IR to assess at the bedside.

## 2018-02-25 NOTE — PROGRESS NOTES
"[facility]  Vascular Neurology  Comprehensive Stroke Center  Progress Note    Assessment/Plan:     * Embolic stroke involving left middle cerebral artery    46 yo M with PMHx HLD, LOYDA, poorly controlled DM II presents to [facility] 01/25/18 after noted "strange behavior" for which EMS was called.  Pt was found to have a L M1 occlusion and was taken to IR for thrombectomy with TICI 2C reperfusion and stent placement due to L MCA stenosis. Etiology remains unclear: no obvious signs of cardiac source or atheromatous disease in the aorto/carotid axis. Echo normal with no hx or signs of A fib. PT/OT/SLP will continue to evaluate to make placement recommendations. DAPT 2/2 recent stent placement in angiogram after PEG placement.      Antithrombotics:  mg po qd, clopidogrel 75 mg po qd [intracranial stent]  Statins: Atorvastatin 80 mg po qd  Aggressive risk factor modification: HLD, DM II (Hgb A1c 10%), Obesity  Rehab efforts: PT/OT/SLP--Recommending SNF, placement pending  Diagnostics ordered/pending: None  VTE prophylaxis: Heparin 5000 U q8h  BP parameters: Infarct: Post sucessful thrombectomy, SBP <140         Right spastic hemiparesis    -Due to stroke  -Continue aggressive PT/OT        Nodule of right lung    - Follow-up with CT 6-12 months         Oral phase dysphagia    - Continue with SLP  - NPO        Pneumonia    - Chest X-ray ordered  - Started pt on vanc and zosyn        Acute respiratory failure with hypoxia    - Chest X-ray completed  - Pt on Venti mask 92-97% - wean as tolerated          Cytotoxic cerebral edema    -2/2 stroke, evident on imaging  -Stable on repeat CT head imaging        Leukocytosis (leucocytosis)    Workup ordered (chest xray, UC/UA, blood cultures, ABG, lactic acid)  UC pending  Will treat for pneumonia as documented under pneumonia and follow results for other pending test  NCC notified of pts guarded status, will remain on NSU          Type 2 diabetes mellitus    " -Stroke risk factor, Hgb A1c 10%  -Continue Diabetasource AC TFs  - Endocrine consulted-Recommend levemir 40 units daily to cover basal needs (using ~0.7u/kg)   Start novolog 6 units q4hr to cover TF  Moderate correction q4hr            Mixed hyperlipidemia    -Stroke risk factor, LDL invalid as TG > 400  -Continue atorvastatin 80 mg qd  -Repeat lipid panel as an outpatient        Essential hypertension    -Stroke risk factor, -145/67-97 over past 24 h  -Continue scheduled lisinopril 20 mg qd, metoprolol 25 mg bid        Obstructive sleep apnea    -Stroke risk factor  -Recommend CPAP qHS             01/25/18:  Brought in for aphasia, RSW with L gaze preferance. LKN unknown, not tPA candidate. Went to IR for angiogram and stent.  01/29/18:  Off Cardene, remains on Insulin gtt. Concern for sepsis, respiratory source. Imaging with mass effect and some brain compression; maycol crani watch.  01/30/18:  EEG consistent with focal L slowing 2/2 lesion and mild generalized slowing, no seizures. CT head stable, no hemorrhagic conversion  02/01/18:  Emergent intubation likely due to upper airway obstruction per NCC.    02/02/18:  Pt off Precedex, moving left side spontaneously and opening eyes. Intubated, on spontaneous today. NCC giving steroids in hopes to extubate tomorrow.  02/03/18:  NAEON, intubated, not responsive to verbal stimuli, withdraws from pain on LUE, SBP ~135-230. Continued on insulin gtt, SQH for DVT ppx, and captopril.   02/16/18:  Few changes on exam, continues to have significant RUE/RLE weakness with global aphasia.  Family member at bedside noted attempt to communicate with her.  02/18/18:  Pt largely unchanged on exam this am.  No family member present during am rounds.  02/19/18:  Tolerating facemask. PEG by IR this afternoon.  02/20/18:  s/p PEG. O2 % on 5L NC, still requiring frequent suctioning. Primary team considering transfer to Medicine tomorrow.  02/21/18:  Pt sating % on 3L  NC. Restarted DAPT, including . Plans for step down to stroke service.  02/22/18:  Pt with [facility] insurance but not service-connected so unable to be placed at SNF.  Working on insurance coverage of at least HHC therapy.  02/23/18:  Increased detemir to 36 U bid.  Placement with [facility] SNF pending completion of paperwork by Stroke team and pt's spouse--in process.  2/24/18 - NAEON. Pts WBC mildly elevated 13.01, pt is afebrile. Will continue to monitor. Endocrine consulted - Recommend levemir 40 units daily to cover basal needs (using ~0.7u/kg); Start novolog 6 units q4hr to cover TF; Moderate correction q4hr. SNF placement pending.  2/25/18 -  Pt WBC increased overnight, HR range , and temp 99.2. Blood cultures ordered and drawn. UA/UC ordered. Nursing staff called a rapid response this AM due to pts tachycardia, fever, and decreased O2 sats. Arrived to room @ 0824. 1 liter of NS ordered. STAT chest x-ray ordered. Tylenol given. ABG completed with no significant abnormal results. Respiratory suctioned pt and pt repositioned to help with breathing. Pt O2 sats improved on venti mask, temp decreased, and BP remained normotensive. Started on  vanc and zosyn. Pt appears more comfortable and no decline in neuro status. Pt family updated.      STROKE DOCUMENTATION   Acute Stroke Times   Stroke Team Called Date: 01/25/18  Stroke Team Called Time: 1852  Stroke Team Arrival Date: 01/25/18  Stroke Team Arrival Time: 0652  CT Interpretation Time: 1910  Decision to Treat Time for Alteplase:  (n/a unknown last known normal )  Decision to Treat Time for IR: 1915    NIH Scale:  1a. Level Of Consciousness: 0-->Alert: keenly responsive  1b. LOC Questions: 2-->Answers neither question correctly  1c. LOC Commands: 2-->Performs neither task correctly  2. Best Gaze: 1-->Partial gaze palsy: gaze is abnormal in one or both eyes, but forced deviation or total gaze paresis is not present  3. Visual: 1-->Partial hemianopia  4. Facial  Palsy: 1-->Minor paralysis (flattened nasolabial fold, asymmetry on smiling)  5a. Motor Arm, Left: 1-->Drift: limb holds 90 (or 45) degrees, but drifts down before full 10 seconds: does not hit bed or other support  5b. Motor Arm, Right: 4-->No movement  6a. Motor Leg, Left: 2-->Some effort against gravity: leg falls to bed by 5 secs, but has some effort against gravity  6b. Motor Leg, Right: 3-->No effort against gravity: leg falls to bed immediately  7. Limb Ataxia: 0-->Absent  8. Sensory: 0-->Normal: no sensory loss  9. Best Language: 3-->Mute, global aphasia: no usable speech or auditory comprehension  10. Dysarthria: 2-->Severe dysarthria: patients speech is so slurred as to be unintelligible in the absence of or out of proportion to any dysphasia, or is mute/anarthric  11. Extinction and Inattention (formerly Neglect): 0-->No abnormality  Total (NIH Stroke Scale): 22       Modified McDonald Score: 0  Jeff Coma Scale:    ABCD2 Score:    PBCK1AK2-MTB Score:   HAS -BLED Score:   ICH Score:   Hunt & Laguerre Classification:      Hemorrhagic change of an Ischemic Stroke: Does this patient have an ischemic stroke with hemorrhagic changes? No     Neurologic Chief Complaint: L MCA Stroke    Subjective:     Interval History: Patient is seen for follow-up neurological assessment and treatment recommendations:Pt WBC increased overnight, HR range , and temp 99.2. Blood cultures ordered and drawn. UA/UC ordered. Nursing staff called a rapid response this AM due to pts tachycardia, fever, and decreased O2 sats/tachypnea. Arrived to room @ 0824. 1 liter of NS ordered. STAT chest x-ray ordered. Tylenol given. ABG completed with no significant abnormal results. Respiratory suctioned pt and pt repositioned to help with breathing. Pt O2 sats improved on venti mask, temp decreased, and BP remained normotensive. Started on  vanc and zosyn. Pt appears more comfortable and no decline in neuro status. Pt family updated. Talked to  nursing staff about turning patient Q2.      HPI, Past Medical, Family, and Social History remains the same as documented in the initial encounter.     Review of Systems   Constitutional: Positive for fever. Negative for chills.   Eyes: Negative for visual disturbance.   Respiratory: Negative for cough and shortness of breath (improving).    Cardiovascular: Negative for chest pain.   Skin: Negative for rash and wound.   Neurological: Positive for speech difficulty and weakness. Negative for numbness.   Psychiatric/Behavioral: Positive for confusion and decreased concentration.     Scheduled Meds:   albuterol sulfate  2.5 mg Nebulization Q6H    aspirin  325 mg Per G Tube Daily    atorvastatin  80 mg Per NG tube Daily    clopidogrel  75 mg Per G Tube Daily    heparin (porcine)  5,000 Units Subcutaneous Q8H    [START ON 2/26/2018] insulin aspart U-100  10 Units Subcutaneous Q24H    [START ON 2/26/2018] insulin aspart U-100  10 Units Subcutaneous Q24H    insulin aspart U-100  10 Units Subcutaneous Q24H    insulin aspart U-100  10 Units Subcutaneous Q24H    insulin aspart U-100  10 Units Subcutaneous Q24H    insulin aspart U-100  10 Units Subcutaneous Q24H    insulin detemir U-100  36 Units Subcutaneous Daily    ipratropium  0.5 mg Nebulization Q6H    lisinopril  20 mg Per NG tube Daily    metoprolol tartrate  25 mg Per NG tube BID    modafinil  200 mg Per NG tube Daily    And    modafinil  100 mg Per NG tube Daily    piperacillin-tazobactam (ZOSYN) IVPB  4.5 g Intravenous Q8H    polyethylene glycol  17 g Per NG tube Daily    senna-docusate 8.6-50 mg  1 tablet Per NG tube BID    sodium chloride 0.9%  3 mL Intravenous Q8H    sodium chloride 3%  4 mL Nebulization Q6H    vancomycin (VANCOCIN) IVPB  15 mg/kg (Dosing Weight) Intravenous Q12H     Continuous Infusions:   sodium chloride 0.9%       PRN Meds:acetaminophen, bisacodyl, dextrose 50%, glucagon (human recombinant), insulin aspart U-100,  ipratropium, labetalol, ondansetron    Objective:     Vital Signs (Most Recent):  Temp: 99.4 °F (37.4 °C) (02/25/18 1228)  Pulse: (!) 116 (02/25/18 1332)  Resp: 20 (02/25/18 1332)  BP: 117/81 (02/25/18 1228)  SpO2: 95 % (02/25/18 1332)  BP Location: Right arm    Vital Signs Range (Last 24H):  Temp:  [97.1 °F (36.2 °C)-101.2 °F (38.4 °C)]   Pulse:  [100-131]   Resp:  [16-32]   BP: (103-146)/(69-98)   SpO2:  [88 %-97 %]   BP Location: Right arm     Physical Exam @ 0824 2/24/18 (rapid response)  Pt was tachypnic, lethargic, increased HR, and diaphoretic  Interventions ordered and completed  02/25/18 0806   101.2 °F (38.4 °C)   125   32  121/82  Lying  98   89 %  --  --  --  nasal cannula         Current:  Physical Exam   Constitutional: He appears well-developed.   HENT:   Head: Normocephalic and atraumatic.   Eyes: EOM are normal. Pupils are equal, round, and reactive to light.   Cardiovascular: Normal rate and regular rhythm.    Pulmonary/Chest: Effort normal and breath sounds normal.   Neurological: He is alert.   Skin: Skin is warm.   Psychiatric: He has a normal mood and affect. His behavior is normal.   Vitals reviewed.     Neurologic Exam:  Mental Status:  Somnolent, opens eyes to voice. Does not follow commands well, intermittently mimicking commands. No attempts to use gestures to communicate this am, tearful.  Cranial Nerves:  Face appears symmetric.  PERRLA, EOMI with tracking.  Tongue protrudes midline.  Motor:  Normal bulk and tone. LUE, LLE moving spontaneously. RLE with some withdrawal to noxious stimuli, non-stereotyped.  No movement RUE.  Sensory:   Withdrawal to noxious stimuli at LUE and BLE.  No withdrawal or grimace to noxious stimuli at RUE.  Reflexes:  Biceps, brachioradialis, patellar 2+ and symmetric.  No ankle clonus.  Downgoing toe bilaterally.  Coordination:  Difficulty following JESSICA.  No resting tremor or myoclonus.    Gait:  Deferred 2/2 fall precautions, AMS    Laboratory:  CMP:      Recent Labs  Lab 02/25/18  0512   CALCIUM 9.7   ALBUMIN 2.2*   PROT 7.9      K 4.0   CO2 27   CL 99   BUN 23*   CREATININE 0.8   ALKPHOS 144*   ALT 31   AST 26   BILITOT 0.6     CBC:     Recent Labs  Lab 02/25/18  0512   WBC 16.30*   RBC 4.44*   HGB 13.1*   HCT 38.1*      MCV 86   MCH 29.5   MCHC 34.4     Lipid Panel: Invalid due to excessively high triglycerides  Coagulation:     Recent Labs  Lab 02/25/18  0512   INR 1.0     Hgb A1C: 10%      Diagnostic Results     Brain Imaging   01/26/18 CT head w/o contrast:  No detrimental change.  Evolving subtle left MCA territory infarct.       01/26/18 MRI Brain w/o contrast:  1. Large acute left MCA territory infarction as above.  No associated hemorrhage or significant mass effect at this time.  2. Mild chronic ischemic changes.        02/04/18 CT head w/o contrast:  Redemonstration of large left MCA territory infarction without evidence of hemorrhagic conversion.  Slight interval increase in local mass effect with rightward midline shift of approximately 0.6 cm. Stable postoperative change from left MCA endovascular stenting. Sinus disease as above.       Vessel Imaging   01/25/18 CTA Stroke Multiphase:  Short segment occlusion of left M1.  There is good collateral flow in the ischemic territory as detailed above.  Noncontrast images demonstrate subtle changes reflecting left MCA territory acute infarct.       01/25/18 IR Angiogram:  Cerebral angiogram demonstrates occlusion of the M1 segment of the left middle cerebral artery with good collateral flow from pial collaterals.  This was successfully removed and residual stenosis at the site of occlusion was angioplastied and stented with improvement in flow.  Slow flow to the left parietal lobe suggests area of infarcted tissue.  This was felt to represent TICI 2C flow.    Cardiac Imaging   01/26/18 2D Echo w/ CFD:  CONCLUSIONS     1 - Normal left ventricular systolic function (EF 65-70%).     2 -  Concentric remodeling.     3 - No wall motion abnormalities.     4 - Normal left ventricular diastolic function.     5 - Normal right ventricular systolic function .    6 - No LA enlargement.      Adriana Monte NP  [facility]  Department of Vascular Neurology   [facility]

## 2018-02-25 NOTE — PROGRESS NOTES
Rapid response initiated.  Patient had SAT's between 87-92%.  Patient placed on 14L 55% Venti Mask.  ABG done as well as NT suction.  Patient's SAT's came up to 96% when leaving.  Will continue to monitor.       02/25/18 0820   Patient Assessment/Suction   Suction Method required;nasopharyngeal   $ Suction Charges NT Suction Procedure   Sputum Amount large;copious   Sputum Color white-yellow;red-streaked   Sputum Consistency thick   PRE-TX-O2-ETCO2   O2 Device (Oxygen Therapy) venti mask   Flow (L/min) 14   Oxygen Concentration (%) 55   SpO2 (!) 92 %   Ready to Wean/Extubation Screen   FIO2<=50 (chart decimal) (!) 0.55   Significant Events This Shift   Significant Events Rapid Response Team call   Code Blue/Rapid Response   Respiratory Therapist Present Self   Respiratory Therapist Present SHRUTI Perez RRT   Member Arrival Time 0820   Member Departure Time 0840   Rapid Reponse Initiated now   Equipment Used Venti Mask   $ Was an ABG obtained? Arterial Puncture;ISTAT - Blood gas   Attempted By? SHRUTI Perez RRT   Labs   $ Labs Tech Time 30 min

## 2018-02-25 NOTE — SUBJECTIVE & OBJECTIVE
Neurologic Chief Complaint: L MCA Stroke    Subjective:     Interval History: Patient is seen for follow-up neurological assessment and treatment recommendations:Pt WBC increased overnight, HR range , and temp 99.2. Blood cultures ordered and drawn. UA/UC ordered. Nursing staff called a rapid response this AM due to pts tachycardia, fever, and decreased O2 sats/tachypnea. Arrived to room @ 0824. 1 liter of NS ordered. STAT chest x-ray ordered. Tylenol given. ABG completed with no significant abnormal results. Respiratory suctioned pt and pt repositioned to help with breathing. Pt O2 sats improved on venti mask, temp decreased, and BP remained normotensive. Started on  vanc and zosyn. Pt appears more comfortable and no decline in neuro status. Pt family updated. Talked to nursing staff about turning patient Q2.      HPI, Past Medical, Family, and Social History remains the same as documented in the initial encounter.     Review of Systems   Constitutional: Positive for fever. Negative for chills.   Eyes: Negative for visual disturbance.   Respiratory: Negative for cough and shortness of breath (improving).    Cardiovascular: Negative for chest pain.   Skin: Negative for rash and wound.   Neurological: Positive for speech difficulty and weakness. Negative for numbness.   Psychiatric/Behavioral: Positive for confusion and decreased concentration.     Scheduled Meds:   albuterol sulfate  2.5 mg Nebulization Q6H    aspirin  325 mg Per G Tube Daily    atorvastatin  80 mg Per NG tube Daily    clopidogrel  75 mg Per G Tube Daily    heparin (porcine)  5,000 Units Subcutaneous Q8H    [START ON 2/26/2018] insulin aspart U-100  10 Units Subcutaneous Q24H    [START ON 2/26/2018] insulin aspart U-100  10 Units Subcutaneous Q24H    insulin aspart U-100  10 Units Subcutaneous Q24H    insulin aspart U-100  10 Units Subcutaneous Q24H    insulin aspart U-100  10 Units Subcutaneous Q24H    insulin aspart U-100  10  Units Subcutaneous Q24H    insulin detemir U-100  36 Units Subcutaneous Daily    ipratropium  0.5 mg Nebulization Q6H    lisinopril  20 mg Per NG tube Daily    metoprolol tartrate  25 mg Per NG tube BID    modafinil  200 mg Per NG tube Daily    And    modafinil  100 mg Per NG tube Daily    piperacillin-tazobactam (ZOSYN) IVPB  4.5 g Intravenous Q8H    polyethylene glycol  17 g Per NG tube Daily    senna-docusate 8.6-50 mg  1 tablet Per NG tube BID    sodium chloride 0.9%  3 mL Intravenous Q8H    sodium chloride 3%  4 mL Nebulization Q6H    vancomycin (VANCOCIN) IVPB  15 mg/kg (Dosing Weight) Intravenous Q12H     Continuous Infusions:   sodium chloride 0.9%       PRN Meds:acetaminophen, bisacodyl, dextrose 50%, glucagon (human recombinant), insulin aspart U-100, ipratropium, labetalol, ondansetron    Objective:     Vital Signs (Most Recent):  Temp: 99.4 °F (37.4 °C) (02/25/18 1228)  Pulse: (!) 116 (02/25/18 1332)  Resp: 20 (02/25/18 1332)  BP: 117/81 (02/25/18 1228)  SpO2: 95 % (02/25/18 1332)  BP Location: Right arm    Vital Signs Range (Last 24H):  Temp:  [97.1 °F (36.2 °C)-101.2 °F (38.4 °C)]   Pulse:  [100-131]   Resp:  [16-32]   BP: (103-146)/(69-98)   SpO2:  [88 %-97 %]   BP Location: Right arm     Physical Exam @ 0824 2/24/18 (rapid response)  Pt was tachypnic, lethargic, increased HR, and diaphoretic  Interventions ordered and completed    Current:  Physical Exam   Constitutional: He appears well-developed.   HENT:   Head: Normocephalic and atraumatic.   Eyes: EOM are normal. Pupils are equal, round, and reactive to light.   Cardiovascular: Normal rate and regular rhythm.    Pulmonary/Chest: Effort normal and breath sounds normal.   Neurological: He is alert.   Skin: Skin is warm.   Psychiatric: He has a normal mood and affect. His behavior is normal.   Vitals reviewed.     Neurologic Exam:  Mental Status:  Somnolent, opens eyes to voice. Does not follow commands well, intermittently mimicking  commands. No attempts to use gestures to communicate this am, tearful.  Cranial Nerves:  Face appears symmetric.  PERRLA, EOMI with tracking.  Tongue protrudes midline.  Motor:  Normal bulk and tone. LUE, LLE moving spontaneously. RLE with some withdrawal to noxious stimuli, non-stereotyped.  No movement RUE.  Sensory:   Withdrawal to noxious stimuli at LUE and BLE.  No withdrawal or grimace to noxious stimuli at RUE.  Reflexes:  Biceps, brachioradialis, patellar 2+ and symmetric.  No ankle clonus.  Downgoing toe bilaterally.  Coordination:  Difficulty following JESSICA.  No resting tremor or myoclonus.    Gait:  Deferred 2/2 fall precautions, AMS    Laboratory:  CMP:     Recent Labs  Lab 02/25/18  0512   CALCIUM 9.7   ALBUMIN 2.2*   PROT 7.9      K 4.0   CO2 27   CL 99   BUN 23*   CREATININE 0.8   ALKPHOS 144*   ALT 31   AST 26   BILITOT 0.6     CBC:     Recent Labs  Lab 02/25/18  0512   WBC 16.30*   RBC 4.44*   HGB 13.1*   HCT 38.1*      MCV 86   MCH 29.5   MCHC 34.4     Lipid Panel: Invalid due to excessively high triglycerides  Coagulation:     Recent Labs  Lab 02/25/18  0512   INR 1.0     Hgb A1C: 10%      Diagnostic Results     Brain Imaging   01/26/18 CT head w/o contrast:  No detrimental change.  Evolving subtle left MCA territory infarct.       01/26/18 MRI Brain w/o contrast:  1. Large acute left MCA territory infarction as above.  No associated hemorrhage or significant mass effect at this time.  2. Mild chronic ischemic changes.        02/04/18 CT head w/o contrast:  Redemonstration of large left MCA territory infarction without evidence of hemorrhagic conversion.  Slight interval increase in local mass effect with rightward midline shift of approximately 0.6 cm. Stable postoperative change from left MCA endovascular stenting. Sinus disease as above.       Vessel Imaging   01/25/18 CTA Stroke Multiphase:  Short segment occlusion of left M1.  There is good collateral flow in the ischemic  territory as detailed above.  Noncontrast images demonstrate subtle changes reflecting left MCA territory acute infarct.       01/25/18 IR Angiogram:  Cerebral angiogram demonstrates occlusion of the M1 segment of the left middle cerebral artery with good collateral flow from pial collaterals.  This was successfully removed and residual stenosis at the site of occlusion was angioplastied and stented with improvement in flow.  Slow flow to the left parietal lobe suggests area of infarcted tissue.  This was felt to represent TICI 2C flow.    Cardiac Imaging   01/26/18 2D Echo w/ CFD:  CONCLUSIONS     1 - Normal left ventricular systolic function (EF 65-70%).     2 - Concentric remodeling.     3 - No wall motion abnormalities.     4 - Normal left ventricular diastolic function.     5 - Normal right ventricular systolic function .    6 - No LA enlargement.

## 2018-02-25 NOTE — PROGRESS NOTES
"[facility]  Endocrinology  Progress Note    Admit Date: 1/25/2018     Reason for Consult: Management of T2DM, Hyperglycemia     Surgical Procedure and Date: thrombectomy with TICI 2C reperfusion and stent placement due to L MCA stenosis  PEG 1/31    Home Diabetes Medications: metformin     How often checking glucose at home? FRIEDA, pt aphasic     Diabetes Complications include:     Hyperglycemia    Complicating diabetes co morbidities:   Residual deficits from CVA  and LOYDA      HPI:   Patient is a 47 y.o. male with a diagnosis of poorly controlled DM II, HLD, LOYDA, presents to [facility] 01/25/18 after noted "strange behavior" for which EMS was called.  Pt was found to have a L M1 occlusion and was taken to IR for thrombectomy with TICI 2C reperfusion and stent placement due to L MCA stenosis. DAPT 2/2 recent stent placement in angiogram after PEG placement.  Endo consulted for BG management.         Interval HPI:   Overnight events:  Hyperglycemia persists but improved, still above goal. Remains on continuois TF 55cc/hr    /83 (BP Location: Right arm, Patient Position: Lying)   Pulse (!) 116   Temp 97.5 °F (36.4 °C) (Axillary)   Resp (!) 25   Ht 5' 10" (1.778 m)   Wt 105.6 kg (232 lb 12.9 oz)   SpO2 (!) 91%   BMI 33.40 kg/m²       Labs Reviewed and Include      Recent Labs  Lab 02/25/18  0512   *   CALCIUM 9.7   ALBUMIN 2.2*   PROT 7.9      K 4.0   CO2 27   CL 99   BUN 23*   CREATININE 0.8   ALKPHOS 144*   ALT 31   AST 26   BILITOT 0.6     Lab Results   Component Value Date    WBC 16.30 (H) 02/25/2018    HGB 13.1 (L) 02/25/2018    HCT 38.1 (L) 02/25/2018    MCV 86 02/25/2018     02/25/2018     No results for input(s): TSH, FREET4 in the last 168 hours.  Lab Results   Component Value Date    HGBA1C 10.0 (H) 01/25/2018       Nutritional status:   Body mass index is 33.4 kg/m².  Lab Results   Component Value Date    ALBUMIN 2.2 (L) 02/25/2018    ALBUMIN 2.3 (L) 02/24/2018    " ALBUMIN 2.3 (L) 02/23/2018     No results found for: PREALBUMIN    Estimated Creatinine Clearance: 137.4 mL/min (based on SCr of 0.8 mg/dL).    Accu-Checks  Recent Labs      02/23/18   1614  02/23/18   1946  02/24/18   0006  02/24/18   0416  02/24/18   0832  02/24/18   1154  02/24/18   1646  02/24/18   1917  02/24/18   2352  02/25/18   0410   POCTGLUCOSE  214*  222*  239*  249*  276*  200*  227*  236*  253*  241*       Current Medications and/or Treatments Impacting Glycemic Control  Immunotherapy:  Immunosuppressants     None        Steroids:   Hormones     None        Pressors:    Autonomic Drugs     Start     Stop Route Frequency Ordered    01/31/18 0854  succinylcholine (ANECTINE) 20 mg/mL injection     Comments:  Created by cabinet override    01/31 2059 01/31/18 0854        Hyperglycemia/Diabetes Medications: Antihyperglycemics     Start     Stop Route Frequency Ordered    02/26/18 0400  insulin aspart U-100 pen 10 Units      -- SubQ Every 24 hours (non-standard times) 02/25/18 0802    02/26/18 0000  insulin aspart U-100 pen 10 Units      -- SubQ Every 24 hours (non-standard times) 02/25/18 0802    02/25/18 2000  insulin aspart U-100 pen 10 Units      -- SubQ Every 24 hours (non-standard times) 02/25/18 0802    02/25/18 1600  insulin aspart U-100 pen 10 Units      -- SubQ Every 24 hours (non-standard times) 02/25/18 0802    02/25/18 1200  insulin aspart U-100 pen 10 Units      -- SubQ Every 24 hours (non-standard times) 02/25/18 0802    02/25/18 0900  insulin detemir U-100 pen 36 Units      -- SubQ Daily 02/24/18 1451    02/25/18 0802  insulin aspart U-100 pen 10 Units      -- SubQ Every 24 hours (non-standard times) 02/25/18 0803    02/24/18 1148  insulin aspart U-100 pen 1-10 Units      -- SubQ Every 4 hours PRN 02/24/18 1049    02/16/18 1400  insulin aspart pen 8 Units      02/19 0001 SubQ Every 4 hours 02/16/18 1058          ASSESSMENT and PLAN    * Embolic stroke involving left middle cerebral artery     Avoid hypoglycemia             Type 2 diabetes mellitus    -180    Continue levemir 36 units daily to cover basal needs (using ~0.7u/kg)   Increase novolog 10 units q4hr to cover TF  Moderate correction q4hr   If TF held or decreased, hold or decrease novolog by same %              On enteral nutrition     TF 55cc/hr, increasing insulin needs                Obstructive sleep apnea     May increase insulin resistance if untreated               Meron Marsh MD  Endocrinology  [facility]

## 2018-02-25 NOTE — PROGRESS NOTES
At 0806 this RN went to check patient's VS. Noted to have 101.2F temp orally, 's, RR 32 and labored, satting 87-89% on 4L NC, and /82. Patient appeared diaphoretic, BG revealed 288, no hypoglycemia. Notified charge RNDelfina, and rapid response was initiated. Placed on Venti mask 14L 55% O2, abg done, cxr done, ekg done, and given 2L NS bolus. Stroke team at bedside with RN and charge RN, rapid response nurse staying at bedside, labs drawn and another peripheral line was placed. Sister notified. Continue to monitor.

## 2018-02-25 NOTE — CARE UPDATE
Rapid Response Follow-up Note    Followed up on patient for prior RRT call this am  No acute issues at this time. Pt remains on VM 55% 14L, sats 96&, Bp 138/98,   Reviewed findings with charge RN Vivi  Please call Rapid Response RN with any questions or concerns at  [phone]

## 2018-02-25 NOTE — SIGNIFICANT EVENT
Rapid Response Nurse Note     Rapid Response Metrics:     Admit Date: 2018  LOS: 31  Code Status: Full Code   Date of Consult: 2018  : 1970  Age: 47 y.o.  Weight:   Wt Readings from Last 1 Encounters:   18 105.6 kg (232 lb 12.9 oz)     Race:   Sex: male  Bed: [address] [place] [address] A:   MRN: [id number]  Time Rapid Response Team page Received: 0820  Time Rapid Response Team at Bedside: 08  Time Rapid Response Team left Bedside: 09  Was the patient discharged from an ICU this admission? No   Was the patient discharged from a PACU within last 24 hours? No  Did the patient receive conscious sedation/general anesthesia within last 24 hours? No  Was the patient in the ED within the past 24 hours? No  Was the patient started on NIPPV within the past 24 hours? No  Did this progress into an ARC or CPA: No  Attending Physician: Jaime Reynolds MD  Primary Service: Networked reference to record PCT   Consult Requested By: Jaime Reynolds MD   Rapid Response Indication(s): Respiratory Distress  Disposition: Remained on the floor    SITUATION:     Reason for Call:   Called to evaluate the patient for Respiratory distress, low saturations on NC, tachycardia, and fever      BACKGROUND:     Why is the patient in the hospital?: The patient is a 47 year old male with no known PMHx admitted to [facility] 2018 s/p thrombectomy of L M1 occlusion. Per chart review, he walked into [place] and was acting abnormal.  EMS was called and brought to the ED for concern of L MCA syndrome. CT MP revealed short segment occlusion of left M1.  Patient went to IR for thrombectomy of L M1.  TICI2C reperfusion and angioplasty.  What changed?: Pt has increased WBC this am at 16, febrile, tachycardic, decreased saturations to 88-89% on 4L NC    ASSESSMENT:     What did you find: Upon arrival, pt had O2 sats of 88-89% on 4LNC. Pt placed upright in the bed, O2 increased to 55% VM/14L, sats increased to 90-92%, Blood cultures, ABG,  EKG, and CXR done. Right 20g PIV started and a 1L NS bolus initiated. Primary team VERENICE Peoples PA-C at the . Antibiotics ordered. Deep nasotracheal suctioning done, pt has thick yellow secretions. Sats after suctioning increased to 96% and remained stable. Pt awake and moving spontaneously, does not follow commands.   Primary team ok with pt to remain on the floor at this time. Will follow up, RRT number given to the RN Khloe.    RECOMMENDATIONS:     We recommend: Continuous pulse ox, prn NT suctioning, prn nebs    FOLLOW-UP/CONTINGENCY PLAN:     Patient needs a follow up    Call back the rapid Response Nurse at [phone]  For additional questions or concerns      PHYSICIAN ESCALATION:     Orders received and case discussed with VERENICE Peoples PA-C

## 2018-02-26 PROBLEM — E11.65 TYPE 2 DIABETES MELLITUS WITH HYPERGLYCEMIA: Status: ACTIVE | Noted: 2018-01-26

## 2018-02-26 PROBLEM — J96.01 ACUTE RESPIRATORY FAILURE WITH HYPOXIA: Status: ACTIVE | Noted: 2018-02-26

## 2018-02-26 LAB
ALBUMIN SERPL BCP-MCNC: 1.8 G/DL
ALP SERPL-CCNC: 137 U/L
ALT SERPL W/O P-5'-P-CCNC: 24 U/L
ANION GAP SERPL CALC-SCNC: 13 MMOL/L
AST SERPL-CCNC: 38 U/L
BASOPHILS # BLD AUTO: 0.04 K/UL
BASOPHILS NFR BLD: 0.3 %
BILIRUB SERPL-MCNC: 0.6 MG/DL
BUN SERPL-MCNC: 19 MG/DL
CALCIUM SERPL-MCNC: 8.5 MG/DL
CHLORIDE SERPL-SCNC: 103 MMOL/L
CO2 SERPL-SCNC: 24 MMOL/L
CREAT SERPL-MCNC: 0.9 MG/DL
DIFFERENTIAL METHOD: ABNORMAL
EOSINOPHIL # BLD AUTO: 0.2 K/UL
EOSINOPHIL NFR BLD: 1.7 %
ERYTHROCYTE [DISTWIDTH] IN BLOOD BY AUTOMATED COUNT: 12.6 %
EST. GFR  (AFRICAN AMERICAN): >60 ML/MIN/1.73 M^2
EST. GFR  (NON AFRICAN AMERICAN): >60 ML/MIN/1.73 M^2
GLUCOSE SERPL-MCNC: 142 MG/DL
GRAM STN SPEC: NORMAL
GRAM STN SPEC: NORMAL
HCT VFR BLD AUTO: 32.4 %
HGB BLD-MCNC: 10.6 G/DL
IMM GRANULOCYTES # BLD AUTO: 0.08 K/UL
IMM GRANULOCYTES NFR BLD AUTO: 0.6 %
INR PPP: 1
LYMPHOCYTES # BLD AUTO: 2.2 K/UL
LYMPHOCYTES NFR BLD: 15.4 %
MAGNESIUM SERPL-MCNC: 1.7 MG/DL
MCH RBC QN AUTO: 29.4 PG
MCHC RBC AUTO-ENTMCNC: 32.7 G/DL
MCV RBC AUTO: 90 FL
MONOCYTES # BLD AUTO: 0.9 K/UL
MONOCYTES NFR BLD: 6.1 %
NEUTROPHILS # BLD AUTO: 11 K/UL
NEUTROPHILS NFR BLD: 75.9 %
NRBC BLD-RTO: 0 /100 WBC
PHOSPHATE SERPL-MCNC: 4.2 MG/DL
PLATELET # BLD AUTO: 324 K/UL
PMV BLD AUTO: 10.1 FL
POCT GLUCOSE: 109 MG/DL (ref 70–110)
POCT GLUCOSE: 113 MG/DL (ref 70–110)
POCT GLUCOSE: 122 MG/DL (ref 70–110)
POCT GLUCOSE: 151 MG/DL (ref 70–110)
POCT GLUCOSE: 188 MG/DL (ref 70–110)
POCT GLUCOSE: 94 MG/DL (ref 70–110)
POTASSIUM SERPL-SCNC: 3.8 MMOL/L
PROT SERPL-MCNC: 6.5 G/DL
PROTHROMBIN TIME: 10.3 SEC
RBC # BLD AUTO: 3.6 M/UL
SODIUM SERPL-SCNC: 140 MMOL/L
VANCOMYCIN TROUGH SERPL-MCNC: 14.2 UG/ML
WBC # BLD AUTO: 14.46 K/UL

## 2018-02-26 PROCEDURE — 20600001 HC STEP DOWN PRIVATE ROOM

## 2018-02-26 PROCEDURE — 99232 SBSQ HOSP IP/OBS MODERATE 35: CPT | Mod: ,,, | Performed by: NURSE PRACTITIONER

## 2018-02-26 PROCEDURE — 25500020 PHARM REV CODE 255: Performed by: PSYCHIATRY & NEUROLOGY

## 2018-02-26 PROCEDURE — 0K9L3ZX DRAINAGE OF LEFT ABDOMEN MUSCLE, PERCUTANEOUS APPROACH, DIAGNOSTIC: ICD-10-PCS | Performed by: RADIOLOGY

## 2018-02-26 PROCEDURE — 87116 MYCOBACTERIA CULTURE: CPT

## 2018-02-26 PROCEDURE — 87076 CULTURE ANAEROBE IDENT EACH: CPT

## 2018-02-26 PROCEDURE — 25500020 PHARM REV CODE 255: Performed by: STUDENT IN AN ORGANIZED HEALTH CARE EDUCATION/TRAINING PROGRAM

## 2018-02-26 PROCEDURE — 85610 PROTHROMBIN TIME: CPT

## 2018-02-26 PROCEDURE — 94640 AIRWAY INHALATION TREATMENT: CPT

## 2018-02-26 PROCEDURE — 99233 SBSQ HOSP IP/OBS HIGH 50: CPT | Mod: ,,, | Performed by: PSYCHIATRY & NEUROLOGY

## 2018-02-26 PROCEDURE — 87186 SC STD MICRODIL/AGAR DIL: CPT

## 2018-02-26 PROCEDURE — 80202 ASSAY OF VANCOMYCIN: CPT

## 2018-02-26 PROCEDURE — 94761 N-INVAS EAR/PLS OXIMETRY MLT: CPT

## 2018-02-26 PROCEDURE — 99222 1ST HOSP IP/OBS MODERATE 55: CPT | Mod: ,,, | Performed by: HOSPITALIST

## 2018-02-26 PROCEDURE — 83735 ASSAY OF MAGNESIUM: CPT

## 2018-02-26 PROCEDURE — 87206 SMEAR FLUORESCENT/ACID STAI: CPT

## 2018-02-26 PROCEDURE — 36415 COLL VENOUS BLD VENIPUNCTURE: CPT

## 2018-02-26 PROCEDURE — 87075 CULTR BACTERIA EXCEPT BLOOD: CPT

## 2018-02-26 PROCEDURE — 87205 SMEAR GRAM STAIN: CPT

## 2018-02-26 PROCEDURE — 85025 COMPLETE CBC W/AUTO DIFF WBC: CPT

## 2018-02-26 PROCEDURE — 25000242 PHARM REV CODE 250 ALT 637 W/ HCPCS: Performed by: PSYCHIATRY & NEUROLOGY

## 2018-02-26 PROCEDURE — 97535 SELF CARE MNGMENT TRAINING: CPT

## 2018-02-26 PROCEDURE — 97110 THERAPEUTIC EXERCISES: CPT

## 2018-02-26 PROCEDURE — 27000221 HC OXYGEN, UP TO 24 HOURS

## 2018-02-26 PROCEDURE — 63600175 PHARM REV CODE 636 W HCPCS: Performed by: NURSE PRACTITIONER

## 2018-02-26 PROCEDURE — 87077 CULTURE AEROBIC IDENTIFY: CPT | Mod: 59

## 2018-02-26 PROCEDURE — 25000003 PHARM REV CODE 250: Performed by: PHYSICIAN ASSISTANT

## 2018-02-26 PROCEDURE — A4216 STERILE WATER/SALINE, 10 ML: HCPCS | Performed by: NURSE PRACTITIONER

## 2018-02-26 PROCEDURE — 87070 CULTURE OTHR SPECIMN AEROBIC: CPT

## 2018-02-26 PROCEDURE — 25000242 PHARM REV CODE 250 ALT 637 W/ HCPCS: Performed by: STUDENT IN AN ORGANIZED HEALTH CARE EDUCATION/TRAINING PROGRAM

## 2018-02-26 PROCEDURE — 84100 ASSAY OF PHOSPHORUS: CPT

## 2018-02-26 PROCEDURE — 25000003 PHARM REV CODE 250: Performed by: NURSE PRACTITIONER

## 2018-02-26 PROCEDURE — 80053 COMPREHEN METABOLIC PANEL: CPT

## 2018-02-26 PROCEDURE — 97112 NEUROMUSCULAR REEDUCATION: CPT

## 2018-02-26 RX ADMIN — IOHEXOL 15 ML: 350 INJECTION, SOLUTION INTRAVENOUS at 12:02

## 2018-02-26 RX ADMIN — IPRATROPIUM BROMIDE 0.5 MG: 0.5 SOLUTION RESPIRATORY (INHALATION) at 07:02

## 2018-02-26 RX ADMIN — Medication 3 ML: at 05:02

## 2018-02-26 RX ADMIN — HEPARIN SODIUM 5000 UNITS: 5000 INJECTION, SOLUTION INTRAVENOUS; SUBCUTANEOUS at 09:02

## 2018-02-26 RX ADMIN — PIPERACILLIN AND TAZOBACTAM 4.5 G: 4; .5 INJECTION, POWDER, LYOPHILIZED, FOR SOLUTION INTRAVENOUS; PARENTERAL at 10:02

## 2018-02-26 RX ADMIN — SODIUM CHLORIDE SOLN NEBU 3% 4 ML: 3 NEBU SOLN at 08:02

## 2018-02-26 RX ADMIN — VANCOMYCIN HYDROCHLORIDE 1750 MG: 1 INJECTION, POWDER, LYOPHILIZED, FOR SOLUTION INTRAVENOUS at 10:02

## 2018-02-26 RX ADMIN — PIPERACILLIN AND TAZOBACTAM 4.5 G: 4; .5 INJECTION, POWDER, LYOPHILIZED, FOR SOLUTION INTRAVENOUS; PARENTERAL at 01:02

## 2018-02-26 RX ADMIN — SODIUM CHLORIDE SOLN NEBU 3% 4 ML: 3 NEBU SOLN at 01:02

## 2018-02-26 RX ADMIN — PIPERACILLIN AND TAZOBACTAM 4.5 G: 4; .5 INJECTION, POWDER, LYOPHILIZED, FOR SOLUTION INTRAVENOUS; PARENTERAL at 06:02

## 2018-02-26 RX ADMIN — INSULIN ASPART 10 UNITS: 100 INJECTION, SOLUTION INTRAVENOUS; SUBCUTANEOUS at 08:02

## 2018-02-26 RX ADMIN — ALBUTEROL SULFATE 2.5 MG: 2.5 SOLUTION RESPIRATORY (INHALATION) at 08:02

## 2018-02-26 RX ADMIN — IPRATROPIUM BROMIDE 0.5 MG: 0.5 SOLUTION RESPIRATORY (INHALATION) at 08:02

## 2018-02-26 RX ADMIN — IOHEXOL 100 ML: 350 INJECTION, SOLUTION INTRAVENOUS at 01:02

## 2018-02-26 RX ADMIN — HEPARIN SODIUM 5000 UNITS: 5000 INJECTION, SOLUTION INTRAVENOUS; SUBCUTANEOUS at 05:02

## 2018-02-26 RX ADMIN — INSULIN ASPART 10 UNITS: 100 INJECTION, SOLUTION INTRAVENOUS; SUBCUTANEOUS at 04:02

## 2018-02-26 RX ADMIN — IPRATROPIUM BROMIDE 0.5 MG: 0.5 SOLUTION RESPIRATORY (INHALATION) at 01:02

## 2018-02-26 RX ADMIN — ALBUTEROL SULFATE 2.5 MG: 2.5 SOLUTION RESPIRATORY (INHALATION) at 07:02

## 2018-02-26 RX ADMIN — SODIUM CHLORIDE SOLN NEBU 3% 4 ML: 3 NEBU SOLN at 12:02

## 2018-02-26 RX ADMIN — HEPARIN SODIUM 5000 UNITS: 5000 INJECTION, SOLUTION INTRAVENOUS; SUBCUTANEOUS at 03:02

## 2018-02-26 RX ADMIN — METOPROLOL TARTRATE 25 MG: 25 TABLET ORAL at 09:02

## 2018-02-26 RX ADMIN — SODIUM CHLORIDE: 0.9 INJECTION, SOLUTION INTRAVENOUS at 05:02

## 2018-02-26 RX ADMIN — IPRATROPIUM BROMIDE 0.5 MG: 0.5 SOLUTION RESPIRATORY (INHALATION) at 12:02

## 2018-02-26 RX ADMIN — ALBUTEROL SULFATE 2.5 MG: 2.5 SOLUTION RESPIRATORY (INHALATION) at 12:02

## 2018-02-26 RX ADMIN — ALBUTEROL SULFATE 2.5 MG: 2.5 SOLUTION RESPIRATORY (INHALATION) at 01:02

## 2018-02-26 RX ADMIN — SODIUM CHLORIDE SOLN NEBU 3% 4 ML: 3 NEBU SOLN at 07:02

## 2018-02-26 NOTE — H&P
Inpatient Radiology Pre-procedure Note    History of Present Illness:  Todd Quevedo is a 47 y.o. male who presents for possible aspiration of left rectus abdominus collection.    Admission H&P reviewed.  History reviewed. No pertinent past medical history.  History reviewed. No pertinent surgical history.    Review of Systems:   As documented in primary team H&P    Home Meds:   Prior to Admission medications    Not on File     Scheduled Meds:    albuterol sulfate  2.5 mg Nebulization Q6H    aspirin  325 mg Per G Tube Daily    atorvastatin  80 mg Per NG tube Daily    clopidogrel  75 mg Per G Tube Daily    heparin (porcine)  5,000 Units Subcutaneous Q8H    insulin aspart U-100  10 Units Subcutaneous Q24H    insulin aspart U-100  10 Units Subcutaneous Q24H    insulin aspart U-100  10 Units Subcutaneous Q24H    insulin aspart U-100  10 Units Subcutaneous Q24H    insulin aspart U-100  10 Units Subcutaneous Q24H    insulin aspart U-100  10 Units Subcutaneous Q24H    insulin detemir U-100  25 Units Subcutaneous Daily    ipratropium  0.5 mg Nebulization Q6H    lisinopril  20 mg Per NG tube Daily    metoprolol tartrate  25 mg Per NG tube BID    modafinil  200 mg Per NG tube Daily    And    modafinil  100 mg Per NG tube Daily    piperacillin-tazobactam (ZOSYN) IVPB  4.5 g Intravenous Q8H    polyethylene glycol  17 g Per NG tube Daily    senna-docusate 8.6-50 mg  1 tablet Per NG tube BID    sodium chloride 0.9%  3 mL Intravenous Q8H    sodium chloride 3%  4 mL Nebulization Q6H    vancomycin (VANCOCIN) IVPB  15 mg/kg (Dosing Weight) Intravenous Q12H     Continuous Infusions:    sodium chloride 0.9% 75 mL/hr at 02/26/18 0557     PRN Meds:acetaminophen, bisacodyl, dextrose 50%, glucagon (human recombinant), insulin aspart U-100, ipratropium, labetalol, ondansetron  Anticoagulants/Antiplatelets: no anticoagulation    Allergies: Review of patient's allergies indicates:  No Known Allergies  Sedation Hx: have  not been any systemic reactions    Labs:    Recent Labs  Lab 02/26/18 0358   INR 1.0       Recent Labs  Lab 02/26/18 0358   WBC 14.46*   HGB 10.6*   HCT 32.4*   MCV 90         Recent Labs  Lab 02/26/18 0358   *      K 3.8      CO2 24   BUN 19   CREATININE 0.9   CALCIUM 8.5*   MG 1.7   ALT 24   AST 38   ALBUMIN 1.8*   BILITOT 0.6         Vitals:  Temp: 97.5 °F (36.4 °C) (02/26/18 0330)  Pulse: 86 (02/26/18 0759)  Resp: 16 (02/26/18 0759)  BP: 119/79 (02/26/18 0330)  SpO2: 99 % (02/26/18 0804)     Physical Exam:  ASA: 2  Mallampati: not assessed     General: no acute distress  Mental Status: NOT alert and oriented to person, place and time  HEENT: normocephalic, atraumatic  Chest: unlabored breathing  Heart: regular heart rate  Abdomen: nondistended    Plan: possible aspiration of left rectus abdominus collection under US.   Sedation Plan: local    Helena Beasley M.D. 10:10 AM 2/26/2018

## 2018-02-26 NOTE — NURSING
0100- patient to CT with this RN and charge RN ;   0145- Returned from CT- partial bed path and total linen change complete.   0200- paged FLORY Mason NP to restart tube feed; per NP do not restart tube feed @ this time. Await CT results. Will continue to monitor.   0431- paged NP FLORY Mason regarding use of PEG- do not use PEG  @this time for med admin. Will cont to monitor.

## 2018-02-26 NOTE — PROGRESS NOTES
Procedure complete, pt tolerates well.  DSD applied to left abdominal procedure site, c/d/i.  VSS.  Pt to return to room for recovery.   Report called to Samina AKHTAR

## 2018-02-26 NOTE — PLAN OF CARE
Problem: Occupational Therapy Goal  Goal: Occupational Therapy Goal  Goals set 2/9 to be addressed for 14 days with expiration date, 2/23:  Patient will increase functional independence with ADLs by performing:    Patient will demonstrate rolling to the right with mod assist.  Not met   Patient will demonstrate rolling to the left with max assist.   Not met  Patient will demonstrate supine -sit with max assist.   Not met  Patient will demonstrate squat pivot transfers with max assist.   Not met  Patient will demonstrate grooming while seated with max assist.   Not met  Patient will demonstrate upper body dressing with max assist while seated EOB.   Not met  Patient will demonstrate lower body dressing with max assist while seated EOB.   Not met  Patient will demonstrate toileting with max assist.   Not met  Patient will demonstrate ability to follow 3/5 commands.   Not met  Patient will demonstrate independence with HEP for right UE positioning.    Not met  Patient's family / caregiver will demonstrate independence and safety with assisting patient with self-care skills and functional mobility.     Not met  Patient's family / caregiver will demonstrate independence with providing ROM and changes in bed positioning.   Not met       Goals remain appropriate.  ISAMAR Almonte  2/26/2018

## 2018-02-26 NOTE — SUBJECTIVE & OBJECTIVE
Neurologic Chief Complaint: L MCA Stroke    Subjective:     Interval History: NAEON. VS wnl. Afebrile on broad spectrum vanc and zosyn.    HPI, Past Medical, Family, and Social History remains the same as documented in the initial encounter.     Review of Systems   Unable to perform ROS: Patient nonverbal   Constitutional: Positive for fever. Negative for chills.   Eyes: Negative for visual disturbance.   Respiratory: Negative for cough and shortness of breath (improving).    Cardiovascular: Negative for chest pain.   Skin: Negative for rash and wound.   Neurological: Positive for speech difficulty and weakness. Negative for numbness.   Psychiatric/Behavioral: Positive for confusion and decreased concentration.     Scheduled Meds:   albuterol sulfate  2.5 mg Nebulization Q6H    aspirin  325 mg Per G Tube Daily    atorvastatin  80 mg Per NG tube Daily    clopidogrel  75 mg Per G Tube Daily    heparin (porcine)  5,000 Units Subcutaneous Q8H    insulin aspart U-100  10 Units Subcutaneous Q24H    insulin aspart U-100  10 Units Subcutaneous Q24H    insulin aspart U-100  10 Units Subcutaneous Q24H    insulin aspart U-100  10 Units Subcutaneous Q24H    insulin aspart U-100  10 Units Subcutaneous Q24H    insulin aspart U-100  10 Units Subcutaneous Q24H    insulin detemir U-100  25 Units Subcutaneous Daily    ipratropium  0.5 mg Nebulization Q6H    lisinopril  20 mg Per NG tube Daily    metoprolol tartrate  25 mg Per NG tube BID    modafinil  200 mg Per NG tube Daily    And    modafinil  100 mg Per NG tube Daily    piperacillin-tazobactam (ZOSYN) IVPB  4.5 g Intravenous Q8H    polyethylene glycol  17 g Per NG tube Daily    senna-docusate 8.6-50 mg  1 tablet Per NG tube BID    sodium chloride 0.9%  3 mL Intravenous Q8H    sodium chloride 3%  4 mL Nebulization Q6H    vancomycin (VANCOCIN) IVPB  15 mg/kg (Dosing Weight) Intravenous Q12H     Continuous Infusions:   sodium chloride 0.9% 75 mL/hr at  02/26/18 0557     PRN Meds:acetaminophen, bisacodyl, dextrose 50%, glucagon (human recombinant), insulin aspart U-100, ipratropium, labetalol, ondansetron    Objective:     Vital Signs (Most Recent):  Temp: 97.8 °F (36.6 °C) (02/26/18 1049)  Pulse: 87 (02/26/18 1255)  Resp: 20 (02/26/18 1255)  BP: (!) 139/91 (02/26/18 1049)  SpO2: 100 % (02/26/18 1255)  BP Location: Right arm    Vital Signs Range (Last 24H):  Temp:  [97.2 °F (36.2 °C)-98.6 °F (37 °C)]   Pulse:  []   Resp:  [16-22]   BP: (119-149)/(66-98)   SpO2:  [95 %-100 %]   BP Location: Right arm     Physical Exam   Constitutional: He appears well-developed.   HENT:   Head: Normocephalic and atraumatic.   Eyes: EOM are normal. Pupils are equal, round, and reactive to light.   Cardiovascular: Normal rate and regular rhythm.    Pulmonary/Chest: Effort normal and breath sounds normal.   Neurological: He is alert.   Skin: Skin is warm.   Psychiatric: He has a normal mood and affect. His behavior is normal.   Vitals reviewed.     Neurologic Exam:  Mental Status:  Somnolent, opens eyes to voice. Does not follow commands well, intermittently mimicking commands. No attempts to use gestures to communicate this am, tearful.  Cranial Nerves:  Face appears symmetric.  PERRLA, EOMI with tracking.  Tongue protrudes midline.  Motor:  Normal bulk and tone. LUE, LLE moving spontaneously. RLE with some withdrawal to noxious stimuli, non-stereotyped.  No movement RUE.  Sensory:   Withdrawal to noxious stimuli at LUE and BLE.  No withdrawal or grimace to noxious stimuli at RUE.  Reflexes:  Biceps, brachioradialis, patellar 2+ and symmetric.  No ankle clonus.  Downgoing toe bilaterally.  Coordination:  Difficulty following JESSICA.  No resting tremor or myoclonus.    Gait:  Deferred 2/2 fall precautions, AMS    Laboratory:  CMP:     Recent Labs  Lab 02/26/18  0358   CALCIUM 8.5*   ALBUMIN 1.8*   PROT 6.5      K 3.8   CO2 24      BUN 19   CREATININE 0.9   ALKPHOS 137*    ALT 24   AST 38   BILITOT 0.6     CBC:     Recent Labs  Lab 02/26/18  0358   WBC 14.46*   RBC 3.60*   HGB 10.6*   HCT 32.4*      MCV 90   MCH 29.4   MCHC 32.7     Lipid Panel: Invalid due to excessively high triglycerides  Coagulation:     Recent Labs  Lab 02/26/18  0358   INR 1.0     Hgb A1C: 10%      Diagnostic Results     Brain Imaging   01/26/18 CT head w/o contrast:  No detrimental change.  Evolving subtle left MCA territory infarct.       01/26/18 MRI Brain w/o contrast:  1. Large acute left MCA territory infarction as above.  No associated hemorrhage or significant mass effect at this time.  2. Mild chronic ischemic changes.        02/04/18 CT head w/o contrast:  Redemonstration of large left MCA territory infarction without evidence of hemorrhagic conversion.  Slight interval increase in local mass effect with rightward midline shift of approximately 0.6 cm. Stable postoperative change from left MCA endovascular stenting. Sinus disease as above.       Vessel Imaging   01/25/18 CTA Stroke Multiphase:  Short segment occlusion of left M1.  There is good collateral flow in the ischemic territory as detailed above.  Noncontrast images demonstrate subtle changes reflecting left MCA territory acute infarct.       01/25/18 IR Angiogram:  Cerebral angiogram demonstrates occlusion of the M1 segment of the left middle cerebral artery with good collateral flow from pial collaterals.  This was successfully removed and residual stenosis at the site of occlusion was angioplastied and stented with improvement in flow.  Slow flow to the left parietal lobe suggests area of infarcted tissue.  This was felt to represent TICI 2C flow.    Cardiac Imaging   01/26/18 2D Echo w/ CFD:  CONCLUSIONS     1 - Normal left ventricular systolic function (EF 65-70%).     2 - Concentric remodeling.     3 - No wall motion abnormalities.     4 - Normal left ventricular diastolic function.     5 - Normal right ventricular systolic  function .    6 - No LA enlargement.

## 2018-02-26 NOTE — PROCEDURES
Radiology Post-Procedure Note    Pre Op Diagnosis: Left rectus sheath collection/phlegmon    Post Op Diagnosis: Same    Procedure: Ultrasound guided left rectus sheath collection aspiration.    Procedure performed by: Dr. Morris and Dr. Sutton    Written Informed Consent Obtained: Yes  Specimen Removed: YES 2 cc blood and fluid  Estimated Blood Loss: Minimal    Findings:     Successful ultrasound guided aspiration of a left rectus collection adjacent to a PEG tube. Sample sent to lab. Please see separate imaging dictation for further details.    Patient tolerated procedure well.    Gallo Sutton MD  PGY-5  Department of Radiology  [phone]

## 2018-02-26 NOTE — CARE UPDATE
Rapid Response Follow-up Note    Followed up with patient for proactive rounding.   MEWS 4: altered mental status.   Per chart review, baseline GCS 8-9, aphasic, responds to voice.   On bedside exam, patient resting in bed.   Portable probe: sat 98%, HR 96  No acute issues at this time. Vital signs within normal limits  Reviewed plan of care with primary RN, Samina.   Please call Rapid Response RN with any questions or concerns at  [phone]

## 2018-02-26 NOTE — ASSESSMENT & PLAN NOTE
Fever - Tmax 101.2 at 8 am  - Trending down  - Workup ordered (CXR, UC/UA, blood cultures, PEG tube cultures, left rectus abdominal hematoma/seroma).   - IR consulted for drain of left rectus abdominal hematoma/seroma today. Pending cultures from UTI & resp sputum  - meanwhile, continue broad spectrum antibiotics vanc & zosyn

## 2018-02-26 NOTE — CONSULTS
Case reviewed and d/w primary team. Will attempt to look under US to look for potential fluid collection in LEFT rectus abdominus adjacent to Gtube, and if seen, aspirate for cultures. Order placed.

## 2018-02-26 NOTE — PROGRESS NOTES
"[facility]  Endocrinology  Progress Note    Admit Date: 1/25/2018     Reason for Consult: Management of T2DM, Hyperglycemia     Surgical Procedure and Date: thrombectomy with TICI 2C reperfusion and stent placement due to L MCA stenosis  PEG 1/31    Home Diabetes Medications: metformin     How often checking glucose at home? FRIEDA, pt aphasic     Diabetes Complications include:     Hyperglycemia    Complicating diabetes co morbidities:   Residual deficits from CVA  and LOYDA      HPI:   Patient is a 47 y.o. male with a diagnosis of poorly controlled DM II, HLD, LOYDA, presents to [facility] 01/25/18 after noted "strange behavior" for which EMS was called.  Pt was found to have a L M1 occlusion and was taken to IR for thrombectomy with TICI 2C reperfusion and stent placement due to L MCA stenosis. DAPT 2/2 recent stent placement in angiogram after PEG placement.  Endo consulted for BG management.         Interval HPI:   FBG at goal.  NPO, TF on hold, previously at 55 cc/hr.  Afebrile.  No hypoglycemia.  Novolog scheduled on hold last night, r/t TF being on hold.  Pt went to IR, for ultrasound guided left rectus sheath collection aspiration.     BP (!) 139/91 (Patient Position: Lying)   Pulse 94   Temp 97.8 °F (36.6 °C) (Axillary)   Resp 17   Ht 5' 10" (1.778 m)   Wt 105.6 kg (232 lb 12.9 oz)   SpO2 96%   BMI 33.40 kg/m²       Labs Reviewed and Include      Recent Labs  Lab 02/26/18  0358   *   CALCIUM 8.5*   ALBUMIN 1.8*   PROT 6.5      K 3.8   CO2 24      BUN 19   CREATININE 0.9   ALKPHOS 137*   ALT 24   AST 38   BILITOT 0.6     Lab Results   Component Value Date    WBC 14.46 (H) 02/26/2018    HGB 10.6 (L) 02/26/2018    HCT 32.4 (L) 02/26/2018    MCV 90 02/26/2018     02/26/2018     No results for input(s): TSH, FREET4 in the last 168 hours.  Lab Results   Component Value Date    HGBA1C 10.0 (H) 01/25/2018       Nutritional status:   Body mass index is 33.4 kg/m².  Lab Results "   Component Value Date    ALBUMIN 1.8 (L) 02/26/2018    ALBUMIN 2.2 (L) 02/25/2018    ALBUMIN 2.3 (L) 02/24/2018     No results found for: PREALBUMIN    Estimated Creatinine Clearance: 122.1 mL/min (based on SCr of 0.9 mg/dL).    Accu-Checks  Recent Labs      02/24/18   1646  02/24/18   1917  02/24/18   2352  02/25/18   0410  02/25/18   0812  02/25/18   1252  02/25/18   1800  02/25/18   2039  02/25/18   2310  02/26/18   0317   POCTGLUCOSE  227*  236*  253*  241*  288*  265*  188*  143*  144*  151*       Current Medications and/or Treatments Impacting Glycemic Control  Immunotherapy:  Immunosuppressants     None        Steroids:   Hormones     None        Pressors:    Autonomic Drugs     Start     Stop Route Frequency Ordered    01/31/18 0854  succinylcholine (ANECTINE) 20 mg/mL injection     Comments:  Created by cabinet override    01/31 2059 01/31/18 0854        Hyperglycemia/Diabetes Medications: Antihyperglycemics     Start     Stop Route Frequency Ordered    02/26/18 0900  insulin detemir U-100 pen 25 Units      -- SubQ Daily 02/26/18 0827    02/26/18 0400  insulin aspart U-100 pen 10 Units      -- SubQ Every 24 hours (non-standard times) 02/25/18 0802    02/26/18 0000  insulin aspart U-100 pen 10 Units      -- SubQ Every 24 hours (non-standard times) 02/25/18 0802    02/25/18 2000  insulin aspart U-100 pen 10 Units      -- SubQ Every 24 hours (non-standard times) 02/25/18 0802    02/25/18 1600  insulin aspart U-100 pen 10 Units      -- SubQ Every 24 hours (non-standard times) 02/25/18 0802    02/25/18 1200  insulin aspart U-100 pen 10 Units      -- SubQ Every 24 hours (non-standard times) 02/25/18 0802    02/25/18 0802  insulin aspart U-100 pen 10 Units      -- SubQ Every 24 hours (non-standard times) 02/25/18 0803    02/24/18 1148  insulin aspart U-100 pen 1-10 Units      -- SubQ Every 4 hours PRN 02/24/18 1049    02/16/18 1400  insulin aspart pen 8 Units      02/19 0001 SubQ Every 4 hours 02/16/18 1058           ASSESSMENT and PLAN    * Embolic stroke involving left middle cerebral artery    Avoid hypoglycemia             Type 2 diabetes mellitus with hyperglycemia    -180  Cut back levemir by 30% r/t TF being on hold  Will reassess needs later today for basal x 1 for bg elevations.  Continue novolog 10 units q4h for TF coverage.  If TF held or decreased, hold or decrease novolog by same %  Moderate correction q4hr             On enteral nutrition    TF 55cc/hr, increasing insulin needs  On hold part of the day.            Obstructive sleep apnea    May increase insulin resistance if untreated             MATHIEU Day, FNP  Endocrinology  [facility]

## 2018-02-26 NOTE — SUBJECTIVE & OBJECTIVE
"Interval HPI:   FBG at goal.  NPO, TF on hold, previously at 55 cc/hr.  Afebrile.  No hypoglycemia.  novolog scheduled on hold last night, r/t TF being on hold.    BP (!) 139/91 (Patient Position: Lying)   Pulse 94   Temp 97.8 °F (36.6 °C) (Axillary)   Resp 17   Ht 5' 10" (1.778 m)   Wt 105.6 kg (232 lb 12.9 oz)   SpO2 96%   BMI 33.40 kg/m²     Labs Reviewed and Include      Recent Labs  Lab 02/26/18  0358   *   CALCIUM 8.5*   ALBUMIN 1.8*   PROT 6.5      K 3.8   CO2 24      BUN 19   CREATININE 0.9   ALKPHOS 137*   ALT 24   AST 38   BILITOT 0.6     Lab Results   Component Value Date    WBC 14.46 (H) 02/26/2018    HGB 10.6 (L) 02/26/2018    HCT 32.4 (L) 02/26/2018    MCV 90 02/26/2018     02/26/2018     No results for input(s): TSH, FREET4 in the last 168 hours.  Lab Results   Component Value Date    HGBA1C 10.0 (H) 01/25/2018       Nutritional status:   Body mass index is 33.4 kg/m².  Lab Results   Component Value Date    ALBUMIN 1.8 (L) 02/26/2018    ALBUMIN 2.2 (L) 02/25/2018    ALBUMIN 2.3 (L) 02/24/2018     No results found for: PREALBUMIN    Estimated Creatinine Clearance: 122.1 mL/min (based on SCr of 0.9 mg/dL).    Accu-Checks  Recent Labs      02/24/18   1646  02/24/18   1917  02/24/18   2352  02/25/18   0410  02/25/18   0812  02/25/18   1252  02/25/18   1800  02/25/18   2039  02/25/18   2310  02/26/18   0317   POCTGLUCOSE  227*  236*  253*  241*  288*  265*  188*  143*  144*  151*       Current Medications and/or Treatments Impacting Glycemic Control  Immunotherapy:  Immunosuppressants     None        Steroids:   Hormones     None        Pressors:    Autonomic Drugs     Start     Stop Route Frequency Ordered    01/31/18 0854  succinylcholine (ANECTINE) 20 mg/mL injection     Comments:  Created by cabinet override    01/31 2059 01/31/18 0854        Hyperglycemia/Diabetes Medications: Antihyperglycemics     Start     Stop Route Frequency Ordered    02/26/18 0900  insulin " detemir U-100 pen 25 Units      -- SubQ Daily 02/26/18 0827    02/26/18 0400  insulin aspart U-100 pen 10 Units      -- SubQ Every 24 hours (non-standard times) 02/25/18 0802    02/26/18 0000  insulin aspart U-100 pen 10 Units      -- SubQ Every 24 hours (non-standard times) 02/25/18 0802    02/25/18 2000  insulin aspart U-100 pen 10 Units      -- SubQ Every 24 hours (non-standard times) 02/25/18 0802    02/25/18 1600  insulin aspart U-100 pen 10 Units      -- SubQ Every 24 hours (non-standard times) 02/25/18 0802    02/25/18 1200  insulin aspart U-100 pen 10 Units      -- SubQ Every 24 hours (non-standard times) 02/25/18 0802    02/25/18 0802  insulin aspart U-100 pen 10 Units      -- SubQ Every 24 hours (non-standard times) 02/25/18 0803    02/24/18 1148  insulin aspart U-100 pen 1-10 Units      -- SubQ Every 4 hours PRN 02/24/18 1049    02/16/18 1400  insulin aspart pen 8 Units      02/19 0001 SubQ Every 4 hours 02/16/18 1058

## 2018-02-26 NOTE — ASSESSMENT & PLAN NOTE
- onset ; AB.55/34/69 suggestive of type 1 respiratory failure. Likely due to acute aspiration event given initial CXR was suggestive of RML consolidation. No further episodes overnight. Considered PE as differential however pt is on OAC and not tachycardic, tachypneic. Well score 1.5 (low-risk)  - will continue BS antibiotics; may descalate to aspiration pneumonia coverage if no other cultures grow in 48 hrs  - chest physiotherapy, aspiration precaution, continue O2 via venti mask to keep O2 sats > 92%  - hospital medicine consulted for additional recs

## 2018-02-26 NOTE — HPI
47yoM with PMHx HLD, poorly controlled Dm, and LOYDA (not currently on home CPAP) s/p large L MCA stroke 1/25 s/p thrombectomy with episode of acute hypoxic respiratory failure. Currently patient was febrile to 101 with tachycardia into the 120s with WBC of 16. For CVA ppx patient is on ASA/plavix and statin therapy, also patient has been on DVT ppx with SQH. Patient was placed on vanc and zosyn

## 2018-02-26 NOTE — SUBJECTIVE & OBJECTIVE
History reviewed.   DMII   LOYDA  HLD    History reviewed. No pertinent surgical history.    Review of patient's allergies indicates:  No Known Allergies    No current facility-administered medications on file prior to encounter.      No current outpatient prescriptions on file prior to encounter.     Family History     None        Social History Main Topics    Smoking status: Unknown If Ever Smoked    Smokeless tobacco: Not on file    Alcohol use Not on file    Drug use: Unknown    Sexual activity: Not on file     Review of Systems   Unable to perform ROS: Patient nonverbal     Objective:     Vital Signs (Most Recent):  Temp: 97.8 °F (36.6 °C) (02/26/18 1049)  Pulse: 94 (02/26/18 1049)  Resp: 17 (02/26/18 1049)  BP: (!) 139/91 (02/26/18 1049)  SpO2: 96 % (02/26/18 1049) Vital Signs (24h Range):  Temp:  [97.5 °F (36.4 °C)-99.4 °F (37.4 °C)] 97.8 °F (36.6 °C)  Pulse:  [] 94  Resp:  [16-26] 17  SpO2:  [92 %-100 %] 96 %  BP: (117-149)/(66-98) 139/91     Weight: 105.6 kg (232 lb 12.9 oz)  Body mass index is 33.4 kg/m².    Physical Exam   HENT:   Head: Normocephalic.   Eyes: EOM are normal. Pupils are equal, round, and reactive to light. No scleral icterus.   Neck: No JVD present. No tracheal deviation present.   Cardiovascular: Normal rate, regular rhythm and normal heart sounds.    No murmur heard.  Pulmonary/Chest: Effort normal and breath sounds normal. He has no wheezes. He has no rales.   Abdominal: Soft. There is no guarding.   Peg site with copious purulent and sanguinous drainage, some induration   Musculoskeletal: He exhibits no edema.   Neurological:   Intermittent alert, does not track or follow commands, left eye deviation, flaccid R. Paralysis, moving left side spontaneously  BS reflexes intact       Skin: Skin is warm. He is not diaphoretic.       Significant Labs:   ABGs:   Recent Labs  Lab 02/25/18  0841   PH 7.551*   PCO2 34.2*   HCO3 30.0*   POCSATURATED 96   BE 8     Blood Culture:   Recent  Labs  Lab 02/25/18  0817 02/25/18  0819   LABBLOO No Growth to date  No Growth to date No Growth to date  No Growth to date     CBC:   Recent Labs  Lab 02/25/18  0512 02/26/18  0358   WBC 16.30* 14.46*   HGB 13.1* 10.6*   HCT 38.1* 32.4*    324     CMP:   Recent Labs  Lab 02/25/18 0512 02/26/18  0358    140   K 4.0 3.8   CL 99 103   CO2 27 24   * 142*   BUN 23* 19   CREATININE 0.8 0.9   CALCIUM 9.7 8.5*   PROT 7.9 6.5   ALBUMIN 2.2* 1.8*   BILITOT 0.6 0.6   ALKPHOS 144* 137*   AST 26 38   ALT 31 24   ANIONGAP 11 13   EGFRNONAA >60.0 >60.0     Lactic Acid: No results for input(s): LACTATE in the last 48 hours.  Urine Culture:   Recent Labs  Lab 02/25/18  0920   LABURIN GRAM NEGATIVE KAVITHA, LACTOSE >100,000 cfu/mlIdentification and susceptibility pending       Significant Imaging: I have reviewed all pertinent imaging results/findings within the past 24 hours.

## 2018-02-26 NOTE — PROGRESS NOTES
"[facility]  Vascular Neurology  Comprehensive Stroke Center  Progress Note    Assessment/Plan:     * Embolic stroke involving left middle cerebral artery    Cytotoxic cerebral edema  46 yo M with PMHx HLD, LOYDA, poorly controlled DM II presents to [facility] after noted "strange behavior" for which EMS was called.  Pt was found to have a L M1 occlusion and was taken to IR for thrombectomy with TICI 2C reperfusion and stent placement due to L MCA stenosis. Etiology remains unclear: no obvious signs of cardiac source or atheromatous disease in the aorto/carotid axis. Echo normal with no hx or signs of A fib. PT/OT/SLP will continue to evaluate to make placement recommendations. DAPT 2/2 recent stent placement in angiogram after PEG placement.      Antithrombotics:  mg po qd, clopidogrel 75 mg po qd [intracranial stent]  Statins: Atorvastatin 80 mg po qd  Aggressive risk factor modification: HLD, DM II (Hgb A1c 10%), Obesity  Rehab efforts: PT/OT/SLP--Recommending SNF, placement pending  Diagnostics ordered/pending: None  VTE prophylaxis: Heparin 5000 U q8h  BP parameters: Infarct: Post sucessful thrombectomy, SBP <140         Right spastic hemiparesis    -Due to stroke  -Continue aggressive PT/OT        Acute respiratory failure with hypoxia    Aspiration Pneumonia  - onset ; AB.55/34/69 suggestive of type 1 respiratory failure. Likely due to acute aspiration event given initial CXR was suggestive of RML consolidation. No further episodes overnight. Considered PE as differential however pt is on OAC and not tachycardic, tachypneic. Well score 1.5 (low-risk)  - will continue BS antibiotics; may descalate to aspiration pneumonia coverage if no other cultures grow in 48 hrs  - chest physiotherapy, aspiration precautions, continue O2 via venti mask to keep O2 sats > 92%  - hospital medicine consulted for additional recs        Leukocytosis (leucocytosis)    Fever - Tmax 101.2 at 8 am  - " Trending down  - Workup ordered (CXR, UC/UA, blood cultures, PEG tube cultures, left rectus abdominal hematoma/seroma).   - IR consulted for drain of left rectus abdominal hematoma/seroma today. Pending cultures from UTI & resp sputum  - meanwhile, continue broad spectrum antibiotics vanc & zosyn        Essential hypertension    -Stroke risk factor, -145/67-97 over past 24 h  -Continue scheduled lisinopril 20 mg qd, metoprolol 25 mg bid        Mixed hyperlipidemia    -Stroke risk factor, LDL invalid as TG > 400  -Continue atorvastatin 80 mg qd  -Repeat lipid panel as an outpatient        Type 2 diabetes mellitus with hyperglycemia    -Stroke risk factor, Hgb A1c 10%  -Continue Diabetasource AC TFs  - Endocrine consulted and guiding insulin modification for TF  - POCT AC & HS; moderate correction q4hr            Nodule of right lung    - Follow-up with CT 6-12 months         Oral phase dysphagia    - Continue with SLP  - NPO        Cytotoxic cerebral edema    -2/2 stroke, evident on imaging  -Stable on repeat CT head imaging        Obstructive sleep apnea    -Stroke risk factor  -Recommend CPAP qHS             01/25/18:  Brought in for aphasia, RSW with L gaze preferance. LKN unknown, not tPA candidate. Went to IR for angiogram and stent.  01/29/18:  Off Cardene, remains on Insulin gtt. Concern for sepsis, respiratory source. Imaging with mass effect and some brain compression; maycol crani watch.  01/30/18:  EEG consistent with focal L slowing 2/2 lesion and mild generalized slowing, no seizures. CT head stable, no hemorrhagic conversion  02/01/18:  Emergent intubation likely due to upper airway obstruction per NCC.    02/02/18:  Pt off Precedex, moving left side spontaneously and opening eyes. Intubated, on spontaneous today. NCC giving steroids in hopes to extubate tomorrow.  02/03/18:  NAEON, intubated, not responsive to verbal stimuli, withdraws from pain on LUE, SBP ~135-230. Continued on insulin gtt, SQH  for DVT ppx, and captopril.   02/16/18:  Few changes on exam, continues to have significant RUE/RLE weakness with global aphasia.  Family member at bedside noted attempt to communicate with her.  02/18/18:  Pt largely unchanged on exam this am.  No family member present during am rounds.  02/19/18:  Tolerating facemask. PEG by IR this afternoon.  02/20/18:  s/p PEG. O2 % on 5L NC, still requiring frequent suctioning. Primary team considering transfer to Medicine tomorrow.  02/21/18:  Pt sating % on 3L NC. Restarted DAPT, including . Plans for step down to stroke service.  02/22/18:  Pt with [facility] insurance but not service-connected so unable to be placed at SNF.  Working on insurance coverage of at least HHC therapy.  02/23/18:  Increased detemir to 36 U bid.  Placement with [facility] SNF pending completion of paperwork by Stroke team and pt's spouse--in process.  2/24/18 - NAEON. Pts WBC mildly elevated 13.01, pt is afebrile. Will continue to monitor. Endocrine consulted - Recommend levemir 40 units daily to cover basal needs (using ~0.7u/kg); Start novolog 6 units q4hr to cover TF; Moderate correction q4hr. SNF placement pending.  2/25/18 -  Pt WBC increased overnight, HR range , and temp 99.2. Blood cultures ordered and drawn. UA/UC ordered. Nursing staff called a rapid response this AM due to pts tachycardia, fever, and decreased O2 sats. Arrived to room @ 0824. 1 liter of NS ordered. STAT CXR ordered. Tylenol given. ABG completed with no significant abnormal results. Respiratory suctioned pt and pt repositioned to help with breathing. Pt O2 sats improved on venti mask, temp decreased, and BP remained normotensive. Started on  vanc and zosyn. Pt appears more comfortable and no decline in neuro status. Pt family updated.   2/26/18 - afebrile overnight and leukocytosis improving with BS antibiotics. CT abdomen yesterday noted seroma/hematoma on left rectus abdominus. IR consulted for culture +/-  drain.    STROKE DOCUMENTATION   Acute Stroke Times   Stroke Team Called Date: 01/25/18  Stroke Team Called Time: 1852  Stroke Team Arrival Date: 01/25/18  Stroke Team Arrival Time: 0652  CT Interpretation Time: 1910  Decision to Treat Time for Alteplase:  (n/a unknown last known normal )  Decision to Treat Time for IR: 1915    NIH Scale:  1a. Level Of Consciousness: 0-->Alert: keenly responsive  1b. LOC Questions: 2-->Answers neither question correctly  1c. LOC Commands: 2-->Performs neither task correctly  2. Best Gaze: 1-->Partial gaze palsy: gaze is abnormal in one or both eyes, but forced deviation or total gaze paresis is not present  3. Visual: 1-->Partial hemianopia  4. Facial Palsy: 1-->Minor paralysis (flattened nasolabial fold, asymmetry on smiling)  5a. Motor Arm, Left: 1-->Drift: limb holds 90 (or 45) degrees, but drifts down before full 10 seconds: does not hit bed or other support  5b. Motor Arm, Right: 4-->No movement  6a. Motor Leg, Left: 3-->No effort against gravity: leg falls to bed immediately  6b. Motor Leg, Right: 4-->No movement  7. Limb Ataxia: 0-->Absent  8. Sensory: 1-->Mild-to-moderate sensory loss: patient feels pinprick is less sharp or is dull on the affected side: or there is a loss of superficial pain with pinprick, but patient is aware of being touched  9. Best Language: 3-->Mute, global aphasia: no usable speech or auditory comprehension  10. Dysarthria: 2-->Severe dysarthria: patients speech is so slurred as to be unintelligible in the absence of or out of proportion to any dysphasia, or is mute/anarthric  11. Extinction and Inattention (formerly Neglect): 0-->No abnormality  Total (NIH Stroke Scale): 25       Modified Hardee Score: 0  Jeff Coma Scale:    ABCD2 Score:    [id number] Score:   HAS -BLED Score:   ICH Score:   Hunt & Laguerre Classification:      Neurologic Chief Complaint: L MCA Stroke    Subjective:     Interval History: NAEON. VS wnl. Afebrile on broad spectrum  vanc and zosyn.    HPI, Past Medical, Family, and Social History remains the same as documented in the initial encounter.     Review of Systems   Unable to perform ROS: Patient nonverbal   Constitutional: Positive for fever. Negative for chills.   Eyes: Negative for visual disturbance.   Respiratory: Negative for cough and shortness of breath (improving).    Cardiovascular: Negative for chest pain.   Skin: Negative for rash and wound.   Neurological: Positive for speech difficulty and weakness. Negative for numbness.   Psychiatric/Behavioral: Positive for confusion and decreased concentration.     Scheduled Meds:   albuterol sulfate  2.5 mg Nebulization Q6H    aspirin  325 mg Per G Tube Daily    atorvastatin  80 mg Per NG tube Daily    clopidogrel  75 mg Per G Tube Daily    heparin (porcine)  5,000 Units Subcutaneous Q8H    insulin aspart U-100  10 Units Subcutaneous Q24H    insulin aspart U-100  10 Units Subcutaneous Q24H    insulin aspart U-100  10 Units Subcutaneous Q24H    insulin aspart U-100  10 Units Subcutaneous Q24H    insulin aspart U-100  10 Units Subcutaneous Q24H    insulin aspart U-100  10 Units Subcutaneous Q24H    insulin detemir U-100  25 Units Subcutaneous Daily    ipratropium  0.5 mg Nebulization Q6H    lisinopril  20 mg Per NG tube Daily    metoprolol tartrate  25 mg Per NG tube BID    modafinil  200 mg Per NG tube Daily    And    modafinil  100 mg Per NG tube Daily    piperacillin-tazobactam (ZOSYN) IVPB  4.5 g Intravenous Q8H    polyethylene glycol  17 g Per NG tube Daily    senna-docusate 8.6-50 mg  1 tablet Per NG tube BID    sodium chloride 0.9%  3 mL Intravenous Q8H    sodium chloride 3%  4 mL Nebulization Q6H    vancomycin (VANCOCIN) IVPB  15 mg/kg (Dosing Weight) Intravenous Q12H     Continuous Infusions:   sodium chloride 0.9% 75 mL/hr at 02/26/18 0557     PRN Meds:acetaminophen, bisacodyl, dextrose 50%, glucagon (human recombinant), insulin aspart U-100,  ipratropium, labetalol, ondansetron    Objective:     Vital Signs (Most Recent):  Temp: 97.8 °F (36.6 °C) (02/26/18 1049)  Pulse: 87 (02/26/18 1255)  Resp: 20 (02/26/18 1255)  BP: (!) 139/91 (02/26/18 1049)  SpO2: 100 % (02/26/18 1255)  BP Location: Right arm    Vital Signs Range (Last 24H):  Temp:  [97.2 °F (36.2 °C)-98.6 °F (37 °C)]   Pulse:  []   Resp:  [16-22]   BP: (119-149)/(66-98)   SpO2:  [95 %-100 %]   BP Location: Right arm     Physical Exam   Constitutional: He appears well-developed.   HENT:   Head: Normocephalic and atraumatic.   Eyes: EOM are normal. Pupils are equal, round, and reactive to light.   Cardiovascular: Normal rate and regular rhythm.    Pulmonary/Chest: Effort normal and breath sounds normal.   Neurological: He is alert.   Skin: Skin is warm.   Psychiatric: He has a normal mood and affect. His behavior is normal.   Vitals reviewed.     Neurologic Exam:  Mental Status:  Somnolent, opens eyes to voice. Does not follow commands well, intermittently mimicking commands. No attempts to use gestures to communicate this am, tearful.  Cranial Nerves:  Face appears symmetric.  PERRLA, EOMI with tracking.  Tongue protrudes midline.  Motor:  Normal bulk and tone. LUE, LLE moving spontaneously. RLE with some withdrawal to noxious stimuli, non-stereotyped.  No movement RUE.  Sensory:   Withdrawal to noxious stimuli at LUE and BLE.  No withdrawal or grimace to noxious stimuli at RUE.  Reflexes:  Biceps, brachioradialis, patellar 2+ and symmetric.  No ankle clonus.  Downgoing toe bilaterally.  Coordination:  Difficulty following JESSICA.  No resting tremor or myoclonus.    Gait:  Deferred 2/2 fall precautions, AMS    Laboratory:  CMP:     Recent Labs  Lab 02/26/18  0358   CALCIUM 8.5*   ALBUMIN 1.8*   PROT 6.5      K 3.8   CO2 24      BUN 19   CREATININE 0.9   ALKPHOS 137*   ALT 24   AST 38   BILITOT 0.6     CBC:     Recent Labs  Lab 02/26/18  0358   WBC 14.46*   RBC 3.60*   HGB 10.6*   HCT  32.4*      MCV 90   MCH 29.4   MCHC 32.7     Lipid Panel: Invalid due to excessively high triglycerides  Coagulation:     Recent Labs  Lab 02/26/18  0358   INR 1.0     Hgb A1C: 10%      Diagnostic Results     Brain Imaging   01/26/18 CT head w/o contrast:  No detrimental change.  Evolving subtle left MCA territory infarct.       01/26/18 MRI Brain w/o contrast:  1. Large acute left MCA territory infarction as above.  No associated hemorrhage or significant mass effect at this time.  2. Mild chronic ischemic changes.        02/04/18 CT head w/o contrast:  Redemonstration of large left MCA territory infarction without evidence of hemorrhagic conversion.  Slight interval increase in local mass effect with rightward midline shift of approximately 0.6 cm. Stable postoperative change from left MCA endovascular stenting. Sinus disease as above.       Vessel Imaging   01/25/18 CTA Stroke Multiphase:  Short segment occlusion of left M1.  There is good collateral flow in the ischemic territory as detailed above.  Noncontrast images demonstrate subtle changes reflecting left MCA territory acute infarct.       01/25/18 IR Angiogram:  Cerebral angiogram demonstrates occlusion of the M1 segment of the left middle cerebral artery with good collateral flow from pial collaterals.  This was successfully removed and residual stenosis at the site of occlusion was angioplastied and stented with improvement in flow.  Slow flow to the left parietal lobe suggests area of infarcted tissue.  This was felt to represent TICI 2C flow.    Cardiac Imaging   01/26/18 2D Echo w/ CFD:  CONCLUSIONS     1 - Normal left ventricular systolic function (EF 65-70%).     2 - Concentric remodeling.     3 - No wall motion abnormalities.     4 - Normal left ventricular diastolic function.     5 - Normal right ventricular systolic function .    6 - No LA enlargement.      Misti Hernández MD  Comprehensive Stroke Center  Department of Vascular Neurology    [facility]

## 2018-02-26 NOTE — PLAN OF CARE
Cm called and left a message for Mrs. Quevedo regarding a need to discuss the discharge plan. Cm also ask for Mrs. Quevedo to fax the paperwork Cm gave her on Friday 2/23/18. Cm will continue to follow.     02/26/18 1042   Discharge Reassessment   Assessment Type Discharge Planning Reassessment   Provided patient/caregiver education on the expected discharge date and the discharge plan Yes   Do you have any problems affording any of your prescribed medications? No   Discharge Plan A Rehab   Discharge Plan B New Nursing Home placement - residential care facility   Patient choice form signed by patient/caregiver N/A   Can the patient answer the patient profile reliably? No, cognitively impaired   How does the patient rate their overall health at the present time? (FRIEDA)   Describe the patient's ability to walk at the present time. Does not walk or unable to take any steps at all   How often would a person be available to care for the patient? Occasionally   Number of comorbid conditions (as recorded on the chart) Two   During the past month, has the patient often been bothered by feeling down, depressed or hopeless? (FRIEDA)   During the past month, has the patient often been bothered by little interest or pleasure in doing things? (FRIEDA)

## 2018-02-26 NOTE — ASSESSMENT & PLAN NOTE
"Cytotoxic cerebral edema  46 yo M with PMHx HLD, LOYDA, poorly controlled DM II presents to [facility] 01/25/18 after noted "strange behavior" for which EMS was called.  Pt was found to have a L M1 occlusion and was taken to IR for thrombectomy with TICI 2C reperfusion and stent placement due to L MCA stenosis. Etiology remains unclear: no obvious signs of cardiac source or atheromatous disease in the aorto/carotid axis. Echo normal with no hx or signs of A fib. PT/OT/SLP will continue to evaluate to make placement recommendations. DAPT 2/2 recent stent placement in angiogram after PEG placement.      Antithrombotics:  mg po qd, clopidogrel 75 mg po qd [intracranial stent]  Statins: Atorvastatin 80 mg po qd  Aggressive risk factor modification: HLD, DM II (Hgb A1c 10%), Obesity  Rehab efforts: PT/OT/SLP--Recommending SNF, placement pending  Diagnostics ordered/pending: None  VTE prophylaxis: Heparin 5000 U q8h  BP parameters: Infarct: Post sucessful thrombectomy, SBP <140   "

## 2018-02-26 NOTE — ASSESSMENT & PLAN NOTE
Likely Aspiration pna 2/2 to superimposed encephalopathy due to sepsis  Patient has been anti-coagulated with DVT ppx and currently on ASA and plavix throughout hospital stay, less likely PE but consider if O2 status with current therapy non-improved; continue active weaning of O2 to ascertain current requirements  Continue to follow wound, blood, and urine cultures, continue vanc and zosyn for sepsis (patient with multiple sources including PNA vs abdominal wall abscess vs UTI)

## 2018-02-26 NOTE — ASSESSMENT & PLAN NOTE
-Stroke risk factor, Hgb A1c 10%  -Continue Diabetasource AC TFs  - Endocrine consulted and guiding insulin modification for TF  - POCT AC & HS; moderate correction q4hr

## 2018-02-26 NOTE — CARE UPDATE
RN Proactive Rounding Note  Time of Visit:     Admit Date: 2018  LOS: 31  Code Status: Full Code   Date of Visit: 2018  : 1970  Age: 47 y.o.  Sex: male  Bed: 6/[facility] A:   MRN: [id number]  Was the patient discharged from an ICU this admission? Yes   Was the patient discharged from a PACU within last 24 hours? No  Did the patient receive conscious sedation/general anesthesia in last 24 hours? No  Was the patient in the ED within the past 24 hours? No  Was the patient started on NIPPV within the past 24 hours? No  Attending Physician: Jaime Reynolds MD  Primary Service: Networked reference to record PCT       ASSESSMENT:     Modified Early Warning Score (MEWS): 2  Vital Signs: , SpO2 96% continuous oximetry.  Clinical Issues: Respiratory     INTERVENTIONS/ RECOMMENDATIONS:     Follow-up visit, patient was an RRT this AM. Patient is sleeping, diaphoretic, continuous oximetry, VM 14L/55%, MIVF @ 75 ml/hr, TF off for pending CT. Advised RN to lay patient flat to ensure that he will tolerate it before going to the CT scan, may also need 100% NRB while flat for CT. RN concern for bloody nose most probably due to aggressive NT suction for secretion clearance. No active bleeding seen. Please call for assistance if patient requires NT suctioning.     Discussed plan of care with RNXiomara [phone]    PHYSICIAN ESCALATION:     Yes/No No    Disposition: Remain on floor    FOLLOW-UP/CONTINGENCY:       Call back the Rapid Response Nurse at [phone]  for additional questions or concerns

## 2018-02-26 NOTE — PT/OT/SLP PROGRESS
Occupational Therapy   Treatment/Goals revised    Name: Todd Quevedo  MRN: [id number]  Admitting Diagnosis:  Embolic stroke involving left middle cerebral artery       Recommendations:     Discharge Recommendations: nursing facility, basic  Discharge Equipment Recommendations:  hospital bed  Barriers to discharge:  Inaccessible home environment, Decreased caregiver support    Subjective   Patient:  Nonverbal  Communicated with: nurse prior to session.  Pain/Comfort:  · Pain Rating 1: 0/10  · Pain Rating Post-Intervention 1: 0/10    Patients cultural, spiritual, Cheondoism conflicts given the current situation: Sikh    Objective:     Patient found with: pulse ox (continuous), telemetry, peripheral IV, Condom Catheter, pressure relief boots, bed alarm, PEG Tube, oxygen  Family not present.  General Precautions: Standard, aspiration, fall, NPO, aphasia   Orthopedic Precautions:N/A   Braces: N/A     Occupational Performance:    Bed Mobility:    · Patient completed Rolling/Turning to Left with  total assistance  · Patient completed Rolling/Turning to Right with total assistance  · Patient completed Scooting/Bridging with total assistance  · Patient completed Supine to Sit with total assistance  · Patient completed Sit to Supine with total assistance     Functional Mobility/Transfers:  · Dependent drawsheet transfers    Activities of Daily Living:  · Feeding:  NPO  · Grooming: Dependent; hand over hand assistance while seated EOB  · Bathing: Dependent (bed level)  · UB Dressing: Dependent  · Toileting: Dependent    Patient left supine with all lines intact, call button in reach and bed alarm on    AMPA 6 Click:  AMPA Total Score: 6    Treatment & Education:  Patient education provided on role of OT, ROM, positioning, bed mobility and postural control.  Continued education, patient/ family training recommended. Daily orientation provided.  PROM performed right UE/LEs and AAROM left UE/LE one set x 10 rep in all planes  of motion with stretches provided at end range; sustained stretch provided for right ankle dorsiflexion.  Assistance and facilitation provided for upward rotation of the scapula during shoulder flexion and abduction.  Patient kept eyes open 80% of the session.   Patient unable to follow commands.  Total assist required with postural control while seated EOB with right UE in weight bearing.  Patient with gaze to the left.  Keeping head turned to the left; stretches provided for rotation to the right.  Positioning provided for midline orientation with bilateral UEs elevated and heels lifted off mattress.    White board updated in patient's room.  OT asked if there were any other questions; patient/ family had no further questions.  Education:    Assessment:     Todd Quevedo is a 47 y.o. male with a medical diagnosis of Embolic stroke involving left middle cerebral artery.  He presents with performance deficits of physical skills including impaired respiration, balance, mobility, strength, dexterity, fine motor coordination, gross motor coordination, sensation and endurance; demonstrating performance deficits of cognitive skills including impaired attention, perception, understanding, problem solving, sequencing and memory all resulting in inability organizing occupational performance in a timely and safe manner; demonstrating performance deficits of psychosocial skills including impairments of interpersonal interactions and coping strategies which are skills necessary to successfully and appropriately participate in everyday tasks and social situations.  These performance deficits have resulted in activity limitations including but not limited to:  bed mobility, transfers, ascending/ descending stairs, walking short and long distances, walking around obstacles, transitional movement patterns (kneeling, bending); eating, upper body dressing, lower body dressing, brushing teeth, toileting, bathing, carrying objects,  meal preparation, and working ().  Patient's role as , father, ,   and independent caretaker for self has been affected. Patient will benefit from skilled OT services to maximize level of independence with self-care skills and functional mobility.  No significant changes noted.     Rehab Prognosis:  Good; patient would benefit from acute skilled OT services to address these deficits and reach maximum level of function.       Plan:     Patient to be seen 3 x/week to address the above listed problems via self-care/home management, cognitive retraining, sensory integration, therapeutic activities, therapeutic exercises, neuromuscular re-education  · Plan of Care Expires: 03/21/18  · Plan of Care Reviewed with: patient    This Plan of care has been discussed with the patient who was involved in its development and understands and is in agreement with the identified goals and treatment plan    GOALS:    Occupational Therapy Goals        Problem: Occupational Therapy Goal    Goal Priority Disciplines Outcome Interventions   Occupational Therapy Goal     OT, PT/OT Ongoing (interventions implemented as appropriate)    Description:  Goals set 2/26 to be addressed for 14 days with expiration date, 3/12:  Patient will increase functional independence with ADLs by performing:    Patient will demonstrate rolling to the right with mod assist.  Not met   Patient will demonstrate rolling to the left with max assist.   Not met  Patient will demonstrate supine -sit with max assist.   Not met  Patient will demonstrate squat pivot transfers with max assist.   Not met  Patient will demonstrate grooming while seated with max assist.   Not met  Patient will demonstrate upper body dressing with max assist while seated EOB.   Not met  Patient will demonstrate lower body dressing with max assist while seated EOB.   Not met  Patient will demonstrate toileting with max assist.    Not met  Patient will demonstrate ability to follow 3/5 commands.   Not met  Patient will demonstrate independence with HEP for right UE positioning.    Not met  Patient's family / caregiver will demonstrate independence and safety with assisting patient with self-care skills and functional mobility.     Not met  Patient's family / caregiver will demonstrate independence with providing ROM and changes in bed positioning.   Not met                         Time Tracking:     OT Date of Treatment: 02/26/18  OT Start Time: 0715  OT Stop Time: 0800  OT Total Time (min): 45 min    Billable Minutes:Self Care/Home Management 15  Therapeutic Exercise 15  Neuromuscular Re-education 15    ISAMAR Almonte  2/26/2018

## 2018-02-26 NOTE — ASSESSMENT & PLAN NOTE
-180  Cut back levemir by 30% r/t TF being on hold  Will reassess needs later today for basal x 1 for bg elevations.  Continue novolog 10 units q4h for TF coverage.  If TF held or decreased, hold or decrease novolog by same %  Moderate correction q4hr

## 2018-02-27 PROBLEM — K94.22 INFECTION OF PEG SITE: Status: ACTIVE | Noted: 2018-02-27

## 2018-02-27 PROBLEM — N39.0 URINARY TRACT INFECTION DUE TO KLEBSIELLA SPECIES: Status: ACTIVE | Noted: 2018-01-27

## 2018-02-27 PROBLEM — Z79.4 TYPE 2 DIABETES MELLITUS WITH HYPERGLYCEMIA, WITH LONG-TERM CURRENT USE OF INSULIN: Status: ACTIVE | Noted: 2018-01-26

## 2018-02-27 PROBLEM — B96.89 URINARY TRACT INFECTION DUE TO KLEBSIELLA SPECIES: Status: ACTIVE | Noted: 2018-01-27

## 2018-02-27 LAB
ALBUMIN SERPL BCP-MCNC: 1.9 G/DL
ALP SERPL-CCNC: 124 U/L
ALT SERPL W/O P-5'-P-CCNC: 30 U/L
ANION GAP SERPL CALC-SCNC: 8 MMOL/L
AST SERPL-CCNC: 43 U/L
BACTERIA SPEC AEROBE CULT: NORMAL
BACTERIA UR CULT: NORMAL
BASOPHILS # BLD AUTO: 0.03 K/UL
BASOPHILS NFR BLD: 0.3 %
BILIRUB SERPL-MCNC: 0.5 MG/DL
BUN SERPL-MCNC: 17 MG/DL
CALCIUM SERPL-MCNC: 8.9 MG/DL
CHLORIDE SERPL-SCNC: 104 MMOL/L
CO2 SERPL-SCNC: 28 MMOL/L
CREAT SERPL-MCNC: 0.9 MG/DL
DIFFERENTIAL METHOD: ABNORMAL
EOSINOPHIL # BLD AUTO: 0.3 K/UL
EOSINOPHIL NFR BLD: 3.3 %
ERYTHROCYTE [DISTWIDTH] IN BLOOD BY AUTOMATED COUNT: 12.7 %
EST. GFR  (AFRICAN AMERICAN): >60 ML/MIN/1.73 M^2
EST. GFR  (NON AFRICAN AMERICAN): >60 ML/MIN/1.73 M^2
GLUCOSE SERPL-MCNC: 113 MG/DL
HCT VFR BLD AUTO: 32 %
HGB BLD-MCNC: 10.4 G/DL
IMM GRANULOCYTES # BLD AUTO: 0.04 K/UL
IMM GRANULOCYTES NFR BLD AUTO: 0.4 %
INR PPP: 1
LYMPHOCYTES # BLD AUTO: 1.3 K/UL
LYMPHOCYTES NFR BLD: 14.5 %
MAGNESIUM SERPL-MCNC: 1.9 MG/DL
MCH RBC QN AUTO: 29 PG
MCHC RBC AUTO-ENTMCNC: 32.5 G/DL
MCV RBC AUTO: 89 FL
MONOCYTES # BLD AUTO: 0.5 K/UL
MONOCYTES NFR BLD: 5.7 %
NEUTROPHILS # BLD AUTO: 7 K/UL
NEUTROPHILS NFR BLD: 75.8 %
NRBC BLD-RTO: 0 /100 WBC
PHOSPHATE SERPL-MCNC: 3.8 MG/DL
PLATELET # BLD AUTO: 361 K/UL
PMV BLD AUTO: 10.1 FL
POCT GLUCOSE: 129 MG/DL (ref 70–110)
POCT GLUCOSE: 131 MG/DL (ref 70–110)
POCT GLUCOSE: 143 MG/DL (ref 70–110)
POCT GLUCOSE: 153 MG/DL (ref 70–110)
POTASSIUM SERPL-SCNC: 3.5 MMOL/L
PROT SERPL-MCNC: 6.6 G/DL
PROTHROMBIN TIME: 10.3 SEC
RBC # BLD AUTO: 3.59 M/UL
SODIUM SERPL-SCNC: 140 MMOL/L
WBC # BLD AUTO: 9.2 K/UL

## 2018-02-27 PROCEDURE — 25000003 PHARM REV CODE 250: Performed by: PHYSICIAN ASSISTANT

## 2018-02-27 PROCEDURE — 36415 COLL VENOUS BLD VENIPUNCTURE: CPT

## 2018-02-27 PROCEDURE — 25000242 PHARM REV CODE 250 ALT 637 W/ HCPCS: Performed by: STUDENT IN AN ORGANIZED HEALTH CARE EDUCATION/TRAINING PROGRAM

## 2018-02-27 PROCEDURE — 63600175 PHARM REV CODE 636 W HCPCS: Performed by: NURSE PRACTITIONER

## 2018-02-27 PROCEDURE — 25000003 PHARM REV CODE 250: Performed by: PSYCHIATRY & NEUROLOGY

## 2018-02-27 PROCEDURE — 94761 N-INVAS EAR/PLS OXIMETRY MLT: CPT

## 2018-02-27 PROCEDURE — A4216 STERILE WATER/SALINE, 10 ML: HCPCS | Performed by: NURSE PRACTITIONER

## 2018-02-27 PROCEDURE — 99232 SBSQ HOSP IP/OBS MODERATE 35: CPT | Mod: ,,, | Performed by: NURSE PRACTITIONER

## 2018-02-27 PROCEDURE — 94668 MNPJ CHEST WALL SBSQ: CPT

## 2018-02-27 PROCEDURE — 92507 TX SP LANG VOICE COMM INDIV: CPT

## 2018-02-27 PROCEDURE — 85610 PROTHROMBIN TIME: CPT

## 2018-02-27 PROCEDURE — 85025 COMPLETE CBC W/AUTO DIFF WBC: CPT

## 2018-02-27 PROCEDURE — 99232 SBSQ HOSP IP/OBS MODERATE 35: CPT | Mod: ,,, | Performed by: INTERNAL MEDICINE

## 2018-02-27 PROCEDURE — 94640 AIRWAY INHALATION TREATMENT: CPT

## 2018-02-27 PROCEDURE — 83735 ASSAY OF MAGNESIUM: CPT

## 2018-02-27 PROCEDURE — 99233 SBSQ HOSP IP/OBS HIGH 50: CPT | Mod: ,,, | Performed by: PSYCHIATRY & NEUROLOGY

## 2018-02-27 PROCEDURE — 27000221 HC OXYGEN, UP TO 24 HOURS

## 2018-02-27 PROCEDURE — 92526 ORAL FUNCTION THERAPY: CPT

## 2018-02-27 PROCEDURE — 94667 MNPJ CHEST WALL 1ST: CPT

## 2018-02-27 PROCEDURE — 20600001 HC STEP DOWN PRIVATE ROOM

## 2018-02-27 PROCEDURE — 80053 COMPREHEN METABOLIC PANEL: CPT

## 2018-02-27 PROCEDURE — 63600175 PHARM REV CODE 636 W HCPCS: Performed by: STUDENT IN AN ORGANIZED HEALTH CARE EDUCATION/TRAINING PROGRAM

## 2018-02-27 PROCEDURE — 25000003 PHARM REV CODE 250: Performed by: NURSE PRACTITIONER

## 2018-02-27 PROCEDURE — 25000242 PHARM REV CODE 250 ALT 637 W/ HCPCS: Performed by: PSYCHIATRY & NEUROLOGY

## 2018-02-27 PROCEDURE — 84100 ASSAY OF PHOSPHORUS: CPT

## 2018-02-27 RX ORDER — INSULIN ASPART 100 [IU]/ML
6 INJECTION, SOLUTION INTRAVENOUS; SUBCUTANEOUS
Status: DISCONTINUED | OUTPATIENT
Start: 2018-02-28 | End: 2018-03-01

## 2018-02-27 RX ORDER — INSULIN ASPART 100 [IU]/ML
6 INJECTION, SOLUTION INTRAVENOUS; SUBCUTANEOUS
Status: DISCONTINUED | OUTPATIENT
Start: 2018-02-27 | End: 2018-03-01

## 2018-02-27 RX ORDER — INSULIN ASPART 100 [IU]/ML
7 INJECTION, SOLUTION INTRAVENOUS; SUBCUTANEOUS
Status: DISCONTINUED | OUTPATIENT
Start: 2018-02-28 | End: 2018-02-27

## 2018-02-27 RX ORDER — CEFTRIAXONE 1 G/1
1 INJECTION, POWDER, FOR SOLUTION INTRAMUSCULAR; INTRAVENOUS
Status: DISPENSED | OUTPATIENT
Start: 2018-02-27 | End: 2018-03-06

## 2018-02-27 RX ORDER — INSULIN ASPART 100 [IU]/ML
7 INJECTION, SOLUTION INTRAVENOUS; SUBCUTANEOUS
Status: DISCONTINUED | OUTPATIENT
Start: 2018-02-27 | End: 2018-02-27

## 2018-02-27 RX ORDER — VANCOMYCIN HYDROCHLORIDE
1500
Status: DISCONTINUED | OUTPATIENT
Start: 2018-02-27 | End: 2018-03-01

## 2018-02-27 RX ADMIN — INSULIN DETEMIR 18 UNITS: 100 INJECTION, SOLUTION SUBCUTANEOUS at 10:02

## 2018-02-27 RX ADMIN — CEFTRIAXONE SODIUM 1 G: 1 INJECTION, POWDER, FOR SOLUTION INTRAMUSCULAR; INTRAVENOUS at 11:02

## 2018-02-27 RX ADMIN — LISINOPRIL 20 MG: 20 TABLET ORAL at 10:02

## 2018-02-27 RX ADMIN — INSULIN ASPART 7 UNITS: 100 INJECTION, SOLUTION INTRAVENOUS; SUBCUTANEOUS at 09:02

## 2018-02-27 RX ADMIN — SODIUM CHLORIDE SOLN NEBU 3% 4 ML: 3 NEBU SOLN at 07:02

## 2018-02-27 RX ADMIN — ASPIRIN 325 MG ORAL TABLET 325 MG: 325 PILL ORAL at 10:02

## 2018-02-27 RX ADMIN — HEPARIN SODIUM 5000 UNITS: 5000 INJECTION, SOLUTION INTRAVENOUS; SUBCUTANEOUS at 02:02

## 2018-02-27 RX ADMIN — HEPARIN SODIUM 5000 UNITS: 5000 INJECTION, SOLUTION INTRAVENOUS; SUBCUTANEOUS at 06:02

## 2018-02-27 RX ADMIN — VANCOMYCIN HCL-SODIUM CHLORIDE IV SOLN 1.5 GM/250ML-0.9% 1500 MG: 1.5-0.9/25 SOLUTION at 11:02

## 2018-02-27 RX ADMIN — ALBUTEROL SULFATE 2.5 MG: 2.5 SOLUTION RESPIRATORY (INHALATION) at 12:02

## 2018-02-27 RX ADMIN — IPRATROPIUM BROMIDE 0.5 MG: 0.5 SOLUTION RESPIRATORY (INHALATION) at 12:02

## 2018-02-27 RX ADMIN — VANCOMYCIN HCL-SODIUM CHLORIDE IV SOLN 1.5 GM/250ML-0.9% 1500 MG: 1.5-0.9/25 SOLUTION at 06:02

## 2018-02-27 RX ADMIN — HEPARIN SODIUM 5000 UNITS: 5000 INJECTION, SOLUTION INTRAVENOUS; SUBCUTANEOUS at 10:02

## 2018-02-27 RX ADMIN — INSULIN ASPART 6 UNITS: 100 INJECTION, SOLUTION INTRAVENOUS; SUBCUTANEOUS at 05:02

## 2018-02-27 RX ADMIN — CLOPIDOGREL BISULFATE 75 MG: 75 TABLET, FILM COATED ORAL at 10:02

## 2018-02-27 RX ADMIN — MODAFINIL 200 MG: 100 TABLET ORAL at 06:02

## 2018-02-27 RX ADMIN — SODIUM CHLORIDE: 0.9 INJECTION, SOLUTION INTRAVENOUS at 10:02

## 2018-02-27 RX ADMIN — SODIUM CHLORIDE SOLN NEBU 3% 4 ML: 3 NEBU SOLN at 12:02

## 2018-02-27 RX ADMIN — IPRATROPIUM BROMIDE 0.5 MG: 0.5 SOLUTION RESPIRATORY (INHALATION) at 07:02

## 2018-02-27 RX ADMIN — ATORVASTATIN CALCIUM 80 MG: 20 TABLET, FILM COATED ORAL at 10:02

## 2018-02-27 RX ADMIN — STANDARDIZED SENNA CONCENTRATE AND DOCUSATE SODIUM 1 TABLET: 8.6; 5 TABLET, FILM COATED ORAL at 08:02

## 2018-02-27 RX ADMIN — ALBUTEROL SULFATE 2.5 MG: 2.5 SOLUTION RESPIRATORY (INHALATION) at 07:02

## 2018-02-27 RX ADMIN — Medication 3 ML: at 02:02

## 2018-02-27 RX ADMIN — INSULIN ASPART 6 UNITS: 100 INJECTION, SOLUTION INTRAVENOUS; SUBCUTANEOUS at 08:02

## 2018-02-27 RX ADMIN — MODAFINIL 100 MG: 100 TABLET ORAL at 02:02

## 2018-02-27 RX ADMIN — PIPERACILLIN AND TAZOBACTAM 4.5 G: 4; .5 INJECTION, POWDER, LYOPHILIZED, FOR SOLUTION INTRAVENOUS; PARENTERAL at 10:02

## 2018-02-27 RX ADMIN — METOPROLOL TARTRATE 25 MG: 25 TABLET ORAL at 10:02

## 2018-02-27 RX ADMIN — METOPROLOL TARTRATE 25 MG: 25 TABLET ORAL at 08:02

## 2018-02-27 RX ADMIN — INSULIN ASPART 7 UNITS: 100 INJECTION, SOLUTION INTRAVENOUS; SUBCUTANEOUS at 12:02

## 2018-02-27 RX ADMIN — SODIUM CHLORIDE: 0.9 INJECTION, SOLUTION INTRAVENOUS at 02:02

## 2018-02-27 RX ADMIN — INSULIN ASPART 10 UNITS: 100 INJECTION, SOLUTION INTRAVENOUS; SUBCUTANEOUS at 04:02

## 2018-02-27 RX ADMIN — PIPERACILLIN AND TAZOBACTAM 4.5 G: 4; .5 INJECTION, POWDER, LYOPHILIZED, FOR SOLUTION INTRAVENOUS; PARENTERAL at 02:02

## 2018-02-27 NOTE — PROGRESS NOTES
"[facility]  Endocrinology  Progress Note    Admit Date: 1/25/2018     Reason for Consult: Management of T2DM, Hyperglycemia     Surgical Procedure and Date: thrombectomy with TICI 2C reperfusion and stent placement due to L MCA stenosis  PEG 1/31    Home Diabetes Medications: metformin     How often checking glucose at home? FRIEDA, pt aphasic     Diabetes Complications include:     Hyperglycemia    Complicating diabetes co morbidities:   Residual deficits from CVA  and LOYDA      HPI:   Patient is a 47 y.o. male with a diagnosis of poorly controlled DM II, HLD, LOYDA, presents to [facility] 01/25/18 after noted "strange behavior" for which EMS was called.  Pt was found to have a L M1 occlusion and was taken to IR for thrombectomy with TICI 2C reperfusion and stent placement due to L MCA stenosis. DAPT 2/2 recent stent placement in angiogram after PEG placement.  Endo consulted for BG management.         Interval HPI:   BG trending down overnight.  TF infusing at 30 cc/hr.  No hypoglycemia.  Afebrile.     BP (!) 151/92 (BP Location: Left arm, Patient Position: Lying)   Pulse 90   Temp 97.6 °F (36.4 °C) (Axillary)   Resp 17   Ht 5' 10" (1.778 m)   Wt 105.6 kg (232 lb 12.9 oz)   SpO2 99%   BMI 33.40 kg/m²       Labs Reviewed and Include      Recent Labs  Lab 02/27/18  0527   *   CALCIUM 8.9   ALBUMIN 1.9*   PROT 6.6      K 3.5   CO2 28      BUN 17   CREATININE 0.9   ALKPHOS 124   ALT 30   AST 43*   BILITOT 0.5     Lab Results   Component Value Date    WBC 9.20 02/27/2018    HGB 10.4 (L) 02/27/2018    HCT 32.0 (L) 02/27/2018    MCV 89 02/27/2018     (H) 02/27/2018     No results for input(s): TSH, FREET4 in the last 168 hours.  Lab Results   Component Value Date    HGBA1C 10.0 (H) 01/25/2018       Nutritional status:   Body mass index is 33.4 kg/m².  Lab Results   Component Value Date    ALBUMIN 1.9 (L) 02/27/2018    ALBUMIN 1.8 (L) 02/26/2018    ALBUMIN 2.2 (L) 02/25/2018     No " results found for: PREALBUMIN    Estimated Creatinine Clearance: 122.1 mL/min (based on SCr of 0.9 mg/dL).    Accu-Checks  Recent Labs      02/25/18   1252  02/25/18   1800  02/25/18   2039  02/25/18   2310  02/26/18   0317  02/26/18   1232  02/26/18   1833  02/26/18   2013  02/26/18   2341  02/27/18   0402   POCTGLUCOSE  265*  188*  143*  144*  151*  122*  113*  109  94  131*       Current Medications and/or Treatments Impacting Glycemic Control  Immunotherapy:  Immunosuppressants     None        Steroids:   Hormones     None        Pressors:    Autonomic Drugs     Start     Stop Route Frequency Ordered    01/31/18 0854  succinylcholine (ANECTINE) 20 mg/mL injection     Comments:  Created by cabinet override    01/31 2059 01/31/18 0854        Moderate dose,  novolog 10 units q4h  levemir 25 units daily    Hyperglycemia/Diabetes Medications: Antihyperglycemics     Start     Stop Route Frequency Ordered    02/28/18 0400  insulin aspart U-100 pen 7 Units      -- SubQ Every 24 hours (non-standard times) 02/27/18 0803    02/28/18 0000  insulin aspart U-100 pen 7 Units      -- SubQ Every 24 hours (non-standard times) 02/27/18 0803    02/27/18 2000  insulin aspart U-100 pen 7 Units      -- SubQ Every 24 hours (non-standard times) 02/27/18 0803    02/27/18 1600  insulin aspart U-100 pen 7 Units      -- SubQ Every 24 hours (non-standard times) 02/27/18 0803    02/27/18 1200  insulin aspart U-100 pen 7 Units      -- SubQ Every 24 hours (non-standard times) 02/27/18 0803    02/27/18 0900  insulin detemir U-100 pen 18 Units      -- SubQ Daily 02/27/18 0803    02/27/18 0815  insulin aspart U-100 pen 7 Units      -- SubQ Every 24 hours (non-standard times) 02/27/18 0803    02/24/18 1148  insulin aspart U-100 pen 1-10 Units      -- SubQ Every 4 hours PRN 02/24/18 1049    02/16/18 1400  insulin aspart pen 8 Units      02/19 0001 SubQ Every 4 hours 02/16/18 1058          ASSESSMENT and PLAN    * Embolic stroke involving left  middle cerebral artery    Avoid hypoglycemia             Type 2 diabetes mellitus with hyperglycemia    -180  Cut back levemir by 25-30%  Levemir 18 units  novolog 7 units q4h for TF coverage  If TF held or decreased, hold or decrease novolog by same %  Continue moderate dose correction scale q4hr prn    Addendum: decrease novolog to 6 units q4h     Discharge planning :  TBD             On enteral nutrition    TF 30 cc/hr today on 2/27/18, increasing insulin needs            Obstructive sleep apnea    May increase insulin resistance if untreated             MATHIEU Day, FNP  Endocrinology  [facility]

## 2018-02-27 NOTE — ASSESSMENT & PLAN NOTE
Fever - Tmax 101.2 at 8 am. Afebrile since  - leukocytosis has resolved  - Workup ordered (CXR, UC/UA, blood cultures, PEG tube cultures, left rectus abdominal hematoma/seroma) -> pt has gram positive cocci from abscess cultures from 2/26/18, enterobacter from PEG cultures (2/25/18) and klebsiella from urine cultures (2/25/18)  - will de-escalate zosyn to rocephin 1g q12h & adjust vanc dose from subtherapeutic trough. 1.5g q8h. Will recheck trough tomorrow morning.

## 2018-02-27 NOTE — SUBJECTIVE & OBJECTIVE
Interval Hx: No acute events overnight. Patient currently on 5L NC    Review of Systems   Unable to perform ROS: Patient nonverbal     Objective:     Vital Signs (Most Recent):  Temp: 97.6 °F (36.4 °C) (02/27/18 0840)  Pulse: 85 (02/27/18 0840)  Resp: 20 (02/27/18 0840)  BP: (!) 151/92 (02/27/18 0840)  SpO2: 99 % (02/27/18 0840) Vital Signs (24h Range):  Temp:  [97.2 °F (36.2 °C)-98.3 °F (36.8 °C)] 97.6 °F (36.4 °C)  Pulse:  [76-94] 85  Resp:  [16-28] 20  SpO2:  [92 %-100 %] 99 %  BP: (115-151)/() 151/92     Weight: 105.6 kg (232 lb 12.9 oz)  Body mass index is 33.4 kg/m².    Physical Exam   HENT:   Head: Normocephalic.   Eyes: EOM are normal. Pupils are equal, round, and reactive to light. No scleral icterus.   Neck: No JVD present. No tracheal deviation present.   Cardiovascular: Normal rate, regular rhythm and normal heart sounds.    No murmur heard.  Pulmonary/Chest: Effort normal and breath sounds normal. He has no wheezes. He has no rales.   Abdominal: Soft. There is no guarding.   Peg site with copious purulent and sanguinous drainage, some induration   Musculoskeletal: He exhibits no edema.   Neurological:   Intermittent alert, does not track or follow commands, left eye deviation, flaccid R. Paralysis, moving left side spontaneously  BS reflexes intact       Skin: Skin is warm. He is not diaphoretic.       Significant Labs:   ABGs: No results for input(s): PH, PCO2, HCO3, POCSATURATED, BE, TOTALHB, COHB, METHB, O2HB, POCFIO2 in the last 48 hours.  Blood Culture: No results for input(s): LABBLOO in the last 48 hours.  CBC:     Recent Labs  Lab 02/26/18  0358 02/27/18  0527   WBC 14.46* 9.20   HGB 10.6* 10.4*   HCT 32.4* 32.0*    361*     CMP:     Recent Labs  Lab 02/26/18  0358 02/27/18  0527    140   K 3.8 3.5    104   CO2 24 28   * 113*   BUN 19 17   CREATININE 0.9 0.9   CALCIUM 8.5* 8.9   PROT 6.5 6.6   ALBUMIN 1.8* 1.9*   BILITOT 0.6 0.5   ALKPHOS 137* 124   AST 38 43*    ALT 24 30   ANIONGAP 13 8   EGFRNONAA >60.0 >60.0     Lactic Acid: No results for input(s): LACTATE in the last 48 hours.  Urine Culture: No results for input(s): LABURIN in the last 48 hours.    Significant Imaging: I have reviewed all pertinent imaging results/findings within the past 24 hours.

## 2018-02-27 NOTE — ASSESSMENT & PLAN NOTE
Likely Aspiration pna vs pneumonitis (as CXR improved after a day) 2/2 to superimposed encephalopathy due to sepsis  Patient has been anti-coagulated with DVT ppx and currently on ASA and plavix throughout hospital stay, less likely PE but consider if O2 status with current therapy non-improved; continue active weaning of O2 to ascertain current requirements  Continue to follow wound, blood cultures. would de-escalate to rocephin and vanc for therapy for abscess and lung penetration based on wound culture results, also abscess drain with GPC, urine culture as well sensitive to rocephin, not sensitive to zosyn

## 2018-02-27 NOTE — PROGRESS NOTES
Pt. Taken off of venti mask and placed on 5 L NC with humidification.  Oxygen saturation @ 95%.  RN notified of change.    Will continue to monitor.

## 2018-02-27 NOTE — PLAN OF CARE
Problem: SLP Goal  Goal: SLP Goal  Speech Language Pathology Goals  Goals expected to be met by 3/2  1. Pt will participate in ongoing bedside assessment of swallow to determine least restrictive diet.  2. Pt will open eyes to stim x5/session given max cues.  3. Pt will follow simple commands x2/session given max cues.  4. Pt will answer basic y/n question via any modality x2/session given max cues.  5. Pt will vocalize in response to any stim x2/session given max cues.          Outcome: Ongoing (interventions implemented as appropriate)  Goals remain appropriate, cont POC. EDWARD Key, CCC/SLP  2/27/2018

## 2018-02-27 NOTE — SUBJECTIVE & OBJECTIVE
Neurologic Chief Complaint: L MCA Stroke    Subjective:     Interval History: NAEON. VS wnl. Afebrile on broad spectrum vanc and zosyn. UCx, PEG cultures have reported organisms. Will descalate antibiotics today.   ARF resolving and pt on 5L O2 via nasal cannula    HPI, Past Medical, Family, and Social History remains the same as documented in the initial encounter.     Review of Systems   Unable to perform ROS: Patient nonverbal   Constitutional: Positive for fever. Negative for chills.   Eyes: Negative for visual disturbance.   Respiratory: Negative for cough and shortness of breath (improving).    Cardiovascular: Negative for chest pain.   Skin: Negative for rash and wound.   Neurological: Positive for speech difficulty and weakness. Negative for numbness.   Psychiatric/Behavioral: Positive for confusion and decreased concentration.     Scheduled Meds:   albuterol sulfate  2.5 mg Nebulization Q6H    aspirin  325 mg Per G Tube Daily    atorvastatin  80 mg Per NG tube Daily    cefTRIAXone (ROCEPHIN) IVPB  1 g Intravenous Q24H    clopidogrel  75 mg Per G Tube Daily    heparin (porcine)  5,000 Units Subcutaneous Q8H    insulin aspart U-100  7 Units Subcutaneous Q24H    insulin aspart U-100  7 Units Subcutaneous Q24H    insulin aspart U-100  7 Units Subcutaneous Q24H    insulin aspart U-100  7 Units Subcutaneous Q24H    [START ON 2/28/2018] insulin aspart U-100  7 Units Subcutaneous Q24H    [START ON 2/28/2018] insulin aspart U-100  7 Units Subcutaneous Q24H    insulin detemir U-100  18 Units Subcutaneous Daily    ipratropium  0.5 mg Nebulization Q6H    lisinopril  20 mg Per NG tube Daily    metoprolol tartrate  25 mg Per NG tube BID    modafinil  200 mg Per NG tube Daily    And    modafinil  100 mg Per NG tube Daily    polyethylene glycol  17 g Per NG tube Daily    senna-docusate 8.6-50 mg  1 tablet Per NG tube BID    sodium chloride 0.9%  3 mL Intravenous Q8H    sodium chloride 3%  4 mL  Nebulization Q6H    vancomycin in 0.9 % sodium chl  1,500 mg Intravenous Q8H     Continuous Infusions:   sodium chloride 0.9% 75 mL/hr at 02/27/18 0207     PRN Meds:acetaminophen, bisacodyl, dextrose 50%, glucagon (human recombinant), insulin aspart U-100, ipratropium, labetalol, ondansetron    Objective:     Vital Signs (Most Recent):  Temp: 97.6 °F (36.4 °C) (02/27/18 0840)  Pulse: 82 (02/27/18 1202)  Resp: 17 (02/27/18 1202)  BP: (!) 151/92 (02/27/18 0840)  SpO2: 99 % (02/27/18 1202)  BP Location: Left arm    Vital Signs Range (Last 24H):  Temp:  [97.2 °F (36.2 °C)-98.3 °F (36.8 °C)]   Pulse:  [76-93]   Resp:  [16-28]   BP: (115-151)/()   SpO2:  [92 %-100 %]   BP Location: Left arm     Physical Exam   Constitutional: He appears well-developed.   HENT:   Head: Normocephalic and atraumatic.   Eyes: EOM are normal. Pupils are equal, round, and reactive to light.   Cardiovascular: Normal rate and regular rhythm.    Pulmonary/Chest: Effort normal and breath sounds normal.   Neurological: He is alert.   Skin: Skin is warm.   Psychiatric: He has a normal mood and affect. His behavior is normal.   Vitals reviewed.     Neurologic Exam:  Mental Status:  Somnolent, opens eyes to voice. Does not follow commands well, intermittently mimicking commands. No attempts to use gestures to communicate this am, tearful.  Cranial Nerves:  Face appears symmetric.  PERRLA, EOMI with tracking.  Tongue protrudes midline.  Motor:  Normal bulk and tone. LUE, LLE moving spontaneously. RLE with some withdrawal to noxious stimuli, non-stereotyped.  No movement RUE.  Sensory:   Withdrawal to noxious stimuli at LUE and BLE.  No withdrawal or grimace to noxious stimuli at RUE.  Reflexes:  Biceps, brachioradialis, patellar 2+ and symmetric.  No ankle clonus.  Downgoing toe bilaterally.  Coordination:  Difficulty following JESSICA.  No resting tremor or myoclonus.    Gait:  Deferred 2/2 fall precautions, AMS    Laboratory:  CMP:     Recent  Labs  Lab 02/27/18  0527   CALCIUM 8.9   ALBUMIN 1.9*   PROT 6.6      K 3.5   CO2 28      BUN 17   CREATININE 0.9   ALKPHOS 124   ALT 30   AST 43*   BILITOT 0.5     CBC:     Recent Labs  Lab 02/27/18  0527   WBC 9.20   RBC 3.59*   HGB 10.4*   HCT 32.0*   *   MCV 89   MCH 29.0   MCHC 32.5     Lipid Panel: Invalid due to excessively high triglycerides  Coagulation:     Recent Labs  Lab 02/27/18  0527   INR 1.0     Hgb A1C: 10%      Diagnostic Results     Brain Imaging   01/26/18 CT head w/o contrast:  No detrimental change.  Evolving subtle left MCA territory infarct.       01/26/18 MRI Brain w/o contrast:  1. Large acute left MCA territory infarction as above.  No associated hemorrhage or significant mass effect at this time.  2. Mild chronic ischemic changes.        02/04/18 CT head w/o contrast:  Redemonstration of large left MCA territory infarction without evidence of hemorrhagic conversion.  Slight interval increase in local mass effect with rightward midline shift of approximately 0.6 cm. Stable postoperative change from left MCA endovascular stenting. Sinus disease as above.       Vessel Imaging   01/25/18 CTA Stroke Multiphase:  Short segment occlusion of left M1.  There is good collateral flow in the ischemic territory as detailed above.  Noncontrast images demonstrate subtle changes reflecting left MCA territory acute infarct.       01/25/18 IR Angiogram:  Cerebral angiogram demonstrates occlusion of the M1 segment of the left middle cerebral artery with good collateral flow from pial collaterals.  This was successfully removed and residual stenosis at the site of occlusion was angioplastied and stented with improvement in flow.  Slow flow to the left parietal lobe suggests area of infarcted tissue.  This was felt to represent TICI 2C flow.    Cardiac Imaging   01/26/18 2D Echo w/ CFD:  CONCLUSIONS     1 - Normal left ventricular systolic function (EF 65-70%).     2 - Concentric  remodeling.     3 - No wall motion abnormalities.     4 - Normal left ventricular diastolic function.     5 - Normal right ventricular systolic function .    6 - No LA enlargement.

## 2018-02-27 NOTE — ASSESSMENT & PLAN NOTE
"Cytotoxic cerebral edema  48 yo M with PMHx HLD, LOYDA, poorly controlled DM II presents to [facility] 01/25/18 after noted "strange behavior" for which EMS was called.  Pt was found to have a L M1 occlusion and was taken to IR for thrombectomy with TICI 2C reperfusion and stent placement due to L MCA stenosis. Etiology remains unclear: no obvious signs of cardiac source or atheromatous disease in the aorto/carotid axis. Echo normal with no hx or signs of A fib. PT/OT/SLP will continue to evaluate to make placement recommendations. DAPT 2/2 recent stent placement in angiogram after PEG placement.      Antithrombotics:  mg po qd, clopidogrel 75 mg po qd [intracranial stent]  Statins: Atorvastatin 80 mg po qd  Aggressive risk factor modification: HLD, DM II (Hgb A1c 10%), Obesity  Rehab efforts: PT/OT/SLP--Recommending SNF, placement pending  Diagnostics ordered/pending: None  VTE prophylaxis: Heparin 5000 U q8h  BP parameters: Infarct: Post sucessful thrombectomy, SBP <140   "

## 2018-02-27 NOTE — PLAN OF CARE
Problem: Patient Care Overview  Goal: Plan of Care Review  Outcome: Ongoing (interventions implemented as appropriate)  Pt remained free from falls or injuries this shift. Pt turned q2hrs. No skin breakdown noticed. Pt rested well through the night. accuchecks q4hrs. Pt restarted on tube feeds. Currently infusing at 20cc/hr w goal of 55cc/hr. Sister at bedside through the night

## 2018-02-27 NOTE — PROGRESS NOTES
"[facility]  Vascular Neurology  Comprehensive Stroke Center  Progress Note    Assessment/Plan:     * Embolic stroke involving left middle cerebral artery    Cytotoxic cerebral edema  46 yo M with PMHx HLD, LOYDA, poorly controlled DM II presents to [facility] after noted "strange behavior" for which EMS was called.  Pt was found to have a L M1 occlusion and was taken to IR for thrombectomy with TICI 2C reperfusion and stent placement due to L MCA stenosis. Etiology remains unclear: no obvious signs of cardiac source or atheromatous disease in the aorto/carotid axis. Echo normal with no hx or signs of A fib. PT/OT/SLP will continue to evaluate to make placement recommendations. DAPT 2/2 recent stent placement in angiogram after PEG placement.      Antithrombotics:  mg po qd, clopidogrel 75 mg po qd [intracranial stent]  Statins: Atorvastatin 80 mg po qd  Aggressive risk factor modification: HLD, DM II (Hgb A1c 10%), Obesity  Rehab efforts: PT/OT/SLP--Recommending SNF, placement pending  Diagnostics ordered/pending: None  VTE prophylaxis: Heparin 5000 U q8h  BP parameters: Infarct: Post sucessful thrombectomy, SBP <140         Right spastic hemiparesis    -Due to stroke  -Continue aggressive PT/OT        Acute respiratory failure with hypoxia    - onset ; AB.55/34/69 suggestive of acute hypoxic respiratory failure, likely due to acute aspiration event given initial CXR was suggestive of RML consolidation. No further episodes overnight. Considered PE as differential however pt is on OAC and not tachycardic, tachypneic. Well score 1.5 (low-risk)  - will continue BS antibiotics; may descalate to aspiration pneumonia coverage if no other cultures grow in 48 hrs  - chest physiotherapy, aspiration precaution, continue O2 via nasal cannula to keep O2 sats > 92%  - hospital medicine consulted for additional recs        Leukocytosis (leucocytosis)    Fever - Tmax 101.2 at 8 am. Afebrile " since  - leukocytosis has resolved  - Workup ordered (CXR, UC/UA, blood cultures, PEG tube cultures, left rectus abdominal hematoma/seroma) -> pt has gram positive cocci from abscess cultures from 2/26/18, enterobacter from PEG cultures (2/25/18) and klebsiella from urine cultures (2/25/18)  - will de-escalate zosyn to rocephin 1g q12h & adjust vanc dose from subtherapeutic trough. 1.5g q8h. Will recheck trough tomorrow morning.        Essential hypertension    -Stroke risk factor, -145/67-97 over past 24 h  -Continue scheduled lisinopril 20 mg qd, metoprolol 25 mg bid        Mixed hyperlipidemia    -Stroke risk factor, LDL invalid as TG > 400  -Continue atorvastatin 80 mg qd  -Repeat lipid panel as an outpatient        Type 2 diabetes mellitus with hyperglycemia    -Stroke risk factor, Hgb A1c 10%  -Continue Diabetasource AC TFs  - Endocrine consulted and guiding insulin modification for TF  - POCT AC & HS; moderate correction q4hr            Nodule of right lung    - Follow-up with CT 6-12 months         Oral phase dysphagia    - Continue with SLP  - NPO        Cytotoxic cerebral edema    -2/2 stroke, evident on imaging  -Stable on repeat CT head imaging        Obstructive sleep apnea    -Stroke risk factor  -Recommend CPAP qHS             01/25/18:  Brought in for aphasia, RSW with L gaze preferance. LKN unknown, not tPA candidate. Went to IR for angiogram and stent.  01/29/18:  Off Cardene, remains on Insulin gtt. Concern for sepsis, respiratory source. Imaging with mass effect and some brain compression; maycol crani watch.  01/30/18:  EEG consistent with focal L slowing 2/2 lesion and mild generalized slowing, no seizures. CT head stable, no hemorrhagic conversion  02/01/18:  Emergent intubation likely due to upper airway obstruction per NCC.    02/02/18:  Pt off Precedex, moving left side spontaneously and opening eyes. Intubated, on spontaneous today. NCC giving steroids in hopes to extubate  tomorrow.  02/03/18:  NAEON, intubated, not responsive to verbal stimuli, withdraws from pain on LUE, SBP ~135-230. Continued on insulin gtt, SQH for DVT ppx, and captopril.   02/16/18:  Few changes on exam, continues to have significant RUE/RLE weakness with global aphasia.  Family member at bedside noted attempt to communicate with her.  02/18/18:  Pt largely unchanged on exam this am.  No family member present during am rounds.  02/19/18:  Tolerating facemask. PEG by IR this afternoon.  02/20/18:  s/p PEG. O2 % on 5L NC, still requiring frequent suctioning. Primary team considering transfer to Medicine tomorrow.  02/21/18:  Pt sating % on 3L NC. Restarted DAPT, including . Plans for step down to stroke service.  02/22/18:  Pt with [facility] insurance but not service-connected so unable to be placed at SNF.  Working on insurance coverage of at least C therapy.  02/23/18:  Increased detemir to 36 U bid.  Placement with [facility] SNF pending completion of paperwork by Stroke team and pt's spouse--in process.  2/24/18 - NAEON. Pts WBC mildly elevated 13.01, pt is afebrile. Will continue to monitor. Endocrine consulted - Recommend levemir 40 units daily to cover basal needs (using ~0.7u/kg); Start novolog 6 units q4hr to cover TF; Moderate correction q4hr. SNF placement pending.  2/25/18 -  Pt WBC increased overnight, HR range , and temp 99.2. Blood cultures ordered and drawn. UA/UC ordered. Nursing staff called a rapid response this AM due to pts tachycardia, fever, and decreased O2 sats. Arrived to room @ 0824. 1 liter of NS ordered. STAT CXR ordered. Tylenol given. ABG completed with no significant abnormal results. Respiratory suctioned pt and pt repositioned to help with breathing. Pt O2 sats improved on venti mask, temp decreased, and BP remained normotensive. Started on  vanc and zosyn. Pt appears more comfortable and no decline in neuro status. Pt family updated.   2/26/18 - afebrile overnight and  leukocytosis improving with BS antibiotics. CT abdomen yesterday noted seroma/hematoma on left rectus abdominus. IR consulted for culture +/- drain.    STROKE DOCUMENTATION   Acute Stroke Times   Stroke Team Called Date: 01/25/18  Stroke Team Called Time: 1852  Stroke Team Arrival Date: 01/25/18  Stroke Team Arrival Time: 0652  CT Interpretation Time: 1910  Decision to Treat Time for Alteplase:  (n/a unknown last known normal )  Decision to Treat Time for IR: 1915    NIH Scale:  1a. Level Of Consciousness: 0-->Alert: keenly responsive  1b. LOC Questions: 2-->Answers neither question correctly  1c. LOC Commands: 2-->Performs neither task correctly  2. Best Gaze: 1-->Partial gaze palsy: gaze is abnormal in one or both eyes, but forced deviation or total gaze paresis is not present  3. Visual: 1-->Partial hemianopia  4. Facial Palsy: 1-->Minor paralysis (flattened nasolabial fold, asymmetry on smiling)  5a. Motor Arm, Left: 1-->Drift: limb holds 90 (or 45) degrees, but drifts down before full 10 seconds: does not hit bed or other support  5b. Motor Arm, Right: 4-->No movement  6a. Motor Leg, Left: 3-->No effort against gravity: leg falls to bed immediately  6b. Motor Leg, Right: 4-->No movement  7. Limb Ataxia: 0-->Absent  8. Sensory: 1-->Mild-to-moderate sensory loss: patient feels pinprick is less sharp or is dull on the affected side: or there is a loss of superficial pain with pinprick, but patient is aware of being touched  9. Best Language: 3-->Mute, global aphasia: no usable speech or auditory comprehension  10. Dysarthria: 2-->Severe dysarthria: patients speech is so slurred as to be unintelligible in the absence of or out of proportion to any dysphasia, or is mute/anarthric  11. Extinction and Inattention (formerly Neglect): 0-->No abnormality  Total (NIH Stroke Scale): 25     Modified Gurley Score: 0  [place] Coma Scale:    ABCD2 Score:    [id number] Score:   HAS -BLED Score:   ICH Score:   Hunt & Laguerre  Classification:      Hemorrhagic change of an Ischemic Stroke: Does this patient have an ischemic stroke with hemorrhagic changes? No     Neurologic Chief Complaint: L MCA Stroke    Subjective:     Interval History: NAEON. VS wnl. Afebrile on broad spectrum vanc and zosyn. UCx, PEG cultures have reported organisms. Will descalate antibiotics today.   ARF resolving and pt on 5L O2 via nasal cannula    HPI, Past Medical, Family, and Social History remains the same as documented in the initial encounter.     Review of Systems   Unable to perform ROS: Patient nonverbal   Constitutional: Positive for fever. Negative for chills.   Eyes: Negative for visual disturbance.   Respiratory: Negative for cough and shortness of breath (improving).    Cardiovascular: Negative for chest pain.   Skin: Negative for rash and wound.   Neurological: Positive for speech difficulty and weakness. Negative for numbness.   Psychiatric/Behavioral: Positive for confusion and decreased concentration.     Scheduled Meds:   albuterol sulfate  2.5 mg Nebulization Q6H    aspirin  325 mg Per G Tube Daily    atorvastatin  80 mg Per NG tube Daily    cefTRIAXone (ROCEPHIN) IVPB  1 g Intravenous Q24H    clopidogrel  75 mg Per G Tube Daily    heparin (porcine)  5,000 Units Subcutaneous Q8H    insulin aspart U-100  7 Units Subcutaneous Q24H    insulin aspart U-100  7 Units Subcutaneous Q24H    insulin aspart U-100  7 Units Subcutaneous Q24H    insulin aspart U-100  7 Units Subcutaneous Q24H    [START ON 2/28/2018] insulin aspart U-100  7 Units Subcutaneous Q24H    [START ON 2/28/2018] insulin aspart U-100  7 Units Subcutaneous Q24H    insulin detemir U-100  18 Units Subcutaneous Daily    ipratropium  0.5 mg Nebulization Q6H    lisinopril  20 mg Per NG tube Daily    metoprolol tartrate  25 mg Per NG tube BID    modafinil  200 mg Per NG tube Daily    And    modafinil  100 mg Per NG tube Daily    polyethylene glycol  17 g Per NG tube Daily     senna-docusate 8.6-50 mg  1 tablet Per NG tube BID    sodium chloride 0.9%  3 mL Intravenous Q8H    sodium chloride 3%  4 mL Nebulization Q6H    vancomycin in 0.9 % sodium chl  1,500 mg Intravenous Q8H     Continuous Infusions:   sodium chloride 0.9% 75 mL/hr at 02/27/18 0207     PRN Meds:acetaminophen, bisacodyl, dextrose 50%, glucagon (human recombinant), insulin aspart U-100, ipratropium, labetalol, ondansetron    Objective:     Vital Signs (Most Recent):  Temp: 97.6 °F (36.4 °C) (02/27/18 0840)  Pulse: 82 (02/27/18 1202)  Resp: 17 (02/27/18 1202)  BP: (!) 151/92 (02/27/18 0840)  SpO2: 99 % (02/27/18 1202)  BP Location: Left arm    Vital Signs Range (Last 24H):  Temp:  [97.2 °F (36.2 °C)-98.3 °F (36.8 °C)]   Pulse:  [76-93]   Resp:  [16-28]   BP: (115-151)/()   SpO2:  [92 %-100 %]   BP Location: Left arm     Physical Exam   Constitutional: He appears well-developed.   HENT:   Head: Normocephalic and atraumatic.   Eyes: EOM are normal. Pupils are equal, round, and reactive to light.   Cardiovascular: Normal rate and regular rhythm.    Pulmonary/Chest: Effort normal and breath sounds normal.   Neurological: He is alert.   Skin: Skin is warm.   Psychiatric: He has a normal mood and affect. His behavior is normal.   Vitals reviewed.     Neurologic Exam:  Mental Status:  Somnolent, opens eyes to voice. Does not follow commands well, intermittently mimicking commands. No attempts to use gestures to communicate this am, tearful.  Cranial Nerves:  Face appears symmetric.  PERRLA, EOMI with tracking.  Tongue protrudes midline.  Motor:  Normal bulk and tone. LUE, LLE moving spontaneously. RLE with some withdrawal to noxious stimuli, non-stereotyped.  No movement RUE.  Sensory:   Withdrawal to noxious stimuli at LUE and BLE.  No withdrawal or grimace to noxious stimuli at RUE.  Reflexes:  Biceps, brachioradialis, patellar 2+ and symmetric.  No ankle clonus.  Downgoing toe bilaterally.  Coordination:   Difficulty following JESSICA.  No resting tremor or myoclonus.    Gait:  Deferred 2/2 fall precautions, AMS    Laboratory:  CMP:     Recent Labs  Lab 02/27/18  0527   CALCIUM 8.9   ALBUMIN 1.9*   PROT 6.6      K 3.5   CO2 28      BUN 17   CREATININE 0.9   ALKPHOS 124   ALT 30   AST 43*   BILITOT 0.5     CBC:     Recent Labs  Lab 02/27/18  0527   WBC 9.20   RBC 3.59*   HGB 10.4*   HCT 32.0*   *   MCV 89   MCH 29.0   MCHC 32.5     Lipid Panel: Invalid due to excessively high triglycerides  Coagulation:     Recent Labs  Lab 02/27/18  0527   INR 1.0     Hgb A1C: 10%      Diagnostic Results     Brain Imaging   01/26/18 CT head w/o contrast:  No detrimental change.  Evolving subtle left MCA territory infarct.       01/26/18 MRI Brain w/o contrast:  1. Large acute left MCA territory infarction as above.  No associated hemorrhage or significant mass effect at this time.  2. Mild chronic ischemic changes.        02/04/18 CT head w/o contrast:  Redemonstration of large left MCA territory infarction without evidence of hemorrhagic conversion.  Slight interval increase in local mass effect with rightward midline shift of approximately 0.6 cm. Stable postoperative change from left MCA endovascular stenting. Sinus disease as above.       Vessel Imaging   01/25/18 CTA Stroke Multiphase:  Short segment occlusion of left M1.  There is good collateral flow in the ischemic territory as detailed above.  Noncontrast images demonstrate subtle changes reflecting left MCA territory acute infarct.       01/25/18 IR Angiogram:  Cerebral angiogram demonstrates occlusion of the M1 segment of the left middle cerebral artery with good collateral flow from pial collaterals.  This was successfully removed and residual stenosis at the site of occlusion was angioplastied and stented with improvement in flow.  Slow flow to the left parietal lobe suggests area of infarcted tissue.  This was felt to represent TICI 2C flow.    Cardiac  Imaging   01/26/18 2D Echo w/ CFD:  CONCLUSIONS     1 - Normal left ventricular systolic function (EF 65-70%).     2 - Concentric remodeling.     3 - No wall motion abnormalities.     4 - Normal left ventricular diastolic function.     5 - Normal right ventricular systolic function .    6 - No LA enlargement.      Misti Hernández MD  Comprehensive Stroke Center  Department of Vascular Neurology   [facility]

## 2018-02-27 NOTE — PROGRESS NOTES
[facility] Hospital Medicine  Progress Note    Patient Name: Todd Quevedo  MRN: [id number]  Patient Class: IP- Inpatient   Admission Date: 1/25/2018  Length of Stay: 33 days  Attending Physician: Jaime Reynolds MD  Primary Care Provider: Primary Doctor [facility] Medicine Team: Networked reference to record PCT  Celine Zepeda MD    Subjective:     Principal Problem:Embolic stroke involving left middle cerebral artery    HPI:  47yoM with PMHx HLD, poorly controlled Dm, and LOYDA (not currently on home CPAP) s/p large L MCA stroke 1/25 s/p thrombectomy with episode of acute hypoxic respiratory failure. Currently patient was febrile to 101 with tachycardia into the 120s with WBC of 16. For CVA ppx patient is on ASA/plavix and statin therapy, also patient has been on DVT ppx with SQH. Patient was placed on [facility] Course:  No notes on file    Interval Hx: No acute events overnight. Patient currently on 5L NC    Review of Systems   Unable to perform ROS: Patient nonverbal     Objective:     Vital Signs (Most Recent):  Temp: 97.6 °F (36.4 °C) (02/27/18 0840)  Pulse: 85 (02/27/18 0840)  Resp: 20 (02/27/18 0840)  BP: (!) 151/92 (02/27/18 0840)  SpO2: 99 % (02/27/18 0840) Vital Signs (24h Range):  Temp:  [97.2 °F (36.2 °C)-98.3 °F (36.8 °C)] 97.6 °F (36.4 °C)  Pulse:  [76-94] 85  Resp:  [16-28] 20  SpO2:  [92 %-100 %] 99 %  BP: (115-151)/() 151/92     Weight: 105.6 kg (232 lb 12.9 oz)  Body mass index is 33.4 kg/m².    Physical Exam   HENT:   Head: Normocephalic.   Eyes: EOM are normal. Pupils are equal, round, and reactive to light. No scleral icterus.   Neck: No JVD present. No tracheal deviation present.   Cardiovascular: Normal rate, regular rhythm and normal heart sounds.    No murmur heard.  Pulmonary/Chest: Effort normal and breath sounds normal. He has no wheezes. He has no rales.   Abdominal: Soft. There is no guarding.   Peg site with copious purulent and  sanguinous drainage, some induration   Musculoskeletal: He exhibits no edema.   Neurological:   Intermittent alert, does not track or follow commands, left eye deviation, flaccid R. Paralysis, moving left side spontaneously  BS reflexes intact       Skin: Skin is warm. He is not diaphoretic.       Significant Labs:   ABGs: No results for input(s): PH, PCO2, HCO3, POCSATURATED, BE, TOTALHB, COHB, METHB, O2HB, POCFIO2 in the last 48 hours.  Blood Culture: No results for input(s): LABBLOO in the last 48 hours.  CBC:     Recent Labs  Lab 02/26/18 0358 02/27/18 0527   WBC 14.46* 9.20   HGB 10.6* 10.4*   HCT 32.4* 32.0*    361*     CMP:     Recent Labs  Lab 02/26/18 0358 02/27/18 0527    140   K 3.8 3.5    104   CO2 24 28   * 113*   BUN 19 17   CREATININE 0.9 0.9   CALCIUM 8.5* 8.9   PROT 6.5 6.6   ALBUMIN 1.8* 1.9*   BILITOT 0.6 0.5   ALKPHOS 137* 124   AST 38 43*   ALT 24 30   ANIONGAP 13 8   EGFRNONAA >60.0 >60.0     Lactic Acid: No results for input(s): LACTATE in the last 48 hours.  Urine Culture: No results for input(s): LABURIN in the last 48 hours.    Significant Imaging: I have reviewed all pertinent imaging results/findings within the past 24 hours.    Assessment/Plan:      Acute respiratory failure with hypoxia    Likely Aspiration pna vs pneumonitis (as CXR improved after a day) 2/2 to superimposed encephalopathy due to sepsis  Patient has been anti-coagulated with DVT ppx and currently on ASA and plavix throughout hospital stay, less likely PE but consider if O2 status with current therapy non-improved; continue active weaning of O2 to ascertain current requirements  Continue to follow wound, blood cultures. would de-escalate to rocephin and vanc for therapy for abscess and lung penetration based on wound culture results, also abscess drain with GPC, urine culture as well sensitive to rocephin, not sensitive to zosyn          VTE Risk Mitigation         Ordered     heparin  (porcine) injection 5,000 Units  Every 8 hours     Route:  Subcutaneous        02/20/18 1010     heparin (porcine) injection 5,000 Units  Every 8 hours     Route:  Subcutaneous        02/14/18 1002     High Risk of VTE  Once      01/27/18 1406              Celine Zepeda MD  Department of Hospital Medicine   [facility]

## 2018-02-27 NOTE — ASSESSMENT & PLAN NOTE
- onset ; AB.55/34/69 suggestive of acute hypoxic respiratory failure, likely due to acute aspiration event given initial CXR was suggestive of RML consolidation. No further episodes overnight. Considered PE as differential however pt is on OAC and not tachycardic, tachypneic. Well score 1.5 (low-risk)  - will continue BS antibiotics; may descalate to aspiration pneumonia coverage if no other cultures grow in 48 hrs  - chest physiotherapy, aspiration precaution, continue O2 via nasal cannula to keep O2 sats > 92%  - hospital medicine consulted for additional recs

## 2018-02-27 NOTE — SUBJECTIVE & OBJECTIVE
"Interval HPI:   BG trending down overnight.  TF infusing at 30 cc/hr.  No hypoglycemia.  Afebrile.     BP (!) 151/92 (BP Location: Left arm, Patient Position: Lying)   Pulse 90   Temp 97.6 °F (36.4 °C) (Axillary)   Resp 17   Ht 5' 10" (1.778 m)   Wt 105.6 kg (232 lb 12.9 oz)   SpO2 99%   BMI 33.40 kg/m²     Labs Reviewed and Include      Recent Labs  Lab 02/27/18  0527   *   CALCIUM 8.9   ALBUMIN 1.9*   PROT 6.6      K 3.5   CO2 28      BUN 17   CREATININE 0.9   ALKPHOS 124   ALT 30   AST 43*   BILITOT 0.5     Lab Results   Component Value Date    WBC 9.20 02/27/2018    HGB 10.4 (L) 02/27/2018    HCT 32.0 (L) 02/27/2018    MCV 89 02/27/2018     (H) 02/27/2018     No results for input(s): TSH, FREET4 in the last 168 hours.  Lab Results   Component Value Date    HGBA1C 10.0 (H) 01/25/2018       Nutritional status:   Body mass index is 33.4 kg/m².  Lab Results   Component Value Date    ALBUMIN 1.9 (L) 02/27/2018    ALBUMIN 1.8 (L) 02/26/2018    ALBUMIN 2.2 (L) 02/25/2018     No results found for: PREALBUMIN    Estimated Creatinine Clearance: 122.1 mL/min (based on SCr of 0.9 mg/dL).    Accu-Checks  Recent Labs      02/25/18   1252  02/25/18   1800  02/25/18   2039  02/25/18   2310  02/26/18   0317  02/26/18   1232  02/26/18   1833  02/26/18   2013  02/26/18   2341  02/27/18   0402   POCTGLUCOSE  265*  188*  143*  144*  151*  122*  113*  109  94  131*       Current Medications and/or Treatments Impacting Glycemic Control  Immunotherapy:  Immunosuppressants     None        Steroids:   Hormones     None        Pressors:    Autonomic Drugs     Start     Stop Route Frequency Ordered    01/31/18 0854  succinylcholine (ANECTINE) 20 mg/mL injection     Comments:  Created by cabinet override    01/31 2059 01/31/18 0854        Moderate dose,  novolog 10 units q4h  levemir 25 units daily    Hyperglycemia/Diabetes Medications: Antihyperglycemics     Start     Stop Route Frequency Ordered    " 02/28/18 0400  insulin aspart U-100 pen 7 Units      -- SubQ Every 24 hours (non-standard times) 02/27/18 0803    02/28/18 0000  insulin aspart U-100 pen 7 Units      -- SubQ Every 24 hours (non-standard times) 02/27/18 0803    02/27/18 2000  insulin aspart U-100 pen 7 Units      -- SubQ Every 24 hours (non-standard times) 02/27/18 0803    02/27/18 1600  insulin aspart U-100 pen 7 Units      -- SubQ Every 24 hours (non-standard times) 02/27/18 0803    02/27/18 1200  insulin aspart U-100 pen 7 Units      -- SubQ Every 24 hours (non-standard times) 02/27/18 0803    02/27/18 0900  insulin detemir U-100 pen 18 Units      -- SubQ Daily 02/27/18 0803    02/27/18 0815  insulin aspart U-100 pen 7 Units      -- SubQ Every 24 hours (non-standard times) 02/27/18 0803    02/24/18 1148  insulin aspart U-100 pen 1-10 Units      -- SubQ Every 4 hours PRN 02/24/18 1049    02/16/18 1400  insulin aspart pen 8 Units      02/19 0001 SubQ Every 4 hours 02/16/18 1058

## 2018-02-27 NOTE — ASSESSMENT & PLAN NOTE
-180  Cut back levemir by 25-30%  Levemir 18 units  novolog 7 units q4h for TF coverage  If TF held or decreased, hold or decrease novolog by same %  Continue moderate dose correction scale q4hr prn    Discharge planning :  TBD

## 2018-02-27 NOTE — PT/OT/SLP PROGRESS
"Speech Language Pathology Treatment    Patient Name:  Todd Quevedo   MRN:  [id number]  Admitting Diagnosis: Embolic stroke involving left middle cerebral artery    Recommendations:                 General Recommendations:  Dysphagia therapy, Speech/language therapy and Cognitive-linguistic therapy  Diet recommendations:  NPO, NPO   Aspiration Precautions: Frequent oral care and Strict aspiration precautions   General Precautions: Standard, aspiration, aphasia  Communication strategies:  yes/no questions only and go to room if call light pushed    Subjective     Pt seen bedside with sister present.  Pt removed nasal cannula x1 with SpO2 drop to 88%.  SLP immediately replaced NC with return to 97%. No s/s respiratory distress. Nursing notified.     Pain/Comfort:  · Pain Rating 1: 0/10  · Pain Rating Post-Intervention 1: 0/10    Objective:     Has the patient been evaluated by SLP for swallowing?   Yes  Keep patient NPO? Yes   Current Respiratory Status: nasal cannula      Pt alert with sister at bedside.  Good eye contact noted on L though pt did not localize to SLP on R despite max cues.  Pt able to follow simple command of "thumbs up" given model.  Pt noted to perseverate on thumbs up motion t/o remainder of session.  No other commands followed in structured task.  Pt did not vocalize in response to any stim.  No response to y/n questions elicited.   Toothette handed to pt for oral care 2/2 sticky oral secretions visualized.  Pt completed surface oral care on L and R given verbal/tactile cues.  Pt noted to smile in response to sister's encouragement during task.  Oral stim completed by pt x1 with lemon glycerine swab.  Swallow not elicited.  SLP completed oral stim to posterior buccal cavities and lingual surface x5 with delayed swallow initiated on 3/5 trials.  Ongoing encouragement and education on SLP POC provided.  Sister verbalized understanding.  White board updated.    Assessment:     Todd Quevedo is a 47 " y.o. male with an SLP diagnosis of Aphasia, Dysphagia, Cognitive-Linguistic Impairment and Visio-Spatial Impairment.      Goals:    SLP Goals        Problem: SLP Goal    Goal Priority Disciplines Outcome   SLP Goal     SLP Ongoing (interventions implemented as appropriate)   Description:  Speech Language Pathology Goals  Goals expected to be met by 3/2  1. Pt will participate in ongoing bedside assessment of swallow to determine least restrictive diet.  2. Pt will open eyes to stim x5/session given max cues.  3. Pt will follow simple commands x2/session given max cues.  4. Pt will answer basic y/n question via any modality x2/session given max cues.  5. Pt will vocalize in response to any stim x2/session given max cues.                           Plan:     · Patient to be seen:  2 x/week   · Plan of Care expires:  03/18/18  · Plan of Care reviewed with:  patient, sibling   · SLP Follow-Up:  Yes       Discharge recommendations:  nursing facility, skilled   Barriers to Discharge:  Level of Skilled Assistance Needed      Time Tracking:     SLP Treatment Date:   02/27/18  Speech Start Time:  1000  Speech Stop Time:  1020     Speech Total Time (min):  20 min    Billable Minutes: Speech Therapy Individual 10 and Treatment Swallowing Dysfunction 10    EDWARD Key, CCC-SLP  02/27/2018

## 2018-02-28 LAB
ABO + RH BLD: NORMAL
ALBUMIN SERPL BCP-MCNC: 2 G/DL
ALP SERPL-CCNC: 122 U/L
ALT SERPL W/O P-5'-P-CCNC: 26 U/L
ANION GAP SERPL CALC-SCNC: 6 MMOL/L
AST SERPL-CCNC: 42 U/L
BASOPHILS # BLD AUTO: 0.02 K/UL
BASOPHILS NFR BLD: 0.3 %
BILIRUB SERPL-MCNC: 0.4 MG/DL
BLD GP AB SCN CELLS X3 SERPL QL: NORMAL
BUN SERPL-MCNC: 14 MG/DL
CALCIUM SERPL-MCNC: 8.5 MG/DL
CHLORIDE SERPL-SCNC: 103 MMOL/L
CO2 SERPL-SCNC: 29 MMOL/L
CREAT SERPL-MCNC: 0.7 MG/DL
DIFFERENTIAL METHOD: ABNORMAL
EOSINOPHIL # BLD AUTO: 0.3 K/UL
EOSINOPHIL NFR BLD: 4.1 %
ERYTHROCYTE [DISTWIDTH] IN BLOOD BY AUTOMATED COUNT: 12.4 %
EST. GFR  (AFRICAN AMERICAN): >60 ML/MIN/1.73 M^2
EST. GFR  (NON AFRICAN AMERICAN): >60 ML/MIN/1.73 M^2
GLUCOSE SERPL-MCNC: 148 MG/DL
HCT VFR BLD AUTO: 29.3 %
HCT VFR BLD AUTO: 31.6 %
HGB BLD-MCNC: 10.7 G/DL
HGB BLD-MCNC: 9.8 G/DL
IMM GRANULOCYTES # BLD AUTO: 0.05 K/UL
IMM GRANULOCYTES NFR BLD AUTO: 0.7 %
INR PPP: 1
LYMPHOCYTES # BLD AUTO: 1.3 K/UL
LYMPHOCYTES NFR BLD: 17.7 %
MAGNESIUM SERPL-MCNC: 1.6 MG/DL
MCH RBC QN AUTO: 28.5 PG
MCHC RBC AUTO-ENTMCNC: 33.4 G/DL
MCV RBC AUTO: 85 FL
MONOCYTES # BLD AUTO: 0.6 K/UL
MONOCYTES NFR BLD: 7.3 %
NEUTROPHILS # BLD AUTO: 5.3 K/UL
NEUTROPHILS NFR BLD: 69.9 %
NRBC BLD-RTO: 0 /100 WBC
PHOSPHATE SERPL-MCNC: 3 MG/DL
PLATELET # BLD AUTO: 372 K/UL
PMV BLD AUTO: 9.4 FL
POCT GLUCOSE: 141 MG/DL (ref 70–110)
POCT GLUCOSE: 166 MG/DL (ref 70–110)
POCT GLUCOSE: 166 MG/DL (ref 70–110)
POCT GLUCOSE: 169 MG/DL (ref 70–110)
POCT GLUCOSE: 175 MG/DL (ref 70–110)
POCT GLUCOSE: 184 MG/DL (ref 70–110)
POTASSIUM SERPL-SCNC: 3.2 MMOL/L
PROT SERPL-MCNC: 6.4 G/DL
PROTHROMBIN TIME: 10.4 SEC
RBC # BLD AUTO: 3.44 M/UL
SODIUM SERPL-SCNC: 138 MMOL/L
VANCOMYCIN TROUGH SERPL-MCNC: 17.5 UG/ML
WBC # BLD AUTO: 7.55 K/UL

## 2018-02-28 PROCEDURE — 97535 SELF CARE MNGMENT TRAINING: CPT

## 2018-02-28 PROCEDURE — 94668 MNPJ CHEST WALL SBSQ: CPT

## 2018-02-28 PROCEDURE — 99233 SBSQ HOSP IP/OBS HIGH 50: CPT | Mod: ,,, | Performed by: PSYCHIATRY & NEUROLOGY

## 2018-02-28 PROCEDURE — 25000003 PHARM REV CODE 250: Performed by: STUDENT IN AN ORGANIZED HEALTH CARE EDUCATION/TRAINING PROGRAM

## 2018-02-28 PROCEDURE — 83735 ASSAY OF MAGNESIUM: CPT

## 2018-02-28 PROCEDURE — 85025 COMPLETE CBC W/AUTO DIFF WBC: CPT

## 2018-02-28 PROCEDURE — 63600175 PHARM REV CODE 636 W HCPCS: Performed by: STUDENT IN AN ORGANIZED HEALTH CARE EDUCATION/TRAINING PROGRAM

## 2018-02-28 PROCEDURE — 25000003 PHARM REV CODE 250: Performed by: PHYSICIAN ASSISTANT

## 2018-02-28 PROCEDURE — 25000003 PHARM REV CODE 250: Performed by: PSYCHIATRY & NEUROLOGY

## 2018-02-28 PROCEDURE — 63600175 PHARM REV CODE 636 W HCPCS: Performed by: PSYCHIATRY & NEUROLOGY

## 2018-02-28 PROCEDURE — 99232 SBSQ HOSP IP/OBS MODERATE 35: CPT | Mod: ,,, | Performed by: INTERNAL MEDICINE

## 2018-02-28 PROCEDURE — 86580 TB INTRADERMAL TEST: CPT | Performed by: PSYCHIATRY & NEUROLOGY

## 2018-02-28 PROCEDURE — 84100 ASSAY OF PHOSPHORUS: CPT

## 2018-02-28 PROCEDURE — 25000242 PHARM REV CODE 250 ALT 637 W/ HCPCS: Performed by: PSYCHIATRY & NEUROLOGY

## 2018-02-28 PROCEDURE — 85610 PROTHROMBIN TIME: CPT

## 2018-02-28 PROCEDURE — 97112 NEUROMUSCULAR REEDUCATION: CPT

## 2018-02-28 PROCEDURE — 25000003 PHARM REV CODE 250: Performed by: NURSE PRACTITIONER

## 2018-02-28 PROCEDURE — 99900035 HC TECH TIME PER 15 MIN (STAT)

## 2018-02-28 PROCEDURE — 27000221 HC OXYGEN, UP TO 24 HOURS

## 2018-02-28 PROCEDURE — 20600001 HC STEP DOWN PRIVATE ROOM

## 2018-02-28 PROCEDURE — 80202 ASSAY OF VANCOMYCIN: CPT

## 2018-02-28 PROCEDURE — 80053 COMPREHEN METABOLIC PANEL: CPT

## 2018-02-28 PROCEDURE — 36415 COLL VENOUS BLD VENIPUNCTURE: CPT

## 2018-02-28 PROCEDURE — 94640 AIRWAY INHALATION TREATMENT: CPT

## 2018-02-28 PROCEDURE — 94761 N-INVAS EAR/PLS OXIMETRY MLT: CPT

## 2018-02-28 PROCEDURE — A4216 STERILE WATER/SALINE, 10 ML: HCPCS | Performed by: NURSE PRACTITIONER

## 2018-02-28 PROCEDURE — 86901 BLOOD TYPING SEROLOGIC RH(D): CPT

## 2018-02-28 PROCEDURE — 25000242 PHARM REV CODE 250 ALT 637 W/ HCPCS: Performed by: STUDENT IN AN ORGANIZED HEALTH CARE EDUCATION/TRAINING PROGRAM

## 2018-02-28 PROCEDURE — 63600175 PHARM REV CODE 636 W HCPCS: Performed by: NURSE PRACTITIONER

## 2018-02-28 PROCEDURE — 85018 HEMOGLOBIN: CPT

## 2018-02-28 PROCEDURE — 85014 HEMATOCRIT: CPT

## 2018-02-28 RX ORDER — METOPROLOL TARTRATE 50 MG/1
50 TABLET ORAL 2 TIMES DAILY
Status: DISCONTINUED | OUTPATIENT
Start: 2018-02-28 | End: 2018-03-21 | Stop reason: HOSPADM

## 2018-02-28 RX ORDER — LABETALOL HYDROCHLORIDE 5 MG/ML
10 INJECTION, SOLUTION INTRAVENOUS EVERY 6 HOURS PRN
Status: DISCONTINUED | OUTPATIENT
Start: 2018-02-28 | End: 2018-03-21 | Stop reason: HOSPADM

## 2018-02-28 RX ORDER — POTASSIUM CHLORIDE 20 MEQ/15ML
40 SOLUTION ORAL ONCE
Status: COMPLETED | OUTPATIENT
Start: 2018-02-28 | End: 2018-02-28

## 2018-02-28 RX ADMIN — ATORVASTATIN CALCIUM 80 MG: 20 TABLET, FILM COATED ORAL at 09:02

## 2018-02-28 RX ADMIN — VANCOMYCIN HCL-SODIUM CHLORIDE IV SOLN 1.5 GM/250ML-0.9% 1500 MG: 1.5-0.9/25 SOLUTION at 02:02

## 2018-02-28 RX ADMIN — LISINOPRIL 20 MG: 20 TABLET ORAL at 10:02

## 2018-02-28 RX ADMIN — SODIUM CHLORIDE SOLN NEBU 3% 4 ML: 3 NEBU SOLN at 12:02

## 2018-02-28 RX ADMIN — Medication 5 UNITS: at 08:02

## 2018-02-28 RX ADMIN — ASPIRIN 325 MG ORAL TABLET 325 MG: 325 PILL ORAL at 09:02

## 2018-02-28 RX ADMIN — POTASSIUM CHLORIDE 40 MEQ: 20 SOLUTION ORAL at 01:02

## 2018-02-28 RX ADMIN — METOPROLOL TARTRATE 25 MG: 25 TABLET ORAL at 09:02

## 2018-02-28 RX ADMIN — INSULIN ASPART 2 UNITS: 100 INJECTION, SOLUTION INTRAVENOUS; SUBCUTANEOUS at 04:02

## 2018-02-28 RX ADMIN — MODAFINIL 200 MG: 100 TABLET ORAL at 05:02

## 2018-02-28 RX ADMIN — INSULIN ASPART 2 UNITS: 100 INJECTION, SOLUTION INTRAVENOUS; SUBCUTANEOUS at 10:02

## 2018-02-28 RX ADMIN — ALBUTEROL SULFATE 2.5 MG: 2.5 SOLUTION RESPIRATORY (INHALATION) at 12:02

## 2018-02-28 RX ADMIN — CLOPIDOGREL BISULFATE 75 MG: 75 TABLET, FILM COATED ORAL at 09:02

## 2018-02-28 RX ADMIN — SODIUM CHLORIDE SOLN NEBU 3% 4 ML: 3 NEBU SOLN at 01:02

## 2018-02-28 RX ADMIN — IPRATROPIUM BROMIDE 0.5 MG: 0.5 SOLUTION RESPIRATORY (INHALATION) at 07:02

## 2018-02-28 RX ADMIN — VANCOMYCIN HCL-SODIUM CHLORIDE IV SOLN 1.5 GM/250ML-0.9% 1500 MG: 1.5-0.9/25 SOLUTION at 04:02

## 2018-02-28 RX ADMIN — INSULIN ASPART 6 UNITS: 100 INJECTION, SOLUTION INTRAVENOUS; SUBCUTANEOUS at 12:02

## 2018-02-28 RX ADMIN — STANDARDIZED SENNA CONCENTRATE AND DOCUSATE SODIUM 1 TABLET: 8.6; 5 TABLET, FILM COATED ORAL at 09:02

## 2018-02-28 RX ADMIN — IPRATROPIUM BROMIDE 0.5 MG: 0.5 SOLUTION RESPIRATORY (INHALATION) at 01:02

## 2018-02-28 RX ADMIN — HEPARIN SODIUM 5000 UNITS: 5000 INJECTION, SOLUTION INTRAVENOUS; SUBCUTANEOUS at 05:02

## 2018-02-28 RX ADMIN — ALBUTEROL SULFATE 2.5 MG: 2.5 SOLUTION RESPIRATORY (INHALATION) at 07:02

## 2018-02-28 RX ADMIN — Medication 3 ML: at 01:02

## 2018-02-28 RX ADMIN — LABETALOL HYDROCHLORIDE 10 MG: 5 INJECTION, SOLUTION INTRAVENOUS at 04:02

## 2018-02-28 RX ADMIN — MODAFINIL 100 MG: 100 TABLET ORAL at 01:02

## 2018-02-28 RX ADMIN — INSULIN ASPART 6 UNITS: 100 INJECTION, SOLUTION INTRAVENOUS; SUBCUTANEOUS at 08:02

## 2018-02-28 RX ADMIN — INSULIN DETEMIR 18 UNITS: 100 INJECTION, SOLUTION SUBCUTANEOUS at 08:02

## 2018-02-28 RX ADMIN — IPRATROPIUM BROMIDE 0.5 MG: 0.5 SOLUTION RESPIRATORY (INHALATION) at 12:02

## 2018-02-28 RX ADMIN — INSULIN ASPART 6 UNITS: 100 INJECTION, SOLUTION INTRAVENOUS; SUBCUTANEOUS at 09:02

## 2018-02-28 RX ADMIN — SODIUM CHLORIDE SOLN NEBU 3% 4 ML: 3 NEBU SOLN at 07:02

## 2018-02-28 RX ADMIN — CEFTRIAXONE SODIUM 1 G: 1 INJECTION, POWDER, FOR SOLUTION INTRAMUSCULAR; INTRAVENOUS at 01:02

## 2018-02-28 RX ADMIN — INSULIN ASPART 6 UNITS: 100 INJECTION, SOLUTION INTRAVENOUS; SUBCUTANEOUS at 04:02

## 2018-02-28 RX ADMIN — INSULIN ASPART 2 UNITS: 100 INJECTION, SOLUTION INTRAVENOUS; SUBCUTANEOUS at 09:02

## 2018-02-28 RX ADMIN — INSULIN ASPART 2 UNITS: 100 INJECTION, SOLUTION INTRAVENOUS; SUBCUTANEOUS at 02:02

## 2018-02-28 RX ADMIN — ALBUTEROL SULFATE 2.5 MG: 2.5 SOLUTION RESPIRATORY (INHALATION) at 01:02

## 2018-02-28 RX ADMIN — HEPARIN SODIUM 5000 UNITS: 5000 INJECTION, SOLUTION INTRAVENOUS; SUBCUTANEOUS at 01:02

## 2018-02-28 NOTE — ASSESSMENT & PLAN NOTE
47yoM with recent L MCA stroke with acute hypoxic respiratory failure and sepsis    -Likely hypoxia is Aspiration pna vs pneumonitis (as CXR improved after a day) 2/2 to superimposed encephalopathy due to sepsis  -Continue active weaning of O2 to ascertain current requirements  -Urine culture +klebsiella >100,000 CFU  -Wound/skin culture with Enterobacter  -Abscess from IR aspiration Gram stain with GPC, Culture NG for 24 hrs; would continue to follow culture  -Blood Cx NGTD  -Would continue to follow IR abscess culture for 48-72hrs, for now continue IV vanc and rocephin for uti, abscess, and lung penetration

## 2018-02-28 NOTE — SUBJECTIVE & OBJECTIVE
Neurologic Chief Complaint: L MCA stroke    Subjective:     Interval History: Patient is seen for follow-up neurological assessment and treatment recommendations: NAEON.  Labs remain slightly abnormal but stable.  Patient tolerating 4L NC O2.    HPI, Past Medical, Family, and Social History remains the same as documented in the initial encounter.     Review of Systems   Unable to perform ROS: Patient nonverbal   Constitutional: Negative for fever.   Eyes: Negative for redness.   Respiratory: Negative for cough.    Gastrointestinal: Negative for diarrhea and vomiting.   Genitourinary: Negative for hematuria.   Skin: Negative for rash.   Neurological: Positive for speech difficulty and weakness.     Scheduled Meds:   albuterol sulfate  2.5 mg Nebulization Q6H    aspirin  325 mg Per G Tube Daily    atorvastatin  80 mg Per NG tube Daily    cefTRIAXone (ROCEPHIN) IVPB  1 g Intravenous Q24H    clopidogrel  75 mg Per G Tube Daily    heparin (porcine)  5,000 Units Subcutaneous Q8H    insulin aspart U-100  6 Units Subcutaneous Q24H    insulin aspart U-100  6 Units Subcutaneous Q24H    insulin aspart U-100  6 Units Subcutaneous Q24H    insulin aspart U-100  6 Units Subcutaneous Q24H    insulin aspart U-100  6 Units Subcutaneous Q24H    insulin aspart U-100  6 Units Subcutaneous Q24H    insulin detemir U-100  18 Units Subcutaneous Daily    ipratropium  0.5 mg Nebulization Q6H    lisinopril  20 mg Per NG tube Daily    metoprolol tartrate  25 mg Per NG tube BID    modafinil  200 mg Per NG tube Daily    And    modafinil  100 mg Per NG tube Daily    polyethylene glycol  17 g Per NG tube Daily    senna-docusate 8.6-50 mg  1 tablet Per NG tube BID    sodium chloride 0.9%  3 mL Intravenous Q8H    sodium chloride 3%  4 mL Nebulization Q6H    vancomycin in 0.9 % sodium chl  1,500 mg Intravenous Q8H     Continuous Infusions:  PRN Meds:acetaminophen, bisacodyl, dextrose 50%, glucagon (human recombinant), insulin  aspart U-100, ipratropium, labetalol, ondansetron    Objective:     Vital Signs (Most Recent):  Temp: 98.8 °F (37.1 °C) (02/28/18 1131)  Pulse: 72 (02/28/18 1322)  Resp: 14 (02/28/18 1322)  BP: (!) 177/91 (02/28/18 1131)  SpO2: 98 % (02/28/18 1322)  BP Location: Left arm    Vital Signs Range (Last 24H):  Temp:  [97.4 °F (36.3 °C)-99.5 °F (37.5 °C)]   Pulse:  [67-91]   Resp:  [14-22]   BP: (150-177)/()   SpO2:  [92 %-100 %]   BP Location: Left arm    Physical Exam   Constitutional: He appears well-developed and well-nourished.   Eyes: Conjunctivae are normal. Pupils are equal, round, and reactive to light.   Cardiovascular: Normal rate and regular rhythm.    Pulmonary/Chest: Effort normal. No respiratory distress.   Abdominal: Soft. Bowel sounds are normal.   Neurological: He is alert.   Skin: Skin is warm and dry.   Nursing note and vitals reviewed.      Neurological Exam:   LOC: alert  Language: No aphasia  EOM (CN III, IV, VI): Gaze preference  left    Laboratory:  CMP:   Recent Labs  Lab 02/28/18 0220   CALCIUM 8.5*   ALBUMIN 2.0*   PROT 6.4      K 3.2*   CO2 29      BUN 14   CREATININE 0.7   ALKPHOS 122   ALT 26   AST 42*   BILITOT 0.4     CBC:   Recent Labs  Lab 02/28/18 0220 02/28/18  1000   WBC 7.55  --    RBC 3.44*  --    HGB 9.8* 10.7*   HCT 29.3* 31.6*   *  --    MCV 85  --    MCH 28.5  --    MCHC 33.4  --      Lipid Panel: No results for input(s): CHOL, LDLCALC, HDL, TRIG in the last 168 hours.  Coagulation:   Recent Labs  Lab 02/28/18 0220   INR 1.0     Hgb A1C: No results for input(s): HGBA1C in the last 168 hours.  TSH: No results for input(s): TSH in the last 168 hours.    Diagnostic Results     Brain Imaging   Most recent CTH 2/04/18 redemonstration of large L MCA territory infarction w/o evidence of hemorrhagic conversion.  Stable post op change from L MCA endovascular stenting.    Vessel Imaging   IR Cerebral Angiogram 1/25/18 demonstrated occlusion of the L M1 segment  of the L MCA with good collaterals.  Occlusion removed and stent placed with TICI 2C.  Cardiac Imaging   2D Echo 1/26/18   CONCLUSIONS     1 - Normal left ventricular systolic function (EF 65-70%).     2 - Concentric remodeling.     3 - No wall motion abnormalities.     4 - Normal left ventricular diastolic function.     5 - Normal right ventricular systolic function .

## 2018-02-28 NOTE — ASSESSMENT & PLAN NOTE
- onset ; AB.55/34/69 suggestive of acute hypoxic respiratory failure, likely due to acute aspiration event given initial CXR was suggestive of RML consolidation. No further episodes. Considered PE as differential however pt is on OAC and not tachycardic, tachypneic. Well score 1.5 (low-risk)  - will continue BS antibiotics; may descalate to aspiration pneumonia coverage if no other cultures grow in 48 hrs  - Continue chest physiotherapy, aspiration precaution, continue O2 via nasal cannula to keep O2 sats > 92%-currently on 4L NC  - hospital medicine consulted, appreciate the recs

## 2018-02-28 NOTE — ASSESSMENT & PLAN NOTE
-Abscess near PEG w/IR aspiration.  Gram stain with gram +cocci, culture w/NGTD.  Continue to monitor culture  -continue IV vanc and rocephin for now pending culture results

## 2018-02-28 NOTE — ASSESSMENT & PLAN NOTE
"48 yo M with PMHx HLD, LOYDA, poorly controlled DM II presents to [facility] 01/25/18 after noted "strange behavior" for which EMS was called.  Pt was found to have a L M1 occlusion and was taken to IR for thrombectomy with TICI 2C reperfusion and stent placement due to L MCA stenosis. Etiology remains unclear: no obvious signs of cardiac source or atheromatous disease in the aorto/carotid axis. Echo normal with no hx or signs of A fib. PT/OT/SLP will continue to evaluate and treat.  Recommending SNF.  DAPT 2/2 recent stent placement in angiogram after PEG placement.      Antithrombotics:  mg po qd, clopidogrel 75 mg po qd [intracranial stent]  Statins: Atorvastatin 80 mg po qd  Aggressive risk factor modification: HLD, DM II (Hgb A1c 10%), Obesity  Rehab efforts: PT/OT/SLP--Recommending SNF, placement pending  Diagnostics ordered/pending: None  VTE prophylaxis: Heparin 5000 U q8h  BP parameters: Infarct: Post sucessful thrombectomy, SBP <140   "

## 2018-02-28 NOTE — ASSESSMENT & PLAN NOTE
Afebrile   - leukocytosis resolved  - Workup ordered (CXR, UC/UA, blood cultures, PEG tube cultures, left rectus abdominal hematoma/seroma) -> pt has gram positive cocci from abscess cultures from 2/26/18, enterobacter from PEG cultures (2/25/18) and klebsiella from urine cultures (2/25/18)  - Continue rocephin and vanc.  Monitor vanc trough.

## 2018-02-28 NOTE — PLAN OF CARE
Problem: Patient Care Overview  Goal: Plan of Care Review  Outcome: Ongoing (interventions implemented as appropriate)  Pt remained free from falls or injuries this shift. Pt turned q2hrs. No skin breakdown noticed. Sacrum pink but blancheable. Pt rested well through the night. accuchecks q4hrs. Tube feeds reached goal of 60cc/hr. Tolerating well. Sister at bedside through the night.

## 2018-02-28 NOTE — ASSESSMENT & PLAN NOTE
-Stroke risk factor.  SBPs 150s-170s  -Continue scheduled lisinopril 20 mg qd.  Metoprolol increased to 50 mg bid.  Prn Labetalol.  Monitor.

## 2018-02-28 NOTE — CARE UPDATE
Continue regimen, computer glance. TF at 60 cc/hr.  No hypoglycemia.  ** Please call Endocrine for any BG related issues **  Angeles Dennis, ALEXI  [phone]

## 2018-02-28 NOTE — PLAN OF CARE
Problem: Occupational Therapy Goal  Goal: Occupational Therapy Goal  Goals set 2/26 to be addressed for 14 days with expiration date, 3/12:  Patient will increase functional independence with ADLs by performing:    Patient will demonstrate rolling to the right with mod assist.  Not met   Patient will demonstrate rolling to the left with max assist.   Not met  Patient will demonstrate supine -sit with max assist.   Not met  Patient will demonstrate squat pivot transfers with max assist.   Not met  Patient will demonstrate grooming while seated with max assist.   Not met  Patient will demonstrate upper body dressing with max assist while seated EOB.   Not met  Patient will demonstrate lower body dressing with max assist while seated EOB.   Not met  Patient will demonstrate toileting with max assist.   Not met  Patient will demonstrate ability to follow 3/5 commands.   Not met  Patient will demonstrate independence with HEP for right UE positioning.    Not met  Patient's family / caregiver will demonstrate independence and safety with assisting patient with self-care skills and functional mobility.     Not met  Patient's family / caregiver will demonstrate independence with providing ROM and changes in bed positioning.   Not met        Goals remain appropriate.  ISAMAR Almonte  2/28/2018

## 2018-02-28 NOTE — SUBJECTIVE & OBJECTIVE
Interval Hx: No acute events overnight. Patient currently on 4L Nc. Potassium 3.2 replaced per primary.    Review of Systems   Unable to perform ROS: Patient nonverbal     Objective:     Vital Signs (Most Recent):  Temp: 98.6 °F (37 °C) (02/28/18 0850)  Pulse: 74 (02/28/18 1100)  Resp: 20 (02/28/18 0850)  BP: (!) 162/92 (02/28/18 0850)  SpO2: 97 % (02/28/18 0850) Vital Signs (24h Range):  Temp:  [97.4 °F (36.3 °C)-99.5 °F (37.5 °C)] 98.6 °F (37 °C)  Pulse:  [67-91] 74  Resp:  [14-22] 20  SpO2:  [92 %-100 %] 97 %  BP: (150-168)/() 162/92     Weight: 105.6 kg (232 lb 12.9 oz)  Body mass index is 33.4 kg/m².    Physical Exam   HENT:   Head: Normocephalic.   Eyes: EOM are normal. Pupils are equal, round, and reactive to light. No scleral icterus.   Neck: No JVD present. No tracheal deviation present.   Cardiovascular: Normal rate, regular rhythm and normal heart sounds.    No murmur heard.  Pulmonary/Chest: Effort normal and breath sounds normal. He has no wheezes. He has no rales.   Abdominal: Soft. There is no guarding.   Peg site without drainage, no induration appreciated   Musculoskeletal: He exhibits no edema.   Neurological:   Alert, does not track or follow commands, left eye deviation, flaccid R. Paralysis, moving left side spontaneously  BS reflexes intact       Skin: Skin is warm. He is not diaphoretic.       Significant Labs:   ABGs: No results for input(s): PH, PCO2, HCO3, POCSATURATED, BE, TOTALHB, COHB, METHB, O2HB, POCFIO2 in the last 48 hours.  Blood Culture: No results for input(s): LABBLOO in the last 48 hours.  CBC:     Recent Labs  Lab 02/27/18  0527 02/28/18  0220 02/28/18  1000   WBC 9.20 7.55  --    HGB 10.4* 9.8* 10.7*   HCT 32.0* 29.3* 31.6*   * 372*  --      CMP:     Recent Labs  Lab 02/27/18  0527 02/28/18  0220    138   K 3.5 3.2*    103   CO2 28 29   * 148*   BUN 17 14   CREATININE 0.9 0.7   CALCIUM 8.9 8.5*   PROT 6.6 6.4   ALBUMIN 1.9* 2.0*   BILITOT 0.5  0.4   ALKPHOS 124 122   AST 43* 42*   ALT 30 26   ANIONGAP 8 6*   EGFRNONAA >60.0 >60.0     Lactic Acid: No results for input(s): LACTATE in the last 48 hours.  Urine Culture: No results for input(s): LABURIN in the last 48 hours.    Significant Imaging: I have reviewed all pertinent imaging results/findings within the past 24 hours.

## 2018-02-28 NOTE — PLAN OF CARE
DAMIAN sent clinical information to the [facility] with the [place] via fax at [phone].    Mary Shoemaker LMSW  [facility]  Ext. [phone]

## 2018-02-28 NOTE — PROGRESS NOTES
[facility] Hospital Medicine  Progress Note    Patient Name: Todd Quevedo  MRN: [id number]  Patient Class: IP- Inpatient   Admission Date: 1/25/2018  Length of Stay: 34 days  Attending Physician: Jaime Reynolds MD  Primary Care Provider: Primary Doctor [facility] Medicine Team: Networked reference to record PCT  Celine Zepeda MD    Subjective:     Principal Problem:Embolic stroke involving left middle cerebral artery    HPI:  47yoM with PMHx HLD, poorly controlled Dm, and LOYDA (not currently on home CPAP) s/p large L MCA stroke 1/25 s/p thrombectomy with episode of acute hypoxic respiratory failure. Currently patient was febrile to 101 with tachycardia into the 120s with WBC of 16. For CVA ppx patient is on ASA/plavix and statin therapy, also patient has been on DVT ppx with SQH. Patient was placed on [facility] Course:  No notes on file    Interval Hx: No acute events overnight. Patient currently on 4L Nc. Potassium 3.2 replaced per primary.    Review of Systems   Unable to perform ROS: Patient nonverbal     Objective:     Vital Signs (Most Recent):  Temp: 98.6 °F (37 °C) (02/28/18 0850)  Pulse: 74 (02/28/18 1100)  Resp: 20 (02/28/18 0850)  BP: (!) 162/92 (02/28/18 0850)  SpO2: 97 % (02/28/18 0850) Vital Signs (24h Range):  Temp:  [97.4 °F (36.3 °C)-99.5 °F (37.5 °C)] 98.6 °F (37 °C)  Pulse:  [67-91] 74  Resp:  [14-22] 20  SpO2:  [92 %-100 %] 97 %  BP: (150-168)/() 162/92     Weight: 105.6 kg (232 lb 12.9 oz)  Body mass index is 33.4 kg/m².    Physical Exam   HENT:   Head: Normocephalic.   Eyes: EOM are normal. Pupils are equal, round, and reactive to light. No scleral icterus.   Neck: No JVD present. No tracheal deviation present.   Cardiovascular: Normal rate, regular rhythm and normal heart sounds.    No murmur heard.  Pulmonary/Chest: Effort normal and breath sounds normal. He has no wheezes. He has no rales.   Abdominal: Soft. There is no guarding.   Peg site  without drainage, no induration appreciated   Musculoskeletal: He exhibits no edema.   Neurological:   Alert, does not track or follow commands, left eye deviation, flaccid R. Paralysis, moving left side spontaneously  BS reflexes intact       Skin: Skin is warm. He is not diaphoretic.       Significant Labs:   ABGs: No results for input(s): PH, PCO2, HCO3, POCSATURATED, BE, TOTALHB, COHB, METHB, O2HB, POCFIO2 in the last 48 hours.  Blood Culture: No results for input(s): LABBLOO in the last 48 hours.  CBC:     Recent Labs  Lab 02/27/18 0527 02/28/18 0220 02/28/18  1000   WBC 9.20 7.55  --    HGB 10.4* 9.8* 10.7*   HCT 32.0* 29.3* 31.6*   * 372*  --      CMP:     Recent Labs  Lab 02/27/18 0527 02/28/18 0220    138   K 3.5 3.2*    103   CO2 28 29   * 148*   BUN 17 14   CREATININE 0.9 0.7   CALCIUM 8.9 8.5*   PROT 6.6 6.4   ALBUMIN 1.9* 2.0*   BILITOT 0.5 0.4   ALKPHOS 124 122   AST 43* 42*   ALT 30 26   ANIONGAP 8 6*   EGFRNONAA >60.0 >60.0     Lactic Acid: No results for input(s): LACTATE in the last 48 hours.  Urine Culture: No results for input(s): LABURIN in the last 48 hours.    Significant Imaging: I have reviewed all pertinent imaging results/findings within the past 24 hours.    Assessment/Plan:      Acute respiratory failure with hypoxia    47yoM with recent L MCA stroke with acute hypoxic respiratory failure and sepsis    -Likely hypoxia is Aspiration pna vs pneumonitis (as CXR improved after a day) 2/2 to superimposed encephalopathy due to sepsis  -Continue active weaning of O2 to ascertain current requirements  -Urine culture +klebsiella >100,000 CFU  -Wound/skin culture with Enterobacter  -Abscess from IR aspiration Gram stain with GPC, Culture NG for 24 hrs; would continue to follow culture  -Blood Cx NGTD  -Would continue to follow IR abscess culture for 48-72hrs, for now continue IV vanc and rocephin for uti, abscess, and lung penetration          VTE Risk Mitigation          Ordered     heparin (porcine) injection 5,000 Units  Every 8 hours     Route:  Subcutaneous        02/20/18 1010     heparin (porcine) injection 5,000 Units  Every 8 hours     Route:  Subcutaneous        02/14/18 1002     High Risk of VTE  Once      01/27/18 1406              Celine Zepeda MD  Department of Hospital Medicine   [facility]

## 2018-02-28 NOTE — ASSESSMENT & PLAN NOTE
-Stroke risk factor, LDL invalid as TG > 400  -Continue atorvastatin 80 mg qd  -Plan to repeat lipid panel as an outpatient

## 2018-02-28 NOTE — ASSESSMENT & PLAN NOTE
-Stroke risk factor, Hgb A1c 10%  -Continue Diabetasource AC TFs  - Endocrine consulted and guiding insulin modification for TF  - POCT Q4h ; moderate correction q4hr

## 2018-02-28 NOTE — PROGRESS NOTES
"[facility]  Vascular Neurology  Comprehensive Stroke Center  Progress Note    Assessment/Plan:     * Embolic stroke involving left middle cerebral artery    46 yo M with PMHx HLD, LOYDA, poorly controlled DM II presents to [facility] after noted "strange behavior" for which EMS was called.  Pt was found to have a L M1 occlusion and was taken to IR for thrombectomy with TICI 2C reperfusion and stent placement due to L MCA stenosis. Etiology remains unclear: no obvious signs of cardiac source or atheromatous disease in the aorto/carotid axis. Echo normal with no hx or signs of A fib. PT/OT/SLP will continue to evaluate and treat.  Recommending SNF.  DAPT 2/2 recent stent placement in angiogram after PEG placement.      Antithrombotics:  mg po qd, clopidogrel 75 mg po qd [intracranial stent]  Statins: Atorvastatin 80 mg po qd  Aggressive risk factor modification: HLD, DM II (Hgb A1c 10%), Obesity  Rehab efforts: PT/OT/SLP--Recommending SNF, placement pending  Diagnostics ordered/pending: None  VTE prophylaxis: Heparin 5000 U q8h  BP parameters: Infarct: Post sucessful thrombectomy, SBP <140         Cytotoxic cerebral edema    -2/2 stroke, evident on imaging.  Stable on repeat CTH.        Acute respiratory failure with hypoxia    - onset ; AB.55/34/69 suggestive of acute hypoxic respiratory failure, likely due to acute aspiration event given initial CXR was suggestive of RML consolidation. No further episodes. Considered PE as differential however pt is on OAC and not tachycardic, tachypneic. 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  Gram stain with gram +cocci, culture w/NGTD.  Continue to monitor culture  -continue IV vanc and rocephin for now pending culture results        Right spastic hemiparesis    -Due to stroke  -Continue aggressive PT/OT        Nodule of right lung    -Plan for f/u CT 6-12 months via PCP        Oral phase dysphagia    - Continue with SLP  - Continue NPO        Urinary tract infection due to Klebsiella species     Afebrile   - leukocytosis resolved  - Workup ordered (CXR, UC/UA, blood cultures, PEG tube cultures, left rectus abdominal hematoma/seroma) -> pt has gram positive cocci from abscess cultures from 2/26/18, enterobacter from PEG cultures (2/25/18) and klebsiella from urine cultures (2/25/18)  - Continue rocephin and vanc.  Monitor vanc trough.        Type 2 diabetes mellitus with hyperglycemia, with long-term current use of insulin    -Stroke risk factor, Hgb A1c 10%  -Continue Diabetasource AC TFs  - Endocrine consulted and guiding insulin modification for TF  - POCT Q4h ; moderate correction q4hr            Mixed hyperlipidemia    -Stroke risk factor, LDL invalid as TG > 400  -Continue atorvastatin 80 mg qd  -Plan to repeat lipid panel as an outpatient        Essential hypertension    -Stroke risk factor.  SBPs 150s-170s  -Continue scheduled lisinopril 20 mg qd.  Metoprolol increased to 50 mg bid.  Prn Labetalol.  Monitor.          Obstructive sleep apnea    -Stroke risk factor  -CPAP qHS             01/25/18:  Brought in for aphasia, RSW with L gaze preferance. LKN unknown, not tPA candidate. Went to IR for angiogram and stent.  01/29/18:  Off Cardene, remains on Insulin gtt. Concern for sepsis, respiratory source. Imaging with mass effect and some brain compression; maycol crani watch.  01/30/18:  EEG consistent with focal L slowing 2/2 lesion and mild generalized slowing, no seizures. CT head stable, no hemorrhagic conversion  02/01/18:  Emergent intubation likely due to upper airway  obstruction per NCC.    02/02/18:  Pt off Precedex, moving left side spontaneously and opening eyes. Intubated, on spontaneous today. NCC giving steroids in hopes to extubate tomorrow.  02/03/18:  NAEON, intubated, not responsive to verbal stimuli, withdraws from pain on LUE, SBP ~135-230. Continued on insulin gtt, SQH for DVT ppx, and captopril.   02/16/18:  Few changes on exam, continues to have significant RUE/RLE weakness with global aphasia.  Family member at bedside noted attempt to communicate with her.  02/18/18:  Pt largely unchanged on exam this am.  No family member present during am rounds.  02/19/18:  Tolerating facemask. PEG by IR this afternoon.  02/20/18:  s/p PEG. O2 % on 5L NC, still requiring frequent suctioning. Primary team considering transfer to Medicine tomorrow.  02/21/18:  Pt sating % on 3L NC. Restarted DAPT, including . Plans for step down to stroke service.  02/22/18:  Pt with [facility] but not service-connected so unable to be placed at SNF.  Working on insurance coverage of at least C therapy.  02/23/18:  Increased detemir to 36 U bid.  Placement with [facility] SNF pending completion of paperwork by Stroke team and pt's spouse--in process.  2/24/18 - NAEON. Pts WBC mildly elevated 13.01, pt is afebrile. Will continue to monitor. Endocrine consulted - Recommend levemir 40 units daily to cover basal needs (using ~0.7u/kg); Start novolog 6 units q4hr to cover TF; Moderate correction q4hr. SNF placement pending.  2/25/18 -  Pt WBC increased overnight, HR range , and temp 99.2. Blood cultures ordered and drawn. UA/UC ordered. Nursing staff called a rapid response this AM due to pts tachycardia, fever, and decreased O2 sats. Arrived to room @ 0824. 1 liter of NS ordered. STAT CXR ordered. Tylenol given. ABG completed with no significant abnormal results. Respiratory suctioned pt and pt repositioned to help with breathing. Pt O2 sats improved on venti mask, temp  decreased, and BP remained normotensive. Started on  vanc and zosyn. Pt appears more comfortable and no decline in neuro status. Pt family updated.   2/26/18 - afebrile overnight and leukocytosis improving with BS antibiotics. CT abdomen yesterday noted seroma/hematoma on left rectus abdominus. IR consulted for culture +/- drain.  02/28/2018 s/p aspiration of left rectus collection adjacent to the PEG tube.  Culture sent to lab, pending.  Remains afebrile.  Currently on 4L NC O2.     STROKE DOCUMENTATION   Acute Stroke Times   Stroke Team Called Date: 01/25/18  Stroke Team Called Time: 1852  Stroke Team Arrival Date: 01/25/18  Stroke Team Arrival Time: 0652  CT Interpretation Time: 1910  Decision to Treat Time for Alteplase:  (n/a unknown last known normal )  Decision to Treat Time for IR: 1915    NIH Scale:  1a. Level Of Consciousness: 0-->Alert: keenly responsive  1b. LOC Questions: 2-->Answers neither question correctly  1c. LOC Commands: 2-->Performs neither task correctly  2. Best Gaze: 1-->Partial gaze palsy: gaze is abnormal in one or both eyes, but forced deviation or total gaze paresis is not present  3. Visual: 0-->No visual loss  4. Facial Palsy: 0-->Normal symmetrical movements  5a. Motor Arm, Left: 3-->No effort against gravity: limb falls  5b. Motor Arm, Right: 4-->No movement  6a. Motor Leg, Left: 3-->No effort against gravity: leg falls to bed immediately  6b. Motor Leg, Right: 4-->No movement  7. Limb Ataxia: 0-->Absent  8. Sensory: 0-->Normal: no sensory loss  9. Best Language: 3-->Mute, global aphasia: no usable speech or auditory comprehension  10. Dysarthria: 2-->Severe dysarthria: patients speech is so slurred as to be unintelligible in the absence of or out of proportion to any dysphasia, or is mute/anarthric  11. Extinction and Inattention (formerly Neglect): 0-->No abnormality  Total (NIH Stroke Scale): 24       Modified Jasmin Score: 0  Jeff Coma Scale:10   ABCD2 Score:    [id number]  Score:   HAS -BLED Score:   ICH Score:   Hunt & Laguerre Classification:      Hemorrhagic change of an Ischemic Stroke: Does this patient have an ischemic stroke with hemorrhagic changes? No     Neurologic Chief Complaint: L MCA stroke    Subjective:     Interval History: Patient is seen for follow-up neurological assessment and treatment recommendations: NAEON.  Labs remain slightly abnormal but stable.  Patient tolerating 4L NC O2.    HPI, Past Medical, Family, and Social History remains the same as documented in the initial encounter.     Review of Systems   Unable to perform ROS: Patient nonverbal   Constitutional: Negative for fever.   Eyes: Negative for redness.   Respiratory: Negative for cough.    Gastrointestinal: Negative for diarrhea and vomiting.   Genitourinary: Negative for hematuria.   Skin: Negative for rash.   Neurological: Positive for speech difficulty and weakness.     Scheduled Meds:   albuterol sulfate  2.5 mg Nebulization Q6H    aspirin  325 mg Per G Tube Daily    atorvastatin  80 mg Per NG tube Daily    cefTRIAXone (ROCEPHIN) IVPB  1 g Intravenous Q24H    clopidogrel  75 mg Per G Tube Daily    heparin (porcine)  5,000 Units Subcutaneous Q8H    insulin aspart U-100  6 Units Subcutaneous Q24H    insulin aspart U-100  6 Units Subcutaneous Q24H    insulin aspart U-100  6 Units Subcutaneous Q24H    insulin aspart U-100  6 Units Subcutaneous Q24H    insulin aspart U-100  6 Units Subcutaneous Q24H    insulin aspart U-100  6 Units Subcutaneous Q24H    insulin detemir U-100  18 Units Subcutaneous Daily    ipratropium  0.5 mg Nebulization Q6H    lisinopril  20 mg Per NG tube Daily    metoprolol tartrate  25 mg Per NG tube BID    modafinil  200 mg Per NG tube Daily    And    modafinil  100 mg Per NG tube Daily    polyethylene glycol  17 g Per NG tube Daily    senna-docusate 8.6-50 mg  1 tablet Per NG tube BID    sodium chloride 0.9%  3 mL Intravenous Q8H    sodium chloride 3%  4 mL  Nebulization Q6H    vancomycin in 0.9 % sodium chl  1,500 mg Intravenous Q8H     Continuous Infusions:  PRN Meds:acetaminophen, bisacodyl, dextrose 50%, glucagon (human recombinant), insulin aspart U-100, ipratropium, labetalol, ondansetron    Objective:     Vital Signs (Most Recent):  Temp: 98.8 °F (37.1 °C) (02/28/18 1131)  Pulse: 72 (02/28/18 1322)  Resp: 14 (02/28/18 1322)  BP: (!) 177/91 (02/28/18 1131)  SpO2: 98 % (02/28/18 1322)  BP Location: Left arm    Vital Signs Range (Last 24H):  Temp:  [97.4 °F (36.3 °C)-99.5 °F (37.5 °C)]   Pulse:  [67-91]   Resp:  [14-22]   BP: (150-177)/()   SpO2:  [92 %-100 %]   BP Location: Left arm    Physical Exam   Constitutional: He appears well-developed and well-nourished.   Eyes: Conjunctivae are normal. Pupils are equal, round, and reactive to light.   Cardiovascular: Normal rate and regular rhythm.    Pulmonary/Chest: Effort normal. No respiratory distress.   Abdominal: Soft. Bowel sounds are normal.   Neurological: He is alert.   Skin: Skin is warm and dry.   Nursing note and vitals reviewed.      Neurological Exam:   LOC: alert  Language: No aphasia  EOM (CN III, IV, VI): Gaze preference  left    Laboratory:  CMP:   Recent Labs  Lab 02/28/18 0220   CALCIUM 8.5*   ALBUMIN 2.0*   PROT 6.4      K 3.2*   CO2 29      BUN 14   CREATININE 0.7   ALKPHOS 122   ALT 26   AST 42*   BILITOT 0.4     CBC:   Recent Labs  Lab 02/28/18 0220 02/28/18  1000   WBC 7.55  --    RBC 3.44*  --    HGB 9.8* 10.7*   HCT 29.3* 31.6*   *  --    MCV 85  --    MCH 28.5  --    MCHC 33.4  --      Lipid Panel: No results for input(s): CHOL, LDLCALC, HDL, TRIG in the last 168 hours.  Coagulation:   Recent Labs  Lab 02/28/18  0220   INR 1.0     Hgb A1C: No results for input(s): HGBA1C in the last 168 hours.  TSH: No results for input(s): TSH in the last 168 hours.    Diagnostic Results     Brain Imaging   Most recent CTH 2/04/18 redemonstration of large L MCA territory infarction  w/o evidence of hemorrhagic conversion.  Stable post op change from L MCA endovascular stenting.    Vessel Imaging   IR Cerebral Angiogram 1/25/18 demonstrated occlusion of the L M1 segment of the L MCA with good collaterals.  Occlusion removed and stent placed with TICI 2C.  Cardiac Imaging   2D Echo 1/26/18   CONCLUSIONS     1 - Normal left ventricular systolic function (EF 65-70%).     2 - Concentric remodeling.     3 - No wall motion abnormalities.     4 - Normal left ventricular diastolic function.     5 - Normal right ventricular systolic function .       Mary Caban DNP, NP  Comprehensive Stroke Center  Department of Vascular Neurology   [facility]

## 2018-03-01 PROBLEM — E87.3 METABOLIC ALKALOSIS: Status: ACTIVE | Noted: 2018-03-01

## 2018-03-01 LAB
ALBUMIN SERPL BCP-MCNC: 2.3 G/DL
ALLENS TEST: ABNORMAL
ALP SERPL-CCNC: 138 U/L
ALT SERPL W/O P-5'-P-CCNC: 35 U/L
ANION GAP SERPL CALC-SCNC: 6 MMOL/L
AST SERPL-CCNC: 41 U/L
BACTERIA SPEC AEROBE CULT: NORMAL
BACTERIA SPEC AEROBE CULT: NORMAL
BASOPHILS # BLD AUTO: 0.04 K/UL
BASOPHILS NFR BLD: 0.5 %
BILIRUB SERPL-MCNC: 0.4 MG/DL
BUN SERPL-MCNC: 13 MG/DL
CALCIUM SERPL-MCNC: 8.9 MG/DL
CHLORIDE SERPL-SCNC: 97 MMOL/L
CO2 SERPL-SCNC: 32 MMOL/L
CREAT SERPL-MCNC: 0.8 MG/DL
DIFFERENTIAL METHOD: ABNORMAL
EOSINOPHIL # BLD AUTO: 0.2 K/UL
EOSINOPHIL NFR BLD: 2.4 %
ERYTHROCYTE [DISTWIDTH] IN BLOOD BY AUTOMATED COUNT: 12.5 %
EST. GFR  (AFRICAN AMERICAN): >60 ML/MIN/1.73 M^2
EST. GFR  (NON AFRICAN AMERICAN): >60 ML/MIN/1.73 M^2
GLUCOSE SERPL-MCNC: 213 MG/DL
HCO3 UR-SCNC: 30.7 MMOL/L (ref 24–28)
HCT VFR BLD AUTO: 33.1 %
HGB BLD-MCNC: 11.1 G/DL
IMM GRANULOCYTES # BLD AUTO: 0.06 K/UL
IMM GRANULOCYTES NFR BLD AUTO: 0.8 %
INR PPP: 1
LYMPHOCYTES # BLD AUTO: 1.1 K/UL
LYMPHOCYTES NFR BLD: 15.1 %
MAGNESIUM SERPL-MCNC: 1.7 MG/DL
MCH RBC QN AUTO: 28.4 PG
MCHC RBC AUTO-ENTMCNC: 33.5 G/DL
MCV RBC AUTO: 85 FL
MONOCYTES # BLD AUTO: 0.6 K/UL
MONOCYTES NFR BLD: 8.1 %
NEUTROPHILS # BLD AUTO: 5.4 K/UL
NEUTROPHILS NFR BLD: 73.1 %
NRBC BLD-RTO: 0 /100 WBC
PCO2 BLDA: 36.3 MMHG (ref 35–45)
PH SMN: 7.54 [PH] (ref 7.35–7.45)
PHOSPHATE SERPL-MCNC: 2.8 MG/DL
PLATELET # BLD AUTO: 421 K/UL
PMV BLD AUTO: 9.6 FL
PO2 BLDA: 66 MMHG (ref 80–100)
POC BE: 8 MMOL/L
POC SATURATED O2: 95 % (ref 95–100)
POC TCO2: 32 MMOL/L (ref 23–27)
POCT GLUCOSE: 171 MG/DL (ref 70–110)
POCT GLUCOSE: 186 MG/DL (ref 70–110)
POCT GLUCOSE: 195 MG/DL (ref 70–110)
POCT GLUCOSE: 202 MG/DL (ref 70–110)
POCT GLUCOSE: 213 MG/DL (ref 70–110)
POCT GLUCOSE: 230 MG/DL (ref 70–110)
POTASSIUM SERPL-SCNC: 3.5 MMOL/L
PROT SERPL-MCNC: 7.1 G/DL
PROTHROMBIN TIME: 10.1 SEC
RBC # BLD AUTO: 3.91 M/UL
SAMPLE: ABNORMAL
SITE: ABNORMAL
SODIUM SERPL-SCNC: 135 MMOL/L
WBC # BLD AUTO: 7.4 K/UL

## 2018-03-01 PROCEDURE — 99233 SBSQ HOSP IP/OBS HIGH 50: CPT | Mod: ,,, | Performed by: PSYCHIATRY & NEUROLOGY

## 2018-03-01 PROCEDURE — 99232 SBSQ HOSP IP/OBS MODERATE 35: CPT | Mod: ,,, | Performed by: INTERNAL MEDICINE

## 2018-03-01 PROCEDURE — 25000003 PHARM REV CODE 250: Performed by: STUDENT IN AN ORGANIZED HEALTH CARE EDUCATION/TRAINING PROGRAM

## 2018-03-01 PROCEDURE — 63600175 PHARM REV CODE 636 W HCPCS: Performed by: NURSE PRACTITIONER

## 2018-03-01 PROCEDURE — 80053 COMPREHEN METABOLIC PANEL: CPT

## 2018-03-01 PROCEDURE — 97164 PT RE-EVAL EST PLAN CARE: CPT

## 2018-03-01 PROCEDURE — 85025 COMPLETE CBC W/AUTO DIFF WBC: CPT

## 2018-03-01 PROCEDURE — 99900035 HC TECH TIME PER 15 MIN (STAT)

## 2018-03-01 PROCEDURE — 25000242 PHARM REV CODE 250 ALT 637 W/ HCPCS: Performed by: PSYCHIATRY & NEUROLOGY

## 2018-03-01 PROCEDURE — 94761 N-INVAS EAR/PLS OXIMETRY MLT: CPT

## 2018-03-01 PROCEDURE — 84100 ASSAY OF PHOSPHORUS: CPT

## 2018-03-01 PROCEDURE — 94668 MNPJ CHEST WALL SBSQ: CPT

## 2018-03-01 PROCEDURE — 20600001 HC STEP DOWN PRIVATE ROOM

## 2018-03-01 PROCEDURE — 25000242 PHARM REV CODE 250 ALT 637 W/ HCPCS: Performed by: STUDENT IN AN ORGANIZED HEALTH CARE EDUCATION/TRAINING PROGRAM

## 2018-03-01 PROCEDURE — 85610 PROTHROMBIN TIME: CPT

## 2018-03-01 PROCEDURE — 83735 ASSAY OF MAGNESIUM: CPT

## 2018-03-01 PROCEDURE — 27000221 HC OXYGEN, UP TO 24 HOURS

## 2018-03-01 PROCEDURE — 25000003 PHARM REV CODE 250: Performed by: NURSE PRACTITIONER

## 2018-03-01 PROCEDURE — G8980 MOBILITY D/C STATUS: HCPCS | Mod: CN

## 2018-03-01 PROCEDURE — 36600 WITHDRAWAL OF ARTERIAL BLOOD: CPT

## 2018-03-01 PROCEDURE — 92507 TX SP LANG VOICE COMM INDIV: CPT

## 2018-03-01 PROCEDURE — 94640 AIRWAY INHALATION TREATMENT: CPT

## 2018-03-01 PROCEDURE — 25000003 PHARM REV CODE 250: Performed by: PSYCHIATRY & NEUROLOGY

## 2018-03-01 PROCEDURE — 25000003 PHARM REV CODE 250: Performed by: PHYSICIAN ASSISTANT

## 2018-03-01 PROCEDURE — G8978 MOBILITY CURRENT STATUS: HCPCS | Mod: CN

## 2018-03-01 PROCEDURE — 97110 THERAPEUTIC EXERCISES: CPT

## 2018-03-01 PROCEDURE — 99232 SBSQ HOSP IP/OBS MODERATE 35: CPT | Mod: ,,, | Performed by: NURSE PRACTITIONER

## 2018-03-01 PROCEDURE — 63600175 PHARM REV CODE 636 W HCPCS: Performed by: STUDENT IN AN ORGANIZED HEALTH CARE EDUCATION/TRAINING PROGRAM

## 2018-03-01 PROCEDURE — 85347 COAGULATION TIME ACTIVATED: CPT

## 2018-03-01 PROCEDURE — G8979 MOBILITY GOAL STATUS: HCPCS | Mod: CN

## 2018-03-01 PROCEDURE — 36415 COLL VENOUS BLD VENIPUNCTURE: CPT

## 2018-03-01 PROCEDURE — A4216 STERILE WATER/SALINE, 10 ML: HCPCS | Performed by: NURSE PRACTITIONER

## 2018-03-01 PROCEDURE — 97530 THERAPEUTIC ACTIVITIES: CPT

## 2018-03-01 RX ORDER — INSULIN ASPART 100 [IU]/ML
7 INJECTION, SOLUTION INTRAVENOUS; SUBCUTANEOUS
Status: DISCONTINUED | OUTPATIENT
Start: 2018-03-01 | End: 2018-03-02

## 2018-03-01 RX ORDER — INSULIN ASPART 100 [IU]/ML
7 INJECTION, SOLUTION INTRAVENOUS; SUBCUTANEOUS
Status: DISCONTINUED | OUTPATIENT
Start: 2018-03-02 | End: 2018-03-02

## 2018-03-01 RX ORDER — METRONIDAZOLE 500 MG/1
500 TABLET ORAL EVERY 8 HOURS
Status: DISCONTINUED | OUTPATIENT
Start: 2018-03-01 | End: 2018-03-01

## 2018-03-01 RX ORDER — METRONIDAZOLE 500 MG/1
500 TABLET ORAL EVERY 8 HOURS
Status: COMPLETED | OUTPATIENT
Start: 2018-03-01 | End: 2018-03-08

## 2018-03-01 RX ORDER — INSULIN ASPART 100 [IU]/ML
8 INJECTION, SOLUTION INTRAVENOUS; SUBCUTANEOUS
Status: DISCONTINUED | OUTPATIENT
Start: 2018-03-02 | End: 2018-03-01

## 2018-03-01 RX ORDER — LISINOPRIL 20 MG/1
20 TABLET ORAL ONCE
Status: DISCONTINUED | OUTPATIENT
Start: 2018-03-01 | End: 2018-03-01

## 2018-03-01 RX ORDER — LISINOPRIL 20 MG/1
40 TABLET ORAL DAILY
Status: DISCONTINUED | OUTPATIENT
Start: 2018-03-02 | End: 2018-03-21 | Stop reason: HOSPADM

## 2018-03-01 RX ADMIN — METRONIDAZOLE 500 MG: 500 TABLET ORAL at 03:03

## 2018-03-01 RX ADMIN — ALBUTEROL SULFATE 2.5 MG: 2.5 SOLUTION RESPIRATORY (INHALATION) at 08:03

## 2018-03-01 RX ADMIN — INSULIN ASPART 6 UNITS: 100 INJECTION, SOLUTION INTRAVENOUS; SUBCUTANEOUS at 09:03

## 2018-03-01 RX ADMIN — HEPARIN SODIUM 5000 UNITS: 5000 INJECTION, SOLUTION INTRAVENOUS; SUBCUTANEOUS at 11:03

## 2018-03-01 RX ADMIN — VANCOMYCIN HCL-SODIUM CHLORIDE IV SOLN 1.5 GM/250ML-0.9% 1500 MG: 1.5-0.9/25 SOLUTION at 12:03

## 2018-03-01 RX ADMIN — Medication 3 ML: at 03:03

## 2018-03-01 RX ADMIN — METOPROLOL TARTRATE 50 MG: 50 TABLET, FILM COATED ORAL at 11:03

## 2018-03-01 RX ADMIN — IPRATROPIUM BROMIDE 0.5 MG: 0.5 SOLUTION RESPIRATORY (INHALATION) at 12:03

## 2018-03-01 RX ADMIN — SODIUM CHLORIDE SOLN NEBU 3% 4 ML: 3 NEBU SOLN at 12:03

## 2018-03-01 RX ADMIN — Medication 3 ML: at 09:03

## 2018-03-01 RX ADMIN — ALBUTEROL SULFATE 2.5 MG: 2.5 SOLUTION RESPIRATORY (INHALATION) at 12:03

## 2018-03-01 RX ADMIN — IPRATROPIUM BROMIDE 0.5 MG: 0.5 SOLUTION RESPIRATORY (INHALATION) at 07:03

## 2018-03-01 RX ADMIN — CLOPIDOGREL BISULFATE 75 MG: 75 TABLET, FILM COATED ORAL at 09:03

## 2018-03-01 RX ADMIN — Medication 3 ML: at 10:03

## 2018-03-01 RX ADMIN — INSULIN ASPART 2 UNITS: 100 INJECTION, SOLUTION INTRAVENOUS; SUBCUTANEOUS at 12:03

## 2018-03-01 RX ADMIN — METRONIDAZOLE 500 MG: 500 TABLET ORAL at 11:03

## 2018-03-01 RX ADMIN — SODIUM CHLORIDE SOLN NEBU 3% 4 ML: 3 NEBU SOLN at 09:03

## 2018-03-01 RX ADMIN — INSULIN ASPART 4 UNITS: 100 INJECTION, SOLUTION INTRAVENOUS; SUBCUTANEOUS at 05:03

## 2018-03-01 RX ADMIN — ATORVASTATIN CALCIUM 80 MG: 20 TABLET, FILM COATED ORAL at 09:03

## 2018-03-01 RX ADMIN — INSULIN ASPART 6 UNITS: 100 INJECTION, SOLUTION INTRAVENOUS; SUBCUTANEOUS at 05:03

## 2018-03-01 RX ADMIN — CEFTRIAXONE SODIUM 1 G: 1 INJECTION, POWDER, FOR SOLUTION INTRAMUSCULAR; INTRAVENOUS at 12:03

## 2018-03-01 RX ADMIN — HEPARIN SODIUM 5000 UNITS: 5000 INJECTION, SOLUTION INTRAVENOUS; SUBCUTANEOUS at 12:03

## 2018-03-01 RX ADMIN — ASPIRIN 325 MG ORAL TABLET 325 MG: 325 PILL ORAL at 09:03

## 2018-03-01 RX ADMIN — METOPROLOL TARTRATE 50 MG: 50 TABLET, FILM COATED ORAL at 09:03

## 2018-03-01 RX ADMIN — SODIUM CHLORIDE SOLN NEBU 3% 4 ML: 3 NEBU SOLN at 07:03

## 2018-03-01 RX ADMIN — MODAFINIL 100 MG: 100 TABLET ORAL at 03:03

## 2018-03-01 RX ADMIN — IPRATROPIUM BROMIDE 0.5 MG: 0.5 SOLUTION RESPIRATORY (INHALATION) at 08:03

## 2018-03-01 RX ADMIN — MODAFINIL 200 MG: 100 TABLET ORAL at 05:03

## 2018-03-01 RX ADMIN — HEPARIN SODIUM 5000 UNITS: 5000 INJECTION, SOLUTION INTRAVENOUS; SUBCUTANEOUS at 03:03

## 2018-03-01 RX ADMIN — INSULIN ASPART 7 UNITS: 100 INJECTION, SOLUTION INTRAVENOUS; SUBCUTANEOUS at 11:03

## 2018-03-01 RX ADMIN — INSULIN ASPART 2 UNITS: 100 INJECTION, SOLUTION INTRAVENOUS; SUBCUTANEOUS at 04:03

## 2018-03-01 RX ADMIN — POLYETHYLENE GLYCOL 3350 17 G: 17 POWDER, FOR SOLUTION ORAL at 09:03

## 2018-03-01 RX ADMIN — INSULIN ASPART 7 UNITS: 100 INJECTION, SOLUTION INTRAVENOUS; SUBCUTANEOUS at 04:03

## 2018-03-01 RX ADMIN — LISINOPRIL 20 MG: 20 TABLET ORAL at 09:03

## 2018-03-01 RX ADMIN — HEPARIN SODIUM 5000 UNITS: 5000 INJECTION, SOLUTION INTRAVENOUS; SUBCUTANEOUS at 05:03

## 2018-03-01 RX ADMIN — ALBUTEROL SULFATE 2.5 MG: 2.5 SOLUTION RESPIRATORY (INHALATION) at 07:03

## 2018-03-01 RX ADMIN — Medication 10 ML: at 12:03

## 2018-03-01 RX ADMIN — INSULIN DETEMIR 18 UNITS: 100 INJECTION, SOLUTION SUBCUTANEOUS at 09:03

## 2018-03-01 RX ADMIN — STANDARDIZED SENNA CONCENTRATE AND DOCUSATE SODIUM 1 TABLET: 8.6; 5 TABLET, FILM COATED ORAL at 09:03

## 2018-03-01 RX ADMIN — STANDARDIZED SENNA CONCENTRATE AND DOCUSATE SODIUM 1 TABLET: 8.6; 5 TABLET, FILM COATED ORAL at 11:03

## 2018-03-01 RX ADMIN — INSULIN ASPART 7 UNITS: 100 INJECTION, SOLUTION INTRAVENOUS; SUBCUTANEOUS at 12:03

## 2018-03-01 RX ADMIN — METOPROLOL TARTRATE 50 MG: 50 TABLET, FILM COATED ORAL at 12:03

## 2018-03-01 NOTE — PT/OT/SLP EVAL
Physical Therapy Re-Evaluation    Patient Name:  Todd Quevedo   MRN:  [id number]    Recommendations:     Discharge Recommendations:  nursing facility, skilled   Discharge Equipment Recommendations:  (hospital bed)   Barriers to discharge: Inaccessible home and Decreased caregiver support      Plan:     During this hospitalization, patient to be seen 3 x/week to address the above listed problems via therapeutic activities, therapeutic exercises, neuromuscular re-education, wheelchair management/training  · Plan of Care Expires:  03/15/18   Plan of Care Reviewed with: patient, sibling    History:     History reviewed. No pertinent past medical history.    History reviewed. No pertinent surgical history.    INTERVAL HISTORY:  1/26 PT eval: Patient somnolent throughout with minimal arousal even during sitting EOB.  Appropriate for only 1 therapy discipline (OT)  1/31 emergent intubation   2/13 self-extubated  2/17 PT re-eval: Pt unable to arouse this date, despite multiple auditory, tactile and noxious stimuli. Still appropriate for only 1 therapy (OT)  2/28 OT notified PT that patient is appropriate for more than one therapy discipline    Subjective     Communicated with RN prior to session.  Weaning oxygen today if sats >90%.     Chief Complaint: non verbal   Patient comments/goals: set by therapist  Pain/Comfort:  · Pain Rating 1:  (unable to verbalize)  · Pain Addressed 1: Reposition, Distraction    Patients cultural, spiritual, [employer] conflicts given the current situation: none noted    Living Environment:  Pt lives with his wife near [facility] in a 1 story home with no steps to enter.  He was independent, working and driving prior to admit. His wife works nights at a hospital.  She is not able to provide 24 hr assist.     Patient has the following equipment: none.  DME owned (not currently used): none.  Upon discharge, family assistance will be limited.     Objective:     Patient found with: bed alarm, blood  pressure cuff, Condom Catheter, peripheral IV, PEG Tube, oxygen, telemetry, pulse ox (continuous), pressure relief boots     Patient found supine in bed with his sister present and SpO2 89-90% on 1 L. RN notified.  Supplemental oxygen increased to 2L for mobilization with therapy and RN replaced finger probe for a more accurate oxygen reading.  Patient was awake 100% of the session but following 0% commands.  He sat EOB with total assistance and actively participated in head control and neutral head positioning in sitting.  ROM performed in supine.  See below for details and assessment.    General Precautions: Standard, aphasia, aspiration, fall, NPO     Orthopedic Precautions:N/A     PHYSICAL EXAMINATION  Cognitive Function:  - Oriented to: non verbal  - Level of Alertness: eyes open 100% of the session   - Follows Commands/attention: 0%  - Communication: non verbal   Musculoskeletal System  Upper Extremities:   ROM: decreased R wrist and finger extension   Strength: RUE hemiplegia with no GH subluxation   Lower Extremities:  ROM: decreased R ankle DF, can achieve neutral DF with overpressure; heel relief boot with DF assist donned pre and post therapy  Strength: LLE WFL; RLE 0/5   Muscular Tone: (Modified Caron Scale) R ankle >20 beat clonus  Integumentary System: PEG  Cardiopulmonary System:   - SpO2: 89-91% on 1L prior to mobilization    97% on 2L in sitting   95% on 1L after therapy  Neuromuscular System:  - Sensation: FRIEDA 2* cognition   Posture and gross symmetry: L gaze preference, L cervical rotation, neck flexed with head hanging down, R scapula abducted, poor postural control in sitting    BALANCE:  Sitting:  - Static sitting EOB:   ·  Assistance: total assist sit EOB   ·  Time: 10 minutes  ·  Assist provided for postural support in midline, LUE in weight bearing, midline orientation, neutral gaze, safety and balance  ·  Pt actively used LUE to attempt to maintain balance and perform postural correction  (ineffective)  ·  Comments: neutral head position with gaze to the horizon performed with hand over head assist.  After 5-6 trials he began actively holding head up with gradual drift into L cervical rotation, but maintained position against gravity.   - Dynamic:unable    FUNCTIONAL MOBILITY ASSESSMENT:  Bed Mobility: performed with HOB flat  - Rolling/Turning R: dependent  - Rolling/Turning L: dependent  - Supine <> sit: dependent  - Scooting EOB: dependent     THERAPEUTIC ACTIVITIES AND EXERCISES:  ROM RLE/RUE 10x/ea with gentle overpressure at end range in all available joints.  Prolonged overpressure applied for R ankle DF.  PT instructed patient's sister in R ankle DF stretch and ROM to prevent PF contractures.     Therapist educated patient's sister on the role of PT, POC, and therapy recommendations of SNF.  Therapist discussed the patients current mobility status and level of assistance with his sister.  Therapist answered questions to patient/familys satisfaction within scope of practice.  White board updated to reflect current level of assistance.     Patient left with bed in chair position with all lines intact, call button in reach, bed alarm on, RN notified and sister present.     AM-PAC 6 CLICK MOBILITY  Total Score:6     GOALS:    Physical Therapy Goals        Problem: Physical Therapy Goal    Goal Priority Disciplines Outcome Goal Variances Interventions   Physical Therapy Goal     PT/OT, PT Ongoing (interventions implemented as appropriate)     Description:    Goals to be met by 3/16/2018    1. Pt will perform rolling to the R and L with max assist.   2. Pt will perform supine to sit from both sides of the bed with max assist.  3. Pt will perform sit to supine with max assist.  4. Pt will sit EOB x 5 minutes with moderate assist to prepare for functional tasks in sitting.   5. Pt will participate in BLE and cervical ROM exercise.   6. Family will verbalize and demonstrate appropriate positioning  and ROM techniques                    Assessment:     Todd Quevedo is a 47 y.o. male admitted with a medical diagnosis of Embolic stroke involving left middle cerebral artery.  He presents with the following impairments/functional limitations:  weakness, impaired functional mobilty, impaired cognition, decreased safety awareness, impaired fine motor, impaired joint extensibility, impaired sensation, impaired balance, decreased upper extremity function, decreased lower extremity function, abnormal tone, impaired skin, impaired self care skills, visual deficits.    Mr Quevedo was evaluated by therapy on 2 previous occasions (1/26, 2/17) but he was not appropriate for skilled PT services or more than one skilled therapy discipline at that time.  He is now demonstrating improved alertness and is appropriate for another trial of skilled PT services.  He is very deconditioned from prolonged immobilization and would benefit from gradual progression of EOB mobility and functional tasks with therapy. He is expected to require SNF at time of discharge to maximize his potential.      Rehab Prognosis:  Fair to limited; patient would benefit from acute skilled PT services to address these deficits and reach maximum level of function.      Recent Surgery: Procedure(s) (LRB):  TRACHEOSTOMY (N/A)  PLACEMENT-TUBE-PEG (N/A)      Clinical Decision Making:   COMPLEXITY OF PT EXAMINATION:  HISTORY  - Comorbidities that affect the PT plan of care or the patient's ability to participate in/progress with therapy:  1. Oxygen dependency   2. L MCA  3. DM  4. Global aphasia  5. PEG with infection   6. R lung nodule  - Personal Factors:   1. Patient's cognitive status and safety concerns.  2. expected progression.  EXAMINATION  - Body Systems: see above  - Activity or participation limitations:   Patient's cognitive status and safety concerns  CLINICAL PRESENTATION: Evolving/changing characteristics  vital sign response   LEVEL OF COMPLEXITY:  Moderate Complexity - at least 1-2 personal factors or comorbidities that impact the plan of care; examination addressing at least 3 body structures and functions, activity limitations, and/or participation restrictions; and clinical presentation with evolving or changing characteristics.    Time Tracking:     PT Received On: 03/01/18  PT Start Time: 1500     PT Stop Time: 1542  PT Total Time (min): 42 min     Billable Minutes: Re-eval 15, Therapeutic Activity 12 and Therapeutic Exercise 15      Madina Luu, PT  03/01/2018

## 2018-03-01 NOTE — ASSESSMENT & PLAN NOTE
-Stroke risk factor, Hgb A1c 10%  - Continue Diabetasource AC TFs  - Endocrine consulted and guiding insulin modification for TF  - POCT Q4h ; moderate correction q4hr

## 2018-03-01 NOTE — PLAN OF CARE
Problem: Physical Therapy Goal  Goal: Physical Therapy Goal    Goals to be met by 3/16/2018    1. Pt will perform rolling to the R and L with max assist.   2. Pt will perform supine to sit from both sides of the bed with max assist.  3. Pt will perform sit to supine with max assist.  4. Pt will sit EOB x 5 minutes with moderate assist to prepare for functional tasks in sitting.   5. Pt will participate in BLE and cervical ROM exercise.   6. Family will verbalize and demonstrate appropriate positioning and ROM techniques  Outcome: Ongoing (interventions implemented as appropriate)  Re-eval completed.  Results, POC, and therapy recommendations discussed with patient's sister.  Complete evaluation documentation to follow.     Pt requires total assistance with transfers.     Madina Luu, PT  3/1/2018  [phone] (pager)

## 2018-03-01 NOTE — PROGRESS NOTES
[facility] Hospital Medicine  Progress Note    Patient Name: Todd Quevedo  MRN: [id number]  Patient Class: IP- Inpatient   Admission Date: 1/25/2018  Length of Stay: 35 days  Attending Physician: Jaime Reynolds MD  Primary Care Provider: Primary Doctor [facility] Medicine Team: Networked reference to record PCT  Celine Zepeda MD    Subjective:     Principal Problem:Embolic stroke involving left middle cerebral artery    HPI:  47yoM with PMHx HLD, poorly controlled Dm, and LOYDA (not currently on home CPAP) s/p large L MCA stroke 1/25 s/p thrombectomy with episode of acute hypoxic respiratory failure. Currently patient was febrile to 101 with tachycardia into the 120s with WBC of 16. For CVA ppx patient is on ASA/plavix and statin therapy, also patient has been on DVT ppx with SQH. Patient was placed on vanc and zosyn    Interval Hx: No acute events overnight. Patient currently being weaned from 4L Nc.     Review of Systems   Unable to perform ROS: Patient nonverbal     Objective:     Vital Signs (Most Recent):  Temp: 98 °F (36.7 °C) (03/01/18 0900)  Pulse: 87 (03/01/18 0908)  Resp: 20 (03/01/18 0900)  BP: (!) 160/100 (03/01/18 0900)  SpO2: (!) 94 % (03/01/18 0900) Vital Signs (24h Range):  Temp:  [98 °F (36.7 °C)-99.7 °F (37.6 °C)] 98 °F (36.7 °C)  Pulse:  [71-87] 87  Resp:  [14-24] 20  SpO2:  [94 %-100 %] 94 %  BP: (154-177)/() 160/100     Weight: 105.6 kg (232 lb 12.9 oz)  Body mass index is 33.4 kg/m².    Physical Exam   HENT:   Head: Normocephalic.   Eyes: EOM are normal. Pupils are equal, round, and reactive to light. No scleral icterus.   Neck: No JVD present. No tracheal deviation present.   Cardiovascular: Normal rate, regular rhythm and normal heart sounds.    No murmur heard.  Pulmonary/Chest: Effort normal and breath sounds normal. He has no wheezes. He has no rales.   Abdominal: Soft. There is no guarding.   Peg site with minimal purulent drainage, no erythema or  induration appreciated   Musculoskeletal: He exhibits no edema.   Neurological:   Alert, does not track or follow commands, left eye deviation, flaccid R. Paralysis, moving left side spontaneously  BS reflexes intact       Skin: Skin is warm. He is not diaphoretic.       Significant Labs:   ABGs:     Recent Labs  Lab 03/01/18  1006   PH 7.535*   PCO2 36.3   HCO3 30.7*   POCSATURATED 95   BE 8     Blood Culture: No results for input(s): LABBLOO in the last 48 hours.  CBC:     Recent Labs  Lab 02/28/18  0220 02/28/18  1000 03/01/18  0521   WBC 7.55  --  7.40   HGB 9.8* 10.7* 11.1*   HCT 29.3* 31.6* 33.1*   *  --  421*     CMP:     Recent Labs  Lab 02/28/18  0220 03/01/18  0521    135*   K 3.2* 3.5    97   CO2 29 32*   * 213*   BUN 14 13   CREATININE 0.7 0.8   CALCIUM 8.5* 8.9   PROT 6.4 7.1   ALBUMIN 2.0* 2.3*   BILITOT 0.4 0.4   ALKPHOS 122 138*   AST 42* 41*   ALT 26 35   ANIONGAP 6* 6*   EGFRNONAA >60.0 >60.0     Lactic Acid: No results for input(s): LACTATE in the last 48 hours.  Urine Culture: No results for input(s): LABURIN in the last 48 hours.    Significant Imaging: I have reviewed all pertinent imaging results/findings within the past 24 hours.    Assessment/Plan:      Metabolic alkalosis    CBC hemoconcentrated, ABG suggests primary metabolic alkalosis with some component of respiratory alkalosis  Likely contraction alkalosis, increased free water flushes from 200 QID to 300 QID  Appears to have Cheyne-Mena pattern respiration, patient with recent stroke and reported sleep apnea  Will monitor daily CMP            Acute respiratory failure with hypoxia    47yoM with recent L MCA stroke with acute hypoxic respiratory failure and sepsis    -Likely hypoxia is Aspiration pna vs pneumonitis (as CXR improved after a day) 2/2 to superimposed encephalopathy due to sepsis  -Urine culture +klebsiella >100,000 CFU  -Wound/skin culture with Enterobacter also provotella and  fusobacterium  -Abscess from IR aspiration Gram stain with GPC, Culture with Enterobacter and Eikenella  -Blood Cx NGTD  -Would continue to follow IR abscess culture for sensitivities  -Would treat with rocephin for 7 days from its initiation (last dose 3/5) and add PO flagyl 500 q8hr for 7 days (last dose 3/7)  -Discontinued vancomycin  -Continue active weaning of O2 for O2 sats >90% to ascertain current requirements   -Wife requested continuous pulse ox as it causes her significant distress without monitoring with patient's current pattern of breathing and recent hypoxia; ordered          VTE Risk Mitigation         Ordered     heparin (porcine) injection 5,000 Units  Every 8 hours     Route:  Subcutaneous        02/20/18 1010     heparin (porcine) injection 5,000 Units  Every 8 hours     Route:  Subcutaneous        02/14/18 1002     High Risk of VTE  Once      01/27/18 1406              Celine Zepeda MD  Department of Hospital Medicine   [facility]

## 2018-03-01 NOTE — ASSESSMENT & PLAN NOTE
"48 yo M with PMHx HLD, LOYDA, poorly controlled DM II [place] [facility] [place] after noted "strange behavior" for which EMS was called.  Pt was found to have a L M1 occlusion and was taken to IR for thrombectomy with TICI 2C reperfusion and stent placement due to L MCA stenosis. Etiology remains unclear: no obvious signs of cardiac source or atheromatous disease in the aorto/carotid axis. Echo normal with no hx or signs of A fib. PT/OT/SLP will continue to evaluate and treat.  Recommending SNF.  DAPT 2/2 recent stent placement in angiogram after PEG placement.      Antithrombotics:  mg po qd, clopidogrel 75 mg po qd [s/p intracranial stent]  Statins: Atorvastatin 80 mg po qd  Aggressive risk factor modification: HLD, DM II (Hgb A1c 10%), Obesity  Rehab efforts: PT/OT/SLP--Recommending SNF, placement pending  Diagnostics ordered/pending: None  VTE prophylaxis: Heparin 5000 U q8h  BP parameters: Infarct: Post sucessful thrombectomy, SBP <140   "

## 2018-03-01 NOTE — ASSESSMENT & PLAN NOTE
47yoM with recent L MCA stroke with acute hypoxic respiratory failure and sepsis    -Likely hypoxia is Aspiration pna vs pneumonitis (as CXR improved after a day) 2/2 to superimposed encephalopathy due to sepsis  -Urine culture +klebsiella >100,000 CFU  -Wound/skin culture with Enterobacter also provotella and fusobacterium  -Abscess from IR aspiration Gram stain with GPC, Culture with Enterobacter and Eikenella  -Blood Cx NGTD  -Would continue to follow IR abscess culture for sensitivities  -Would treat with rocephin for 7 days from its initiation (last dose 3/5) and add PO flagyl 500 q8hr for 7 days (last dose 3/7)  -Discontinued vancomycin  -Continue active weaning of O2 for O2 sats >90% to ascertain current requirements   -Wife requested continuous pulse ox as it causes her significant distress without monitoring with patient's current pattern of breathing and recent hypoxia; ordered

## 2018-03-01 NOTE — SUBJECTIVE & OBJECTIVE
Interval Hx: No acute events overnight. Patient currently being weaned from 4L Nc.     Review of Systems   Unable to perform ROS: Patient nonverbal     Objective:     Vital Signs (Most Recent):  Temp: 98 °F (36.7 °C) (03/01/18 0900)  Pulse: 87 (03/01/18 0908)  Resp: 20 (03/01/18 0900)  BP: (!) 160/100 (03/01/18 0900)  SpO2: (!) 94 % (03/01/18 0900) Vital Signs (24h Range):  Temp:  [98 °F (36.7 °C)-99.7 °F (37.6 °C)] 98 °F (36.7 °C)  Pulse:  [71-87] 87  Resp:  [14-24] 20  SpO2:  [94 %-100 %] 94 %  BP: (154-177)/() 160/100     Weight: 105.6 kg (232 lb 12.9 oz)  Body mass index is 33.4 kg/m².    Physical Exam   HENT:   Head: Normocephalic.   Eyes: EOM are normal. Pupils are equal, round, and reactive to light. No scleral icterus.   Neck: No JVD present. No tracheal deviation present.   Cardiovascular: Normal rate, regular rhythm and normal heart sounds.    No murmur heard.  Pulmonary/Chest: Effort normal and breath sounds normal. He has no wheezes. He has no rales.   Abdominal: Soft. There is no guarding.   Peg site with minimal purulent drainage, no erythema or induration appreciated   Musculoskeletal: He exhibits no edema.   Neurological:   Alert, does not track or follow commands, left eye deviation, flaccid R. Paralysis, moving left side spontaneously  BS reflexes intact       Skin: Skin is warm. He is not diaphoretic.       Significant Labs:   ABGs:     Recent Labs  Lab 03/01/18  1006   PH 7.535*   PCO2 36.3   HCO3 30.7*   POCSATURATED 95   BE 8     Blood Culture: No results for input(s): LABBLOO in the last 48 hours.  CBC:     Recent Labs  Lab 02/28/18  0220 02/28/18  1000 03/01/18  0521   WBC 7.55  --  7.40   HGB 9.8* 10.7* 11.1*   HCT 29.3* 31.6* 33.1*   *  --  421*     CMP:     Recent Labs  Lab 02/28/18  0220 03/01/18  0521    135*   K 3.2* 3.5    97   CO2 29 32*   * 213*   BUN 14 13   CREATININE 0.7 0.8   CALCIUM 8.5* 8.9   PROT 6.4 7.1   ALBUMIN 2.0* 2.3*   BILITOT 0.4 0.4    ALKPHOS 122 138*   AST 42* 41*   ALT 26 35   ANIONGAP 6* 6*   EGFRNONAA >60.0 >60.0     Lactic Acid: No results for input(s): LACTATE in the last 48 hours.  Urine Culture: No results for input(s): LABURIN in the last 48 hours.    Significant Imaging: I have reviewed all pertinent imaging results/findings within the past 24 hours.

## 2018-03-01 NOTE — ASSESSMENT & PLAN NOTE
- onset ; AB.55/34/69 suggestive of acute hypoxic respiratory failure, likely due to acute aspiration event given initial CXR was suggestive of RML consolidation. No further episodes. Considered PE as differential however pt is on OAC and not tachycardic, tachypneic. Well score 1.5 (low-risk)    - stable / no active concerns further   - Continue chest physiotherapy, aspiration precautions, wean off O2 support

## 2018-03-01 NOTE — ASSESSMENT & PLAN NOTE
Afebrile   - leukocytosis resolved  - klebsiella from urine cultures (2/25/18) + Anaerobes from PEG site cultures     - Continue rocephin and D/c vanc and added Flagyl

## 2018-03-01 NOTE — SUBJECTIVE & OBJECTIVE
Neurologic Chief Complaint: Left MCA stroke     Subjective:     Interval History: NAEON. No concerns for fevers / respiratory distress or worsening neuro status.     HPI, Past Medical, Family, and Social History remains the same as documented in the initial encounter.     Review of Systems   Unable to perform ROS: Patient nonverbal   Constitutional: Negative for fever.   Eyes: Negative for redness.   Respiratory: Negative for cough.    Gastrointestinal: Negative for diarrhea and vomiting.   Genitourinary: Negative for hematuria.   Skin: Negative for rash.   Neurological: Positive for speech difficulty and weakness.     Scheduled Meds:   albuterol sulfate  2.5 mg Nebulization Q6H    aspirin  325 mg Per G Tube Daily    atorvastatin  80 mg Per NG tube Daily    cefTRIAXone (ROCEPHIN) IVPB  1 g Intravenous Q24H    clopidogrel  75 mg Per G Tube Daily    heparin (porcine)  5,000 Units Subcutaneous Q8H    [START ON 3/2/2018] insulin aspart U-100  7 Units Subcutaneous Q24H    [START ON 3/2/2018] insulin aspart U-100  7 Units Subcutaneous Q24H    insulin aspart U-100  7 Units Subcutaneous Q24H    insulin aspart U-100  7 Units Subcutaneous Q24H    insulin aspart U-100  7 Units Subcutaneous Q24H    [START ON 3/2/2018] insulin aspart U-100  7 Units Subcutaneous Q24H    [START ON 3/2/2018] insulin detemir U-100  20 Units Subcutaneous Daily    ipratropium  0.5 mg Nebulization Q6H    [START ON 3/2/2018] lisinopril  40 mg Per NG tube Daily    metoprolol tartrate  50 mg Per NG tube BID    metroNIDAZOLE  500 mg Per G Tube Q8H    modafinil  200 mg Per NG tube Daily    And    modafinil  100 mg Per NG tube Daily    polyethylene glycol  17 g Per NG tube Daily    senna-docusate 8.6-50 mg  1 tablet Per NG tube BID    sodium chloride 0.9%  3 mL Intravenous Q8H    sodium chloride 3%  4 mL Nebulization Q6H     Continuous Infusions:  PRN Meds:acetaminophen, bisacodyl, dextrose 50%, glucagon (human recombinant), insulin  aspart U-100, ipratropium, labetalol, ondansetron    Objective:     Vital Signs (Most Recent):  Temp: 99.1 °F (37.3 °C) (03/01/18 1600)  Pulse: 97 (03/01/18 1600)  Resp: 20 (03/01/18 1600)  BP: (!) 175/108 (03/01/18 1600)  SpO2: 96 % (03/01/18 1600)  BP Location: Right arm    Vital Signs Range (Last 24H):  Temp:  [97.6 °F (36.4 °C)-99.1 °F (37.3 °C)]   Pulse:  [75-97]   Resp:  [18-24]   BP: (138-175)/()   SpO2:  [90 %-100 %]   BP Location: Right arm    Physical Exam   Constitutional: He appears well-developed and well-nourished.   Eyes: Conjunctivae are normal. Pupils are equal, round, and reactive to light.   Cardiovascular: Normal rate and regular rhythm.    Pulmonary/Chest: Effort normal. No respiratory distress.   Abdominal: Soft. Bowel sounds are normal.   Neurological: He is alert.   Skin: Skin is warm and dry.   Nursing note and vitals reviewed.      Neurological Exam:   LOC: alert  Attention Span: poor  Language: No aphasia, Global aphasia  Articulation: Mute/Anarthric  Orientation: Person, Place, Time , Not oriented to person, place, and time  Visual Fields: Full  Hemianopsia right  EOM (CN III, IV, VI): Full/intact  Pupils (CN II, III): PERRL  Facial Sensation (CN V): Normal  Facial Movement (CN VII): Lower facial weakness on the Right  Gag Reflex: present  Reflexes: 2+ throughout  Brisk reflexes right  Motor: Arm right  Plegia 0/5  Leg right Plegia 0/5  Cebellar: No evidence of appendicular or axial ataxia  Sensation: Omid-anesthesia right  Tone: Normal tone throughout    Laboratory:  CMP:   Recent Labs  Lab 03/01/18  0521   CALCIUM 8.9   ALBUMIN 2.3*   PROT 7.1   *   K 3.5   CO2 32*   CL 97   BUN 13   CREATININE 0.8   ALKPHOS 138*   ALT 35   AST 41*   BILITOT 0.4     BMP:   Recent Labs  Lab 03/01/18  0521   *   K 3.5   CL 97   CO2 32*   BUN 13   CREATININE 0.8   CALCIUM 8.9     CBC:   Recent Labs  Lab 03/01/18  0521   WBC 7.40   RBC 3.91*   HGB 11.1*   HCT 33.1*   *   MCV 85    MCH 28.4   MCHC 33.5     Lipid Panel: No results for input(s): CHOL, LDLCALC, HDL, TRIG in the last 168 hours.  Coagulation:   Recent Labs  Lab 03/01/18  0521   INR 1.0     Platelet Aggregation Study: No results for input(s): PLTAGG, PLTAGINTERP, PLTAGREGLACO, ADPPLTAGGREG in the last 168 hours.  Hgb A1C: No results for input(s): HGBA1C in the last 168 hours.  TSH: No results for input(s): TSH in the last 168 hours.    Diagnostic Results     Brain Imaging   Most recent CTH 2/04/18 redemonstration of large L MCA territory infarction w/o evidence of hemorrhagic conversion.  Stable post op change from L MCA endovascular stenting.    Vessel Imaging   IR Cerebral Angiogram 1/25/18 demonstrated occlusion of the L M1 segment of the L MCA with good collaterals.  Occlusion removed and stent placed with TICI 2C.  Cardiac Imaging   2D Echo 1/26/18   CONCLUSIONS     1 - Normal left ventricular systolic function (EF 65-70%).     2 - Concentric remodeling.     3 - No wall motion abnormalities.     4 - Normal left ventricular diastolic function.     5 - Normal right ventricular systolic function

## 2018-03-01 NOTE — SUBJECTIVE & OBJECTIVE
"Interval HPI:   BG at or above goal.  On Diabetasource TF at 60 cc/hr.  Has infection of peg site.  On antibiotic therapy.  Awaiting SNF placement.    BP (!) 160/100 (BP Location: Left arm, Patient Position: Lying)   Pulse 87   Temp 98 °F (36.7 °C) (Axillary)   Resp 20   Ht 5' 10" (1.778 m)   Wt 105.6 kg (232 lb 12.9 oz)   SpO2 (!) 94%   BMI 33.40 kg/m²     Labs Reviewed and Include      Recent Labs  Lab 03/01/18  0521   *   CALCIUM 8.9   ALBUMIN 2.3*   PROT 7.1   *   K 3.5   CO2 32*   CL 97   BUN 13   CREATININE 0.8   ALKPHOS 138*   ALT 35   AST 41*   BILITOT 0.4     Lab Results   Component Value Date    WBC 7.40 03/01/2018    HGB 11.1 (L) 03/01/2018    HCT 33.1 (L) 03/01/2018    MCV 85 03/01/2018     (H) 03/01/2018     No results for input(s): TSH, FREET4 in the last 168 hours.  Lab Results   Component Value Date    HGBA1C 10.0 (H) 01/25/2018       Nutritional status:   Body mass index is 33.4 kg/m².  Lab Results   Component Value Date    ALBUMIN 2.3 (L) 03/01/2018    ALBUMIN 2.0 (L) 02/28/2018    ALBUMIN 1.9 (L) 02/27/2018     No results found for: PREALBUMIN    Estimated Creatinine Clearance: 137.4 mL/min (based on SCr of 0.8 mg/dL).    Accu-Checks  Recent Labs      02/27/18   2026  02/28/18   0035  02/28/18   0448  02/28/18   0854  02/28/18   1009  02/28/18   1400  02/28/18   2102  03/01/18   0025  03/01/18   0550  03/01/18   0907   POCTGLUCOSE  143*  141*  175*  169*  184*  166*  166*  202*  230*  213*       Current Medications and/or Treatments Impacting Glycemic Control  Immunotherapy:  Immunosuppressants     None        Steroids:   Hormones     None        Pressors:    Autonomic Drugs     Start     Stop Route Frequency Ordered    01/31/18 0854  succinylcholine (ANECTINE) 20 mg/mL injection     Comments:  Created by cabinet override    01/31 2059 01/31/18 0854          Prior  levemir 18 units daily-received today  novolog 6 units q4h, mn held  Moderate dose correction " scale  Hyperglycemia/Diabetes Medications: Antihyperglycemics     Start     Stop Route Frequency Ordered    03/02/18 0900  insulin detemir U-100 pen 20 Units      -- SubQ Daily 03/01/18 0943    03/02/18 0815  insulin aspart U-100 pen 7 Units      -- SubQ Every 24 hours (non-standard times) 03/01/18 0943    03/02/18 0400  insulin aspart U-100 pen 7 Units      -- SubQ Every 24 hours (non-standard times) 03/01/18 0816    03/02/18 0000  insulin aspart U-100 pen 7 Units      -- SubQ Every 24 hours (non-standard times) 03/01/18 0816    03/01/18 2000  insulin aspart U-100 pen 7 Units      -- SubQ Every 24 hours (non-standard times) 03/01/18 0943    03/01/18 1600  insulin aspart U-100 pen 7 Units      -- SubQ Every 24 hours (non-standard times) 03/01/18 0943    03/01/18 1200  insulin aspart U-100 pen 7 Units      -- SubQ Every 24 hours (non-standard times) 03/01/18 0943    02/24/18 1148  insulin aspart U-100 pen 1-10 Units      -- SubQ Every 4 hours PRN 02/24/18 1049    02/16/18 1400  insulin aspart pen 8 Units      02/19 0001 SubQ Every 4 hours 02/16/18 1058

## 2018-03-01 NOTE — PLAN OF CARE
Problem: SLP Goal  Goal: SLP Goal  Speech Language Pathology Goals  Goals expected to be met by 3/2  1. Pt will participate in ongoing bedside assessment of swallow to determine least restrictive diet.  2. Pt will open eyes to stim x5/session given max cues.  3. Pt will follow simple commands x2/session given max cues.  4. Pt will answer basic y/n question via any modality x2/session given max cues.  5. Pt will vocalize in response to any stim x2/session given max cues.          Outcome: Ongoing (interventions implemented as appropriate)  POC remains appropriate. EDWARD Key, CCC/SLP  3/1/2018

## 2018-03-01 NOTE — PLAN OF CARE
Cm spoke with the patient's sister Jessica at length regarding discharge plan. [facility] [place] [facility]. [facility] three separate faxes with information that they have requested. Chip will continue to follow.

## 2018-03-01 NOTE — PT/OT/SLP PROGRESS
Speech Language Pathology Treatment    Patient Name:  Todd Quevedo   MRN:  [id number]  Admitting Diagnosis: Embolic stroke involving left middle cerebral artery    Recommendations:                 General Recommendations:  Dysphagia therapy, Speech/language therapy and Cognitive-linguistic therapy  Diet recommendations:  NPO, NPO   Aspiration Precautions: Frequent oral care and Strict aspiration precautions   General Precautions: Standard, aspiration, aphasia  Communication strategies:  yes/no questions only and go to room if call light pushed    Subjective     Pt seen bedside with sister present.  Session ended 2/2 incontinent episode with soiled bed linens.  Nursing alerted       Pain/Comfort:  · Pain Rating 1:  (some grimacing noted)  · Pain Addressed 1: Distraction, Nurse notified  · Pain Rating Post-Intervention 1:  (continued grimacing)    Objective:     Has the patient been evaluated by SLP for swallowing?   Yes  Keep patient NPO? Yes   Current Respiratory Status: nasal cannula      Pt alert with L gaze t/o session.  He did not localize to SLP on R though did scan to midline in attempts to localize.  Smile in response to SLP x2.  No vocalizations elicited.  No response to y/n questions via any modality observed.  Max cues for pt to model thumbs up x1 and to squeeze SLP hand x1.  No other commands followed.  Pt did participate briefly with oral care though limited.  Swallow initiated x1 given thermal tactile stim with lemon glycerine swabs.  No po attempted 2/2 continued intermittent coughing on secretions.  Somewhat congested sounding today.  Session discontinued 2/2 pt appeared uncomfortable 2/2 recent BM.  Sister in agreement.  White board updated.        Assessment:     Todd Quevedo is a 47 y.o. male with an SLP diagnosis of Aphasia, Dysphagia, Cognitive-Linguistic Impairment and Visio-Spatial Impairment.      Goals:    SLP Goals        Problem: SLP Goal    Goal Priority Disciplines Outcome   SLP Goal      SLP Ongoing (interventions implemented as appropriate)   Description:  Speech Language Pathology Goals  Goals expected to be met by 3/2  1. Pt will participate in ongoing bedside assessment of swallow to determine least restrictive diet.  2. Pt will open eyes to stim x5/session given max cues.  3. Pt will follow simple commands x2/session given max cues.  4. Pt will answer basic y/n question via any modality x2/session given max cues.  5. Pt will vocalize in response to any stim x2/session given max cues.                           Plan:     · Patient to be seen:  2 x/week   · Plan of Care expires:  03/18/18  · Plan of Care reviewed with:  patient, sibling   · SLP Follow-Up:  Yes       Discharge recommendations:  nursing facility, skilled   Barriers to Discharge:  Level of Skilled Assistance Needed      Time Tracking:     SLP Treatment Date:   03/01/18  Speech Start Time:  1125  Speech Stop Time:  1138     Speech Total Time (min):  13 min    Billable Minutes: Speech Therapy Individual 13    EDWARD Key, CCC-SLP  03/01/2018

## 2018-03-01 NOTE — ASSESSMENT & PLAN NOTE
-180  Increase levemir to 20 units daily  Change novolog to 7 units q4h for TF coverage.  If TF held or decreased, hold or decrease novolog by same %  Continue moderate dose correction scale q4hr prn    Discharge planning :  TBD

## 2018-03-01 NOTE — PROGRESS NOTES
"[facility]  Vascular Neurology  Comprehensive Stroke Center  Progress Note    Assessment/Plan:     * Embolic stroke involving left middle cerebral artery    48 yo M with PMHx HLD, LOYDA, poorly controlled DM II presents to [facility] after noted "strange behavior" for which EMS was called.  Pt was found to have a L M1 occlusion and was taken to IR for thrombectomy with TICI 2C reperfusion and stent placement due to L MCA stenosis. Etiology remains unclear: no obvious signs of cardiac source or atheromatous disease in the aorto/carotid axis. Echo normal with no hx or signs of A fib. PT/OT/SLP will continue to evaluate and treat.  Recommending SNF.  DAPT 2/2 recent stent placement in angiogram after PEG placement.      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 No further episodes. Considered PE as differential however pt is on OAC and not tachycardic, tachypneic. Well score 1.5 (low-risk)    - stable / no active concerns further   - Continue chest physiotherapy, aspiration " precautions, wean off O2 support         Cytotoxic cerebral edema    -2/2 stroke, evident on imaging.  Stable on repeat CTH.        Urinary tract infection due to Klebsiella species     Afebrile   - leukocytosis resolved  - klebsiella from urine cultures (2/25/18) + Anaerobes from PEG site cultures     - Continue rocephin and D/c vanc and added Flagyl         Type 2 diabetes mellitus with hyperglycemia, with long-term current use of insulin    -Stroke risk factor, Hgb A1c 10%  - Continue Diabetasource AC TFs  - Endocrine consulted and guiding insulin modification for TF  - POCT Q4h ; moderate correction q4hr            Mixed hyperlipidemia    -Stroke risk factor, LDL invalid as TG > 400  -Continue atorvastatin 80 mg qd          Essential hypertension    -Stroke risk factor.  SBPs 150s-170s  -Continue scheduled lisinopril 20 mg qd.  Metoprolol increased to 50 mg bid.  Prn Labetalol.  Monitor.          Obstructive sleep apnea    -Stroke risk factor  -CPAP qHS             01/25/18:  Brought in for aphasia, RSW with L gaze preferance. LKN unknown, not tPA candidate. Went to IR for angiogram and stent.  01/29/18:  Off Cardene, remains on Insulin gtt. Concern for sepsis, respiratory source. Imaging with mass effect and some brain compression; maycol crani watch.  01/30/18:  EEG consistent with focal L slowing 2/2 lesion and mild generalized slowing, no seizures. CT head stable, no hemorrhagic conversion  02/01/18:  Emergent intubation likely due to upper airway obstruction per NCC.    02/02/18:  Pt off Precedex, moving left side spontaneously and opening eyes. Intubated, on spontaneous today. NCC giving steroids in hopes to extubate tomorrow.  02/03/18:  NAEON, intubated, not responsive to verbal stimuli, withdraws from pain on LUE, SBP ~135-230. Continued on insulin gtt, SQH for DVT ppx, and captopril.   02/16/18:  Few changes on exam, continues to have significant RUE/RLE weakness with global aphasia.  Family member at  bedside noted attempt to communicate with her.  02/18/18:  Pt largely unchanged on exam this am.  No family member present during am rounds.  02/19/18:  Tolerating facemask. PEG by IR this afternoon.  02/20/18:  s/p PEG. O2 % on 5L NC, still requiring frequent suctioning. Primary team considering transfer to Medicine tomorrow.  02/21/18:  Pt sating % on 3L NC. Restarted DAPT, including . Plans for step down to stroke service.  02/22/18:  Pt with [facility] insurance but not service-connected so unable to be placed at SNF.  Working on insurance coverage of at least HHC therapy.  02/23/18:  Increased detemir to 36 U bid.  Placement with [facility] SNF pending completion of paperwork by Stroke team and pt's spouse--in process.  2/24/18 - NAEON. Pts WBC mildly elevated 13.01, pt is afebrile. Will continue to monitor. Endocrine consulted - Recommend levemir 40 units daily to cover basal needs (using ~0.7u/kg); Start novolog 6 units q4hr to cover TF; Moderate correction q4hr. SNF placement pending.  2/25/18 -  Pt WBC increased overnight, HR range , and temp 99.2. Blood cultures ordered and drawn. UA/UC ordered. Nursing staff called a rapid response this AM due to pts tachycardia, fever, and decreased O2 sats. Arrived to room @ 0824. 1 liter of NS ordered. STAT CXR ordered. Tylenol given. ABG completed with no significant abnormal results. Respiratory suctioned pt and pt repositioned to help with breathing. Pt O2 sats improved on venti mask, temp decreased, and BP remained normotensive. Started on  vanc and zosyn. Pt appears more comfortable and no decline in neuro status. Pt family updated.   2/26/18 - afebrile overnight and leukocytosis improving with BS antibiotics. CT abdomen yesterday noted seroma/hematoma on left rectus abdominus. IR consulted for culture +/- drain.  02/28/2018 s/p aspiration of left rectus collection adjacent to the PEG tube.  Culture sent to lab, pending.  Remains afebrile.  Currently on  4L NC O2.   3/1/18: ELY.     STROKE DOCUMENTATION   Acute Stroke Times   Stroke Team Called Date: 01/25/18  Stroke Team Called Time: 1852  Stroke Team Arrival Date: 01/25/18  Stroke Team Arrival Time: 0652  CT Interpretation Time: 1910  Decision to Treat Time for Alteplase:  (n/a unknown last known normal )  Decision to Treat Time for IR: 1915    NIH Scale:  1a. Level Of Consciousness: 1-->Not alert: but arousable by minor stimulation to obey, answer, or respond  1b. LOC Questions: 2-->Answers neither question correctly  1c. LOC Commands: 2-->Performs neither task correctly  2. Best Gaze: 1-->Partial gaze palsy: gaze is abnormal in one or both eyes, but forced deviation or total gaze paresis is not present  3. Visual: 2-->Complete hemianopia  4. Facial Palsy: 1-->Minor paralysis (flattened nasolabial fold, asymmetry on smiling)  5a. Motor Arm, Left: 1-->Drift: limb holds 90 (or 45) degrees, but drifts down before full 10 seconds: does not hit bed or other support  5b. Motor Arm, Right: 4-->No movement  6a. Motor Leg, Left: 2-->Some effort against gravity: leg falls to bed by 5 secs, but has some effort against gravity  6b. Motor Leg, Right: 4-->No movement  7. Limb Ataxia: 0-->Absent  8. Sensory: 1-->Mild-to-moderate sensory loss: patient feels pinprick is less sharp or is dull on the affected side: or there is a loss of superficial pain with pinprick, but patient is aware of being touched  9. Best Language: 2-->Severe aphasia: all communication is through fragmentary expression: great need for inference, questioning, and guessing by the listener. Range of information that can be exchanged is limited: listener carries burden of. . . (see row details)  10. Dysarthria: 2-->Severe dysarthria: patients speech is so slurred as to be unintelligible in the absence of or out of proportion to any dysphasia, or is mute/anarthric  11. Extinction and Inattention (formerly Neglect): 0-->No abnormality  Total (NIH Stroke  Scale): 25       Modified Leflore Score: 0  Jeff Coma Scale:    ABCD2 Score:    [id number] Score:   HAS -BLED Score:   ICH Score:   Hunt & Laguerre Classification:      Hemorrhagic change of an Ischemic Stroke: Does this patient have an ischemic stroke with hemorrhagic changes? No     Neurologic Chief Complaint: Left MCA stroke     Subjective:     Interval History: NAEON. No concerns for fevers / respiratory distress or worsening neuro status.     HPI, Past Medical, Family, and Social History remains the same as documented in the initial encounter.     Review of Systems   Unable to perform ROS: Patient nonverbal   Constitutional: Negative for fever.   Eyes: Negative for redness.   Respiratory: Negative for cough.    Gastrointestinal: Negative for diarrhea and vomiting.   Genitourinary: Negative for hematuria.   Skin: Negative for rash.   Neurological: Positive for speech difficulty and weakness.     Scheduled Meds:   albuterol sulfate  2.5 mg Nebulization Q6H    aspirin  325 mg Per G Tube Daily    atorvastatin  80 mg Per NG tube Daily    cefTRIAXone (ROCEPHIN) IVPB  1 g Intravenous Q24H    clopidogrel  75 mg Per G Tube Daily    heparin (porcine)  5,000 Units Subcutaneous Q8H    [START ON 3/2/2018] insulin aspart U-100  7 Units Subcutaneous Q24H    [START ON 3/2/2018] insulin aspart U-100  7 Units Subcutaneous Q24H    insulin aspart U-100  7 Units Subcutaneous Q24H    insulin aspart U-100  7 Units Subcutaneous Q24H    insulin aspart U-100  7 Units Subcutaneous Q24H    [START ON 3/2/2018] insulin aspart U-100  7 Units Subcutaneous Q24H    [START ON 3/2/2018] insulin detemir U-100  20 Units Subcutaneous Daily    ipratropium  0.5 mg Nebulization Q6H    [START ON 3/2/2018] lisinopril  40 mg Per NG tube Daily    metoprolol tartrate  50 mg Per NG tube BID    metroNIDAZOLE  500 mg Per G Tube Q8H    modafinil  200 mg Per NG tube Daily    And    modafinil  100 mg Per NG tube Daily    polyethylene glycol   17 g Per NG tube Daily    senna-docusate 8.6-50 mg  1 tablet Per NG tube BID    sodium chloride 0.9%  3 mL Intravenous Q8H    sodium chloride 3%  4 mL Nebulization Q6H     Continuous Infusions:  PRN Meds:acetaminophen, bisacodyl, dextrose 50%, glucagon (human recombinant), insulin aspart U-100, ipratropium, labetalol, ondansetron    Objective:     Vital Signs (Most Recent):  Temp: 99.1 °F (37.3 °C) (03/01/18 1600)  Pulse: 97 (03/01/18 1600)  Resp: 20 (03/01/18 1600)  BP: (!) 175/108 (03/01/18 1600)  SpO2: 96 % (03/01/18 1600)  BP Location: Right arm    Vital Signs Range (Last 24H):  Temp:  [97.6 °F (36.4 °C)-99.1 °F (37.3 °C)]   Pulse:  [75-97]   Resp:  [18-24]   BP: (138-175)/()   SpO2:  [90 %-100 %]   BP Location: Right arm    Physical Exam   Constitutional: He appears well-developed and well-nourished.   Eyes: Conjunctivae are normal. Pupils are equal, round, and reactive to light.   Cardiovascular: Normal rate and regular rhythm.    Pulmonary/Chest: Effort normal. No respiratory distress.   Abdominal: Soft. Bowel sounds are normal.   Neurological: He is alert.   Skin: Skin is warm and dry.   Nursing note and vitals reviewed.      Neurological Exam:   LOC: alert  Attention Span: poor  Language: No aphasia, Global aphasia  Articulation: Mute/Anarthric  Orientation: Person, Place, Time , Not oriented to person, place, and time  Visual Fields: Full  Hemianopsia right  EOM (CN III, IV, VI): Full/intact  Pupils (CN II, III): PERRL  Facial Sensation (CN V): Normal  Facial Movement (CN VII): Lower facial weakness on the Right  Gag Reflex: present  Reflexes: 2+ throughout  Brisk reflexes right  Motor: Arm right  Plegia 0/5  Leg right Plegia 0/5  Cebellar: No evidence of appendicular or axial ataxia  Sensation: Omid-anesthesia right  Tone: Normal tone throughout    Laboratory:  CMP:   Recent Labs  Lab 03/01/18  0521   CALCIUM 8.9   ALBUMIN 2.3*   PROT 7.1   *   K 3.5   CO2 32*   CL 97   BUN 13   CREATININE  0.8   ALKPHOS 138*   ALT 35   AST 41*   BILITOT 0.4     BMP:   Recent Labs  Lab 03/01/18  0521   *   K 3.5   CL 97   CO2 32*   BUN 13   CREATININE 0.8   CALCIUM 8.9     CBC:   Recent Labs  Lab 03/01/18  0521   WBC 7.40   RBC 3.91*   HGB 11.1*   HCT 33.1*   *   MCV 85   MCH 28.4   MCHC 33.5     Lipid Panel: No results for input(s): CHOL, LDLCALC, HDL, TRIG in the last 168 hours.  Coagulation:   Recent Labs  Lab 03/01/18  0521   INR 1.0     Platelet Aggregation Study: No results for input(s): PLTAGG, PLTAGINTERP, PLTAGREGLACO, ADPPLTAGGREG in the last 168 hours.  Hgb A1C: No results for input(s): HGBA1C in the last 168 hours.  TSH: No results for input(s): TSH in the last 168 hours.    Diagnostic Results     Brain Imaging   Most recent CTH 2/04/18 redemonstration of large L MCA territory infarction w/o evidence of hemorrhagic conversion.  Stable post op change from L MCA endovascular stenting.    Vessel Imaging   IR Cerebral Angiogram 1/25/18 demonstrated occlusion of the L M1 segment of the L MCA with good collaterals.  Occlusion removed and stent placed with TICI 2C.  Cardiac Imaging   2D Echo 1/26/18   CONCLUSIONS     1 - Normal left ventricular systolic function (EF 65-70%).     2 - Concentric remodeling.     3 - No wall motion abnormalities.     4 - Normal left ventricular diastolic function.     5 - Normal right ventricular systolic function       Araceli Mathis MD  Comprehensive Stroke Center  Department of Vascular Neurology   [facility]

## 2018-03-01 NOTE — PROGRESS NOTES
"[facility]  Endocrinology  Progress Note    Admit Date: 1/25/2018     Reason for Consult: Management of T2DM, Hyperglycemia     Surgical Procedure and Date: thrombectomy with TICI 2C reperfusion and stent placement due to L MCA stenosis  PEG 1/31    Home Diabetes Medications: metformin     How often checking glucose at home? FRIEDA, pt aphasic     Diabetes Complications include:     Hyperglycemia    Complicating diabetes co morbidities:   Residual deficits from CVA  and LOYDA      HPI:   Patient is a 47 y.o. male with a diagnosis of poorly controlled DM II, HLD, LOYDA, [place] [facility] [place] after noted "strange behavior" for which EMS was called.  Pt was found to have a L M1 occlusion and was taken to IR for thrombectomy with TICI 2C reperfusion and stent placement due to L MCA stenosis. DAPT 2/2 recent stent placement in angiogram after PEG placement.  Endo consulted for BG management.         Interval HPI:   BG at or above goal.  On Diabetasource TF at 60 cc/hr.  Has infection of peg site.  On antibiotic therapy.  Awaiting SNF placement.    BP (!) 160/100 (BP Location: Left arm, Patient Position: Lying)   Pulse 87   Temp 98 °F (36.7 °C) (Axillary)   Resp 20   Ht 5' 10" (1.778 m)   Wt 105.6 kg (232 lb 12.9 oz)   SpO2 (!) 94%   BMI 33.40 kg/m²       Labs Reviewed and Include      Recent Labs  Lab 03/01/18  0521   *   CALCIUM 8.9   ALBUMIN 2.3*   PROT 7.1   *   K 3.5   CO2 32*   CL 97   BUN 13   CREATININE 0.8   ALKPHOS 138*   ALT 35   AST 41*   BILITOT 0.4     Lab Results   Component Value Date    WBC 7.40 03/01/2018    HGB 11.1 (L) 03/01/2018    HCT 33.1 (L) 03/01/2018    MCV 85 03/01/2018     (H) 03/01/2018     No results for input(s): TSH, FREET4 in the last 168 hours.  Lab Results   Component Value Date    HGBA1C 10.0 (H) 01/25/2018       Nutritional status:   Body mass index is 33.4 kg/m².  Lab Results   Component Value Date    ALBUMIN 2.3 (L) 03/01/2018    ALBUMIN 2.0 " (L) 02/28/2018    ALBUMIN 1.9 (L) 02/27/2018     No results found for: PREALBUMIN    Estimated Creatinine Clearance: 137.4 mL/min (based on SCr of 0.8 mg/dL).    Accu-Checks  Recent Labs      02/27/18   2026  02/28/18   0035  02/28/18   0448  02/28/18   0854  02/28/18   1009  02/28/18   1400  02/28/18   2102  03/01/18   0025  03/01/18   0550  03/01/18   0907   POCTGLUCOSE  143*  141*  175*  169*  184*  166*  166*  202*  230*  213*       Current Medications and/or Treatments Impacting Glycemic Control  Immunotherapy:  Immunosuppressants     None        Steroids:   Hormones     None        Pressors:    Autonomic Drugs     Start     Stop Route Frequency Ordered    01/31/18 0854  succinylcholine (ANECTINE) 20 mg/mL injection     Comments:  Created by cabinet override    01/31 2059 01/31/18 0854          Prior  levemir 18 units daily-received today  novolog 6 units q4h, mn held  Moderate dose correction scale  Hyperglycemia/Diabetes Medications: Antihyperglycemics     Start     Stop Route Frequency Ordered    03/02/18 0900  insulin detemir U-100 pen 20 Units      -- SubQ Daily 03/01/18 0943    03/02/18 0815  insulin aspart U-100 pen 7 Units      -- SubQ Every 24 hours (non-standard times) 03/01/18 0943    03/02/18 0400  insulin aspart U-100 pen 7 Units      -- SubQ Every 24 hours (non-standard times) 03/01/18 0816    03/02/18 0000  insulin aspart U-100 pen 7 Units      -- SubQ Every 24 hours (non-standard times) 03/01/18 0816    03/01/18 2000  insulin aspart U-100 pen 7 Units      -- SubQ Every 24 hours (non-standard times) 03/01/18 0943    03/01/18 1600  insulin aspart U-100 pen 7 Units      -- SubQ Every 24 hours (non-standard times) 03/01/18 0943    03/01/18 1200  insulin aspart U-100 pen 7 Units      -- SubQ Every 24 hours (non-standard times) 03/01/18 0943    02/24/18 1148  insulin aspart U-100 pen 1-10 Units      -- SubQ Every 4 hours PRN 02/24/18 1049    02/16/18 1400  insulin aspart pen 8 Units      02/19 0001  SubQ Every 4 hours 02/16/18 1058          ASSESSMENT and PLAN    * Embolic stroke involving left middle cerebral artery    Avoid hypoglycemia             Type 2 diabetes mellitus with hyperglycemia, with long-term current use of insulin    -180  Increase levemir to 20 units daily  Change novolog to 7 units q4h for TF coverage.  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         On enteral nutrition    TF 60 cc/hr  on 2/28/18, increasing insulin needs            Obstructive sleep apnea    May increase insulin resistance if untreated             MATHIEU Day, FNP  Endocrinology  [facility]

## 2018-03-02 PROBLEM — I63.9 DEPRESSION DUE TO ACUTE STROKE: Status: ACTIVE | Noted: 2018-03-02

## 2018-03-02 PROBLEM — F06.31 DEPRESSION DUE TO ACUTE STROKE: Status: ACTIVE | Noted: 2018-03-02

## 2018-03-02 LAB
ALBUMIN SERPL BCP-MCNC: 2.4 G/DL
ALP SERPL-CCNC: 133 U/L
ALT SERPL W/O P-5'-P-CCNC: 44 U/L
ANION GAP SERPL CALC-SCNC: 10 MMOL/L
AST SERPL-CCNC: 43 U/L
BACTERIA BLD CULT: NORMAL
BACTERIA BLD CULT: NORMAL
BACTERIA SPEC ANAEROBE CULT: NORMAL
BASOPHILS # BLD AUTO: 0.03 K/UL
BASOPHILS NFR BLD: 0.4 %
BILIRUB SERPL-MCNC: 0.5 MG/DL
BUN SERPL-MCNC: 15 MG/DL
CALCIUM SERPL-MCNC: 9.1 MG/DL
CHLORIDE SERPL-SCNC: 98 MMOL/L
CO2 SERPL-SCNC: 28 MMOL/L
CREAT SERPL-MCNC: 0.8 MG/DL
DIFFERENTIAL METHOD: ABNORMAL
EOSINOPHIL # BLD AUTO: 0.2 K/UL
EOSINOPHIL NFR BLD: 2.2 %
ERYTHROCYTE [DISTWIDTH] IN BLOOD BY AUTOMATED COUNT: 12.8 %
EST. GFR  (AFRICAN AMERICAN): >60 ML/MIN/1.73 M^2
EST. GFR  (NON AFRICAN AMERICAN): >60 ML/MIN/1.73 M^2
GLUCOSE SERPL-MCNC: 197 MG/DL
HCT VFR BLD AUTO: 35.1 %
HGB BLD-MCNC: 12.1 G/DL
IMM GRANULOCYTES # BLD AUTO: 0.07 K/UL
IMM GRANULOCYTES NFR BLD AUTO: 0.9 %
INR PPP: 0.9
LYMPHOCYTES # BLD AUTO: 1.4 K/UL
LYMPHOCYTES NFR BLD: 19.1 %
MAGNESIUM SERPL-MCNC: 1.7 MG/DL
MCH RBC QN AUTO: 29.4 PG
MCHC RBC AUTO-ENTMCNC: 34.5 G/DL
MCV RBC AUTO: 85 FL
MONOCYTES # BLD AUTO: 0.6 K/UL
MONOCYTES NFR BLD: 7.9 %
NEUTROPHILS # BLD AUTO: 5.2 K/UL
NEUTROPHILS NFR BLD: 69.5 %
NRBC BLD-RTO: 0 /100 WBC
PHOSPHATE SERPL-MCNC: 3.5 MG/DL
PLATELET # BLD AUTO: 469 K/UL
PMV BLD AUTO: 9.6 FL
POCT GLUCOSE: 150 MG/DL (ref 70–110)
POCT GLUCOSE: 171 MG/DL (ref 70–110)
POCT GLUCOSE: 196 MG/DL (ref 70–110)
POCT GLUCOSE: 199 MG/DL (ref 70–110)
POCT GLUCOSE: 205 MG/DL (ref 70–110)
POTASSIUM SERPL-SCNC: 3.3 MMOL/L
PROT SERPL-MCNC: 7.2 G/DL
PROTHROMBIN TIME: 10 SEC
RBC # BLD AUTO: 4.11 M/UL
SODIUM SERPL-SCNC: 136 MMOL/L
WBC # BLD AUTO: 7.43 K/UL

## 2018-03-02 PROCEDURE — 36415 COLL VENOUS BLD VENIPUNCTURE: CPT

## 2018-03-02 PROCEDURE — 94640 AIRWAY INHALATION TREATMENT: CPT

## 2018-03-02 PROCEDURE — 84100 ASSAY OF PHOSPHORUS: CPT

## 2018-03-02 PROCEDURE — 97112 NEUROMUSCULAR REEDUCATION: CPT

## 2018-03-02 PROCEDURE — 20600001 HC STEP DOWN PRIVATE ROOM

## 2018-03-02 PROCEDURE — 94668 MNPJ CHEST WALL SBSQ: CPT

## 2018-03-02 PROCEDURE — 94761 N-INVAS EAR/PLS OXIMETRY MLT: CPT

## 2018-03-02 PROCEDURE — 83735 ASSAY OF MAGNESIUM: CPT

## 2018-03-02 PROCEDURE — 25000003 PHARM REV CODE 250: Performed by: PSYCHIATRY & NEUROLOGY

## 2018-03-02 PROCEDURE — 25000003 PHARM REV CODE 250: Performed by: PHYSICIAN ASSISTANT

## 2018-03-02 PROCEDURE — A4216 STERILE WATER/SALINE, 10 ML: HCPCS | Performed by: NURSE PRACTITIONER

## 2018-03-02 PROCEDURE — 27000221 HC OXYGEN, UP TO 24 HOURS

## 2018-03-02 PROCEDURE — 25000003 PHARM REV CODE 250: Performed by: STUDENT IN AN ORGANIZED HEALTH CARE EDUCATION/TRAINING PROGRAM

## 2018-03-02 PROCEDURE — 99233 SBSQ HOSP IP/OBS HIGH 50: CPT | Mod: ,,, | Performed by: PSYCHIATRY & NEUROLOGY

## 2018-03-02 PROCEDURE — 25000003 PHARM REV CODE 250: Performed by: NURSE PRACTITIONER

## 2018-03-02 PROCEDURE — 97530 THERAPEUTIC ACTIVITIES: CPT

## 2018-03-02 PROCEDURE — 85025 COMPLETE CBC W/AUTO DIFF WBC: CPT

## 2018-03-02 PROCEDURE — 97803 MED NUTRITION INDIV SUBSEQ: CPT

## 2018-03-02 PROCEDURE — 99231 SBSQ HOSP IP/OBS SF/LOW 25: CPT | Mod: ,,, | Performed by: INTERNAL MEDICINE

## 2018-03-02 PROCEDURE — 63600175 PHARM REV CODE 636 W HCPCS: Performed by: NURSE PRACTITIONER

## 2018-03-02 PROCEDURE — 63600175 PHARM REV CODE 636 W HCPCS: Performed by: STUDENT IN AN ORGANIZED HEALTH CARE EDUCATION/TRAINING PROGRAM

## 2018-03-02 PROCEDURE — 85610 PROTHROMBIN TIME: CPT

## 2018-03-02 PROCEDURE — 25000242 PHARM REV CODE 250 ALT 637 W/ HCPCS: Performed by: PSYCHIATRY & NEUROLOGY

## 2018-03-02 PROCEDURE — 80053 COMPREHEN METABOLIC PANEL: CPT

## 2018-03-02 PROCEDURE — 25000242 PHARM REV CODE 250 ALT 637 W/ HCPCS: Performed by: STUDENT IN AN ORGANIZED HEALTH CARE EDUCATION/TRAINING PROGRAM

## 2018-03-02 RX ORDER — POTASSIUM CHLORIDE 20 MEQ/15ML
40 SOLUTION ORAL ONCE
Status: DISCONTINUED | OUTPATIENT
Start: 2018-03-02 | End: 2018-03-21 | Stop reason: HOSPADM

## 2018-03-02 RX ORDER — POTASSIUM CHLORIDE 20 MEQ/15ML
40 SOLUTION ORAL ONCE
Status: COMPLETED | OUTPATIENT
Start: 2018-03-02 | End: 2018-03-02

## 2018-03-02 RX ORDER — INSULIN ASPART 100 [IU]/ML
8 INJECTION, SOLUTION INTRAVENOUS; SUBCUTANEOUS
Status: DISCONTINUED | OUTPATIENT
Start: 2018-03-03 | End: 2018-03-04

## 2018-03-02 RX ORDER — SERTRALINE HYDROCHLORIDE 50 MG/1
50 TABLET, FILM COATED ORAL DAILY
Status: DISCONTINUED | OUTPATIENT
Start: 2018-03-02 | End: 2018-03-02

## 2018-03-02 RX ORDER — INSULIN ASPART 100 [IU]/ML
8 INJECTION, SOLUTION INTRAVENOUS; SUBCUTANEOUS
Status: DISCONTINUED | OUTPATIENT
Start: 2018-03-02 | End: 2018-03-04

## 2018-03-02 RX ADMIN — METOPROLOL TARTRATE 50 MG: 50 TABLET, FILM COATED ORAL at 10:03

## 2018-03-02 RX ADMIN — METRONIDAZOLE 500 MG: 500 TABLET ORAL at 06:03

## 2018-03-02 RX ADMIN — CEFTRIAXONE SODIUM 1 G: 1 INJECTION, POWDER, FOR SOLUTION INTRAMUSCULAR; INTRAVENOUS at 02:03

## 2018-03-02 RX ADMIN — METRONIDAZOLE 500 MG: 500 TABLET ORAL at 10:03

## 2018-03-02 RX ADMIN — INSULIN ASPART 8 UNITS: 100 INJECTION, SOLUTION INTRAVENOUS; SUBCUTANEOUS at 10:03

## 2018-03-02 RX ADMIN — IPRATROPIUM BROMIDE 0.5 MG: 0.5 SOLUTION RESPIRATORY (INHALATION) at 07:03

## 2018-03-02 RX ADMIN — IPRATROPIUM BROMIDE 0.5 MG: 0.5 SOLUTION RESPIRATORY (INHALATION) at 12:03

## 2018-03-02 RX ADMIN — ATORVASTATIN CALCIUM 80 MG: 20 TABLET, FILM COATED ORAL at 10:03

## 2018-03-02 RX ADMIN — INSULIN ASPART 8 UNITS: 100 INJECTION, SOLUTION INTRAVENOUS; SUBCUTANEOUS at 06:03

## 2018-03-02 RX ADMIN — SODIUM CHLORIDE SOLN NEBU 3% 4 ML: 3 NEBU SOLN at 07:03

## 2018-03-02 RX ADMIN — METRONIDAZOLE 500 MG: 500 TABLET ORAL at 02:03

## 2018-03-02 RX ADMIN — SODIUM CHLORIDE SOLN NEBU 3% 4 ML: 3 NEBU SOLN at 12:03

## 2018-03-02 RX ADMIN — ALBUTEROL SULFATE 2.5 MG: 2.5 SOLUTION RESPIRATORY (INHALATION) at 01:03

## 2018-03-02 RX ADMIN — CLOPIDOGREL BISULFATE 75 MG: 75 TABLET, FILM COATED ORAL at 10:03

## 2018-03-02 RX ADMIN — Medication 3 ML: at 10:03

## 2018-03-02 RX ADMIN — INSULIN ASPART 8 UNITS: 100 INJECTION, SOLUTION INTRAVENOUS; SUBCUTANEOUS at 09:03

## 2018-03-02 RX ADMIN — POTASSIUM CHLORIDE 40 MEQ: 20 SOLUTION ORAL at 10:03

## 2018-03-02 RX ADMIN — HEPARIN SODIUM 5000 UNITS: 5000 INJECTION, SOLUTION INTRAVENOUS; SUBCUTANEOUS at 06:03

## 2018-03-02 RX ADMIN — ALBUTEROL SULFATE 2.5 MG: 2.5 SOLUTION RESPIRATORY (INHALATION) at 12:03

## 2018-03-02 RX ADMIN — INSULIN ASPART 4 UNITS: 100 INJECTION, SOLUTION INTRAVENOUS; SUBCUTANEOUS at 06:03

## 2018-03-02 RX ADMIN — HEPARIN SODIUM 5000 UNITS: 5000 INJECTION, SOLUTION INTRAVENOUS; SUBCUTANEOUS at 10:03

## 2018-03-02 RX ADMIN — INSULIN ASPART 8 UNITS: 100 INJECTION, SOLUTION INTRAVENOUS; SUBCUTANEOUS at 02:03

## 2018-03-02 RX ADMIN — Medication 3 ML: at 06:03

## 2018-03-02 RX ADMIN — SERTRALINE HYDROCHLORIDE 50 MG: 50 TABLET ORAL at 05:03

## 2018-03-02 RX ADMIN — HEPARIN SODIUM 5000 UNITS: 5000 INJECTION, SOLUTION INTRAVENOUS; SUBCUTANEOUS at 02:03

## 2018-03-02 RX ADMIN — MODAFINIL 100 MG: 100 TABLET ORAL at 02:03

## 2018-03-02 RX ADMIN — IPRATROPIUM BROMIDE 0.5 MG: 0.5 SOLUTION RESPIRATORY (INHALATION) at 01:03

## 2018-03-02 RX ADMIN — INSULIN ASPART 7 UNITS: 100 INJECTION, SOLUTION INTRAVENOUS; SUBCUTANEOUS at 04:03

## 2018-03-02 RX ADMIN — INSULIN ASPART 2 UNITS: 100 INJECTION, SOLUTION INTRAVENOUS; SUBCUTANEOUS at 02:03

## 2018-03-02 RX ADMIN — ALBUTEROL SULFATE 2.5 MG: 2.5 SOLUTION RESPIRATORY (INHALATION) at 07:03

## 2018-03-02 RX ADMIN — MODAFINIL 200 MG: 100 TABLET ORAL at 06:03

## 2018-03-02 RX ADMIN — LISINOPRIL 40 MG: 20 TABLET ORAL at 10:03

## 2018-03-02 RX ADMIN — INSULIN DETEMIR 22 UNITS: 100 INJECTION, SOLUTION SUBCUTANEOUS at 09:03

## 2018-03-02 RX ADMIN — INSULIN ASPART 2 UNITS: 100 INJECTION, SOLUTION INTRAVENOUS; SUBCUTANEOUS at 09:03

## 2018-03-02 RX ADMIN — STANDARDIZED SENNA CONCENTRATE AND DOCUSATE SODIUM 1 TABLET: 8.6; 5 TABLET, FILM COATED ORAL at 10:03

## 2018-03-02 RX ADMIN — SODIUM CHLORIDE SOLN NEBU 3% 4 ML: 3 NEBU SOLN at 01:03

## 2018-03-02 RX ADMIN — Medication 3 ML: at 02:03

## 2018-03-02 RX ADMIN — ASPIRIN 325 MG ORAL TABLET 325 MG: 325 PILL ORAL at 10:03

## 2018-03-02 NOTE — PLAN OF CARE
Problem: Patient Care Overview  Goal: Plan of Care Review  Recommendations    1. Recommend to increase TF to goal rate of Diabetisource @75mL/hr. Provides 2160kcals, 108g pro, and 1472mL free water. Current TF rate/ formula only meeting 75% energy needs & 81% of protein needs.   2. If less TF volume desired, recommend changing to Glucerna 1.5 @ 60 mL/hr. Provides 2160 kcals, 119 g pro, and 1093 mL free water.   3. Discontinue Beneprotein supplement, as stated in TF order    RD following     Goals: Meet >90% EEN/EPN from TF   Nutrition Goal Status: progressing towards goal

## 2018-03-02 NOTE — PROGRESS NOTES
[facility]  Adult Nutrition  Progress Note    SUMMARY     Recommendations    1. Recommend to increase TF to goal rate of Diabetisource @75mL/hr. Provides 2160kcals, 108g pro, and 1472mL free water. Current TF rate/ formula only meeting 75% energy needs & 81% of protein needs.   2. If less TF volume desired, recommend changing to Glucerna 1.5 @ 60 mL/hr. Provides 2160 kcals, 119 g pro, and 1093 mL free water.   3. Discontinue Beneprotein supplement, as stated in TF order    RD following     Goals: Meet >90% EEN/EPN from TF   Nutrition Goal Status: progressing towards goal  Communication of RD Recs: reviewed with physician (POC)    Reason for Assessment    Reason for Assessment: RD follow-up  Diagnosis: stroke/CVA (Embolic stroke L MCA)  Relevent Medical History: HTN, T2DM   Interdisciplinary Rounds: did not attend     General Information Comments: Pt seen this am. Wife at bedside to report TF tolerance. Currently running @ 60mL/hr. 2/28 MD note: aspiration of L rectus collection adjacent to PEG. SLP still rec NPO status 2/2 oral phase dysphagia.      Nutrition Discharge Planning: Tolerance of TF    Nutrition Prescription Ordered    Current Diet Order: NPO  Nutrition Order Comments: Beneprotein under TF order comments; no quantity noted  Current Nutrition Support Formula Ordered: Diabetisource  Current Nutrition Support Rate Ordered: 60 (ml)  Current Nutrition Support Frequency Ordered: ml/hr        Evaluation of Received Nutrients/Fluid Intake    Enteral Calories (kcal): 1728  Enteral Protein (gm): 86  Enteral (Free Water) Fluid (mL): 1178    Energy Calories Required: not meeting needs  % Kcal Needs: 75%     Protein Required: not meeting needs  % Protein Needs: 81%    I/O: +I/O   Fluid Required: meeting needs  Comments: LBM 3/1  Tolerance: tolerating    % Intake of Estimated Energy Needs: 75 - 100 %  % Meal Intake: NPO     Nutrition Risk Screen     Nutrition Risk Screen: no indicators  "present    Nutrition/Diet History    Patient Reported Diet/Restrictions/Preferences: other (see comments) (FRIEDA)  Typical Food/Fluid Intake: FRIEDA  Food Preferences: N/A   Supplemental Drinks or Food Habits: Other (see comments) (FRIEDA)  Factors Affecting Nutritional Intake: NPO, difficulty/impaired swallowing       Labs/Tests/Procedures/Meds    Pertinent Labs Reviewed: reviewed, pertinent  Pertinent Labs Comments: POCT Gluc 113-230, K 3.3, Alb 2.4  Pertinent Medications Reviewed: reviewed, pertinent  Pertinent Medications Comments: statin, insulin, senna, KCl    Physical Findings    Overall Physical Appearance: obese, listlessness  Tubes: gastrostomy tube  Oral/Mouth Cavity: WDL  Skin: intact    Anthropometrics    Temp: 98.7 °F (37.1 °C)     Height: 5' 10" (177.8 cm)  Weight Method: Bed Scale  Weight: 105.6 kg (232 lb 12.9 oz)  Ideal Body Weight (IBW), Male: 166 lb     % Ideal Body Weight, Male (lb): 140.25 lb     BMI (Calculated): 33.5  BMI Grade: 30 - 34.9- obesity - grade I     Estimated/Assessed Needs    Weight Used For Calorie Calculations: 105.6 kg (232 lb 12.9 oz)   Energy Calorie Requirements (kcal): 2318 kcal/d  Energy Need Method: Crittenden-St Jeor (1.2 PAL)  RMR (Crittenden-St. Jeor Equation): 1932.25  Weight Used For Protein Calculations: 75.4 kg (166 lb 3.6 oz) (IBW)  Protein Requirements: 90-105g (1.2-1.4g/kg)  Fluid Need Method: RDA Method (Per MD or 1 ml/kcal)  RDA Method (mL): 2318  CHO Requirement: 50% total kcals     Assessment and Plan    * Embolic stroke involving left middle cerebral artery    Problem  Inadequate energy intake    Related to (etiology):   Inability to consume sufficient energy    Signs and Symptoms (as evidenced by):   TF meeting <85% EEN/EPN     Interventions/Recommendations (treatment strategy):  See recs above    Nutrition Diagnosis Status:   Continues              Monitor and Evaluation    Food and Nutrient Intake: enteral nutrition intake  Food and Nutrient Adminstration: enteral " and parenteral nutrition administration     Physical Activity and Function: nutrition-related ADLs and IADLs  Anthropometric Measurements: weight, weight change  Biochemical Data, Medical Tests and Procedures: electrolyte and renal panel, gastrointestinal profile, glucose/endocrine profile, inflammatory profile, lipid profile  Nutrition-Focused Physical Findings: overall appearance    Nutrition Risk    Level of Risk: other (f/u 2x/week)    Nutrition Follow-Up    RD Follow-up?: Yes

## 2018-03-02 NOTE — PLAN OF CARE
Problem: Physical Therapy Goal  Goal: Physical Therapy Goal    Goals to be met by 3/16/2018    1. Pt will perform rolling to the R and L with max assist.  -met to the L only  2. Pt will perform supine to sit from both sides of the bed with max assist.  3. Pt will perform sit to supine with max assist.  4. Pt will sit EOB x 5 minutes with moderate assist to prepare for functional tasks in sitting.   5. Pt will participate in BLE and cervical ROM exercise.   6. Family will verbalize and demonstrate appropriate positioning and ROM techniques   7. Pt will tolerate sitting up in cardiac chair for 1 hr to improve endurance.   Outcome: Ongoing (interventions implemented as appropriate)  Patient participated well in therapy.  POC and goals remain appropriate.  Please refer to progress note for functional mobility.     Madina Luu, PT  3/2/2018  [phone] (pager)

## 2018-03-02 NOTE — ASSESSMENT & PLAN NOTE
Improved  CBC hemoconcentrated, ABG 3/1 suggests primary metabolic alkalosis with some component of respiratory alkalosis  Likely contraction alkalosis, increased free water flushes from 200 QID to 300 QID, CMP HCO3 improved after increase  Appears to have Cheyne-Mena pattern respiration, patient with recent stroke and reported sleep apnea  daily CMP

## 2018-03-02 NOTE — SUBJECTIVE & OBJECTIVE
Neurologic Chief Complaint: Left MCA stroke     Subjective:     Interval History: Pt afebrile, WBC WNL. Now sating well on 1L NC. Pt emotional on exam, family not interested in SSRI at this time. CM attempting to get pt placed to [facility] so he can then transfer to [facility]. Increased rate of TFs.    HPI, Past Medical, Family, and Social History remains the same as documented in the initial encounter.     Review of Systems   Constitutional: Positive for diaphoresis. Negative for fever.   Eyes: Positive for visual disturbance. Negative for redness.   Respiratory: Positive for shortness of breath. Negative for cough.    Gastrointestinal: Negative for diarrhea and vomiting.   Genitourinary: Positive for difficulty urinating. Negative for hematuria.   Skin: Negative for pallor and rash.   Neurological: Positive for speech difficulty and weakness.   Psychiatric/Behavioral: Negative for agitation and behavioral problems.     Scheduled Meds:   albuterol sulfate  2.5 mg Nebulization Q6H    aspirin  325 mg Per G Tube Daily    atorvastatin  80 mg Per NG tube Daily    cefTRIAXone (ROCEPHIN) IVPB  1 g Intravenous Q24H    clopidogrel  75 mg Per G Tube Daily    heparin (porcine)  5,000 Units Subcutaneous Q8H    [START ON 3/3/2018] insulin aspart U-100  8 Units Subcutaneous Q24H    [START ON 3/3/2018] insulin aspart U-100  8 Units Subcutaneous Q24H    insulin aspart U-100  8 Units Subcutaneous Q24H    insulin aspart U-100  8 Units Subcutaneous Q24H    insulin aspart U-100  8 Units Subcutaneous Q24H    insulin aspart U-100  8 Units Subcutaneous Q24H    insulin detemir U-100  22 Units Subcutaneous Daily    ipratropium  0.5 mg Nebulization Q6H    lisinopril  40 mg Per NG tube Daily    metoprolol tartrate  50 mg Per NG tube BID    metroNIDAZOLE  500 mg Per G Tube Q8H    modafinil  200 mg Per NG tube Daily    And    modafinil  100 mg Per NG tube Daily    polyethylene glycol  17 g Per NG tube Daily    potassium  chloride 10%  40 mEq Oral Once    senna-docusate 8.6-50 mg  1 tablet Per NG tube BID    sertraline  50 mg Per G Tube Daily    sodium chloride 0.9%  3 mL Intravenous Q8H    sodium chloride 3%  4 mL Nebulization Q6H     Continuous Infusions:  PRN Meds:acetaminophen, bisacodyl, dextrose 50%, glucagon (human recombinant), insulin aspart U-100, ipratropium, labetalol, ondansetron    Objective:     Vital Signs (Most Recent):  Temp: 98.7 °F (37.1 °C) (03/02/18 1153)  Pulse: 80 (03/02/18 1304)  Resp: 14 (03/02/18 1304)  BP: (!) 169/107 (03/02/18 1153)  SpO2: 97 % (03/02/18 1153)  BP Location: Right arm    Vital Signs Range (Last 24H):  Temp:  [96.9 °F (36.1 °C)-99.2 °F (37.3 °C)]   Pulse:  [72-97]   Resp:  [14-88]   BP: (130-175)/()   SpO2:  [93 %-98 %]   BP Location: Right arm    Physical Exam   Constitutional: He appears well-developed and well-nourished.   HENT:   Head: Normocephalic and atraumatic.   Eyes: Conjunctivae and EOM are normal.   Cardiovascular: Normal rate.    Pulmonary/Chest: Effort normal. No respiratory distress.   On NC   Musculoskeletal: He exhibits no edema or deformity.   Neurological: He is alert. A cranial nerve deficit is present. No sensory deficit.   Skin: Skin is warm and dry.   Psychiatric: His behavior is normal.   Emotional   Nursing note and vitals reviewed.      Neurological Exam:   LOC: alert  Attention Span: Good   Language: Global aphasia  Articulation: Mute/Anarthric  Orientation: Untestable due to severe aphasia   Visual Fields: Hemianopsia right  EOM (CN III, IV, VI): Gaze preference  left  Facial Movement (CN VII): Lower facial weakness on the Right  Motor: Arm left  Normal 5/5  Leg left  Paresis: 3/5  Arm right  Plegia 0/5  Leg right Plegia 0/5  Cebellar: No evidence of appendicular or axial ataxia  Tone: Normal tone throughout    Laboratory:  CMP:     Recent Labs  Lab 03/02/18  0524   CALCIUM 9.1   ALBUMIN 2.4*   PROT 7.2      K 3.3*   CO2 28   CL 98   BUN 15    CREATININE 0.8   ALKPHOS 133   ALT 44   AST 43*   BILITOT 0.5     BMP:     Recent Labs  Lab 03/02/18  0524      K 3.3*   CL 98   CO2 28   BUN 15   CREATININE 0.8   CALCIUM 9.1     CBC:     Recent Labs  Lab 03/02/18  0524   WBC 7.43   RBC 4.11*   HGB 12.1*   HCT 35.1*   *   MCV 85   MCH 29.4   MCHC 34.5     Lipid Panel: No results for input(s): CHOL, LDLCALC, HDL, TRIG in the last 168 hours.  Coagulation:     Recent Labs  Lab 03/02/18  0524   INR 0.9     Platelet Aggregation Study: No results for input(s): PLTAGG, PLTAGINTERP, PLTAGREGLACO, ADPPLTAGGREG in the last 168 hours.  Hgb A1C: No results for input(s): HGBA1C in the last 168 hours.  TSH: No results for input(s): TSH in the last 168 hours.      Diagnostic Results       Brain Imaging     Most recent CTH 2/04/18 redemonstration of large L MCA territory infarction w/o evidence of hemorrhagic conversion.  Stable post op change from L MCA endovascular stenting.        Vessel Imaging     IR Cerebral Angiogram 1/25/18 demonstrated occlusion of the L M1 segment of the L MCA with good collaterals. Occlusion removed and stent placed with TICI 2C.      Cardiac Imaging     2D Echo 1/26/18   CONCLUSIONS     1 - Normal left ventricular systolic function (EF 65-70%).     2 - Concentric remodeling.     3 - No wall motion abnormalities.     4 - Normal left ventricular diastolic function.     5 - Normal right ventricular systolic function

## 2018-03-02 NOTE — ASSESSMENT & PLAN NOTE
Problem  Inadequate energy intake    Related to (etiology):   Inability to consume sufficient energy    Signs and Symptoms (as evidenced by):   TF meeting <85% EEN/EPN     Interventions/Recommendations (treatment strategy):  See recs above    Nutrition Diagnosis Status:   Continues

## 2018-03-02 NOTE — NURSING
Chart check completed, elevated MEWS score noted, bedside RNSilke contacted, no concerns verbalized at this time, instructed to call [phone] for further concerns or assistance.

## 2018-03-02 NOTE — PHYSICIAN QUERY
PT Name: Todd Quevedo  MR #: [id number]     Physician Query Form - Documentation Clarification      CDS/: Diane Robles RN, CDS               Contact information: [email or web address]    This form is a permanent document in the medical record.     Query Date: 2018    By submitting this query, we are merely seeking further clarification of documentation. Please utilize your independent clinical judgment when addressing the question(s) below.    The Medical record reflects the following:    Supporting Clinical Findings Location in Medical Record     --Aspiration Pneumonia            - onset ; AB.55/34/69 suggestive of type 1 respiratory failure. Likely due to acute aspiration event given initial CXR was suggestive of RML consolidation.            -will continue BS antibiotics; may descalate to aspiration pneumonia coverage if no other cultures grow in 48 hrs    --Likely Aspiration pna vs pneumonitis (as CXR improved after a day)  to superimposed encephalopathy due to sepsis    --Acute hypoxic respiratory failure due to pneumonitis improved and recommend to wean oxygen to keep sats > 90% and continue IV Rocephin to treat pneumonitis.     Vas Neuro Note                 Hosp Med Note         Hosp Med Note      Cx: Enterobacter Cloacae, Eikenella Corrodens    Cx: Fusibacterium and presumed Provotella     PEG site abscess     PEG site drainage swab                                                                             Doctor, Please specify diagnosis or diagnoses associated with above clinical findings.      Please clarify the respiratory diagnosis: (select all that apply)       Provider Use Only      [  ] Non infectious pneumonitis    [ x ] Aspiration PNA    [  ] Bacterial Pneumonia (Specify organism): ______________________    [  ] Bacterial Pneumonia, Gram Negative organism     [  ] Other diagnosis: (please specify): _____________                                                                                                                  [  ] Clinically undetermined

## 2018-03-02 NOTE — PLAN OF CARE
VA hospital transfer has been denied due to patient no longer being acutely ill. Also the patients' VA insurance is non service connected so he has no VA SNF benefits. Cm will continue to follow.     03/02/18 1425   Discharge Reassessment   Assessment Type Discharge Planning Reassessment   Provided patient/caregiver education on the expected discharge date and the discharge plan No   Do you have any problems affording any of your prescribed medications? No   Discharge Plan A Skilled Nursing Facility   Discharge Plan B New Nursing Home placement - detention care facility   Patient choice form signed by patient/caregiver N/A   Can the patient answer the patient profile reliably? No, cognitively impaired   How does the patient rate their overall health at the present time? (FRIEDA)   Describe the patient's ability to walk at the present time. Does not walk or unable to take any steps at all   How often would a person be available to care for the patient? Occasionally   Number of comorbid conditions (as recorded on the chart) Two   During the past month, has the patient often been bothered by feeling down, depressed or hopeless? (FRIEDA)   During the past month, has the patient often been bothered by little interest or pleasure in doing things? (FRIEDA)

## 2018-03-02 NOTE — PHYSICIAN QUERY
PT Name: Todd Quevedo  MR #: [id number]    Physician Query Form - Relationship to Procedure Clarification     CDS/: Diane Robles RN, CDS               Contact information: [email or web address]    This form is a permanent document in the medical record.     Query Date: March 2, 2018      By submitting this query, we are merely seeking further clarification of documentation. Please utilize your independent clinical judgment when addressing the question(s) below.    The Medical record contains the following:  Supporting Clinical Findings Location in Medical Record     --presents for IR Gastric tube placement.    --G-tube placement completed.      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  Gram stain with gram +cocci, culture w/NGTD.  Continue to monitor culture             -continue IV vanc and rocephin for now pending culture results       IR H&P 2/19    IR Nurse Note 2/19      Hosp Med C/S 2/26      Nursing Note 2/26 @240p    Hosp Med Note 2/27      Vas Neuro Note 2/28         Please clarify if _abscess/Infection of PEG site____ is    [  ] Inherent/Integral to procedure  [  ] Routine outcome  [  ] Incidental finding  [  ] Complication of procedure  [  ] Clinically insignificant  [ x ] Clinically undetermined

## 2018-03-02 NOTE — ASSESSMENT & PLAN NOTE
47yoM with recent L MCA stroke with acute hypoxic respiratory failure and previous concern for sepsis    -Likely hypoxia is Aspiration pna vs pneumonitis (as CXR improved after a day) 2/2 to superimposed encephalopathy due to sepsis  -Urine culture +klebsiella >100,000 CFU  -Wound/skin culture with Enterobacter also provotella and fusobacterium  -Abscess from IR aspiration Gram stain with GPC, Culture with Enterobacter and Eikenella; enterobacter sensitive to rocephin  -Blood Cx NGTD  -Would continue to follow IR abscess culture for sensitivities  -Would treat with rocephin for 7 days from its initiation (last dose 3/5) and PO flagyl 500 q8hr for 7 days (last dose 3/7)  -Discontinued vancomycin 3/1  -AM CXR 3/2 with mild bibasilar atelectasis   -Continue O2 sats >90%, okay with current requirements of 1LNC, patient has what appears as Cheyne espinal pattern and reported LOYDA  -Sister requested continuous pulse ox as it causes her significant distress without monitoring with patient's current pattern of breathing and recent hypoxia; ordered

## 2018-03-02 NOTE — SUBJECTIVE & OBJECTIVE
Interval Hx: No acute events overnight.Patient currently on 1L NC for saturation 90%.   Review of Systems   Unable to perform ROS: Patient nonverbal     Objective:     Vital Signs (Most Recent):  Temp: 98.7 °F (37.1 °C) (03/02/18 0820)  Pulse: 94 (03/02/18 0820)  Resp: 16 (03/02/18 0820)  BP: (!) 143/96 (03/02/18 0820)  SpO2: 95 % (03/02/18 0820) Vital Signs (24h Range):  Temp:  [96.9 °F (36.1 °C)-99.2 °F (37.3 °C)] 98.7 °F (37.1 °C)  Pulse:  [72-97] 94  Resp:  [14-88] 16  SpO2:  [90 %-99 %] 95 %  BP: (130-175)/() 143/96     Weight: 105.6 kg (232 lb 12.9 oz)  Body mass index is 33.4 kg/m².    Physical Exam   HENT:   Head: Normocephalic.   Eyes: EOM are normal. Pupils are equal, round, and reactive to light. No scleral icterus.   Neck: No JVD present. No tracheal deviation present.   Cardiovascular: Normal rate, regular rhythm and normal heart sounds.    No murmur heard.  Pulmonary/Chest: Effort normal and breath sounds normal. He has no wheezes. He has no rales.   Abdominal: Soft. There is no guarding.   Peg site with minimal purulent drainage, no erythema or induration appreciated   Musculoskeletal: He exhibits no edema.   contracturing in RUE   Neurological:   Alert, does not track or follow commands, left eye deviation, flaccid R. Paralysis, moving left side spontaneously  BS reflexes intact       Skin: Skin is warm. He is not diaphoretic.       Significant Labs:   ABGs:     Recent Labs  Lab 03/01/18  1006   PH 7.535*   PCO2 36.3   HCO3 30.7*   POCSATURATED 95   BE 8     Blood Culture: No results for input(s): LABBLOO in the last 48 hours.  CBC:     Recent Labs  Lab 03/01/18  0521 03/02/18  0524   WBC 7.40 7.43   HGB 11.1* 12.1*   HCT 33.1* 35.1*   * 469*     CMP:     Recent Labs  Lab 03/01/18  0521 03/02/18  0524   * 136   K 3.5 3.3*   CL 97 98   CO2 32* 28   * 197*   BUN 13 15   CREATININE 0.8 0.8   CALCIUM 8.9 9.1   PROT 7.1 7.2   ALBUMIN 2.3* 2.4*   BILITOT 0.4 0.5   ALKPHOS 138* 133    AST 41* 43*   ALT 35 44   ANIONGAP 6* 10   EGFRNONAA >60.0 >60.0     Lactic Acid: No results for input(s): LACTATE in the last 48 hours.  Urine Culture: No results for input(s): LABURIN in the last 48 hours.    Significant Imaging: I have reviewed all pertinent imaging results/findings within the past 24 hours.

## 2018-03-02 NOTE — PHYSICIAN QUERY
PT Name: Todd Quevedo  MR #: [id number]     Physician Query Form - Documentation Clarification      CDS/: Diane Robles RN, CDS               Contact information: [email or web address]    This form is a permanent document in the medical record.     Query Date: 2018    By submitting this query, we are merely seeking further clarification of documentation. Please utilize your independent clinical judgment when addressing the question(s) below.    The Medical record reflects the following:    Supporting Clinical Findings Location in Medical Record     --What changed?: Pt has increased WBC this am at 16, febrile, tachycardic, decreased saturations to 88-89% on 4L NC  --Antibiotics ordered. Deep nasotracheal suctioning done, pt has thick yellow secretions.      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    -Abscess near PEG w/IR aspirate, growing anaerobes               -continue rocephin / Dcd vanc & added flagyl  --Acute respiratory failure with hypoxia    - onset ; AB.55/34/69 suggestive of acute hypoxic respiratory failure, likely due to acute aspiration event given initial CXR was suggestive of RML consolidation  --Urinary tract infection due to Klebsiella species    - Afebrile               - leukocytosis resolved              - klebsiella from urine cultures (18) + Anaerobes from PEG site cultures              - Continue rocephin and D/c vanc and added Flagyl      --Further culture results from aspiration near PEG site revealed Enterobacter in wound as well as Eikenella and IV Rocephin  should cover both organisms as well as Klebsiella UTI. Anaerobic culture from PEG site growing both Fusobacterium and Prevotella         --Bld Cx: NGTD     Rapid Event Note 2/25            Vas Neuro Note 2/26          Hosp Med Note 2/26                Vas Neuro Note 3/1                              Hosp Med Note 3/1            Cx 1/25                                                                                Doctor, Please specify diagnosis or diagnoses associated with above clinical findings.      Please clarify the sepsis diagnosis:        Provider Use Only      [  ] Sepsis ruled out    [x  ] Sepsis due to Gram Negative organism    [  ] Sepsis due to Other organism (please specify if known): ________    [  ] Sepsis, unspecified                                                                                                             [  ] Clinically undetermined

## 2018-03-02 NOTE — CARE UPDATE
Computer glance, TF infusing. Increase scheduled novolog to 8 units q4h, increase levemir slightly to 22 units daily. Continue moderate dose correction scale.  ** Please call Endocrine for any BG related issues **  Angeles Dennis, ALEXI  [phone]

## 2018-03-02 NOTE — PT/OT/SLP PROGRESS
Physical Therapy Treatment    Patient Name:  Todd Quevedo   MRN:  [id number]    Recommendations:     Discharge Recommendations:  nursing facility, skilled   Discharge Equipment Recommendations:  (TBD at next level of care; hospital bed if d/c home)   Barriers to discharge: Decreased caregiver support      Plan:     During this hospitalization, patient to be seen 3 x/week to address the above listed problems via therapeutic activities, therapeutic exercises, neuromuscular re-education, wheelchair management/training  · Plan of Care Expires:  03/15/18   Plan of Care Reviewed with: patient, sibling    Subjective     Communicated with RN prior to session.  Patient found supine in bed with his sister at bedside upon PT entry to room, agreeable to treatment.      Chief Complaint: pt non verbal  Patient comments/goals: set by therapist  Pain/Comfort:  · Pain Rating 1: 0/10  · Pain Rating Post-Intervention 1: 0/10    Patients cultural, spiritual, Mandaen conflicts given the current situation: none noted    Objective:     Patient found with: bed alarm, SCD, pulse ox (continuous), oxygen, telemetry, PEG Tube     General Precautions: Standard, aphasia, aspiration, fall, NPO     Patient was found supine in bed, awake, and in NAD.  He moves LUE and LLE spontaneously.  Patient transferred to cardiac chair with total assist x3 using a draw sheet.  Patient sat up for ~40 minutes during therapy while PT engaged patient in functional tasks.     Functional Mobility:  Bed Mobility:   Rolling R: max assist with active participation using LUE after PT assisted him to grab bed rail, performed 3x  Rolling L: total assist with draw sheet, 2x     Therapeutic Activities and Exercises:   Patient sat up in cardiac chair   · Assistance Level Required: maintained balance in high back cardiac chair, moderate assist for nrutral head position  · Time: 40 minutes  · Postural deviations noted: L gaze preference with L head turn, 2 episode of LUE  pushing requiring min assist to maintain sitting balance (with back supported by chair)  · PT Encouraged: neutral head positioning, midline gaze, participation in activity    Dynamic activities in sitting: Cones, balloon toss, and reaching tasks attempted to maximize functional participation in activity  · Assistance Level Required: moderate assist for neutral head; max v/c and encouragement for gaze  · Comments: Patient actively held objects on 5 trials, reached for object with min facilitation on 1 trial, spontaneously performed visual scanning to midline 2x, actively raised hand for balloon toss on 2 trials.   · Eyes open 100% of the session  · Non verbal   · 0% command following      At the end of the session, the patient was left sitting up in cardiac chair with lap belt in place, bed positioned next to chair and RN present.  PT demonstrated to RN how to work the cardiac chair.      AM-PAC 6 CLICK MOBILITY  Turning over in bed (including adjusting bedclothes, sheets and blankets)?: 1  Sitting down on and standing up from a chair with arms (e.g., wheelchair, bedside commode, etc.): 1  Moving from lying on back to sitting on the side of the bed?: 1  Moving to and from a bed to a chair (including a wheelchair)?: 1  Need to walk in hospital room?: 1  Climbing 3-5 steps with a railing?: 1  Total Score: 6     GOALS:    Physical Therapy Goals        Problem: Physical Therapy Goal    Goal Priority Disciplines Outcome Goal Variances Interventions   Physical Therapy Goal     PT/OT, PT Ongoing (interventions implemented as appropriate)     Description:    Goals to be met by 3/16/2018    1. Pt will perform rolling to the R and L with max assist.  -met to the L only  2. Pt will perform supine to sit from both sides of the bed with max assist.  3. Pt will perform sit to supine with max assist.  4. Pt will sit EOB x 5 minutes with moderate assist to prepare for functional tasks in sitting.   5. Pt will participate in BLE and  cervical ROM exercise.   6. Family will verbalize and demonstrate appropriate positioning and ROM techniques   7. Pt will tolerate sitting up in cardiac chair for 1 hr to improve endurance.                   Assessment:     Todd Quevedo is a 47 y.o. male admitted with a medical diagnosis of Embolic stroke involving left middle cerebral artery.  He presents with the following impairments/functional limitations:  weakness, impaired sensation, impaired functional mobilty, impaired balance, impaired cognition, decreased upper extremity function, decreased safety awareness, impaired muscle length, impaired fine motor, decreased lower extremity function, visual deficits, gait instability, impaired self care skills, impaired endurance.    He tolerated sitting up in the cardiac chair and spontaneously participated in some activities with his LUE.  The patient has a definite L gaze preference and has difficulty maintaining his head in neutral, especially when fatigued.  He remains appropriate for mobility trials with PT.  Will continue to gradually mobilize this patient as tolerated.     Rehab Prognosis:  limited; patient would benefit from acute skilled PT services to address these deficits and reach maximum level of function.      Recent Surgery: Procedure(s) (LRB):  TRACHEOSTOMY (N/A)  PLACEMENT-TUBE-PEG (N/A)        Time Tracking:     PT Received On: 03/02/18  PT Start Time: 1340     PT Stop Time: 1438  PT Total Time (min): 58 min     Billable Minutes: Therapeutic Activity 15 and Neuromuscular Re-education 43    Treatment Type: Treatment  PT/PTA: PT         Madina Luu, PT  3/2/2018  [phone] (pager)

## 2018-03-03 LAB
ALBUMIN SERPL BCP-MCNC: 2.3 G/DL
ALP SERPL-CCNC: 126 U/L
ALT SERPL W/O P-5'-P-CCNC: 56 U/L
ANION GAP SERPL CALC-SCNC: 11 MMOL/L
AST SERPL-CCNC: 48 U/L
BASOPHILS # BLD AUTO: 0.04 K/UL
BASOPHILS NFR BLD: 0.5 %
BILIRUB SERPL-MCNC: 0.5 MG/DL
BUN SERPL-MCNC: 18 MG/DL
CALCIUM SERPL-MCNC: 9.4 MG/DL
CHLORIDE SERPL-SCNC: 98 MMOL/L
CO2 SERPL-SCNC: 26 MMOL/L
CREAT SERPL-MCNC: 0.8 MG/DL
DIFFERENTIAL METHOD: ABNORMAL
EOSINOPHIL # BLD AUTO: 0.1 K/UL
EOSINOPHIL NFR BLD: 1.5 %
ERYTHROCYTE [DISTWIDTH] IN BLOOD BY AUTOMATED COUNT: 13.1 %
EST. GFR  (AFRICAN AMERICAN): >60 ML/MIN/1.73 M^2
EST. GFR  (NON AFRICAN AMERICAN): >60 ML/MIN/1.73 M^2
GLUCOSE SERPL-MCNC: 233 MG/DL
HCT VFR BLD AUTO: 35.3 %
HGB BLD-MCNC: 12.2 G/DL
IMM GRANULOCYTES # BLD AUTO: 0.09 K/UL
IMM GRANULOCYTES NFR BLD AUTO: 1.1 %
INR PPP: 0.9
LYMPHOCYTES # BLD AUTO: 1.7 K/UL
LYMPHOCYTES NFR BLD: 21.6 %
MAGNESIUM SERPL-MCNC: 1.8 MG/DL
MCH RBC QN AUTO: 29.5 PG
MCHC RBC AUTO-ENTMCNC: 34.6 G/DL
MCV RBC AUTO: 86 FL
MONOCYTES # BLD AUTO: 0.7 K/UL
MONOCYTES NFR BLD: 8.7 %
NEUTROPHILS # BLD AUTO: 5.2 K/UL
NEUTROPHILS NFR BLD: 66.6 %
NRBC BLD-RTO: 0 /100 WBC
PHOSPHATE SERPL-MCNC: 3.5 MG/DL
PLATELET # BLD AUTO: 493 K/UL
PMV BLD AUTO: 9.7 FL
POCT GLUCOSE: 169 MG/DL (ref 70–110)
POCT GLUCOSE: 176 MG/DL (ref 70–110)
POCT GLUCOSE: 194 MG/DL (ref 70–110)
POCT GLUCOSE: 206 MG/DL (ref 70–110)
POCT GLUCOSE: 239 MG/DL (ref 70–110)
POTASSIUM SERPL-SCNC: 3.7 MMOL/L
PROT SERPL-MCNC: 7 G/DL
PROTHROMBIN TIME: 9.9 SEC
RBC # BLD AUTO: 4.13 M/UL
SODIUM SERPL-SCNC: 135 MMOL/L
WBC # BLD AUTO: 7.83 K/UL

## 2018-03-03 PROCEDURE — 25000003 PHARM REV CODE 250: Performed by: PHYSICIAN ASSISTANT

## 2018-03-03 PROCEDURE — 85025 COMPLETE CBC W/AUTO DIFF WBC: CPT

## 2018-03-03 PROCEDURE — 25000242 PHARM REV CODE 250 ALT 637 W/ HCPCS: Performed by: PSYCHIATRY & NEUROLOGY

## 2018-03-03 PROCEDURE — 25000003 PHARM REV CODE 250: Performed by: NURSE PRACTITIONER

## 2018-03-03 PROCEDURE — 80053 COMPREHEN METABOLIC PANEL: CPT

## 2018-03-03 PROCEDURE — 94640 AIRWAY INHALATION TREATMENT: CPT

## 2018-03-03 PROCEDURE — 25000242 PHARM REV CODE 250 ALT 637 W/ HCPCS: Performed by: NURSE PRACTITIONER

## 2018-03-03 PROCEDURE — 99233 SBSQ HOSP IP/OBS HIGH 50: CPT | Mod: ,,, | Performed by: PSYCHIATRY & NEUROLOGY

## 2018-03-03 PROCEDURE — A4216 STERILE WATER/SALINE, 10 ML: HCPCS | Performed by: NURSE PRACTITIONER

## 2018-03-03 PROCEDURE — 85610 PROTHROMBIN TIME: CPT

## 2018-03-03 PROCEDURE — 94761 N-INVAS EAR/PLS OXIMETRY MLT: CPT

## 2018-03-03 PROCEDURE — 63600175 PHARM REV CODE 636 W HCPCS: Performed by: INTERNAL MEDICINE

## 2018-03-03 PROCEDURE — 94668 MNPJ CHEST WALL SBSQ: CPT

## 2018-03-03 PROCEDURE — 25000003 PHARM REV CODE 250: Performed by: PSYCHIATRY & NEUROLOGY

## 2018-03-03 PROCEDURE — 25000003 PHARM REV CODE 250: Performed by: STUDENT IN AN ORGANIZED HEALTH CARE EDUCATION/TRAINING PROGRAM

## 2018-03-03 PROCEDURE — 63600175 PHARM REV CODE 636 W HCPCS: Performed by: STUDENT IN AN ORGANIZED HEALTH CARE EDUCATION/TRAINING PROGRAM

## 2018-03-03 PROCEDURE — 27000221 HC OXYGEN, UP TO 24 HOURS

## 2018-03-03 PROCEDURE — 63600175 PHARM REV CODE 636 W HCPCS: Performed by: NURSE PRACTITIONER

## 2018-03-03 PROCEDURE — 84100 ASSAY OF PHOSPHORUS: CPT

## 2018-03-03 PROCEDURE — 83735 ASSAY OF MAGNESIUM: CPT

## 2018-03-03 PROCEDURE — 99232 SBSQ HOSP IP/OBS MODERATE 35: CPT | Mod: ,,, | Performed by: INTERNAL MEDICINE

## 2018-03-03 PROCEDURE — 36415 COLL VENOUS BLD VENIPUNCTURE: CPT

## 2018-03-03 PROCEDURE — 20600001 HC STEP DOWN PRIVATE ROOM

## 2018-03-03 RX ORDER — IPRATROPIUM BROMIDE AND ALBUTEROL SULFATE 2.5; .5 MG/3ML; MG/3ML
3 SOLUTION RESPIRATORY (INHALATION) EVERY 6 HOURS
Status: DISCONTINUED | OUTPATIENT
Start: 2018-03-03 | End: 2018-03-21 | Stop reason: HOSPADM

## 2018-03-03 RX ADMIN — Medication 3 ML: at 09:03

## 2018-03-03 RX ADMIN — METRONIDAZOLE 500 MG: 500 TABLET ORAL at 09:03

## 2018-03-03 RX ADMIN — LISINOPRIL 40 MG: 20 TABLET ORAL at 09:03

## 2018-03-03 RX ADMIN — SODIUM CHLORIDE SOLN NEBU 3% 4 ML: 3 NEBU SOLN at 08:03

## 2018-03-03 RX ADMIN — STANDARDIZED SENNA CONCENTRATE AND DOCUSATE SODIUM 1 TABLET: 8.6; 5 TABLET, FILM COATED ORAL at 09:03

## 2018-03-03 RX ADMIN — CLOPIDOGREL BISULFATE 75 MG: 75 TABLET, FILM COATED ORAL at 09:03

## 2018-03-03 RX ADMIN — HEPARIN SODIUM 5000 UNITS: 5000 INJECTION, SOLUTION INTRAVENOUS; SUBCUTANEOUS at 01:03

## 2018-03-03 RX ADMIN — INSULIN DETEMIR 22 UNITS: 100 INJECTION, SOLUTION SUBCUTANEOUS at 10:03

## 2018-03-03 RX ADMIN — IPRATROPIUM BROMIDE AND ALBUTEROL SULFATE 3 ML: 2.5; .5 SOLUTION RESPIRATORY (INHALATION) at 12:03

## 2018-03-03 RX ADMIN — SODIUM CHLORIDE SOLN NEBU 3% 4 ML: 3 NEBU SOLN at 07:03

## 2018-03-03 RX ADMIN — INSULIN ASPART 4 UNITS: 100 INJECTION, SOLUTION INTRAVENOUS; SUBCUTANEOUS at 05:03

## 2018-03-03 RX ADMIN — SODIUM CHLORIDE SOLN NEBU 3% 4 ML: 3 NEBU SOLN at 12:03

## 2018-03-03 RX ADMIN — INSULIN ASPART 8 UNITS: 100 INJECTION, SOLUTION INTRAVENOUS; SUBCUTANEOUS at 01:03

## 2018-03-03 RX ADMIN — Medication 3 ML: at 06:03

## 2018-03-03 RX ADMIN — IPRATROPIUM BROMIDE AND ALBUTEROL SULFATE 3 ML: 2.5; .5 SOLUTION RESPIRATORY (INHALATION) at 07:03

## 2018-03-03 RX ADMIN — INSULIN ASPART 8 UNITS: 100 INJECTION, SOLUTION INTRAVENOUS; SUBCUTANEOUS at 05:03

## 2018-03-03 RX ADMIN — HEPARIN SODIUM 5000 UNITS: 5000 INJECTION, SOLUTION INTRAVENOUS; SUBCUTANEOUS at 09:03

## 2018-03-03 RX ADMIN — INSULIN ASPART 2 UNITS: 100 INJECTION, SOLUTION INTRAVENOUS; SUBCUTANEOUS at 01:03

## 2018-03-03 RX ADMIN — METOPROLOL TARTRATE 50 MG: 50 TABLET, FILM COATED ORAL at 08:03

## 2018-03-03 RX ADMIN — HEPARIN SODIUM 5000 UNITS: 5000 INJECTION, SOLUTION INTRAVENOUS; SUBCUTANEOUS at 05:03

## 2018-03-03 RX ADMIN — INSULIN ASPART 2 UNITS: 100 INJECTION, SOLUTION INTRAVENOUS; SUBCUTANEOUS at 05:03

## 2018-03-03 RX ADMIN — POLYETHYLENE GLYCOL 3350 17 G: 17 POWDER, FOR SOLUTION ORAL at 09:03

## 2018-03-03 RX ADMIN — INSULIN ASPART 1 UNITS: 100 INJECTION, SOLUTION INTRAVENOUS; SUBCUTANEOUS at 09:03

## 2018-03-03 RX ADMIN — METOPROLOL TARTRATE 50 MG: 50 TABLET, FILM COATED ORAL at 09:03

## 2018-03-03 RX ADMIN — Medication 3 ML: at 02:03

## 2018-03-03 RX ADMIN — ASPIRIN 325 MG ORAL TABLET 325 MG: 325 PILL ORAL at 09:03

## 2018-03-03 RX ADMIN — METRONIDAZOLE 500 MG: 500 TABLET ORAL at 01:03

## 2018-03-03 RX ADMIN — IPRATROPIUM BROMIDE AND ALBUTEROL SULFATE 3 ML: 2.5; .5 SOLUTION RESPIRATORY (INHALATION) at 08:03

## 2018-03-03 RX ADMIN — INSULIN ASPART 8 UNITS: 100 INJECTION, SOLUTION INTRAVENOUS; SUBCUTANEOUS at 09:03

## 2018-03-03 RX ADMIN — MODAFINIL 200 MG: 100 TABLET ORAL at 05:03

## 2018-03-03 RX ADMIN — METRONIDAZOLE 500 MG: 500 TABLET ORAL at 05:03

## 2018-03-03 RX ADMIN — INSULIN ASPART 4 UNITS: 100 INJECTION, SOLUTION INTRAVENOUS; SUBCUTANEOUS at 09:03

## 2018-03-03 RX ADMIN — INSULIN ASPART 8 UNITS: 100 INJECTION, SOLUTION INTRAVENOUS; SUBCUTANEOUS at 08:03

## 2018-03-03 RX ADMIN — MODAFINIL 100 MG: 100 TABLET ORAL at 01:03

## 2018-03-03 RX ADMIN — CEFTRIAXONE SODIUM 1 G: 1 INJECTION, POWDER, FOR SOLUTION INTRAMUSCULAR; INTRAVENOUS at 12:03

## 2018-03-03 RX ADMIN — ATORVASTATIN CALCIUM 80 MG: 20 TABLET, FILM COATED ORAL at 09:03

## 2018-03-03 NOTE — ASSESSMENT & PLAN NOTE
- Abscess near PEG w/IR aspirate, growing anaerobes   - Continue Rocephin (last dose 3/5) and Flagyl (last dose 3/7)

## 2018-03-03 NOTE — ASSESSMENT & PLAN NOTE
Afebrile, leukocytosis resolved  - Klebsiella from urine cultures (2/25/18) + Anaerobes from PEG site cultures   -Continue rocephin and Flagyl

## 2018-03-03 NOTE — ASSESSMENT & PLAN NOTE
-Stroke risk factor  SBPs 150s-170s  - Continue scheduled lisinopril 40 mg qd, Metoprolol 50 mg bid, Labetalol PRN  Monitoring

## 2018-03-03 NOTE — ASSESSMENT & PLAN NOTE
Improving, suspect 2/2 aspiration pneumonitis vs sepsis from abdominal abscess, less likely PNA   -Currently likely with some persistent atelectasis, turn q2h, PRN O2

## 2018-03-03 NOTE — ASSESSMENT & PLAN NOTE
- Stroke risk factor, Hgb A1c 10%  - Continue Diabetasource AC TFs; rate increased to 75 on 3/2  - Endocrine consulted and guiding insulin modification for TF  - POCT Q4h ; Moderate correction q4hr

## 2018-03-03 NOTE — ASSESSMENT & PLAN NOTE
-Urine culture +klebsiella >100,000 CFU  -Wound/skin culture with Enterobacter also provotella and fusobacterium  -Abscess from IR aspiration Gram stain with GPC, Culture with Enterobacter and Eikenella; enterobacter sensitive to rocephin  -Blood Cx NGTD  -Would treat with rocephin for 7 days from its initiation (last dose 3/5) and PO flagyl 500 q8hr for 7 days (last dose 3/7)  -Discontinued vancomycin 3/1

## 2018-03-03 NOTE — ASSESSMENT & PLAN NOTE
Likely hypoxia is Aspiration pna vs pneumonitis (as CXR improved after a day) 2/2 to superimposed encephalopathy due to sepsis  -AM CXR 3/2 with mild bibasilar atelectasis   -Continue O2 sats >90%, okay with current requirements of 1L NC, pt has what appears as Cheyne espianl pattern and reported LOYDA  -Sister requested continuous pulse ox; Ordered  -Contraction alkalosis improved with increasing water flushes via PEG  -Considered PE as differential however pt is on OAC and not tachycardic, tachypneic. Wells score 1.5 (low-risk)  - Continue chest physiotherapy, aspiration precautions, wean off O2 support

## 2018-03-03 NOTE — ASSESSMENT & PLAN NOTE
Pt emotional while discussing condition, POC on interview  Family not interested in SSRI at this time  Will continue to monitor

## 2018-03-03 NOTE — PROGRESS NOTES
"[facility]  Vascular Neurology  Comprehensive Stroke Center  Progress Note    Assessment/Plan:     * Embolic stroke involving left middle cerebral artery    46 yo M with PMHx HLD, LOYDA, poorly controlled DM II [place] [facility] [place] after noted "strange behavior" for which EMS was called. Pt was found to have a L M1 occlusion and was taken to IR for thrombectomy with TICI 2C reperfusion and stent placement due to L MCA stenosis. Etiology remains unclear: no obvious signs of cardiac source or atheromatous disease in the aorto/carotid axis. Echo normal with no hx or signs of A fib. PT/OT/SLP will continue to evaluate and treat; recommending SNF. DAPT 2/2 recent stent placement.      [facility] denied pt today as he is not acutely ill. CM working on other options, but I discussed with family at bedside the possibility of them taking him home with home health in [place]. Family asks how they could do suctioning necessary at home. Will monitor over weekend and discuss for further associated details on Monday.    Pt emotional on exam today; family not interested in SSRI at this time.    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    - Abscess near PEG w/IR aspirate, growing anaerobes   - Continue Rocephin (last dose 3/5) and Flagyl (last dose 3/7)        Right spastic hemiparesis    -Due to stroke  -Continue aggressive PT/OT; Recommending SNF        Oral phase " dysphagia    - Continue SLP  - NPO, meds/feeds per PEG        Acute respiratory failure with hypoxia    Likely hypoxia is Aspiration pna vs pneumonitis (as CXR improved after a day) 2/2 to superimposed encephalopathy due to sepsis  -AM CXR 3/2 with mild bibasilar atelectasis   -Continue O2 sats >90%, okay with current requirements of 1L NC, pt has what appears as Cheyne espinal pattern and reported LOYDA  -Sister requested continuous pulse ox; Ordered  -Contraction alkalosis improved with increasing water flushes via PEG  -Considered PE as differential however pt is on OAC and not tachycardic, tachypneic. Wells score 1.5 (low-risk)  - Continue chest physiotherapy, aspiration precautions, wean off O2 support         Cytotoxic cerebral edema    -2/2 stroke, evident on imaging  Stable on repeat CTH 2/4/18        Urinary tract infection due to Klebsiella species     Afebrile, leukocytosis resolved  - Klebsiella from urine cultures (2/25/18) + Anaerobes from PEG site cultures   -Continue rocephin and Flagyl         Type 2 diabetes mellitus with hyperglycemia, with long-term current use of insulin    - Stroke risk factor, Hgb A1c 10%  - Continue Diabetasource AC TFs; rate increased to 75 on 3/2  - Endocrine consulted and guiding insulin modification for TF  - POCT Q4h ; Moderate correction q4hr         Mixed hyperlipidemia    -Stroke risk factor, LDL invalid as TG > 400  -Continue atorvastatin 80 mg qd        Essential hypertension    -Stroke risk factor  SBPs 150s-170s  - Continue scheduled lisinopril 40 mg qd, Metoprolol 50 mg bid, Labetalol PRN  Monitoring        Obstructive sleep apnea    -Stroke risk factor  -CPAP qHS        Nodule of right lung    - Plan for f/u CT 6-12 months via PCP             01/25/18:  Brought in for aphasia, RSW with L gaze preferance. LKN unknown, not tPA candidate. Went to IR for angiogram and stent.  01/29/18:  Off Cardene, remains on Insulin gtt. Concern for sepsis, respiratory source.  Imaging with mass effect and some brain compression; maycol crani watch.  01/30/18:  EEG consistent with focal L slowing 2/2 lesion and mild generalized slowing, no seizures. CT head stable, no hemorrhagic conversion  02/01/18:  Emergent intubation likely due to upper airway obstruction per NCC.    02/02/18:  Pt off Precedex, moving left side spontaneously and opening eyes. Intubated, on spontaneous today. NCC giving steroids in hopes to extubate tomorrow.  02/03/18:  NAEON, intubated, not responsive to verbal stimuli, withdraws from pain on LUE, SBP ~135-230. Continued on insulin gtt, SQH for DVT ppx, and captopril.   02/16/18:  Few changes on exam, continues to have significant RUE/RLE weakness with global aphasia.  Family member at bedside noted attempt to communicate with her.  02/18/18:  Pt largely unchanged on exam this am.  No family member present during am rounds.  02/19/18:  Tolerating facemask. PEG by IR this afternoon.  02/20/18:  s/p PEG. O2 % on 5L NC, still requiring frequent suctioning. Primary team considering transfer to Medicine tomorrow.  02/21/18:  Pt sating % on 3L NC. Restarted DAPT, including . Plans for step down to stroke service.  02/22/18:  Pt with [facility] insurance but not service-connected so unable to be placed at SNF.  Working on insurance coverage of at least [facility] therapy.  02/23/18:  Increased detemir to 36 U bid.  Placement with [facility] SNF pending completion of paperwork by Stroke team and pt's spouse--in process.  2/24/18 - NAEON. Pts WBC mildly elevated 13.01, pt is afebrile. Will continue to monitor. Endocrine consulted - Recommend levemir 40 units daily to cover basal needs (using ~0.7u/kg); Start novolog 6 units q4hr to cover TF; Moderate correction q4hr. SNF placement pending.  2/25/18 -  Pt WBC increased overnight, HR range , and temp 99.2. Blood cultures ordered and drawn. UA/UC ordered. Nursing staff called a rapid response this AM due to pts tachycardia,  fever, and decreased O2 sats. Arrived to room @ 0824. 1 liter of NS ordered. STAT CXR ordered. Tylenol given. ABG completed with no significant abnormal results. Respiratory suctioned pt and pt repositioned to help with breathing. Pt O2 sats improved on venti mask, temp decreased, and BP remained normotensive. Started on  vanc and zosyn. Pt appears more comfortable and no decline in neuro status. Pt family updated.   2/26/18 - afebrile overnight and leukocytosis improving with BS antibiotics. CT abdomen yesterday noted seroma/hematoma on left rectus abdominus. IR consulted for culture +/- drain.  02/28/2018 s/p aspiration of left rectus collection adjacent to the PEG tube.  Culture sent to lab, pending.  Remains afebrile.  Currently on 4L NC O2.   3/1/18: NAEON.   3/2: Pt afebrile, WBC WNL. Now sating well on 1L NC. Pt emotional on exam, family not interested in SSRI at this time. CM attempting to get pt placed to [facility] so he can then transfer to [facility]. Increased rate of TFs.    STROKE DOCUMENTATION   Acute Stroke Times   Stroke Team Called Date: 01/25/18  Stroke Team Called Time: 1852  Stroke Team Arrival Date: 01/25/18  Stroke Team Arrival Time: 0652  CT Interpretation Time: 1910  Decision to Treat Time for Alteplase:  (n/a unknown last known normal )  Decision to Treat Time for IR: 1915    NIH Scale:  1a. Level Of Consciousness: 0-->Alert: keenly responsive  1b. LOC Questions: 2-->Answers neither question correctly  1c. LOC Commands: 1-->Performs one task correctly  2. Best Gaze: 1-->Partial gaze palsy: gaze is abnormal in one or both eyes, but forced deviation or total gaze paresis is not present  3. Visual: 2-->Complete hemianopia  4. Facial Palsy: 1-->Minor paralysis (flattened nasolabial fold, asymmetry on smiling)  5a. Motor Arm, Left: 1-->Drift: limb holds 90 (or 45) degrees, but drifts down before full 10 seconds: does not hit bed or other support  5b. Motor Arm, Right: 4-->No movement  6a. Motor  Leg, Left: 1-->Drift: leg falls by the end of the 5-sec period but does not hit bed  6b. Motor Leg, Right: 4-->No movement  7. Limb Ataxia: 0-->Absent  8. Sensory: 1-->Mild-to-moderate sensory loss: patient feels pinprick is less sharp or is dull on the affected side: or there is a loss of superficial pain with pinprick, but patient is aware of being touched  9. Best Language: 3-->Mute, global aphasia: no usable speech or auditory comprehension  10. Dysarthria: 2-->Severe dysarthria: patients speech is so slurred as to be unintelligible in the absence of or out of proportion to any dysphasia, or is mute/anarthric  11. Extinction and Inattention (formerly Neglect): 0-->No abnormality  Total (NIH Stroke Scale): 23       Modified Jasmin Score: 0  Jeff Coma Scale:    ABCD2 Score:    [id number] Score:   HAS -BLED Score:   ICH Score:   Hunt & Laguerre Classification:      Hemorrhagic change of an Ischemic Stroke: Does this patient have an ischemic stroke with hemorrhagic changes? No     Neurologic Chief Complaint: Left MCA stroke     Subjective:     Interval History: Pt afebrile, WBC WNL. Now sating well on 1L NC. Pt emotional on exam, family not interested in SSRI at this time. CM attempting to get pt [facility] so [facility] [place] [facility]. Increased rate of TFs.    HPI, Past Medical, Family, and Social History remains the same as documented in the initial encounter.     Review of Systems   Constitutional: Positive for diaphoresis. Negative for fever.   Eyes: Positive for visual disturbance. Negative for redness.   Respiratory: Positive for shortness of breath. Negative for cough.    Gastrointestinal: Negative for diarrhea and vomiting.   Genitourinary: Positive for difficulty urinating. Negative for hematuria.   Skin: Negative for pallor and rash.   Neurological: Positive for speech difficulty and weakness.   Psychiatric/Behavioral: Negative for agitation and behavioral problems.     Scheduled  Meds:   albuterol sulfate  2.5 mg Nebulization Q6H    aspirin  325 mg Per G Tube Daily    atorvastatin  80 mg Per NG tube Daily    cefTRIAXone (ROCEPHIN) IVPB  1 g Intravenous Q24H    clopidogrel  75 mg Per G Tube Daily    heparin (porcine)  5,000 Units Subcutaneous Q8H    [START ON 3/3/2018] insulin aspart U-100  8 Units Subcutaneous Q24H    [START ON 3/3/2018] insulin aspart U-100  8 Units Subcutaneous Q24H    insulin aspart U-100  8 Units Subcutaneous Q24H    insulin aspart U-100  8 Units Subcutaneous Q24H    insulin aspart U-100  8 Units Subcutaneous Q24H    insulin aspart U-100  8 Units Subcutaneous Q24H    insulin detemir U-100  22 Units Subcutaneous Daily    ipratropium  0.5 mg Nebulization Q6H    lisinopril  40 mg Per NG tube Daily    metoprolol tartrate  50 mg Per NG tube BID    metroNIDAZOLE  500 mg Per G Tube Q8H    modafinil  200 mg Per NG tube Daily    And    modafinil  100 mg Per NG tube Daily    polyethylene glycol  17 g Per NG tube Daily    potassium chloride 10%  40 mEq Oral Once    senna-docusate 8.6-50 mg  1 tablet Per NG tube BID    sertraline  50 mg Per G Tube Daily    sodium chloride 0.9%  3 mL Intravenous Q8H    sodium chloride 3%  4 mL Nebulization Q6H     Continuous Infusions:  PRN Meds:acetaminophen, bisacodyl, dextrose 50%, glucagon (human recombinant), insulin aspart U-100, ipratropium, labetalol, ondansetron    Objective:     Vital Signs (Most Recent):  Temp: 98.7 °F (37.1 °C) (03/02/18 1153)  Pulse: 80 (03/02/18 1304)  Resp: 14 (03/02/18 1304)  BP: (!) 169/107 (03/02/18 1153)  SpO2: 97 % (03/02/18 1153)  BP Location: Right arm    Vital Signs Range (Last 24H):  Temp:  [96.9 °F (36.1 °C)-99.2 °F (37.3 °C)]   Pulse:  [72-97]   Resp:  [14-88]   BP: (130-175)/()   SpO2:  [93 %-98 %]   BP Location: Right arm    Physical Exam   Constitutional: He appears well-developed and well-nourished.   HENT:   Head: Normocephalic and atraumatic.   Eyes: Conjunctivae and EOM  are normal.   Cardiovascular: Normal rate.    Pulmonary/Chest: Effort normal. No respiratory distress.   On NC   Musculoskeletal: He exhibits no edema or deformity.   Neurological: He is alert. A cranial nerve deficit is present. No sensory deficit.   Skin: Skin is warm and dry.   Psychiatric: His behavior is normal.   Emotional   Nursing note and vitals reviewed.      Neurological Exam:   LOC: alert  Attention Span: Good   Language: Global aphasia  Articulation: Mute/Anarthric  Orientation: Untestable due to severe aphasia   Visual Fields: Hemianopsia right  EOM (CN III, IV, VI): Gaze preference  left  Facial Movement (CN VII): Lower facial weakness on the Right  Motor: Arm left  Normal 5/5  Leg left  Paresis: 3/5  Arm right  Plegia 0/5  Leg right Plegia 0/5  Cebellar: No evidence of appendicular or axial ataxia  Tone: Normal tone throughout    Laboratory:  CMP:     Recent Labs  Lab 03/02/18  0524   CALCIUM 9.1   ALBUMIN 2.4*   PROT 7.2      K 3.3*   CO2 28   CL 98   BUN 15   CREATININE 0.8   ALKPHOS 133   ALT 44   AST 43*   BILITOT 0.5     BMP:     Recent Labs  Lab 03/02/18  0524      K 3.3*   CL 98   CO2 28   BUN 15   CREATININE 0.8   CALCIUM 9.1     CBC:     Recent Labs  Lab 03/02/18  0524   WBC 7.43   RBC 4.11*   HGB 12.1*   HCT 35.1*   *   MCV 85   MCH 29.4   MCHC 34.5     Lipid Panel: No results for input(s): CHOL, LDLCALC, HDL, TRIG in the last 168 hours.  Coagulation:     Recent Labs  Lab 03/02/18  0524   INR 0.9     Platelet Aggregation Study: No results for input(s): PLTAGG, PLTAGINTERP, PLTAGREGLACO, ADPPLTAGGREG in the last 168 hours.  Hgb A1C: No results for input(s): HGBA1C in the last 168 hours.  TSH: No results for input(s): TSH in the last 168 hours.      Diagnostic Results       Brain Imaging     Most recent CTH 2/04/18 redemonstration of large L MCA territory infarction w/o evidence of hemorrhagic conversion.  Stable post op change from L MCA endovascular  stenting.        Vessel Imaging     IR Cerebral Angiogram 1/25/18 demonstrated occlusion of the L M1 segment of the L MCA with good collaterals. Occlusion removed and stent placed with TICI 2C.      Cardiac Imaging     2D Echo 1/26/18   CONCLUSIONS     1 - Normal left ventricular systolic function (EF 65-70%).     2 - Concentric remodeling.     3 - No wall motion abnormalities.     4 - Normal left ventricular diastolic function.     5 - Normal right ventricular systolic function       Arlette Hernández PA-C  Comprehensive Stroke Center  Department of Vascular Neurology   [facility]

## 2018-03-03 NOTE — PROGRESS NOTES
[facility] Hospital Medicine  Progress Note    Patient Name: Todd Quevedo  MRN: [id number]  Patient Class: IP- Inpatient   Admission Date: 1/25/2018  Length of Stay: 37 days  Attending Physician: Augustine Hollins MD  Primary Care Provider: [facility] Medicine Team: Networked reference to record PCT  Tao Scott IV, MD    Subjective:     Principal Problem:Embolic stroke involving left middle cerebral artery    HPI:  47yoM with PMHx HLD, poorly controlled Dm, and LOYDA (not currently on home CPAP) s/p large L MCA stroke 1/25 s/p thrombectomy with episode of acute hypoxic respiratory failure. Currently patient was febrile to 101 with tachycardia into the 120s with WBC of 16. For CVA ppx patient is on ASA/plavix and statin therapy, also patient has been on DVT ppx with SQH. Patient was placed on [facility] Course:  No notes on file    Interval Hx: No acute events overnight. Per family had a small yellow lesion on bottom that improved with frankincense and myrrh that she put on patient.      Review of Systems   Unable to perform ROS: Patient nonverbal     Objective:     Vital Signs (Most Recent):  Temp: 98.9 °F (37.2 °C) (03/03/18 0329)  Pulse: 88 (03/03/18 0819)  Resp: 18 (03/03/18 0819)  BP: (!) 127/91 (03/03/18 0329)  SpO2: 96 % (03/03/18 0819) Vital Signs (24h Range):  Temp:  [96.3 °F (35.7 °C)-98.9 °F (37.2 °C)] 98.9 °F (37.2 °C)  Pulse:  [] 88  Resp:  [14-20] 18  SpO2:  [94 %-100 %] 96 %  BP: (127-169)/() 127/91     Weight: 105.6 kg (232 lb 12.9 oz)  Body mass index is 33.4 kg/m².    Physical Exam   HENT:   Head: Normocephalic.   Eyes: EOM are normal. Pupils are equal, round, and reactive to light. No scleral icterus.   Neck: No JVD present. No tracheal deviation present.   Cardiovascular: Normal rate, regular rhythm and normal heart sounds.    No murmur heard.  Pulmonary/Chest: Effort normal and breath sounds normal. He has no wheezes. He has no  rales.   Abdominal: Soft. There is no guarding.   Peg site with minimal purulent drainage, no erythema or induration appreciated   Musculoskeletal: He exhibits no edema.   contracturing in RUE   Neurological:   Alert, does not track or follow commands, left eye deviation, flaccid R. Paralysis, moving left side spontaneously  BS reflexes intact       Skin: Skin is warm. He is not diaphoretic.       Significant Labs:   ABGs:     Recent Labs  Lab 03/01/18  1006   PH 7.535*   PCO2 36.3   HCO3 30.7*   POCSATURATED 95   BE 8     Blood Culture: No results for input(s): LABBLOO in the last 48 hours.  CBC:     Recent Labs  Lab 03/02/18  0524 03/03/18  0523   WBC 7.43 7.83   HGB 12.1* 12.2*   HCT 35.1* 35.3*   * 493*     CMP:     Recent Labs  Lab 03/02/18  0524 03/03/18  0523    135*   K 3.3* 3.7   CL 98 98   CO2 28 26   * 233*   BUN 15 18   CREATININE 0.8 0.8   CALCIUM 9.1 9.4   PROT 7.2 7.0   ALBUMIN 2.4* 2.3*   BILITOT 0.5 0.5   ALKPHOS 133 126   AST 43* 48*   ALT 44 56*   ANIONGAP 10 11   EGFRNONAA >60.0 >60.0     Lactic Acid: No results for input(s): LACTATE in the last 48 hours.  Urine Culture: No results for input(s): LABURIN in the last 48 hours.    Significant Imaging: I have reviewed all pertinent imaging results/findings within the past 24 hours.    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    -Urine culture +klebsiella >100,000 CFU  -Wound/skin culture with Enterobacter also provotella and fusobacterium  -Abscess from IR aspiration Gram stain with GPC, Culture with Enterobacter and Eikenella; enterobacter sensitive to rocephin  -Blood Cx NGTD  -Would treat with rocephin for 7 days from its initiation (last dose 3/5) and PO flagyl 500 q8hr for 7 days (last dose 3/7)  -Discontinued vancomycin 3/1        Acute respiratory failure with hypoxia    Improving, suspect 2/2 aspiration pneumonitis vs sepsis from abdominal abscess, less likely PNA   -Currently likely with  some persistent atelectasis, turn q2h, PRN O2          VTE Risk Mitigation         Ordered     heparin (porcine) injection 5,000 Units  Every 8 hours     Route:  Subcutaneous        02/20/18 1010     heparin (porcine) injection 5,000 Units  Every 8 hours     Route:  Subcutaneous        02/14/18 1002     High Risk of VTE  Once      01/27/18 1406              Tao Scott IV, MD  Department of Hospital Medicine   [facility]

## 2018-03-03 NOTE — ASSESSMENT & PLAN NOTE
"46 yo M with PMHx HLD, LOYDA, poorly controlled DM II presents to [facility] 01/25/18 after noted "strange behavior" for which EMS was called. Pt was found to have a L M1 occlusion and was taken to IR for thrombectomy with TICI 2C reperfusion and stent placement due to L MCA stenosis. Etiology remains unclear: no obvious signs of cardiac source or atheromatous disease in the aorto/carotid axis. Echo normal with no hx or signs of A fib. PT/OT/SLP will continue to evaluate and treat; recommending SNF. DAPT 2/2 recent stent placement.      [facility] denied pt today as he is not acutely ill. CM working on other options, but I discussed with family at bedside the possibility of them taking him home with home health in [place]. Family asks how they could do suctioning necessary at home. Will monitor over weekend and discuss for further associated details on Monday.    Pt emotional on exam today; family not interested in SSRI at this time.    Antithrombotics:  mg po qd, clopidogrel 75 mg po qd [s/p intracranial stent]  Statins: Atorvastatin 80 mg po qd  Aggressive risk factor modification: HLD, DM II (Hgb A1c 10%), Obesity  Rehab efforts: PT/OT/SLP-- Recommending SNF, placement pending  Diagnostics ordered/pending: None  VTE prophylaxis: Heparin 5000 U q8h  BP parameters: Infarct: -170   "

## 2018-03-04 PROBLEM — R74.01 TRANSAMINITIS: Status: ACTIVE | Noted: 2018-03-04

## 2018-03-04 LAB
ALBUMIN SERPL BCP-MCNC: 2.4 G/DL
ALP SERPL-CCNC: 125 U/L
ALT SERPL W/O P-5'-P-CCNC: 74 U/L
ANION GAP SERPL CALC-SCNC: 9 MMOL/L
AST SERPL-CCNC: 65 U/L
BASOPHILS # BLD AUTO: 0.04 K/UL
BASOPHILS NFR BLD: 0.5 %
BILIRUB SERPL-MCNC: 0.5 MG/DL
BUN SERPL-MCNC: 17 MG/DL
CALCIUM SERPL-MCNC: 9 MG/DL
CHLORIDE SERPL-SCNC: 99 MMOL/L
CO2 SERPL-SCNC: 29 MMOL/L
CREAT SERPL-MCNC: 0.8 MG/DL
DIFFERENTIAL METHOD: ABNORMAL
EOSINOPHIL # BLD AUTO: 0.2 K/UL
EOSINOPHIL NFR BLD: 2 %
ERYTHROCYTE [DISTWIDTH] IN BLOOD BY AUTOMATED COUNT: 13.2 %
EST. GFR  (AFRICAN AMERICAN): >60 ML/MIN/1.73 M^2
EST. GFR  (NON AFRICAN AMERICAN): >60 ML/MIN/1.73 M^2
GLUCOSE SERPL-MCNC: 185 MG/DL
HCT VFR BLD AUTO: 34.5 %
HGB BLD-MCNC: 11.9 G/DL
IMM GRANULOCYTES # BLD AUTO: 0.09 K/UL
IMM GRANULOCYTES NFR BLD AUTO: 1.2 %
INR PPP: 1
LYMPHOCYTES # BLD AUTO: 1.7 K/UL
LYMPHOCYTES NFR BLD: 22.8 %
MAGNESIUM SERPL-MCNC: 1.6 MG/DL
MCH RBC QN AUTO: 29.2 PG
MCHC RBC AUTO-ENTMCNC: 34.5 G/DL
MCV RBC AUTO: 85 FL
MONOCYTES # BLD AUTO: 0.8 K/UL
MONOCYTES NFR BLD: 9.9 %
NEUTROPHILS # BLD AUTO: 4.8 K/UL
NEUTROPHILS NFR BLD: 63.6 %
NRBC BLD-RTO: 0 /100 WBC
PHOSPHATE SERPL-MCNC: 3.7 MG/DL
PLATELET # BLD AUTO: 518 K/UL
PMV BLD AUTO: 9.4 FL
POCT GLUCOSE: 164 MG/DL (ref 70–110)
POCT GLUCOSE: 165 MG/DL (ref 70–110)
POCT GLUCOSE: 178 MG/DL (ref 70–110)
POCT GLUCOSE: 186 MG/DL (ref 70–110)
POCT GLUCOSE: 199 MG/DL (ref 70–110)
POCT GLUCOSE: 205 MG/DL (ref 70–110)
POTASSIUM SERPL-SCNC: 3.7 MMOL/L
PROT SERPL-MCNC: 7 G/DL
PROTHROMBIN TIME: 10.1 SEC
RBC # BLD AUTO: 4.07 M/UL
SODIUM SERPL-SCNC: 137 MMOL/L
WBC # BLD AUTO: 7.6 K/UL

## 2018-03-04 PROCEDURE — 25000003 PHARM REV CODE 250: Performed by: PSYCHIATRY & NEUROLOGY

## 2018-03-04 PROCEDURE — 94761 N-INVAS EAR/PLS OXIMETRY MLT: CPT

## 2018-03-04 PROCEDURE — 85025 COMPLETE CBC W/AUTO DIFF WBC: CPT

## 2018-03-04 PROCEDURE — 94668 MNPJ CHEST WALL SBSQ: CPT

## 2018-03-04 PROCEDURE — 94640 AIRWAY INHALATION TREATMENT: CPT

## 2018-03-04 PROCEDURE — 25000003 PHARM REV CODE 250: Performed by: NURSE PRACTITIONER

## 2018-03-04 PROCEDURE — 80053 COMPREHEN METABOLIC PANEL: CPT

## 2018-03-04 PROCEDURE — 25000242 PHARM REV CODE 250 ALT 637 W/ HCPCS: Performed by: NURSE PRACTITIONER

## 2018-03-04 PROCEDURE — 97110 THERAPEUTIC EXERCISES: CPT

## 2018-03-04 PROCEDURE — 36415 COLL VENOUS BLD VENIPUNCTURE: CPT

## 2018-03-04 PROCEDURE — 99233 SBSQ HOSP IP/OBS HIGH 50: CPT | Mod: ,,, | Performed by: PSYCHIATRY & NEUROLOGY

## 2018-03-04 PROCEDURE — 63600175 PHARM REV CODE 636 W HCPCS: Performed by: NURSE PRACTITIONER

## 2018-03-04 PROCEDURE — 84100 ASSAY OF PHOSPHORUS: CPT

## 2018-03-04 PROCEDURE — 25000003 PHARM REV CODE 250: Performed by: PHYSICIAN ASSISTANT

## 2018-03-04 PROCEDURE — 20600001 HC STEP DOWN PRIVATE ROOM

## 2018-03-04 PROCEDURE — A4216 STERILE WATER/SALINE, 10 ML: HCPCS | Performed by: NURSE PRACTITIONER

## 2018-03-04 PROCEDURE — 85610 PROTHROMBIN TIME: CPT

## 2018-03-04 PROCEDURE — 63600175 PHARM REV CODE 636 W HCPCS: Performed by: STUDENT IN AN ORGANIZED HEALTH CARE EDUCATION/TRAINING PROGRAM

## 2018-03-04 PROCEDURE — 27000221 HC OXYGEN, UP TO 24 HOURS

## 2018-03-04 PROCEDURE — 83735 ASSAY OF MAGNESIUM: CPT

## 2018-03-04 PROCEDURE — 99900035 HC TECH TIME PER 15 MIN (STAT)

## 2018-03-04 PROCEDURE — 25000242 PHARM REV CODE 250 ALT 637 W/ HCPCS: Performed by: PSYCHIATRY & NEUROLOGY

## 2018-03-04 PROCEDURE — 25000003 PHARM REV CODE 250: Performed by: STUDENT IN AN ORGANIZED HEALTH CARE EDUCATION/TRAINING PROGRAM

## 2018-03-04 PROCEDURE — 99231 SBSQ HOSP IP/OBS SF/LOW 25: CPT | Mod: ,,, | Performed by: INTERNAL MEDICINE

## 2018-03-04 RX ORDER — INSULIN ASPART 100 [IU]/ML
9 INJECTION, SOLUTION INTRAVENOUS; SUBCUTANEOUS
Status: DISCONTINUED | OUTPATIENT
Start: 2018-03-04 | End: 2018-03-06

## 2018-03-04 RX ORDER — INSULIN ASPART 100 [IU]/ML
9 INJECTION, SOLUTION INTRAVENOUS; SUBCUTANEOUS
Status: DISCONTINUED | OUTPATIENT
Start: 2018-03-05 | End: 2018-03-04

## 2018-03-04 RX ORDER — INSULIN ASPART 100 [IU]/ML
9 INJECTION, SOLUTION INTRAVENOUS; SUBCUTANEOUS
Status: DISCONTINUED | OUTPATIENT
Start: 2018-03-05 | End: 2018-03-06

## 2018-03-04 RX ADMIN — IPRATROPIUM BROMIDE AND ALBUTEROL SULFATE 3 ML: 2.5; .5 SOLUTION RESPIRATORY (INHALATION) at 07:03

## 2018-03-04 RX ADMIN — HEPARIN SODIUM 5000 UNITS: 5000 INJECTION, SOLUTION INTRAVENOUS; SUBCUTANEOUS at 05:03

## 2018-03-04 RX ADMIN — INSULIN ASPART 2 UNITS: 100 INJECTION, SOLUTION INTRAVENOUS; SUBCUTANEOUS at 12:03

## 2018-03-04 RX ADMIN — HEPARIN SODIUM 5000 UNITS: 5000 INJECTION, SOLUTION INTRAVENOUS; SUBCUTANEOUS at 02:03

## 2018-03-04 RX ADMIN — ASPIRIN 325 MG ORAL TABLET 325 MG: 325 PILL ORAL at 09:03

## 2018-03-04 RX ADMIN — INSULIN ASPART 9 UNITS: 100 INJECTION, SOLUTION INTRAVENOUS; SUBCUTANEOUS at 08:03

## 2018-03-04 RX ADMIN — LISINOPRIL 40 MG: 20 TABLET ORAL at 08:03

## 2018-03-04 RX ADMIN — STANDARDIZED SENNA CONCENTRATE AND DOCUSATE SODIUM 1 TABLET: 8.6; 5 TABLET, FILM COATED ORAL at 08:03

## 2018-03-04 RX ADMIN — METOPROLOL TARTRATE 50 MG: 50 TABLET, FILM COATED ORAL at 09:03

## 2018-03-04 RX ADMIN — MODAFINIL 100 MG: 100 TABLET ORAL at 02:03

## 2018-03-04 RX ADMIN — SODIUM CHLORIDE SOLN NEBU 3% 4 ML: 3 NEBU SOLN at 12:03

## 2018-03-04 RX ADMIN — METRONIDAZOLE 500 MG: 500 TABLET ORAL at 09:03

## 2018-03-04 RX ADMIN — CEFTRIAXONE SODIUM 1 G: 1 INJECTION, POWDER, FOR SOLUTION INTRAMUSCULAR; INTRAVENOUS at 12:03

## 2018-03-04 RX ADMIN — INSULIN ASPART 8 UNITS: 100 INJECTION, SOLUTION INTRAVENOUS; SUBCUTANEOUS at 12:03

## 2018-03-04 RX ADMIN — SODIUM CHLORIDE SOLN NEBU 3% 4 ML: 3 NEBU SOLN at 01:03

## 2018-03-04 RX ADMIN — IPRATROPIUM BROMIDE AND ALBUTEROL SULFATE 3 ML: 2.5; .5 SOLUTION RESPIRATORY (INHALATION) at 12:03

## 2018-03-04 RX ADMIN — INSULIN ASPART 1 UNITS: 100 INJECTION, SOLUTION INTRAVENOUS; SUBCUTANEOUS at 12:03

## 2018-03-04 RX ADMIN — INSULIN ASPART 1 UNITS: 100 INJECTION, SOLUTION INTRAVENOUS; SUBCUTANEOUS at 09:03

## 2018-03-04 RX ADMIN — ATORVASTATIN CALCIUM 80 MG: 20 TABLET, FILM COATED ORAL at 08:03

## 2018-03-04 RX ADMIN — CLOPIDOGREL BISULFATE 75 MG: 75 TABLET, FILM COATED ORAL at 08:03

## 2018-03-04 RX ADMIN — INSULIN ASPART 4 UNITS: 100 INJECTION, SOLUTION INTRAVENOUS; SUBCUTANEOUS at 05:03

## 2018-03-04 RX ADMIN — Medication 3 ML: at 09:03

## 2018-03-04 RX ADMIN — METRONIDAZOLE 500 MG: 500 TABLET ORAL at 02:03

## 2018-03-04 RX ADMIN — Medication 3 ML: at 02:03

## 2018-03-04 RX ADMIN — INSULIN ASPART 9 UNITS: 100 INJECTION, SOLUTION INTRAVENOUS; SUBCUTANEOUS at 12:03

## 2018-03-04 RX ADMIN — SODIUM CHLORIDE SOLN NEBU 3% 4 ML: 3 NEBU SOLN at 07:03

## 2018-03-04 RX ADMIN — IPRATROPIUM BROMIDE 0.5 MG: 0.5 SOLUTION RESPIRATORY (INHALATION) at 08:03

## 2018-03-04 RX ADMIN — INSULIN ASPART 9 UNITS: 100 INJECTION, SOLUTION INTRAVENOUS; SUBCUTANEOUS at 09:03

## 2018-03-04 RX ADMIN — INSULIN ASPART 9 UNITS: 100 INJECTION, SOLUTION INTRAVENOUS; SUBCUTANEOUS at 04:03

## 2018-03-04 RX ADMIN — MODAFINIL 200 MG: 100 TABLET ORAL at 05:03

## 2018-03-04 RX ADMIN — METRONIDAZOLE 500 MG: 500 TABLET ORAL at 05:03

## 2018-03-04 RX ADMIN — INSULIN ASPART 8 UNITS: 100 INJECTION, SOLUTION INTRAVENOUS; SUBCUTANEOUS at 04:03

## 2018-03-04 RX ADMIN — INSULIN ASPART 2 UNITS: 100 INJECTION, SOLUTION INTRAVENOUS; SUBCUTANEOUS at 04:03

## 2018-03-04 RX ADMIN — INSULIN DETEMIR 22 UNITS: 100 INJECTION, SOLUTION SUBCUTANEOUS at 08:03

## 2018-03-04 RX ADMIN — IPRATROPIUM BROMIDE AND ALBUTEROL SULFATE 3 ML: 2.5; .5 SOLUTION RESPIRATORY (INHALATION) at 01:03

## 2018-03-04 RX ADMIN — POLYETHYLENE GLYCOL 3350 17 G: 17 POWDER, FOR SOLUTION ORAL at 08:03

## 2018-03-04 RX ADMIN — HEPARIN SODIUM 5000 UNITS: 5000 INJECTION, SOLUTION INTRAVENOUS; SUBCUTANEOUS at 09:03

## 2018-03-04 RX ADMIN — METOPROLOL TARTRATE 50 MG: 50 TABLET, FILM COATED ORAL at 08:03

## 2018-03-04 RX ADMIN — SODIUM CHLORIDE SOLN NEBU 3% 4 ML: 3 NEBU SOLN at 08:03

## 2018-03-04 RX ADMIN — STANDARDIZED SENNA CONCENTRATE AND DOCUSATE SODIUM 1 TABLET: 8.6; 5 TABLET, FILM COATED ORAL at 10:03

## 2018-03-04 RX ADMIN — Medication 3 ML: at 06:03

## 2018-03-04 NOTE — PROGRESS NOTES
[facility] Hospital Medicine  Progress Note    Patient Name: Todd Quevedo  MRN: [id number]  Patient Class: IP- Inpatient   Admission Date: 1/25/2018  Length of Stay: 38 days  Attending Physician: Augustine Hollins MD  Primary Care Provider: Primary Doctor [facility] Medicine Team: Networked reference to record PCT  Tao Scott IV, MD    Subjective:     Principal Problem:Embolic stroke involving left middle cerebral artery    HPI:  47yoM with PMHx HLD, poorly controlled Dm, and LOYDA (not currently on home CPAP) s/p large L MCA stroke 1/25 s/p thrombectomy with episode of acute hypoxic respiratory failure. Currently patient was febrile to 101 with tachycardia into the 120s with WBC of 16. For CVA ppx patient is on ASA/plavix and statin therapy, also patient has been on DVT ppx with SQH. Patient was placed on [facility] Course:  No notes on file    Interval Hx: No acute events overnight. Mr. Quevedo's sister reports he seemed more interactive yesterday and seemed to interact with his wife on skype. Reports oxygen saturations continue to vary but that he looks much more comfortable overall.      Review of Systems   Unable to perform ROS: Patient nonverbal     Objective:     Vital Signs (Most Recent):  Temp: 99 °F (37.2 °C) (03/04/18 0752)  Pulse: 104 (03/04/18 0752)  Resp: 16 (03/04/18 0752)  BP: (!) 131/91 (03/04/18 0752)  SpO2: (!) 92 % (03/04/18 0752) Vital Signs (24h Range):  Temp:  [97.5 °F (36.4 °C)-99.3 °F (37.4 °C)] 99 °F (37.2 °C)  Pulse:  [] 104  Resp:  [16-19] 16  SpO2:  [92 %-98 %] 92 %  BP: (131-157)/(83-98) 131/91     Weight: 105.6 kg (232 lb 12.9 oz)  Body mass index is 33.4 kg/m².    Physical Exam   HENT:   Head: Normocephalic.   Eyes: EOM are normal. Pupils are equal, round, and reactive to light. No scleral icterus.   Neck: No JVD present. No tracheal deviation present.   Cardiovascular: Normal rate, regular rhythm and normal heart sounds.    No murmur  heard.  Pulmonary/Chest: Effort normal and breath sounds normal. He has no wheezes. He has no rales.   Abdominal: Soft. There is no guarding.   Peg site with minimal purulent drainage, no erythema or induration appreciated   Musculoskeletal: He exhibits no edema.   contracturing in RUE   Neurological:   Alert, does not track or follow commands, left eye deviation, flaccid R. Paralysis, moving left side spontaneously  BS reflexes intact       Skin: Skin is warm. He is not diaphoretic.       Significant Labs:   ABGs:   No results for input(s): PH, PCO2, HCO3, POCSATURATED, BE, TOTALHB, COHB, METHB, O2HB, POCFIO2 in the last 48 hours.  Blood Culture: No results for input(s): LABBLOO in the last 48 hours.  CBC:     Recent Labs  Lab 03/03/18  0523 03/04/18  0422   WBC 7.83 7.60   HGB 12.2* 11.9*   HCT 35.3* 34.5*   * 518*     CMP:     Recent Labs  Lab 03/03/18  0523 03/04/18  0422   * 137   K 3.7 3.7   CL 98 99   CO2 26 29   * 185*   BUN 18 17   CREATININE 0.8 0.8   CALCIUM 9.4 9.0   PROT 7.0 7.0   ALBUMIN 2.3* 2.4*   BILITOT 0.5 0.5   ALKPHOS 126 125   AST 48* 65*   ALT 56* 74*   ANIONGAP 11 9   EGFRNONAA >60.0 >60.0     Lactic Acid: No results for input(s): LACTATE in the last 48 hours.  Urine Culture: No results for input(s): LABURIN in the last 48 hours.    Significant Imaging: I have reviewed all pertinent imaging results/findings within the past 24 hours.    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    -Urine culture +klebsiella >100,000 CFU  -Wound/skin culture with Enterobacter also provotella and fusobacterium  -Abscess from IR aspiration Gram stain with GPC, Culture with Enterobacter and Eikenella; enterobacter sensitive to rocephin  -Blood Cx NGTD  -Would treat with rocephin for 7 days from its initiation (last dose 3/5)  and PO flagyl 500 q8hr for 7 days (last dose 3/7)  -Discontinued vancomycin 3/1        Acute respiratory failure with hypoxia    Improving, suspect 2/2 aspiration pneumonitis vs sepsis from abdominal abscess, less likely PNA   At baseline will possibly have chronic hypoxic respiratory failure 2/2 combination of LOYDA, atelectasis and possible central component  - turn q2h, HOB elevated, PRN O2        Urinary tract infection due to Klebsiella species    Covered on current regimen  Bruce changed          VTE Risk Mitigation         Ordered     heparin (porcine) injection 5,000 Units  Every 8 hours     Route:  Subcutaneous        02/20/18 1010     heparin (porcine) injection 5,000 Units  Every 8 hours     Route:  Subcutaneous        02/14/18 1002     High Risk of VTE  Once      01/27/18 1406        Tao Scott IV, MD  Department of Hospital Medicine   [facility]

## 2018-03-04 NOTE — ASSESSMENT & PLAN NOTE
"48 yo M with PMHx HLD, LOYDA, poorly controlled DM II presents to [facility] 01/25/18 after noted "strange behavior" for which EMS was called. Pt was found to have a L M1 occlusion and was taken to IR for thrombectomy with TICI 2C reperfusion and stent placement due to L MCA stenosis. Etiology remains unclear: no obvious signs of cardiac source or atheromatous disease in the aorto/carotid axis. Echo normal with no hx or signs of A fib. PT/OT/SLP will continue to evaluate and treat; recommending SNF. DAPT 2/2 recent stent placement.      [facility] denied pt on 3/2 as he is not acutely ill. CM working on other options, but I discussed with family at bedside the possibility of them taking pt home with home health in [place]. Family asks how they could do suctioning necessary at home. Will monitor over weekend and discuss for further associated details on Monday.    Pt emotional at visit 3/2; family not interested in SSRI at this time.    Antithrombotics:  mg po qd, clopidogrel 75 mg po qd [s/p intracranial stent]  Statins: Atorvastatin 80 mg po qd  Aggressive risk factor modification: HLD, DM II (Hgb A1c 10%), Obesity  Rehab efforts: PT/OT/SLP-- Recommending SNF, placement pending  Diagnostics ordered/pending: None  VTE prophylaxis: Heparin 5000 U q8h  BP parameters: Infarct: -170   "

## 2018-03-04 NOTE — SUBJECTIVE & OBJECTIVE
Neurologic Chief Complaint: Left MCA stroke     Subjective:     Interval History: NAEON. Pending placement. Will monitor Na level tomorrow.    HPI, Past Medical, Family, and Social History remains the same as documented in the initial encounter.     Review of Systems   Constitutional: Negative for fatigue and fever.   Eyes: Positive for visual disturbance. Negative for redness.   Respiratory: Negative for cough and shortness of breath.    Gastrointestinal: Negative for diarrhea and vomiting.   Genitourinary: Negative for flank pain and hematuria.   Skin: Negative for pallor and rash.   Neurological: Positive for speech difficulty and weakness.   Psychiatric/Behavioral: Negative for agitation and behavioral problems.     Scheduled Meds:   albuterol-ipratropium 2.5mg-0.5mg/3mL  3 mL Nebulization Q6H    aspirin  325 mg Per G Tube Daily    atorvastatin  80 mg Per NG tube Daily    cefTRIAXone (ROCEPHIN) IVPB  1 g Intravenous Q24H    clopidogrel  75 mg Per G Tube Daily    heparin (porcine)  5,000 Units Subcutaneous Q8H    insulin aspart U-100  8 Units Subcutaneous Q24H    insulin aspart U-100  8 Units Subcutaneous Q24H    insulin aspart U-100  8 Units Subcutaneous Q24H    insulin aspart U-100  8 Units Subcutaneous Q24H    insulin aspart U-100  8 Units Subcutaneous Q24H    insulin aspart U-100  8 Units Subcutaneous Q24H    insulin detemir U-100  22 Units Subcutaneous Daily    lisinopril  40 mg Per NG tube Daily    metoprolol tartrate  50 mg Per NG tube BID    metroNIDAZOLE  500 mg Per G Tube Q8H    modafinil  200 mg Per NG tube Daily    And    modafinil  100 mg Per NG tube Daily    polyethylene glycol  17 g Per NG tube Daily    potassium chloride 10%  40 mEq Oral Once    senna-docusate 8.6-50 mg  1 tablet Per NG tube BID    sodium chloride 0.9%  3 mL Intravenous Q8H    sodium chloride 3%  4 mL Nebulization Q6H     Continuous Infusions:  PRN Meds:acetaminophen, bisacodyl, dextrose 50%, glucagon (human  recombinant), insulin aspart U-100, ipratropium, labetalol, ondansetron    Objective:     Vital Signs (Most Recent):  Temp: 98.2 °F (36.8 °C) (03/03/18 1944)  Pulse: 91 (03/03/18 1944)  Resp: 18 (03/03/18 1944)  BP: 135/83 (03/03/18 1944)  SpO2: 97 % (03/03/18 1944)  BP Location: Right arm    Vital Signs Range (Last 24H):  Temp:  [97.5 °F (36.4 °C)-98.9 °F (37.2 °C)]   Pulse:  [66-95]   Resp:  [16-20]   BP: (127-149)/(83-98)   SpO2:  [94 %-100 %]   BP Location: Right arm    Physical Exam   Constitutional: He appears well-developed and well-nourished.   HENT:   Head: Normocephalic and atraumatic.   Eyes: Conjunctivae are normal. No scleral icterus.   Cardiovascular: Normal rate.    Pulmonary/Chest: Effort normal. No respiratory distress.   On NC   Musculoskeletal: He exhibits no edema or deformity.   Neurological: He is alert. A cranial nerve deficit is present. No sensory deficit.   Skin: Skin is warm and dry.   Psychiatric: He has a normal mood and affect. His behavior is normal.   Nursing note and vitals reviewed.      Neurological Exam:   LOC: alert  Attention Span: Good   Language: Global aphasia  Articulation: Mute/Anarthric  Orientation: Untestable due to severe aphasia   Visual Fields: Hemianopsia right  EOM (CN III, IV, VI): Gaze preference  left  Facial Movement (CN VII): Lower facial weakness on the Right  Motor: Arm left  Normal 5/5  Leg left  Paresis: 3/5  Arm right  Plegia 0/5  Leg right Plegia 0/5  Cebellar: No evidence of appendicular or axial ataxia  Tone: Normal tone throughout    Laboratory:  CMP:     Recent Labs  Lab 03/03/18  0523   CALCIUM 9.4   ALBUMIN 2.3*   PROT 7.0   *   K 3.7   CO2 26   CL 98   BUN 18   CREATININE 0.8   ALKPHOS 126   ALT 56*   AST 48*   BILITOT 0.5     BMP:     Recent Labs  Lab 03/03/18  0523   *   K 3.7   CL 98   CO2 26   BUN 18   CREATININE 0.8   CALCIUM 9.4     CBC:     Recent Labs  Lab 03/03/18  0523   WBC 7.83   RBC 4.13*   HGB 12.2*   HCT 35.3*   *    MCV 86   MCH 29.5   MCHC 34.6     Lipid Panel: No results for input(s): CHOL, LDLCALC, HDL, TRIG in the last 168 hours.  Coagulation:     Recent Labs  Lab 03/03/18  0523   INR 0.9     Platelet Aggregation Study: No results for input(s): PLTAGG, PLTAGINTERP, PLTAGREGLACO, ADPPLTAGGREG in the last 168 hours.  Hgb A1C: No results for input(s): HGBA1C in the last 168 hours.  TSH: No results for input(s): TSH in the last 168 hours.      Diagnostic Results       Brain Imaging     Most recent CTH 2/04/18 redemonstration of large L MCA territory infarction w/o evidence of hemorrhagic conversion.  Stable post op change from L MCA endovascular stenting.        Vessel Imaging     IR Cerebral Angiogram 1/25/18 demonstrated occlusion of the L M1 segment of the L MCA with good collaterals. Occlusion removed and stent placed with TICI 2C.      Cardiac Imaging     2D Echo 1/26/18   CONCLUSIONS     1 - Normal left ventricular systolic function (EF 65-70%).     2 - Concentric remodeling.     3 - No wall motion abnormalities.     4 - Normal left ventricular diastolic function.     5 - Normal right ventricular systolic function

## 2018-03-04 NOTE — ASSESSMENT & PLAN NOTE
Likely hypoxia is Aspiration pna vs pneumonitis (as CXR improved after a day) 2/2 to superimposed encephalopathy due to sepsis  -AM CXR 3/2 with mild bibasilar atelectasis   -Continue O2 sats >90%, okay with current requirements of 1L NC, pt has what appears as Cheyne espinal pattern and reported LOYDA  -Sister requested continuous pulse ox; Ordered  -Contraction alkalosis improved with increasing water flushes via PEG  -Considered PE as differential however pt is on OAC and not tachycardic, tachypneic. Wells score 1.5 (low-risk)  - Continue chest physiotherapy, aspiration precautions, wean off O2 support

## 2018-03-04 NOTE — PROGRESS NOTES
"[facility]  Vascular Neurology  Comprehensive Stroke Center  Progress Note    Assessment/Plan:     * Embolic stroke involving left middle cerebral artery    48 yo M with PMHx HLD, LOYDA, poorly controlled DM II presents to [facility] [place] 01/25/18 after noted "strange behavior" for which EMS was called. Pt was found to have a L M1 occlusion and was taken to IR for thrombectomy with TICI 2C reperfusion and stent placement due to L MCA stenosis. Etiology remains unclear: no obvious signs of cardiac source or atheromatous disease in the aorto/carotid axis. Echo normal with no hx or signs of A fib. PT/OT/SLP will continue to evaluate and treat; recommending SNF. DAPT 2/2 recent stent placement.      [facility] denied pt on 3/2 as he is not acutely ill. CM working on other options, but I discussed with family at bedside the possibility of them taking pt home with home health in [place]. Family asks how they could do suctioning necessary at home. Will monitor over weekend and discuss for further associated details on Monday.    Pt emotional at visit 3/2; family not interested in SSRI at this time.    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    - Abscess near PEG w/IR aspirate, growing anaerobes   - Continue Rocephin (last dose 3/5) and Flagyl (last dose 3/7)        Right spastic hemiparesis    -Due to stroke  -Continue aggressive PT/OT; Recommending SNF        Oral phase " dysphagia    - Continue SLP  - NPO, meds/feeds per PEG        Acute respiratory failure with hypoxia    Likely hypoxia is Aspiration pna vs pneumonitis (as CXR improved after a day) 2/2 to superimposed encephalopathy due to sepsis  -AM CXR 3/2 with mild bibasilar atelectasis   -Continue O2 sats >90%, okay with current requirements of 1L NC, pt has what appears as Cheyne espinal pattern and reported LOYDA  -Sister requested continuous pulse ox; Ordered  -Contraction alkalosis improved with increasing water flushes via PEG  -Considered PE as differential however pt is on OAC and not tachycardic, tachypneic. Wells score 1.5 (low-risk)  - Continue chest physiotherapy, aspiration precautions, wean off O2 support         Cytotoxic cerebral edema    -2/2 stroke, evident on imaging  Stable on repeat CTH 2/4/18        Urinary tract infection due to Klebsiella species     Afebrile, leukocytosis resolved  - Klebsiella from urine cultures (2/25/18) + Anaerobes from PEG site cultures   -Continue rocephin and Flagyl         Type 2 diabetes mellitus with hyperglycemia, with long-term current use of insulin    - Stroke risk factor, Hgb A1c 10%  - Continue Diabetasource AC TFs; rate increased to 75 on 3/2  - Endocrine consulted and guiding insulin modification for TF  - POCT Q4h ; Moderate correction q4hr         Mixed hyperlipidemia    -Stroke risk factor, LDL invalid as TG > 400  -Continue atorvastatin 80 mg qd        Essential hypertension    -Stroke risk factor  SBPs 150s-170s  - Continue scheduled lisinopril 40 mg qd, Metoprolol 50 mg bid, Labetalol PRN  Monitoring        Obstructive sleep apnea    -Stroke risk factor  -CPAP qHS        Nodule of right lung    - Plan for f/u CT 6-12 months via PCP             01/25/18:  Brought in for aphasia, RSW with L gaze preferance. LKN unknown, not tPA candidate. Went to IR for angiogram and stent.  01/29/18:  Off Cardene, remains on Insulin gtt. Concern for sepsis, respiratory source.  Imaging with mass effect and some brain compression; maycol crani watch.  01/30/18:  EEG consistent with focal L slowing 2/2 lesion and mild generalized slowing, no seizures. CT head stable, no hemorrhagic conversion  02/01/18:  Emergent intubation likely due to upper airway obstruction per NCC.    02/02/18:  Pt off Precedex, moving left side spontaneously and opening eyes. Intubated, on spontaneous today. NCC giving steroids in hopes to extubate tomorrow.  02/03/18:  NAEON, intubated, not responsive to verbal stimuli, withdraws from pain on LUE, SBP ~135-230. Continued on insulin gtt, SQH for DVT ppx, and captopril.   02/16/18:  Few changes on exam, continues to have significant RUE/RLE weakness with global aphasia.  Family member at bedside noted attempt to communicate with her.  02/18/18:  Pt largely unchanged on exam this am.  No family member present during am rounds.  02/19/18:  Tolerating facemask. PEG by IR this afternoon.  02/20/18:  s/p PEG. O2 % on 5L NC, still requiring frequent suctioning. Primary team considering transfer to Medicine tomorrow.  02/21/18:  Pt sating % on 3L NC. Restarted DAPT, including . Plans for step down to stroke service.  02/22/18:  Pt with [facility] but not service-connected so unable to be placed at SNF.  Working on insurance coverage of at least [facility] therapy.  02/23/18:  Increased detemir to 36 U bid.  [facility] with [facility] SN[facility] pending completion of paperwork by Stroke team and pt's spouse--in process.  2/24/18 - NAEON. Pts WBC mildly elevated 13.01, pt is afebrile. Will continue to monitor. Endocrine consulted - Recommend levemir 40 units daily to cover basal needs (using ~0.7u/kg); Start novolog 6 units q4hr to cover TF; Moderate correction q4hr. SNF placement pending.  2/25/18 -  Pt WBC increased overnight, HR range , and temp 99.2. Blood cultures ordered and drawn. UA/UC ordered. Nursing staff called a rapid response this AM due to pts tachycardia,  fever, and decreased O2 sats. Arrived to room @ 0824. 1 liter of NS ordered. STAT CXR ordered. Tylenol given. ABG completed with no significant abnormal results. Respiratory suctioned pt and pt repositioned to help with breathing. Pt O2 sats improved on venti mask, temp decreased, and BP remained normotensive. Started on  vanc and zosyn. Pt appears more comfortable and no decline in neuro status. Pt family updated.   2/26/18 - afebrile overnight and leukocytosis improving with BS antibiotics. CT abdomen yesterday noted seroma/hematoma on left rectus abdominus. IR consulted for culture +/- drain.  02/28/2018 s/p aspiration of left rectus collection adjacent to the PEG tube.  Culture sent to lab, pending.  Remains afebrile.  Currently on 4L NC O2.   3/1/18: NAEON.   3/2: Pt afebrile, WBC WNL. Now sating well on 1L NC. Pt emotional on exam, family not interested in SSRI at this time. CM attempting to get pt placed to [facility] so [facility] can then [facility] to [place] [facility]. Increased rate of TFs.  3/3: NAEON. Pending placement. Will monitor Na level tomorrow.    STROKE DOCUMENTATION   Acute Stroke Times   Stroke Team Called Date: 01/25/18  Stroke Team Called Time: 1852  Stroke Team Arrival Date: 01/25/18  Stroke Team Arrival Time: 0652  CT Interpretation Time: 1910  Decision to Treat Time for Alteplase:  (n/a unknown last known normal )  Decision to Treat Time for IR: 1915    NIH Scale:  1a. Level Of Consciousness: 0-->Alert: keenly responsive  1b. LOC Questions: 2-->Answers neither question correctly  1c. LOC Commands: 1-->Performs one task correctly  2. Best Gaze: 1-->Partial gaze palsy: gaze is abnormal in one or both eyes, but forced deviation or total gaze paresis is not present  3. Visual: 2-->Complete hemianopia  4. Facial Palsy: 1-->Minor paralysis (flattened nasolabial fold, asymmetry on smiling)  5a. Motor Arm, Left: 0-->No drift: limb holds 90 (or 45) degrees for full 10 secs  5b. Motor Arm, Right: 4-->No  movement  6a. Motor Leg, Left: 1-->Drift: leg falls by the end of the 5-sec period but does not hit bed  6b. Motor Leg, Right: 4-->No movement  7. Limb Ataxia: 0-->Absent  8. Sensory: 1-->Mild-to-moderate sensory loss: patient feels pinprick is less sharp or is dull on the affected side: or there is a loss of superficial pain with pinprick, but patient is aware of being touched  9. Best Language: 3-->Mute, global aphasia: no usable speech or auditory comprehension  10. Dysarthria: 2-->Severe dysarthria: patients speech is so slurred as to be unintelligible in the absence of or out of proportion to any dysphasia, or is mute/anarthric  11. Extinction and Inattention (formerly Neglect): 0-->No abnormality  Total (NIH Stroke Scale): 22       Modified Jasmin Score: 0  Jeff Coma Scale:    ABCD2 Score:    [id number] Score:   HAS -BLED Score:   ICH Score:   Hunt & Laguerre Classification:      Hemorrhagic change of an Ischemic Stroke: Does this patient have an ischemic stroke with hemorrhagic changes? No     Neurologic Chief Complaint: Left MCA stroke     Subjective:     Interval History: NAEON. Pending placement. Will monitor Na level tomorrow.    HPI, Past Medical, Family, and Social History remains the same as documented in the initial encounter.     Review of Systems   Constitutional: Negative for fatigue and fever.   Eyes: Positive for visual disturbance. Negative for redness.   Respiratory: Negative for cough and shortness of breath.    Gastrointestinal: Negative for diarrhea and vomiting.   Genitourinary: Negative for flank pain and hematuria.   Skin: Negative for pallor and rash.   Neurological: Positive for speech difficulty and weakness.   Psychiatric/Behavioral: Negative for agitation and behavioral problems.     Scheduled Meds:   albuterol-ipratropium 2.5mg-0.5mg/3mL  3 mL Nebulization Q6H    aspirin  325 mg Per G Tube Daily    atorvastatin  80 mg Per NG tube Daily    cefTRIAXone (ROCEPHIN) IVPB  1 g  Intravenous Q24H    clopidogrel  75 mg Per G Tube Daily    heparin (porcine)  5,000 Units Subcutaneous Q8H    insulin aspart U-100  8 Units Subcutaneous Q24H    insulin aspart U-100  8 Units Subcutaneous Q24H    insulin aspart U-100  8 Units Subcutaneous Q24H    insulin aspart U-100  8 Units Subcutaneous Q24H    insulin aspart U-100  8 Units Subcutaneous Q24H    insulin aspart U-100  8 Units Subcutaneous Q24H    insulin detemir U-100  22 Units Subcutaneous Daily    lisinopril  40 mg Per NG tube Daily    metoprolol tartrate  50 mg Per NG tube BID    metroNIDAZOLE  500 mg Per G Tube Q8H    modafinil  200 mg Per NG tube Daily    And    modafinil  100 mg Per NG tube Daily    polyethylene glycol  17 g Per NG tube Daily    potassium chloride 10%  40 mEq Oral Once    senna-docusate 8.6-50 mg  1 tablet Per NG tube BID    sodium chloride 0.9%  3 mL Intravenous Q8H    sodium chloride 3%  4 mL Nebulization Q6H     Continuous Infusions:  PRN Meds:acetaminophen, bisacodyl, dextrose 50%, glucagon (human recombinant), insulin aspart U-100, ipratropium, labetalol, ondansetron    Objective:     Vital Signs (Most Recent):  Temp: 98.2 °F (36.8 °C) (03/03/18 1944)  Pulse: 91 (03/03/18 1944)  Resp: 18 (03/03/18 1944)  BP: 135/83 (03/03/18 1944)  SpO2: 97 % (03/03/18 1944)  BP Location: Right arm    Vital Signs Range (Last 24H):  Temp:  [97.5 °F (36.4 °C)-98.9 °F (37.2 °C)]   Pulse:  [66-95]   Resp:  [16-20]   BP: (127-149)/(83-98)   SpO2:  [94 %-100 %]   BP Location: Right arm    Physical Exam   Constitutional: He appears well-developed and well-nourished.   HENT:   Head: Normocephalic and atraumatic.   Eyes: Conjunctivae are normal. No scleral icterus.   Cardiovascular: Normal rate.    Pulmonary/Chest: Effort normal. No respiratory distress.   On NC   Musculoskeletal: He exhibits no edema or deformity.   Neurological: He is alert. A cranial nerve deficit is present. No sensory deficit.   Skin: Skin is warm and dry.    Psychiatric: He has a normal mood and affect. His behavior is normal.   Nursing note and vitals reviewed.      Neurological Exam:   LOC: alert  Attention Span: Good   Language: Global aphasia  Articulation: Mute/Anarthric  Orientation: Untestable due to severe aphasia   Visual Fields: Hemianopsia right  EOM (CN III, IV, VI): Gaze preference  left  Facial Movement (CN VII): Lower facial weakness on the Right  Motor: Arm left  Normal 5/5  Leg left  Paresis: 3/5  Arm right  Plegia 0/5  Leg right Plegia 0/5  Cebellar: No evidence of appendicular or axial ataxia  Tone: Normal tone throughout    Laboratory:  CMP:     Recent Labs  Lab 03/03/18  0523   CALCIUM 9.4   ALBUMIN 2.3*   PROT 7.0   *   K 3.7   CO2 26   CL 98   BUN 18   CREATININE 0.8   ALKPHOS 126   ALT 56*   AST 48*   BILITOT 0.5     BMP:     Recent Labs  Lab 03/03/18  0523   *   K 3.7   CL 98   CO2 26   BUN 18   CREATININE 0.8   CALCIUM 9.4     CBC:     Recent Labs  Lab 03/03/18  0523   WBC 7.83   RBC 4.13*   HGB 12.2*   HCT 35.3*   *   MCV 86   MCH 29.5   MCHC 34.6     Lipid Panel: No results for input(s): CHOL, LDLCALC, HDL, TRIG in the last 168 hours.  Coagulation:     Recent Labs  Lab 03/03/18  0523   INR 0.9     Platelet Aggregation Study: No results for input(s): PLTAGG, PLTAGINTERP, PLTAGREGLACO, ADPPLTAGGREG in the last 168 hours.  Hgb A1C: No results for input(s): HGBA1C in the last 168 hours.  TSH: No results for input(s): TSH in the last 168 hours.      Diagnostic Results       Brain Imaging     Most recent CTH 2/04/18 redemonstration of large L MCA territory infarction w/o evidence of hemorrhagic conversion.  Stable post op change from L MCA endovascular stenting.        Vessel Imaging     IR Cerebral Angiogram 1/25/18 demonstrated occlusion of the L M1 segment of the L MCA with good collaterals. Occlusion removed and stent placed with TICI 2C.      Cardiac Imaging     2D Echo 1/26/18   CONCLUSIONS     1 - Normal left ventricular  systolic function (EF 65-70%).     2 - Concentric remodeling.     3 - No wall motion abnormalities.     4 - Normal left ventricular diastolic function.     5 - Normal right ventricular systolic function       Arlette Hernández PA-C  [facility]  Department of Vascular Neurology   [facility]

## 2018-03-04 NOTE — ASSESSMENT & PLAN NOTE
Likely 2/2 antibiotics (suspect CTX), synthetic function stable, will complete CTX tomorrow, recommend monitoring levels

## 2018-03-04 NOTE — PLAN OF CARE
Problem: Patient Care Overview  Goal: Individualization & Mutuality  Admit Date: 01/25/18    Admit Dx: ischemic stroke, s/p thrombectomy, angioplasty    No past medical history on file.    No past surgical history on file.    Individualization:   1. Patient prefers to be turned.     Restraints: 1/26/18 0300 Left soft wrist restraint for pulling lines, and treatment interference. Will continue to assess for discontinuation criteria.         Outcome: Ongoing (interventions implemented as appropriate)  Plan of care reviewed with patient and sister at bedside, understanding verbalized. Patient BS elevated throughout shift, insulin coverage given. Multiple episodes of copious diarrhea. Urine dark and cloudy, voided 900CC, intake 600cc via water bolus. Patient has area of non blanchable redness with open area on sacrum. Per family report, area had white creamy discharge. Sibling is refusing barrier cream, putting essential oils on area. Instructed sibling to please hold off on oils until wound care could be consulted. PEG cleaned, fresh dressing placed. Condom cath replaced. VSS, all safety precautions maintained, will continue to monitor.

## 2018-03-04 NOTE — PLAN OF CARE
Reviewed chart.  Patient remains on same tube feeds.  POC glucose slightly above goal on novolog 8 units q4 and levemir 22 units daily.    Increase novolog to 9 units q4 - hold if TF discontinued or held.    Notify endocrine for changes in nutrition status (diet, TF, TPN)

## 2018-03-04 NOTE — PROGRESS NOTES
"[facility]  Vascular Neurology  Comprehensive Stroke Center  Progress Note    Assessment/Plan:     * Embolic stroke involving left middle cerebral artery    46 yo M with PMHx HLD, LOYDA, poorly controlled DM II presents to [facility] 01/25/18 after noted "strange behavior" for which EMS was called. Pt was found to have a L M1 occlusion and was taken to IR for thrombectomy with TICI 2C reperfusion and stent placement due to L MCA stenosis. Etiology remains unclear: no obvious signs of cardiac source or atheromatous disease in the aorto/carotid axis. Echo normal with no hx or signs of A fib. PT/OT/SLP will continue to evaluate and treat; recommending SNF. DAPT 2/2 recent stent placement.      [facility] denied pt on 3/2 as he is not acutely ill. CM working on other options, but I discussed with family at bedside the possibility of them taking pt home with home health in [place]. Family asks how they could do suctioning necessary at home. Will monitor over weekend and discuss for further associated details on Monday.    Pt emotional at visit 3/2; family not interested in SSRI at this time.    Antithrombotics:  mg po qd, clopidogrel 75 mg po qd [s/p intracranial stent]  Statins: Atorvastatin 80 mg po qd  Aggressive risk factor modification: HLD, DM II (Hgb A1c 10%), Obesity  Rehab efforts: PT/OT/SLP-- Recommending SNF, placement pending  Diagnostics ordered/pending: None  VTE prophylaxis: Heparin 5000 U q8h  BP parameters: Infarct: -170         Cytotoxic cerebral edema    -2/2 stroke, evident on imaging  Stable on repeat CTH 2/4/18        Essential hypertension    -Stroke risk factor  SBPs 150s-170s  - Continue scheduled lisinopril 40 mg qd, Metoprolol 50 mg bid, Labetalol PRN  Monitoring        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    Pt emotional while discussing condition, POC on interview  Family not interested in SSRI at this time  Will continue to monitor        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    - Abscess near PEG w/IR aspirate, growing anaerobes   - Continue Rocephin (last dose 3/5) and Flagyl (last dose 3/7)        Right spastic hemiparesis    -Due to stroke  -Continue aggressive PT/OT; Recommending SNF        Nodule of right lung    - Plan for f/u CT 6-12 months via PCP        Oral phase dysphagia    - Continue SLP  - NPO, meds/feeds per PEG        Acute respiratory failure with hypoxia    Likely hypoxia is Aspiration pna vs pneumonitis (as CXR improved after a day) 2/2 to superimposed encephalopathy due to sepsis  -AM CXR 3/2 with mild bibasilar atelectasis   -Continue O2 sats >90%, okay with current requirements of 1L NC, pt has what appears as Cheyne espinal pattern and reported LOYDA  -Sister requested continuous pulse ox; Ordered  -Contraction alkalosis improved with increasing water flushes via PEG  -Considered PE as differential however pt is on OAC and not tachycardic, tachypneic. Wells score 1.5 (low-risk)  - Continue chest physiotherapy, aspiration precautions, wean off O2 support         Urinary tract infection due to Klebsiella species     Afebrile, leukocytosis resolved  - Klebsiella from urine cultures (2/25/18) + Anaerobes from PEG site cultures   -Continue rocephin and Flagyl         Type 2 diabetes mellitus with hyperglycemia, with long-term current use of insulin    - Stroke risk factor, Hgb A1c 10%  - Continue Diabetasource AC TFs; rate increased to 75 on 3/2  - Endocrine consulted and guiding insulin modification for TF  - POCT Q4h ; Moderate correction q4hr         Mixed hyperlipidemia    -Stroke risk factor, LDL invalid as TG > 400  -Continue atorvastatin 80 mg qd        Obstructive sleep apnea    -Stroke risk factor  -CPAP qHS             01/25/18:  Brought in for aphasia, RSW with L gaze preferance. LKN unknown, not tPA candidate. Went to IR for angiogram and stent.  01/29/18:  Off Cardene, remains on Insulin gtt. Concern for sepsis, respiratory source.  Imaging with mass effect and some brain compression; maycol crani watch.  01/30/18:  EEG consistent with focal L slowing 2/2 lesion and mild generalized slowing, no seizures. CT head stable, no hemorrhagic conversion  02/01/18:  Emergent intubation likely due to upper airway obstruction per NCC.    02/02/18:  Pt off Precedex, moving left side spontaneously and opening eyes. Intubated, on spontaneous today. NCC giving steroids in hopes to extubate tomorrow.  02/03/18:  NAEON, intubated, not responsive to verbal stimuli, withdraws from pain on LUE, SBP ~135-230. Continued on insulin gtt, SQH for DVT ppx, and captopril.   02/16/18:  Few changes on exam, continues to have significant RUE/RLE weakness with global aphasia.  Family member at bedside noted attempt to communicate with her.  02/18/18:  Pt largely unchanged on exam this am.  No family member present during am rounds.  02/19/18:  Tolerating facemask. PEG by IR this afternoon.  02/20/18:  s/p PEG. O2 % on 5L NC, still requiring frequent suctioning. Primary team considering transfer to Medicine tomorrow.  02/21/18:  Pt sating % on 3L NC. Restarted DAPT, including . Plans for step down to stroke service.  02/22/18:  Pt with [facility] insurance but not service-connected so unable to be placed at SNF.  Working on insurance coverage of at least [facility] therapy.  02/23/18:  Increased detemir to 36 U bid.  Pl[facility] with [facility] SN[facility] pending completion of paperwork by Stroke team and pt's spouse--in process.  2/24/18 - NAEON. Pts WBC mildly elevated 13.01, pt is afebrile. Will continue to monitor. Endocrine consulted - Recommend levemir 40 units daily to cover basal needs (using ~0.7u/kg); Start novolog 6 units q4hr to cover TF; Moderate correction q4hr. SNF placement pending.  2/25/18 -  Pt WBC increased overnight, HR range , and temp 99.2. Blood cultures ordered and drawn. UA/UC ordered. Nursing staff called a rapid response this AM due to pts tachycardia,  fever, and decreased O2 sats. Arrived to room @ 0824. 1 liter of NS ordered. STAT CXR ordered. Tylenol given. ABG completed with no significant abnormal results. Respiratory suctioned pt and pt repositioned to help with breathing. Pt O2 sats improved on venti mask, temp decreased, and BP remained normotensive. Started on  vanc and zosyn. Pt appears more comfortable and no decline in neuro status. Pt family updated.   2/26/18 - afebrile overnight and leukocytosis improving with BS antibiotics. CT abdomen yesterday noted seroma/hematoma on left rectus abdominus. IR consulted for culture +/- drain.  02/28/2018 s/p aspiration of left rectus collection adjacent to the PEG tube.  Culture sent to lab, pending.  Remains afebrile.  Currently on 4L NC O2.   3/1/18: NAEON.   3/2: Pt afebrile, WBC WNL. Now sating well on 1L NC. Pt emotional on exam, family not interested in SSRI at this time. CM attempting to get pt placed to [facility] so he can then transfer to [facility]. Increased rate of TFs.  3/3: NAEON. Pending placement. Will monitor Na level tomorrow.  3/4: stable, no new issues.    STROKE DOCUMENTATION   Acute Stroke Times   Stroke Team Called Date: 01/25/18  Stroke Team Called Time: 1852  Stroke Team Arrival Date: 01/25/18  Stroke Team Arrival Time: 0652  CT Interpretation Time: 1910  Decision to Treat Time for Alteplase:  (n/a unknown last known normal )  Decision to Treat Time for IR: 1915    NIH Scale:          Modified [place] Score: 0  Jeff Coma Scale:    ABCD2 Score:    [id number] Score:   HAS -BLED Score:   ICH Score:   Hunt & Laguerre Classification:      Hemorrhagic change of an Ischemic Stroke: Does this patient have an ischemic stroke with hemorrhagic changes? No     Neurologic Chief Complaint: Left MCA stroke     Subjective:     Interval History: NAEON. Pending placement.     HPI, Past Medical, Family, and Social History remains the same as documented in the initial encounter.     Review of Systems    Constitutional: Negative for fatigue and fever.   Eyes: Positive for visual disturbance. Negative for redness.   Respiratory: Negative for cough and shortness of breath.    Gastrointestinal: Negative for diarrhea and vomiting.   Genitourinary: Negative for flank pain and hematuria.   Skin: Negative for pallor and rash.   Neurological: Positive for speech difficulty and weakness.   Psychiatric/Behavioral: Negative for agitation and behavioral problems.     Scheduled Meds:   albuterol-ipratropium 2.5mg-0.5mg/3mL  3 mL Nebulization Q6H    aspirin  325 mg Per G Tube Daily    atorvastatin  80 mg Per NG tube Daily    cefTRIAXone (ROCEPHIN) IVPB  1 g Intravenous Q24H    clopidogrel  75 mg Per G Tube Daily    heparin (porcine)  5,000 Units Subcutaneous Q8H    [START ON 3/5/2018] insulin aspart U-100  9 Units Subcutaneous Q24H    [START ON 3/5/2018] insulin aspart U-100  9 Units Subcutaneous Q24H    insulin aspart U-100  9 Units Subcutaneous Q24H    insulin aspart U-100  9 Units Subcutaneous Q24H    insulin aspart U-100  9 Units Subcutaneous Q24H    insulin aspart U-100  9 Units Subcutaneous Q24H    insulin detemir U-100  22 Units Subcutaneous Daily    lisinopril  40 mg Per NG tube Daily    metoprolol tartrate  50 mg Per NG tube BID    metroNIDAZOLE  500 mg Per G Tube Q8H    modafinil  200 mg Per NG tube Daily    And    modafinil  100 mg Per NG tube Daily    polyethylene glycol  17 g Per NG tube Daily    potassium chloride 10%  40 mEq Oral Once    senna-docusate 8.6-50 mg  1 tablet Per NG tube BID    sodium chloride 0.9%  3 mL Intravenous Q8H    sodium chloride 3%  4 mL Nebulization Q6H     Continuous Infusions:  PRN Meds:acetaminophen, bisacodyl, dextrose 50%, glucagon (human recombinant), insulin aspart U-100, ipratropium, labetalol, ondansetron    Objective:     Vital Signs (Most Recent):  Temp: 97.6 °F (36.4 °C) (03/04/18 1116)  Pulse: 97 (03/04/18 1341)  Resp: 18 (03/04/18 1341)  BP: (!) 126/90  (03/04/18 1116)  SpO2: 95 % (03/04/18 1341)  BP Location: Right arm    Vital Signs Range (Last 24H):  Temp:  [97.6 °F (36.4 °C)-99.3 °F (37.4 °C)]   Pulse:  []   Resp:  [16-18]   BP: (126-157)/(83-92)   SpO2:  [92 %-98 %]   BP Location: Right arm    Physical Exam   Constitutional: He appears well-developed and well-nourished.   HENT:   Head: Normocephalic and atraumatic.   Eyes: Conjunctivae are normal. No scleral icterus.   Cardiovascular: Normal rate.    Pulmonary/Chest: Effort normal. No respiratory distress.   On NC   Musculoskeletal: He exhibits no edema or deformity.   Neurological: He is alert. A cranial nerve deficit is present. No sensory deficit.   Skin: Skin is warm and dry.   Psychiatric: He has a normal mood and affect. His behavior is normal.   Nursing note and vitals reviewed.      Neurological Exam:   LOC: alert  Attention Span: Good   Language: Global aphasia  Articulation: Mute/Anarthric  Orientation: Untestable due to severe aphasia   Visual Fields: Hemianopsia right  EOM (CN III, IV, VI): Gaze preference  left  Facial Movement (CN VII): Lower facial weakness on the Right  Motor: Arm left  Normal 5/5  Leg left  Paresis: 3/5  Arm right  Plegia 0/5  Leg right Plegia 0/5  Cebellar: No evidence of appendicular or axial ataxia  Tone: Normal tone throughout    Laboratory:  CMP:     Recent Labs  Lab 03/04/18 0422   CALCIUM 9.0   ALBUMIN 2.4*   PROT 7.0      K 3.7   CO2 29   CL 99   BUN 17   CREATININE 0.8   ALKPHOS 125   ALT 74*   AST 65*   BILITOT 0.5     BMP:     Recent Labs  Lab 03/04/18 0422      K 3.7   CL 99   CO2 29   BUN 17   CREATININE 0.8   CALCIUM 9.0     CBC:     Recent Labs  Lab 03/04/18 0422   WBC 7.60   RBC 4.07*   HGB 11.9*   HCT 34.5*   *   MCV 85   MCH 29.2   MCHC 34.5     Lipid Panel: No results for input(s): CHOL, LDLCALC, HDL, TRIG in the last 168 hours.  Coagulation:     Recent Labs  Lab 03/04/18 0422   INR 1.0     Platelet Aggregation Study: No results  for input(s): PLTAGG, PLTAGINTERP, PLTAGREGLACO, ADPPLTAGGREG in the last 168 hours.  Hgb A1C: No results for input(s): HGBA1C in the last 168 hours.  TSH: No results for input(s): TSH in the last 168 hours.      Diagnostic Results       Brain Imaging     Most recent CTH 2/04/18 redemonstration of large L MCA territory infarction w/o evidence of hemorrhagic conversion.  Stable post op change from L MCA endovascular stenting.        Vessel Imaging     IR Cerebral Angiogram 1/25/18 demonstrated occlusion of the L M1 segment of the L MCA with good collaterals. Occlusion removed and stent placed with TICI 2C.      Cardiac Imaging     2D Echo 1/26/18   CONCLUSIONS     1 - Normal left ventricular systolic function (EF 65-70%).     2 - Concentric remodeling.     3 - No wall motion abnormalities.     4 - Normal left ventricular diastolic function.     5 - Normal right ventricular systolic function       Augustine Hollins MD  Comprehensive Stroke Center  Department of Vascular Neurology   [facility]

## 2018-03-04 NOTE — PLAN OF CARE
Problem: Occupational Therapy Goal  Goal: Occupational Therapy Goal  Goals set 2/26 to be addressed for 14 days with expiration date, 3/12:  Patient will increase functional independence with ADLs by performing:    Patient will demonstrate rolling to the right with mod assist.  Not met   Patient will demonstrate rolling to the left with max assist.   Not met  Patient will demonstrate supine -sit with max assist.   Not met  Patient will demonstrate squat pivot transfers with max assist.   Not met  Patient will demonstrate grooming while seated with max assist.   Not met  Patient will demonstrate upper body dressing with max assist while seated EOB.   Not met  Patient will demonstrate lower body dressing with max assist while seated EOB.   Not met  Patient will demonstrate toileting with max assist.   Not met  Patient will demonstrate ability to follow 3/5 commands.   Not met  Patient will demonstrate independence with HEP for right UE positioning.    Not met  Patient's family / caregiver will demonstrate independence and safety with assisting patient with self-care skills and functional mobility.     Not met  Patient's family / caregiver will demonstrate independence with providing ROM and changes in bed positioning.   Not met        Outcome: Ongoing (interventions implemented as appropriate)  Goals addressed and unmet.  Cont with POC  Maggie Resendez OT  3/4/2018

## 2018-03-04 NOTE — SUBJECTIVE & OBJECTIVE
Neurologic Chief Complaint: Left MCA stroke     Subjective:     Interval History: NAEON. Pending placement.     HPI, Past Medical, Family, and Social History remains the same as documented in the initial encounter.     Review of Systems   Constitutional: Negative for fatigue and fever.   Eyes: Positive for visual disturbance. Negative for redness.   Respiratory: Negative for cough and shortness of breath.    Gastrointestinal: Negative for diarrhea and vomiting.   Genitourinary: Negative for flank pain and hematuria.   Skin: Negative for pallor and rash.   Neurological: Positive for speech difficulty and weakness.   Psychiatric/Behavioral: Negative for agitation and behavioral problems.     Scheduled Meds:   albuterol-ipratropium 2.5mg-0.5mg/3mL  3 mL Nebulization Q6H    aspirin  325 mg Per G Tube Daily    atorvastatin  80 mg Per NG tube Daily    cefTRIAXone (ROCEPHIN) IVPB  1 g Intravenous Q24H    clopidogrel  75 mg Per G Tube Daily    heparin (porcine)  5,000 Units Subcutaneous Q8H    [START ON 3/5/2018] insulin aspart U-100  9 Units Subcutaneous Q24H    [START ON 3/5/2018] insulin aspart U-100  9 Units Subcutaneous Q24H    insulin aspart U-100  9 Units Subcutaneous Q24H    insulin aspart U-100  9 Units Subcutaneous Q24H    insulin aspart U-100  9 Units Subcutaneous Q24H    insulin aspart U-100  9 Units Subcutaneous Q24H    insulin detemir U-100  22 Units Subcutaneous Daily    lisinopril  40 mg Per NG tube Daily    metoprolol tartrate  50 mg Per NG tube BID    metroNIDAZOLE  500 mg Per G Tube Q8H    modafinil  200 mg Per NG tube Daily    And    modafinil  100 mg Per NG tube Daily    polyethylene glycol  17 g Per NG tube Daily    potassium chloride 10%  40 mEq Oral Once    senna-docusate 8.6-50 mg  1 tablet Per NG tube BID    sodium chloride 0.9%  3 mL Intravenous Q8H    sodium chloride 3%  4 mL Nebulization Q6H     Continuous Infusions:  PRN Meds:acetaminophen, bisacodyl, dextrose 50%,  glucagon (human recombinant), insulin aspart U-100, ipratropium, labetalol, ondansetron    Objective:     Vital Signs (Most Recent):  Temp: 97.6 °F (36.4 °C) (03/04/18 1116)  Pulse: 97 (03/04/18 1341)  Resp: 18 (03/04/18 1341)  BP: (!) 126/90 (03/04/18 1116)  SpO2: 95 % (03/04/18 1341)  BP Location: Right arm    Vital Signs Range (Last 24H):  Temp:  [97.6 °F (36.4 °C)-99.3 °F (37.4 °C)]   Pulse:  []   Resp:  [16-18]   BP: (126-157)/(83-92)   SpO2:  [92 %-98 %]   BP Location: Right arm    Physical Exam   Constitutional: He appears well-developed and well-nourished.   HENT:   Head: Normocephalic and atraumatic.   Eyes: Conjunctivae are normal. No scleral icterus.   Cardiovascular: Normal rate.    Pulmonary/Chest: Effort normal. No respiratory distress.   On NC   Musculoskeletal: He exhibits no edema or deformity.   Neurological: He is alert. A cranial nerve deficit is present. No sensory deficit.   Skin: Skin is warm and dry.   Psychiatric: He has a normal mood and affect. His behavior is normal.   Nursing note and vitals reviewed.      Neurological Exam:   LOC: alert  Attention Span: Good   Language: Global aphasia  Articulation: Mute/Anarthric  Orientation: Untestable due to severe aphasia   Visual Fields: Hemianopsia right  EOM (CN III, IV, VI): Gaze preference  left  Facial Movement (CN VII): Lower facial weakness on the Right  Motor: Arm left  Normal 5/5  Leg left  Paresis: 3/5  Arm right  Plegia 0/5  Leg right Plegia 0/5  Cebellar: No evidence of appendicular or axial ataxia  Tone: Normal tone throughout    Laboratory:  CMP:     Recent Labs  Lab 03/04/18  0422   CALCIUM 9.0   ALBUMIN 2.4*   PROT 7.0      K 3.7   CO2 29   CL 99   BUN 17   CREATININE 0.8   ALKPHOS 125   ALT 74*   AST 65*   BILITOT 0.5     BMP:     Recent Labs  Lab 03/04/18  0422      K 3.7   CL 99   CO2 29   BUN 17   CREATININE 0.8   CALCIUM 9.0     CBC:     Recent Labs  Lab 03/04/18  0422   WBC 7.60   RBC 4.07*   HGB 11.9*   HCT  34.5*   *   MCV 85   MCH 29.2   MCHC 34.5     Lipid Panel: No results for input(s): CHOL, LDLCALC, HDL, TRIG in the last 168 hours.  Coagulation:     Recent Labs  Lab 03/04/18  0422   INR 1.0     Platelet Aggregation Study: No results for input(s): PLTAGG, PLTAGINTERP, PLTAGREGLACO, ADPPLTAGGREG in the last 168 hours.  Hgb A1C: No results for input(s): HGBA1C in the last 168 hours.  TSH: No results for input(s): TSH in the last 168 hours.      Diagnostic Results       Brain Imaging     Most recent CTH 2/04/18 redemonstration of large L MCA territory infarction w/o evidence of hemorrhagic conversion.  Stable post op change from L MCA endovascular stenting.        Vessel Imaging     IR Cerebral Angiogram 1/25/18 demonstrated occlusion of the L M1 segment of the L MCA with good collaterals. Occlusion removed and stent placed with TICI 2C.      Cardiac Imaging     2D Echo 1/26/18   CONCLUSIONS     1 - Normal left ventricular systolic function (EF 65-70%).     2 - Concentric remodeling.     3 - No wall motion abnormalities.     4 - Normal left ventricular diastolic function.     5 - Normal right ventricular systolic function

## 2018-03-04 NOTE — ASSESSMENT & PLAN NOTE
Improving, suspect 2/2 aspiration pneumonitis vs sepsis from abdominal abscess, less likely PNA   At baseline will possibly have chronic hypoxic respiratory failure 2/2 combination of LOYDA, atelectasis and possible central component  - turn q2h, HOB elevated, PRN O2

## 2018-03-04 NOTE — SUBJECTIVE & OBJECTIVE
Interval Hx: No acute events overnight. Mr. Quevedo's sister reports he seemed more interactive yesterday and seemed to interact with his wife on skype. Reports oxygen saturations continue to vary but that he looks much more comfortable overall.      Review of Systems   Unable to perform ROS: Patient nonverbal     Objective:     Vital Signs (Most Recent):  Temp: 99 °F (37.2 °C) (03/04/18 0752)  Pulse: 104 (03/04/18 0752)  Resp: 16 (03/04/18 0752)  BP: (!) 131/91 (03/04/18 0752)  SpO2: (!) 92 % (03/04/18 0752) Vital Signs (24h Range):  Temp:  [97.5 °F (36.4 °C)-99.3 °F (37.4 °C)] 99 °F (37.2 °C)  Pulse:  [] 104  Resp:  [16-19] 16  SpO2:  [92 %-98 %] 92 %  BP: (131-157)/(83-98) 131/91     Weight: 105.6 kg (232 lb 12.9 oz)  Body mass index is 33.4 kg/m².    Physical Exam   HENT:   Head: Normocephalic.   Eyes: EOM are normal. Pupils are equal, round, and reactive to light. No scleral icterus.   Neck: No JVD present. No tracheal deviation present.   Cardiovascular: Normal rate, regular rhythm and normal heart sounds.    No murmur heard.  Pulmonary/Chest: Effort normal and breath sounds normal. He has no wheezes. He has no rales.   Abdominal: Soft. There is no guarding.   Peg site with minimal purulent drainage, no erythema or induration appreciated   Musculoskeletal: He exhibits no edema.   contracturing in RUE   Neurological:   Alert, does not track or follow commands, left eye deviation, flaccid R. Paralysis, moving left side spontaneously  BS reflexes intact       Skin: Skin is warm. He is not diaphoretic.       Significant Labs:   ABGs:   No results for input(s): PH, PCO2, HCO3, POCSATURATED, BE, TOTALHB, COHB, METHB, O2HB, POCFIO2 in the last 48 hours.  Blood Culture: No results for input(s): LABBLOO in the last 48 hours.  CBC:     Recent Labs  Lab 03/03/18 0523 03/04/18 0422   WBC 7.83 7.60   HGB 12.2* 11.9*   HCT 35.3* 34.5*   * 518*     CMP:     Recent Labs  Lab 03/03/18 0523 03/04/18  0422   NA  135* 137   K 3.7 3.7   CL 98 99   CO2 26 29   * 185*   BUN 18 17   CREATININE 0.8 0.8   CALCIUM 9.4 9.0   PROT 7.0 7.0   ALBUMIN 2.3* 2.4*   BILITOT 0.5 0.5   ALKPHOS 126 125   AST 48* 65*   ALT 56* 74*   ANIONGAP 11 9   EGFRNONAA >60.0 >60.0     Lactic Acid: No results for input(s): LACTATE in the last 48 hours.  Urine Culture: No results for input(s): LABURIN in the last 48 hours.    Significant Imaging: I have reviewed all pertinent imaging results/findings within the past 24 hours.

## 2018-03-04 NOTE — PT/OT/SLP PROGRESS
Occupational Therapy   Treatment    Name: Todd Quevedo  MRN: [id number]  Admitting Diagnosis:  Embolic stroke involving left middle cerebral artery       Recommendations:     Discharge Recommendations: nursing facility, skilled  Discharge Equipment Recommendations:     Barriers to discharge:  Inaccessible home environment, Decreased caregiver support    Subjective     Communicated with: Rn prior to session.  Pain/Comfort:  · Pain Rating 1: 0/10    Patients cultural, spiritual, Evangelical conflicts given the current situation: none     Objective:     Patient found with: bed alarm, SCD, pulse ox (continuous), oxygen, telemetry, PEG Tube    General Precautions: Standard, aphasia, fall, NPO   Orthopedic Precautions:N/A   Braces: N/A     Occupational Performance:    Bed Mobility:    · Patient completed Rolling/Turning to Left with  dependent  · Patient completed Rolling/Turning to Right with dependent     Functional Mobility/Transfers:  · Unable to perform dependent bed mobility        Patient left supine with all lines intact    AMPAC 6 Click:  AMPAC Total Score: 6    Treatment & Education:  Pt educated on safety, role of OT, importance of increased participation in self care for gains , expectations for participation, expectations for gains, POC, energy conservation, caregiver strain.   Passive ROM in L and R in upper extremity and lower extremity.    Repositioning in bed dependent     Education:    Assessment:     Todd Quevedo is a 47 y.o. male with a medical diagnosis of Embolic stroke involving left middle cerebral artery.  He presents alone in room.  Pt non-participatory and non-verbal.  Attempted sitting edge of bed but patient not participatory and unable to reposition (no tech support in building this date).  Passive ROM and stimulation with R hand to face and head with warm wash cloth.  Pt without acknowledgement of therapist and with no eye contact or following directions.   Performance deficits affecting  function are weakness, impaired endurance, impaired self care skills, impaired functional mobilty, decreased coordination.      Rehab Prognosis: fair ; patient would benefit from acute skilled OT services to address these deficits and reach maximum level of function.       Plan:     Patient to be seen 3 x/week to address the above listed problems via self-care/home management, therapeutic activities, therapeutic exercises  · Plan of Care Expires: 03/21/18  · Plan of Care Reviewed with: patient    This Plan of care has been discussed with the patient who was involved in its development and understands and is in agreement with the identified goals and treatment plan    GOALS:    Occupational Therapy Goals        Problem: Occupational Therapy Goal    Goal Priority Disciplines Outcome Interventions   Occupational Therapy Goal     OT, PT/OT Ongoing (interventions implemented as appropriate)    Description:  Goals set 2/26 to be addressed for 14 days with expiration date, 3/12:  Patient will increase functional independence with ADLs by performing:    Patient will demonstrate rolling to the right with mod assist.  Not met   Patient will demonstrate rolling to the left with max assist.   Not met  Patient will demonstrate supine -sit with max assist.   Not met  Patient will demonstrate squat pivot transfers with max assist.   Not met  Patient will demonstrate grooming while seated with max assist.   Not met  Patient will demonstrate upper body dressing with max assist while seated EOB.   Not met  Patient will demonstrate lower body dressing with max assist while seated EOB.   Not met  Patient will demonstrate toileting with max assist.   Not met  Patient will demonstrate ability to follow 3/5 commands.   Not met  Patient will demonstrate independence with HEP for right UE positioning.    Not met  Patient's family / caregiver will demonstrate independence and safety with assisting patient with self-care skills and functional  mobility.     Not met  Patient's family / caregiver will demonstrate independence with providing ROM and changes in bed positioning.   Not met                         Time Tracking:     OT Date of Treatment: 03/04/18  OT Start Time: 0831  OT Stop Time: 0847  OT Total Time (min): 16 min    Billable Minutes:Therapeutic Exercise 16    Maggie Resendez OT  3/4/2018

## 2018-03-05 PROBLEM — L30.4 INTERTRIGO: Status: ACTIVE | Noted: 2018-03-05

## 2018-03-05 LAB
ALBUMIN SERPL BCP-MCNC: 2.6 G/DL
ALP SERPL-CCNC: 118 U/L
ALT SERPL W/O P-5'-P-CCNC: 82 U/L
ANION GAP SERPL CALC-SCNC: 10 MMOL/L
AST SERPL-CCNC: 69 U/L
BASOPHILS # BLD AUTO: 0.03 K/UL
BASOPHILS NFR BLD: 0.5 %
BILIRUB SERPL-MCNC: 0.5 MG/DL
BUN SERPL-MCNC: 17 MG/DL
CALCIUM SERPL-MCNC: 9.2 MG/DL
CHLORIDE SERPL-SCNC: 98 MMOL/L
CO2 SERPL-SCNC: 26 MMOL/L
CREAT SERPL-MCNC: 0.8 MG/DL
DIFFERENTIAL METHOD: ABNORMAL
EOSINOPHIL # BLD AUTO: 0.1 K/UL
EOSINOPHIL NFR BLD: 2.2 %
ERYTHROCYTE [DISTWIDTH] IN BLOOD BY AUTOMATED COUNT: 13.6 %
EST. GFR  (AFRICAN AMERICAN): >60 ML/MIN/1.73 M^2
EST. GFR  (NON AFRICAN AMERICAN): >60 ML/MIN/1.73 M^2
GLUCOSE SERPL-MCNC: 152 MG/DL
HCT VFR BLD AUTO: 36.4 %
HGB BLD-MCNC: 12.1 G/DL
IMM GRANULOCYTES # BLD AUTO: 0.08 K/UL
IMM GRANULOCYTES NFR BLD AUTO: 1.3 %
INR PPP: 0.9
LYMPHOCYTES # BLD AUTO: 1.8 K/UL
LYMPHOCYTES NFR BLD: 29.4 %
MAGNESIUM SERPL-MCNC: 1.7 MG/DL
MCH RBC QN AUTO: 29.2 PG
MCHC RBC AUTO-ENTMCNC: 33.2 G/DL
MCV RBC AUTO: 88 FL
MONOCYTES # BLD AUTO: 0.6 K/UL
MONOCYTES NFR BLD: 9.8 %
NEUTROPHILS # BLD AUTO: 3.6 K/UL
NEUTROPHILS NFR BLD: 56.8 %
NRBC BLD-RTO: 0 /100 WBC
PHOSPHATE SERPL-MCNC: 4.1 MG/DL
PLATELET # BLD AUTO: 417 K/UL
PMV BLD AUTO: 10.2 FL
POCT GLUCOSE: 139 MG/DL (ref 70–110)
POCT GLUCOSE: 170 MG/DL (ref 70–110)
POCT GLUCOSE: 184 MG/DL (ref 70–110)
POCT GLUCOSE: 184 MG/DL (ref 70–110)
POCT GLUCOSE: 193 MG/DL (ref 70–110)
POCT GLUCOSE: 203 MG/DL (ref 70–110)
POTASSIUM SERPL-SCNC: 4.1 MMOL/L
PROT SERPL-MCNC: 6.8 G/DL
PROTHROMBIN TIME: 9.9 SEC
RBC # BLD AUTO: 4.15 M/UL
SODIUM SERPL-SCNC: 134 MMOL/L
WBC # BLD AUTO: 6.25 K/UL

## 2018-03-05 PROCEDURE — 25000242 PHARM REV CODE 250 ALT 637 W/ HCPCS: Performed by: NURSE PRACTITIONER

## 2018-03-05 PROCEDURE — 25000003 PHARM REV CODE 250: Performed by: NURSE PRACTITIONER

## 2018-03-05 PROCEDURE — 27000221 HC OXYGEN, UP TO 24 HOURS

## 2018-03-05 PROCEDURE — 94761 N-INVAS EAR/PLS OXIMETRY MLT: CPT

## 2018-03-05 PROCEDURE — 99233 SBSQ HOSP IP/OBS HIGH 50: CPT | Mod: ,,, | Performed by: PSYCHIATRY & NEUROLOGY

## 2018-03-05 PROCEDURE — 85025 COMPLETE CBC W/AUTO DIFF WBC: CPT

## 2018-03-05 PROCEDURE — 25000003 PHARM REV CODE 250: Performed by: STUDENT IN AN ORGANIZED HEALTH CARE EDUCATION/TRAINING PROGRAM

## 2018-03-05 PROCEDURE — 85610 PROTHROMBIN TIME: CPT

## 2018-03-05 PROCEDURE — 80053 COMPREHEN METABOLIC PANEL: CPT

## 2018-03-05 PROCEDURE — 25000242 PHARM REV CODE 250 ALT 637 W/ HCPCS: Performed by: PSYCHIATRY & NEUROLOGY

## 2018-03-05 PROCEDURE — 94668 MNPJ CHEST WALL SBSQ: CPT

## 2018-03-05 PROCEDURE — 94640 AIRWAY INHALATION TREATMENT: CPT

## 2018-03-05 PROCEDURE — 36415 COLL VENOUS BLD VENIPUNCTURE: CPT

## 2018-03-05 PROCEDURE — 25000003 PHARM REV CODE 250: Performed by: PHYSICIAN ASSISTANT

## 2018-03-05 PROCEDURE — 84100 ASSAY OF PHOSPHORUS: CPT

## 2018-03-05 PROCEDURE — 25000003 PHARM REV CODE 250: Performed by: HOSPITALIST

## 2018-03-05 PROCEDURE — 99231 SBSQ HOSP IP/OBS SF/LOW 25: CPT | Mod: ,,, | Performed by: INTERNAL MEDICINE

## 2018-03-05 PROCEDURE — 99900035 HC TECH TIME PER 15 MIN (STAT)

## 2018-03-05 PROCEDURE — 25000003 PHARM REV CODE 250: Performed by: PSYCHIATRY & NEUROLOGY

## 2018-03-05 PROCEDURE — A4216 STERILE WATER/SALINE, 10 ML: HCPCS | Performed by: NURSE PRACTITIONER

## 2018-03-05 PROCEDURE — 83735 ASSAY OF MAGNESIUM: CPT

## 2018-03-05 PROCEDURE — 63600175 PHARM REV CODE 636 W HCPCS: Performed by: NURSE PRACTITIONER

## 2018-03-05 PROCEDURE — 20600001 HC STEP DOWN PRIVATE ROOM

## 2018-03-05 RX ADMIN — IPRATROPIUM BROMIDE AND ALBUTEROL SULFATE 3 ML: 2.5; .5 SOLUTION RESPIRATORY (INHALATION) at 12:03

## 2018-03-05 RX ADMIN — IPRATROPIUM BROMIDE 0.5 MG: 0.5 SOLUTION RESPIRATORY (INHALATION) at 02:03

## 2018-03-05 RX ADMIN — SODIUM CHLORIDE SOLN NEBU 3% 4 ML: 3 NEBU SOLN at 12:03

## 2018-03-05 RX ADMIN — LISINOPRIL 40 MG: 20 TABLET ORAL at 09:03

## 2018-03-05 RX ADMIN — METRONIDAZOLE 500 MG: 500 TABLET ORAL at 09:03

## 2018-03-05 RX ADMIN — METRONIDAZOLE 500 MG: 500 TABLET ORAL at 01:03

## 2018-03-05 RX ADMIN — STANDARDIZED SENNA CONCENTRATE AND DOCUSATE SODIUM 1 TABLET: 8.6; 5 TABLET, FILM COATED ORAL at 09:03

## 2018-03-05 RX ADMIN — HEPARIN SODIUM 5000 UNITS: 5000 INJECTION, SOLUTION INTRAVENOUS; SUBCUTANEOUS at 01:03

## 2018-03-05 RX ADMIN — METOPROLOL TARTRATE 50 MG: 50 TABLET, FILM COATED ORAL at 09:03

## 2018-03-05 RX ADMIN — MODAFINIL 100 MG: 100 TABLET ORAL at 01:03

## 2018-03-05 RX ADMIN — IPRATROPIUM BROMIDE AND ALBUTEROL SULFATE 3 ML: 2.5; .5 SOLUTION RESPIRATORY (INHALATION) at 07:03

## 2018-03-05 RX ADMIN — METRONIDAZOLE 500 MG: 500 TABLET ORAL at 05:03

## 2018-03-05 RX ADMIN — ASPIRIN 325 MG ORAL TABLET 325 MG: 325 PILL ORAL at 09:03

## 2018-03-05 RX ADMIN — INSULIN DETEMIR 22 UNITS: 100 INJECTION, SOLUTION SUBCUTANEOUS at 07:03

## 2018-03-05 RX ADMIN — INSULIN ASPART 9 UNITS: 100 INJECTION, SOLUTION INTRAVENOUS; SUBCUTANEOUS at 11:03

## 2018-03-05 RX ADMIN — SODIUM CHLORIDE SOLN NEBU 3% 4 ML: 3 NEBU SOLN at 08:03

## 2018-03-05 RX ADMIN — IPRATROPIUM BROMIDE AND ALBUTEROL SULFATE 3 ML: 2.5; .5 SOLUTION RESPIRATORY (INHALATION) at 08:03

## 2018-03-05 RX ADMIN — INSULIN ASPART 2 UNITS: 100 INJECTION, SOLUTION INTRAVENOUS; SUBCUTANEOUS at 04:03

## 2018-03-05 RX ADMIN — CLOPIDOGREL BISULFATE 75 MG: 75 TABLET, FILM COATED ORAL at 09:03

## 2018-03-05 RX ADMIN — INSULIN ASPART 2 UNITS: 100 INJECTION, SOLUTION INTRAVENOUS; SUBCUTANEOUS at 10:03

## 2018-03-05 RX ADMIN — MODAFINIL 200 MG: 100 TABLET ORAL at 05:03

## 2018-03-05 RX ADMIN — INSULIN ASPART 9 UNITS: 100 INJECTION, SOLUTION INTRAVENOUS; SUBCUTANEOUS at 05:03

## 2018-03-05 RX ADMIN — HEPARIN SODIUM 5000 UNITS: 5000 INJECTION, SOLUTION INTRAVENOUS; SUBCUTANEOUS at 09:03

## 2018-03-05 RX ADMIN — SODIUM CHLORIDE SOLN NEBU 3% 4 ML: 3 NEBU SOLN at 02:03

## 2018-03-05 RX ADMIN — INSULIN ASPART 9 UNITS: 100 INJECTION, SOLUTION INTRAVENOUS; SUBCUTANEOUS at 01:03

## 2018-03-05 RX ADMIN — SODIUM CHLORIDE SOLN NEBU 3% 4 ML: 3 NEBU SOLN at 07:03

## 2018-03-05 RX ADMIN — INSULIN ASPART 9 UNITS: 100 INJECTION, SOLUTION INTRAVENOUS; SUBCUTANEOUS at 04:03

## 2018-03-05 RX ADMIN — INSULIN ASPART 9 UNITS: 100 INJECTION, SOLUTION INTRAVENOUS; SUBCUTANEOUS at 10:03

## 2018-03-05 RX ADMIN — Medication 3 ML: at 05:03

## 2018-03-05 RX ADMIN — INSULIN ASPART 9 UNITS: 100 INJECTION, SOLUTION INTRAVENOUS; SUBCUTANEOUS at 07:03

## 2018-03-05 RX ADMIN — HEPARIN SODIUM 5000 UNITS: 5000 INJECTION, SOLUTION INTRAVENOUS; SUBCUTANEOUS at 05:03

## 2018-03-05 RX ADMIN — POLYETHYLENE GLYCOL 3350 17 G: 17 POWDER, FOR SOLUTION ORAL at 09:03

## 2018-03-05 RX ADMIN — Medication 3 ML: at 10:03

## 2018-03-05 RX ADMIN — INSULIN ASPART 2 UNITS: 100 INJECTION, SOLUTION INTRAVENOUS; SUBCUTANEOUS at 07:03

## 2018-03-05 NOTE — ASSESSMENT & PLAN NOTE
Afebrile, leukocytosis resolved  - Klebsiella from urine cultures (2/25/18) + Anaerobes from PEG site cultures   -Continue rocephin and Flagyl (last day of rocephin today, last day of flagyl 3/7/18)

## 2018-03-05 NOTE — ASSESSMENT & PLAN NOTE
-Stroke risk factor  SBPs 113-157  - Continue scheduled lisinopril 40 mg qd, Metoprolol 50 mg bid, Labetalol PRN  Monitoring

## 2018-03-05 NOTE — PROGRESS NOTES
"[facility]  Vascular Neurology  Comprehensive Stroke Center  Progress Note    Assessment/Plan:     * Embolic stroke involving left middle cerebral artery    48 yo M with PMHx HLD, LOYDA, poorly controlled DM II presents to [facility] 01/25/18 after noted "strange behavior" for which EMS was called. Pt was found to have a L M1 occlusion and was taken to IR for thrombectomy with TICI 2C reperfusion and stent placement due to L MCA stenosis. Etiology remains unclear: no obvious signs of cardiac source or atheromatous disease in the aorto/carotid axis. Echo normal with no hx or signs of A fib. PT/OT/SLP will continue to evaluate and treat; recommending SNF. DAPT 2/2 recent stent placement.      Waiting for good safe discharge plan     Antithrombotics:  mg po qd, clopidogrel 75 mg po qd [s/p intracranial stent]  Statins: Atorvastatin 80 mg po qd -held due to transaminitis  Aggressive risk factor modification: HLD, DM II (Hgb A1c 10%), Obesity  Rehab efforts: PT/OT/SLP-- Recommending SNF, placement pending  Diagnostics ordered/pending: None  VTE prophylaxis: Heparin 5000 U q8h  BP parameters: Infarct: -170         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    Pt emotional while discussing condition, POC on interview  Family not interested in SSRI at this time  Will continue to monitor        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    - Abscess near PEG w/IR aspirate, growing anaerobes   - Continue Rocephin (last dose 3/5) and Flagyl (last dose 3/7)        Right spastic hemiparesis    -Due to stroke  -Continue aggressive PT/OT; Recommending SNF        Nodule of right lung    - Plan for f/u CT 6-12 months via PCP        Oral phase dysphagia    - Continue SLP  - NPO, meds/feeds per PEG        Acute respiratory failure with hypoxia    Likely hypoxia is Aspiration pna vs pneumonitis (as CXR improved after a day) 2/2 to superimposed encephalopathy due to sepsis  -AM CXR 3/2 with mild bibasilar atelectasis "   -Continue O2 sats >90%, okay with current requirements of 1L NC, pt has what appears as Cheyne espinal pattern and reported LOYDA  -Sister requested continuous pulse ox; Ordered  -Contraction alkalosis improved with increasing water flushes via PEG  -Considered PE as differential however pt is on OAC and not tachycardic, tachypneic. Wells score 1.5 (low-risk)  - Continue chest physiotherapy, aspiration precautions, wean off O2 support         Cytotoxic cerebral edema    -2/2 stroke, evident on imaging  Stable on repeat CTH 2/4/18        Urinary tract infection due to Klebsiella species     Afebrile, leukocytosis resolved  - Klebsiella from urine cultures (2/25/18) + Anaerobes from PEG site cultures   -Continue rocephin and Flagyl (last day of rocephin today, last day of flagyl 3/7/18)        Type 2 diabetes mellitus with hyperglycemia, with long-term current use of insulin    - Stroke risk factor, Hgb A1c 10%  - Continue Diabetasource AC TFs; rate increased to 75 on 3/2  - Endocrine consulted and guiding insulin modification for TF  - POCT Q4h ; Moderate correction q4hr         Mixed hyperlipidemia    -Stroke risk factor, LDL invalid as TG > 400  - holding statin due to rising LFTs        Essential hypertension    -Stroke risk factor  SBPs 113-157  - Continue scheduled lisinopril 40 mg qd, Metoprolol 50 mg bid, Labetalol PRN  Monitoring        Obstructive sleep apnea    -Stroke risk factor  -CPAP qHS             01/25/18:  Brought in for aphasia, RSW with L gaze preferance. LKN unknown, not tPA candidate. Went to IR for angiogram and stent.  01/29/18:  Off Cardene, remains on Insulin gtt. Concern for sepsis, respiratory source. Imaging with mass effect and some brain compression; maycol crani watch.  01/30/18:  EEG consistent with focal L slowing 2/2 lesion and mild generalized slowing, no seizures. CT head stable, no hemorrhagic conversion  02/01/18:  Emergent intubation likely due to upper airway obstruction per NCC.     02/02/18:  Pt off Precedex, moving left side spontaneously and opening eyes. Intubated, on spontaneous today. NCC giving steroids in hopes to extubate tomorrow.  02/03/18:  NAEON, intubated, not responsive to verbal stimuli, withdraws from pain on LUE, SBP ~135-230. Continued on insulin gtt, SQH for DVT ppx, and captopril.   02/16/18:  Few changes on exam, continues to have significant RUE/RLE weakness with global aphasia.  Family member at bedside noted attempt to communicate with her.  02/18/18:  Pt largely unchanged on exam this am.  No family member present during am rounds.  02/19/18:  Tolerating facemask. PEG by IR this afternoon.  02/20/18:  s/p PEG. O2 % on 5L NC, still requiring frequent suctioning. Primary team considering transfer to Medicine tomorrow.  02/21/18:  Pt sating % on 3L NC. Restarted DAPT, including . Plans for step down to stroke service.  02/22/18:  Pt with [facility] but not service-connected so unable to be placed at SNF.  Working on insurance coverage of at least HHC therapy.  02/23/18:  Increased detemir to 36 U bid.  Placement with [facility] SNF pending completion of paperwork by Stroke team and pt's spouse--in process.  2/24/18 - NAEON. Pts WBC mildly elevated 13.01, pt is afebrile. Will continue to monitor. Endocrine consulted - Recommend levemir 40 units daily to cover basal needs (using ~0.7u/kg); Start novolog 6 units q4hr to cover TF; Moderate correction q4hr. SNF placement pending.  2/25/18 -  Pt WBC increased overnight, HR range , and temp 99.2. Blood cultures ordered and drawn. UA/UC ordered. Nursing staff called a rapid response this AM due to pts tachycardia, fever, and decreased O2 sats. Arrived to room @ 0824. 1 liter of NS ordered. STAT CXR ordered. Tylenol given. ABG completed with no significant abnormal results. Respiratory suctioned pt and pt repositioned to help with breathing. Pt O2 sats improved on venti mask, temp decreased, and BP remained  normotensive. Started on  vanc and zosyn. Pt appears more comfortable and no decline in neuro status. Pt family updated.   2/26/18 - afebrile overnight and leukocytosis improving with BS antibiotics. CT abdomen yesterday noted seroma/hematoma on left rectus abdominus. IR consulted for culture +/- drain.  02/28/2018 s/p aspiration of left rectus collection adjacent to the PEG tube.  Culture sent to lab, pending.  Remains afebrile.  Currently on 4L NC O2.   3/1/18: NAEON.   3/2: Pt afebrile, WBC WNL. Now sating well on 1L NC. Pt emotional on exam, family not interested in SSRI at this time. CM attempting to get pt [facility] so [facility] [place] [facility]. Increased rate of TFs.  3/3: NAEON. Pending placement. Will monitor Na level tomorrow.  3/4: stable, no new issues.  3/5/18 - sleepy today but no events overnight     STROKE DOCUMENTATION   Acute Stroke Times   Stroke Team Called Date: 01/25/18  Stroke Team Called Time: 1852  Stroke Team Arrival Date: 01/25/18  Stroke Team Arrival Time: 0652  CT Interpretation Time: 1910  Decision to Treat Time for Alteplase:  (n/a unknown last known normal )  Decision to Treat Time for IR: 1915    NIH Scale:  1a. Level Of Consciousness: 1-->Not alert: but arousable by minor stimulation to obey, answer, or respond  1b. LOC Questions: 2-->Answers neither question correctly  1c. LOC Commands: 1-->Performs one task correctly  2. Best Gaze: 1-->Partial gaze palsy: gaze is abnormal in one or both eyes, but forced deviation or total gaze paresis is not present  3. Visual: 2-->Complete hemianopia  4. Facial Palsy: 1-->Minor paralysis (flattened nasolabial fold, asymmetry on smiling)  5a. Motor Arm, Left: 0-->No drift: limb holds 90 (or 45) degrees for full 10 secs  5b. Motor Arm, Right: 4-->No movement  6a. Motor Leg, Left: 1-->Drift: leg falls by the end of the 5-sec period but does not hit bed  6b. Motor Leg, Right: 4-->No movement  7. Limb Ataxia: 0-->Absent  8. Sensory:  1-->Mild-to-moderate sensory loss: patient feels pinprick is less sharp or is dull on the affected side: or there is a loss of superficial pain with pinprick, but patient is aware of being touched  9. Best Language: 3-->Mute, global aphasia: no usable speech or auditory comprehension  10. Dysarthria: 2-->Severe dysarthria: patients speech is so slurred as to be unintelligible in the absence of or out of proportion to any dysphasia, or is mute/anarthric  11. Extinction and Inattention (formerly Neglect): 0-->No abnormality  Total (NIH Stroke Scale): 23       Modified Claiborne Score: 0  Jeff Coma Scale:    ABCD2 Score:    [id number] Score:   HAS -BLED Score:   ICH Score:   Hunt & Laguerre Classification:      Hemorrhagic change of an Ischemic Stroke: Does this patient have an ischemic stroke with hemorrhagic changes? No     Neurologic Chief Complaint: Left MCA stroke     Subjective:     Interval History: sleepy today but no events overnight   Atorvastatin stopped due to rising LFTs, will continue to monitor. Also last day of rocephin     HPI, Past Medical, Family, and Social History remains the same as documented in the initial encounter.     Review of Systems   Constitutional: Negative for fatigue and fever.   Eyes: Positive for visual disturbance. Negative for redness.   Genitourinary: Negative for hematuria.   Neurological: Positive for speech difficulty and weakness.   Psychiatric/Behavioral: Negative for behavioral problems.     Scheduled Meds:   albuterol-ipratropium 2.5mg-0.5mg/3mL  3 mL Nebulization Q6H    aspirin  325 mg Per G Tube Daily    cefTRIAXone (ROCEPHIN) IVPB  1 g Intravenous Q24H    clopidogrel  75 mg Per G Tube Daily    heparin (porcine)  5,000 Units Subcutaneous Q8H    insulin aspart U-100  9 Units Subcutaneous Q24H    insulin aspart U-100  9 Units Subcutaneous Q24H    insulin aspart U-100  9 Units Subcutaneous Q24H    insulin aspart U-100  9 Units Subcutaneous Q24H    insulin aspart  U-100  9 Units Subcutaneous Q24H    insulin aspart U-100  9 Units Subcutaneous Q24H    insulin detemir U-100  22 Units Subcutaneous Daily    lisinopril  40 mg Per NG tube Daily    metoprolol tartrate  50 mg Per NG tube BID    metroNIDAZOLE  500 mg Per G Tube Q8H    modafinil  200 mg Per NG tube Daily    And    modafinil  100 mg Per NG tube Daily    polyethylene glycol  17 g Per NG tube Daily    potassium chloride 10%  40 mEq Oral Once    senna-docusate 8.6-50 mg  1 tablet Per NG tube BID    sodium chloride 0.9%  3 mL Intravenous Q8H    sodium chloride 3%  4 mL Nebulization Q6H     Continuous Infusions:  PRN Meds:acetaminophen, bisacodyl, dextrose 50%, glucagon (human recombinant), insulin aspart U-100, ipratropium, labetalol, ondansetron    Objective:     Vital Signs (Most Recent):  Temp: 98.5 °F (36.9 °C) (03/05/18 1128)  Pulse: 90 (03/05/18 1128)  Resp: 16 (03/05/18 1128)  BP: 136/86 (03/05/18 1131)  SpO2: 97 % (03/05/18 1128)  BP Location: Right arm    Vital Signs Range (Last 24H):  Temp:  [97.2 °F (36.2 °C)-98.5 °F (36.9 °C)]   Pulse:  [77-98]   Resp:  [16-18]   BP: (113-157)/()   SpO2:  [95 %-98 %]   BP Location: Right arm    Physical Exam   Constitutional: He appears well-developed and well-nourished.   HENT:   Head: Normocephalic and atraumatic.   Cardiovascular: Normal rate.    Pulmonary/Chest: Effort normal. No respiratory distress.   Musculoskeletal: He exhibits no edema or deformity.   Skin: Skin is warm and dry.   Psychiatric: He has a normal mood and affect. His behavior is normal.   Nursing note and vitals reviewed.      Neurological Exam:   LOC: drowsy but arousable  Language: Global aphasia  Articulation: Mute/Anarthric  Orientation: Untestable due to severe aphasia   Visual Fields: Hemianopsia right  EOM (CN III, IV, VI): Gaze preference  left  Facial Movement (CN VII): Lower facial weakness on the Right  Motor: Arm left  Normal 5/5  Leg left  Paresis: 3/5  Arm right  Plegia  0/5  Leg right Plegia 0/5  Tone: Normal tone throughout    Laboratory:  CMP:     Recent Labs  Lab 03/05/18  0434   CALCIUM 9.2   ALBUMIN 2.6*   PROT 6.8   *   K 4.1   CO2 26   CL 98   BUN 17   CREATININE 0.8   ALKPHOS 118   ALT 82*   AST 69*   BILITOT 0.5     BMP:     Recent Labs  Lab 03/05/18  0434   *   K 4.1   CL 98   CO2 26   BUN 17   CREATININE 0.8   CALCIUM 9.2     CBC:     Recent Labs  Lab 03/05/18  0434   WBC 6.25   RBC 4.15*   HGB 12.1*   HCT 36.4*   *   MCV 88   MCH 29.2   MCHC 33.2     Lipid Panel: No results for input(s): CHOL, LDLCALC, HDL, TRIG in the last 168 hours.  Coagulation:     Recent Labs  Lab 03/05/18  0434   INR 0.9     Platelet Aggregation Study: No results for input(s): PLTAGG, PLTAGINTERP, PLTAGREGLACO, ADPPLTAGGREG in the last 168 hours.  Hgb A1C: No results for input(s): HGBA1C in the last 168 hours.  TSH: No results for input(s): TSH in the last 168 hours.      Diagnostic Results       Brain Imaging     Most recent CTH 2/04/18 redemonstration of large L MCA territory infarction w/o evidence of hemorrhagic conversion.  Stable post op change from L MCA endovascular stenting.        Vessel Imaging   IR Cerebral Angiogram 1/25/18 demonstrated occlusion of the L M1 segment of the L MCA with good collaterals. Occlusion removed and stent placed with TICI 2C.    Cardiac Imaging   2D Echo 1/26/18   CONCLUSIONS     1 - Normal left ventricular systolic function (EF 65-70%).     2 - Concentric remodeling.     3 - No wall motion abnormalities.     4 - Normal left ventricular diastolic function.     5 - Normal right ventricular systolic function       CARIE Alejo  [facility] Stroke [facility]  Department of Vascular Neurology   [facility]

## 2018-03-05 NOTE — ASSESSMENT & PLAN NOTE
Likely 2/2 antibiotics (suspect CTX), synthetic function stable, will complete CTX today, recommend monitoring levels

## 2018-03-05 NOTE — PROGRESS NOTES
[facility] Hospital Medicine  Progress Note    Patient Name: Todd Quevedo  MRN: [id number]  Patient Class: IP- Inpatient   Admission Date: 1/25/2018  Length of Stay: 39 days  Attending Physician: Augustine Hollins MD  Primary Care Provider: Primary Doctor [facility] Medicine Team: Networked reference to record PCT  Tao Scott IV, MD    Subjective:     Principal Problem:Embolic stroke involving left middle cerebral artery    HPI:  47yoM with PMHx HLD, poorly controlled Dm, and LOYDA (not currently on home CPAP) s/p large L MCA stroke 1/25 s/p thrombectomy with episode of acute hypoxic respiratory failure. Currently patient was febrile to 101 with tachycardia into the 120s with WBC of 16. For CVA ppx patient is on ASA/plavix and statin therapy, also patient has been on DVT ppx with SQH. Patient was placed on [facility] Course:  No notes on file    Interval Hx: No acute events overnight. Mr. Quevedo's sister is not at bedside today. Mr Quevedo was initially asleep but alert when woken up.      Review of Systems   Unable to perform ROS: Patient nonverbal     Objective:     Vital Signs (Most Recent):  Temp: 98 °F (36.7 °C) (03/05/18 0800)  Pulse: 88 (03/05/18 0800)  Resp: 16 (03/05/18 0800)  BP: 125/85 (03/05/18 0800)  SpO2: 95 % (03/05/18 0800) Vital Signs (24h Range):  Temp:  [97.2 °F (36.2 °C)-98 °F (36.7 °C)] 98 °F (36.7 °C)  Pulse:  [77-98] 88  Resp:  [16-18] 16  SpO2:  [95 %-98 %] 95 %  BP: (113-157)/(76-96) 125/85     Weight: 105.6 kg (232 lb 12.9 oz)  Body mass index is 33.4 kg/m².    Physical Exam   HENT:   Head: Normocephalic.   Eyes: EOM are normal. Pupils are equal, round, and reactive to light. No scleral icterus.   Neck: No JVD present. No tracheal deviation present.   Cardiovascular: Normal rate, regular rhythm and normal heart sounds.    No murmur heard.  Pulmonary/Chest: Effort normal and breath sounds normal. He has no wheezes. He has no rales.   Abdominal: Soft.  There is no guarding.   Peg site with minimal non-purulent drainage, no erythema or induration appreciated   Musculoskeletal: He exhibits no edema.   contracturing in RUE   Neurological:   Alert, does not track or follow commands, left eye deviation, flaccid R. Paralysis, moving left side spontaneously     Skin: Skin is warm. He is not diaphoretic.       Significant Labs:   ABGs:   No results for input(s): PH, PCO2, HCO3, POCSATURATED, BE, TOTALHB, COHB, METHB, O2HB, POCFIO2 in the last 48 hours.  Blood Culture: No results for input(s): LABBLOO in the last 48 hours.  CBC:     Recent Labs  Lab 03/04/18 0422 03/05/18  0434   WBC 7.60 6.25   HGB 11.9* 12.1*   HCT 34.5* 36.4*   * 417*     CMP:     Recent Labs  Lab 03/04/18 0422 03/05/18  0434    134*   K 3.7 4.1   CL 99 98   CO2 29 26   * 152*   BUN 17 17   CREATININE 0.8 0.8   CALCIUM 9.0 9.2   PROT 7.0 6.8   ALBUMIN 2.4* 2.6*   BILITOT 0.5 0.5   ALKPHOS 125 118   AST 65* 69*   ALT 74* 82*   ANIONGAP 9 10   EGFRNONAA >60.0 >60.0     Lactic Acid: No results for input(s): LACTATE in the last 48 hours.  Urine Culture: No results for input(s): LABURIN in the last 48 hours.    Significant Imaging: I have reviewed all pertinent imaging results/findings within the past 24 hours.    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    -Urine culture +klebsiella >100,000 CFU  -Wound/skin culture with  Enterobacter also provotella and fusobacterium  -Abscess from IR aspiration Gram stain with GPC, Culture with Enterobacter and Eikenella; enterobacter sensitive to rocephin  -Blood Cx NGTD  -Would treat with rocephin for 7 days from its initiation (last dose 3/5) and PO flagyl 500 q8hr for 7 days (last dose 3/7)  -Discontinued vancomycin 3/1        Acute respiratory failure with hypoxia    Improving, suspect 2/2 aspiration pneumonitis vs sepsis from abdominal abscess, less likely PNA   At baseline will possibly have chronic hypoxic respiratory failure 2/2 combination of LOYDA, atelectasis and possible central component  - turn q2h, HOB elevated, PRN O2        Urinary tract infection due to Klebsiella species    Covered on current regimen  Bruce changed          VTE Risk Mitigation         Ordered     heparin (porcine) injection 5,000 Units  Every 8 hours     Route:  Subcutaneous        02/20/18 1010     heparin (porcine) injection 5,000 Units  Every 8 hours     Route:  Subcutaneous        02/14/18 1002     High Risk of VTE  Once      01/27/18 1406              Tao Scott IV, MD  Department of Hospital Medicine   [facility]

## 2018-03-05 NOTE — ASSESSMENT & PLAN NOTE
"48 yo M with PMHx HLD, LOYDA, poorly controlled DM II [place] [facility] [place] after noted "strange behavior" for which EMS was called. Pt was found to have a L M1 occlusion and was taken to IR for thrombectomy with TICI 2C reperfusion and stent placement due to L MCA stenosis. Etiology remains unclear: no obvious signs of cardiac source or atheromatous disease in the aorto/carotid axis. Echo normal with no hx or signs of A fib. PT/OT/SLP will continue to evaluate and treat; recommending SNF. DAPT 2/2 recent stent placement.      Waiting for good safe discharge plan     Antithrombotics:  mg po qd, clopidogrel 75 mg po qd [s/p intracranial stent]  Statins: Atorvastatin 80 mg po qd -held due to transaminitis  Aggressive risk factor modification: HLD, DM II (Hgb A1c 10%), Obesity  Rehab efforts: PT/OT/SLP-- Recommending SNF, placement pending  Diagnostics ordered/pending: None  VTE prophylaxis: Heparin 5000 U q8h  BP parameters: Infarct: -170   "

## 2018-03-05 NOTE — PHYSICIAN QUERY
PT Name: Todd Quevedo  MR #: [id number]    Physician Query Form - Nutrition Clarification     CDS/: Diane Robles RN, CDS               Contact information: [email or web address]    This form is a permanent document in the medical record.     Query Date: March 5, 2018    By submitting this query, we are merely seeking further clarification of documentation.. Please utilize your independent clinical judgment when addressing the question(s) below.    The Medical record contains the following:   Indicators  Supporting Clinical Findings Location in Medical Record   x % of Estimated Energy Intake over a time frame from p.o., TF, or TPN --% Intake of Estimated Energy Needs: 50 - 75 %      --% Intake of Estimated Energy Needs: 75 - 100 %    --Current TF rate/ formula only meeting 75% energy needs & 81% of protein needs.    RD Note 1/30-->2/1; 2/12->2/19    RD Note 2/5, 2/23 RD Note 3/2    Weight Status over a time frame      Subcutaneous Fat and/or Muscle Loss      Fluid Accumulation or Edema      Reduced  Strength     x Wt / BMI / Usual Body Weight Wt: 248 lbs-->232 lbs  BMI: 35.79-->33.47   Flowsheets 1/25->2/19    Delayed Wound Healing / Failure to Thrive     x Acute or Chronic Illness --Adult ftt    --Embolic CVA, Infection of PEG site, dysphagia, ARF, UTI  --DM    --Asp PNA, gram neg sepsis    --Intubation on 1/31-> extubated 2/13 NCC Note 2/3->2/4    Vas Neuro 3/4        Query reponse 3/2    Flowsheets 1/31--> 2/13      Medication     x Treatment   --S/p PEG    --Pt currently on Diabeta Source @ 10 ml/hr with goal rate of 60 ml/hr. Goal rate of 60 ml/hr of Diabeticsource will not provide adequate calories. Recommend switch to Glucerna 1.5.     --Recommend to increase TF to goal rate of Diabetisource @75mL/hr.     Procedure Note 2/14    RD Note 1/30          RD Note 3/2   x Other --Pt remains NPO, failed WOODY, globally aphasic with NG tube in place.    --Energy Calories Required: not meeting  needs   --Protein Required: not meeting needs   RD Note 1/26      RD Note 1/30->3/2     AND / ASPEN Clinical Characteristics (October 2011)  A minimum of two characteristics is recommended for diagnosing either moderate or severe malnutrition   Mild Malnutrition Moderate Malnutrition Severe Malnutrition   Energy Intake from p.o., TF or TPN. < 75% intake of estimated energy needs for less than 7 days < 75% intake of estimated energy needs for greater than 7 days < 50% intake of estimated energy needs for > 5 days   Weight Loss 1-2% in 1 month  5% in 3 months  7.5% in 6 months  10% in 1 year 1-2 % in 1 week  5% in 1 month  7.5% in 3 months  10% in 6 months  20% in 1 year > 2% in 1 week  > 5% in 1 month  > 7.5% in 3 months  > 10% in 6 months  > 20% in 1 year   Physical Findings     None *Mild subcutaneous fat and/or muscle loss  *Mild fluid accumulation  *Stage II decubitus  *Surgical wound or non-healing wound *Mod/severe subcutaneous fat and/or muscle loss  *Mod/severe fluid accumulation  *Stage III or IV decubitus  *Non-healing surgical wound     Provider, please specify diagnosis or diagnoses associated with above clinical findings.    [ ] Mild Protein-Calorie Malnutrition  [ ] Moderate Protein-Calorie Malnutrition  [ ] Other Nutritional Diagnosis (please specify): ____________________________________  [ ] Other: ________________________________  [xxxxxx ] Clinically Undetermined    Please document in your progress notes daily for the duration of treatment until resolved and include in your discharge summary.

## 2018-03-05 NOTE — SUBJECTIVE & OBJECTIVE
Interval Hx: No acute events overnight. Mr. Quevedo's sister is not at bedside today. Mr Quevedo was initially asleep but alert when woken up.      Review of Systems   Unable to perform ROS: Patient nonverbal     Objective:     Vital Signs (Most Recent):  Temp: 98 °F (36.7 °C) (03/05/18 0800)  Pulse: 88 (03/05/18 0800)  Resp: 16 (03/05/18 0800)  BP: 125/85 (03/05/18 0800)  SpO2: 95 % (03/05/18 0800) Vital Signs (24h Range):  Temp:  [97.2 °F (36.2 °C)-98 °F (36.7 °C)] 98 °F (36.7 °C)  Pulse:  [77-98] 88  Resp:  [16-18] 16  SpO2:  [95 %-98 %] 95 %  BP: (113-157)/(76-96) 125/85     Weight: 105.6 kg (232 lb 12.9 oz)  Body mass index is 33.4 kg/m².    Physical Exam   HENT:   Head: Normocephalic.   Eyes: EOM are normal. Pupils are equal, round, and reactive to light. No scleral icterus.   Neck: No JVD present. No tracheal deviation present.   Cardiovascular: Normal rate, regular rhythm and normal heart sounds.    No murmur heard.  Pulmonary/Chest: Effort normal and breath sounds normal. He has no wheezes. He has no rales.   Abdominal: Soft. There is no guarding.   Peg site with minimal non-purulent drainage, no erythema or induration appreciated   Musculoskeletal: He exhibits no edema.   contracturing in RUE   Neurological:   Alert, does not track or follow commands, left eye deviation, flaccid R. Paralysis, moving left side spontaneously     Skin: Skin is warm. He is not diaphoretic.       Significant Labs:   ABGs:   No results for input(s): PH, PCO2, HCO3, POCSATURATED, BE, TOTALHB, COHB, METHB, O2HB, POCFIO2 in the last 48 hours.  Blood Culture: No results for input(s): LABBLOO in the last 48 hours.  CBC:     Recent Labs  Lab 03/04/18  0422 03/05/18  0434   WBC 7.60 6.25   HGB 11.9* 12.1*   HCT 34.5* 36.4*   * 417*     CMP:     Recent Labs  Lab 03/04/18  0422 03/05/18  0434    134*   K 3.7 4.1   CL 99 98   CO2 29 26   * 152*   BUN 17 17   CREATININE 0.8 0.8   CALCIUM 9.0 9.2   PROT 7.0 6.8   ALBUMIN  2.4* 2.6*   BILITOT 0.5 0.5   ALKPHOS 125 118   AST 65* 69*   ALT 74* 82*   ANIONGAP 9 10   EGFRNONAA >60.0 >60.0     Lactic Acid: No results for input(s): LACTATE in the last 48 hours.  Urine Culture: No results for input(s): LABURIN in the last 48 hours.    Significant Imaging: I have reviewed all pertinent imaging results/findings within the past 24 hours.

## 2018-03-05 NOTE — PROGRESS NOTES
Wound care consulted for skin breakdown on sacrum.   Intertrigo noted to gluteal cleft/coccyx area.  Redness to bj-wound- blanches.  Condom catheter in place.   Family at bedside. Plan of care discussed with family who verbalized understanding.  Recommendations:  Barrier cream BID  AD overlay  Skin breakdown preventions     03/05/18 0945       Wound 03/05/18 0945 Intertrigo;Moisture associated dermatitis Gluteal cleft   Date First Assessed/Time First Assessed: 03/05/18 0945   Pre-existing: No  Wound Type: Intertrigo;Moisture associated dermatitis  Location: Gluteal cleft   Wound WDL ex   Dressing Appearance Open to air;No dressing   Drainage Amount None   Appearance Pink;Moist  (blanches)   Tissue loss description Partial thickness   Red (%), Wound Tissue Color 100 %   Periwound Area Intact;Redness  (blanches)   Wound Edges Open   Wound Length (cm) 1   Wound Width (cm) 0.2   Depth (cm) 0.1   Care Applied:;Skin Barrier   Skin Interventions   Pressure Reduction Devices Heel offloading device utilized   Pressure Reduction Techniques weight shift assistance provided;heels elevated off bed;positioned off wounds;pressure points protected   Skin Protection Tubing/devices free from skin contact;Urinary containment device;Skin-to-device areas padded;Skin-to-skin areas padded;Skin sealant/moisture barrier;Incontinence pads

## 2018-03-05 NOTE — SUBJECTIVE & OBJECTIVE
Neurologic Chief Complaint: Left MCA stroke     Subjective:     Interval History: sleepy today but no events overnight   Atorvastatin stopped due to rising LFTs, will continue to monitor. Also last day of rocephin     HPI, Past Medical, Family, and Social History remains the same as documented in the initial encounter.     Review of Systems   Constitutional: Negative for fatigue and fever.   Eyes: Positive for visual disturbance. Negative for redness.   Genitourinary: Negative for hematuria.   Neurological: Positive for speech difficulty and weakness.   Psychiatric/Behavioral: Negative for behavioral problems.     Scheduled Meds:   albuterol-ipratropium 2.5mg-0.5mg/3mL  3 mL Nebulization Q6H    aspirin  325 mg Per G Tube Daily    cefTRIAXone (ROCEPHIN) IVPB  1 g Intravenous Q24H    clopidogrel  75 mg Per G Tube Daily    heparin (porcine)  5,000 Units Subcutaneous Q8H    insulin aspart U-100  9 Units Subcutaneous Q24H    insulin aspart U-100  9 Units Subcutaneous Q24H    insulin aspart U-100  9 Units Subcutaneous Q24H    insulin aspart U-100  9 Units Subcutaneous Q24H    insulin aspart U-100  9 Units Subcutaneous Q24H    insulin aspart U-100  9 Units Subcutaneous Q24H    insulin detemir U-100  22 Units Subcutaneous Daily    lisinopril  40 mg Per NG tube Daily    metoprolol tartrate  50 mg Per NG tube BID    metroNIDAZOLE  500 mg Per G Tube Q8H    modafinil  200 mg Per NG tube Daily    And    modafinil  100 mg Per NG tube Daily    polyethylene glycol  17 g Per NG tube Daily    potassium chloride 10%  40 mEq Oral Once    senna-docusate 8.6-50 mg  1 tablet Per NG tube BID    sodium chloride 0.9%  3 mL Intravenous Q8H    sodium chloride 3%  4 mL Nebulization Q6H     Continuous Infusions:  PRN Meds:acetaminophen, bisacodyl, dextrose 50%, glucagon (human recombinant), insulin aspart U-100, ipratropium, labetalol, ondansetron    Objective:     Vital Signs (Most Recent):  Temp: 98.5 °F (36.9 °C)  (03/05/18 1128)  Pulse: 90 (03/05/18 1128)  Resp: 16 (03/05/18 1128)  BP: 136/86 (03/05/18 1131)  SpO2: 97 % (03/05/18 1128)  BP Location: Right arm    Vital Signs Range (Last 24H):  Temp:  [97.2 °F (36.2 °C)-98.5 °F (36.9 °C)]   Pulse:  [77-98]   Resp:  [16-18]   BP: (113-157)/()   SpO2:  [95 %-98 %]   BP Location: Right arm    Physical Exam   Constitutional: He appears well-developed and well-nourished.   HENT:   Head: Normocephalic and atraumatic.   Cardiovascular: Normal rate.    Pulmonary/Chest: Effort normal. No respiratory distress.   Musculoskeletal: He exhibits no edema or deformity.   Skin: Skin is warm and dry.   Psychiatric: He has a normal mood and affect. His behavior is normal.   Nursing note and vitals reviewed.      Neurological Exam:   LOC: drowsy but arousable  Language: Global aphasia  Articulation: Mute/Anarthric  Orientation: Untestable due to severe aphasia   Visual Fields: Hemianopsia right  EOM (CN III, IV, VI): Gaze preference  left  Facial Movement (CN VII): Lower facial weakness on the Right  Motor: Arm left  Normal 5/5  Leg left  Paresis: 3/5  Arm right  Plegia 0/5  Leg right Plegia 0/5  Tone: Normal tone throughout    Laboratory:  CMP:     Recent Labs  Lab 03/05/18 0434   CALCIUM 9.2   ALBUMIN 2.6*   PROT 6.8   *   K 4.1   CO2 26   CL 98   BUN 17   CREATININE 0.8   ALKPHOS 118   ALT 82*   AST 69*   BILITOT 0.5     BMP:     Recent Labs  Lab 03/05/18 0434   *   K 4.1   CL 98   CO2 26   BUN 17   CREATININE 0.8   CALCIUM 9.2     CBC:     Recent Labs  Lab 03/05/18 0434   WBC 6.25   RBC 4.15*   HGB 12.1*   HCT 36.4*   *   MCV 88   MCH 29.2   MCHC 33.2     Lipid Panel: No results for input(s): CHOL, LDLCALC, HDL, TRIG in the last 168 hours.  Coagulation:     Recent Labs  Lab 03/05/18  0434   INR 0.9     Platelet Aggregation Study: No results for input(s): PLTAGG, PLTAGINTERP, PLTAGREGLACO, ADPPLTAGGREG in the last 168 hours.  Hgb A1C: No results for input(s): HGBA1C  in the last 168 hours.  TSH: No results for input(s): TSH in the last 168 hours.      Diagnostic Results       Brain Imaging     Most recent CTH 2/04/18 redemonstration of large L MCA territory infarction w/o evidence of hemorrhagic conversion.  Stable post op change from L MCA endovascular stenting.        Vessel Imaging   IR Cerebral Angiogram 1/25/18 demonstrated occlusion of the L M1 segment of the L MCA with good collaterals. Occlusion removed and stent placed with TICI 2C.    Cardiac Imaging   2D Echo 1/26/18   CONCLUSIONS     1 - Normal left ventricular systolic function (EF 65-70%).     2 - Concentric remodeling.     3 - No wall motion abnormalities.     4 - Normal left ventricular diastolic function.     5 - Normal right ventricular systolic function

## 2018-03-06 PROBLEM — I63.9 DEPRESSION DUE TO ACUTE STROKE: Status: RESOLVED | Noted: 2018-03-02 | Resolved: 2018-03-06

## 2018-03-06 PROBLEM — F06.31 DEPRESSION DUE TO ACUTE STROKE: Status: RESOLVED | Noted: 2018-03-02 | Resolved: 2018-03-06

## 2018-03-06 LAB
ALBUMIN SERPL BCP-MCNC: 2.8 G/DL
ALP SERPL-CCNC: 115 U/L
ALT SERPL W/O P-5'-P-CCNC: 94 U/L
ANION GAP SERPL CALC-SCNC: 10 MMOL/L
AST SERPL-CCNC: 69 U/L
BASOPHILS # BLD AUTO: 0.05 K/UL
BASOPHILS NFR BLD: 0.6 %
BILIRUB SERPL-MCNC: 0.6 MG/DL
BUN SERPL-MCNC: 16 MG/DL
CALCIUM SERPL-MCNC: 9.8 MG/DL
CHLORIDE SERPL-SCNC: 97 MMOL/L
CO2 SERPL-SCNC: 28 MMOL/L
CREAT SERPL-MCNC: 0.8 MG/DL
DIFFERENTIAL METHOD: ABNORMAL
EOSINOPHIL # BLD AUTO: 0.1 K/UL
EOSINOPHIL NFR BLD: 1.2 %
ERYTHROCYTE [DISTWIDTH] IN BLOOD BY AUTOMATED COUNT: 13.8 %
EST. GFR  (AFRICAN AMERICAN): >60 ML/MIN/1.73 M^2
EST. GFR  (NON AFRICAN AMERICAN): >60 ML/MIN/1.73 M^2
GLUCOSE SERPL-MCNC: 182 MG/DL
HCT VFR BLD AUTO: 36.1 %
HGB BLD-MCNC: 12.2 G/DL
IMM GRANULOCYTES # BLD AUTO: 0.08 K/UL
IMM GRANULOCYTES NFR BLD AUTO: 1 %
INR PPP: 0.9
LYMPHOCYTES # BLD AUTO: 2.1 K/UL
LYMPHOCYTES NFR BLD: 26.5 %
MAGNESIUM SERPL-MCNC: 1.6 MG/DL
MCH RBC QN AUTO: 29.2 PG
MCHC RBC AUTO-ENTMCNC: 33.8 G/DL
MCV RBC AUTO: 86 FL
MONOCYTES # BLD AUTO: 0.7 K/UL
MONOCYTES NFR BLD: 8.7 %
NEUTROPHILS # BLD AUTO: 5 K/UL
NEUTROPHILS NFR BLD: 62 %
NRBC BLD-RTO: 0 /100 WBC
PHOSPHATE SERPL-MCNC: 3.5 MG/DL
PLATELET # BLD AUTO: 551 K/UL
PMV BLD AUTO: 9.3 FL
POCT GLUCOSE: 185 MG/DL (ref 70–110)
POCT GLUCOSE: 205 MG/DL (ref 70–110)
POCT GLUCOSE: 206 MG/DL (ref 70–110)
POCT GLUCOSE: 216 MG/DL (ref 70–110)
POCT GLUCOSE: 219 MG/DL (ref 70–110)
POCT GLUCOSE: 234 MG/DL (ref 70–110)
POTASSIUM SERPL-SCNC: 4 MMOL/L
PROT SERPL-MCNC: 7.1 G/DL
PROTHROMBIN TIME: 9.9 SEC
RBC # BLD AUTO: 4.18 M/UL
SODIUM SERPL-SCNC: 135 MMOL/L
WBC # BLD AUTO: 8.08 K/UL

## 2018-03-06 PROCEDURE — 25000003 PHARM REV CODE 250: Performed by: NURSE PRACTITIONER

## 2018-03-06 PROCEDURE — 99232 SBSQ HOSP IP/OBS MODERATE 35: CPT | Mod: ,,, | Performed by: HOSPITALIST

## 2018-03-06 PROCEDURE — 25000003 PHARM REV CODE 250: Performed by: PSYCHIATRY & NEUROLOGY

## 2018-03-06 PROCEDURE — 25000242 PHARM REV CODE 250 ALT 637 W/ HCPCS: Performed by: NURSE PRACTITIONER

## 2018-03-06 PROCEDURE — 83735 ASSAY OF MAGNESIUM: CPT

## 2018-03-06 PROCEDURE — 25000242 PHARM REV CODE 250 ALT 637 W/ HCPCS: Performed by: PSYCHIATRY & NEUROLOGY

## 2018-03-06 PROCEDURE — 25000003 PHARM REV CODE 250: Performed by: PHYSICIAN ASSISTANT

## 2018-03-06 PROCEDURE — 94668 MNPJ CHEST WALL SBSQ: CPT

## 2018-03-06 PROCEDURE — 25000003 PHARM REV CODE 250: Performed by: HOSPITALIST

## 2018-03-06 PROCEDURE — 63600175 PHARM REV CODE 636 W HCPCS: Performed by: NURSE PRACTITIONER

## 2018-03-06 PROCEDURE — A4216 STERILE WATER/SALINE, 10 ML: HCPCS | Performed by: NURSE PRACTITIONER

## 2018-03-06 PROCEDURE — 94761 N-INVAS EAR/PLS OXIMETRY MLT: CPT

## 2018-03-06 PROCEDURE — 80053 COMPREHEN METABOLIC PANEL: CPT

## 2018-03-06 PROCEDURE — 92526 ORAL FUNCTION THERAPY: CPT

## 2018-03-06 PROCEDURE — 97530 THERAPEUTIC ACTIVITIES: CPT

## 2018-03-06 PROCEDURE — 20600001 HC STEP DOWN PRIVATE ROOM

## 2018-03-06 PROCEDURE — 84100 ASSAY OF PHOSPHORUS: CPT

## 2018-03-06 PROCEDURE — 85610 PROTHROMBIN TIME: CPT

## 2018-03-06 PROCEDURE — 25000003 PHARM REV CODE 250: Performed by: STUDENT IN AN ORGANIZED HEALTH CARE EDUCATION/TRAINING PROGRAM

## 2018-03-06 PROCEDURE — 36415 COLL VENOUS BLD VENIPUNCTURE: CPT

## 2018-03-06 PROCEDURE — 94640 AIRWAY INHALATION TREATMENT: CPT

## 2018-03-06 PROCEDURE — 85025 COMPLETE CBC W/AUTO DIFF WBC: CPT

## 2018-03-06 PROCEDURE — 92507 TX SP LANG VOICE COMM INDIV: CPT

## 2018-03-06 PROCEDURE — 99233 SBSQ HOSP IP/OBS HIGH 50: CPT | Mod: ,,, | Performed by: PSYCHIATRY & NEUROLOGY

## 2018-03-06 RX ORDER — INSULIN ASPART 100 [IU]/ML
10 INJECTION, SOLUTION INTRAVENOUS; SUBCUTANEOUS
Status: DISCONTINUED | OUTPATIENT
Start: 2018-03-06 | End: 2018-03-07

## 2018-03-06 RX ORDER — INSULIN ASPART 100 [IU]/ML
10 INJECTION, SOLUTION INTRAVENOUS; SUBCUTANEOUS
Status: DISCONTINUED | OUTPATIENT
Start: 2018-03-07 | End: 2018-03-07

## 2018-03-06 RX ADMIN — INSULIN ASPART 2 UNITS: 100 INJECTION, SOLUTION INTRAVENOUS; SUBCUTANEOUS at 02:03

## 2018-03-06 RX ADMIN — HEPARIN SODIUM 5000 UNITS: 5000 INJECTION, SOLUTION INTRAVENOUS; SUBCUTANEOUS at 06:03

## 2018-03-06 RX ADMIN — INSULIN ASPART 4 UNITS: 100 INJECTION, SOLUTION INTRAVENOUS; SUBCUTANEOUS at 11:03

## 2018-03-06 RX ADMIN — MODAFINIL 100 MG: 100 TABLET ORAL at 02:03

## 2018-03-06 RX ADMIN — POLYETHYLENE GLYCOL 3350 17 G: 17 POWDER, FOR SOLUTION ORAL at 08:03

## 2018-03-06 RX ADMIN — METOPROLOL TARTRATE 50 MG: 50 TABLET, FILM COATED ORAL at 07:03

## 2018-03-06 RX ADMIN — INSULIN ASPART 10 UNITS: 100 INJECTION, SOLUTION INTRAVENOUS; SUBCUTANEOUS at 09:03

## 2018-03-06 RX ADMIN — INSULIN ASPART 2 UNITS: 100 INJECTION, SOLUTION INTRAVENOUS; SUBCUTANEOUS at 07:03

## 2018-03-06 RX ADMIN — INSULIN ASPART 2 UNITS: 100 INJECTION, SOLUTION INTRAVENOUS; SUBCUTANEOUS at 10:03

## 2018-03-06 RX ADMIN — MODAFINIL 200 MG: 100 TABLET ORAL at 06:03

## 2018-03-06 RX ADMIN — INSULIN ASPART 9 UNITS: 100 INJECTION, SOLUTION INTRAVENOUS; SUBCUTANEOUS at 06:03

## 2018-03-06 RX ADMIN — METRONIDAZOLE 500 MG: 500 TABLET ORAL at 06:03

## 2018-03-06 RX ADMIN — Medication 3 ML: at 10:03

## 2018-03-06 RX ADMIN — HEPARIN SODIUM 5000 UNITS: 5000 INJECTION, SOLUTION INTRAVENOUS; SUBCUTANEOUS at 09:03

## 2018-03-06 RX ADMIN — INSULIN ASPART 9 UNITS: 100 INJECTION, SOLUTION INTRAVENOUS; SUBCUTANEOUS at 02:03

## 2018-03-06 RX ADMIN — METOPROLOL TARTRATE 50 MG: 50 TABLET, FILM COATED ORAL at 09:03

## 2018-03-06 RX ADMIN — LISINOPRIL 40 MG: 20 TABLET ORAL at 08:03

## 2018-03-06 RX ADMIN — SODIUM CHLORIDE SOLN NEBU 3% 4 ML: 3 NEBU SOLN at 07:03

## 2018-03-06 RX ADMIN — METRONIDAZOLE 500 MG: 500 TABLET ORAL at 09:03

## 2018-03-06 RX ADMIN — INSULIN DETEMIR 22 UNITS: 100 INJECTION, SOLUTION SUBCUTANEOUS at 07:03

## 2018-03-06 RX ADMIN — HEPARIN SODIUM 5000 UNITS: 5000 INJECTION, SOLUTION INTRAVENOUS; SUBCUTANEOUS at 02:03

## 2018-03-06 RX ADMIN — INSULIN ASPART 10 UNITS: 100 INJECTION, SOLUTION INTRAVENOUS; SUBCUTANEOUS at 04:03

## 2018-03-06 RX ADMIN — CLOPIDOGREL BISULFATE 75 MG: 75 TABLET, FILM COATED ORAL at 07:03

## 2018-03-06 RX ADMIN — STANDARDIZED SENNA CONCENTRATE AND DOCUSATE SODIUM 1 TABLET: 8.6; 5 TABLET, FILM COATED ORAL at 09:03

## 2018-03-06 RX ADMIN — ASPIRIN 325 MG ORAL TABLET 325 MG: 325 PILL ORAL at 07:03

## 2018-03-06 RX ADMIN — INSULIN ASPART 2 UNITS: 100 INJECTION, SOLUTION INTRAVENOUS; SUBCUTANEOUS at 06:03

## 2018-03-06 RX ADMIN — Medication 3 ML: at 06:03

## 2018-03-06 RX ADMIN — IPRATROPIUM BROMIDE AND ALBUTEROL SULFATE 3 ML: 2.5; .5 SOLUTION RESPIRATORY (INHALATION) at 07:03

## 2018-03-06 RX ADMIN — INSULIN ASPART 4 UNITS: 100 INJECTION, SOLUTION INTRAVENOUS; SUBCUTANEOUS at 04:03

## 2018-03-06 RX ADMIN — STANDARDIZED SENNA CONCENTRATE AND DOCUSATE SODIUM 1 TABLET: 8.6; 5 TABLET, FILM COATED ORAL at 07:03

## 2018-03-06 RX ADMIN — IPRATROPIUM BROMIDE AND ALBUTEROL SULFATE 3 ML: 2.5; .5 SOLUTION RESPIRATORY (INHALATION) at 01:03

## 2018-03-06 RX ADMIN — METRONIDAZOLE 500 MG: 500 TABLET ORAL at 02:03

## 2018-03-06 RX ADMIN — INSULIN ASPART 10 UNITS: 100 INJECTION, SOLUTION INTRAVENOUS; SUBCUTANEOUS at 11:03

## 2018-03-06 RX ADMIN — INSULIN ASPART 9 UNITS: 100 INJECTION, SOLUTION INTRAVENOUS; SUBCUTANEOUS at 07:03

## 2018-03-06 RX ADMIN — SODIUM CHLORIDE SOLN NEBU 3% 4 ML: 3 NEBU SOLN at 12:03

## 2018-03-06 NOTE — SUBJECTIVE & OBJECTIVE
Neurologic Chief Complaint: Left MCA stroke     Subjective:     Interval History: sleepy today but no events overnight   Sister concerned about coughing post speech. Reviewed with speech and sister. Stable oxygenation on 1 liter.   sats without significant decrase, patient in no respiratory distress, will continue to monitor    LFTs still elevated at current, will monitor and hold statin. Rocephin course completed     HPI, Past Medical, Family, and Social History remains the same as documented in the initial encounter.     Review of Systems   Constitutional: Negative for fatigue and fever.   Eyes: Positive for visual disturbance. Negative for redness.   Genitourinary: Negative for hematuria.   Neurological: Positive for speech difficulty and weakness.   Psychiatric/Behavioral: Negative for behavioral problems.     Scheduled Meds:   albuterol-ipratropium 2.5mg-0.5mg/3mL  3 mL Nebulization Q6H    aspirin  325 mg Per G Tube Daily    clopidogrel  75 mg Per G Tube Daily    heparin (porcine)  5,000 Units Subcutaneous Q8H    [START ON 3/7/2018] insulin aspart U-100  10 Units Subcutaneous Q24H    [START ON 3/7/2018] insulin aspart U-100  10 Units Subcutaneous Q24H    insulin aspart U-100  10 Units Subcutaneous Q24H    insulin aspart U-100  10 Units Subcutaneous Q24H    insulin aspart U-100  10 Units Subcutaneous Q24H    [START ON 3/7/2018] insulin aspart U-100  10 Units Subcutaneous Q24H    insulin detemir U-100  22 Units Subcutaneous Daily    lisinopril  40 mg Per NG tube Daily    metoprolol tartrate  50 mg Per NG tube BID    metroNIDAZOLE  500 mg Per G Tube Q8H    modafinil  200 mg Per NG tube Daily    And    modafinil  100 mg Per NG tube Daily    polyethylene glycol  17 g Per NG tube Daily    potassium chloride 10%  40 mEq Oral Once    senna-docusate 8.6-50 mg  1 tablet Per NG tube BID    sodium chloride 0.9%  3 mL Intravenous Q8H    sodium chloride 3%  4 mL Nebulization Q6H     Continuous  Infusions:  PRN Meds:acetaminophen, bisacodyl, dextrose 50%, glucagon (human recombinant), insulin aspart U-100, ipratropium, labetalol, ondansetron    Objective:     Vital Signs (Most Recent):  Temp: 97.9 °F (36.6 °C) (03/06/18 1156)  Pulse: 97 (03/06/18 1256)  Resp: 17 (03/06/18 1256)  BP: (!) 143/93 (03/06/18 1156)  SpO2: 97 % (03/06/18 1156)  BP Location: Right arm    Vital Signs Range (Last 24H):  Temp:  [97.9 °F (36.6 °C)-99.8 °F (37.7 °C)]   Pulse:  []   Resp:  [16-18]   BP: (133-174)/(88-98)   SpO2:  [94 %-100 %]   BP Location: Right arm    Physical Exam   Constitutional: He appears well-developed and well-nourished.   HENT:   Head: Normocephalic and atraumatic.   Cardiovascular: Normal rate.    Pulmonary/Chest: Effort normal. No respiratory distress.   No use of accessory muscles    Musculoskeletal: He exhibits no edema or deformity.   Skin: Skin is warm and dry.   Psychiatric:   Depressed mood   Nursing note and vitals reviewed.      Neurological Exam:   LOC: alert  Language: Global aphasia  Articulation: Mute/Anarthric  Orientation: Untestable due to severe aphasia   Visual Fields: Hemianopsia right  EOM (CN III, IV, VI): Gaze preference  left  Facial Movement (CN VII): Lower facial weakness on the Right  Motor: Arm left  Normal 5/5  Leg left  Paresis: 3/5  Arm right  Plegia 0/5  Leg right Plegia 0/5      Laboratory:  CMP:     Recent Labs  Lab 03/06/18 0449   CALCIUM 9.8   ALBUMIN 2.8*   PROT 7.1   *   K 4.0   CO2 28   CL 97   BUN 16   CREATININE 0.8   ALKPHOS 115   ALT 94*   AST 69*   BILITOT 0.6     BMP:     Recent Labs  Lab 03/06/18 0449   *   K 4.0   CL 97   CO2 28   BUN 16   CREATININE 0.8   CALCIUM 9.8     CBC:     Recent Labs  Lab 03/06/18 0449   WBC 8.08   RBC 4.18*   HGB 12.2*   HCT 36.1*   *   MCV 86   MCH 29.2   MCHC 33.8     Lipid Panel: No results for input(s): CHOL, LDLCALC, HDL, TRIG in the last 168 hours.  Coagulation:     Recent Labs  Lab 03/06/18  0449   INR  0.9     Platelet Aggregation Study: No results for input(s): PLTAGG, PLTAGINTERP, PLTAGREGLACO, ADPPLTAGGREG in the last 168 hours.  Hgb A1C: No results for input(s): HGBA1C in the last 168 hours.  TSH: No results for input(s): TSH in the last 168 hours.      Diagnostic Results       Brain Imaging     Most recent CTH 2/04/18 redemonstration of large L MCA territory infarction w/o evidence of hemorrhagic conversion.  Stable post op change from L MCA endovascular stenting.        Vessel Imaging   IR Cerebral Angiogram 1/25/18 demonstrated occlusion of the L M1 segment of the L MCA with good collaterals. Occlusion removed and stent placed with TICI 2C.    Cardiac Imaging   2D Echo 1/26/18   CONCLUSIONS     1 - Normal left ventricular systolic function (EF 65-70%).     2 - Concentric remodeling.     3 - No wall motion abnormalities.     4 - Normal left ventricular diastolic function.     5 - Normal right ventricular systolic function

## 2018-03-06 NOTE — PLAN OF CARE
Problem: Patient Care Overview  Goal: Plan of Care Review  Outcome: Ongoing (interventions implemented as appropriate)  PT is AAOX3, no acute changes noted during shift, pt has been on bedrest during shift, and repositioned q2, pt tolerates repositioning well, no skin breakdown noted during shift, VSS. No falls noted. Fall precautions remain Pain assessed. No pain noted. Pt resting comfortable in bed. Call light in reach. Will continue to monitor.

## 2018-03-06 NOTE — PT/OT/SLP PROGRESS
Physical Therapy      Patient Name:  Todd Quevedo   MRN:  [id number]    PT was notified from interdisciplinary rounds that the d/c plan for Mr. Quevedo has changed from SNF to home with HH and 24 hr assist.  Writing PT spoke with the patient's sister in the hospital room to arrange/schedule family training with the patient's family.  He will be cared for at home by his wife and his daughter.  They live in [place] and may not be able to physically come to the hospital for family training.  The dtr is physically in [place] but will be moving to [place] to help care for her father.  The patient's sister will communicate with them today regarding family training.  If they are unable to come for family training.  Family training will be completed with the sister and written communication will be provided for the family.  The sister will then be responsible for training his wife and dtr.     DME:  Hospital bed   Edmar lift   Reclining w/c (Must be a reclining w/c.  The patient does not have sufficient head or trunk control to safely sit upright in a regular w/c.)   W/c cushion    The sister verbalized understanding of the above and agreed to communicate with his wife and dtr.  Education was provided on level of assistance, examples of DME. PT reinforced the need for 24 hr assistance and total/dependent care.       Time Tracking:       PT Received On: 03/06/18  PT Start Time: 1100     PT Stop Time: 1115  PT Total Time (min): 15 min      Billable Minutes: Therapeutic Activity 15      Madina Luu, PT  3/2/2018  [phone] (pager)  Madina Luu, PT

## 2018-03-06 NOTE — PROGRESS NOTES
Sister called for nurse, and stated that she believes that pt aspirated on a piece of ice given by speech therapy. Noises auscultated from the throat area with stethoscope, however, his lungs sound clear and equal bilaterally. Notified stroke team of her concerns.

## 2018-03-06 NOTE — ASSESSMENT & PLAN NOTE
Improving, suspect 2/2 aspiration pneumonitis vs sepsis from abdominal abscess, less likely PNA   At baseline will possibly have chronic hypoxic respiratory failure 2/2 combination of LOYDA, atelectasis and possible central component  - Patiently currently satting well on 1L NC  - turn q2h, HOB elevated, PRN O2

## 2018-03-06 NOTE — PROGRESS NOTES
[facility] Hospital Medicine  Progress Note    Patient Name: Todd Quevedo  MRN: [id number]  Patient Class: IP- Inpatient   Admission Date: 1/25/2018  Length of Stay: 40 days  Attending Physician: Augustine Hollins MD  Primary Care Provider: [facility] Medicine Team: Networked reference to record PCT  Mina Ram MD    Subjective:     Principal Problem:Embolic stroke involving left middle cerebral artery    HPI:  47yoM with PMHx HLD, poorly controlled Dm, and LOYDA (not currently on home CPAP) s/p large L MCA stroke 1/25 s/p thrombectomy with episode of acute hypoxic respiratory failure. Currently patient was febrile to 101 with tachycardia into the 120s with WBC of 16. For CVA ppx patient is on ASA/plavix and statin therapy, also patient has been on DVT ppx with SQH. Patient was placed on [facility] Course:  No notes on file    Interval History: NAEON. Mr. Quevedo is alert this morning, but unable to follow to simple commands. Spontaneously moving left upper extremity.     Review of Systems   Unable to perform ROS: Patient nonverbal     Objective:     Vital Signs (Most Recent):  Temp: 98.1 °F (36.7 °C) (03/06/18 0433)  Pulse: 92 (03/06/18 0433)  Resp: 18 (03/06/18 0433)  BP: (!) 150/98 (03/06/18 0433)  SpO2: 96 % (03/06/18 0433) Vital Signs (24h Range):  Temp:  [98.1 °F (36.7 °C)-99.8 °F (37.7 °C)] 98.1 °F (36.7 °C)  Pulse:  [] 92  Resp:  [16-18] 18  SpO2:  [94 %-100 %] 96 %  BP: (133-174)/(86-98) 150/98     Weight: 105.6 kg (232 lb 12.9 oz)  Body mass index is 33.4 kg/m².    Intake/Output Summary (Last 24 hours) at 03/06/18 0957  Last data filed at 03/06/18 0800   Gross per 24 hour   Intake              300 ml   Output             1200 ml   Net             -900 ml      Physical Exam   Constitutional: No distress.   HENT:   Head: Normocephalic.   Eyes: EOM are normal. Pupils are equal, round, and reactive to light. No scleral icterus.   Neck: No JVD  present. No tracheal deviation present.   Cardiovascular: Normal rate, regular rhythm and normal heart sounds.    No murmur heard.  Pulmonary/Chest: Effort normal and breath sounds normal. He has no wheezes. He has no rales.   Abdominal: Soft. There is no guarding.   Peg site with minimal non-purulent drainage, no erythema or induration appreciated   Musculoskeletal: He exhibits no edema.   contracturing in RUE   Neurological:   Alert, does not track or follow commands, left eye deviation, flaccid right-sided Paralysis, moving left upper extremity spontaneously     Skin: Skin is warm. Capillary refill takes less than 2 seconds. He is not diaphoretic.       Significant Labs:   Recent Results (from the past 24 hour(s))   POCT glucose    Collection Time: 03/05/18 11:28 AM   Result Value Ref Range    POCT Glucose 139 (H) 70 - 110 mg/dL   POCT glucose    Collection Time: 03/05/18  4:01 PM   Result Value Ref Range    POCT Glucose 193 (H) 70 - 110 mg/dL   POCT glucose    Collection Time: 03/05/18  9:59 PM   Result Value Ref Range    POCT Glucose 203 (H) 70 - 110 mg/dL   POCT glucose    Collection Time: 03/06/18  2:18 AM   Result Value Ref Range    POCT Glucose 205 (H) 70 - 110 mg/dL   Comprehensive metabolic panel    Collection Time: 03/06/18  4:49 AM   Result Value Ref Range    Sodium 135 (L) 136 - 145 mmol/L    Potassium 4.0 3.5 - 5.1 mmol/L    Chloride 97 95 - 110 mmol/L    CO2 28 23 - 29 mmol/L    Glucose 182 (H) 70 - 110 mg/dL    BUN, Bld 16 6 - 20 mg/dL    Creatinine 0.8 0.5 - 1.4 mg/dL    Calcium 9.8 8.7 - 10.5 mg/dL    Total Protein 7.1 6.0 - 8.4 g/dL    Albumin 2.8 (L) 3.5 - 5.2 g/dL    Total Bilirubin 0.6 0.1 - 1.0 mg/dL    Alkaline Phosphatase 115 55 - 135 U/L    AST 69 (H) 10 - 40 U/L    ALT 94 (H) 10 - 44 U/L    Anion Gap 10 8 - 16 mmol/L    eGFR if African American >60.0 >60 mL/min/1.73 m^2    eGFR if non African American >60.0 >60 mL/min/1.73 m^2   CBC auto differential    Collection Time: 03/06/18  4:49 AM    Result Value Ref Range    WBC 8.08 3.90 - 12.70 K/uL    RBC 4.18 (L) 4.60 - 6.20 M/uL    Hemoglobin 12.2 (L) 14.0 - 18.0 g/dL    Hematocrit 36.1 (L) 40.0 - 54.0 %    MCV 86 82 - 98 fL    MCH 29.2 27.0 - 31.0 pg    MCHC 33.8 32.0 - 36.0 g/dL    RDW 13.8 11.5 - 14.5 %    Platelets 551 (H) 150 - 350 K/uL    MPV 9.3 9.2 - 12.9 fL    Immature Granulocytes 1.0 (H) 0.0 - 0.5 %    Gran # (ANC) 5.0 1.8 - 7.7 K/uL    Immature Grans (Abs) 0.08 (H) 0.00 - 0.04 K/uL    Lymph # 2.1 1.0 - 4.8 K/uL    Mono # 0.7 0.3 - 1.0 K/uL    Eos # 0.1 0.0 - 0.5 K/uL    Baso # 0.05 0.00 - 0.20 K/uL    nRBC 0 0 /100 WBC    Gran% 62.0 38.0 - 73.0 %    Lymph% 26.5 18.0 - 48.0 %    Mono% 8.7 4.0 - 15.0 %    Eosinophil% 1.2 0.0 - 8.0 %    Basophil% 0.6 0.0 - 1.9 %    Differential Method Automated    Magnesium    Collection Time: 03/06/18  4:49 AM   Result Value Ref Range    Magnesium 1.6 1.6 - 2.6 mg/dL   Phosphorus    Collection Time: 03/06/18  4:49 AM   Result Value Ref Range    Phosphorus 3.5 2.7 - 4.5 mg/dL   Protime-INR    Collection Time: 03/06/18  4:49 AM   Result Value Ref Range    Prothrombin Time 9.9 9.0 - 12.5 sec    INR 0.9 0.8 - 1.2   POCT glucose    Collection Time: 03/06/18  5:59 AM   Result Value Ref Range    POCT Glucose 216 (H) 70 - 110 mg/dL   POCT glucose    Collection Time: 03/06/18  7:56 AM   Result Value Ref Range    POCT Glucose 185 (H) 70 - 110 mg/dL         Significant Imaging: No new imaging.     Assessment/Plan:      Transaminitis    Likely 2/2 antibiotics (suspect CTX) vs sepsis vs fatty liver, synthetic function stable, completed CTX yesterday, recommend monitoring levels.           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    -Urine culture +klebsiella >100,000 CFU  -Wound/skin culture with Enterobacter also provotella and fusobacterium  -Abscess from IR aspiration Gram stain with GPC, Culture with Enterobacter and Eikenella; enterobacter sensitive to rocephin  -Blood Cx NGTD  -Would treat with rocephin for 7 days from its initiation (last dose 3/5) and PO flagyl 500 q8hr for 7 days (last dose 3/7)  -Discontinued vancomycin 3/1        Acute respiratory failure with hypoxia    Improving, suspect 2/2 aspiration pneumonitis vs sepsis from abdominal abscess, less likely PNA   At baseline will possibly have chronic hypoxic respiratory failure 2/2 combination of LYODA, atelectasis and possible central component  - Patiently currently satting well on 1L NC  - turn q2h, HOB elevated, PRN O2, Incentive spirometry        Urinary tract infection due to Klebsiella species    Klebsiella sensitive to rocephin.   Patient received 7 day course of rocephin.   Bruce changed          VTE Risk Mitigation         Ordered     heparin (porcine) injection 5,000 Units  Every 8 hours     Route:  Subcutaneous        02/20/18 1010     heparin (porcine) injection 5,000 Units  Every 8 hours     Route:  Subcutaneous        02/14/18 1002     High Risk of VTE  Once      01/27/18 1406        We will sign off. Thank you for your consult. Please call with any questions.       Mina Ram MD  Department of Hospital Medicine   [facility]                    03/06/2018                             STAFF PHYSICIAN NOTE                                   Attending Attestation for Rounds with Resident  I have reviewed and concur with the resident's history, physical, assessment, and plan.  I have personally interviewed and examined the patient at bedside and agree with the resident's findings.                                   ________________________________________                                     REASON FOR ADMISSION:     Patient is 47 y.o.male    Body mass index is 33.4 kg/m².,  Embolic stroke involving left middle cerebral artery

## 2018-03-06 NOTE — ASSESSMENT & PLAN NOTE
-Stroke risk factor  SBPs 133-174  - Continue scheduled lisinopril 40 mg qd, Metoprolol 50 mg bid, Labetalol PRN  Monitoring

## 2018-03-06 NOTE — PROGRESS NOTES
"[facility]  Vascular Neurology  Comprehensive Stroke Center  Progress Note    Assessment/Plan:     * Embolic stroke involving left middle cerebral artery    48 yo M with PMHx HLD, LOYDA, poorly controlled DM II [place] [facility] [place] after noted "strange behavior" for which EMS was called. Pt was found to have a L M1 occlusion and was taken to IR for thrombectomy with TICI 2C reperfusion and stent placement due to L MCA stenosis. Etiology remains unclear: no obvious signs of cardiac source or atheromatous disease in the aorto/carotid axis. Echo normal with no hx or signs of A fib. PT/OT/SLP will continue to evaluate and treat; recommending SNF. DAPT 2/2 recent stent placement.      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    - Abscess near PEG w/IR aspirate, growing anaerobes   - Rocephin (last dose 3/5) and Flagyl (last dose 3/7)        Right spastic hemiparesis    -Due to stroke  -Continue aggressive PT/OT; Recommending SNF        Nodule of right lung    - Plan for f/u CT 6-12 months via PCP        Oral phase dysphagia    - Continue SLP  - NPO, meds/feeds per PEG        Acute respiratory failure with hypoxia    Likely hypoxia is Aspiration pna vs pneumonitis (as CXR improved after a day) 2/2 to superimposed encephalopathy due to sepsis  -AM CXR 3/2 with mild bibasilar atelectasis   -Continue O2 sats >90%, okay with current requirements of 1L NC, pt has what appears as Cheyne espinal pattern and reported LOYDA  -Sister " requested continuous pulse ox; Ordered  -Contraction alkalosis improved with increasing water flushes via PEG  -Considered PE as differential however pt is on OAC and not tachycardic, tachypneic. Wells score 1.5 (low-risk)  - Continue chest physiotherapy, aspiration precautions, wean off O2 support         Cytotoxic cerebral edema    -2/2 stroke, evident on imaging  Stable on repeat CTH 2/4/18        Urinary tract infection due to Klebsiella species     Afebrile, leukocytosis resolved  - Klebsiella from urine cultures (2/25/18) + Anaerobes from PEG site cultures   - rocephin course completed, and Flagyl almost done ( last day of flagyl 3/7/18)        Type 2 diabetes mellitus with hyperglycemia, with long-term current use of insulin    - Stroke risk factor, Hgb A1c 10%  - Continue Diabetasource AC TFs; rate increased to 75 on 3/2  - Endocrine consulted and guiding insulin modification for TF  - POCT Q4h ; Moderate correction q4hr         Mixed hyperlipidemia    -Stroke risk factor, LDL invalid as TG > 400  - holding statin due to rising LFTs        Essential hypertension    -Stroke risk factor  SBPs 133-174  - Continue scheduled lisinopril 40 mg qd, Metoprolol 50 mg bid, Labetalol PRN  Monitoring        Obstructive sleep apnea    -Stroke risk factor  -CPAP qHS             01/25/18:  Brought in for aphasia, RSW with L gaze preferance. LKN unknown, not tPA candidate. Went to IR for angiogram and stent.  01/29/18:  Off Cardene, remains on Insulin gtt. Concern for sepsis, respiratory source. Imaging with mass effect and some brain compression; maycol crani watch.  01/30/18:  EEG consistent with focal L slowing 2/2 lesion and mild generalized slowing, no seizures. CT head stable, no hemorrhagic conversion  02/01/18:  Emergent intubation likely due to upper airway obstruction per NCC.    02/02/18:  Pt off Precedex, moving left side spontaneously and opening eyes. Intubated, on spontaneous today. NCC giving steroids in hopes  to extubate tomorrow.  02/03/18:  NAEON, intubated, not responsive to verbal stimuli, withdraws from pain on LUE, SBP ~135-230. Continued on insulin gtt, SQH for DVT ppx, and captopril.   02/16/18:  Few changes on exam, continues to have significant RUE/RLE weakness with global aphasia.  Family member at bedside noted attempt to communicate with her.  02/18/18:  Pt largely unchanged on exam this am.  No family member present during am rounds.  02/19/18:  Tolerating facemask. PEG by IR this afternoon.  02/20/18:  s/p PEG. O2 % on 5L NC, still requiring frequent suctioning. Primary team considering transfer to Medicine tomorrow.  02/21/18:  Pt sating % on 3L NC. Restarted DAPT, including . Plans for step down to stroke service.  02/22/18:  Pt with [facility] but not service-connected so unable to be placed at SNF.  Working on insurance coverage of at least C therapy.  02/23/18:  Increased detemir to 36 U bid.  [facility] with [facility] SN[facility] pending completion of paperwork by Stroke team and pt's spouse--in process.  2/24/18 - NAEON. Pts WBC mildly elevated 13.01, pt is afebrile. Will continue to monitor. Endocrine consulted - Recommend levemir 40 units daily to cover basal needs (using ~0.7u/kg); Start novolog 6 units q4hr to cover TF; Moderate correction q4hr. SNF placement pending.  2/25/18 -  Pt WBC increased overnight, HR range , and temp 99.2. Blood cultures ordered and drawn. UA/UC ordered. Nursing staff called a rapid response this AM due to pts tachycardia, fever, and decreased O2 sats. Arrived to room @ 0824. 1 liter of NS ordered. STAT CXR ordered. Tylenol given. ABG completed with no significant abnormal results. Respiratory suctioned pt and pt repositioned to help with breathing. Pt O2 sats improved on venti mask, temp decreased, and BP remained normotensive. Started on  vanc and zosyn. Pt appears more comfortable and no decline in neuro status. Pt family updated.   2/26/18 - afebrile  overnight and leukocytosis improving with BS antibiotics. CT abdomen yesterday noted seroma/hematoma on left rectus abdominus. IR consulted for culture +/- drain.  02/28/2018 s/p aspiration of left rectus collection adjacent to the PEG tube.  Culture sent to lab, pending.  Remains afebrile.  Currently on 4L NC O2.   3/1/18: NAEON.   3/2: Pt afebrile, WBC WNL. Now sating well on 1L NC. Pt emotional on exam, family not interested in SSRI at this time. CM attempting to get pt placed [facility] so [facility] [place] [facility]. Increased rate of TFs.  3/3: NAEON. Pending placement. Will monitor Na level tomorrow.  3/4: stable, no new issues.  3/5/18 - sleepy today but no events overnight   3/6/18 - sister concerned due to coughing post speech therapy - discussed with speech team and sister     STROKE DOCUMENTATION   Acute Stroke Times   Stroke Team Called Date: 01/25/18  Stroke Team Called Time: 1852  Stroke Team Arrival Date: 01/25/18  Stroke Team Arrival Time: 0652  CT Interpretation Time: 1910  Decision to Treat Time for Alteplase:  (n/a unknown last known normal )  Decision to Treat Time for IR: 1915    NIH Scale:  1a. Level Of Consciousness: 0-->Alert: keenly responsive  1b. LOC Questions: 2-->Answers neither question correctly  1c. LOC Commands: 2-->Performs neither task correctly  2. Best Gaze: 1-->Partial gaze palsy: gaze is abnormal in one or both eyes, but forced deviation or total gaze paresis is not present  3. Visual: 2-->Complete hemianopia  4. Facial Palsy: 1-->Minor paralysis (flattened nasolabial fold, asymmetry on smiling)  5a. Motor Arm, Left: 0-->No drift: limb holds 90 (or 45) degrees for full 10 secs  5b. Motor Arm, Right: 4-->No movement  6a. Motor Leg, Left: 1-->Drift: leg falls by the end of the 5-sec period but does not hit bed  6b. Motor Leg, Right: 4-->No movement  7. Limb Ataxia: 0-->Absent  8. Sensory: 1-->Mild-to-moderate sensory loss: patient feels pinprick is less sharp or is dull  on the affected side: or there is a loss of superficial pain with pinprick, but patient is aware of being touched  9. Best Language: 3-->Mute, global aphasia: no usable speech or auditory comprehension  10. Dysarthria: 2-->Severe dysarthria: patients speech is so slurred as to be unintelligible in the absence of or out of proportion to any dysphasia, or is mute/anarthric  11. Extinction and Inattention (formerly Neglect): 0-->No abnormality  Total (NIH Stroke Scale): 23       Modified Jasmin Score: 0  [place] Coma Scale:    ABCD2 Score:    [id number] Score:   HAS -BLED Score:   ICH Score:   Hunt & Laguerre Classification:      Hemorrhagic change of an Ischemic Stroke: Does this patient have an ischemic stroke with hemorrhagic changes? No     Neurologic Chief Complaint: Left MCA stroke     Subjective:     Interval History: sleepy today but no events overnight   Sister concerned about coughing post speech. Reviewed with speech and sister. Stable oxygenation on 1 liter.   sats without significant decrase, patient in no respiratory distress, will continue to monitor    LFTs still elevated at current, will monitor and hold statin. Rocephin course completed     HPI, Past Medical, Family, and Social History remains the same as documented in the initial encounter.     Review of Systems   Constitutional: Negative for fatigue and fever.   Eyes: Positive for visual disturbance. Negative for redness.   Genitourinary: Negative for hematuria.   Neurological: Positive for speech difficulty and weakness.   Psychiatric/Behavioral: Negative for behavioral problems.     Scheduled Meds:   albuterol-ipratropium 2.5mg-0.5mg/3mL  3 mL Nebulization Q6H    aspirin  325 mg Per G Tube Daily    clopidogrel  75 mg Per G Tube Daily    heparin (porcine)  5,000 Units Subcutaneous Q8H    [START ON 3/7/2018] insulin aspart U-100  10 Units Subcutaneous Q24H    [START ON 3/7/2018] insulin aspart U-100  10 Units Subcutaneous Q24H    insulin aspart  U-100  10 Units Subcutaneous Q24H    insulin aspart U-100  10 Units Subcutaneous Q24H    insulin aspart U-100  10 Units Subcutaneous Q24H    [START ON 3/7/2018] insulin aspart U-100  10 Units Subcutaneous Q24H    insulin detemir U-100  22 Units Subcutaneous Daily    lisinopril  40 mg Per NG tube Daily    metoprolol tartrate  50 mg Per NG tube BID    metroNIDAZOLE  500 mg Per G Tube Q8H    modafinil  200 mg Per NG tube Daily    And    modafinil  100 mg Per NG tube Daily    polyethylene glycol  17 g Per NG tube Daily    potassium chloride 10%  40 mEq Oral Once    senna-docusate 8.6-50 mg  1 tablet Per NG tube BID    sodium chloride 0.9%  3 mL Intravenous Q8H    sodium chloride 3%  4 mL Nebulization Q6H     Continuous Infusions:  PRN Meds:acetaminophen, bisacodyl, dextrose 50%, glucagon (human recombinant), insulin aspart U-100, ipratropium, labetalol, ondansetron    Objective:     Vital Signs (Most Recent):  Temp: 97.9 °F (36.6 °C) (03/06/18 1156)  Pulse: 97 (03/06/18 1256)  Resp: 17 (03/06/18 1256)  BP: (!) 143/93 (03/06/18 1156)  SpO2: 97 % (03/06/18 1156)  BP Location: Right arm    Vital Signs Range (Last 24H):  Temp:  [97.9 °F (36.6 °C)-99.8 °F (37.7 °C)]   Pulse:  []   Resp:  [16-18]   BP: (133-174)/(88-98)   SpO2:  [94 %-100 %]   BP Location: Right arm    Physical Exam   Constitutional: He appears well-developed and well-nourished.   HENT:   Head: Normocephalic and atraumatic.   Cardiovascular: Normal rate.    Pulmonary/Chest: Effort normal. No respiratory distress.   No use of accessory muscles    Musculoskeletal: He exhibits no edema or deformity.   Skin: Skin is warm and dry.   Psychiatric:   Depressed mood   Nursing note and vitals reviewed.      Neurological Exam:   LOC: alert  Language: Global aphasia  Articulation: Mute/Anarthric  Orientation: Untestable due to severe aphasia   Visual Fields: Hemianopsia right  EOM (CN III, IV, VI): Gaze preference  left  Facial Movement (CN VII):  Lower facial weakness on the Right  Motor: Arm left  Normal 5/5  Leg left  Paresis: 3/5  Arm right  Plegia 0/5  Leg right Plegia 0/5      Laboratory:  CMP:     Recent Labs  Lab 03/06/18 0449   CALCIUM 9.8   ALBUMIN 2.8*   PROT 7.1   *   K 4.0   CO2 28   CL 97   BUN 16   CREATININE 0.8   ALKPHOS 115   ALT 94*   AST 69*   BILITOT 0.6     BMP:     Recent Labs  Lab 03/06/18 0449   *   K 4.0   CL 97   CO2 28   BUN 16   CREATININE 0.8   CALCIUM 9.8     CBC:     Recent Labs  Lab 03/06/18 0449   WBC 8.08   RBC 4.18*   HGB 12.2*   HCT 36.1*   *   MCV 86   MCH 29.2   MCHC 33.8     Lipid Panel: No results for input(s): CHOL, LDLCALC, HDL, TRIG in the last 168 hours.  Coagulation:     Recent Labs  Lab 03/06/18 0449   INR 0.9     Platelet Aggregation Study: No results for input(s): PLTAGG, PLTAGINTERP, PLTAGREGLACO, ADPPLTAGGREG in the last 168 hours.  Hgb A1C: No results for input(s): HGBA1C in the last 168 hours.  TSH: No results for input(s): TSH in the last 168 hours.      Diagnostic Results       Brain Imaging     Most recent CTH 2/04/18 redemonstration of large L MCA territory infarction w/o evidence of hemorrhagic conversion.  Stable post op change from L MCA endovascular stenting.        Vessel Imaging   IR Cerebral Angiogram 1/25/18 demonstrated occlusion of the L M1 segment of the L MCA with good collaterals. Occlusion removed and stent placed with TICI 2C.    Cardiac Imaging   2D Echo 1/26/18   CONCLUSIONS     1 - Normal left ventricular systolic function (EF 65-70%).     2 - Concentric remodeling.     3 - No wall motion abnormalities.     4 - Normal left ventricular diastolic function.     5 - Normal right ventricular systolic function       CARIE Alejo  [facility]  Department of Vascular Neurology   [facility]

## 2018-03-06 NOTE — PT/OT/SLP PROGRESS
Speech Language Pathology Treatment    Patient Name:  Todd Quevedo   MRN:  [id number]  Admitting Diagnosis: Embolic stroke involving left middle cerebral artery    Recommendations:                 General Recommendations:  Dysphagia therapy, Speech/language therapy and Cognitive-linguistic therapy  Diet recommendations:  NPO, Liquid Diet Level: NPO   Aspiration Precautions: Continue alternate means of nutrition and Strict aspiration precautions, frequent oral care  General Precautions: Standard, aphasia, aspiration, fall, NPO  Communication strategies:  provide increased time to answer and go to room if call light pushed    Subjective     Pt seen bedside with sister present.  Currently on 2L of O2 via NC.  SpO2 at 97% at beginning and following session.       Pain/Comfort:  · Pain Rating 1: 0/10  · Pain Rating Post-Intervention 1: 0/10    Objective:     Has the patient been evaluated by SLP for swallowing?   Yes  Keep patient NPO? Yes   Current Respiratory Status: nasal cannula (1L)      Pt alert when SLP entered, far L gaze today with no localization to SLP on R despite max cues.  Mod cues t/o session attend to SLP on mid-L.  Pt initially smiling/laughing with SLP though abruptly crying half way through session with decreased eye contact.  Pt with head turn to R in apparent avoidance of SLP by end of session  Encouragement and education on POC provided.  Pt followed simple modeled command with L hand x1.  No response to basic y/n questions via any modality.  Slight voicing noted on yawn x1.  No volitional vocalizations elicited.  Pt allowed oral stim by SLP x2 and completed using L hand x2.  Pt noted to rub swab in buccal cavity.  Delayed swallow elicited x2.  Delayed cough x1.  Breath sounds were clear.  Ice chip presented x1.  Minimal oral manipulation observed.  Delayed swallow elicited.  No overt s/s aspiration.  SpO2 remained at 97%.  Small amount of chocolate pudding applied to lips using toothette.  Pt with  no attempts to remove despite max cues.  SLP removed from lips.  Sister with questions re: increased oral acceptance/mouth opening. Education provided on labial/facial massage and stim, oral care/stim with cold toothette with all liquids removed prior to stim, rubbing of slightly flavored items including chap stick to lips to encourage lip smacking/lingual protrusion, avoidance of any po trials within oral cavity and continued SLP POC. She verbalized understanding.  Pt with noted coughing on secretions near end of session with SpO2 drop to 88% during cough.  SpO2 returned to 97% shortly following cough.  Pt resting comfortably in bed.      Following session, CARIE Sauer with stroke team contacted SLP that pt's sister was concerned re: aspiration of ice chip.  SLP returned to pt's room with Cristiane to discuss incident with pt's sister though she had stepped out.  Pt not coughing at that time with SpO2 at 96%.  SLP remained present during phone call with sister to provide additional information as needed re: ongoing swallow stimulation/assessment and safe swallow precs, and reason for ice chip as trial 2/2 lower risk for negative outcomes compared to other consistencies/trials.  Sister did not have any additional questions for SLP; in agreement with SLP POC per Cristiane.  SLP to continue to follow.               Assessment:     Todd Quevedo is a 47 y.o. male with an SLP diagnosis of Aphasia, Dysphagia, Cognitive-Linguistic Impairment and Visio-Spatial Impairment.      Goals:    SLP Goals        Problem: SLP Goal    Goal Priority Disciplines Outcome   SLP Goal     SLP Ongoing (interventions implemented as appropriate)   Description:  Speech Language Pathology Goals  Goals expected to be met by 3/20  1. Pt will participate in ongoing bedside assessment of swallow to determine least restrictive diet.  2. Pt will open eyes to stim x5/session given max cues.-met  3. Pt will follow simple commands x2/session given max cues.  4.  Pt will answer basic y/n question via any modality x2/session given max cues.  5. Pt will vocalize in response to any stim x2/session given max cues.                            Plan:     · Patient to be seen:  2 x/week   · Plan of Care expires:  03/18/18  · Plan of Care reviewed with:  patient, sibling   · SLP Follow-Up:  Yes       Discharge recommendations:  nursing facility, skilled   Barriers to Discharge:  Level of Skilled Assistance Needed      Time Tracking:     SLP Treatment Date:   03/06/18  Speech Start Time:  0950  Speech Stop Time:  1016     Speech Total Time (min):  26 min    Billable Minutes: Speech Therapy Individual 15 and Treatment Swallowing Dysfunction 11    EDWARD Key, CCC-SLP  03/06/2018

## 2018-03-06 NOTE — PLAN OF CARE
3:30 PM  DAMIAN faxed discharge worksheet and prosthetic supply sheet to [facility] with [facility] for review. DAMIAN will follow up.    DAMIAN contacted [facility] to obtain cost for transport home to [place]. DAMIAN informed that transport will cost $2,[phone]. DAMIAN will discuss cost with pt's family.    Mary Shoemaker LMSW  [facility]  Ext. [phone]

## 2018-03-06 NOTE — PLAN OF CARE
Reviewed BG and insulin regimen.    Patient remains on same tube feeds (Diabetasource AC TFs; rate of 75cc).  POC glucose slightly above goal on novolog 9 units q4 and levemir 22 units daily.    Increase novolog to 10 units q4 - hold if TF discontinued or held.     Notify endocrine for changes in nutrition status (diet, TF, TPN)

## 2018-03-06 NOTE — SUBJECTIVE & OBJECTIVE
Interval History: NAEON. Mr. Quevedo is alert this morning, but unable to follow to simple commands. Spontaneously moving left upper extremity.     Review of Systems   Unable to perform ROS: Patient nonverbal     Objective:     Vital Signs (Most Recent):  Temp: 98.1 °F (36.7 °C) (03/06/18 0433)  Pulse: 92 (03/06/18 0433)  Resp: 18 (03/06/18 0433)  BP: (!) 150/98 (03/06/18 0433)  SpO2: 96 % (03/06/18 0433) Vital Signs (24h Range):  Temp:  [98.1 °F (36.7 °C)-99.8 °F (37.7 °C)] 98.1 °F (36.7 °C)  Pulse:  [] 92  Resp:  [16-18] 18  SpO2:  [94 %-100 %] 96 %  BP: (133-174)/(86-98) 150/98     Weight: 105.6 kg (232 lb 12.9 oz)  Body mass index is 33.4 kg/m².    Intake/Output Summary (Last 24 hours) at 03/06/18 0957  Last data filed at 03/06/18 0800   Gross per 24 hour   Intake              300 ml   Output             1200 ml   Net             -900 ml      Physical Exam   Constitutional: No distress.   HENT:   Head: Normocephalic.   Eyes: EOM are normal. Pupils are equal, round, and reactive to light. No scleral icterus.   Neck: No JVD present. No tracheal deviation present.   Cardiovascular: Normal rate, regular rhythm and normal heart sounds.    No murmur heard.  Pulmonary/Chest: Effort normal and breath sounds normal. He has no wheezes. He has no rales.   Abdominal: Soft. There is no guarding.   Peg site with minimal non-purulent drainage, no erythema or induration appreciated   Musculoskeletal: He exhibits no edema.   contracturing in RUE   Neurological:   Alert, does not track or follow commands, left eye deviation, flaccid right-sided Paralysis, moving left upper extremity spontaneously     Skin: Skin is warm. Capillary refill takes less than 2 seconds. He is not diaphoretic.       Significant Labs:   Recent Results (from the past 24 hour(s))   POCT glucose    Collection Time: 03/05/18 11:28 AM   Result Value Ref Range    POCT Glucose 139 (H) 70 - 110 mg/dL   POCT glucose    Collection Time: 03/05/18  4:01 PM    Result Value Ref Range    POCT Glucose 193 (H) 70 - 110 mg/dL   POCT glucose    Collection Time: 03/05/18  9:59 PM   Result Value Ref Range    POCT Glucose 203 (H) 70 - 110 mg/dL   POCT glucose    Collection Time: 03/06/18  2:18 AM   Result Value Ref Range    POCT Glucose 205 (H) 70 - 110 mg/dL   Comprehensive metabolic panel    Collection Time: 03/06/18  4:49 AM   Result Value Ref Range    Sodium 135 (L) 136 - 145 mmol/L    Potassium 4.0 3.5 - 5.1 mmol/L    Chloride 97 95 - 110 mmol/L    CO2 28 23 - 29 mmol/L    Glucose 182 (H) 70 - 110 mg/dL    BUN, Bld 16 6 - 20 mg/dL    Creatinine 0.8 0.5 - 1.4 mg/dL    Calcium 9.8 8.7 - 10.5 mg/dL    Total Protein 7.1 6.0 - 8.4 g/dL    Albumin 2.8 (L) 3.5 - 5.2 g/dL    Total Bilirubin 0.6 0.1 - 1.0 mg/dL    Alkaline Phosphatase 115 55 - 135 U/L    AST 69 (H) 10 - 40 U/L    ALT 94 (H) 10 - 44 U/L    Anion Gap 10 8 - 16 mmol/L    eGFR if African American >60.0 >60 mL/min/1.73 m^2    eGFR if non African American >60.0 >60 mL/min/1.73 m^2   CBC auto differential    Collection Time: 03/06/18  4:49 AM   Result Value Ref Range    WBC 8.08 3.90 - 12.70 K/uL    RBC 4.18 (L) 4.60 - 6.20 M/uL    Hemoglobin 12.2 (L) 14.0 - 18.0 g/dL    Hematocrit 36.1 (L) 40.0 - 54.0 %    MCV 86 82 - 98 fL    MCH 29.2 27.0 - 31.0 pg    MCHC 33.8 32.0 - 36.0 g/dL    RDW 13.8 11.5 - 14.5 %    Platelets 551 (H) 150 - 350 K/uL    MPV 9.3 9.2 - 12.9 fL    Immature Granulocytes 1.0 (H) 0.0 - 0.5 %    Gran # (ANC) 5.0 1.8 - 7.7 K/uL    Immature Grans (Abs) 0.08 (H) 0.00 - 0.04 K/uL    Lymph # 2.1 1.0 - 4.8 K/uL    Mono # 0.7 0.3 - 1.0 K/uL    Eos # 0.1 0.0 - 0.5 K/uL    Baso # 0.05 0.00 - 0.20 K/uL    nRBC 0 0 /100 WBC    Gran% 62.0 38.0 - 73.0 %    Lymph% 26.5 18.0 - 48.0 %    Mono% 8.7 4.0 - 15.0 %    Eosinophil% 1.2 0.0 - 8.0 %    Basophil% 0.6 0.0 - 1.9 %    Differential Method Automated    Magnesium    Collection Time: 03/06/18  4:49 AM   Result Value Ref Range    Magnesium 1.6 1.6 - 2.6 mg/dL   Phosphorus     Collection Time: 03/06/18  4:49 AM   Result Value Ref Range    Phosphorus 3.5 2.7 - 4.5 mg/dL   Protime-INR    Collection Time: 03/06/18  4:49 AM   Result Value Ref Range    Prothrombin Time 9.9 9.0 - 12.5 sec    INR 0.9 0.8 - 1.2   POCT glucose    Collection Time: 03/06/18  5:59 AM   Result Value Ref Range    POCT Glucose 216 (H) 70 - 110 mg/dL   POCT glucose    Collection Time: 03/06/18  7:56 AM   Result Value Ref Range    POCT Glucose 185 (H) 70 - 110 mg/dL         Significant Imaging: No new imaging.

## 2018-03-06 NOTE — ASSESSMENT & PLAN NOTE
Afebrile, leukocytosis resolved  - Klebsiella from urine cultures (2/25/18) + Anaerobes from PEG site cultures   - rocephin course completed, and Flagyl almost done ( last day of flagyl 3/7/18)

## 2018-03-06 NOTE — PLAN OF CARE
Problem: SLP Goal  Goal: SLP Goal  Speech Language Pathology Goals  Goals expected to be met by 3/20  1. Pt will participate in ongoing bedside assessment of swallow to determine least restrictive diet.  2. Pt will open eyes to stim x5/session given max cues.-met  3. Pt will follow simple commands x2/session given max cues.  4. Pt will answer basic y/n question via any modality x2/session given max cues.  5. Pt will vocalize in response to any stim x2/session given max cues.          Outcome: Ongoing (interventions implemented as appropriate)  Goals remain appropriate, cont POC. EDWARD Key, CCC/SLP  3/6/2018

## 2018-03-07 LAB
ALBUMIN SERPL BCP-MCNC: 3 G/DL
ALP SERPL-CCNC: 112 U/L
ALT SERPL W/O P-5'-P-CCNC: 86 U/L
ANION GAP SERPL CALC-SCNC: 11 MMOL/L
AST SERPL-CCNC: 56 U/L
BASOPHILS # BLD AUTO: 0.03 K/UL
BASOPHILS NFR BLD: 0.4 %
BILIRUB SERPL-MCNC: 0.6 MG/DL
BUN SERPL-MCNC: 17 MG/DL
CALCIUM SERPL-MCNC: 9.7 MG/DL
CHLORIDE SERPL-SCNC: 98 MMOL/L
CO2 SERPL-SCNC: 26 MMOL/L
CREAT SERPL-MCNC: 0.8 MG/DL
DIFFERENTIAL METHOD: ABNORMAL
EOSINOPHIL # BLD AUTO: 0.1 K/UL
EOSINOPHIL NFR BLD: 1.2 %
ERYTHROCYTE [DISTWIDTH] IN BLOOD BY AUTOMATED COUNT: 14.2 %
EST. GFR  (AFRICAN AMERICAN): >60 ML/MIN/1.73 M^2
EST. GFR  (NON AFRICAN AMERICAN): >60 ML/MIN/1.73 M^2
GLUCOSE SERPL-MCNC: 214 MG/DL
HCT VFR BLD AUTO: 38.9 %
HGB BLD-MCNC: 13 G/DL
IMM GRANULOCYTES # BLD AUTO: 0.06 K/UL
IMM GRANULOCYTES NFR BLD AUTO: 0.8 %
INR PPP: 0.9
LYMPHOCYTES # BLD AUTO: 1.8 K/UL
LYMPHOCYTES NFR BLD: 24.9 %
MAGNESIUM SERPL-MCNC: 1.8 MG/DL
MCH RBC QN AUTO: 29.1 PG
MCHC RBC AUTO-ENTMCNC: 33.4 G/DL
MCV RBC AUTO: 87 FL
MONOCYTES # BLD AUTO: 0.7 K/UL
MONOCYTES NFR BLD: 9.1 %
NEUTROPHILS # BLD AUTO: 4.7 K/UL
NEUTROPHILS NFR BLD: 63.6 %
NRBC BLD-RTO: 0 /100 WBC
PHOSPHATE SERPL-MCNC: 4.4 MG/DL
PLATELET # BLD AUTO: 520 K/UL
PMV BLD AUTO: 9.2 FL
POCT GLUCOSE: 153 MG/DL (ref 70–110)
POCT GLUCOSE: 169 MG/DL (ref 70–110)
POCT GLUCOSE: 183 MG/DL (ref 70–110)
POCT GLUCOSE: 194 MG/DL (ref 70–110)
POCT GLUCOSE: 235 MG/DL (ref 70–110)
POCT GLUCOSE: 236 MG/DL (ref 70–110)
POTASSIUM SERPL-SCNC: 4.2 MMOL/L
PROT SERPL-MCNC: 7.2 G/DL
PROTHROMBIN TIME: 9.9 SEC
RBC # BLD AUTO: 4.46 M/UL
SODIUM SERPL-SCNC: 135 MMOL/L
WBC # BLD AUTO: 7.38 K/UL

## 2018-03-07 PROCEDURE — 25000242 PHARM REV CODE 250 ALT 637 W/ HCPCS: Performed by: PSYCHIATRY & NEUROLOGY

## 2018-03-07 PROCEDURE — 25000003 PHARM REV CODE 250: Performed by: HOSPITALIST

## 2018-03-07 PROCEDURE — A4216 STERILE WATER/SALINE, 10 ML: HCPCS | Performed by: NURSE PRACTITIONER

## 2018-03-07 PROCEDURE — 25000003 PHARM REV CODE 250: Performed by: PSYCHIATRY & NEUROLOGY

## 2018-03-07 PROCEDURE — 25000003 PHARM REV CODE 250: Performed by: NURSE PRACTITIONER

## 2018-03-07 PROCEDURE — 94761 N-INVAS EAR/PLS OXIMETRY MLT: CPT

## 2018-03-07 PROCEDURE — 85610 PROTHROMBIN TIME: CPT

## 2018-03-07 PROCEDURE — 25000242 PHARM REV CODE 250 ALT 637 W/ HCPCS: Performed by: NURSE PRACTITIONER

## 2018-03-07 PROCEDURE — 80053 COMPREHEN METABOLIC PANEL: CPT

## 2018-03-07 PROCEDURE — 63600175 PHARM REV CODE 636 W HCPCS: Performed by: NURSE PRACTITIONER

## 2018-03-07 PROCEDURE — 20600001 HC STEP DOWN PRIVATE ROOM

## 2018-03-07 PROCEDURE — 94640 AIRWAY INHALATION TREATMENT: CPT

## 2018-03-07 PROCEDURE — 25000003 PHARM REV CODE 250: Performed by: PHYSICIAN ASSISTANT

## 2018-03-07 PROCEDURE — 85025 COMPLETE CBC W/AUTO DIFF WBC: CPT

## 2018-03-07 PROCEDURE — 11000001 HC ACUTE MED/SURG PRIVATE ROOM

## 2018-03-07 PROCEDURE — 36415 COLL VENOUS BLD VENIPUNCTURE: CPT

## 2018-03-07 PROCEDURE — 84100 ASSAY OF PHOSPHORUS: CPT

## 2018-03-07 PROCEDURE — 83735 ASSAY OF MAGNESIUM: CPT

## 2018-03-07 PROCEDURE — 25000003 PHARM REV CODE 250: Performed by: STUDENT IN AN ORGANIZED HEALTH CARE EDUCATION/TRAINING PROGRAM

## 2018-03-07 PROCEDURE — 99233 SBSQ HOSP IP/OBS HIGH 50: CPT | Mod: ,,, | Performed by: PSYCHIATRY & NEUROLOGY

## 2018-03-07 PROCEDURE — 99232 SBSQ HOSP IP/OBS MODERATE 35: CPT | Mod: ,,, | Performed by: INTERNAL MEDICINE

## 2018-03-07 RX ORDER — INSULIN ASPART 100 [IU]/ML
12 INJECTION, SOLUTION INTRAVENOUS; SUBCUTANEOUS
Status: DISCONTINUED | OUTPATIENT
Start: 2018-03-08 | End: 2018-03-09

## 2018-03-07 RX ORDER — INSULIN ASPART 100 [IU]/ML
12 INJECTION, SOLUTION INTRAVENOUS; SUBCUTANEOUS
Status: DISCONTINUED | OUTPATIENT
Start: 2018-03-07 | End: 2018-03-09

## 2018-03-07 RX ADMIN — SODIUM CHLORIDE SOLN NEBU 3% 4 ML: 3 NEBU SOLN at 02:03

## 2018-03-07 RX ADMIN — IPRATROPIUM BROMIDE AND ALBUTEROL SULFATE 3 ML: 2.5; .5 SOLUTION RESPIRATORY (INHALATION) at 09:03

## 2018-03-07 RX ADMIN — ASPIRIN 325 MG ORAL TABLET 325 MG: 325 PILL ORAL at 08:03

## 2018-03-07 RX ADMIN — INSULIN ASPART 10 UNITS: 100 INJECTION, SOLUTION INTRAVENOUS; SUBCUTANEOUS at 07:03

## 2018-03-07 RX ADMIN — METRONIDAZOLE 500 MG: 500 TABLET ORAL at 05:03

## 2018-03-07 RX ADMIN — METRONIDAZOLE 500 MG: 500 TABLET ORAL at 10:03

## 2018-03-07 RX ADMIN — INSULIN ASPART 12 UNITS: 100 INJECTION, SOLUTION INTRAVENOUS; SUBCUTANEOUS at 08:03

## 2018-03-07 RX ADMIN — INSULIN ASPART 12 UNITS: 100 INJECTION, SOLUTION INTRAVENOUS; SUBCUTANEOUS at 04:03

## 2018-03-07 RX ADMIN — Medication 3 ML: at 03:03

## 2018-03-07 RX ADMIN — INSULIN ASPART 2 UNITS: 100 INJECTION, SOLUTION INTRAVENOUS; SUBCUTANEOUS at 01:03

## 2018-03-07 RX ADMIN — METOPROLOL TARTRATE 50 MG: 50 TABLET, FILM COATED ORAL at 08:03

## 2018-03-07 RX ADMIN — IPRATROPIUM BROMIDE AND ALBUTEROL SULFATE 3 ML: 2.5; .5 SOLUTION RESPIRATORY (INHALATION) at 12:03

## 2018-03-07 RX ADMIN — STANDARDIZED SENNA CONCENTRATE AND DOCUSATE SODIUM 1 TABLET: 8.6; 5 TABLET, FILM COATED ORAL at 10:03

## 2018-03-07 RX ADMIN — INSULIN DETEMIR 22 UNITS: 100 INJECTION, SOLUTION SUBCUTANEOUS at 08:03

## 2018-03-07 RX ADMIN — INSULIN ASPART 4 UNITS: 100 INJECTION, SOLUTION INTRAVENOUS; SUBCUTANEOUS at 05:03

## 2018-03-07 RX ADMIN — SODIUM CHLORIDE SOLN NEBU 3% 4 ML: 3 NEBU SOLN at 07:03

## 2018-03-07 RX ADMIN — MODAFINIL 200 MG: 100 TABLET ORAL at 05:03

## 2018-03-07 RX ADMIN — INSULIN ASPART 2 UNITS: 100 INJECTION, SOLUTION INTRAVENOUS; SUBCUTANEOUS at 04:03

## 2018-03-07 RX ADMIN — METOPROLOL TARTRATE 50 MG: 50 TABLET, FILM COATED ORAL at 10:03

## 2018-03-07 RX ADMIN — HEPARIN SODIUM 5000 UNITS: 5000 INJECTION, SOLUTION INTRAVENOUS; SUBCUTANEOUS at 04:03

## 2018-03-07 RX ADMIN — INSULIN ASPART 10 UNITS: 100 INJECTION, SOLUTION INTRAVENOUS; SUBCUTANEOUS at 04:03

## 2018-03-07 RX ADMIN — IPRATROPIUM BROMIDE AND ALBUTEROL SULFATE 3 ML: 2.5; .5 SOLUTION RESPIRATORY (INHALATION) at 07:03

## 2018-03-07 RX ADMIN — HEPARIN SODIUM 5000 UNITS: 5000 INJECTION, SOLUTION INTRAVENOUS; SUBCUTANEOUS at 05:03

## 2018-03-07 RX ADMIN — SODIUM CHLORIDE SOLN NEBU 3% 4 ML: 3 NEBU SOLN at 12:03

## 2018-03-07 RX ADMIN — INSULIN ASPART 10 UNITS: 100 INJECTION, SOLUTION INTRAVENOUS; SUBCUTANEOUS at 01:03

## 2018-03-07 RX ADMIN — CLOPIDOGREL BISULFATE 75 MG: 75 TABLET, FILM COATED ORAL at 08:03

## 2018-03-07 RX ADMIN — LISINOPRIL 40 MG: 20 TABLET ORAL at 08:03

## 2018-03-07 RX ADMIN — HEPARIN SODIUM 5000 UNITS: 5000 INJECTION, SOLUTION INTRAVENOUS; SUBCUTANEOUS at 10:03

## 2018-03-07 RX ADMIN — METRONIDAZOLE 500 MG: 500 TABLET ORAL at 01:03

## 2018-03-07 RX ADMIN — POLYETHYLENE GLYCOL 3350 17 G: 17 POWDER, FOR SOLUTION ORAL at 08:03

## 2018-03-07 RX ADMIN — Medication 3 ML: at 10:03

## 2018-03-07 RX ADMIN — INSULIN ASPART 1 UNITS: 100 INJECTION, SOLUTION INTRAVENOUS; SUBCUTANEOUS at 08:03

## 2018-03-07 RX ADMIN — INSULIN ASPART 2 UNITS: 100 INJECTION, SOLUTION INTRAVENOUS; SUBCUTANEOUS at 07:03

## 2018-03-07 RX ADMIN — Medication 3 ML: at 06:03

## 2018-03-07 RX ADMIN — STANDARDIZED SENNA CONCENTRATE AND DOCUSATE SODIUM 1 TABLET: 8.6; 5 TABLET, FILM COATED ORAL at 08:03

## 2018-03-07 RX ADMIN — IPRATROPIUM BROMIDE AND ALBUTEROL SULFATE 3 ML: 2.5; .5 SOLUTION RESPIRATORY (INHALATION) at 02:03

## 2018-03-07 NOTE — PROGRESS NOTES
"[facility]  Vascular Neurology  Comprehensive Stroke Center  Progress Note    Assessment/Plan:     * Embolic stroke involving left middle cerebral artery    46 yo M with PMHx HLD, LOYDA, poorly controlled DM II [place] to [facility] [place]25/18 after noted "strange behavior" for which EMS was called. Pt was found to have a L M1 occlusion and was taken to IR for thrombectomy with TICI 2C reperfusion and stent placement due to L MCA stenosis. Etiology remains unclear: no obvious signs of cardiac source or atheromatous disease in the aorto/carotid axis. Echo normal with no hx or signs of A fib. PT/OT/SLP will continue to evaluate and treat; recommending SNF. DAPT 2/2 recent stent placement.      Waiting for good safe discharge plan - currently pursuing home health.  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    - Abscess near PEG w/IR aspirate, growing anaerobes   - Rocephin (last dose 3/5) and Flagyl (last dose 3/7)        Right spastic hemiparesis    -Due to stroke  -Continue aggressive PT/OT; Recommending SNF        Nodule of right lung    - Plan for f/u CT 6-12 months via PCP        Oral phase dysphagia    - Continue SLP  - NPO, meds/feeds per PEG        Acute respiratory failure with hypoxia    Likely hypoxia is Aspiration pna vs pneumonitis (as CXR improved after a day) 2/2 to superimposed encephalopathy due to sepsis  -AM CXR 3/2 with mild bibasilar atelectasis   -Continue O2 sats >90%, okay with " current requirements of 1L NC, pt has what appears as Cheyne espinal pattern and reported LOYDA  -Sister requested continuous pulse ox; Ordered  -Contraction alkalosis improved with increasing water flushes via PEG  -Considered PE as differential however pt is on OAC and not tachycardic, tachypneic. Wells score 1.5 (low-risk)  - Continue chest physiotherapy, aspiration precautions, wean off O2 support   -plan for home suctioning upon discharge        Cytotoxic cerebral edema    -2/2 stroke, evident on imaging  Stable on repeat CTH 2/4/18        Urinary tract infection due to Klebsiella species     Afebrile, leukocytosis resolved  - Klebsiella from urine cultures (2/25/18) + Anaerobes from PEG site cultures   - rocephin course completed, and Flagyl completed today        Type 2 diabetes mellitus with hyperglycemia, with long-term current use of insulin    - Stroke risk factor, Hgb A1c 10%  - Continue Diabetasource AC TFs; rate increased to 75 on 3/2  - Endocrine consulted and guiding insulin modification for TF  - POCT Q4h ; Moderate correction q4hr         Mixed hyperlipidemia    -Stroke risk factor, LDL invalid as TG > 400  - holding statin due to rising LFTs        Essential hypertension    -Stroke risk factor  SBPs 123-159  - Continue scheduled lisinopril 40 mg qd, Metoprolol 50 mg bid, Labetalol PRN  Monitoring        Obstructive sleep apnea    -Stroke risk factor  -CPAP qHS             01/25/18:  Brought in for aphasia, RSW with L gaze preferance. LKN unknown, not tPA candidate. Went to IR for angiogram and stent.  01/29/18:  Off Cardene, remains on Insulin gtt. Concern for sepsis, respiratory source. Imaging with mass effect and some brain compression; maycol crani watch.  01/30/18:  EEG consistent with focal L slowing 2/2 lesion and mild generalized slowing, no seizures. CT head stable, no hemorrhagic conversion  02/01/18:  Emergent intubation likely due to upper airway obstruction per NCC.    02/02/18:  Pt off  Precedex, moving left side spontaneously and opening eyes. Intubated, on spontaneous today. NCC giving steroids in hopes to extubate tomorrow.  02/03/18:  NAEON, intubated, not responsive to verbal stimuli, withdraws from pain on LUE, SBP ~135-230. Continued on insulin gtt, SQH for DVT ppx, and captopril.   02/16/18:  Few changes on exam, continues to have significant RUE/RLE weakness with global aphasia.  Family member at bedside noted attempt to communicate with her.  02/18/18:  Pt largely unchanged on exam this am.  No family member present during am rounds.  02/19/18:  Tolerating facemask. PEG by IR this afternoon.  02/20/18:  s/p PEG. O2 % on 5L NC, still requiring frequent suctioning. Primary team considering transfer to Medicine tomorrow.  02/21/18:  Pt sating % on 3L NC. Restarted DAPT, including . Plans for step down to stroke service.  02/22/18:  [facility] with [facility] but not service-connected so unable to be placed at SNF.  Working on insurance coverage of at least HHC therapy.  02/23/18:  Increased detemir to 36 U bid.  [facility] with [facility] pending completion of paperwork by Stroke team and pt's spouse--in process.  2/24/18 - NAEON. Pts WBC mildly elevated 13.01, pt is afebrile. Will continue to monitor. Endocrine consulted - Recommend levemir 40 units daily to cover basal needs (using ~0.7u/kg); Start novolog 6 units q4hr to cover TF; Moderate correction q4hr. SNF placement pending.  2/25/18 -  Pt WBC increased overnight, HR range , and temp 99.2. Blood cultures ordered and drawn. UA/UC ordered. Nursing staff called a rapid response this AM due to pts tachycardia, fever, and decreased O2 sats. Arrived to room @ 0824. 1 liter of NS ordered. STAT CXR ordered. Tylenol given. ABG completed with no significant abnormal results. Respiratory suctioned pt and pt repositioned to help with breathing. Pt O2 sats improved on venti mask, temp decreased, and BP remained normotensive. Started  on  vanc and zosyn. Pt appears more comfortable and no decline in neuro status. Pt family updated.   2/26/18 - afebrile overnight and leukocytosis improving with BS antibiotics. CT abdomen yesterday noted seroma/hematoma on left rectus abdominus. IR consulted for culture +/- drain.  02/28/2018 s/p aspiration of left rectus collection adjacent to the PEG tube.  Culture sent to lab, pending.  Remains afebrile.  Currently on 4L NC O2.   3/1/18: NAEON.   3/2: Pt afebrile, WBC WNL. Now sating well on 1L NC. Pt emotional on exam, family not interested in SSRI at this time. CM attempting to get pt placed [facility] so [facility] can then [facility] [place] [facility]. Increased rate of TFs.  3/3: NAEON. Pending placement. Will monitor Na level tomorrow.  3/4: stable, no new issues.  3/5/18 - sleepy today but no events overnight   3/6/18 - sister concerned due to coughing post speech therapy - discussed with speech team and sister   3/7/18  No change in exam.  [facility] - [facility].  Continue to monitor liver enzymes.    STROKE DOCUMENTATION   Acute Stroke Times   Stroke Team Called Date: 01/25/18  Stroke Team Called Time: 1852  Stroke Team Arrival Date: 01/25/18  Stroke Team Arrival Time: 0652  CT Interpretation Time: 1910  Decision to Treat Time for Alteplase:  (n/a unknown last known normal )  Decision to Treat Time for IR: 1915    NIH Scale:  1a. Level Of Consciousness: 0-->Alert: keenly responsive  1b. LOC Questions: 2-->Answers neither question correctly  1c. LOC Commands: 2-->Performs neither task correctly  2. Best Gaze: 1-->Partial gaze palsy: gaze is abnormal in one or both eyes, but forced deviation or total gaze paresis is not present  3. Visual: 2-->Complete hemianopia  4. Facial Palsy: 1-->Minor paralysis (flattened nasolabial fold, asymmetry on smiling)  5a. Motor Arm, Left: 0-->No drift: limb holds 90 (or 45) degrees for full 10 secs  5b. Motor Arm, Right: 4-->No movement  6a. Motor Leg, Left:  1-->Drift: leg falls by the end of the 5-sec period but does not hit bed  6b. Motor Leg, Right: 4-->No movement  7. Limb Ataxia: 0-->Absent  8. Sensory: 1-->Mild-to-moderate sensory loss: patient feels pinprick is less sharp or is dull on the affected side: or there is a loss of superficial pain with pinprick, but patient is aware of being touched  9. Best Language: 3-->Mute, global aphasia: no usable speech or auditory comprehension  10. Dysarthria: 2-->Severe dysarthria: patients speech is so slurred as to be unintelligible in the absence of or out of proportion to any dysphasia, or is mute/anarthric  11. Extinction and Inattention (formerly Neglect): 0-->No abnormality  Total (NIH Stroke Scale): 23       Modified Jasmin    [place] Coma Scale:    ABCD2 Score:    PRNH2OE2-LEX Score:   HAS -BLED Score:   ICH Score:   Hunt & Laguerre Classification:      Hemorrhagic change of an Ischemic Stroke: Does this patient have an ischemic stroke with hemorrhagic changes? No     Neurologic Chief Complaint: Left MCA stroke     Subjective:     Interval History: Alert today.  Remains non verbal.  No new symptoms.  LFTs still elevated at current, may be due to modafinil.  Holding statin.  Will continue to monitor.  [facility].  Also discussed need for home suctioning.    HPI, Past Medical, Family, and Social History remains the same as documented in the initial encounter.     Review of Systems   Constitutional: Negative for fever.   Eyes: Positive for visual disturbance.   Gastrointestinal: Negative for vomiting.   Genitourinary: Negative for hematuria.   Neurological: Positive for speech difficulty and weakness.   Psychiatric/Behavioral: Negative for behavioral problems.     Scheduled Meds:   albuterol-ipratropium 2.5mg-0.5mg/3mL  3 mL Nebulization Q6H    aspirin  325 mg Per G Tube Daily    clopidogrel  75 mg Per G Tube Daily    heparin (porcine)  5,000 Units Subcutaneous Q8H    [START ON 3/8/2018]  insulin aspart U-100  12 Units Subcutaneous Q24H    [START ON 3/8/2018] insulin aspart U-100  12 Units Subcutaneous Q24H    [START ON 3/8/2018] insulin aspart U-100  12 Units Subcutaneous Q24H    insulin aspart U-100  12 Units Subcutaneous Q24H    insulin aspart U-100  12 Units Subcutaneous Q24H    [START ON 3/8/2018] insulin aspart U-100  12 Units Subcutaneous Q24H    insulin detemir U-100  22 Units Subcutaneous Daily    lisinopril  40 mg Per NG tube Daily    metoprolol tartrate  50 mg Per NG tube BID    metroNIDAZOLE  500 mg Per G Tube Q8H    modafinil  200 mg Per NG tube Daily    And    modafinil  100 mg Per NG tube Daily    polyethylene glycol  17 g Per NG tube Daily    potassium chloride 10%  40 mEq Oral Once    senna-docusate 8.6-50 mg  1 tablet Per NG tube BID    sodium chloride 0.9%  3 mL Intravenous Q8H    sodium chloride 3%  4 mL Nebulization Q6H     Continuous Infusions:  PRN Meds:acetaminophen, bisacodyl, dextrose 50%, glucagon (human recombinant), insulin aspart U-100, ipratropium, labetalol, ondansetron    Objective:     Vital Signs (Most Recent):  Temp: 98.2 °F (36.8 °C) (03/07/18 1611)  Pulse: 98 (03/07/18 1611)  Resp: 18 (03/07/18 1611)  BP: 132/79 (03/07/18 1611)  SpO2: 97 % (03/07/18 1611)  BP Location: Right arm    Vital Signs Range (Last 24H):  Temp:  [96.1 °F (35.6 °C)-98.5 °F (36.9 °C)]   Pulse:  [75-98]   Resp:  [16-19]   BP: (123-159)/(79-99)   SpO2:  [94 %-99 %]   BP Location: Right arm    Physical Exam   Constitutional: He appears well-developed and well-nourished.   Cardiovascular: Normal rate.    Pulmonary/Chest: Effort normal.       Skin: Skin is warm and dry.   Nursing note and vitals reviewed.      Neurological Exam:   LOC: alert  Language: Global aphasia  Articulation: Mute/Anarthric  Orientation: Untestable due to severe aphasia   Visual Fields: Hemianopsia right  EOM (CN III, IV, VI): Gaze preference  left  Facial Movement (CN VII): Lower facial weakness on the  Right  Motor: Arm left  Normal 5/5  Leg left  Paresis: 3/5  Arm right  Plegia 0/5  Leg right Plegia 0/5      Laboratory:  CMP:     Recent Labs  Lab 03/07/18  0446   CALCIUM 9.7   ALBUMIN 3.0*   PROT 7.2   *   K 4.2   CO2 26   CL 98   BUN 17   CREATININE 0.8   ALKPHOS 112   ALT 86*   AST 56*   BILITOT 0.6     BMP:     Recent Labs  Lab 03/07/18 0446   *   K 4.2   CL 98   CO2 26   BUN 17   CREATININE 0.8   CALCIUM 9.7     CBC:     Recent Labs  Lab 03/07/18 0447   WBC 7.38   RBC 4.46*   HGB 13.0*   HCT 38.9*   *   MCV 87   MCH 29.1   MCHC 33.4     Lipid Panel: No results for input(s): CHOL, LDLCALC, HDL, TRIG in the last 168 hours.  Coagulation:     Recent Labs  Lab 03/07/18 0447   INR 0.9     Platelet Aggregation Study: No results for input(s): PLTAGG, PLTAGINTERP, PLTAGREGLACO, ADPPLTAGGREG in the last 168 hours.  Hgb A1C: No results for input(s): HGBA1C in the last 168 hours.  TSH: No results for input(s): TSH in the last 168 hours.      Diagnostic Results       Brain Imaging     Most recent CTH 2/04/18 redemonstration of large L MCA territory infarction w/o evidence of hemorrhagic conversion.  Stable post op change from L MCA endovascular stenting.        Vessel Imaging   IR Cerebral Angiogram 1/25/18 demonstrated occlusion of the L M1 segment of the L MCA with good collaterals. Occlusion removed and stent placed with TICI 2C.    Cardiac Imaging   2D Echo 1/26/18   CONCLUSIONS     1 - Normal left ventricular systolic function (EF 65-70%).     2 - Concentric remodeling.     3 - No wall motion abnormalities.     4 - Normal left ventricular diastolic function.     5 - Normal right ventricular systolic function       Debo Kumar NP  [facility]  Department of Vascular Neurology   [facility]

## 2018-03-07 NOTE — ASSESSMENT & PLAN NOTE
"46 yo M with PMHx HLD, LOYDA, poorly controlled DM II presents to [facility] 01/25/18 after noted "strange behavior" for which EMS was called. Pt was found to have a L M1 occlusion and was taken to IR for thrombectomy with TICI 2C reperfusion and stent placement due to L MCA stenosis. Etiology remains unclear: no obvious signs of cardiac source or atheromatous disease in the aorto/carotid axis. Echo normal with no hx or signs of A fib. PT/OT/SLP will continue to evaluate and treat; recommending SNF. DAPT 2/2 recent stent placement.      Waiting for good safe discharge plan - currently pursuing home health.  [facility]    Antithrombotics:  mg po qd, clopidogrel 75 mg po qd [s/p intracranial stent]  Statins: Atorvastatin 80 mg po qd -held due to transaminitis  Aggressive risk factor modification: HLD, DM II (Hgb A1c 10%), Obesity  Rehab efforts: PT/OT/SLP-- Recommending SNF, placement pending  Diagnostics ordered/pending: None  VTE prophylaxis: Heparin 5000 U q8h  BP parameters: Infarct: -170   "

## 2018-03-07 NOTE — ASSESSMENT & PLAN NOTE
Likely hypoxia is Aspiration pna vs pneumonitis (as CXR improved after a day) 2/2 to superimposed encephalopathy due to sepsis  -AM CXR 3/2 with mild bibasilar atelectasis   -Continue O2 sats >90%, okay with current requirements of 1L NC, pt has what appears as Cheyne espinal pattern and reported LOYDA  -Sister requested continuous pulse ox; Ordered  -Contraction alkalosis improved with increasing water flushes via PEG  -Considered PE as differential however pt is on OAC and not tachycardic, tachypneic. Wells score 1.5 (low-risk)  - Continue chest physiotherapy, aspiration precautions, wean off O2 support   -plan for home suctioning upon discharge

## 2018-03-07 NOTE — ASSESSMENT & PLAN NOTE
-Stroke risk factor  SBPs 123-159  - Continue scheduled lisinopril 40 mg qd, Metoprolol 50 mg bid, Labetalol PRN  Monitoring

## 2018-03-07 NOTE — SUBJECTIVE & OBJECTIVE
"Interval HPI:   Overnight events:  AFVSS.  BG at goal during AM check, otherwise above goal.  On Diabetasource TF at 75cc/h with 300cc free water boluses q4.  No hypoglycemia.      Unclear timing issue yesterday. Discussed with nursing.    /83 (BP Location: Right arm, Patient Position: Lying)   Pulse 81   Temp 96.1 °F (35.6 °C) (Oral)   Resp 16   Ht 5' 10" (1.778 m)   Wt 105.6 kg (232 lb 12.9 oz)   SpO2 96%   BMI 33.40 kg/m²     Labs Reviewed and Include      Recent Labs  Lab 03/07/18  0446   *   CALCIUM 9.7   ALBUMIN 3.0*   PROT 7.2   *   K 4.2   CO2 26   CL 98   BUN 17   CREATININE 0.8   ALKPHOS 112   ALT 86*   AST 56*   BILITOT 0.6     Lab Results   Component Value Date    WBC 7.38 03/07/2018    HGB 13.0 (L) 03/07/2018    HCT 38.9 (L) 03/07/2018    MCV 87 03/07/2018     (H) 03/07/2018     No results for input(s): TSH, FREET4 in the last 168 hours.  Lab Results   Component Value Date    HGBA1C 10.0 (H) 01/25/2018       Nutritional status:   Body mass index is 33.4 kg/m².  Lab Results   Component Value Date    ALBUMIN 3.0 (L) 03/07/2018    ALBUMIN 2.8 (L) 03/06/2018    ALBUMIN 2.6 (L) 03/05/2018     No results found for: PREALBUMIN    Estimated Creatinine Clearance: 137.4 mL/min (based on SCr of 0.8 mg/dL).    Accu-Checks  Recent Labs      03/05/18   2159  03/06/18   0218  03/06/18   0559  03/06/18   0756  03/06/18   1144  03/06/18   1611  03/06/18   2201  03/07/18   0153  03/07/18   0552  03/07/18   0749   POCTGLUCOSE  203*  205*  216*  185*  206*  234*  219*  235*  236*  194*       Current Medications and/or Treatments Impacting Glycemic Control  Immunotherapy:  Immunosuppressants     None        Steroids:   Hormones     None        Pressors:    Autonomic Drugs     Start     Stop Route Frequency Ordered    01/31/18 0854  succinylcholine (ANECTINE) 20 mg/mL injection     Comments:  Created by cabinet override    01/31 2059 01/31/18 0854        Hyperglycemia/Diabetes Medications: " Antihyperglycemics     Start     Stop Route Frequency Ordered    03/07/18 0800  insulin aspart U-100 pen 10 Units      -- SubQ Every 24 hours (non-standard times) 03/06/18 0946    03/07/18 0400  insulin aspart U-100 pen 10 Units      -- SubQ Every 24 hours (non-standard times) 03/06/18 0946    03/07/18 0000  insulin aspart U-100 pen 10 Units      -- SubQ Every 24 hours (non-standard times) 03/06/18 0946    03/06/18 2000  insulin aspart U-100 pen 10 Units      -- SubQ Every 24 hours (non-standard times) 03/06/18 0946    03/06/18 1600  insulin aspart U-100 pen 10 Units      -- SubQ Every 24 hours (non-standard times) 03/06/18 0946    03/06/18 1200  insulin aspart U-100 pen 10 Units      -- SubQ Every 24 hours (non-standard times) 03/06/18 0946    03/02/18 0900  insulin detemir U-100 pen 22 Units      -- SubQ Daily 03/02/18 0808    02/24/18 1148  insulin aspart U-100 pen 1-10 Units      -- SubQ Every 4 hours PRN 02/24/18 1049    02/16/18 1400  insulin aspart pen 8 Units      02/19 0001 SubQ Every 4 hours 02/16/18 1058

## 2018-03-07 NOTE — PLAN OF CARE
DAMIAN spoke with patient's spouse regarding EMS transportation to [place]. DAMIAN informed wife that cost of transport will be $2,722.21. Wife is in agreement to pay for transport, however, states that she is unable to pay the total amount up front. Wife is interested in payment plan option, if possible. DAMIAN will contact [facility] to inquire about payment options.     2:50 PM  DAMIAN spoke with Fernanda with [facility]. Fernanda states a payment plan option has to be approved by a supervisor with [facility]. Fernanda states that typically half of the cost has to be paid up front and the balance can be arranged on a payment plan, however, Fernanda will ask about the least amount that can be paid to arrange the transport.  DAMIAN is waiting to hear back from Fernanda.     2:57 PM  DAMIAN spoke with Fernanda. Fernanda states that patient will not be required to pay anything up front for the transport, however, the bill will be sent to patient and a payment plan can then be arranged at that time.     DAMIAN informed pt's spouse of the above information.    Mary Shoemaker LMSW  [facility]  Ext. [phone]

## 2018-03-07 NOTE — ASSESSMENT & PLAN NOTE
Afebrile, leukocytosis resolved  - Klebsiella from urine cultures (2/25/18) + Anaerobes from PEG site cultures   - rocephin course completed, and Flagyl completed today

## 2018-03-07 NOTE — PROGRESS NOTES
"[facility]  Endocrinology  Progress Note    Admit Date: 1/25/2018     Reason for Consult: Management of T2DM, Hyperglycemia     Surgical Procedure and Date: thrombectomy with TICI 2C reperfusion and stent placement due to L MCA stenosis  PEG 1/31    Home Diabetes Medications: metformin     How often checking glucose at home? FRIEDA, pt aphasic     Diabetes Complications include:     Hyperglycemia    Complicating diabetes co morbidities:   Residual deficits from CVA  and LOYDA      HPI:   Patient is a 47 y.o. male with a diagnosis of poorly controlled DM II, HLD, LOYDA, [place] [facility] after noted "strange behavior" for which EMS was called.  Pt was found to have a L M1 occlusion and was taken to IR for thrombectomy with TICI 2C reperfusion and stent placement due to L MCA stenosis. DAPT 2/2 recent stent placement in angiogram after PEG placement.  Endo consulted for BG management.         Interval HPI:   Overnight events:  AFVSS.  BG at goal during AM check, otherwise above goal.  On Diabetasource TF at 75cc/h with 300cc free water boluses q4.  No hypoglycemia.      Unclear timing issue yesterday. Discussed with nursing.    /83 (BP Location: Right arm, Patient Position: Lying)   Pulse 81   Temp 96.1 °F (35.6 °C) (Oral)   Resp 16   Ht 5' 10" (1.778 m)   Wt 105.6 kg (232 lb 12.9 oz)   SpO2 96%   BMI 33.40 kg/m²       Labs Reviewed and Include      Recent Labs  Lab 03/07/18  0446   *   CALCIUM 9.7   ALBUMIN 3.0*   PROT 7.2   *   K 4.2   CO2 26   CL 98   BUN 17   CREATININE 0.8   ALKPHOS 112   ALT 86*   AST 56*   BILITOT 0.6     Lab Results   Component Value Date    WBC 7.38 03/07/2018    HGB 13.0 (L) 03/07/2018    HCT 38.9 (L) 03/07/2018    MCV 87 03/07/2018     (H) 03/07/2018     No results for input(s): TSH, FREET4 in the last 168 hours.  Lab Results   Component Value Date    HGBA1C 10.0 (H) 01/25/2018       Nutritional status:   Body mass index is 33.4 " kg/m².  Lab Results   Component Value Date    ALBUMIN 3.0 (L) 03/07/2018    ALBUMIN 2.8 (L) 03/06/2018    ALBUMIN 2.6 (L) 03/05/2018     No results found for: PREALBUMIN    Estimated Creatinine Clearance: 137.4 mL/min (based on SCr of 0.8 mg/dL).    Accu-Checks  Recent Labs      03/05/18   2159  03/06/18   0218  03/06/18   0559  03/06/18   0756  03/06/18   1144  03/06/18   1611  03/06/18   2201  03/07/18   0153  03/07/18   0552  03/07/18   0749   POCTGLUCOSE  203*  205*  216*  185*  206*  234*  219*  235*  236*  194*       Current Medications and/or Treatments Impacting Glycemic Control  Immunotherapy:  Immunosuppressants     None        Steroids:   Hormones     None        Pressors:    Autonomic Drugs     Start     Stop Route Frequency Ordered    01/31/18 0854  succinylcholine (ANECTINE) 20 mg/mL injection     Comments:  Created by cabinet override    01/31 2059 01/31/18 0854        Hyperglycemia/Diabetes Medications: Antihyperglycemics     Start     Stop Route Frequency Ordered    03/07/18 0800  insulin aspart U-100 pen 10 Units      -- SubQ Every 24 hours (non-standard times) 03/06/18 0946    03/07/18 0400  insulin aspart U-100 pen 10 Units      -- SubQ Every 24 hours (non-standard times) 03/06/18 0946    03/07/18 0000  insulin aspart U-100 pen 10 Units      -- SubQ Every 24 hours (non-standard times) 03/06/18 0946    03/06/18 2000  insulin aspart U-100 pen 10 Units      -- SubQ Every 24 hours (non-standard times) 03/06/18 0946    03/06/18 1600  insulin aspart U-100 pen 10 Units      -- SubQ Every 24 hours (non-standard times) 03/06/18 0946    03/06/18 1200  insulin aspart U-100 pen 10 Units      -- SubQ Every 24 hours (non-standard times) 03/06/18 0946    03/02/18 0900  insulin detemir U-100 pen 22 Units      -- SubQ Daily 03/02/18 0808    02/24/18 1148  insulin aspart U-100 pen 1-10 Units      -- SubQ Every 4 hours PRN 02/24/18 1049    02/16/18 1400  insulin aspart pen 8 Units      02/19 0001 SubQ Every 4  hours 02/16/18 1058          ASSESSMENT and PLAN    * Embolic stroke involving left middle cerebral artery    Avoid hypoglycemia             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         On enteral nutrition    TF 75 cc/hr, managed per primary  Can increase insulin needs            Type 2 diabetes mellitus with hyperglycemia, with long-term current use of insulin    -180    Continue Levemir 22 units daily  Change novolog to 15u q6h.    If TF held or decreased, hold or decrease novolog by same %  Continue moderate dose correction scale q6hr prn    Discharge planning :  TBD             Obstructive sleep apnea    May increase insulin resistance if untreated             Kassie Danielle MD  Endocrinology  [facility] CRAMER, Kathrin Maya MD,  have personally taken the history and examined the patient and agree with the resident's note as stated above.  Increase novolog 12 q4

## 2018-03-07 NOTE — ASSESSMENT & PLAN NOTE
- Abscess near PEG w/IR aspirate, growing anaerobes   - Rocephin (last dose 3/5) and Flagyl (last dose 3/7)

## 2018-03-07 NOTE — NURSING
Report called to GIOVANA Rosario on the 9th floor and patient moved with all belongings. All meds and bags of tube feedings taken.

## 2018-03-07 NOTE — ASSESSMENT & PLAN NOTE
-180    Continue Levemir 22 units daily  Change novolog to 15u q6h.    If TF held or decreased, hold or decrease novolog by same %  Continue moderate dose correction scale q6hr prn    Discharge planning :  TBD

## 2018-03-07 NOTE — SUBJECTIVE & OBJECTIVE
Neurologic Chief Complaint: Left MCA stroke     Subjective:     Interval History: Alert today.  Remains non verbal.  No new symptoms.  LFTs still elevated at current, may be due to modafinil.  Holding statin.  Will continue to monitor.  [facility].  Also discussed need for home suctioning.    HPI, Past Medical, Family, and Social History remains the same as documented in the initial encounter.     Review of Systems   Constitutional: Negative for fever.   Eyes: Positive for visual disturbance.   Gastrointestinal: Negative for vomiting.   Genitourinary: Negative for hematuria.   Neurological: Positive for speech difficulty and weakness.   Psychiatric/Behavioral: Negative for behavioral problems.     Scheduled Meds:   albuterol-ipratropium 2.5mg-0.5mg/3mL  3 mL Nebulization Q6H    aspirin  325 mg Per G Tube Daily    clopidogrel  75 mg Per G Tube Daily    heparin (porcine)  5,000 Units Subcutaneous Q8H    [START ON 3/8/2018] insulin aspart U-100  12 Units Subcutaneous Q24H    [START ON 3/8/2018] insulin aspart U-100  12 Units Subcutaneous Q24H    [START ON 3/8/2018] insulin aspart U-100  12 Units Subcutaneous Q24H    insulin aspart U-100  12 Units Subcutaneous Q24H    insulin aspart U-100  12 Units Subcutaneous Q24H    [START ON 3/8/2018] insulin aspart U-100  12 Units Subcutaneous Q24H    insulin detemir U-100  22 Units Subcutaneous Daily    lisinopril  40 mg Per NG tube Daily    metoprolol tartrate  50 mg Per NG tube BID    metroNIDAZOLE  500 mg Per G Tube Q8H    modafinil  200 mg Per NG tube Daily    And    modafinil  100 mg Per NG tube Daily    polyethylene glycol  17 g Per NG tube Daily    potassium chloride 10%  40 mEq Oral Once    senna-docusate 8.6-50 mg  1 tablet Per NG tube BID    sodium chloride 0.9%  3 mL Intravenous Q8H    sodium chloride 3%  4 mL Nebulization Q6H     Continuous Infusions:  PRN Meds:acetaminophen, bisacodyl, dextrose 50%, glucagon (human  recombinant), insulin aspart U-100, ipratropium, labetalol, ondansetron    Objective:     Vital Signs (Most Recent):  Temp: 98.2 °F (36.8 °C) (03/07/18 1611)  Pulse: 98 (03/07/18 1611)  Resp: 18 (03/07/18 1611)  BP: 132/79 (03/07/18 1611)  SpO2: 97 % (03/07/18 1611)  BP Location: Right arm    Vital Signs Range (Last 24H):  Temp:  [96.1 °F (35.6 °C)-98.5 °F (36.9 °C)]   Pulse:  [75-98]   Resp:  [16-19]   BP: (123-159)/(79-99)   SpO2:  [94 %-99 %]   BP Location: Right arm    Physical Exam   Constitutional: He appears well-developed and well-nourished.   Cardiovascular: Normal rate.    Pulmonary/Chest: Effort normal.       Skin: Skin is warm and dry.   Nursing note and vitals reviewed.      Neurological Exam:   LOC: alert  Language: Global aphasia  Articulation: Mute/Anarthric  Orientation: Untestable due to severe aphasia   Visual Fields: Hemianopsia right  EOM (CN III, IV, VI): Gaze preference  left  Facial Movement (CN VII): Lower facial weakness on the Right  Motor: Arm left  Normal 5/5  Leg left  Paresis: 3/5  Arm right  Plegia 0/5  Leg right Plegia 0/5      Laboratory:  CMP:     Recent Labs  Lab 03/07/18 0446   CALCIUM 9.7   ALBUMIN 3.0*   PROT 7.2   *   K 4.2   CO2 26   CL 98   BUN 17   CREATININE 0.8   ALKPHOS 112   ALT 86*   AST 56*   BILITOT 0.6     BMP:     Recent Labs  Lab 03/07/18  0446   *   K 4.2   CL 98   CO2 26   BUN 17   CREATININE 0.8   CALCIUM 9.7     CBC:     Recent Labs  Lab 03/07/18 0447   WBC 7.38   RBC 4.46*   HGB 13.0*   HCT 38.9*   *   MCV 87   MCH 29.1   MCHC 33.4     Lipid Panel: No results for input(s): CHOL, LDLCALC, HDL, TRIG in the last 168 hours.  Coagulation:     Recent Labs  Lab 03/07/18 0447   INR 0.9     Platelet Aggregation Study: No results for input(s): PLTAGG, PLTAGINTERP, PLTAGREGLACO, ADPPLTAGGREG in the last 168 hours.  Hgb A1C: No results for input(s): HGBA1C in the last 168 hours.  TSH: No results for input(s): TSH in the last 168  hours.      Diagnostic Results       Brain Imaging     Most recent CTH 2/04/18 redemonstration of large L MCA territory infarction w/o evidence of hemorrhagic conversion.  Stable post op change from L MCA endovascular stenting.        Vessel Imaging   IR Cerebral Angiogram 1/25/18 demonstrated occlusion of the L M1 segment of the L MCA with good collaterals. Occlusion removed and stent placed with TICI 2C.    Cardiac Imaging   2D Echo 1/26/18   CONCLUSIONS     1 - Normal left ventricular systolic function (EF 65-70%).     2 - Concentric remodeling.     3 - No wall motion abnormalities.     4 - Normal left ventricular diastolic function.     5 - Normal right ventricular systolic function

## 2018-03-08 LAB
ALBUMIN SERPL BCP-MCNC: 3 G/DL
ALP SERPL-CCNC: 107 U/L
ALT SERPL W/O P-5'-P-CCNC: 81 U/L
ANION GAP SERPL CALC-SCNC: 9 MMOL/L
AST SERPL-CCNC: 50 U/L
BASOPHILS # BLD AUTO: 0.04 K/UL
BASOPHILS NFR BLD: 0.5 %
BILIRUB SERPL-MCNC: 0.6 MG/DL
BUN SERPL-MCNC: 21 MG/DL
CALCIUM SERPL-MCNC: 9.8 MG/DL
CHLORIDE SERPL-SCNC: 100 MMOL/L
CO2 SERPL-SCNC: 29 MMOL/L
CREAT SERPL-MCNC: 0.8 MG/DL
DIFFERENTIAL METHOD: ABNORMAL
EOSINOPHIL # BLD AUTO: 0.2 K/UL
EOSINOPHIL NFR BLD: 1.9 %
ERYTHROCYTE [DISTWIDTH] IN BLOOD BY AUTOMATED COUNT: 14.4 %
EST. GFR  (AFRICAN AMERICAN): >60 ML/MIN/1.73 M^2
EST. GFR  (NON AFRICAN AMERICAN): >60 ML/MIN/1.73 M^2
GLUCOSE SERPL-MCNC: 193 MG/DL
HCT VFR BLD AUTO: 38.9 %
HGB BLD-MCNC: 13.3 G/DL
IMM GRANULOCYTES # BLD AUTO: 0.06 K/UL
IMM GRANULOCYTES NFR BLD AUTO: 0.8 %
INR PPP: 0.9
LYMPHOCYTES # BLD AUTO: 2.5 K/UL
LYMPHOCYTES NFR BLD: 31.1 %
MAGNESIUM SERPL-MCNC: 1.8 MG/DL
MCH RBC QN AUTO: 29.2 PG
MCHC RBC AUTO-ENTMCNC: 34.2 G/DL
MCV RBC AUTO: 86 FL
MONOCYTES # BLD AUTO: 0.7 K/UL
MONOCYTES NFR BLD: 8.7 %
NEUTROPHILS # BLD AUTO: 4.5 K/UL
NEUTROPHILS NFR BLD: 57 %
NRBC BLD-RTO: 0 /100 WBC
PHOSPHATE SERPL-MCNC: 4.6 MG/DL
PLATELET # BLD AUTO: 521 K/UL
PMV BLD AUTO: 9.2 FL
POCT GLUCOSE: 149 MG/DL (ref 70–110)
POCT GLUCOSE: 164 MG/DL (ref 70–110)
POCT GLUCOSE: 169 MG/DL (ref 70–110)
POCT GLUCOSE: 172 MG/DL (ref 70–110)
POCT GLUCOSE: 193 MG/DL (ref 70–110)
POCT GLUCOSE: 212 MG/DL (ref 70–110)
POTASSIUM SERPL-SCNC: 4.1 MMOL/L
PROT SERPL-MCNC: 7.3 G/DL
PROTHROMBIN TIME: 9.8 SEC
RBC # BLD AUTO: 4.55 M/UL
SODIUM SERPL-SCNC: 138 MMOL/L
WBC # BLD AUTO: 7.91 K/UL

## 2018-03-08 PROCEDURE — 97530 THERAPEUTIC ACTIVITIES: CPT

## 2018-03-08 PROCEDURE — 85610 PROTHROMBIN TIME: CPT

## 2018-03-08 PROCEDURE — 94640 AIRWAY INHALATION TREATMENT: CPT

## 2018-03-08 PROCEDURE — 25000242 PHARM REV CODE 250 ALT 637 W/ HCPCS: Performed by: PSYCHIATRY & NEUROLOGY

## 2018-03-08 PROCEDURE — 36415 COLL VENOUS BLD VENIPUNCTURE: CPT

## 2018-03-08 PROCEDURE — 25000003 PHARM REV CODE 250: Performed by: NURSE PRACTITIONER

## 2018-03-08 PROCEDURE — 27000221 HC OXYGEN, UP TO 24 HOURS

## 2018-03-08 PROCEDURE — 92526 ORAL FUNCTION THERAPY: CPT

## 2018-03-08 PROCEDURE — 20600001 HC STEP DOWN PRIVATE ROOM

## 2018-03-08 PROCEDURE — 99233 SBSQ HOSP IP/OBS HIGH 50: CPT | Mod: ,,, | Performed by: PSYCHIATRY & NEUROLOGY

## 2018-03-08 PROCEDURE — 25000003 PHARM REV CODE 250: Performed by: PHYSICIAN ASSISTANT

## 2018-03-08 PROCEDURE — 84100 ASSAY OF PHOSPHORUS: CPT

## 2018-03-08 PROCEDURE — 25000003 PHARM REV CODE 250: Performed by: PSYCHIATRY & NEUROLOGY

## 2018-03-08 PROCEDURE — 83735 ASSAY OF MAGNESIUM: CPT

## 2018-03-08 PROCEDURE — 25000003 PHARM REV CODE 250: Performed by: STUDENT IN AN ORGANIZED HEALTH CARE EDUCATION/TRAINING PROGRAM

## 2018-03-08 PROCEDURE — 63600175 PHARM REV CODE 636 W HCPCS: Performed by: NURSE PRACTITIONER

## 2018-03-08 PROCEDURE — 94761 N-INVAS EAR/PLS OXIMETRY MLT: CPT

## 2018-03-08 PROCEDURE — 63600175 PHARM REV CODE 636 W HCPCS: Performed by: INTERNAL MEDICINE

## 2018-03-08 PROCEDURE — 97110 THERAPEUTIC EXERCISES: CPT

## 2018-03-08 PROCEDURE — 25000242 PHARM REV CODE 250 ALT 637 W/ HCPCS: Performed by: NURSE PRACTITIONER

## 2018-03-08 PROCEDURE — 94668 MNPJ CHEST WALL SBSQ: CPT

## 2018-03-08 PROCEDURE — A4216 STERILE WATER/SALINE, 10 ML: HCPCS | Performed by: NURSE PRACTITIONER

## 2018-03-08 PROCEDURE — 80053 COMPREHEN METABOLIC PANEL: CPT

## 2018-03-08 PROCEDURE — 85025 COMPLETE CBC W/AUTO DIFF WBC: CPT

## 2018-03-08 PROCEDURE — 25000003 PHARM REV CODE 250: Performed by: HOSPITALIST

## 2018-03-08 RX ADMIN — INSULIN ASPART 1 UNITS: 100 INJECTION, SOLUTION INTRAVENOUS; SUBCUTANEOUS at 08:03

## 2018-03-08 RX ADMIN — HEPARIN SODIUM 5000 UNITS: 5000 INJECTION, SOLUTION INTRAVENOUS; SUBCUTANEOUS at 02:03

## 2018-03-08 RX ADMIN — SODIUM CHLORIDE SOLN NEBU 3% 4 ML: 3 NEBU SOLN at 07:03

## 2018-03-08 RX ADMIN — CLOPIDOGREL BISULFATE 75 MG: 75 TABLET, FILM COATED ORAL at 09:03

## 2018-03-08 RX ADMIN — ASPIRIN 325 MG ORAL TABLET 325 MG: 325 PILL ORAL at 09:03

## 2018-03-08 RX ADMIN — INSULIN ASPART 1 UNITS: 100 INJECTION, SOLUTION INTRAVENOUS; SUBCUTANEOUS at 12:03

## 2018-03-08 RX ADMIN — INSULIN ASPART 4 UNITS: 100 INJECTION, SOLUTION INTRAVENOUS; SUBCUTANEOUS at 08:03

## 2018-03-08 RX ADMIN — IPRATROPIUM BROMIDE AND ALBUTEROL SULFATE 3 ML: 2.5; .5 SOLUTION RESPIRATORY (INHALATION) at 12:03

## 2018-03-08 RX ADMIN — MODAFINIL 200 MG: 100 TABLET ORAL at 05:03

## 2018-03-08 RX ADMIN — SODIUM CHLORIDE SOLN NEBU 3% 4 ML: 3 NEBU SOLN at 12:03

## 2018-03-08 RX ADMIN — HEPARIN SODIUM 5000 UNITS: 5000 INJECTION, SOLUTION INTRAVENOUS; SUBCUTANEOUS at 08:03

## 2018-03-08 RX ADMIN — INSULIN ASPART 12 UNITS: 100 INJECTION, SOLUTION INTRAVENOUS; SUBCUTANEOUS at 04:03

## 2018-03-08 RX ADMIN — INSULIN DETEMIR 22 UNITS: 100 INJECTION, SOLUTION SUBCUTANEOUS at 08:03

## 2018-03-08 RX ADMIN — HEPARIN SODIUM 5000 UNITS: 5000 INJECTION, SOLUTION INTRAVENOUS; SUBCUTANEOUS at 05:03

## 2018-03-08 RX ADMIN — IPRATROPIUM BROMIDE AND ALBUTEROL SULFATE 3 ML: 2.5; .5 SOLUTION RESPIRATORY (INHALATION) at 07:03

## 2018-03-08 RX ADMIN — INSULIN ASPART 1 UNITS: 100 INJECTION, SOLUTION INTRAVENOUS; SUBCUTANEOUS at 04:03

## 2018-03-08 RX ADMIN — Medication 3 ML: at 02:03

## 2018-03-08 RX ADMIN — METRONIDAZOLE 500 MG: 500 TABLET ORAL at 05:03

## 2018-03-08 RX ADMIN — IPRATROPIUM BROMIDE AND ALBUTEROL SULFATE 3 ML: 2.5; .5 SOLUTION RESPIRATORY (INHALATION) at 01:03

## 2018-03-08 RX ADMIN — INSULIN ASPART 2 UNITS: 100 INJECTION, SOLUTION INTRAVENOUS; SUBCUTANEOUS at 12:03

## 2018-03-08 RX ADMIN — INSULIN ASPART 12 UNITS: 100 INJECTION, SOLUTION INTRAVENOUS; SUBCUTANEOUS at 08:03

## 2018-03-08 RX ADMIN — LISINOPRIL 40 MG: 20 TABLET ORAL at 09:03

## 2018-03-08 RX ADMIN — METOPROLOL TARTRATE 50 MG: 50 TABLET, FILM COATED ORAL at 09:03

## 2018-03-08 RX ADMIN — INSULIN ASPART 12 UNITS: 100 INJECTION, SOLUTION INTRAVENOUS; SUBCUTANEOUS at 05:03

## 2018-03-08 RX ADMIN — SODIUM CHLORIDE SOLN NEBU 3% 4 ML: 3 NEBU SOLN at 01:03

## 2018-03-08 RX ADMIN — INSULIN ASPART 12 UNITS: 100 INJECTION, SOLUTION INTRAVENOUS; SUBCUTANEOUS at 12:03

## 2018-03-08 RX ADMIN — STANDARDIZED SENNA CONCENTRATE AND DOCUSATE SODIUM 1 TABLET: 8.6; 5 TABLET, FILM COATED ORAL at 09:03

## 2018-03-08 RX ADMIN — POLYETHYLENE GLYCOL 3350 17 G: 17 POWDER, FOR SOLUTION ORAL at 09:03

## 2018-03-08 RX ADMIN — MODAFINIL 100 MG: 100 TABLET ORAL at 02:03

## 2018-03-08 RX ADMIN — METOPROLOL TARTRATE 50 MG: 50 TABLET, FILM COATED ORAL at 08:03

## 2018-03-08 NOTE — PT/OT/SLP PROGRESS
Occupational Therapy   Treatment    Name: Todd Quevedo  MRN: [id number]  Admitting Diagnosis:  Embolic stroke involving left middle cerebral artery       Recommendations:     Discharge Recommendations: nursing facility, skilled  Discharge Equipment Recommendations:   (hospital bed )  Barriers to discharge:  Inaccessible home environment, Decreased caregiver support    Subjective     Communicated with: RN prior to session.  Pain/Comfort:  · Pain Rating 1: 0/10  · Pain Rating Post-Intervention 2: 0/10    Patients cultural, spiritual, Pent[employer]al conflicts given the current situation: none     Objective:     Patient found with: bed alarm, SCD, pulse ox (continuous), oxygen, telemetry, PEG Tube.  O2 saturation levels assessed prior to sitting EOB.  Therapy tech (Luana) assisted with session.  Sister present at start of session.    General Precautions: Standard, aphasia, aspiration, fall, NPO   Orthopedic Precautions:N/A   Braces: N/A     Occupational Performance:    Bed Mobility:    · Patient completed Rolling/Turning to Left with  total assistance  · Patient completed Rolling/Turning to Right with total assistance  · Patient completed Scooting/Bridging with total assistance  · Patient completed Supine to Sit with total assistance  · Patient completed Sit to Supine with total assistance   · With assist of 2     Functional Mobility/Transfers:  · Pt not appropriate for task this date 2* poor trunk control and decreased ability to follow commands.    Activities of Daily Living:  · Grooming: total assistance for washing face utilizing cloth with LUE while seated EOB.    Patient left supine with HOB elevated; all lines intact and call button in reach    AMPAC 6 Click:  AMPAC Total Score: 6    Treatment & Education:  *Pt sat EOB for ~25 minutes with Max A given to maintain upright position.  *PROM performed on (B) UE incorporating all joints to provide stretch and promote increased functional use of (B) UE: 3 sets x 10  reps  *PROM performed on (B) LE incorporating all joints: 3 sets x 10 reps  *POC reviewed with pt and sister.  Sister stated they will be available for family training on Monday 3/12.  Education:    Assessment:     Todd Quevedo is a 47 y.o. male with a medical diagnosis of Embolic stroke involving left middle cerebral artery.  He presents with the following performance deficits affecting function are weakness, impaired endurance, impaired self care skills, impaired functional mobilty, decreased coordination.  Pt alert during session and tolerated sitting EOB well.  During PROM some tone detected in in LUE and LLE.  Pt continues to require significant assist for all ADLs and mobility at this time and would continue to benefit from skilled OT services to address problems listed below and increase independence with ADLs.    Rehab Prognosis:  Good; patient would benefit from acute skilled OT services to address these deficits and reach maximum level of function.       Plan:     Patient to be seen 3 x/week to address the above listed problems via self-care/home management, therapeutic activities, therapeutic exercises  · Plan of Care Expires: 03/21/18  · Plan of Care Reviewed with: patient, sibling    This Plan of care has been discussed with the patient who was involved in its development and understands and is in agreement with the identified goals and treatment plan    GOALS:    Occupational Therapy Goals        Problem: Occupational Therapy Goal    Goal Priority Disciplines Outcome Interventions   Occupational Therapy Goal     OT, PT/OT Ongoing (interventions implemented as appropriate)    Description:  Goals set 2/26 to be addressed for 14 days with expiration date, 3/12:  Patient will increase functional independence with ADLs by performing:    Patient will demonstrate rolling to the right with mod assist.  Not met   Patient will demonstrate rolling to the left with max assist.   Not met  Patient will demonstrate  supine -sit with max assist.   Not met  Patient will demonstrate squat pivot transfers with max assist.   Not met  Patient will demonstrate grooming while seated with max assist.   Not met  Patient will demonstrate upper body dressing with max assist while seated EOB.   Not met  Patient will demonstrate lower body dressing with max assist while seated EOB.   Not met  Patient will demonstrate toileting with max assist.   Not met  Patient will demonstrate ability to follow 3/5 commands.   Not met  Patient will demonstrate independence with HEP for right UE positioning.    Not met  Patient's family / caregiver will demonstrate independence and safety with assisting patient with self-care skills and functional mobility.     Not met  Patient's family / caregiver will demonstrate independence with providing ROM and changes in bed positioning.   Not met                         Time Tracking:     OT Date of Treatment: 03/08/18  OT Start Time: 0951  OT Stop Time: 1022  OT Total Time (min): 31 min    Billable Minutes:Therapeutic Activity 31    ISAMAR Chang  3/8/2018

## 2018-03-08 NOTE — ASSESSMENT & PLAN NOTE
"48 yo M with PMHx HLD, LOYDA, poorly controlled DM II presents to [facility] 01/25/18 after noted "strange behavior" for which EMS was called. Pt was found to have a L M1 occlusion and was taken to IR for thrombectomy with TICI 2C reperfusion and stent placement due to L MCA stenosis. Etiology remains unclear: no obvious signs of cardiac source or atheromatous disease in the aorto/carotid axis. Echo normal with no hx or signs of A fib. PT/OT/SLP will continue to evaluate and treat; recommending SNF. DAPT 2/2 recent stent placement.      Waiting for good safe discharge plan - currently pursuing home health.  [facility]    Antithrombotics:  mg po qd, clopidogrel 75 mg po qd [s/p intracranial stent]  Statins: Atorvastatin 80 mg po qd -held due to transaminitis  Aggressive risk factor modification: HLD, DM II (Hgb A1c 10%), Obesity  Rehab efforts: PT/OT/SLP-- Recommending SNF, placement pending  Diagnostics ordered/pending: None  VTE prophylaxis: Heparin 5000 U q8h  BP parameters: Infarct: -170   "

## 2018-03-08 NOTE — SUBJECTIVE & OBJECTIVE
Neurologic Chief Complaint: Left MCA stroke     Subjective:     Interval History: Alert today.  Remains non verbal.  No new symptoms.  LFTs still elevated at current, may be due to modafinil.  Holding statin.  Will continue to monitor.  [facility].  Also discussed need for home suctioning.    HPI, Past Medical, Family, and Social History remains the same as documented in the initial encounter.     Review of Systems   Constitutional: Negative for fever.   Eyes: Positive for visual disturbance.   Gastrointestinal: Negative for vomiting.   Genitourinary: Negative for hematuria.   Neurological: Positive for speech difficulty and weakness.   Psychiatric/Behavioral: Negative for behavioral problems.     Scheduled Meds:   albuterol-ipratropium 2.5mg-0.5mg/3mL  3 mL Nebulization Q6H    aspirin  325 mg Per G Tube Daily    clopidogrel  75 mg Per G Tube Daily    heparin (porcine)  5,000 Units Subcutaneous Q8H    insulin aspart U-100  12 Units Subcutaneous Q24H    insulin aspart U-100  12 Units Subcutaneous Q24H    insulin aspart U-100  12 Units Subcutaneous Q24H    insulin aspart U-100  12 Units Subcutaneous Q24H    insulin aspart U-100  12 Units Subcutaneous Q24H    insulin aspart U-100  12 Units Subcutaneous Q24H    insulin detemir U-100  22 Units Subcutaneous Daily    lisinopril  40 mg Per NG tube Daily    metoprolol tartrate  50 mg Per NG tube BID    modafinil  200 mg Per NG tube Daily    And    modafinil  100 mg Per NG tube Daily    polyethylene glycol  17 g Per NG tube Daily    potassium chloride 10%  40 mEq Oral Once    senna-docusate 8.6-50 mg  1 tablet Per NG tube BID    sodium chloride 0.9%  3 mL Intravenous Q8H    sodium chloride 3%  4 mL Nebulization Q6H     Continuous Infusions:  PRN Meds:acetaminophen, bisacodyl, dextrose 50%, glucagon (human recombinant), insulin aspart U-100, ipratropium, labetalol, ondansetron    Objective:     Vital Signs (Most Recent):  Temp:  99.2 °F (37.3 °C) (03/08/18 1552)  Pulse: 88 (03/08/18 1552)  Resp: 18 (03/08/18 1552)  BP: 121/85 (03/08/18 1552)  SpO2: (!) 93 % (03/08/18 1552)  BP Location: Left arm    Vital Signs Range (Last 24H):  Temp:  [96.1 °F (35.6 °C)-99.2 °F (37.3 °C)]   Pulse:  []   Resp:  [14-20]   BP: (104-151)/(69-90)   SpO2:  [90 %-98 %]   BP Location: Left arm    Physical Exam   Constitutional: He appears well-developed and well-nourished.   Cardiovascular: Normal rate.    Pulmonary/Chest: Effort normal.       Skin: Skin is warm and dry.   Nursing note and vitals reviewed.    Neurological Exam:   LOC: alert  Language: Global aphasia  Articulation: Mute/Anarthric  Orientation: Untestable due to severe aphasia   Visual Fields: Hemianopsia right  EOM (CN III, IV, VI): Gaze preference  left  Facial Movement (CN VII): Lower facial weakness on the Right  Motor: Arm left  Normal 5/5  Leg left  Paresis: 3/5  Arm right  Plegia 0/5  Leg right Plegia 0/5    Laboratory:  CMP:     Recent Labs  Lab 03/08/18 0445   CALCIUM 9.8   ALBUMIN 3.0*   PROT 7.3      K 4.1   CO2 29      BUN 21*   CREATININE 0.8   ALKPHOS 107   ALT 81*   AST 50*   BILITOT 0.6     BMP:     Recent Labs  Lab 03/08/18 0445      K 4.1      CO2 29   BUN 21*   CREATININE 0.8   CALCIUM 9.8     CBC:     Recent Labs  Lab 03/08/18 0445   WBC 7.91   RBC 4.55*   HGB 13.3*   HCT 38.9*   *   MCV 86   MCH 29.2   MCHC 34.2     Lipid Panel: No results for input(s): CHOL, LDLCALC, HDL, TRIG in the last 168 hours.  Coagulation:     Recent Labs  Lab 03/08/18  0445   INR 0.9     Platelet Aggregation Study: No results for input(s): PLTAGG, PLTAGINTERP, PLTAGREGLACO, ADPPLTAGGREG in the last 168 hours.  Hgb A1C: No results for input(s): HGBA1C in the last 168 hours.  TSH: No results for input(s): TSH in the last 168 hours.      Diagnostic Results       Brain Imaging     Most recent CT 2/04/18 redemonstration of large L MCA territory infarction w/o evidence  of hemorrhagic conversion.  Stable post op change from L MCA endovascular stenting.        Vessel Imaging   IR Cerebral Angiogram 1/25/18 demonstrated occlusion of the L M1 segment of the L MCA with good collaterals. Occlusion removed and stent placed with TICI 2C.    Cardiac Imaging   2D Echo 1/26/18   CONCLUSIONS     1 - Normal left ventricular systolic function (EF 65-70%).     2 - Concentric remodeling.     3 - No wall motion abnormalities.     4 - Normal left ventricular diastolic function.     5 - Normal right ventricular systolic function

## 2018-03-08 NOTE — PLAN OF CARE
Problem: SLP Goal  Goal: SLP Goal  Speech Language Pathology Goals  Goals expected to be met by 3/20  1. Pt will participate in ongoing bedside assessment of swallow to determine least restrictive diet.  2. Pt will open eyes to stim x5/session given max cues.-met  3. Pt will follow simple commands x2/session given max cues.  4. Pt will answer basic y/n question via any modality x2/session given max cues.  5. Pt will vocalize in response to any stim x2/session given max cues.           Outcome: Ongoing (interventions implemented as appropriate)  Goals remain appropriate. ST will continue to follow.   CARLTON Villegas., CCC-SLP  Pager: [phone]  03/08/2018

## 2018-03-08 NOTE — PT/OT/SLP PROGRESS
Physical Therapy Treatment    Patient Name:  Todd Quevedo   MRN:  [id number]    Recommendations:     Discharge Recommendations:  nursing facility, skilled (Pt expected to d/c home with HH and DME)   Discharge Equipment Recommendations:  (hospital bed, jim lift, reclining back w/c, w/c cushion )   Barriers to discharge: Decreased caregiver support, family training and DME required      Assessment:     Todd Quevedo is a 47 y.o. male admitted with a medical diagnosis of Embolic stroke involving left middle cerebral artery. He has demonstrated little improvement with therapy.  He has the potential to improve sitting balance and head control with additional therapy, but is expected to require increased time to meet these goals.  His family will need extensive training to assume caregiver role.  Family was unavailable to begin training today.       He presents with the following impairments/functional limitations:  weakness, impaired self care skills, impaired balance, decreased safety awareness, impaired joint extensibility, impaired functional mobilty, visual deficits, decreased upper extremity function, decreased lower extremity function, impaired sensation, impaired cognition, abnormal tone, impaired fine motor.    Rehab Prognosis:  limited; patient would benefit from acute skilled PT services to address these deficits and reach maximum level of function.      Recent Surgery: Procedure(s) (LRB):  TRACHEOSTOMY (N/A)  PLACEMENT-TUBE-PEG (N/A)      Plan:     During this hospitalization, patient to be seen 3 x/week to address the above listed problems via therapeutic activities, therapeutic exercises, wheelchair management/training, neuromuscular re-education  · Plan of Care Expires:  03/15/18   Plan of Care Reviewed with: patient, sibling    Subjective       Chief Complaint: patient is non verbal   Pain/Comfort:  · Pain Rating Post-Intervention 1: 0/10    Patients cultural, spiritual, Sabianism conflicts given the  current situation: none noted    Objective:     Patient found with: telemetry, pulse ox (continuous), SCD, pressure relief boots, bed alarm     General Precautions: Standard, aphasia, aspiration, fall, NPO     The patient was found supine in bed, slouched down with feet against the foot board and RLE ER and flexed.  He exhibited increased flexor tone in his RUE, but responded well to stretching.  RUE ROM performed 10x in all available planes to improve UE positioning in bed.  PCT assisted with draw sheet transfer toward the HOB dependent with draw sheet.  Bed positioned in chair position to improve upright tolerance and postural control.  The patient actively used his RUE to push himself down in the bed, so chair position had to be aborted due to poor positioning. Pt tolerated positioning with HOB lowered slightly.  PT attempted to actively engage patient in functional tasks, such as holding cup, taking telephone, and applying chapstick.  Pt demonstrated no active reaching for these objects.  He would maintain grasp if PT placed the object in his had, but he made no attempt to transport or use these objects. RLE ROM performed 10x in all available planes.  He has full PROM with no hypertonicity evident and good DF ROM.  Rolling performed to reposition draw sheet, total assist in both directions.      Family not present and sister not available (working) to begin family training.  Will attempt to initiate family training again tomorrow.     AM-PAC 6 CLICK MOBILITY  Turning over in bed (including adjusting bedclothes, sheets and blankets)?: 1  Sitting down on and standing up from a chair with arms (e.g., wheelchair, bedside commode, etc.): 1  Moving from lying on back to sitting on the side of the bed?: 1  Moving to and from a bed to a chair (including a wheelchair)?: 1  Need to walk in hospital room?: 1  Climbing 3-5 steps with a railing?: 1  Total Score: 6     GOALS:    Physical Therapy Goals        Problem: Physical  Therapy Goal    Goal Priority Disciplines Outcome Goal Variances Interventions   Physical Therapy Goal     PT/OT, PT Ongoing (interventions implemented as appropriate)     Description:    Goals to be met by 3/16/2018    1. Pt will perform rolling to the R and L with max assist.  -met to the L only  2. Pt will perform supine to sit from both sides of the bed with max assist.  3. Pt will perform sit to supine with max assist.  4. Pt will sit EOB x 5 minutes with moderate assist to prepare for functional tasks in sitting.   5. Pt will participate in BLE and cervical ROM exercise.   6. Family will verbalize and demonstrate appropriate positioning and ROM techniques   7. Pt will tolerate sitting up in cardiac chair for 1 hr to improve endurance.                     Time Tracking:     PT Received On: 03/08/18  PT Start Time: 1424     PT Stop Time: 1453  PT Total Time (min): 29 min     Billable Minutes: Therapeutic Activity 14 and Therapeutic Exercise 15    Treatment Type: Treatment  PT/PTA: PT         Madina Luu, PT  3/8/2018  [phone] (pager)

## 2018-03-08 NOTE — PT/OT/SLP PROGRESS
Speech Language Pathology Treatment    Patient Name:  Todd Quevedo   MRN:  [id number]   732/732 A    Admitting Diagnosis: Embolic stroke involving left middle cerebral artery    Recommendations:                 General Recommendations:  Dysphagia therapy, Speech/language therapy and Cognitive-linguistic therapy  Diet recommendations:  NPO, Liquid Diet Level: NPO   Aspiration Precautions: Continue alternate means of nutrition and Strict aspiration precautions, frequent oral care  General Precautions: Standard, aphasia, aspiration, NPO, fall  Communication strategies:  provide increased time to answer and go to room if call light pushed    Subjective     Pt seen bedside. Family present, however, sleeping throughout ST session.    Pain/Comfort:  Pain Rating 1: 0/10    Objective:     Has the patient been evaluated by SLP for swallowing?   Yes  Keep patient NPO? Yes   Current Respiratory Status: nasal cannula (1L)      Pt awake/alert with far left gaze and no localization to SLP on right or to midline despite max cues.  Mod cues t/o session attend to SLP on mid-L or to track objects to midline. Pt did not verbalize, follow directions given cues or [place] assistance, or attend to any attempted therapy tasks. Pt made eye contact with SLP x1 when SLP stood in pt's direct line of vision. Oral care provided with oral swabs, mouth wash, and suctioning. Pt initially clamping mouth closed. Eventually allowed oral care, however, began biting down on oral swab. No coughing noted during oral care. Chap stick applied to lips to encourage labial movement/lip smacking given model, however, patient did not imitate or attempt to move lips. Cold lemon glycerin swabs utilized to facilitate oral stimulation. No movement or swallow noted. White board current. No further questions.          Assessment:     Todd Quevedo is a 47 y.o. male with an SLP diagnosis of Aphasia, Dysphagia, Cognitive-Linguistic Impairment and Visio-Spatial Impairment.   ST will continue to follow.     Goals:    SLP Goals        Problem: SLP Goal    Goal Priority Disciplines Outcome   SLP Goal     SLP Ongoing (interventions implemented as appropriate)   Description:  Speech Language Pathology Goals  Goals expected to be met by 3/20  1. Pt will participate in ongoing bedside assessment of swallow to determine least restrictive diet.  2. Pt will open eyes to stim x5/session given max cues.-met  3. Pt will follow simple commands x2/session given max cues.  4. Pt will answer basic y/n question via any modality x2/session given max cues.  5. Pt will vocalize in response to any stim x2/session given max cues.                            Plan:     · Patient to be seen:  2 x/week   · Plan of Care expires:  03/18/18  · Plan of Care reviewed with:  patient, sibling   · SLP Follow-Up:  Yes       Discharge recommendations:  nursing facility, skilled   Barriers to Discharge:  Level of Skilled Assistance Needed      Time Tracking:     SLP Treatment Date:   03/08/18  Speech Start Time:  1320  Speech Stop Time:  1332     Speech Total Time (min):  12 min    Billable Minutes: Treatment Swallowing Dysfunction 12    EDWARD Johnson, CCC-SLP   Pager: [phone]  03/08/2018

## 2018-03-08 NOTE — PLAN OF CARE
Problem: Physical Therapy Goal  Goal: Physical Therapy Goal    Goals to be met by 3/16/2018    1. Pt will perform rolling to the R and L with max assist.  -met to the L only  2. Pt will perform supine to sit from both sides of the bed with max assist.  3. Pt will perform sit to supine with max assist.  4. Pt will sit EOB x 5 minutes with moderate assist to prepare for functional tasks in sitting.   5. Pt will participate in BLE and cervical ROM exercise.   6. Family will verbalize and demonstrate appropriate positioning and ROM techniques   7. Pt will tolerate sitting up in cardiac chair for 1 hr to improve endurance.    Outcome: Ongoing (interventions implemented as appropriate)  Patient participated in therapy.  POC and goals remain appropriate.  Please refer to progress note for functional mobility.         Madina Luu, PT  3/8/2018  [phone] (pager)

## 2018-03-08 NOTE — PLAN OF CARE
Problem: Occupational Therapy Goal  Goal: Occupational Therapy Goal  Goals set 2/26 to be addressed for 14 days with expiration date, 3/12:  Patient will increase functional independence with ADLs by performing:    Patient will demonstrate rolling to the right with mod assist.  Not met   Patient will demonstrate rolling to the left with max assist.   Not met  Patient will demonstrate supine -sit with max assist.   Not met  Patient will demonstrate squat pivot transfers with max assist.   Not met  Patient will demonstrate grooming while seated with max assist.   Not met  Patient will demonstrate upper body dressing with max assist while seated EOB.   Not met  Patient will demonstrate lower body dressing with max assist while seated EOB.   Not met  Patient will demonstrate toileting with max assist.   Not met  Patient will demonstrate ability to follow 3/5 commands.   Not met  Patient will demonstrate independence with HEP for right UE positioning.    Not met  Patient's family / caregiver will demonstrate independence and safety with assisting patient with self-care skills and functional mobility.     Not met  Patient's family / caregiver will demonstrate independence with providing ROM and changes in bed positioning.   Not met          POC remains appropriate.    ISAMAR Chang  3/8/2018

## 2018-03-08 NOTE — PROGRESS NOTES
"[facility]  Vascular Neurology  Comprehensive Stroke Center  Progress Note    Assessment/Plan:     * Embolic stroke involving left middle cerebral artery    48 yo M with PMHx HLD, LOYDA, poorly controlled DM II presents to [facility] 01/25/18 after noted "strange behavior" for which EMS was called. Pt was found to have a L M1 occlusion and was taken to IR for thrombectomy with TICI 2C reperfusion and stent placement due to L MCA stenosis. Etiology remains unclear: no obvious signs of cardiac source or atheromatous disease in the aorto/carotid axis. Echo normal with no hx or signs of A fib. PT/OT/SLP will continue to evaluate and treat; recommending SNF. DAPT 2/2 recent stent placement.      Waiting for good safe discharge plan - currently pursuing home health.  [facility]    Antithrombotics:  mg po qd, clopidogrel 75 mg po qd [s/p intracranial stent]  Statins: Atorvastatin 80 mg po qd -held due to transaminitis  Aggressive risk factor modification: HLD, DM II (Hgb A1c 10%), Obesity  Rehab efforts: PT/OT/SLP-- Recommending SNF, placement pending  Diagnostics ordered/pending: None  VTE prophylaxis: Heparin 5000 U q8h  BP parameters: Infarct: -170         Right spastic hemiparesis    -Due to stroke  -Continue aggressive PT/OT; Recommending SNF        Acute respiratory failure with hypoxia    Likely hypoxia is Aspiration pna vs pneumonitis (as CXR improved after a day) 2/2 to superimposed encephalopathy due to sepsis  -AM CXR 3/2 with mild bibasilar atelectasis   -Continue O2 sats >90%, okay with current requirements of 1L NC, pt has what appears as Cheyne espinal pattern and reported LOYDA  -Sister requested continuous pulse ox; Ordered  -Contraction alkalosis improved with increasing water flushes via PEG  -Considered PE as differential however pt is on OAC and not tachycardic, tachypneic. Wells score 1.5 (low-risk)  - Continue chest physiotherapy, aspiration " precautions, wean off O2 support   -plan for home suctioning upon discharge        Urinary tract infection due to Klebsiella species     Afebrile, leukocytosis resolved  - Klebsiella from urine cultures (2/25/18) + Anaerobes from PEG site cultures   - rocephin course completed, and Flagyl completed today        Essential hypertension    -Stroke risk factor  SBPs 123-159  - Continue scheduled lisinopril 40 mg qd, Metoprolol 50 mg bid, Labetalol PRN  Monitoring        Mixed hyperlipidemia    -Stroke risk factor, LDL invalid as TG > 400  - holding statin due to rising LFTs        Type 2 diabetes mellitus with hyperglycemia, with long-term current use of insulin    - Stroke risk factor, Hgb A1c 10%  - Continue Diabetasource AC TFs; rate increased to 75 on 3/2  - Endocrine consulted and guiding insulin modification for TF  - POCT Q4h ; Moderate correction q4hr         Transaminitis    -holding statin  -monitoring closely        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 LKN unknown, not tPA candidate. Went to IR for angiogram and stent.  01/29/18:  Off Cardene, remains on Insulin gtt. Concern for sepsis, respiratory source. Imaging with mass effect and some brain compression; maycol crani watch.  01/30/18:  EEG consistent with focal L slowing 2/2 lesion and mild generalized slowing, no seizures. CT head stable, no hemorrhagic conversion  02/01/18:  Emergent intubation likely due to upper airway obstruction per NCC.    02/02/18:  Pt off  Precedex, moving left side spontaneously and opening eyes. Intubated, on spontaneous today. NCC giving steroids in hopes to extubate tomorrow.  02/03/18:  NAEON, intubated, not responsive to verbal stimuli, withdraws from pain on LUE, SBP ~135-230. Continued on insulin gtt, SQH for DVT ppx, and captopril.   02/16/18:  Few changes on exam, continues to have significant RUE/RLE weakness with global aphasia.  Family member at bedside noted attempt to communicate with her.  02/18/18:  Pt largely unchanged on exam this am.  No family member present during am rounds.  02/19/18:  Tolerating facemask. PEG by IR this afternoon.  02/20/18:  s/p PEG. O2 % on 5L NC, still requiring frequent suctioning. Primary team considering transfer to Medicine tomorrow.  02/21/18:  Pt sating % on 3L NC. Restarted DAPT, including . Plans for step down to stroke service.  02/22/18:  [facility] with [facility] but not service-connected so unable to be placed at SNF.  Working on insurance coverage of at least HHC therapy.  02/23/18:  Increased detemir to 36 U bid.  [facility] with [facility] SN[facility] pending completion of paperwork by Stroke team and pt's spouse--in process.  2/24/18 - NAEON. Pts WBC mildly elevated 13.01, pt is afebrile. Will continue to monitor. Endocrine consulted - Recommend levemir 40 units daily to cover basal needs (using ~0.7u/kg); Start novolog 6 units q4hr to cover TF; Moderate correction q4hr. SNF placement pending.  2/25/18 -  Pt WBC increased overnight, HR range , and temp 99.2. Blood cultures ordered and drawn. UA/UC ordered. Nursing staff called a rapid response this AM due to pts tachycardia, fever, and decreased O2 sats. Arrived to room @ 0824. 1 liter of NS ordered. STAT CXR ordered. Tylenol given. ABG completed with no significant abnormal results. Respiratory suctioned pt and pt repositioned to help with breathing. Pt O2 sats improved on venti mask, temp decreased, and BP remained normotensive. Started  on  vanc and zosyn. Pt appears more comfortable and no decline in neuro status. Pt family updated.   2/26/18 - afebrile overnight and leukocytosis improving with BS antibiotics. CT abdomen yesterday noted seroma/hematoma on left rectus abdominus. IR consulted for culture +/- drain.  02/28/2018 s/p aspiration of left rectus collection adjacent to the PEG tube.  Culture sent to lab, pending.  Remains afebrile.  Currently on 4L NC O2.   3/1/18: NAEON.   3/2: Pt afebrile, WBC WNL. Now sating well on 1L NC. Pt emotional on exam, family not interested in SSRI at this time. CM attempting to get pt [facility] so [facility] [place] [facility]. Increased rate of TFs.  3/3: NAEON. Pending placement. Will monitor Na level tomorrow.  3/4: stable, no new issues.  3/5/18 - sleepy today but no events overnight   3/6/18 - sister concerned due to coughing post speech therapy - discussed with speech team and sister   3/7/18 - No change in exam.  [facility] - pending [facility].  Continue to monitor liver enzymes.  3/8/18 - no acute events overnight. Vitals within normal. Pending placement     STROKE DOCUMENTATION   Acute Stroke Times   Stroke Team Called Date: 01/25/18  Stroke Team Called Time: 1852  Stroke Team Arrival Date: 01/25/18  Stroke Team Arrival Time: 0652  CT Interpretation Time: 1910  Decision to Treat Time for Alteplase:  (n/a unknown last known normal )  Decision to Treat Time for IR: 1915    NIH Scale:  1a. Level Of Consciousness: 0-->Alert: keenly responsive  1b. LOC Questions: 2-->Answers neither question correctly  1c. LOC Commands: 2-->Performs neither task correctly  2. Best Gaze: 1-->Partial gaze palsy: gaze is abnormal in one or both eyes, but forced deviation or total gaze paresis is not present  3. Visual: 2-->Complete hemianopia  4. Facial Palsy: 1-->Minor paralysis (flattened nasolabial fold, asymmetry on smiling)  5a. Motor Arm, Left: 4-->No movement  5b. Motor Arm, Right: 4-->No  movement  6a. Motor Leg, Left: 4-->No movement  6b. Motor Leg, Right: 4-->No movement  7. Limb Ataxia: 0-->Absent  8. Sensory: 1-->Mild-to-moderate sensory loss: patient feels pinprick is less sharp or is dull on the affected side: or there is a loss of superficial pain with pinprick, but patient is aware of being touched  9. Best Language: 3-->Mute, global aphasia: no usable speech or auditory comprehension  10. Dysarthria: 2-->Severe dysarthria: patients speech is so slurred as to be unintelligible in the absence of or out of proportion to any dysphasia, or is mute/anarthric  11. Extinction and Inattention (formerly Neglect): 0-->No abnormality  Total (NIH Stroke Scale): 30       Modified Dubois Score: 0     Hemorrhagic change of an Ischemic Stroke: Does this patient have an ischemic stroke with hemorrhagic changes? No     Neurologic Chief Complaint: Left MCA stroke     Subjective:     Interval History: Alert today.  Remains non verbal.  No new symptoms.  LFTs still elevated at current, may be due to modafinil.  Holding statin.  Will continue to monitor.  [facility].  Also discussed need for home suctioning.    HPI, Past Medical, Family, and Social History remains the same as documented in the initial encounter.     Review of Systems   Constitutional: Negative for fever.   Eyes: Positive for visual disturbance.   Gastrointestinal: Negative for vomiting.   Genitourinary: Negative for hematuria.   Neurological: Positive for speech difficulty and weakness.   Psychiatric/Behavioral: Negative for behavioral problems.     Scheduled Meds:   albuterol-ipratropium 2.5mg-0.5mg/3mL  3 mL Nebulization Q6H    aspirin  325 mg Per G Tube Daily    clopidogrel  75 mg Per G Tube Daily    heparin (porcine)  5,000 Units Subcutaneous Q8H    insulin aspart U-100  12 Units Subcutaneous Q24H    insulin aspart U-100  12 Units Subcutaneous Q24H    insulin aspart U-100  12 Units Subcutaneous Q24H     insulin aspart U-100  12 Units Subcutaneous Q24H    insulin aspart U-100  12 Units Subcutaneous Q24H    insulin aspart U-100  12 Units Subcutaneous Q24H    insulin detemir U-100  22 Units Subcutaneous Daily    lisinopril  40 mg Per NG tube Daily    metoprolol tartrate  50 mg Per NG tube BID    modafinil  200 mg Per NG tube Daily    And    modafinil  100 mg Per NG tube Daily    polyethylene glycol  17 g Per NG tube Daily    potassium chloride 10%  40 mEq Oral Once    senna-docusate 8.6-50 mg  1 tablet Per NG tube BID    sodium chloride 0.9%  3 mL Intravenous Q8H    sodium chloride 3%  4 mL Nebulization Q6H     Continuous Infusions:  PRN Meds:acetaminophen, bisacodyl, dextrose 50%, glucagon (human recombinant), insulin aspart U-100, ipratropium, labetalol, ondansetron    Objective:     Vital Signs (Most Recent):  Temp: 99.2 °F (37.3 °C) (03/08/18 1552)  Pulse: 88 (03/08/18 1552)  Resp: 18 (03/08/18 1552)  BP: 121/85 (03/08/18 1552)  SpO2: (!) 93 % (03/08/18 1552)  BP Location: Left arm    Vital Signs Range (Last 24H):  Temp:  [96.1 °F (35.6 °C)-99.2 °F (37.3 °C)]   Pulse:  []   Resp:  [14-20]   BP: (104-151)/(69-90)   SpO2:  [90 %-98 %]   BP Location: Left arm    Physical Exam   Constitutional: He appears well-developed and well-nourished.   Cardiovascular: Normal rate.    Pulmonary/Chest: Effort normal.       Skin: Skin is warm and dry.   Nursing note and vitals reviewed.    Neurological Exam:   LOC: alert  Language: Global aphasia  Articulation: Mute/Anarthric  Orientation: Untestable due to severe aphasia   Visual Fields: Hemianopsia right  EOM (CN III, IV, VI): Gaze preference  left  Facial Movement (CN VII): Lower facial weakness on the Right  Motor: Arm left  Normal 5/5  Leg left  Paresis: 3/5  Arm right  Plegia 0/5  Leg right Plegia 0/5    Laboratory:  CMP:     Recent Labs  Lab 03/08/18  0445   CALCIUM 9.8   ALBUMIN 3.0*   PROT 7.3      K 4.1   CO2 29      BUN 21*   CREATININE 0.8    ALKPHOS 107   ALT 81*   AST 50*   BILITOT 0.6     BMP:     Recent Labs  Lab 03/08/18  0445      K 4.1      CO2 29   BUN 21*   CREATININE 0.8   CALCIUM 9.8     CBC:     Recent Labs  Lab 03/08/18  0445   WBC 7.91   RBC 4.55*   HGB 13.3*   HCT 38.9*   *   MCV 86   MCH 29.2   MCHC 34.2     Lipid Panel: No results for input(s): CHOL, LDLCALC, HDL, TRIG in the last 168 hours.  Coagulation:     Recent Labs  Lab 03/08/18  0445   INR 0.9     Platelet Aggregation Study: No results for input(s): PLTAGG, PLTAGINTERP, PLTAGREGLACO, ADPPLTAGGREG in the last 168 hours.  Hgb A1C: No results for input(s): HGBA1C in the last 168 hours.  TSH: No results for input(s): TSH in the last 168 hours.      Diagnostic Results       Brain Imaging     Most recent CTH 2/04/18 redemonstration of large L MCA territory infarction w/o evidence of hemorrhagic conversion.  Stable post op change from L MCA endovascular stenting.        Vessel Imaging   IR Cerebral Angiogram 1/25/18 demonstrated occlusion of the L M1 segment of the L MCA with good collaterals. Occlusion removed and stent placed with TICI 2C.    Cardiac Imaging   2D Echo 1/26/18   CONCLUSIONS     1 - Normal left ventricular systolic function (EF 65-70%).     2 - Concentric remodeling.     3 - No wall motion abnormalities.     4 - Normal left ventricular diastolic function.     5 - Normal right ventricular systolic function       Misti Hernández MD  Comprehensive Stroke Center  Department of Vascular Neurology   [facility]

## 2018-03-09 LAB
ALBUMIN SERPL BCP-MCNC: 3 G/DL
ALP SERPL-CCNC: 101 U/L
ALT SERPL W/O P-5'-P-CCNC: 73 U/L
ANION GAP SERPL CALC-SCNC: 9 MMOL/L
AST SERPL-CCNC: 49 U/L
BASOPHILS # BLD AUTO: 0.04 K/UL
BASOPHILS NFR BLD: 0.5 %
BILIRUB SERPL-MCNC: 0.7 MG/DL
BUN SERPL-MCNC: 25 MG/DL
CALCIUM SERPL-MCNC: 9.6 MG/DL
CHLORIDE SERPL-SCNC: 99 MMOL/L
CO2 SERPL-SCNC: 28 MMOL/L
CREAT SERPL-MCNC: 0.9 MG/DL
DIFFERENTIAL METHOD: ABNORMAL
EOSINOPHIL # BLD AUTO: 0.1 K/UL
EOSINOPHIL NFR BLD: 0.9 %
ERYTHROCYTE [DISTWIDTH] IN BLOOD BY AUTOMATED COUNT: 14.4 %
EST. GFR  (AFRICAN AMERICAN): >60 ML/MIN/1.73 M^2
EST. GFR  (NON AFRICAN AMERICAN): >60 ML/MIN/1.73 M^2
GLUCOSE SERPL-MCNC: 195 MG/DL
HCT VFR BLD AUTO: 37 %
HGB BLD-MCNC: 12.7 G/DL
IMM GRANULOCYTES # BLD AUTO: 0.04 K/UL
IMM GRANULOCYTES NFR BLD AUTO: 0.5 %
INR PPP: 0.9
LYMPHOCYTES # BLD AUTO: 2.4 K/UL
LYMPHOCYTES NFR BLD: 31.5 %
MCH RBC QN AUTO: 29 PG
MCHC RBC AUTO-ENTMCNC: 34.3 G/DL
MCV RBC AUTO: 85 FL
MONOCYTES # BLD AUTO: 0.7 K/UL
MONOCYTES NFR BLD: 9.3 %
NEUTROPHILS # BLD AUTO: 4.4 K/UL
NEUTROPHILS NFR BLD: 57.3 %
NRBC BLD-RTO: 0 /100 WBC
PLATELET # BLD AUTO: 516 K/UL
PMV BLD AUTO: 9.5 FL
POCT GLUCOSE: 135 MG/DL (ref 70–110)
POCT GLUCOSE: 189 MG/DL (ref 70–110)
POCT GLUCOSE: 194 MG/DL (ref 70–110)
POCT GLUCOSE: 201 MG/DL (ref 70–110)
POCT GLUCOSE: 209 MG/DL (ref 70–110)
POCT GLUCOSE: 219 MG/DL (ref 70–110)
POTASSIUM SERPL-SCNC: 4.1 MMOL/L
PROT SERPL-MCNC: 7.1 G/DL
PROTHROMBIN TIME: 9.7 SEC
RBC # BLD AUTO: 4.38 M/UL
SODIUM SERPL-SCNC: 136 MMOL/L
WBC # BLD AUTO: 7.61 K/UL

## 2018-03-09 PROCEDURE — 80053 COMPREHEN METABOLIC PANEL: CPT

## 2018-03-09 PROCEDURE — A4216 STERILE WATER/SALINE, 10 ML: HCPCS | Performed by: NURSE PRACTITIONER

## 2018-03-09 PROCEDURE — 92507 TX SP LANG VOICE COMM INDIV: CPT

## 2018-03-09 PROCEDURE — 36415 COLL VENOUS BLD VENIPUNCTURE: CPT

## 2018-03-09 PROCEDURE — 94761 N-INVAS EAR/PLS OXIMETRY MLT: CPT

## 2018-03-09 PROCEDURE — 94640 AIRWAY INHALATION TREATMENT: CPT

## 2018-03-09 PROCEDURE — 92526 ORAL FUNCTION THERAPY: CPT

## 2018-03-09 PROCEDURE — 94668 MNPJ CHEST WALL SBSQ: CPT

## 2018-03-09 PROCEDURE — 25000003 PHARM REV CODE 250: Performed by: NURSE PRACTITIONER

## 2018-03-09 PROCEDURE — 25000003 PHARM REV CODE 250: Performed by: PSYCHIATRY & NEUROLOGY

## 2018-03-09 PROCEDURE — 97110 THERAPEUTIC EXERCISES: CPT

## 2018-03-09 PROCEDURE — 99900035 HC TECH TIME PER 15 MIN (STAT)

## 2018-03-09 PROCEDURE — 25000003 PHARM REV CODE 250: Performed by: PHYSICIAN ASSISTANT

## 2018-03-09 PROCEDURE — 85610 PROTHROMBIN TIME: CPT

## 2018-03-09 PROCEDURE — 97530 THERAPEUTIC ACTIVITIES: CPT

## 2018-03-09 PROCEDURE — 25000003 PHARM REV CODE 250: Performed by: STUDENT IN AN ORGANIZED HEALTH CARE EDUCATION/TRAINING PROGRAM

## 2018-03-09 PROCEDURE — 27000221 HC OXYGEN, UP TO 24 HOURS

## 2018-03-09 PROCEDURE — 25000242 PHARM REV CODE 250 ALT 637 W/ HCPCS: Performed by: NURSE PRACTITIONER

## 2018-03-09 PROCEDURE — 99233 SBSQ HOSP IP/OBS HIGH 50: CPT | Mod: ,,, | Performed by: PSYCHIATRY & NEUROLOGY

## 2018-03-09 PROCEDURE — 25000242 PHARM REV CODE 250 ALT 637 W/ HCPCS: Performed by: PSYCHIATRY & NEUROLOGY

## 2018-03-09 PROCEDURE — 20600001 HC STEP DOWN PRIVATE ROOM

## 2018-03-09 PROCEDURE — 85025 COMPLETE CBC W/AUTO DIFF WBC: CPT

## 2018-03-09 PROCEDURE — 63600175 PHARM REV CODE 636 W HCPCS: Performed by: NURSE PRACTITIONER

## 2018-03-09 RX ORDER — INSULIN ASPART 100 [IU]/ML
13 INJECTION, SOLUTION INTRAVENOUS; SUBCUTANEOUS
Status: DISCONTINUED | OUTPATIENT
Start: 2018-03-10 | End: 2018-03-21 | Stop reason: HOSPADM

## 2018-03-09 RX ORDER — INSULIN ASPART 100 [IU]/ML
13 INJECTION, SOLUTION INTRAVENOUS; SUBCUTANEOUS
Status: DISCONTINUED | OUTPATIENT
Start: 2018-03-09 | End: 2018-03-21 | Stop reason: HOSPADM

## 2018-03-09 RX ADMIN — METOPROLOL TARTRATE 50 MG: 50 TABLET, FILM COATED ORAL at 10:03

## 2018-03-09 RX ADMIN — CLOPIDOGREL BISULFATE 75 MG: 75 TABLET, FILM COATED ORAL at 09:03

## 2018-03-09 RX ADMIN — ASPIRIN 325 MG ORAL TABLET 325 MG: 325 PILL ORAL at 09:03

## 2018-03-09 RX ADMIN — MODAFINIL 100 MG: 100 TABLET ORAL at 04:03

## 2018-03-09 RX ADMIN — INSULIN ASPART 13 UNITS: 100 INJECTION, SOLUTION INTRAVENOUS; SUBCUTANEOUS at 08:03

## 2018-03-09 RX ADMIN — Medication 3 ML: at 10:03

## 2018-03-09 RX ADMIN — INSULIN ASPART 13 UNITS: 100 INJECTION, SOLUTION INTRAVENOUS; SUBCUTANEOUS at 04:03

## 2018-03-09 RX ADMIN — POLYETHYLENE GLYCOL 3350 17 G: 17 POWDER, FOR SOLUTION ORAL at 09:03

## 2018-03-09 RX ADMIN — IPRATROPIUM BROMIDE AND ALBUTEROL SULFATE 3 ML: 2.5; .5 SOLUTION RESPIRATORY (INHALATION) at 07:03

## 2018-03-09 RX ADMIN — STANDARDIZED SENNA CONCENTRATE AND DOCUSATE SODIUM 1 TABLET: 8.6; 5 TABLET, FILM COATED ORAL at 09:03

## 2018-03-09 RX ADMIN — HEPARIN SODIUM 5000 UNITS: 5000 INJECTION, SOLUTION INTRAVENOUS; SUBCUTANEOUS at 04:03

## 2018-03-09 RX ADMIN — HEPARIN SODIUM 5000 UNITS: 5000 INJECTION, SOLUTION INTRAVENOUS; SUBCUTANEOUS at 10:03

## 2018-03-09 RX ADMIN — INSULIN ASPART 2 UNITS: 100 INJECTION, SOLUTION INTRAVENOUS; SUBCUTANEOUS at 01:03

## 2018-03-09 RX ADMIN — LISINOPRIL 40 MG: 20 TABLET ORAL at 09:03

## 2018-03-09 RX ADMIN — IPRATROPIUM BROMIDE AND ALBUTEROL SULFATE 3 ML: 2.5; .5 SOLUTION RESPIRATORY (INHALATION) at 01:03

## 2018-03-09 RX ADMIN — INSULIN ASPART 12 UNITS: 100 INJECTION, SOLUTION INTRAVENOUS; SUBCUTANEOUS at 09:03

## 2018-03-09 RX ADMIN — STANDARDIZED SENNA CONCENTRATE AND DOCUSATE SODIUM 1 TABLET: 8.6; 5 TABLET, FILM COATED ORAL at 10:03

## 2018-03-09 RX ADMIN — INSULIN ASPART 12 UNITS: 100 INJECTION, SOLUTION INTRAVENOUS; SUBCUTANEOUS at 04:03

## 2018-03-09 RX ADMIN — HEPARIN SODIUM 5000 UNITS: 5000 INJECTION, SOLUTION INTRAVENOUS; SUBCUTANEOUS at 06:03

## 2018-03-09 RX ADMIN — INSULIN ASPART 4 UNITS: 100 INJECTION, SOLUTION INTRAVENOUS; SUBCUTANEOUS at 09:03

## 2018-03-09 RX ADMIN — METOPROLOL TARTRATE 50 MG: 50 TABLET, FILM COATED ORAL at 09:03

## 2018-03-09 RX ADMIN — INSULIN ASPART 13 UNITS: 100 INJECTION, SOLUTION INTRAVENOUS; SUBCUTANEOUS at 01:03

## 2018-03-09 RX ADMIN — INSULIN ASPART 12 UNITS: 100 INJECTION, SOLUTION INTRAVENOUS; SUBCUTANEOUS at 12:03

## 2018-03-09 RX ADMIN — INSULIN ASPART 2 UNITS: 100 INJECTION, SOLUTION INTRAVENOUS; SUBCUTANEOUS at 12:03

## 2018-03-09 RX ADMIN — INSULIN ASPART 2 UNITS: 100 INJECTION, SOLUTION INTRAVENOUS; SUBCUTANEOUS at 04:03

## 2018-03-09 RX ADMIN — IPRATROPIUM BROMIDE AND ALBUTEROL SULFATE 3 ML: 2.5; .5 SOLUTION RESPIRATORY (INHALATION) at 11:03

## 2018-03-09 RX ADMIN — MODAFINIL 200 MG: 100 TABLET ORAL at 06:03

## 2018-03-09 RX ADMIN — SODIUM CHLORIDE SOLN NEBU 3% 4 ML: 3 NEBU SOLN at 08:03

## 2018-03-09 RX ADMIN — IPRATROPIUM BROMIDE AND ALBUTEROL SULFATE 3 ML: 2.5; .5 SOLUTION RESPIRATORY (INHALATION) at 08:03

## 2018-03-09 RX ADMIN — INSULIN DETEMIR 22 UNITS: 100 INJECTION, SOLUTION SUBCUTANEOUS at 09:03

## 2018-03-09 RX ADMIN — SODIUM CHLORIDE SOLN NEBU 3% 4 ML: 3 NEBU SOLN at 11:03

## 2018-03-09 RX ADMIN — Medication 3 ML: at 04:03

## 2018-03-09 NOTE — PLAN OF CARE
Problem: Physical Therapy Goal  Goal: Physical Therapy Goal    Goals to be met by 3/16/2018    1. Pt will perform rolling to the R and L with max assist.  -met to the L only  2. Pt will perform supine to sit from both sides of the bed with max assist.  3. Pt will perform sit to supine with max assist.  4. Pt will sit EOB x 5 minutes with moderate assist to prepare for functional tasks in sitting.   5. Pt will participate in BLE and cervical ROM exercise.   6. Family will verbalize and demonstrate appropriate positioning and ROM techniques   7. Pt will tolerate sitting up in cardiac chair for 1 hr to improve endurance.    Outcome: Ongoing (interventions implemented as appropriate)  Family training initiated.  POC and goals remain appropriate.  Please refer to progress note for functional mobility.     Madina Luu, PT  3/9/2018  [phone] (pager)

## 2018-03-09 NOTE — PROGRESS NOTES
"[facility]  Vascular Neurology  Comprehensive Stroke Center  Progress Note    Assessment/Plan:     * Embolic stroke involving left middle cerebral artery    48 yo M with PMHx HLD, LOYDA, poorly controlled DM II presents to [facility] 01/25/18 after noted "strange behavior" for which EMS was called. Pt was found to have a L M1 occlusion and was taken to IR for thrombectomy with TICI 2C reperfusion and stent placement due to L MCA stenosis. Etiology remains unclear: no obvious signs of cardiac source or atheromatous disease in the aorto/carotid axis. Echo normal with no hx or signs of A fib. PT/OT/SLP will continue to evaluate and treat; recommending SNF. DAPT 2/2 recent stent placement.      Disposition - currently pursuing home health.  [facility]    Antithrombotics:  mg po qd, clopidogrel 75 mg po qd [s/p intracranial stent]  Statins: Atorvastatin 80 mg po qd -held due to transaminitis. Consider resuming when this resolves.  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    - Abscess near PEG w/IR aspirate, growing anaerobes   - Rocephin (last dose 3/5) and Flagyl (last dose 3/7)        Right spastic hemiparesis    -Due to stroke  -Continue aggressive PT/OT; Recommending SNF        Nodule of right lung    - Plan for f/u CT 6-12 months via PCP        Oral phase dysphagia    - Continue SLP  - NPO, meds/feeds per PEG        Acute respiratory failure with hypoxia    Likely hypoxia is Aspiration pna vs pneumonitis (as CXR improved after a day) 2/2 to superimposed encephalopathy due to sepsis  -AM CXR 3/2 with mild bibasilar atelectasis   -Continue O2 sats >90%, " okay with current requirements of 1L NC, pt has what appears as Cheyne espinal pattern and reported LOYDA  -Sister requested continuous pulse ox; Ordered  -Contraction alkalosis improved with increasing water flushes via PEG  -Considered PE as differential however pt is on OAC and not tachycardic, tachypneic. Wells score 1.5 (low-risk)  - Continue chest physiotherapy, aspiration precautions, wean off O2 support   -plan for home suctioning upon discharge        Cytotoxic cerebral edema    -2/2 stroke, evident on imaging  Stable on repeat CTH 2/4/18        Urinary tract infection due to Klebsiella species     Afebrile, leukocytosis resolved  - Klebsiella from urine cultures (2/25/18) + Anaerobes from PEG site cultures   - rocephin course completed, and Flagyl completed today        Type 2 diabetes mellitus with hyperglycemia, with long-term current use of insulin    - Stroke risk factor, Hgb A1c 10%  - Continue Diabetasource AC TFs; rate increased to 75 on 3/2  - Endocrine consulted and guiding insulin modification for TF  - POCT Q4h ; Moderate correction q4hr         Mixed hyperlipidemia    -Stroke risk factor, LDL invalid as TG > 400  - holding statin due to rising LFTs        Essential hypertension    -Stroke risk factor  SBPs 123-159  - Continue scheduled lisinopril 40 mg qd, Metoprolol 50 mg bid, Labetalol PRN  Monitoring        Obstructive sleep apnea    -Stroke risk factor  -CPAP qHS  -Wean O2 as tolerated.             01/25/18:  Brought in for aphasia, RSW with L gaze preferance. LKN unknown, not tPA candidate. Went to IR for angiogram and stent.  01/29/18:  Off Cardene, remains on Insulin gtt. Concern for sepsis, respiratory source. Imaging with mass effect and some brain compression; maycol crani watch.  01/30/18:  EEG consistent with focal L slowing 2/2 lesion and mild generalized slowing, no seizures. CT head stable, no hemorrhagic conversion  02/01/18:  Emergent intubation likely due to upper airway  obstruction per NCC.    02/02/18:  Pt off Precedex, moving left side spontaneously and opening eyes. Intubated, on spontaneous today. NCC giving steroids in hopes to extubate tomorrow.  02/03/18:  NAEON, intubated, not responsive to verbal stimuli, withdraws from pain on LUE, SBP ~135-230. Continued on insulin gtt, SQH for DVT ppx, and captopril.   02/16/18:  Few changes on exam, continues to have significant RUE/RLE weakness with global aphasia.  Family member at bedside noted attempt to communicate with her.  02/18/18:  Pt largely unchanged on exam this am.  No family member present during am rounds.  02/19/18:  Tolerating facemask. PEG by IR this afternoon.  02/20/18:  s/p PEG. O2 % on 5L NC, still requiring frequent suctioning. Primary team considering transfer to Medicine tomorrow.  02/21/18:  Pt sating % on 3L NC. Restarted DAPT, including . Plans for step down to stroke service.  02/22/18:  [facility] with [facility] but not service-connected so unable to be placed at SNF.  Working on insurance coverage of at least C therapy.  02/23/18:  Increased detemir to 36 U bid.  [facility] with [facility] SN[facility] pending completion of paperwork by Stroke team and pt's spouse--in process.  2/24/18 - NAEON. Pts WBC mildly elevated 13.01, pt is afebrile. Will continue to monitor. Endocrine consulted - Recommend levemir 40 units daily to cover basal needs (using ~0.7u/kg); Start novolog 6 units q4hr to cover TF; Moderate correction q4hr. SNF placement pending.  2/25/18 -  Pt WBC increased overnight, HR range , and temp 99.2. Blood cultures ordered and drawn. UA/UC ordered. Nursing staff called a rapid response this AM due to pts tachycardia, fever, and decreased O2 sats. Arrived to room @ 0824. 1 liter of NS ordered. STAT CXR ordered. Tylenol given. ABG completed with no significant abnormal results. Respiratory suctioned pt and pt repositioned to help with breathing. Pt O2 sats improved on venti mask, temp  decreased, and BP remained normotensive. Started on  vanc and zosyn. Pt appears more comfortable and no decline in neuro status. Pt family updated.   2/26/18 - afebrile overnight and leukocytosis improving with BS antibiotics. CT abdomen yesterday noted seroma/hematoma on left rectus abdominus. IR consulted for culture +/- drain.  02/28/2018 s/p aspiration of left rectus collection adjacent to the PEG tube.  Culture sent to lab, pending.  Remains afebrile.  Currently on 4L NC O2.   3/1/18: NAEON.   3/2: Pt afebrile, WBC WNL. Now sating well on 1L NC. Pt emotional on exam, family not interested in SSRI at this time. CM attempting to get pt [facility] [place] [facility]. Increased rate of TFs.  3/3: NAEON. Pending placement. Will monitor Na level tomorrow.  3/4: stable, no new issues.  3/5/18 - sleepy today but no events overnight   3/6/18 - sister concerned due to coughing post speech therapy - discussed with speech team and sister   3/7/18 - No change in exam.  [facility] - [facility].  Continue to monitor liver enzymes.  3/8/18 - no acute events overnight. Vitals within normal. Pending placement   03/09/2018 V[place]MAON    STROKE DOCUMENTATION   Acute Stroke Times   Stroke Team Called Date: 01/25/18  Stroke Team Called Time: 1852  Stroke Team Arrival Date: 01/25/18  Stroke Team Arrival Time: 0652  CT Interpretation Time: 1910  Decision to Treat Time for Alteplase:  (n/a unknown last known normal )  Decision to Treat Time for IR: 1915    NIH Scale:  1a. Level Of Consciousness: 0-->Alert: keenly responsive  1b. LOC Questions: 2-->Answers neither question correctly  1c. LOC Commands: 2-->Performs neither task correctly  2. Best Gaze: 1-->Partial gaze palsy: gaze is abnormal in one or both eyes, but forced deviation or total gaze paresis is not present  3. Visual: 2-->Complete hemianopia  4. Facial Palsy: 1-->Minor paralysis (flattened nasolabial fold, asymmetry on  smiling)  5a. Motor Arm, Left: 4-->No movement  5b. Motor Arm, Right: 4-->No movement  6a. Motor Leg, Left: 4-->No movement  6b. Motor Leg, Right: 4-->No movement  7. Limb Ataxia: 0-->Absent  8. Sensory: 1-->Mild-to-moderate sensory loss: patient feels pinprick is less sharp or is dull on the affected side: or there is a loss of superficial pain with pinprick, but patient is aware of being touched  9. Best Language: 3-->Mute, global aphasia: no usable speech or auditory comprehension  10. Dysarthria: 2-->Severe dysarthria: patients speech is so slurred as to be unintelligible in the absence of or out of proportion to any dysphasia, or is mute/anarthric  11. Extinction and Inattention (formerly Neglect): 0-->No abnormality  Total (NIH Stroke Scale): 30       Modified Jasmin Score: 0  [place] Coma Scale:10   ABCD2 Score:    [id number] Score:   HAS -BLED Score:   ICH Score:   Hunt & Laguerre Classification:      Hemorrhagic change of an Ischemic Stroke: Does this patient have an ischemic stroke with hemorrhagic changes? No     Neurologic Chief Complaint: Left MCA stroke     Subjective:     Interval History: Alert today.  Remains non verbal.  No new symptoms.  LFTs still elevated at current, may be due to modafinil.  Holding statin.  Will continue to monitor.  [facility].  Also discussed need for home suctioning.    HPI, Past Medical, Family, and Social History remains the same as documented in the initial encounter.     Review of Systems   Constitutional: Negative for fever.   Eyes: Positive for visual disturbance.   Gastrointestinal: Negative for vomiting.   Genitourinary: Negative for hematuria.   Neurological: Positive for speech difficulty and weakness.   Psychiatric/Behavioral: Negative for behavioral problems.     Scheduled Meds:   albuterol-ipratropium 2.5mg-0.5mg/3mL  3 mL Nebulization Q6H    aspirin  325 mg Per G Tube Daily    clopidogrel  75 mg Per G Tube Daily    heparin  (porcine)  5,000 Units Subcutaneous Q8H    [START ON 3/10/2018] insulin aspart U-100  13 Units Subcutaneous Q24H    [START ON 3/10/2018] insulin aspart U-100  13 Units Subcutaneous Q24H    insulin aspart U-100  13 Units Subcutaneous Q24H    insulin aspart U-100  13 Units Subcutaneous Q24H    insulin aspart U-100  13 Units Subcutaneous Q24H    [START ON 3/10/2018] insulin aspart U-100  13 Units Subcutaneous Q24H    insulin detemir U-100  22 Units Subcutaneous Daily    lisinopril  40 mg Per NG tube Daily    metoprolol tartrate  50 mg Per NG tube BID    modafinil  200 mg Per NG tube Daily    And    modafinil  100 mg Per NG tube Daily    polyethylene glycol  17 g Per NG tube Daily    potassium chloride 10%  40 mEq Oral Once    senna-docusate 8.6-50 mg  1 tablet Per NG tube BID    sodium chloride 0.9%  3 mL Intravenous Q8H    sodium chloride 3%  4 mL Nebulization Q6H     Continuous Infusions:  PRN Meds:acetaminophen, bisacodyl, dextrose 50%, glucagon (human recombinant), insulin aspart U-100, ipratropium, labetalol, ondansetron    Objective:     Vital Signs (Most Recent):  Temp: 97.8 °F (36.6 °C) (03/09/18 1247)  Pulse: 101 (03/09/18 1500)  Resp: 18 (03/09/18 1247)  BP: 109/66 (03/09/18 1247)  SpO2: 96 % (03/09/18 1500)  BP Location: Left arm    Vital Signs Range (Last 24H):  Temp:  [96.8 °F (36 °C)-99.2 °F (37.3 °C)]   Pulse:  []   Resp:  [16-20]   BP: (101-147)/(66-85)   SpO2:  [91 %-99 %]   BP Location: Left arm    Physical Exam   Constitutional: He appears well-developed and well-nourished.   Cardiovascular: Normal rate.    Pulmonary/Chest: Effort normal.       Skin: Skin is warm and dry.   Nursing note and vitals reviewed.    Neurological Exam:   LOC: alert  Language: Global aphasia  Articulation: Mute/Anarthric  Orientation: Untestable due to severe aphasia   Visual Fields: Hemianopsia right  EOM (CN III, IV, VI): Gaze preference  left  Facial Movement (CN VII): Lower facial weakness on the  Right  Motor: Arm left  Normal 5/5  Leg left  Paresis: 3/5  Arm right  Plegia 0/5  Leg right Plegia 0/5    Laboratory:  CMP:     Recent Labs  Lab 03/09/18  0838   CALCIUM 9.6   ALBUMIN 3.0*   PROT 7.1      K 4.1   CO2 28   CL 99   BUN 25*   CREATININE 0.9   ALKPHOS 101   ALT 73*   AST 49*   BILITOT 0.7     BMP:     Recent Labs  Lab 03/09/18  0838      K 4.1   CL 99   CO2 28   BUN 25*   CREATININE 0.9   CALCIUM 9.6     CBC:     Recent Labs  Lab 03/09/18  0838   WBC 7.61   RBC 4.38*   HGB 12.7*   HCT 37.0*   *   MCV 85   MCH 29.0   MCHC 34.3     Lipid Panel: No results for input(s): CHOL, LDLCALC, HDL, TRIG in the last 168 hours.  Coagulation:     Recent Labs  Lab 03/09/18  0506   INR 0.9     Platelet Aggregation Study: No results for input(s): PLTAGG, PLTAGINTERP, PLTAGREGLACO, ADPPLTAGGREG in the last 168 hours.  Hgb A1C: No results for input(s): HGBA1C in the last 168 hours.  TSH: No results for input(s): TSH in the last 168 hours.      Diagnostic Results       Brain Imaging     Most recent CTH 2/04/18 redemonstration of large L MCA territory infarction w/o evidence of hemorrhagic conversion.  Stable post op change from L MCA endovascular stenting.        Vessel Imaging   IR Cerebral Angiogram 1/25/18 demonstrated occlusion of the L M1 segment of the L MCA with good collaterals. Occlusion removed and stent placed with TICI 2C.    Cardiac Imaging   2D Echo 1/26/18   CONCLUSIONS     1 - Normal left ventricular systolic function (EF 65-70%).     2 - Concentric remodeling.     3 - No wall motion abnormalities.     4 - Normal left ventricular diastolic function.     5 - Normal right ventricular systolic function       Ria Vasquez PA-C  [facility]  Department of Vascular Neurology   [facility]

## 2018-03-09 NOTE — PROGRESS NOTES
"[facility]  Adult Nutrition  Progress Note    SUMMARY       Recommendations    Recommendation/Intervention:   1.Continue current TF of Diabetisource @75mL/hr, meeting 93% of Pt's energy needs and 103% of protein needs.   In preparation of Pt discharge, consider:   Bolus feeds of Diabetisource @ 7 cans/day.This will provide 2100 kcals, 105g protein, and 1435mL of free water. This will meet 91% of Pt's energy needs and 100% protein needs.     2. Monitor Pt's labs and TF tolerance.     3.RD to follow.     Goals: Meet >90% EEN/EPN from TF   Nutrition Goal Status: goal met  Communication of RD Recs:  (POC)    Reason for Assessment    Reason for Assessment: RD follow-up  Diagnosis: stroke/CVA (Embolic stroke L MCA)  Relevant Medical History: HTN, T2DM  Interdisciplinary Rounds: did not attend  General Information Comments: Pt seen this am. Wife at bedside to report TF tolerance. Current TF order of Diabetasource is running @75ml/hr.2/28 Noted aspiration of L rectus collection adjacent to PEG.   Nutrition Discharge Planning: Tolerance of TF    Nutrition Risk Screen    Nutrition Risk Screen: no indicators present    Nutrition/Diet History    Patient Reported Diet/Restrictions/Preferences: other (see comments) (FRIEDA)  Typical Food/Fluid Intake: FRIEDA  Food Preferences: N/A   Do you have any cultural, spiritual, Yazidi conflicts, given your current situation?: none  Supplemental Drinks or Food Habits: Other (see comments) (FRIEDA)  Factors Affecting Nutritional Intake: NPO, difficulty/impaired swallowing    Anthropometrics    Temp: 97.8 °F (36.6 °C)  Height: 5' 10" (177.8 cm)  Height (inches): 70 in  Weight Method: Bed Scale  Weight: 105.6 kg (232 lb 12.9 oz)  Weight (lb): 232.81 lb  Ideal Body Weight (IBW), Male: 166 lb  % Ideal Body Weight, Male (lb): 140.25 lb  BMI (Calculated): 33.5  BMI Grade: 30 - 34.9- obesity - grade I      Lab/Procedures/Meds    Pertinent Labs Reviewed: reviewed, pertinent  Pertinent " "Labs Comments: POCT Gluc 189, K 4.1, Alb 3.0, BUN 25, Gluc 195, AST 49, ALT 73  Pertinent Medications Reviewed: reviewed, pertinent  Pertinent Medications Comments: Statin, insulin, Senna, KCl, lisinopril, clopidrogel, heparin    Physical Findings/Assessment    Overall Physical Appearance: obese, listlessness  Tubes: nasogastric tube  Oral/Mouth Cavity: tooth/teeth missing  Skin: intact, edema (1+ edema)    Estimated/Assessed Needs    Weight Used For Calorie Calculations: 105.6 kg (232 lb 12.9 oz)  Height: 5' 10" (177.8 cm)  Energy Calorie Requirements (kcal): 2318 kcal/d  Energy Need Method: [place]-St Jeor (1.2 PAL)    RMR ([place]-St. Jeor Equation): 1932.25  Protein Requirements: 90-105g (1.2-1.4g/kg)  Weight Used For Protein Calculations: 75.4 kg (166 lb 3.6 oz) (IBW)    Fluid Need Method: RDA Method (Per MD or 1 ml/kcal)  RDA Method (mL): 2318  CHO Requirement: 50% total kcals       Nutrition Prescription Ordered    Current Diet Order: NPO  Nutrition Order Comments: Beneprotein under TF order comments; no quantity noted  Current Nutrition Support Formula Ordered: Diabetisource  Current Nutrition Support Rate Ordered: 75 (ml)  Current Nutrition Support Frequency Ordered: ml/hr    Evaluation of Received Nutrient/Fluid Intake    Enteral Calories (kcal): 2160  Enteral Protein (gm): 108  Enteral (Free Water) Fluid (mL): 1472  Free Water Flush Fluid (mL): 1200  % Kcal Needs: 93%  % Protein Needs: 103%  Total Fluid Intake (mL): 2672  I/O: +I/O   Energy Calories Required: meeting needs  Protein Required: meeting needs  Fluid Required: meeting needs  Comments: LBM 3/3/18  Tolerance: tolerating  % Intake of Estimated Energy Needs: 75 - 100 %  % Meal Intake: NPO    Nutrition Risk    Level of Risk/Frequency of Follow-up: low     Assessment and Plan    * Embolic stroke involving left middle cerebral artery    Problem  Inadequate energy intake    Related to (etiology):   Inability to consume sufficient energy    Signs and " Symptoms (as evidenced by):   TF meeting <85% EEN/EPN     Interventions/Recommendations (treatment strategy):  See recs above    Nutrition Diagnosis Status:   Resolved              Monitor and Eval    Food and Nutrient Intake: enteral nutrition intake  Food and Nutrient Adminstration: enteral and parenteral nutrition administration  Physical Activity and Function: nutrition-related ADLs and IADLs  Anthropometric Measurements: weight, weight change  Biochemical Data, Medical Tests and Procedures: electrolyte and renal panel, gastrointestinal profile, glucose/endocrine profile, inflammatory profile, lipid profile  Nutrition-Focused Physical Findings: overall appearance     Nutrition Follow-Up    RD Follow-up?: Yes

## 2018-03-09 NOTE — PLAN OF CARE
DAMINA [facility] with [facility]. Ceci states that the [facility] to arrange pt's home health and DME. Ceci states that she will have more information on Monday.    Mary Shoemaker LMSW  [facility]  Ext. [phone]

## 2018-03-09 NOTE — PLAN OF CARE
Reviewed BG and insulin regimen.    Patient remains on same tube feeds (Diabetasource AC TFs; rate of 75cc).   Some BG readings above goal, required 11u of correction insulin.  Changed regimen to increase to Novolog 13u q4h - hold if TF discontinued/held. Continue Levemir 22u qD    Notify endocrine for changes in nutrition status (diet, TF, TPN).    Kassie Danielle MD  Endocrinology Fellow

## 2018-03-09 NOTE — ASSESSMENT & PLAN NOTE
Problem  Inadequate energy intake    Related to (etiology):   Inability to consume sufficient energy    Signs and Symptoms (as evidenced by):   TF meeting <85% EEN/EPN     Interventions/Recommendations (treatment strategy):  See recs above    Nutrition Diagnosis Status:   Resolved

## 2018-03-09 NOTE — SUBJECTIVE & OBJECTIVE
Neurologic Chief Complaint: Left MCA stroke     Subjective:     Interval History: Alert today.  Remains non verbal.  No new symptoms.  LFTs still elevated at current, may be due to modafinil.  Holding statin.  Will continue to monitor.  [facility].  Also discussed need for home suctioning.    HPI, Past Medical, Family, and Social History remains the same as documented in the initial encounter.     Review of Systems   Constitutional: Negative for fever.   Eyes: Positive for visual disturbance.   Gastrointestinal: Negative for vomiting.   Genitourinary: Negative for hematuria.   Neurological: Positive for speech difficulty and weakness.   Psychiatric/Behavioral: Negative for behavioral problems.     Scheduled Meds:   albuterol-ipratropium 2.5mg-0.5mg/3mL  3 mL Nebulization Q6H    aspirin  325 mg Per G Tube Daily    clopidogrel  75 mg Per G Tube Daily    heparin (porcine)  5,000 Units Subcutaneous Q8H    [START ON 3/10/2018] insulin aspart U-100  13 Units Subcutaneous Q24H    [START ON 3/10/2018] insulin aspart U-100  13 Units Subcutaneous Q24H    insulin aspart U-100  13 Units Subcutaneous Q24H    insulin aspart U-100  13 Units Subcutaneous Q24H    insulin aspart U-100  13 Units Subcutaneous Q24H    [START ON 3/10/2018] insulin aspart U-100  13 Units Subcutaneous Q24H    insulin detemir U-100  22 Units Subcutaneous Daily    lisinopril  40 mg Per NG tube Daily    metoprolol tartrate  50 mg Per NG tube BID    modafinil  200 mg Per NG tube Daily    And    modafinil  100 mg Per NG tube Daily    polyethylene glycol  17 g Per NG tube Daily    potassium chloride 10%  40 mEq Oral Once    senna-docusate 8.6-50 mg  1 tablet Per NG tube BID    sodium chloride 0.9%  3 mL Intravenous Q8H    sodium chloride 3%  4 mL Nebulization Q6H     Continuous Infusions:  PRN Meds:acetaminophen, bisacodyl, dextrose 50%, glucagon (human recombinant), insulin aspart U-100, ipratropium, labetalol,  ondansetron    Objective:     Vital Signs (Most Recent):  Temp: 97.8 °F (36.6 °C) (03/09/18 1247)  Pulse: 101 (03/09/18 1500)  Resp: 18 (03/09/18 1247)  BP: 109/66 (03/09/18 1247)  SpO2: 96 % (03/09/18 1500)  BP Location: Left arm    Vital Signs Range (Last 24H):  Temp:  [96.8 °F (36 °C)-99.2 °F (37.3 °C)]   Pulse:  []   Resp:  [16-20]   BP: (101-147)/(66-85)   SpO2:  [91 %-99 %]   BP Location: Left arm    Physical Exam   Constitutional: He appears well-developed and well-nourished.   Cardiovascular: Normal rate.    Pulmonary/Chest: Effort normal.       Skin: Skin is warm and dry.   Nursing note and vitals reviewed.    Neurological Exam:   LOC: alert  Language: Global aphasia  Articulation: Mute/Anarthric  Orientation: Untestable due to severe aphasia   Visual Fields: Hemianopsia right  EOM (CN III, IV, VI): Gaze preference  left  Facial Movement (CN VII): Lower facial weakness on the Right  Motor: Arm left  Normal 5/5  Leg left  Paresis: 3/5  Arm right  Plegia 0/5  Leg right Plegia 0/5    Laboratory:  CMP:     Recent Labs  Lab 03/09/18  0838   CALCIUM 9.6   ALBUMIN 3.0*   PROT 7.1      K 4.1   CO2 28   CL 99   BUN 25*   CREATININE 0.9   ALKPHOS 101   ALT 73*   AST 49*   BILITOT 0.7     BMP:     Recent Labs  Lab 03/09/18  0838      K 4.1   CL 99   CO2 28   BUN 25*   CREATININE 0.9   CALCIUM 9.6     CBC:     Recent Labs  Lab 03/09/18  0838   WBC 7.61   RBC 4.38*   HGB 12.7*   HCT 37.0*   *   MCV 85   MCH 29.0   MCHC 34.3     Lipid Panel: No results for input(s): CHOL, LDLCALC, HDL, TRIG in the last 168 hours.  Coagulation:     Recent Labs  Lab 03/09/18  0506   INR 0.9     Platelet Aggregation Study: No results for input(s): PLTAGG, PLTAGINTERP, PLTAGREGLACO, ADPPLTAGGREG in the last 168 hours.  Hgb A1C: No results for input(s): HGBA1C in the last 168 hours.  TSH: No results for input(s): TSH in the last 168 hours.      Diagnostic Results       Brain Imaging     Most recent CT 2/04/18  redemonstration of large L MCA territory infarction w/o evidence of hemorrhagic conversion.  Stable post op change from L MCA endovascular stenting.        Vessel Imaging   IR Cerebral Angiogram 1/25/18 demonstrated occlusion of the L M1 segment of the L MCA with good collaterals. Occlusion removed and stent placed with TICI 2C.    Cardiac Imaging   2D Echo 1/26/18   CONCLUSIONS     1 - Normal left ventricular systolic function (EF 65-70%).     2 - Concentric remodeling.     3 - No wall motion abnormalities.     4 - Normal left ventricular diastolic function.     5 - Normal right ventricular systolic function

## 2018-03-09 NOTE — PLAN OF CARE
Plan for discharge is to discharge to home with home health and DME once [facility] and [facility]. Cm [facility] the [facility] regarding [facility] for [facility] and DME and left a message. Patient's family will come to the hospital on Monday 3/12/18 for PT/OT teaching to ensure the family will be able to properly care for the patient. Cm will continue to follow.     03/09/18 4084   Discharge Reassessment   Assessment Type Discharge Planning Reassessment   Provided patient/caregiver education on the expected discharge date and the discharge plan Yes   Do you have any problems affording any of your prescribed medications? No   Discharge Plan A Home with family;Home Health   Discharge Plan B Home Health;Home with family   Patient choice form signed by patient/caregiver N/A   Can the patient answer the patient profile reliably? No, cognitively impaired   How does the patient rate their overall health at the present time? (FRIEDA)   Describe the patient's ability to walk at the present time. Does not walk or unable to take any steps at all   How often would a person be available to care for the patient? Occasionally   Number of comorbid conditions (as recorded on the chart) Two   During the past month, has the patient often been bothered by feeling down, depressed or hopeless? (FRIEDA)   During the past month, has the patient often been bothered by little interest or pleasure in doing things? (FRIEDA)

## 2018-03-09 NOTE — PT/OT/SLP PROGRESS
Occupational Therapy   Treatment    Name: Todd Quevedo  MRN: [id number]  Admitting Diagnosis:  Embolic stroke involving left middle cerebral artery       Recommendations:     Discharge Recommendations: nursing facility, skilled (long term)  Discharge Equipment Recommendations:  hospital bed, lift device, wheelchair (reclining back WC with cushion)  Barriers to discharge:  Inaccessible home environment, Decreased caregiver support    Subjective     Communicated with: RN prior to session. OTS present and engaged in session. Pt's sister briefly present.   Pain/Comfort:  · Pain Rating 1: 0/10 (no indications/Pt unable to report)  · Pain Rating Post-Intervention 1: 0/10    Patients cultural, spiritual, [employer] conflicts given the current situation: none     Objective:     Patient found with: SCD, telemetry, peripheral IV, bed alarm, Condom Catheter    General Precautions: Standard, aphasia, aspiration, fall, NPO   Orthopedic Precautions:N/A   Braces: N/A     Occupational Performance:    Bed Mobility:    · Patient completed Rolling/Turning to Left with  total assistance, 2 persons and with side rail x3 reps  · Patient completed Rolling/Turning to Right with maximal assistance and with side rail x3 reps  · Patient completed Scooting/Bridging with maximal assistance to HOB with positioning for R LE to assist with push through    Functional Mobility/Transfers:  · Not appropriate at this time     Activities of Daily Living:  · Grooming: dependence hand over hand for L hand to wipe R hand; dep for maintaining functional grasp pattern   · LB Dressing: dependence to don B socks for bridge and scooting for Bed mobility; completed in supine    Patient left HOB elevated with all lines intact, call button in reach, bed alarm on and sister present    AMPA 6 Click:  AMPA Total Score: 6    Treatment & Education:  -Pt alert with eyes open throughout; Pt with severe L gaze preference with no tracking to midline; no functional  tracking/scanning; blinks to threat bilaterally   -Pt with constant non purposeful movement of L UE/LE- fidget item provided with fair response for self stim  -Supine: R UE PROM as tolerated with support provided for scapulohumeral rhythm- completed in all planes of motion x5 reps   -Supine: AAROM for cervical rotation bilaterally and lateral flexion; encouraging L side attention with L UE ROM task  -Supine: R LE Hip IR stretch with weight bearing throughout foot x10 reps   -Positioning completed for R LE to maintain neutral midline; modification made to pillow to encourage midline positioning   -Communication board updated; edu pt's sister on positioning with need for reinforcement   Education:    Assessment:     Todd Quevedo is a 47 y.o. male with a medical diagnosis of Embolic stroke involving left middle cerebral artery.  He presents with overall decline in his participation and initiation of all functional tasks and activities. He will cont to benefit from skilled OT services for positioning, ROM and family training at this time- family with plans to take him home following acute stay.  Performance deficits affecting function are weakness, impaired endurance, impaired self care skills, impaired functional mobilty, impaired sensation, gait instability, impaired balance, visual deficits, impaired cognition, decreased upper extremity function, decreased lower extremity function, decreased coordination, impaired fine motor, impaired joint extensibility.      Rehab Prognosis:  Limited; patient would benefit from acute skilled OT services to address these deficits and reach maximum level of function.       Plan:     Patient to be seen 3 x/week to address the above listed problems via self-care/home management, therapeutic activities, therapeutic exercises, neuromuscular re-education, cognitive retraining  · Plan of Care Expires: 03/21/18  · Plan of Care Reviewed with: patient, sibling    This Plan of care has been  discussed with the patient who was involved in its development and understands and is in agreement with the identified goals and treatment plan    GOALS:    Occupational Therapy Goals        Problem: Occupational Therapy Goal    Goal Priority Disciplines Outcome Interventions   Occupational Therapy Goal     OT, PT/OT Ongoing (interventions implemented as appropriate)    Description:  Goals revised 3/9 to be addressed for 14 days with expiration date, 3/23:  Patient will increase functional independence with ADLs by performing:    Patient will demonstrate rolling bilaterally with max assist.  Not met   Patient will engage in therapeutic task with visual attention for 30 sec duration. Not met  Patient will demonstrate independence with HEP for right UE positioning.    Not met  Patient's family / caregiver will demonstrate independence and safety with assisting patient with self-care skills and functional mobility.     Not met  Patient's family / caregiver will demonstrate independence with providing ROM and changes in bed positioning.   Not met                          Time Tracking:     OT Date of Treatment: 03/09/18  OT Start Time: 1349  OT Stop Time: 1420  OT Total Time (min): 31 min    Billable Minutes:Therapeutic Activity 10  Therapeutic Exercise 20    ISAMAR Barbosa  3/9/2018

## 2018-03-09 NOTE — PLAN OF CARE
Problem: SLP Goal  Goal: SLP Goal  Speech Language Pathology Goals  Goals expected to be met by 3/20  1. Pt will participate in ongoing bedside assessment of swallow to determine least restrictive diet.  2. Pt will open eyes to stim x5/session given max cues.-met  3. Pt will follow simple commands x2/session given max cues.  4. Pt will answer basic y/n question via any modality x2/session given max cues.  5. Pt will vocalize in response to any stim x2/session given max cues.           Outcome: Ongoing (interventions implemented as appropriate)  Goals remain appropriate, cont POC. EDWARD Key, CCC/SLP  3/9/2018

## 2018-03-09 NOTE — PT/OT/SLP PROGRESS
"Speech Language Pathology Treatment    Patient Name:  Todd Quevedo   MRN:  [id number]  Admitting Diagnosis: Embolic stroke involving left middle cerebral artery    Recommendations:                  General Recommendations:  Dysphagia therapy, Speech/language therapy and Cognitive-linguistic therapy  Diet recommendations:  NPO, Liquid Diet Level: NPO   Aspiration Precautions: Continue alternate means of nutrition and Strict aspiration precautions, frequent oral care  General Precautions: Standard, aphasia, aspiration, NPO, fall  Communication strategies:  provide increased time to answer and go to room if call light pushed    Subjective     "He's not having to use the suction nearly as much."    Pain/Comfort:  · Pain Rating 1: 0/10  · Pain Rating Post-Intervention 1: 0/10    Objective:     Has the patient been evaluated by SLP for swallowing?   Yes  Keep patient NPO? Yes   Current Respiratory Status: nasal cannula      Pt seen bedside with sister present.  Alert with eye gaze up and to R when SLP entered.  Max cues for minimal eye contact only when SLP in line of vision.  Pt pulling arm against SLP when hand gripped, appeared to be attempting to arm wrestle.  Pt smiling with slight laugh with SLP joking and arm wrestling. Slight head nod x1 to y/n question.  No commands followed or voicing achieved despite max cues.  Oral care completed by SLP.  Pt holding swab though unable to place within oral cavity today.  Clear breath sounds noted.  Puree presented to lips though no lip smacking/attempts to clear with tongue noted.  Material removed by SLP.  White board updated.      Assessment:     Todd Quevedo is a 47 y.o. male with an SLP diagnosis of Aphasia, Dysphagia, Cognitive-Linguistic Impairment and Visio-Spatial Impairment.      Goals:    SLP Goals        Problem: SLP Goal    Goal Priority Disciplines Outcome   SLP Goal     SLP Ongoing (interventions implemented as appropriate)   Description:  Speech Language " Pathology Goals  Goals expected to be met by 3/20  1. Pt will participate in ongoing bedside assessment of swallow to determine least restrictive diet.  2. Pt will open eyes to stim x5/session given max cues.-met  3. Pt will follow simple commands x2/session given max cues.  4. Pt will answer basic y/n question via any modality x2/session given max cues.  5. Pt will vocalize in response to any stim x2/session given max cues.                            Plan:     · Patient to be seen:  2 x/week   · Plan of Care expires:  03/18/18  · Plan of Care reviewed with:  patient, sibling   · SLP Follow-Up:  Yes       Discharge recommendations:  nursing facility, skilled   Barriers to Discharge:  Level of Skilled Assistance Needed      Time Tracking:     SLP Treatment Date:   03/09/18  Speech Start Time:  1258  Speech Stop Time:  1316     Speech Total Time (min):  18 min    Billable Minutes: Speech Therapy Individual 10 and Treatment Swallowing Dysfunction 8    EDWARD Key, CCC-SLP  03/09/2018

## 2018-03-09 NOTE — ASSESSMENT & PLAN NOTE
"48 yo M with PMHx HLD, LOYDA, poorly controlled DM II [place] [facility] [place]18 after noted "strange behavior" for which EMS was called. Pt was found to have a L M1 occlusion and was taken to IR for thrombectomy with TICI 2C reperfusion and stent placement due to L MCA stenosis. Etiology remains unclear: no obvious signs of cardiac source or atheromatous disease in the aorto/carotid axis. Echo normal with no hx or signs of A fib. PT/OT/SLP will continue to evaluate and treat; recommending SNF. DAPT 2/2 recent stent placement.      Disposition - currently pursuing home health.  [facility]    Antithrombotics:  mg po qd, clopidogrel 75 mg po qd [s/p intracranial stent]  Statins: Atorvastatin 80 mg po qd -held due to transaminitis. Consider resuming when this resolves.  Aggressive risk factor modification: HLD, DM II (Hgb A1c 10%), Obesity  Rehab efforts: PT/OT/SLP-- Recommending SNF, placement pending  Diagnostics ordered/pending: None  VTE prophylaxis: Heparin 5000 U q8h  BP parameters: Infarct: -170   "

## 2018-03-09 NOTE — PLAN OF CARE
Problem: Patient Care Overview  Goal: Plan of Care Review    Recommendations     Recommendation/Intervention:   1.Continue current TF of Diabetisource @75mL/hr, meeting 93% of Pt's energy needs and 103% of protein needs.   In preparation of Pt discharge, consider:   Bolus feeds of Diabetisource @ 7 cans/day.This will provide 2100 kcals, 105g protein, and 1435mL of free water. This will meet 91% of Pt's energy needs and 100% protein needs.      2. Monitor Pt's labs and TF tolerance.      3.RD to follow.      Goals: Meet >90% EEN/EPN from TF   Nutrition Goal Status: goal met

## 2018-03-09 NOTE — PLAN OF CARE
Problem: Occupational Therapy Goal  Goal: Occupational Therapy Goal  Goals revised 3/9 to be addressed for 14 days with expiration date, 3/23:  Patient will increase functional independence with ADLs by performing:    Patient will demonstrate rolling bilaterally with max assist.  Not met   Patient will engage in therapeutic task with visual attention for 30 sec duration. Not met  Patient will demonstrate independence with HEP for right UE positioning.    Not met  Patient's family / caregiver will demonstrate independence and safety with assisting patient with self-care skills and functional mobility.     Not met  Patient's family / caregiver will demonstrate independence with providing ROM and changes in bed positioning.   Not met        Outcome: Ongoing (interventions implemented as appropriate)  Goals revised on this date. ISAMAR Barbosa 3/9/2018

## 2018-03-09 NOTE — PT/OT/SLP PROGRESS
Physical Therapy Treatment    Patient Name:  Todd Quevedo   MRN:  [id number]    Recommendations:     Discharge Recommendations:  nursing facility, skilled   Discharge Equipment Recommendations:  (hospital bed, jim lift, reclining w/c, w/c cushion )   Barriers to discharge: Decreased caregiver support    Plan:     During this hospitalization, patient to be seen 3 x/week to address the above listed problems via therapeutic activities, therapeutic exercises, neuromuscular re-education  · Plan of Care Expires:  03/15/18   Plan of Care Reviewed with: patient, sibling    Subjective     Communicated with RN prior to session.  Patient found supine in bed with HOB elevated and RUE and RLE positioned appropriately with wedge and pillow upon PT entry to room, agreeable to treatment.      Pain/Comfort:  · Pain Rating 1: 0/10  · Pain Rating Post-Intervention 1: 0/10    Patients cultural, spiritual, [employer] conflicts given the current situation: none noted    Objective:     Patient found with: telemetry, oxygen, SCD, pulse ox (continuous), pressure relief boots, PEG Tube     General Precautions: Standard, aspiration, aphasia, fall, NPO     Family training discussed with the patient's sister.  She informed the therapist that she is no longer one of the caregivers at time of d/c.  The patient's dtr and son-in-law will move from [place] to [place] to assist his wife with total care.  The wife and dtr will not be arriving until this weekend for family training.  PT brought handouts to teaching/trainign on positioning in bed, pressure relief, and PROM.  PT reviewed these with the sister at bedside.  Additional family training included: joint protection, use of draw sheet, positioning draw sheet, pulling patient up in bed, and positioning on wedge.  PT performed PROM to RUE 10x to improve positioning.  Additional training will be performed with wife and dtr on Monday.       AM-PAC 6 CLICK MOBILITY  Turning over in bed (including adjusting  bedclothes, sheets and blankets)?: 1  Sitting down on and standing up from a chair with arms (e.g., wheelchair, bedside commode, etc.): 1  Moving from lying on back to sitting on the side of the bed?: 1  Moving to and from a bed to a chair (including a wheelchair)?: 1  Need to walk in hospital room?: 1  Climbing 3-5 steps with a railing?: 1  Total Score: 6       Patient left HOB elevated with all lines intact, call button in reach and sister present..    GOALS:    Physical Therapy Goals        Problem: Physical Therapy Goal    Goal Priority Disciplines Outcome Goal Variances Interventions   Physical Therapy Goal     PT/OT, PT Ongoing (interventions implemented as appropriate)     Description:    Goals to be met by 3/16/2018    1. Pt will perform rolling to the R and L with max assist.  -met to the L only  2. Pt will perform supine to sit from both sides of the bed with max assist.  3. Pt will perform sit to supine with max assist.  4. Pt will sit EOB x 5 minutes with moderate assist to prepare for functional tasks in sitting.   5. Pt will participate in BLE and cervical ROM exercise.   6. Family will verbalize and demonstrate appropriate positioning and ROM techniques   7. Pt will tolerate sitting up in cardiac chair for 1 hr to improve endurance.                     Assessment:     Todd Quevedo is a 47 y.o. male admitted with a medical diagnosis of Embolic stroke involving left middle cerebral artery.  Family training was initiated with the sister, however the d/c situation and caregivers have changed again.  PT will continue family training with primary caregivers on Monday to facilitate safe d/c planning.     Rehab Prognosis:  limited; patient would benefit from acute skilled PT services to address these deficits and reach maximum level of function.      Recent Surgery: Procedure(s) (LRB):  TRACHEOSTOMY (N/A)  PLACEMENT-TUBE-PEG (N/A)      Time Tracking:     PT Received On: 03/09/18  PT Start Time: 1210     PT  Stop Time: 1240  PT Total Time (min): 30 min     Billable Minutes: Therapeutic Activity 30    Treatment Type: Treatment  PT/PTA: PT         Madina Luu, PT  3/9/2018  [phone] (pager)

## 2018-03-10 LAB
ALBUMIN SERPL BCP-MCNC: 3 G/DL
ALP SERPL-CCNC: 96 U/L
ALT SERPL W/O P-5'-P-CCNC: 68 U/L
ANION GAP SERPL CALC-SCNC: 9 MMOL/L
AST SERPL-CCNC: 43 U/L
BASOPHILS # BLD AUTO: 0.06 K/UL
BASOPHILS NFR BLD: 0.8 %
BILIRUB SERPL-MCNC: 0.6 MG/DL
BUN SERPL-MCNC: 25 MG/DL
CALCIUM SERPL-MCNC: 9.6 MG/DL
CHLORIDE SERPL-SCNC: 99 MMOL/L
CO2 SERPL-SCNC: 28 MMOL/L
CREAT SERPL-MCNC: 0.9 MG/DL
DIFFERENTIAL METHOD: ABNORMAL
EOSINOPHIL # BLD AUTO: 0.1 K/UL
EOSINOPHIL NFR BLD: 1.3 %
ERYTHROCYTE [DISTWIDTH] IN BLOOD BY AUTOMATED COUNT: 14.5 %
EST. GFR  (AFRICAN AMERICAN): >60 ML/MIN/1.73 M^2
EST. GFR  (NON AFRICAN AMERICAN): >60 ML/MIN/1.73 M^2
GLUCOSE SERPL-MCNC: 209 MG/DL
HCT VFR BLD AUTO: 38.3 %
HGB BLD-MCNC: 12.7 G/DL
IMM GRANULOCYTES # BLD AUTO: 0.04 K/UL
IMM GRANULOCYTES NFR BLD AUTO: 0.6 %
INR PPP: 0.9
LYMPHOCYTES # BLD AUTO: 2 K/UL
LYMPHOCYTES NFR BLD: 28 %
MAGNESIUM SERPL-MCNC: 1.9 MG/DL
MCH RBC QN AUTO: 28.9 PG
MCHC RBC AUTO-ENTMCNC: 33.2 G/DL
MCV RBC AUTO: 87 FL
MONOCYTES # BLD AUTO: 0.6 K/UL
MONOCYTES NFR BLD: 8.2 %
NEUTROPHILS # BLD AUTO: 4.4 K/UL
NEUTROPHILS NFR BLD: 61.1 %
NRBC BLD-RTO: 0 /100 WBC
PHOSPHATE SERPL-MCNC: 4.7 MG/DL
PLATELET # BLD AUTO: 474 K/UL
PMV BLD AUTO: 9.4 FL
POCT GLUCOSE: 137 MG/DL (ref 70–110)
POCT GLUCOSE: 138 MG/DL (ref 70–110)
POCT GLUCOSE: 179 MG/DL (ref 70–110)
POCT GLUCOSE: 185 MG/DL (ref 70–110)
POCT GLUCOSE: 199 MG/DL (ref 70–110)
POTASSIUM SERPL-SCNC: 4.3 MMOL/L
PROT SERPL-MCNC: 7.1 G/DL
PROTHROMBIN TIME: 9.7 SEC
RBC # BLD AUTO: 4.39 M/UL
SODIUM SERPL-SCNC: 136 MMOL/L
WBC # BLD AUTO: 7.19 K/UL

## 2018-03-10 PROCEDURE — 36415 COLL VENOUS BLD VENIPUNCTURE: CPT

## 2018-03-10 PROCEDURE — 63600175 PHARM REV CODE 636 W HCPCS: Performed by: NURSE PRACTITIONER

## 2018-03-10 PROCEDURE — 25000242 PHARM REV CODE 250 ALT 637 W/ HCPCS: Performed by: NURSE PRACTITIONER

## 2018-03-10 PROCEDURE — 25000003 PHARM REV CODE 250: Performed by: STUDENT IN AN ORGANIZED HEALTH CARE EDUCATION/TRAINING PROGRAM

## 2018-03-10 PROCEDURE — 94761 N-INVAS EAR/PLS OXIMETRY MLT: CPT

## 2018-03-10 PROCEDURE — 94640 AIRWAY INHALATION TREATMENT: CPT

## 2018-03-10 PROCEDURE — 25000003 PHARM REV CODE 250: Performed by: PHYSICIAN ASSISTANT

## 2018-03-10 PROCEDURE — 25000003 PHARM REV CODE 250: Performed by: NURSE PRACTITIONER

## 2018-03-10 PROCEDURE — 27000221 HC OXYGEN, UP TO 24 HOURS

## 2018-03-10 PROCEDURE — 25000003 PHARM REV CODE 250: Performed by: PSYCHIATRY & NEUROLOGY

## 2018-03-10 PROCEDURE — 20600001 HC STEP DOWN PRIVATE ROOM

## 2018-03-10 PROCEDURE — 84100 ASSAY OF PHOSPHORUS: CPT

## 2018-03-10 PROCEDURE — 80053 COMPREHEN METABOLIC PANEL: CPT

## 2018-03-10 PROCEDURE — 85610 PROTHROMBIN TIME: CPT

## 2018-03-10 PROCEDURE — A4216 STERILE WATER/SALINE, 10 ML: HCPCS | Performed by: NURSE PRACTITIONER

## 2018-03-10 PROCEDURE — 85025 COMPLETE CBC W/AUTO DIFF WBC: CPT

## 2018-03-10 PROCEDURE — 25000242 PHARM REV CODE 250 ALT 637 W/ HCPCS: Performed by: PSYCHIATRY & NEUROLOGY

## 2018-03-10 PROCEDURE — 99233 SBSQ HOSP IP/OBS HIGH 50: CPT | Mod: ,,, | Performed by: PSYCHIATRY & NEUROLOGY

## 2018-03-10 PROCEDURE — 83735 ASSAY OF MAGNESIUM: CPT

## 2018-03-10 PROCEDURE — 94668 MNPJ CHEST WALL SBSQ: CPT

## 2018-03-10 RX ORDER — FLUOXETINE HYDROCHLORIDE 20 MG/1
20 CAPSULE ORAL DAILY
Status: DISCONTINUED | OUTPATIENT
Start: 2018-03-10 | End: 2018-03-21 | Stop reason: HOSPADM

## 2018-03-10 RX ORDER — BACLOFEN 10 MG/1
10 TABLET ORAL 2 TIMES DAILY
Status: DISCONTINUED | OUTPATIENT
Start: 2018-03-10 | End: 2018-03-21 | Stop reason: HOSPADM

## 2018-03-10 RX ADMIN — INSULIN ASPART 13 UNITS: 100 INJECTION, SOLUTION INTRAVENOUS; SUBCUTANEOUS at 02:03

## 2018-03-10 RX ADMIN — Medication 3 ML: at 09:03

## 2018-03-10 RX ADMIN — INSULIN DETEMIR 22 UNITS: 100 INJECTION, SOLUTION SUBCUTANEOUS at 09:03

## 2018-03-10 RX ADMIN — IPRATROPIUM BROMIDE AND ALBUTEROL SULFATE 3 ML: 2.5; .5 SOLUTION RESPIRATORY (INHALATION) at 12:03

## 2018-03-10 RX ADMIN — CLOPIDOGREL BISULFATE 75 MG: 75 TABLET, FILM COATED ORAL at 09:03

## 2018-03-10 RX ADMIN — POLYETHYLENE GLYCOL 3350 17 G: 17 POWDER, FOR SOLUTION ORAL at 09:03

## 2018-03-10 RX ADMIN — INSULIN ASPART 13 UNITS: 100 INJECTION, SOLUTION INTRAVENOUS; SUBCUTANEOUS at 04:03

## 2018-03-10 RX ADMIN — INSULIN ASPART 13 UNITS: 100 INJECTION, SOLUTION INTRAVENOUS; SUBCUTANEOUS at 06:03

## 2018-03-10 RX ADMIN — INSULIN ASPART 13 UNITS: 100 INJECTION, SOLUTION INTRAVENOUS; SUBCUTANEOUS at 09:03

## 2018-03-10 RX ADMIN — FLUOXETINE 20 MG: 20 CAPSULE ORAL at 02:03

## 2018-03-10 RX ADMIN — SODIUM CHLORIDE SOLN NEBU 3% 4 ML: 3 NEBU SOLN at 06:03

## 2018-03-10 RX ADMIN — MODAFINIL 200 MG: 100 TABLET ORAL at 05:03

## 2018-03-10 RX ADMIN — MODAFINIL 100 MG: 100 TABLET ORAL at 02:03

## 2018-03-10 RX ADMIN — Medication 3 ML: at 02:03

## 2018-03-10 RX ADMIN — IPRATROPIUM BROMIDE AND ALBUTEROL SULFATE 3 ML: 2.5; .5 SOLUTION RESPIRATORY (INHALATION) at 06:03

## 2018-03-10 RX ADMIN — HEPARIN SODIUM 5000 UNITS: 5000 INJECTION, SOLUTION INTRAVENOUS; SUBCUTANEOUS at 09:03

## 2018-03-10 RX ADMIN — LISINOPRIL 40 MG: 20 TABLET ORAL at 09:03

## 2018-03-10 RX ADMIN — HEPARIN SODIUM 5000 UNITS: 5000 INJECTION, SOLUTION INTRAVENOUS; SUBCUTANEOUS at 05:03

## 2018-03-10 RX ADMIN — IPRATROPIUM BROMIDE AND ALBUTEROL SULFATE 3 ML: 2.5; .5 SOLUTION RESPIRATORY (INHALATION) at 08:03

## 2018-03-10 RX ADMIN — ASPIRIN 325 MG ORAL TABLET 325 MG: 325 PILL ORAL at 09:03

## 2018-03-10 RX ADMIN — BACLOFEN 10 MG: 10 TABLET ORAL at 02:03

## 2018-03-10 RX ADMIN — INSULIN ASPART 2 UNITS: 100 INJECTION, SOLUTION INTRAVENOUS; SUBCUTANEOUS at 02:03

## 2018-03-10 RX ADMIN — SODIUM CHLORIDE SOLN NEBU 3% 4 ML: 3 NEBU SOLN at 12:03

## 2018-03-10 RX ADMIN — INSULIN ASPART 13 UNITS: 100 INJECTION, SOLUTION INTRAVENOUS; SUBCUTANEOUS at 12:03

## 2018-03-10 RX ADMIN — INSULIN ASPART 2 UNITS: 100 INJECTION, SOLUTION INTRAVENOUS; SUBCUTANEOUS at 09:03

## 2018-03-10 RX ADMIN — HEPARIN SODIUM 5000 UNITS: 5000 INJECTION, SOLUTION INTRAVENOUS; SUBCUTANEOUS at 03:03

## 2018-03-10 RX ADMIN — METOPROLOL TARTRATE 50 MG: 50 TABLET, FILM COATED ORAL at 09:03

## 2018-03-10 RX ADMIN — STANDARDIZED SENNA CONCENTRATE AND DOCUSATE SODIUM 1 TABLET: 8.6; 5 TABLET, FILM COATED ORAL at 10:03

## 2018-03-10 NOTE — ASSESSMENT & PLAN NOTE
"48 yo M with PMHx HLD, LOYDA, poorly controlled DM II [place] to [facility] [place] 01/25/18 after noted "strange behavior" for which EMS was called. Pt was found to have a L M1 occlusion and was taken to IR for thrombectomy with TICI 2C reperfusion and stent placement due to L MCA stenosis. Etiology remains unclear: no obvious signs of cardiac source or atheromatous disease in the aorto/carotid axis. Echo normal with no hx or signs of A fib. PT/OT/SLP will continue to evaluate and treat; recommending SNF. DAPT 2/2 recent stent placement.      Disposition - currently pursuing home health.  Training family on 3/12 to bring patient home    Antithrombotics:  mg po qd, clopidogrel 75 mg po qd [s/p intracranial stent]  Statins: Atorvastatin 80 mg po qd -held due to transaminitis. Consider resuming when this resolves.  Aggressive risk factor modification: HLD, DM II (Hgb A1c 10%), Obesity  Rehab efforts: PT/OT/SLP-- Recommending SNF, placement pending  Diagnostics ordered/pending: None  VTE prophylaxis: Heparin 5000 U q8h  BP parameters: SBP <140   "

## 2018-03-10 NOTE — ASSESSMENT & PLAN NOTE
- Stroke risk factor, Hgb A1c 10%  - Continue Diabetasource AC TFs  - Endocrine consulted and guiding insulin modification for TF  - POCT Q4h ; Moderate correction q4hr

## 2018-03-10 NOTE — SUBJECTIVE & OBJECTIVE
Neurologic Chief Complaint: Left MCA stroke     Subjective:     Interval History: [place]    H[facility] in [place], Past Medical, Family, and Social History remains the same as documented in the initial encounter.     Review of Systems   Constitutional: Negative for fever.   Eyes: Positive for visual disturbance.   Gastrointestinal: Negative for vomiting.   Genitourinary: Negative for hematuria.   Neurological: Positive for speech difficulty and weakness.   Psychiatric/Behavioral: Negative for behavioral problems.     Scheduled Meds:   albuterol-ipratropium 2.5mg-0.5mg/3mL  3 mL Nebulization Q6H    aspirin  325 mg Per G Tube Daily    clopidogrel  75 mg Per G Tube Daily    heparin (porcine)  5,000 Units Subcutaneous Q8H    [START ON 3/10/2018] insulin aspart U-100  13 Units Subcutaneous Q24H    [START ON 3/10/2018] insulin aspart U-100  13 Units Subcutaneous Q24H    insulin aspart U-100  13 Units Subcutaneous Q24H    insulin aspart U-100  13 Units Subcutaneous Q24H    insulin aspart U-100  13 Units Subcutaneous Q24H    [START ON 3/10/2018] insulin aspart U-100  13 Units Subcutaneous Q24H    insulin detemir U-100  22 Units Subcutaneous Daily    lisinopril  40 mg Per NG tube Daily    metoprolol tartrate  50 mg Per NG tube BID    modafinil  200 mg Per NG tube Daily    And    modafinil  100 mg Per NG tube Daily    polyethylene glycol  17 g Per NG tube Daily    potassium chloride 10%  40 mEq Oral Once    senna-docusate 8.6-50 mg  1 tablet Per NG tube BID    sodium chloride 0.9%  3 mL Intravenous Q8H    sodium chloride 3%  4 mL Nebulization Q6H     Continuous Infusions:  PRN Meds:acetaminophen, bisacodyl, dextrose 50%, glucagon (human recombinant), insulin aspart U-100, ipratropium, labetalol, ondansetron    Objective:     Vital Signs (Most Recent):  Temp: 96.8 °F (36 °C) (03/09/18 2052)  Pulse: 90 (03/09/18 2052)  Resp: 20 (03/09/18 2052)  BP: 104/71 (03/09/18 2052)  SpO2: 95 % (03/09/18 2052)  BP Location: Right  arm    Vital Signs Range (Last 24H):  Temp:  [96.8 °F (36 °C)-98.9 °F (37.2 °C)]   Pulse:  []   Resp:  [16-20]   BP: (101-133)/(66-80)   SpO2:  [91 %-99 %]   BP Location: Right arm    Physical Exam   Constitutional: He appears well-developed and well-nourished.   Cardiovascular: Normal rate.    Pulmonary/Chest: Effort normal.       Skin: Skin is warm and dry.   Nursing note and vitals reviewed.    Neurological Exam:   LOC: alert  Language: Global aphasia  Articulation: Mute/Anarthric  Orientation: Untestable due to severe aphasia   Visual Fields: Hemianopsia right  EOM (CN III, IV, VI): Gaze preference  left  Facial Movement (CN VII): Lower facial weakness on the Right  Motor: Arm left  Normal 5/5  Leg left  Paresis: 5/5  Arm right  Plegia 0/5  Leg right Plegia 0/5    Laboratory:  CMP:     Recent Labs  Lab 03/09/18  0838   CALCIUM 9.6   ALBUMIN 3.0*   PROT 7.1      K 4.1   CO2 28   CL 99   BUN 25*   CREATININE 0.9   ALKPHOS 101   ALT 73*   AST 49*   BILITOT 0.7     BMP:     Recent Labs  Lab 03/09/18  0838      K 4.1   CL 99   CO2 28   BUN 25*   CREATININE 0.9   CALCIUM 9.6     CBC:     Recent Labs  Lab 03/09/18  0838   WBC 7.61   RBC 4.38*   HGB 12.7*   HCT 37.0*   *   MCV 85   MCH 29.0   MCHC 34.3     Lipid Panel: No results for input(s): CHOL, LDLCALC, HDL, TRIG in the last 168 hours.  Coagulation:     Recent Labs  Lab 03/09/18  0506   INR 0.9     Platelet Aggregation Study: No results for input(s): PLTAGG, PLTAGINTERP, PLTAGREGLACO, ADPPLTAGGREG in the last 168 hours.  Hgb A1C: No results for input(s): HGBA1C in the last 168 hours.  TSH: No results for input(s): TSH in the last 168 hours.      Diagnostic Results       Brain Imaging     Most recent CTH 2/04/18 redemonstration of large L MCA territory infarction w/o evidence of hemorrhagic conversion.  Stable post op change from L MCA endovascular stenting.        Vessel Imaging   IR Cerebral Angiogram 1/25/18 demonstrated occlusion of the  L M1 segment of the L MCA with good collaterals. Occlusion removed and stent placed with TICI 2C.    Cardiac Imaging   2D Echo 1/26/18   CONCLUSIONS     1 - Normal left ventricular systolic function (EF 65-70%).     2 - Concentric remodeling.     3 - No wall motion abnormalities.     4 - Normal left ventricular diastolic function.     5 - Normal right ventricular systolic function

## 2018-03-10 NOTE — PROGRESS NOTES
"[facility]  Vascular Neurology  Comprehensive Stroke Center  Progress Note    Assessment/Plan:     * Embolic stroke involving left middle cerebral artery    46 yo M with PMHx HLD, LOYDA, poorly controlled DM II [place] [facility] [place] after noted "strange behavior" for which EMS was called. Pt was found to have a L M1 occlusion and was taken to IR for thrombectomy with TICI 2C reperfusion and stent placement due to L MCA stenosis. Etiology remains unclear: no obvious signs of cardiac source or atheromatous disease in the aorto/carotid axis. Echo normal with no hx or signs of A fib. PT/OT/SLP will continue to evaluate and treat; recommending SNF. DAPT 2/2 recent stent placement.      Disposition - currently pursuing home health.  Training family on 3/12 to bring patient home    Antithrombotics:  mg po qd, clopidogrel 75 mg po qd [s/p intracranial stent]  Statins: Atorvastatin 80 mg po qd -held due to transaminitis. Consider resuming when this resolves.  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    - Abscess near PEG w/IR aspirate, growing anaerobes   - Rocephin (last dose 3/5) and Flagyl (last dose 3/7)        Right spastic hemiparesis    -Due to stroke  -Continue aggressive PT/OT; Recommending SNF  -starting baclofen        Nodule of right lung    - Plan for f/u CT 6-12 months via PCP        Oral phase dysphagia    - Continue SLP  - NPO, meds/feeds per PEG        Acute respiratory failure with hypoxia    Likely hypoxia is Aspiration pna vs pneumonitis (as CXR improved after a day) 2/2 to superimposed encephalopathy due to sepsis  -AM CXR 3/2 with mild bibasilar atelectasis   -Continue O2 " sats >90%, okay with current requirements of 1L NC, pt has what appears as Cheyne espinal pattern and reported LOYDA  -Sister requested continuous pulse ox; Ordered  -Contraction alkalosis improved with increasing water flushes via PEG  -Considered PE as differential however pt is on OAC and not tachycardic, tachypneic. Wells score 1.5 (low-risk)  - Continue chest physiotherapy, aspiration precautions, wean off O2 support   -plan for home suctioning upon discharge        Cytotoxic cerebral edema    -2/2 stroke, evident on imaging  Stable on repeat CTH 2/4/18        Urinary tract infection due to Klebsiella species     Afebrile, leukocytosis resolved  - Klebsiella from urine cultures (2/25/18) + Anaerobes from PEG site cultures   - rocephin course completed, and Flagyl completed today        Type 2 diabetes mellitus with hyperglycemia, with long-term current use of insulin    - Stroke risk factor, Hgb A1c 10%  - Continue Diabetasource AC TFs  - Endocrine consulted and guiding insulin modification for TF  - POCT Q4h ; Moderate correction q4hr         Mixed hyperlipidemia    -Stroke risk factor, LDL invalid as TG > 400  - holding statin due to rising LFTs        Essential hypertension    -Stroke risk factor  -SBP <140  - Continue scheduled lisinopril 40 mg qd, Metoprolol 50 mg bid          Obstructive sleep apnea    -Stroke risk factor  -CPAP qHS  -Wean O2 as tolerated.             01/25/18:  Brought in for aphasia, RSW with L gaze preferance. LKN unknown, not tPA candidate. Went to IR for angiogram and stent.  01/29/18:  Off Cardene, remains on Insulin gtt. Concern for sepsis, respiratory source. Imaging with mass effect and some brain compression; maycol crani watch.  01/30/18:  EEG consistent with focal L slowing 2/2 lesion and mild generalized slowing, no seizures. CT head stable, no hemorrhagic conversion  02/01/18:  Emergent intubation likely due to upper airway obstruction per NCC.    02/02/18:  Pt off Precedex,  moving left side spontaneously and opening eyes. Intubated, on spontaneous today. NCC giving steroids in hopes to extubate tomorrow.  02/03/18:  NAEON, intubated, not responsive to verbal stimuli, withdraws from pain on LUE, SBP ~135-230. Continued on insulin gtt, SQH for DVT ppx, and captopril.   02/16/18:  Few changes on exam, continues to have significant RUE/RLE weakness with global aphasia.  Family member at bedside noted attempt to communicate with her.  02/18/18:  Pt largely unchanged on exam this am.  No family member present during am rounds.  02/19/18:  Tolerating facemask. PEG by IR this afternoon.  02/20/18:  s/p PEG. O2 % on 5L NC, still requiring frequent suctioning. Primary team considering transfer to Medicine tomorrow.  02/21/18:  Pt sating % on 3L NC. Restarted DAPT, including . Plans for step down to stroke service.  02/22/18:  Pt with [facility] but not service-connected so unable to be placed at SNF.  Working on insurance coverage of at least HHC therapy.  02/23/18:  Increased detemir to 36 U bid.  [facility] with [facility] SN[facility] pending completion of paperwork by Stroke team and pt's spouse--in process.  2/24/18 - NAEON. Pts WBC mildly elevated 13.01, pt is afebrile. Will continue to monitor. Endocrine consulted - Recommend levemir 40 units daily to cover basal needs (using ~0.7u/kg); Start novolog 6 units q4hr to cover TF; Moderate correction q4hr. SNF placement pending.  2/25/18 -  Pt WBC increased overnight, HR range , and temp 99.2. Blood cultures ordered and drawn. UA/UC ordered. Nursing staff called a rapid response this AM due to pts tachycardia, fever, and decreased O2 sats. Arrived to room @ 0824. 1 liter of NS ordered. STAT CXR ordered. Tylenol given. ABG completed with no significant abnormal results. Respiratory suctioned pt and pt repositioned to help with breathing. Pt O2 sats improved on venti mask, temp decreased, and BP remained normotensive. Started on  vanc  and zosyn. Pt appears more comfortable and no decline in neuro status. Pt family updated.   2/26/18 - afebrile overnight and leukocytosis improving with BS antibiotics. CT abdomen yesterday noted seroma/hematoma on left rectus abdominus. IR consulted for culture +/- drain.  02/28/2018 s/p aspiration of left rectus collection adjacent to the PEG tube.  Culture sent to lab, pending.  Remains afebrile.  Currently on 4L NC O2.   3/1/18: NAEON.   3/2: Pt afebrile, WBC WNL. Now sating well on 1L NC. Pt emotional on exam, family not interested in SSRI at this time. CM attempting to get pt placed [facility] so [facility] [place] [facility]. Increased rate of TFs.  3/3: NAEON. Pending placement. Will monitor Na level tomorrow.  3/4: stable, no new issues.  3/5/18 - sleepy today but no events overnight   3/6/18 - sister concerned due to coughing post speech therapy - discussed with speech team and sister   3/7/18 - No change in exam.  [facility] to [facility] - pending approval.  Continue to monitor liver enzymes.  3/8/18 - no acute events overnight. Vitals within normal. Pending placement   03/09/2018 NAEON  3/10/18 starting baclofen for spasticity, fluoxetine for depression and therapy motivation    STROKE DOCUMENTATION   Acute Stroke Times   Stroke Team Called Date: 01/25/18  Stroke Team Called Time: 1852  Stroke Team Arrival Date: 01/25/18  Stroke Team Arrival Time: 0652  CT Interpretation Time: 1910  Decision to Treat Time for Alteplase:  (n/a unknown last known normal )  Decision to Treat Time for IR: 1915    NIH Scale:  1a. Level Of Consciousness: 0-->Alert: keenly responsive  1b. LOC Questions: 2-->Answers neither question correctly  1c. LOC Commands: 2-->Performs neither task correctly  2. Best Gaze: 1-->Partial gaze palsy: gaze is abnormal in one or both eyes, but forced deviation or total gaze paresis is not present  3. Visual: 2-->Complete hemianopia  4. Facial Palsy: 1-->Minor paralysis (flattened  nasolabial fold, asymmetry on smiling)  5a. Motor Arm, Left: 0-->No drift: limb holds 90 (or 45) degrees for full 10 secs  5b. Motor Arm, Right: 4-->No movement  6a. Motor Leg, Left: 0-->No drift: leg holds 30 degree position for full 5 secs  6b. Motor Leg, Right: 4-->No movement  7. Limb Ataxia: 0-->Absent  8. Sensory: 1-->Mild-to-moderate sensory loss: patient feels pinprick is less sharp or is dull on the affected side: or there is a loss of superficial pain with pinprick, but patient is aware of being touched  9. Best Language: 3-->Mute, global aphasia: no usable speech or auditory comprehension  10. Dysarthria: 2-->Severe dysarthria: patients speech is so slurred as to be unintelligible in the absence of or out of proportion to any dysphasia, or is mute/anarthric  11. Extinction and Inattention (formerly Neglect): 0-->No abnormality  Total (NIH Stroke Scale): 22       Modified Jasmin Score: 0  Jeff Coma Scale:    ABCD2 Score:    [id number] Score:   HAS -BLED Score:   ICH Score:   Hunt & Laguerre Classification:        Neurologic Chief Complaint: Left MCA stroke     Subjective:     Interval History: NAEON    HPI, Past Medical, Family, and Social History remains the same as documented in the initial encounter.     Review of Systems   Constitutional: Negative for fever.   Eyes: Positive for visual disturbance.   Gastrointestinal: Negative for vomiting.   Genitourinary: Negative for hematuria.   Neurological: Positive for speech difficulty and weakness.   Psychiatric/Behavioral: Negative for behavioral problems.     Scheduled Meds:   albuterol-ipratropium 2.5mg-0.5mg/3mL  3 mL Nebulization Q6H    aspirin  325 mg Per G Tube Daily    clopidogrel  75 mg Per G Tube Daily    heparin (porcine)  5,000 Units Subcutaneous Q8H    [START ON 3/10/2018] insulin aspart U-100  13 Units Subcutaneous Q24H    [START ON 3/10/2018] insulin aspart U-100  13 Units Subcutaneous Q24H    insulin aspart U-100  13 Units Subcutaneous  Q24H    insulin aspart U-100  13 Units Subcutaneous Q24H    insulin aspart U-100  13 Units Subcutaneous Q24H    [START ON 3/10/2018] insulin aspart U-100  13 Units Subcutaneous Q24H    insulin detemir U-100  22 Units Subcutaneous Daily    lisinopril  40 mg Per NG tube Daily    metoprolol tartrate  50 mg Per NG tube BID    modafinil  200 mg Per NG tube Daily    And    modafinil  100 mg Per NG tube Daily    polyethylene glycol  17 g Per NG tube Daily    potassium chloride 10%  40 mEq Oral Once    senna-docusate 8.6-50 mg  1 tablet Per NG tube BID    sodium chloride 0.9%  3 mL Intravenous Q8H    sodium chloride 3%  4 mL Nebulization Q6H     Continuous Infusions:  PRN Meds:acetaminophen, bisacodyl, dextrose 50%, glucagon (human recombinant), insulin aspart U-100, ipratropium, labetalol, ondansetron    Objective:     Vital Signs (Most Recent):  Temp: 96.8 °F (36 °C) (03/09/18 2052)  Pulse: 90 (03/09/18 2052)  Resp: 20 (03/09/18 2052)  BP: 104/71 (03/09/18 2052)  SpO2: 95 % (03/09/18 2052)  BP Location: Right arm    Vital Signs Range (Last 24H):  Temp:  [96.8 °F (36 °C)-98.9 °F (37.2 °C)]   Pulse:  []   Resp:  [16-20]   BP: (101-133)/(66-80)   SpO2:  [91 %-99 %]   BP Location: Right arm    Physical Exam   Constitutional: He appears well-developed and well-nourished.   Cardiovascular: Normal rate.    Pulmonary/Chest: Effort normal.       Skin: Skin is warm and dry.   Nursing note and vitals reviewed.    Neurological Exam:   LOC: alert  Language: Global aphasia  Articulation: Mute/Anarthric  Orientation: Untestable due to severe aphasia   Visual Fields: Hemianopsia right  EOM (CN III, IV, VI): Gaze preference  left  Facial Movement (CN VII): Lower facial weakness on the Right  Motor: Arm left  Normal 5/5  Leg left  Paresis: 5/5  Arm right  Plegia 0/5  Leg right Plegia 0/5    Laboratory:  CMP:     Recent Labs  Lab 03/09/18  0838   CALCIUM 9.6   ALBUMIN 3.0*   PROT 7.1      K 4.1   CO2 28   CL 99    BUN 25*   CREATININE 0.9   ALKPHOS 101   ALT 73*   AST 49*   BILITOT 0.7     BMP:     Recent Labs  Lab 03/09/18  0838      K 4.1   CL 99   CO2 28   BUN 25*   CREATININE 0.9   CALCIUM 9.6     CBC:     Recent Labs  Lab 03/09/18  0838   WBC 7.61   RBC 4.38*   HGB 12.7*   HCT 37.0*   *   MCV 85   MCH 29.0   MCHC 34.3     Lipid Panel: No results for input(s): CHOL, LDLCALC, HDL, TRIG in the last 168 hours.  Coagulation:     Recent Labs  Lab 03/09/18  0506   INR 0.9     Platelet Aggregation Study: No results for input(s): PLTAGG, PLTAGINTERP, PLTAGREGLACO, ADPPLTAGGREG in the last 168 hours.  Hgb A1C: No results for input(s): HGBA1C in the last 168 hours.  TSH: No results for input(s): TSH in the last 168 hours.      Diagnostic Results       Brain Imaging     Most recent CTH 2/04/18 redemonstration of large L MCA territory infarction w/o evidence of hemorrhagic conversion.  Stable post op change from L MCA endovascular stenting.        Vessel Imaging   IR Cerebral Angiogram 1/25/18 demonstrated occlusion of the L M1 segment of the L MCA with good collaterals. Occlusion removed and stent placed with TICI 2C.    Cardiac Imaging   2D Echo 1/26/18   CONCLUSIONS     1 - Normal left ventricular systolic function (EF 65-70%).     2 - Concentric remodeling.     3 - No wall motion abnormalities.     4 - Normal left ventricular diastolic function.     5 - Normal right ventricular systolic function             Do Cochran PA-C  Comprehensive Stroke Center  Department of Vascular Neurology   [facility]

## 2018-03-11 PROBLEM — L89.93 PRESSURE INJURY OF SKIN AND SUBCUTANEOUS TISSUE, STAGE 3: Status: ACTIVE | Noted: 2018-03-11

## 2018-03-11 PROBLEM — Z91.89 AT MODERATE RISK FOR ALTERATION IN SKIN INTEGRITY: Status: ACTIVE | Noted: 2018-03-11

## 2018-03-11 LAB
INR PPP: 0.9
POCT GLUCOSE: 142 MG/DL (ref 70–110)
POCT GLUCOSE: 166 MG/DL (ref 70–110)
POCT GLUCOSE: 175 MG/DL (ref 70–110)
POCT GLUCOSE: 178 MG/DL (ref 70–110)
POCT GLUCOSE: 191 MG/DL (ref 70–110)
PROTHROMBIN TIME: 9.8 SEC

## 2018-03-11 PROCEDURE — 25000003 PHARM REV CODE 250: Performed by: NURSE PRACTITIONER

## 2018-03-11 PROCEDURE — 36415 COLL VENOUS BLD VENIPUNCTURE: CPT

## 2018-03-11 PROCEDURE — 25000003 PHARM REV CODE 250: Performed by: STUDENT IN AN ORGANIZED HEALTH CARE EDUCATION/TRAINING PROGRAM

## 2018-03-11 PROCEDURE — 25000242 PHARM REV CODE 250 ALT 637 W/ HCPCS: Performed by: PSYCHIATRY & NEUROLOGY

## 2018-03-11 PROCEDURE — 25000242 PHARM REV CODE 250 ALT 637 W/ HCPCS: Performed by: NURSE PRACTITIONER

## 2018-03-11 PROCEDURE — A4216 STERILE WATER/SALINE, 10 ML: HCPCS | Performed by: NURSE PRACTITIONER

## 2018-03-11 PROCEDURE — 99233 SBSQ HOSP IP/OBS HIGH 50: CPT | Mod: ,,, | Performed by: PSYCHIATRY & NEUROLOGY

## 2018-03-11 PROCEDURE — 25000003 PHARM REV CODE 250

## 2018-03-11 PROCEDURE — 20600001 HC STEP DOWN PRIVATE ROOM

## 2018-03-11 PROCEDURE — 94640 AIRWAY INHALATION TREATMENT: CPT

## 2018-03-11 PROCEDURE — 63600175 PHARM REV CODE 636 W HCPCS: Performed by: INTERNAL MEDICINE

## 2018-03-11 PROCEDURE — 63600175 PHARM REV CODE 636 W HCPCS: Performed by: NURSE PRACTITIONER

## 2018-03-11 PROCEDURE — 85610 PROTHROMBIN TIME: CPT

## 2018-03-11 PROCEDURE — 25000003 PHARM REV CODE 250: Performed by: PSYCHIATRY & NEUROLOGY

## 2018-03-11 PROCEDURE — 94761 N-INVAS EAR/PLS OXIMETRY MLT: CPT

## 2018-03-11 PROCEDURE — 25000003 PHARM REV CODE 250: Performed by: PHYSICIAN ASSISTANT

## 2018-03-11 RX ADMIN — LISINOPRIL 40 MG: 20 TABLET ORAL at 09:03

## 2018-03-11 RX ADMIN — BACLOFEN 5 MG: 10 TABLET ORAL at 02:03

## 2018-03-11 RX ADMIN — CLOPIDOGREL BISULFATE 75 MG: 75 TABLET, FILM COATED ORAL at 09:03

## 2018-03-11 RX ADMIN — MODAFINIL 100 MG: 100 TABLET ORAL at 02:03

## 2018-03-11 RX ADMIN — HEPARIN SODIUM 5000 UNITS: 5000 INJECTION, SOLUTION INTRAVENOUS; SUBCUTANEOUS at 02:03

## 2018-03-11 RX ADMIN — INSULIN ASPART 13 UNITS: 100 INJECTION, SOLUTION INTRAVENOUS; SUBCUTANEOUS at 04:03

## 2018-03-11 RX ADMIN — IPRATROPIUM BROMIDE AND ALBUTEROL SULFATE 3 ML: 2.5; .5 SOLUTION RESPIRATORY (INHALATION) at 11:03

## 2018-03-11 RX ADMIN — ASPIRIN 325 MG ORAL TABLET 325 MG: 325 PILL ORAL at 09:03

## 2018-03-11 RX ADMIN — INSULIN ASPART 13 UNITS: 100 INJECTION, SOLUTION INTRAVENOUS; SUBCUTANEOUS at 09:03

## 2018-03-11 RX ADMIN — MODAFINIL 200 MG: 100 TABLET ORAL at 05:03

## 2018-03-11 RX ADMIN — METOPROLOL TARTRATE 50 MG: 50 TABLET, FILM COATED ORAL at 09:03

## 2018-03-11 RX ADMIN — INSULIN ASPART 1 UNITS: 100 INJECTION, SOLUTION INTRAVENOUS; SUBCUTANEOUS at 12:03

## 2018-03-11 RX ADMIN — IPRATROPIUM BROMIDE AND ALBUTEROL SULFATE 3 ML: 2.5; .5 SOLUTION RESPIRATORY (INHALATION) at 01:03

## 2018-03-11 RX ADMIN — SODIUM CHLORIDE SOLN NEBU 3% 4 ML: 3 NEBU SOLN at 08:03

## 2018-03-11 RX ADMIN — BACLOFEN 10 MG: 10 TABLET ORAL at 10:03

## 2018-03-11 RX ADMIN — INSULIN ASPART 13 UNITS: 100 INJECTION, SOLUTION INTRAVENOUS; SUBCUTANEOUS at 05:03

## 2018-03-11 RX ADMIN — INSULIN ASPART 13 UNITS: 100 INJECTION, SOLUTION INTRAVENOUS; SUBCUTANEOUS at 10:03

## 2018-03-11 RX ADMIN — INSULIN DETEMIR 22 UNITS: 100 INJECTION, SOLUTION SUBCUTANEOUS at 09:03

## 2018-03-11 RX ADMIN — SODIUM CHLORIDE SOLN NEBU 3% 4 ML: 3 NEBU SOLN at 11:03

## 2018-03-11 RX ADMIN — POLYETHYLENE GLYCOL 3350 17 G: 17 POWDER, FOR SOLUTION ORAL at 09:03

## 2018-03-11 RX ADMIN — INSULIN ASPART 13 UNITS: 100 INJECTION, SOLUTION INTRAVENOUS; SUBCUTANEOUS at 12:03

## 2018-03-11 RX ADMIN — INSULIN ASPART 1 UNITS: 100 INJECTION, SOLUTION INTRAVENOUS; SUBCUTANEOUS at 03:03

## 2018-03-11 RX ADMIN — SODIUM CHLORIDE SOLN NEBU 3% 4 ML: 3 NEBU SOLN at 12:03

## 2018-03-11 RX ADMIN — HEPARIN SODIUM 5000 UNITS: 5000 INJECTION, SOLUTION INTRAVENOUS; SUBCUTANEOUS at 05:03

## 2018-03-11 RX ADMIN — Medication 3 ML: at 10:03

## 2018-03-11 RX ADMIN — STANDARDIZED SENNA CONCENTRATE AND DOCUSATE SODIUM 1 TABLET: 8.6; 5 TABLET, FILM COATED ORAL at 09:03

## 2018-03-11 RX ADMIN — INSULIN ASPART 1 UNITS: 100 INJECTION, SOLUTION INTRAVENOUS; SUBCUTANEOUS at 04:03

## 2018-03-11 RX ADMIN — SODIUM CHLORIDE SOLN NEBU 3% 4 ML: 3 NEBU SOLN at 01:03

## 2018-03-11 RX ADMIN — SODIUM CHLORIDE SOLN NEBU 3% 4 ML: 3 NEBU SOLN at 07:03

## 2018-03-11 RX ADMIN — HEPARIN SODIUM 5000 UNITS: 5000 INJECTION, SOLUTION INTRAVENOUS; SUBCUTANEOUS at 09:03

## 2018-03-11 RX ADMIN — Medication 3 ML: at 05:03

## 2018-03-11 RX ADMIN — FLUOXETINE 20 MG: 20 CAPSULE ORAL at 09:03

## 2018-03-11 RX ADMIN — IPRATROPIUM BROMIDE AND ALBUTEROL SULFATE 3 ML: 2.5; .5 SOLUTION RESPIRATORY (INHALATION) at 07:03

## 2018-03-11 RX ADMIN — INSULIN ASPART 13 UNITS: 100 INJECTION, SOLUTION INTRAVENOUS; SUBCUTANEOUS at 02:03

## 2018-03-11 RX ADMIN — Medication 3 ML: at 03:03

## 2018-03-11 RX ADMIN — BACLOFEN 10 MG: 10 TABLET ORAL at 09:03

## 2018-03-11 RX ADMIN — IPRATROPIUM BROMIDE AND ALBUTEROL SULFATE 3 ML: 2.5; .5 SOLUTION RESPIRATORY (INHALATION) at 08:03

## 2018-03-11 NOTE — PLAN OF CARE
Problem: Patient Care Overview  Goal: Plan of Care Review  Outcome: Ongoing (interventions implemented as appropriate)  Plan of care reviewed, the patients family was able to verbalize understanding. Client is aphasic, opens eyes spontaneously and is aroused to pain. Diabetisource 1.5 is infusing via PEG tube at current goal of 75 mL/hr. No residual TF noted. 300 cc water bolus given via PEG tube per standing MD order. SR on telemetry with continuous pulse ox with O2 saturations > 93%. On 1 Liter of oxygen via nasal cannula. Turned Q2.Free from falls/injuries. No acute distress noted. Client is sleeping in bed with his wife at the bedside. Will continue to monitor.

## 2018-03-11 NOTE — PROGRESS NOTES
Patient seen for skin breakdown to sacral spine with patient's nurse Desiree AKHTAR. Stage 3 pressure injury noted to sacral spine. Wound dressed with coloplast protective sheet/ hydrocolloid. Recommend changing twice a week. Patient incontinent of stool, stool should be able to be wiped off from surface of hydrocolloid. Size wise low air loss surface ordered for patient. Low air loss overlay was at bedside. Patient moving left foot around, no breakdown noted to left heel, aquacel heel foam ordered for left heel for prevention. EHOB heel boot noted to right heel, offloaded. No breakdown noted to right heel. Discussed recommendations with Do DARNELL with stroke service, telephone order received for nursing. Nursing to continue care.     03/11/18 1246       Pressure Injury 03/11/18 1246 Sacral spine Stage 3   Date First Assessed/Time First Assessed: 03/11/18 1246   Pressure Injury Present on Admission: no  Location: Sacral spine  Staging: Stage 3   Wound Image    Staging 3   Drainage Amount None   Drainage Characteristics/Odor No odor   Appearance Moist;Pink;Slough  (thin layer of transparent slough noted)   Tissue loss description Full thickness   Red (%), Wound Tissue Color 98 %   Yellow (%), Wound Tissue Color 2 %   Periwound Area Redness   Wound Edges Open   Wound Length (cm) 2   Wound Width (cm) 1   Depth (cm) 0.2   Care Cleansed with:;Sterile normal saline;Applied:;Skin Barrier   Dressing Applied;Hydrocolloid   Periwound Care Skin barrier film applied   Dressing Change Due 03/15/18

## 2018-03-11 NOTE — PLAN OF CARE
Reviewed BG and insulin regimen.     Patient remains on same tube feeds (Diabetasource AC TFs; rate of 75cc).   BG readings at goal, requiring only 6u of correction insulin.  Continue Novolog 13u q4h - hold if TF discontinued/held.   Continue Levemir 22u qD     Notify endocrine for changes in nutrition status (diet, TF, TPN).     Kassie Danielle MD  Endocrinology Fellow

## 2018-03-11 NOTE — SUBJECTIVE & OBJECTIVE
Neurologic Chief Complaint: Left MCA stroke     Subjective:     Interval History: VILMAON    [facility], Past Medical, Family, and Social History remains the same as documented in the initial encounter.     Review of Systems   Constitutional: Negative for fever.   Eyes: Positive for visual disturbance.   Gastrointestinal: Negative for vomiting.   Genitourinary: Negative for hematuria.   Neurological: Positive for speech difficulty and weakness.   Psychiatric/Behavioral: Negative for behavioral problems.     Scheduled Meds:   albuterol-ipratropium 2.5mg-0.5mg/3mL  3 mL Nebulization Q6H    aspirin  325 mg Per G Tube Daily    baclofen  10 mg Per G Tube BID    clopidogrel  75 mg Per G Tube Daily    FLUoxetine  20 mg Per G Tube Daily    heparin (porcine)  5,000 Units Subcutaneous Q8H    insulin aspart U-100  13 Units Subcutaneous Q24H    insulin aspart U-100  13 Units Subcutaneous Q24H    insulin aspart U-100  13 Units Subcutaneous Q24H    insulin aspart U-100  13 Units Subcutaneous Q24H    insulin aspart U-100  13 Units Subcutaneous Q24H    insulin aspart U-100  13 Units Subcutaneous Q24H    insulin detemir U-100  22 Units Subcutaneous Daily    lisinopril  40 mg Per NG tube Daily    metoprolol tartrate  50 mg Per NG tube BID    modafinil  200 mg Per NG tube Daily    And    modafinil  100 mg Per NG tube Daily    polyethylene glycol  17 g Per NG tube Daily    potassium chloride 10%  40 mEq Oral Once    senna-docusate 8.6-50 mg  1 tablet Per NG tube BID    sodium chloride 0.9%  3 mL Intravenous Q8H    sodium chloride 3%  4 mL Nebulization Q6H     Continuous Infusions:  PRN Meds:acetaminophen, bisacodyl, dextrose 50%, glucagon (human recombinant), insulin aspart U-100, ipratropium, labetalol, ondansetron    Objective:     Vital Signs (Most Recent):  Temp: 98.3 °F (36.8 °C) (03/10/18 1700)  Pulse: 101 (03/10/18 1700)  Resp: 20 (03/10/18 1700)  BP: 104/65 (03/10/18 1700)  SpO2: 98 % (03/10/18 1700)  BP Location:  Left arm    Vital Signs Range (Last 24H):  Temp:  [96.8 °F (36 °C)-98.3 °F (36.8 °C)]   Pulse:  []   Resp:  [18-20]   BP: (104-123)/(65-78)   SpO2:  [89 %-99 %]   BP Location: Left arm    Physical Exam   Constitutional: He appears well-developed and well-nourished.   Cardiovascular: Normal rate.    Pulmonary/Chest: Effort normal.       Skin: Skin is warm and dry.   Nursing note and vitals reviewed.    Neurological Exam:   LOC: alert  Language: Global aphasia  Articulation: Mute/Anarthric  Orientation: Untestable due to severe aphasia   Visual Fields: Hemianopsia right  EOM (CN III, IV, VI): Gaze preference  left  Facial Movement (CN VII): Lower facial weakness on the Right  Motor: Arm left  Normal 5/5  Leg left  Paresis: 5/5  Arm right  Plegia 0/5  Leg right Plegia 0/5    Laboratory:  CMP:     Recent Labs  Lab 03/10/18  0453   CALCIUM 9.6   ALBUMIN 3.0*   PROT 7.1      K 4.3   CO2 28   CL 99   BUN 25*   CREATININE 0.9   ALKPHOS 96   ALT 68*   AST 43*   BILITOT 0.6     BMP:     Recent Labs  Lab 03/10/18  0453      K 4.3   CL 99   CO2 28   BUN 25*   CREATININE 0.9   CALCIUM 9.6     CBC:     Recent Labs  Lab 03/10/18  0453   WBC 7.19   RBC 4.39*   HGB 12.7*   HCT 38.3*   *   MCV 87   MCH 28.9   MCHC 33.2     Lipid Panel: No results for input(s): CHOL, LDLCALC, HDL, TRIG in the last 168 hours.  Coagulation:     Recent Labs  Lab 03/10/18  0453   INR 0.9     Platelet Aggregation Study: No results for input(s): PLTAGG, PLTAGINTERP, PLTAGREGLACO, ADPPLTAGGREG in the last 168 hours.  Hgb A1C: No results for input(s): HGBA1C in the last 168 hours.  TSH: No results for input(s): TSH in the last 168 hours.      Diagnostic Results       Brain Imaging     Most recent CTH 2/04/18 redemonstration of large L MCA territory infarction w/o evidence of hemorrhagic conversion.  Stable post op change from L MCA endovascular stenting.        Vessel Imaging   IR Cerebral Angiogram 1/25/18 demonstrated occlusion of  the L M1 segment of the L MCA with good collaterals. Occlusion removed and stent placed with TICI 2C.    Cardiac Imaging   2D Echo 1/26/18   CONCLUSIONS     1 - Normal left ventricular systolic function (EF 65-70%).     2 - Concentric remodeling.     3 - No wall motion abnormalities.     4 - Normal left ventricular diastolic function.     5 - Normal right ventricular systolic function

## 2018-03-11 NOTE — PROGRESS NOTES
"[facility]  Vascular Neurology  Comprehensive Stroke Center  Progress Note    Assessment/Plan:     * Embolic stroke involving left middle cerebral artery    48 yo M with PMHx HLD, LOYDA, poorly controlled DM II presents to [facility] 01/25/18 after noted "strange behavior" for which EMS was called. Pt was found to have a L M1 occlusion and was taken to IR for thrombectomy with TICI 2C reperfusion and stent placement due to L MCA stenosis. Etiology remains unclear: no obvious signs of cardiac source or atheromatous disease in the aorto/carotid axis. Echo normal with no hx or signs of A fib. PT/OT/SLP will continue to evaluate and treat; recommending SNF. DAPT 2/2 recent stent placement.      Disposition - currently pursuing home health.  Training family on 3/12 to bring patient home    Antithrombotics:  mg po qd, clopidogrel 75 mg po qd [s/p intracranial stent]  Statins: Atorvastatin 80 mg po qd -held due to transaminitis. Consider resuming when this resolves.  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    - Abscess near PEG w/IR aspirate, growing anaerobes   - Rocephin (last dose 3/5) and Flagyl (last dose 3/7)        Right spastic hemiparesis    -Due to stroke  -Continue aggressive PT/OT; Recommending SNF  -starting baclofen        Nodule of right lung    - Plan for f/u CT 6-12 months via PCP        Oral phase dysphagia    - Continue SLP  - NPO, meds/feeds per PEG        Acute respiratory failure with hypoxia    Likely hypoxia is Aspiration pna vs pneumonitis (as CXR improved after a day) 2/2 to superimposed encephalopathy due to sepsis  -AM CXR 3/2 with mild bibasilar atelectasis   -Continue O2 " sats >90%, okay with current requirements of 1L NC, pt has what appears as Cheyne espinal pattern and reported LOYDA  -Sister requested continuous pulse ox; Ordered  -Contraction alkalosis improved with increasing water flushes via PEG  -Considered PE as differential however pt is on OAC and not tachycardic, tachypneic. Wells score 1.5 (low-risk)  - Continue chest physiotherapy, aspiration precautions, wean off O2 support   -plan for home suctioning upon discharge        Cytotoxic cerebral edema    -2/2 stroke, evident on imaging  Stable on repeat CTH 2/4/18        Urinary tract infection due to Klebsiella species     Afebrile, leukocytosis resolved  - Klebsiella from urine cultures (2/25/18) + Anaerobes from PEG site cultures   - rocephin course completed, and Flagyl completed today        Type 2 diabetes mellitus with hyperglycemia, with long-term current use of insulin    - Stroke risk factor, Hgb A1c 10%  - Continue Diabetasource AC TFs  - Endocrine consulted and guiding insulin modification for TF  - POCT Q4h ; Moderate correction q4hr         Mixed hyperlipidemia    -Stroke risk factor, LDL invalid as TG > 400  - holding statin due to rising LFTs        Essential hypertension    -Stroke risk factor  -SBP <140  - Continue scheduled lisinopril 40 mg qd, Metoprolol 50 mg bid          Obstructive sleep apnea    -Stroke risk factor  -CPAP qHS  -Wean O2 as tolerated.             01/25/18:  Brought in for aphasia, RSW with L gaze preferance. LKN unknown, not tPA candidate. Went to IR for angiogram and stent.  01/29/18:  Off Cardene, remains on Insulin gtt. Concern for sepsis, respiratory source. Imaging with mass effect and some brain compression; maycol crani watch.  01/30/18:  EEG consistent with focal L slowing 2/2 lesion and mild generalized slowing, no seizures. CT head stable, no hemorrhagic conversion  02/01/18:  Emergent intubation likely due to upper airway obstruction per NCC.    02/02/18:  Pt off Precedex,  moving left side spontaneously and opening eyes. Intubated, on spontaneous today. NCC giving steroids in hopes to extubate tomorrow.  02/03/18:  NAEON, intubated, not responsive to verbal stimuli, withdraws from pain on LUE, SBP ~135-230. Continued on insulin gtt, SQH for DVT ppx, and captopril.   02/16/18:  Few changes on exam, continues to have significant RUE/RLE weakness with global aphasia.  Family member at bedside noted attempt to communicate with her.  02/18/18:  Pt largely unchanged on exam this am.  No family member present during am rounds.  02/19/18:  Tolerating facemask. PEG by IR this afternoon.  02/20/18:  s/p PEG. O2 % on 5L NC, still requiring frequent suctioning. Primary team considering transfer to Medicine tomorrow.  02/21/18:  Pt sating % on 3L NC. Restarted DAPT, including . Plans for step down to stroke service.  02/22/18:  [facility] with [facility] but not service-connected so unable to be placed at SNF.  Working on insurance coverage of at least HHC therapy.  02/23/18:  Increased detemir to 36 U bid.  [facility] with [facility] SN[facility] pending completion of paperwork by Stroke team and pt's spouse--in process.  2/24/18 - NAEON. Pts WBC mildly elevated 13.01, pt is afebrile. Will continue to monitor. Endocrine consulted - Recommend levemir 40 units daily to cover basal needs (using ~0.7u/kg); Start novolog 6 units q4hr to cover TF; Moderate correction q4hr. SNF placement pending.  2/25/18 -  Pt WBC increased overnight, HR range , and temp 99.2. Blood cultures ordered and drawn. UA/UC ordered. Nursing staff called a rapid response this AM due to pts tachycardia, fever, and decreased O2 sats. Arrived to room @ 0824. 1 liter of NS ordered. STAT CXR ordered. Tylenol given. ABG completed with no significant abnormal results. Respiratory suctioned pt and pt repositioned to help with breathing. Pt O2 sats improved on venti mask, temp decreased, and BP remained normotensive. Started on  vanc  and zosyn. Pt appears more comfortable and no decline in neuro status. Pt family updated.   2/26/18 - afebrile overnight and leukocytosis improving with BS antibiotics. CT abdomen yesterday noted seroma/hematoma on left rectus abdominus. IR consulted for culture +/- drain.  02/28/2018 s/p aspiration of left rectus collection adjacent to the PEG tube.  Culture sent to lab, pending.  Remains afebrile.  Currently on 4L NC O2.   3/1/18: NAEON.   3/2: Pt afebrile, WBC WNL. Now sating well on 1L NC. Pt emotional on exam, family not interested in SSRI at this time. CM attempting to get pt placed [facility] so [facility] [place] [facility]. Increased rate of TFs.  3/3: NAEON. Pending placement. Will monitor Na level tomorrow.  3/4: stable, no new issues.  3/5/18 - sleepy today but no events overnight   3/6/18 - sister concerned due to coughing post speech therapy - discussed with speech team and sister   3/7/18 - No change in exam.  [facility] to [facility] - pending approval.  Continue to monitor liver enzymes.  3/8/18 - no acute events overnight. Vitals within normal. Pending placement   03/09/2018 NAEON  3/10/18 starting baclofen for spasticity, fluoxetine for depression and therapy motivation  3/11/18 OT to make splint for RUE to prevent contractures, plans for discharge home with family training planned for Monday    STROKE DOCUMENTATION   Acute Stroke Times   Stroke Team Called Date: 01/25/18  Stroke Team Called Time: 1852  Stroke Team Arrival Date: 01/25/18  Stroke Team Arrival Time: 0652  CT Interpretation Time: 1910  Decision to Treat Time for Alteplase:  (n/a unknown last known normal )  Decision to Treat Time for IR: 1915    NIH Scale:  1a. Level Of Consciousness: 0-->Alert: keenly responsive  1b. LOC Questions: 2-->Answers neither question correctly  1c. LOC Commands: 2-->Performs neither task correctly  2. Best Gaze: 1-->Partial gaze palsy: gaze is abnormal in one or both eyes, but forced deviation or  total gaze paresis is not present  3. Visual: 2-->Complete hemianopia  4. Facial Palsy: 1-->Minor paralysis (flattened nasolabial fold, asymmetry on smiling)  5a. Motor Arm, Left: 0-->No drift: limb holds 90 (or 45) degrees for full 10 secs  5b. Motor Arm, Right: 4-->No movement  6a. Motor Leg, Left: 0-->No drift: leg holds 30 degree position for full 5 secs  6b. Motor Leg, Right: 4-->No movement  7. Limb Ataxia: 0-->Absent  8. Sensory: 1-->Mild-to-moderate sensory loss: patient feels pinprick is less sharp or is dull on the affected side: or there is a loss of superficial pain with pinprick, but patient is aware of being touched  9. Best Language: 3-->Mute, global aphasia: no usable speech or auditory comprehension  10. Dysarthria: 2-->Severe dysarthria: patients speech is so slurred as to be unintelligible in the absence of or out of proportion to any dysphasia, or is mute/anarthric  11. Extinction and Inattention (formerly Neglect): 0-->No abnormality  Total (NIH Stroke Scale): 22       Modified Highland Score: 0  [place] Coma Scale:    ABCD2 Score:    [id number] Score:   HAS -BLED Score:   ICH Score:   Hunt & Laguerre Classification:        Neurologic Chief Complaint: Left MCA stroke     Subjective:     Interval History: NAEON    HPI, Past Medical, Family, and Social History remains the same as documented in the initial encounter.     Review of Systems   Constitutional: Negative for fever.   Eyes: Positive for visual disturbance.   Gastrointestinal: Negative for vomiting.   Genitourinary: Negative for hematuria.   Neurological: Positive for speech difficulty and weakness.   Psychiatric/Behavioral: Negative for behavioral problems.     Scheduled Meds:   albuterol-ipratropium 2.5mg-0.5mg/3mL  3 mL Nebulization Q6H    aspirin  325 mg Per G Tube Daily    baclofen  10 mg Per G Tube BID    clopidogrel  75 mg Per G Tube Daily    FLUoxetine  20 mg Per G Tube Daily    heparin (porcine)  5,000 Units Subcutaneous Q8H     insulin aspart U-100  13 Units Subcutaneous Q24H    insulin aspart U-100  13 Units Subcutaneous Q24H    insulin aspart U-100  13 Units Subcutaneous Q24H    insulin aspart U-100  13 Units Subcutaneous Q24H    insulin aspart U-100  13 Units Subcutaneous Q24H    insulin aspart U-100  13 Units Subcutaneous Q24H    insulin detemir U-100  22 Units Subcutaneous Daily    lisinopril  40 mg Per NG tube Daily    metoprolol tartrate  50 mg Per NG tube BID    modafinil  200 mg Per NG tube Daily    And    modafinil  100 mg Per NG tube Daily    polyethylene glycol  17 g Per NG tube Daily    potassium chloride 10%  40 mEq Oral Once    senna-docusate 8.6-50 mg  1 tablet Per NG tube BID    sodium chloride 0.9%  3 mL Intravenous Q8H    sodium chloride 3%  4 mL Nebulization Q6H     Continuous Infusions:  PRN Meds:acetaminophen, bisacodyl, dextrose 50%, glucagon (human recombinant), insulin aspart U-100, ipratropium, labetalol, ondansetron    Objective:     Vital Signs (Most Recent):  Temp: 98.3 °F (36.8 °C) (03/10/18 1700)  Pulse: 101 (03/10/18 1700)  Resp: 20 (03/10/18 1700)  BP: 104/65 (03/10/18 1700)  SpO2: 98 % (03/10/18 1700)  BP Location: Left arm    Vital Signs Range (Last 24H):  Temp:  [96.8 °F (36 °C)-98.3 °F (36.8 °C)]   Pulse:  []   Resp:  [18-20]   BP: (104-123)/(65-78)   SpO2:  [89 %-99 %]   BP Location: Left arm    Physical Exam   Constitutional: He appears well-developed and well-nourished.   Cardiovascular: Normal rate.    Pulmonary/Chest: Effort normal.       Skin: Skin is warm and dry.   Nursing note and vitals reviewed.    Neurological Exam:   LOC: alert  Language: Global aphasia  Articulation: Mute/Anarthric  Orientation: Untestable due to severe aphasia   Visual Fields: Hemianopsia right  EOM (CN III, IV, VI): Gaze preference  left  Facial Movement (CN VII): Lower facial weakness on the Right  Motor: Arm left  Normal 5/5  Leg left  Paresis: 5/5  Arm right  Plegia 0/5  Leg right Plegia  0/5    Laboratory:  CMP:     Recent Labs  Lab 03/10/18  0453   CALCIUM 9.6   ALBUMIN 3.0*   PROT 7.1      K 4.3   CO2 28   CL 99   BUN 25*   CREATININE 0.9   ALKPHOS 96   ALT 68*   AST 43*   BILITOT 0.6     BMP:     Recent Labs  Lab 03/10/18  0453      K 4.3   CL 99   CO2 28   BUN 25*   CREATININE 0.9   CALCIUM 9.6     CBC:     Recent Labs  Lab 03/10/18  0453   WBC 7.19   RBC 4.39*   HGB 12.7*   HCT 38.3*   *   MCV 87   MCH 28.9   MCHC 33.2     Lipid Panel: No results for input(s): CHOL, LDLCALC, HDL, TRIG in the last 168 hours.  Coagulation:     Recent Labs  Lab 03/10/18  0453   INR 0.9     Platelet Aggregation Study: No results for input(s): PLTAGG, PLTAGINTERP, PLTAGREGLACO, ADPPLTAGGREG in the last 168 hours.  Hgb A1C: No results for input(s): HGBA1C in the last 168 hours.  TSH: No results for input(s): TSH in the last 168 hours.      Diagnostic Results       Brain Imaging     Most recent CTH 2/04/18 redemonstration of large L MCA territory infarction w/o evidence of hemorrhagic conversion.  Stable post op change from L MCA endovascular stenting.        Vessel Imaging   IR Cerebral Angiogram 1/25/18 demonstrated occlusion of the L M1 segment of the L MCA with good collaterals. Occlusion removed and stent placed with TICI 2C.    Cardiac Imaging   2D Echo 1/26/18   CONCLUSIONS     1 - Normal left ventricular systolic function (EF 65-70%).     2 - Concentric remodeling.     3 - No wall motion abnormalities.     4 - Normal left ventricular diastolic function.     5 - Normal right ventricular systolic function           Do Cochran PA-C  [facility]  Department of Vascular Neurology   [facility]

## 2018-03-11 NOTE — PT/OT/SLP PROGRESS
Physical Therapy      Patient Name:  Todd Quevedo   MRN:  [id number]    PT met with patient's wife to initiate family training.  She requested to hold family training until her dtr arrives on Monday.  Family training with the patient's wife and dtr scheduled for 10am Monday for physical therapy.     Madina Luu, PT   3/11/2018

## 2018-03-12 LAB
ALBUMIN SERPL BCP-MCNC: 3.1 G/DL
ALP SERPL-CCNC: 97 U/L
ALT SERPL W/O P-5'-P-CCNC: 52 U/L
ANION GAP SERPL CALC-SCNC: 11 MMOL/L
AST SERPL-CCNC: 33 U/L
BASOPHILS # BLD AUTO: 0.03 K/UL
BASOPHILS NFR BLD: 0.4 %
BILIRUB SERPL-MCNC: 0.6 MG/DL
BUN SERPL-MCNC: 25 MG/DL
CALCIUM SERPL-MCNC: 9.7 MG/DL
CHLORIDE SERPL-SCNC: 99 MMOL/L
CO2 SERPL-SCNC: 28 MMOL/L
CREAT SERPL-MCNC: 0.8 MG/DL
DIFFERENTIAL METHOD: ABNORMAL
EOSINOPHIL # BLD AUTO: 0.1 K/UL
EOSINOPHIL NFR BLD: 1.3 %
ERYTHROCYTE [DISTWIDTH] IN BLOOD BY AUTOMATED COUNT: 14.6 %
EST. GFR  (AFRICAN AMERICAN): >60 ML/MIN/1.73 M^2
EST. GFR  (NON AFRICAN AMERICAN): >60 ML/MIN/1.73 M^2
GLUCOSE SERPL-MCNC: 171 MG/DL
HCT VFR BLD AUTO: 38 %
HGB BLD-MCNC: 12.7 G/DL
IMM GRANULOCYTES # BLD AUTO: 0.02 K/UL
IMM GRANULOCYTES NFR BLD AUTO: 0.3 %
INR PPP: 0.9
LYMPHOCYTES # BLD AUTO: 2.3 K/UL
LYMPHOCYTES NFR BLD: 33.9 %
MAGNESIUM SERPL-MCNC: 1.8 MG/DL
MCH RBC QN AUTO: 29 PG
MCHC RBC AUTO-ENTMCNC: 33.4 G/DL
MCV RBC AUTO: 87 FL
MONOCYTES # BLD AUTO: 0.6 K/UL
MONOCYTES NFR BLD: 9.1 %
NEUTROPHILS # BLD AUTO: 3.7 K/UL
NEUTROPHILS NFR BLD: 55 %
NRBC BLD-RTO: 0 /100 WBC
PHOSPHATE SERPL-MCNC: 4 MG/DL
PLATELET # BLD AUTO: 460 K/UL
PMV BLD AUTO: 9.7 FL
POCT GLUCOSE: 134 MG/DL (ref 70–110)
POCT GLUCOSE: 153 MG/DL (ref 70–110)
POCT GLUCOSE: 156 MG/DL (ref 70–110)
POCT GLUCOSE: 161 MG/DL (ref 70–110)
POCT GLUCOSE: 176 MG/DL (ref 70–110)
POCT GLUCOSE: 180 MG/DL (ref 70–110)
POCT GLUCOSE: 191 MG/DL (ref 70–110)
POTASSIUM SERPL-SCNC: 3.9 MMOL/L
PROT SERPL-MCNC: 7.1 G/DL
PROTHROMBIN TIME: 9.6 SEC
RBC # BLD AUTO: 4.38 M/UL
SODIUM SERPL-SCNC: 138 MMOL/L
WBC # BLD AUTO: 6.73 K/UL

## 2018-03-12 PROCEDURE — 63600175 PHARM REV CODE 636 W HCPCS: Performed by: NURSE PRACTITIONER

## 2018-03-12 PROCEDURE — 25000003 PHARM REV CODE 250: Performed by: NURSE PRACTITIONER

## 2018-03-12 PROCEDURE — 99232 SBSQ HOSP IP/OBS MODERATE 35: CPT | Mod: ,,, | Performed by: NURSE PRACTITIONER

## 2018-03-12 PROCEDURE — 80053 COMPREHEN METABOLIC PANEL: CPT

## 2018-03-12 PROCEDURE — 83735 ASSAY OF MAGNESIUM: CPT

## 2018-03-12 PROCEDURE — 36415 COLL VENOUS BLD VENIPUNCTURE: CPT

## 2018-03-12 PROCEDURE — 99233 SBSQ HOSP IP/OBS HIGH 50: CPT | Mod: ,,, | Performed by: PSYCHIATRY & NEUROLOGY

## 2018-03-12 PROCEDURE — 97530 THERAPEUTIC ACTIVITIES: CPT

## 2018-03-12 PROCEDURE — 97110 THERAPEUTIC EXERCISES: CPT

## 2018-03-12 PROCEDURE — 20600001 HC STEP DOWN PRIVATE ROOM

## 2018-03-12 PROCEDURE — 25000003 PHARM REV CODE 250: Performed by: PHYSICIAN ASSISTANT

## 2018-03-12 PROCEDURE — 25000242 PHARM REV CODE 250 ALT 637 W/ HCPCS: Performed by: NURSE PRACTITIONER

## 2018-03-12 PROCEDURE — 97535 SELF CARE MNGMENT TRAINING: CPT

## 2018-03-12 PROCEDURE — 94761 N-INVAS EAR/PLS OXIMETRY MLT: CPT

## 2018-03-12 PROCEDURE — 25000242 PHARM REV CODE 250 ALT 637 W/ HCPCS: Performed by: PSYCHIATRY & NEUROLOGY

## 2018-03-12 PROCEDURE — 85610 PROTHROMBIN TIME: CPT

## 2018-03-12 PROCEDURE — A4216 STERILE WATER/SALINE, 10 ML: HCPCS | Performed by: NURSE PRACTITIONER

## 2018-03-12 PROCEDURE — 92526 ORAL FUNCTION THERAPY: CPT

## 2018-03-12 PROCEDURE — 84100 ASSAY OF PHOSPHORUS: CPT

## 2018-03-12 PROCEDURE — 94640 AIRWAY INHALATION TREATMENT: CPT

## 2018-03-12 PROCEDURE — 25000003 PHARM REV CODE 250: Performed by: STUDENT IN AN ORGANIZED HEALTH CARE EDUCATION/TRAINING PROGRAM

## 2018-03-12 PROCEDURE — 85025 COMPLETE CBC W/AUTO DIFF WBC: CPT

## 2018-03-12 PROCEDURE — 94668 MNPJ CHEST WALL SBSQ: CPT

## 2018-03-12 PROCEDURE — 25000003 PHARM REV CODE 250: Performed by: PSYCHIATRY & NEUROLOGY

## 2018-03-12 RX ADMIN — HEPARIN SODIUM 5000 UNITS: 5000 INJECTION, SOLUTION INTRAVENOUS; SUBCUTANEOUS at 05:03

## 2018-03-12 RX ADMIN — FLUOXETINE 20 MG: 20 CAPSULE ORAL at 08:03

## 2018-03-12 RX ADMIN — INSULIN ASPART 13 UNITS: 100 INJECTION, SOLUTION INTRAVENOUS; SUBCUTANEOUS at 08:03

## 2018-03-12 RX ADMIN — INSULIN ASPART 13 UNITS: 100 INJECTION, SOLUTION INTRAVENOUS; SUBCUTANEOUS at 10:03

## 2018-03-12 RX ADMIN — CLOPIDOGREL BISULFATE 75 MG: 75 TABLET, FILM COATED ORAL at 08:03

## 2018-03-12 RX ADMIN — HEPARIN SODIUM 5000 UNITS: 5000 INJECTION, SOLUTION INTRAVENOUS; SUBCUTANEOUS at 01:03

## 2018-03-12 RX ADMIN — Medication 3 ML: at 12:03

## 2018-03-12 RX ADMIN — SODIUM CHLORIDE SOLN NEBU 3% 4 ML: 3 NEBU SOLN at 01:03

## 2018-03-12 RX ADMIN — METOPROLOL TARTRATE 50 MG: 50 TABLET, FILM COATED ORAL at 08:03

## 2018-03-12 RX ADMIN — IPRATROPIUM BROMIDE AND ALBUTEROL SULFATE 3 ML: 2.5; .5 SOLUTION RESPIRATORY (INHALATION) at 07:03

## 2018-03-12 RX ADMIN — INSULIN ASPART 13 UNITS: 100 INJECTION, SOLUTION INTRAVENOUS; SUBCUTANEOUS at 01:03

## 2018-03-12 RX ADMIN — INSULIN ASPART 1 UNITS: 100 INJECTION, SOLUTION INTRAVENOUS; SUBCUTANEOUS at 08:03

## 2018-03-12 RX ADMIN — INSULIN ASPART 2 UNITS: 100 INJECTION, SOLUTION INTRAVENOUS; SUBCUTANEOUS at 03:03

## 2018-03-12 RX ADMIN — IPRATROPIUM BROMIDE AND ALBUTEROL SULFATE 3 ML: 2.5; .5 SOLUTION RESPIRATORY (INHALATION) at 01:03

## 2018-03-12 RX ADMIN — HEPARIN SODIUM 5000 UNITS: 5000 INJECTION, SOLUTION INTRAVENOUS; SUBCUTANEOUS at 09:03

## 2018-03-12 RX ADMIN — SODIUM CHLORIDE SOLN NEBU 3% 4 ML: 3 NEBU SOLN at 07:03

## 2018-03-12 RX ADMIN — MINERAL OIL AND WHITE PETROLATUM: 150; 830 OINTMENT OPHTHALMIC at 10:03

## 2018-03-12 RX ADMIN — ASPIRIN 325 MG ORAL TABLET 325 MG: 325 PILL ORAL at 08:03

## 2018-03-12 RX ADMIN — MODAFINIL 100 MG: 100 TABLET ORAL at 01:03

## 2018-03-12 RX ADMIN — METOPROLOL TARTRATE 50 MG: 50 TABLET, FILM COATED ORAL at 09:03

## 2018-03-12 RX ADMIN — LISINOPRIL 40 MG: 20 TABLET ORAL at 08:03

## 2018-03-12 RX ADMIN — POLYETHYLENE GLYCOL 3350 17 G: 17 POWDER, FOR SOLUTION ORAL at 08:03

## 2018-03-12 RX ADMIN — Medication 3 ML: at 06:03

## 2018-03-12 RX ADMIN — INSULIN DETEMIR 22 UNITS: 100 INJECTION, SOLUTION SUBCUTANEOUS at 08:03

## 2018-03-12 RX ADMIN — INSULIN ASPART 13 UNITS: 100 INJECTION, SOLUTION INTRAVENOUS; SUBCUTANEOUS at 04:03

## 2018-03-12 RX ADMIN — INSULIN ASPART 13 UNITS: 100 INJECTION, SOLUTION INTRAVENOUS; SUBCUTANEOUS at 03:03

## 2018-03-12 RX ADMIN — STANDARDIZED SENNA CONCENTRATE AND DOCUSATE SODIUM 1 TABLET: 8.6; 5 TABLET, FILM COATED ORAL at 08:03

## 2018-03-12 RX ADMIN — BACLOFEN 10 MG: 10 TABLET ORAL at 09:03

## 2018-03-12 RX ADMIN — INSULIN ASPART 13 UNITS: 100 INJECTION, SOLUTION INTRAVENOUS; SUBCUTANEOUS at 12:03

## 2018-03-12 RX ADMIN — MODAFINIL 200 MG: 100 TABLET ORAL at 05:03

## 2018-03-12 RX ADMIN — Medication 3 ML: at 10:03

## 2018-03-12 NOTE — PT/OT/SLP PROGRESS
Occupational Therapy   Treatment    Name: Todd Quevedo  MRN: [id number]  Admitting Diagnosis:  Embolic stroke involving left middle cerebral artery       Recommendations:     Discharge Recommendations: nursing facility, skilled  Discharge Equipment Recommendations:   (hospital bed, jim lift, reclining wheelchair, wheelchair cushion)  Barriers to discharge:  Inaccessible home environment, Decreased caregiver support    Subjective     Communicated with: PT prior to session.  Pain/Comfort:  · Pain Rating 1: 0/10  · Pain Rating Post-Intervention 1: 0/10    Patients cultural, spiritual, Catholic conflicts given the current situation: None stated    Objective:     Patient found with: telemetry, oxygen, Condom Catheter, pulse ox (continuous), pressure relief boots, PEG Tube.  Wife present entire session; daughter present for last 15 minutes.    General Precautions: Standard, aspiration, aphasia, fall, NPO   Orthopedic Precautions:N/A   Braces: N/A     Occupational Performance:    Bed Mobility:    · Not assessed 2* session focused on family training.       Functional Mobility/Transfers:  · Pt not appropriate for task this date.    Activities of Daily Living:  · Not assessed 2* focused on family training.      Patient left right sidelying with all lines intact, call button in reach and wife, daughter, and PT present    [facility] 6 Click:  [facility] Total Score: 6    Treatment & Education:  *Session focused on family training regarding ADLs and appropriate DME needed once pt returns home.  OT discussed proper techniques for dressing, performing bj care after toileting, grooming, and bathing.  OT addressed all questions within scope of practice concerning appropriate DME for shower and for toileting.  In addition, positioning for LUE and possibility of utilizing a splint talked about.  OT discussed that pt requires significant assist at this time so safety for both the pt and caregiver was of upmost importance.  Different types  of shower chairs discussed; however, family has tub/shower combination instead of walk-in shower.  OT discussed possible safety concerns regarding shower and stated bed baths could be a viable option to provide safe environment for pt to perform task.  In addition, daughter asked questions about condom catheter and utilizing urinal.  OT stated that at this time pt unable to express when he needed to urinate or have a bowel movement, so briefs could be an option with frequent skin checks performed.  At end of session pt need for reclining wheelchair and reasoning behind using one addressed with PT.       *POC reviewed with pt and daughter  Education:    Assessment:     Todd Quevedo is a 47 y.o. male with a medical diagnosis of Embolic stroke involving left middle cerebral artery.  He presents with the following performance deficits affecting function: weakness, impaired endurance, impaired functional mobilty, impaired self care skills, impaired cognition, decreased upper extremity function, decreased lower extremity function, impaired balance, impaired skin, visual deficits, decreased safety awareness, decreased coordination.  Family training performed this session with daughter and wife verbalizing understanding of all concepts  Discussed.  Further education to be reinforced in upcoming session to ensure caregiver confidence and knowledge with utilizing appropriate DME and techniques for performing ADLs.  Pt requires Total A for all ADLs and mobility at this time and would continue to benefit from skilled OT services to address problems listed below and increase independence with ADLs.          Rehab Prognosis:  Good; patient would benefit from acute skilled OT services to address these deficits and reach maximum level of function.       Plan:     Patient to be seen 3 x/week to address the above listed problems via self-care/home management, therapeutic activities, therapeutic exercises, neuromuscular  re-education  · Plan of Care Expires: 03/21/18  · Plan of Care Reviewed with: patient    This Plan of care has been discussed with the patient who was involved in its development and understands and is in agreement with the identified goals and treatment plan    GOALS:    Occupational Therapy Goals        Problem: Occupational Therapy Goal    Goal Priority Disciplines Outcome Interventions   Occupational Therapy Goal     OT, PT/OT Ongoing (interventions implemented as appropriate)    Description:  Goals revised 3/9 to be addressed for 14 days with expiration date, 3/23:  Patient will increase functional independence with ADLs by performing:    Patient will demonstrate rolling bilaterally with max assist.  Not met   Patient will engage in therapeutic task with visual attention for 30 sec duration. Not met  Patient will demonstrate independence with HEP for right UE positioning.    Not met  Patient's family / caregiver will demonstrate independence and safety with assisting patient with self-care skills and functional mobility.     Not met  Patient's family / caregiver will demonstrate independence with providing ROM and changes in bed positioning.   Not met                          Time Tracking:     OT Date of Treatment: 03/12/18  OT Start Time: 1030  OT Stop Time: 1110  OT Total Time (min): 40 min    Billable Minutes:Therapeutic Activity 40    ISAMAR Chang  3/12/2018

## 2018-03-12 NOTE — SUBJECTIVE & OBJECTIVE
"Interval HPI:   TF infusing at Diabetasource 75 cc/hr.  Afebrile, no hypoglycemia.  Remains npo.    BP (!) 100/56   Pulse 101   Temp 97.2 °F (36.2 °C)   Resp 18   Ht 5' 10" (1.778 m)   Wt 106 kg (233 lb 11 oz)   SpO2 97%   BMI 33.53 kg/m²     Labs Reviewed and Include      Recent Labs  Lab 03/12/18  0428   *   CALCIUM 9.7   ALBUMIN 3.1*   PROT 7.1      K 3.9   CO2 28   CL 99   BUN 25*   CREATININE 0.8   ALKPHOS 97   ALT 52*   AST 33   BILITOT 0.6     Lab Results   Component Value Date    WBC 6.73 03/12/2018    HGB 12.7 (L) 03/12/2018    HCT 38.0 (L) 03/12/2018    MCV 87 03/12/2018     (H) 03/12/2018     No results for input(s): TSH, FREET4 in the last 168 hours.  Lab Results   Component Value Date    HGBA1C 10.0 (H) 01/25/2018       Nutritional status:   Body mass index is 33.53 kg/m².  Lab Results   Component Value Date    ALBUMIN 3.1 (L) 03/12/2018    ALBUMIN 3.0 (L) 03/10/2018    ALBUMIN 3.0 (L) 03/09/2018     No results found for: PREALBUMIN    Estimated Creatinine Clearance: 137.7 mL/min (based on SCr of 0.8 mg/dL).    Accu-Checks  Recent Labs      03/10/18   1819  03/10/18   2038  03/11/18   0022  03/11/18   0319  03/11/18   1501  03/11/18   1750  03/11/18   2127  03/12/18   0122  03/12/18   0526  03/12/18   0817   POCTGLUCOSE  137*  138*  175*  178*  191*  166*  142*  176*  161*  156*       Current Medications and/or Treatments Impacting Glycemic Control  Immunotherapy:  Immunosuppressants     None        Steroids:   Hormones     None        Pressors:    Autonomic Drugs     Start     Stop Route Frequency Ordered    01/31/18 0854  succinylcholine (ANECTINE) 20 mg/mL injection     Comments:  Created by cabinet override    01/31 2059 01/31/18 0854        Hyperglycemia/Diabetes Medications: Antihyperglycemics     Start     Stop Route Frequency Ordered    03/10/18 0800  insulin aspart U-100 pen 13 Units      -- SubQ Every 24 hours (non-standard times) 03/09/18 0940    03/10/18 0400  " insulin aspart U-100 pen 13 Units      -- SubQ Every 24 hours (non-standard times) 03/09/18 0940    03/10/18 0000  insulin aspart U-100 pen 13 Units      -- SubQ Every 24 hours (non-standard times) 03/09/18 0940    03/09/18 2000  insulin aspart U-100 pen 13 Units      -- SubQ Every 24 hours (non-standard times) 03/09/18 0940    03/09/18 1600  insulin aspart U-100 pen 13 Units      -- SubQ Every 24 hours (non-standard times) 03/09/18 0940    03/09/18 1200  insulin aspart U-100 pen 13 Units      -- SubQ Every 24 hours (non-standard times) 03/09/18 0940    03/02/18 0900  insulin detemir U-100 pen 22 Units      -- SubQ Daily 03/02/18 0808    02/24/18 1148  insulin aspart U-100 pen 1-10 Units      -- SubQ Every 4 hours PRN 02/24/18 1049    02/16/18 1400  insulin aspart pen 8 Units      02/19 0001 SubQ Every 4 hours 02/16/18 1058

## 2018-03-12 NOTE — PLAN OF CARE
Problem: SLP Goal  Goal: SLP Goal  Speech Language Pathology Goals  Goals expected to be met by 3/20  1. Pt will participate in ongoing bedside assessment of swallow to determine least restrictive diet.  2. Pt will open eyes to stim x5/session given max cues.-met  3. Pt will follow simple commands x2/session given max cues.  4. Pt will answer basic y/n question via any modality x2/session given max cues.  5. Pt will vocalize in response to any stim x2/session given max cues.           Outcome: Ongoing (interventions implemented as appropriate)  Education provided re: appropriate language stimulation, communication strategies, oral care and stim, and aspiration precs.  Wife verbalized understanding with no additional questions.  EDWARD Key, CCC/SLP  3/12/2018

## 2018-03-12 NOTE — PLAN OF CARE
Chip [facility] regarding [facility] and DME request. Cm left a message. Cm will continue to follow.    11:35 am  [facility]antosh at [facility] contacted Cm. Santosh states that she had to [facility] the [facility] [place] on 3/9/18. Santosh stated that she has already called [facility] to get an update and is awaiting a return call. Santosh stated that she will give the  at [facility] CM contact information so we can talk directly. Chip will continue to follow.

## 2018-03-12 NOTE — PT/OT/SLP PROGRESS
Speech Language Pathology Treatment    Patient Name:  Todd Quevedo   MRN:  [id number]  Admitting Diagnosis: Embolic stroke involving left middle cerebral artery    Recommendations:                 General Recommendations:  Dysphagia therapy, Speech/language therapy and Cognitive-linguistic therapy  Diet recommendations:  NPO, Liquid Diet Level: NPO   Aspiration Precautions: Strict aspiration precautions   General Precautions: Standard, aspiration, aphasia, fall, NPO  Communication strategies:  provide increased time to answer    Subjective     Pt seen bedside with wife present      Pain/Comfort:  · Pain Rating 1: 0/10  · Pain Rating Post-Intervention 1: 0/10    Objective:     Has the patient been evaluated by SLP for swallowing?   Yes  Keep patient NPO? Yes   Current Respiratory Status: nasal cannula      Pt's wife present for family training 2/2 discharge plan is for home with home health services and 24 hour assistance by family.  Education provided re: SLP POC, goals targeted in therapy, appropriate language stimulation, communication strategies, importance of inclusion/social interaction to limit delirium and depression,  R attn strategies/stim, importance of oral care/stim, safe technique for oral care, s/s aspiration, recs for cont strict npo at this time, and  SLP services.  Oral stim demonstrated with lemon glycerine swabs x5.  Swallow initiated x1.  Breath sounds remained clear.  Ice chip presented to pt's lips though no attempt at acceptance.  No swallow initiated with stim.  NO po trials attempted.  Slight voicing achieved with yawn x1.  Pt did not follow any commands or answer any y/n questions.  Pt with some visual attn to spouse on L though no localizing to SLP on R today.  Spouse denied any questions.  Good verbalized understanding of education provided.  White board updated.        Assessment:     Todd Quevedo is a 47 y.o. male with an SLP diagnosis of Aphasia, Dysphagia, Cognitive-Linguistic  Impairment and Visio-Spatial Impairment.      Goals:    SLP Goals        Problem: SLP Goal    Goal Priority Disciplines Outcome   SLP Goal     SLP Ongoing (interventions implemented as appropriate)   Description:  Speech Language Pathology Goals  Goals expected to be met by 3/20  1. Pt will participate in ongoing bedside assessment of swallow to determine least restrictive diet.  2. Pt will open eyes to stim x5/session given max cues.-met  3. Pt will follow simple commands x2/session given max cues.  4. Pt will answer basic y/n question via any modality x2/session given max cues.  5. Pt will vocalize in response to any stim x2/session given max cues.                            Plan:     · Patient to be seen:  2 x/week   · Plan of Care expires:  03/18/18  · Plan of Care reviewed with:  patient   · SLP Follow-Up:  Yes       Discharge recommendations:  nursing facility, skilled (family to take pt home with 24 hr A and HH services)   Barriers to Discharge:  Level of Skilled Assistance Needed      Time Tracking:     SLP Treatment Date:   03/12/18  Speech Start Time:  0915  Speech Stop Time:  0938     Speech Total Time (min):  23 min    Billable Minutes: Treatment Swallowing Dysfunction 10 and Seld Care/Home Management Training 13\    EDWARD Key, CCC-SLP  03/12/2018

## 2018-03-12 NOTE — PLAN OF CARE
Problem: Physical Therapy Goal  Goal: Physical Therapy Goal    Goals to be met by 3/16/2018    1. Pt will perform rolling to the R and L with max assist.  -met to the L only  2. Pt will perform supine to sit from both sides of the bed with max assist.  3. Pt will perform sit to supine with max assist.  4. Pt will sit EOB x 5 minutes with moderate assist to prepare for functional tasks in sitting.   5. Pt will participate in BLE and cervical ROM exercise. - MET with fly assist  6. Family will verbalize and demonstrate appropriate positioning and ROM techniques - MET  7. Pt will tolerate sitting up in cardiac chair for 1 hr to improve endurance.    Outcome: Ongoing (interventions implemented as appropriate)  Patient and family participated therapy/family training.  POC and goals remain appropriate.  Please refer to progress note for functional mobility.       Madina Luu, PT  3/12/2018  [phone] (pager)

## 2018-03-12 NOTE — PLAN OF CARE
Problem: Patient Care Overview  Goal: Plan of Care Review  Outcome: Ongoing (interventions implemented as appropriate)  POC reviewed with patient and patient's wife. Wife verbalized understanding. Some emotional distress noted at the beginning of the shift. Pt and wife crying. Wife unsatisfied with hospital and care. Pressure ulcer on sacrum being one of the biggest concerns. Questions and concerns addressed. No acute events overnight. Pt progressing toward goals. Will continue to monitor. See flow sheets for full assessment and VS info

## 2018-03-12 NOTE — PT/OT/SLP PROGRESS
Physical Therapy Treatment    Patient Name:  Todd Quevedo   MRN:  [id number]    Recommendations:     Discharge Recommendations:  home health PT (and 24 hr care)   Discharge Equipment Recommendations:  (hospital bed, jim lift, reclining back wheelchair, wheelchair cushion)   Barriers to discharge: DME and d/c planning (HH)      Plan:     During this hospitalization, patient to be seen 3 x/week to address the above listed problems via therapeutic activities, therapeutic exercises, neuromuscular re-education  · Family training performed  · PT will resume sitting trials and mobilization as primary focus as therapy  · Plan of Care Expires:  03/15/18   Plan of Care Reviewed with: patient, spouse, daughter    Subjective     Communicated with RN prior to session.      Chief Complaint: patient non verbal  Pain/Comfort:  · Pain Rating 1: 0/10  · Pain Rating Post-Intervention 1: 0/10    Patients cultural, spiritual, Methodist conflicts given the current situation: none noted    Objective:     Patient found with: telemetry, SCD, pressure relief boots, Condom Catheter, bed alarm, pulse ox (continuous), PEG Tube     General Precautions: Standard, aphasia, aspiration, fall, NPO     PT met with the patient and his wife at the agreed upon time for family training.  The daughter (the second in home caregiver) was not present.  PT and PCT demonstrated total assist bed mobility, bj care, and placement of draw sheet following BM.  PT then educated wife on the following using v.c., demonstration, and teach back: rolling R and L with and without draw sheet, draw sheet placement, positioning in bed using wedge, pulling patient up in bed, and pressure relief using pillows and heel offloading boots.  PT educated the wife extensively on risk for skin breakdown, pressure relief, turning schedule every 2 hrs, use of wedge and pillows, and importance of keeping skin dry.  Handouts were provided on pressure relief, pressure sores and  prevention, clock for turning schedule, proper positioning in bed, and PROM HEP.      OT arrived to continue discussion on bed mobility, positioning and ADLs.  PT returned after OT family training.  The patient's daughter was now present with his wife.  PT reviewed bed mobility and positioning streategies in response to the daughter's questions.  Pt demonstrated ROM RUE and RLE with teach back method and illustrated handout. PT reviewed DME needs with the caregivers.  PT answered all questions to the patient's wife and daughter's satisfaction.      Family training not performed: edmar lift education and transfers with mechanical lift. PT offered to provide this on another day.  The patient's wife and dtr stated they cannot return tomorrow.  PT offered alternate days and day of discharge as options for edmar lift education. Transportation is a limitation to training.  The patient's wife cannot return d/t work schedule.  The patient's dtr may be able to return day of d/c IF she can ride back to [place] in the ambulance with patient.  This condition was mentioned to the CM (Naty) to determine feasibility of this option.  The patient will receive HH therapy upon d/c.  Edmar lift training can be performed by the HH therapist after d/c. PT educated the family NOT to perform sitting EOB or sitting trials in the w/c.They both verbalized understanding.     At the end of the session, the patient was left in L sidelying on wedge with all lines intact and bed alarm on and wife and dtr present    AM-PAC 6 CLICK MOBILITY  Turning over in bed (including adjusting bedclothes, sheets and blankets)?: 2  Sitting down on and standing up from a chair with arms (e.g., wheelchair, bedside commode, etc.): 1  Moving from lying on back to sitting on the side of the bed?: 1  Moving to and from a bed to a chair (including a wheelchair)?: 1  Need to walk in hospital room?: 1  Climbing 3-5 steps with a railing?: 1  Total Score: 7     GOALS:     Physical Therapy Goals        Problem: Physical Therapy Goal    Goal Priority Disciplines Outcome Goal Variances Interventions   Physical Therapy Goal     PT/OT, PT Ongoing (interventions implemented as appropriate)     Description:    Goals to be met by 3/16/2018    1. Pt will perform rolling to the R and L with max assist.  -met to the L only  2. Pt will perform supine to sit from both sides of the bed with max assist.  3. Pt will perform sit to supine with max assist.  4. Pt will sit EOB x 5 minutes with moderate assist to prepare for functional tasks in sitting.   5. Pt will participate in BLE and cervical ROM exercise. - MET with fly assist  6. Family will verbalize and demonstrate appropriate positioning and ROM techniques - MET  7. Pt will tolerate sitting up in cardiac chair for 1 hr to improve endurance.                    Assessment:     Todd Quevedo is a 47 y.o. male admitted with a medical diagnosis of Embolic stroke involving left middle cerebral artery.  His wife and dtr (part of time) were present for family training to prepare them to provide total assist care for the patient in the home.  The patient's wife demonstrated understanding and competence with bed mobility, pressure relief, positioning, and ROM.  Edmar lift training was not performed.  Edmar lift training is not essential prior to d/c. Pt can be cared for at a bed level without edmar lift training.  HH therapy can complete training with the family on use of the edmar lift and reclining w/c (when appropriate). If family is able to return prior to d/c from the hospital, edmar lift training will be completed.     He presents with the following impairments/functional limitations:  weakness, impaired self care skills, impaired balance, decreased safety awareness, impaired functional mobilty, impaired endurance, decreased upper extremity function, decreased lower extremity function, visual deficits, abnormal tone, impaired fine motor, impaired  cognition, impaired sensation and wife and dtr present.    Rehab Prognosis:  limited; patient would benefit from acute skilled PT services to address these deficits and reach maximum level of function.      Recent Surgery: Procedure(s) (LRB):  TRACHEOSTOMY (N/A)  PLACEMENT-TUBE-PEG (N/A)      Time Tracking:     PT Received On: 03/12/18  PT Start Time: 1000     PT Stop Time: 1033   PT Start Time: 1135  PT Stop Time: 11:50   PT Total Time (min): 33 min + 15 min = 48 min     Billable Minutes: Therapeutic Activity 33 and Therapeutic Exercise 15    Treatment Type: Treatment  PT/PTA: PT         Madina Luu, PT  3/12/2018  [phone] (pager)

## 2018-03-12 NOTE — PLAN OF CARE
Problem: Occupational Therapy Goal  Goal: Occupational Therapy Goal  Goals revised 3/9 to be addressed for 14 days with expiration date, 3/23:  Patient will increase functional independence with ADLs by performing:    Patient will demonstrate rolling bilaterally with max assist.  Not met   Patient will engage in therapeutic task with visual attention for 30 sec duration. Not met  Patient will demonstrate independence with HEP for right UE positioning.    Not met  Patient's family / caregiver will demonstrate independence and safety with assisting patient with self-care skills and functional mobility.     Not met  Patient's family / caregiver will demonstrate independence with providing ROM and changes in bed positioning.   Not met           POC remains appropriate.    ISAMAR Chang  3/12/2018

## 2018-03-12 NOTE — ASSESSMENT & PLAN NOTE
-180\    Continue Levemir 22 units daily  Continue novolog 13 units q4h.    If TF held or decreased, hold or decrease novolog by same %  Continue moderate dose correction scale q4hr prn    Discharge planning :  TBD

## 2018-03-12 NOTE — PROGRESS NOTES
"[facility]  Endocrinology  Progress Note    Admit Date: 1/25/2018     Reason for Consult: Management of T2DM, Hyperglycemia     Surgical Procedure and Date: thrombectomy with TICI 2C reperfusion and stent placement due to L MCA stenosis  PEG 1/31    Home Diabetes Medications: metformin     How often checking glucose at home? FRIEDA, pt aphasic     Diabetes Complications include:     Hyperglycemia    Complicating diabetes co morbidities:   Residual deficits from CVA  and LOYDA      HPI:   Patient is a 47 y.o. male with a diagnosis of poorly controlled DM II, HLD, LOYDA, presents to [facility] 01/25/18 after noted "strange behavior" for which EMS was called.  Pt was found to have a L M1 occlusion and was taken to IR for thrombectomy with TICI 2C reperfusion and stent placement due to L MCA stenosis. DAPT 2/2 recent stent placement in angiogram after PEG placement.  Endo consulted for BG management.         Interval HPI:   TF infusing at Diabetasource 75 cc/hr.  Afebrile, no hypoglycemia.  Remains npo.    BP (!) 100/56   Pulse 101   Temp 97.2 °F (36.2 °C)   Resp 18   Ht 5' 10" (1.778 m)   Wt 106 kg (233 lb 11 oz)   SpO2 97%   BMI 33.53 kg/m²       Labs Reviewed and Include      Recent Labs  Lab 03/12/18  0428   *   CALCIUM 9.7   ALBUMIN 3.1*   PROT 7.1      K 3.9   CO2 28   CL 99   BUN 25*   CREATININE 0.8   ALKPHOS 97   ALT 52*   AST 33   BILITOT 0.6     Lab Results   Component Value Date    WBC 6.73 03/12/2018    HGB 12.7 (L) 03/12/2018    HCT 38.0 (L) 03/12/2018    MCV 87 03/12/2018     (H) 03/12/2018     No results for input(s): TSH, FREET4 in the last 168 hours.  Lab Results   Component Value Date    HGBA1C 10.0 (H) 01/25/2018       Nutritional status:   Body mass index is 33.53 kg/m².  Lab Results   Component Value Date    ALBUMIN 3.1 (L) 03/12/2018    ALBUMIN 3.0 (L) 03/10/2018    ALBUMIN 3.0 (L) 03/09/2018     No results found for: PREALBUMIN    Estimated Creatinine Clearance: " 137.7 mL/min (based on SCr of 0.8 mg/dL).    Accu-Checks  Recent Labs      03/10/18   1819  03/10/18   2038  03/11/18   0022  03/11/18   0319  03/11/18   1501  03/11/18   1750  03/11/18   2127  03/12/18   0122  03/12/18   0526  03/12/18   0817   POCTGLUCOSE  137*  138*  175*  178*  191*  166*  142*  176*  161*  156*       Current Medications and/or Treatments Impacting Glycemic Control  Immunotherapy:  Immunosuppressants     None        Steroids:   Hormones     None        Pressors:    Autonomic Drugs     Start     Stop Route Frequency Ordered    01/31/18 0854  succinylcholine (ANECTINE) 20 mg/mL injection     Comments:  Created by cabinet override    01/31 2059 01/31/18 0854        Hyperglycemia/Diabetes Medications: Antihyperglycemics     Start     Stop Route Frequency Ordered    03/10/18 0800  insulin aspart U-100 pen 13 Units      -- SubQ Every 24 hours (non-standard times) 03/09/18 0940    03/10/18 0400  insulin aspart U-100 pen 13 Units      -- SubQ Every 24 hours (non-standard times) 03/09/18 0940    03/10/18 0000  insulin aspart U-100 pen 13 Units      -- SubQ Every 24 hours (non-standard times) 03/09/18 0940    03/09/18 2000  insulin aspart U-100 pen 13 Units      -- SubQ Every 24 hours (non-standard times) 03/09/18 0940    03/09/18 1600  insulin aspart U-100 pen 13 Units      -- SubQ Every 24 hours (non-standard times) 03/09/18 0940    03/09/18 1200  insulin aspart U-100 pen 13 Units      -- SubQ Every 24 hours (non-standard times) 03/09/18 0940    03/02/18 0900  insulin detemir U-100 pen 22 Units      -- SubQ Daily 03/02/18 0808    02/24/18 1148  insulin aspart U-100 pen 1-10 Units      -- SubQ Every 4 hours PRN 02/24/18 1049    02/16/18 1400  insulin aspart pen 8 Units      02/19 0001 SubQ Every 4 hours 02/16/18 1058          ASSESSMENT and PLAN    * Embolic stroke involving left middle cerebral artery    Avoid hypoglycemia             Type 2 diabetes mellitus with hyperglycemia, with long-term current  use of insulin    -180\    Continue Levemir 22 units daily  Continue novolog 13 units q4h.    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         On enteral nutrition    TF 75 cc/hr, managed per primary  Can increase insulin needs            Obstructive sleep apnea    May increase insulin resistance if untreated             MATHIEU Day, FNP  Endocrinology  [facility]

## 2018-03-13 LAB
INR PPP: 0.9
POCT GLUCOSE: 150 MG/DL (ref 70–110)
POCT GLUCOSE: 155 MG/DL (ref 70–110)
POCT GLUCOSE: 160 MG/DL (ref 70–110)
POCT GLUCOSE: 177 MG/DL (ref 70–110)
POCT GLUCOSE: 180 MG/DL (ref 70–110)
PROTHROMBIN TIME: 9.7 SEC

## 2018-03-13 PROCEDURE — 94668 MNPJ CHEST WALL SBSQ: CPT

## 2018-03-13 PROCEDURE — 36415 COLL VENOUS BLD VENIPUNCTURE: CPT

## 2018-03-13 PROCEDURE — 94761 N-INVAS EAR/PLS OXIMETRY MLT: CPT

## 2018-03-13 PROCEDURE — 25000242 PHARM REV CODE 250 ALT 637 W/ HCPCS: Performed by: NURSE PRACTITIONER

## 2018-03-13 PROCEDURE — 25000003 PHARM REV CODE 250: Performed by: NURSE PRACTITIONER

## 2018-03-13 PROCEDURE — 25000003 PHARM REV CODE 250: Performed by: PSYCHIATRY & NEUROLOGY

## 2018-03-13 PROCEDURE — 99233 SBSQ HOSP IP/OBS HIGH 50: CPT | Mod: ,,, | Performed by: PSYCHIATRY & NEUROLOGY

## 2018-03-13 PROCEDURE — 25000242 PHARM REV CODE 250 ALT 637 W/ HCPCS: Performed by: PSYCHIATRY & NEUROLOGY

## 2018-03-13 PROCEDURE — 25000003 PHARM REV CODE 250: Performed by: PHYSICIAN ASSISTANT

## 2018-03-13 PROCEDURE — A4216 STERILE WATER/SALINE, 10 ML: HCPCS | Performed by: NURSE PRACTITIONER

## 2018-03-13 PROCEDURE — 63600175 PHARM REV CODE 636 W HCPCS: Performed by: NURSE PRACTITIONER

## 2018-03-13 PROCEDURE — 94640 AIRWAY INHALATION TREATMENT: CPT

## 2018-03-13 PROCEDURE — 85610 PROTHROMBIN TIME: CPT

## 2018-03-13 PROCEDURE — 25000003 PHARM REV CODE 250: Performed by: STUDENT IN AN ORGANIZED HEALTH CARE EDUCATION/TRAINING PROGRAM

## 2018-03-13 PROCEDURE — 20600001 HC STEP DOWN PRIVATE ROOM

## 2018-03-13 RX ADMIN — CLOPIDOGREL BISULFATE 75 MG: 75 TABLET, FILM COATED ORAL at 08:03

## 2018-03-13 RX ADMIN — FLUOXETINE 20 MG: 20 CAPSULE ORAL at 08:03

## 2018-03-13 RX ADMIN — INSULIN ASPART 2 UNITS: 100 INJECTION, SOLUTION INTRAVENOUS; SUBCUTANEOUS at 12:03

## 2018-03-13 RX ADMIN — Medication 3 ML: at 01:03

## 2018-03-13 RX ADMIN — HEPARIN SODIUM 5000 UNITS: 5000 INJECTION, SOLUTION INTRAVENOUS; SUBCUTANEOUS at 09:03

## 2018-03-13 RX ADMIN — INSULIN ASPART 13 UNITS: 100 INJECTION, SOLUTION INTRAVENOUS; SUBCUTANEOUS at 08:03

## 2018-03-13 RX ADMIN — ASPIRIN 325 MG ORAL TABLET 325 MG: 325 PILL ORAL at 08:03

## 2018-03-13 RX ADMIN — INSULIN ASPART 2 UNITS: 100 INJECTION, SOLUTION INTRAVENOUS; SUBCUTANEOUS at 03:03

## 2018-03-13 RX ADMIN — Medication 3 ML: at 05:03

## 2018-03-13 RX ADMIN — METOPROLOL TARTRATE 50 MG: 50 TABLET, FILM COATED ORAL at 08:03

## 2018-03-13 RX ADMIN — INSULIN ASPART 13 UNITS: 100 INJECTION, SOLUTION INTRAVENOUS; SUBCUTANEOUS at 12:03

## 2018-03-13 RX ADMIN — SODIUM CHLORIDE SOLN NEBU 3% 4 ML: 3 NEBU SOLN at 12:03

## 2018-03-13 RX ADMIN — MINERAL OIL AND WHITE PETROLATUM: 150; 830 OINTMENT OPHTHALMIC at 10:03

## 2018-03-13 RX ADMIN — HEPARIN SODIUM 5000 UNITS: 5000 INJECTION, SOLUTION INTRAVENOUS; SUBCUTANEOUS at 05:03

## 2018-03-13 RX ADMIN — SODIUM CHLORIDE SOLN NEBU 3% 4 ML: 3 NEBU SOLN at 07:03

## 2018-03-13 RX ADMIN — INSULIN ASPART 13 UNITS: 100 INJECTION, SOLUTION INTRAVENOUS; SUBCUTANEOUS at 09:03

## 2018-03-13 RX ADMIN — MODAFINIL 100 MG: 100 TABLET ORAL at 01:03

## 2018-03-13 RX ADMIN — IPRATROPIUM BROMIDE AND ALBUTEROL SULFATE 3 ML: 2.5; .5 SOLUTION RESPIRATORY (INHALATION) at 07:03

## 2018-03-13 RX ADMIN — SODIUM CHLORIDE SOLN NEBU 3% 4 ML: 3 NEBU SOLN at 01:03

## 2018-03-13 RX ADMIN — BACLOFEN 10 MG: 10 TABLET ORAL at 09:03

## 2018-03-13 RX ADMIN — IPRATROPIUM BROMIDE AND ALBUTEROL SULFATE 3 ML: 2.5; .5 SOLUTION RESPIRATORY (INHALATION) at 12:03

## 2018-03-13 RX ADMIN — STANDARDIZED SENNA CONCENTRATE AND DOCUSATE SODIUM 1 TABLET: 8.6; 5 TABLET, FILM COATED ORAL at 08:03

## 2018-03-13 RX ADMIN — Medication 3 ML: at 10:03

## 2018-03-13 RX ADMIN — MODAFINIL 200 MG: 100 TABLET ORAL at 05:03

## 2018-03-13 RX ADMIN — Medication 3 ML: at 02:03

## 2018-03-13 RX ADMIN — INSULIN ASPART 13 UNITS: 100 INJECTION, SOLUTION INTRAVENOUS; SUBCUTANEOUS at 03:03

## 2018-03-13 RX ADMIN — HEPARIN SODIUM 5000 UNITS: 5000 INJECTION, SOLUTION INTRAVENOUS; SUBCUTANEOUS at 01:03

## 2018-03-13 RX ADMIN — LISINOPRIL 40 MG: 20 TABLET ORAL at 08:03

## 2018-03-13 RX ADMIN — IPRATROPIUM BROMIDE AND ALBUTEROL SULFATE 3 ML: 2.5; .5 SOLUTION RESPIRATORY (INHALATION) at 01:03

## 2018-03-13 RX ADMIN — INSULIN DETEMIR 22 UNITS: 100 INJECTION, SOLUTION SUBCUTANEOUS at 08:03

## 2018-03-13 RX ADMIN — POLYETHYLENE GLYCOL 3350 17 G: 17 POWDER, FOR SOLUTION ORAL at 08:03

## 2018-03-13 RX ADMIN — METOPROLOL TARTRATE 50 MG: 50 TABLET, FILM COATED ORAL at 09:03

## 2018-03-13 RX ADMIN — BACLOFEN 10 MG: 10 TABLET ORAL at 08:03

## 2018-03-13 NOTE — SUBJECTIVE & OBJECTIVE
Neurologic Chief Complaint: Left MCA stroke     Subjective:     Interval History: family ok with antidepressant, family requesting eye drops for red eyes.   Pending placement with home health     HPI, Past Medical, Family, and Social History remains the same as documented in the initial encounter.     Review of Systems   Constitutional: Negative for fever.   Eyes: Positive for redness and visual disturbance.   Neurological: Positive for speech difficulty and weakness.   Psychiatric/Behavioral: Negative for behavioral problems.     Scheduled Meds:   albuterol-ipratropium 2.5mg-0.5mg/3mL  3 mL Nebulization Q6H    aspirin  325 mg Per G Tube Daily    baclofen  10 mg Per G Tube BID    clopidogrel  75 mg Per G Tube Daily    FLUoxetine  20 mg Per G Tube Daily    heparin (porcine)  5,000 Units Subcutaneous Q8H    insulin aspart U-100  13 Units Subcutaneous Q24H    insulin aspart U-100  13 Units Subcutaneous Q24H    insulin aspart U-100  13 Units Subcutaneous Q24H    insulin aspart U-100  13 Units Subcutaneous Q24H    insulin aspart U-100  13 Units Subcutaneous Q24H    insulin aspart U-100  13 Units Subcutaneous Q24H    insulin detemir U-100  22 Units Subcutaneous Daily    lisinopril  40 mg Per NG tube Daily    metoprolol tartrate  50 mg Per NG tube BID    modafinil  200 mg Per NG tube Daily    And    modafinil  100 mg Per NG tube Daily    polyethylene glycol  17 g Per NG tube Daily    potassium chloride 10%  40 mEq Oral Once    senna-docusate 8.6-50 mg  1 tablet Per NG tube BID    sodium chloride 0.9%  3 mL Intravenous Q8H    sodium chloride 3%  4 mL Nebulization Q6H    white petrolatum-mineral oil   Both Eyes QHS     Continuous Infusions:  PRN Meds:acetaminophen, bisacodyl, dextrose 50%, glucagon (human recombinant), insulin aspart U-100, ipratropium, labetalol, ondansetron    Objective:     Vital Signs (Most Recent):  Temp: 97.9 °F (36.6 °C) (03/12/18 1616)  Pulse: 85 (03/12/18 1929)  Resp: 16  (03/12/18 1929)  BP: (!) 149/84 (03/12/18 1616)  SpO2: 98 % (03/12/18 1929)  BP Location: Right arm    Vital Signs Range (Last 24H):  Temp:  [97.2 °F (36.2 °C)-99.1 °F (37.3 °C)]   Pulse:  []   Resp:  [16-18]   BP: (100-149)/(56-92)   SpO2:  [88 %-98 %]   BP Location: Right arm    Physical Exam   Constitutional: He appears well-developed and well-nourished.   Cardiovascular: Normal rate.    Pulmonary/Chest: Effort normal.   Skin: Skin is warm and dry.   Nursing note and vitals reviewed.    Neurological Exam:   LOC: alert  Language: Global aphasia  Articulation: Mute/Anarthric  Visual Fields: Hemianopsia right  EOM (CN III, IV, VI): Gaze preference  left  Facial Movement (CN VII): Lower facial weakness on the Right  Motor: Arm left  Normal 5/5  Leg left  Paresis: 5/5  Arm right  Plegia 0/5  Leg right Plegia 0/5    Laboratory:  CMP:     Recent Labs  Lab 03/12/18 0428   CALCIUM 9.7   ALBUMIN 3.1*   PROT 7.1      K 3.9   CO2 28   CL 99   BUN 25*   CREATININE 0.8   ALKPHOS 97   ALT 52*   AST 33   BILITOT 0.6     BMP:     Recent Labs  Lab 03/12/18 0428      K 3.9   CL 99   CO2 28   BUN 25*   CREATININE 0.8   CALCIUM 9.7     CBC:     Recent Labs  Lab 03/12/18 0428   WBC 6.73   RBC 4.38*   HGB 12.7*   HCT 38.0*   *   MCV 87   MCH 29.0   MCHC 33.4     Lipid Panel: No results for input(s): CHOL, LDLCALC, HDL, TRIG in the last 168 hours.  Coagulation:     Recent Labs  Lab 03/12/18 0428   INR 0.9     Platelet Aggregation Study: No results for input(s): PLTAGG, PLTAGINTERP, PLTAGREGLACO, ADPPLTAGGREG in the last 168 hours.  Hgb A1C: No results for input(s): HGBA1C in the last 168 hours.  TSH: No results for input(s): TSH in the last 168 hours.      Diagnostic Results       Brain Imaging     Most recent CTH 2/04/18 redemonstration of large L MCA territory infarction w/o evidence of hemorrhagic conversion.  Stable post op change from L MCA endovascular stenting.        Vessel Imaging   IR Cerebral  Angiogram 1/25/18 demonstrated occlusion of the L M1 segment of the L MCA with good collaterals. Occlusion removed and stent placed with TICI 2C.    Cardiac Imaging   2D Echo 1/26/18   CONCLUSIONS     1 - Normal left ventricular systolic function (EF 65-70%).     2 - Concentric remodeling.     3 - No wall motion abnormalities.     4 - Normal left ventricular diastolic function.     5 - Normal right ventricular systolic function

## 2018-03-13 NOTE — SUBJECTIVE & OBJECTIVE
Neurologic Chief Complaint: Left MCA stroke     Subjective:     Interval History: hypotensive overnight, given bolus and resovled     HPI, Past Medical, Family, and Social History remains the same as documented in the initial encounter.     Review of Systems   Constitutional: Negative for fever.   Eyes: Positive for redness and visual disturbance.   Neurological: Positive for speech difficulty and weakness.   Psychiatric/Behavioral: Negative for behavioral problems.     Scheduled Meds:   albuterol-ipratropium 2.5mg-0.5mg/3mL  3 mL Nebulization Q6H    aspirin  325 mg Per G Tube Daily    baclofen  10 mg Per G Tube BID    clopidogrel  75 mg Per G Tube Daily    FLUoxetine  20 mg Per G Tube Daily    heparin (porcine)  5,000 Units Subcutaneous Q8H    insulin aspart U-100  13 Units Subcutaneous Q24H    insulin aspart U-100  13 Units Subcutaneous Q24H    insulin aspart U-100  13 Units Subcutaneous Q24H    insulin aspart U-100  13 Units Subcutaneous Q24H    insulin aspart U-100  13 Units Subcutaneous Q24H    insulin aspart U-100  13 Units Subcutaneous Q24H    insulin detemir U-100  22 Units Subcutaneous Daily    lisinopril  40 mg Per NG tube Daily    metoprolol tartrate  50 mg Per NG tube BID    modafinil  200 mg Per NG tube Daily    And    modafinil  100 mg Per NG tube Daily    polyethylene glycol  17 g Per NG tube Daily    potassium chloride 10%  40 mEq Oral Once    senna-docusate 8.6-50 mg  1 tablet Per NG tube BID    sodium chloride 0.9%  500 mL Intravenous Once    sodium chloride 0.9%  3 mL Intravenous Q8H    sodium chloride 3%  4 mL Nebulization Q6H    white petrolatum-mineral oil   Both Eyes QHS     Continuous Infusions:  PRN Meds:acetaminophen, bisacodyl, dextrose 50%, glucagon (human recombinant), insulin aspart U-100, ipratropium, labetalol, ondansetron    Objective:     Vital Signs (Most Recent):  Temp: 98.5 °F (36.9 °C) (03/13/18 1610)  Pulse: 98 (03/13/18 1610)  Resp: 18 (03/13/18  1610)  BP: (!) 140/88 (03/13/18 1610)  SpO2: 95 % (03/13/18 1610)  BP Location: Right arm    Vital Signs Range (Last 24H):  Temp:  [97.1 °F (36.2 °C)-98.5 °F (36.9 °C)]   Pulse:  []   Resp:  [16-22]   BP: ()/(52-98)   SpO2:  [90 %-99 %]   BP Location: Right arm    Physical Exam   Constitutional: He appears well-developed and well-nourished.   Cardiovascular: Normal rate.    Pulmonary/Chest: Effort normal.   Skin: Skin is warm and dry.   Nursing note and vitals reviewed.    Neurological Exam:   LOC: alert  Language: Global aphasia  Articulation: Mute/Anarthric  Visual Fields: Hemianopsia right  EOM (CN III, IV, VI): Gaze preference  left  Facial Movement (CN VII): Lower facial weakness on the Right  Motor: Arm left  Normal 5/5  Leg left  Paresis: 5/5  Arm right  Plegia 0/5  Leg right Plegia 0/5    Laboratory:  CMP:   No results for input(s): GLUCOSE, CALCIUM, ALBUMIN, PROT, NA, K, CO2, CL, BUN, CREATININE, ALKPHOS, ALT, AST, BILITOT in the last 24 hours.  BMP:     Recent Labs  Lab 03/12/18  0428      K 3.9   CL 99   CO2 28   BUN 25*   CREATININE 0.8   CALCIUM 9.7     CBC:     Recent Labs  Lab 03/12/18  0428   WBC 6.73   RBC 4.38*   HGB 12.7*   HCT 38.0*   *   MCV 87   MCH 29.0   MCHC 33.4     Lipid Panel: No results for input(s): CHOL, LDLCALC, HDL, TRIG in the last 168 hours.  Coagulation:     Recent Labs  Lab 03/13/18  0414   INR 0.9     Platelet Aggregation Study: No results for input(s): PLTAGG, PLTAGINTERP, PLTAGREGLACO, ADPPLTAGGREG in the last 168 hours.  Hgb A1C: No results for input(s): HGBA1C in the last 168 hours.  TSH: No results for input(s): TSH in the last 168 hours.      Diagnostic Results       Brain Imaging     Most recent CTH 2/04/18 redemonstration of large L MCA territory infarction w/o evidence of hemorrhagic conversion.  Stable post op change from L MCA endovascular stenting.        Vessel Imaging   IR Cerebral Angiogram 1/25/18 demonstrated occlusion of the L M1  segment of the L MCA with good collaterals. Occlusion removed and stent placed with TICI 2C.    Cardiac Imaging   2D Echo 1/26/18   CONCLUSIONS     1 - Normal left ventricular systolic function (EF 65-70%).     2 - Concentric remodeling.     3 - No wall motion abnormalities.     4 - Normal left ventricular diastolic function.     5 - Normal right ventricular systolic function

## 2018-03-13 NOTE — PROGRESS NOTES
"[facility]  Vascular Neurology  Comprehensive Stroke Center  Progress Note    Assessment/Plan:     * Embolic stroke involving left middle cerebral artery    48 yo M with PMHx HLD, LOYDA, poorly controlled DM II presents to [place] 01/25/18 after noted "strange behavior" for which EMS was called. Pt was found to have a L M1 occlusion and was taken to IR for thrombectomy with TICI 2C reperfusion and stent placement due to L MCA stenosis. Etiology remains unclear: no obvious signs of cardiac source or atheromatous disease in the aorto/carotid axis. Echo normal with no hx or signs of A fib. PT/OT/SLP will continue to evaluate and treat; recommending SNF. DAPT 2/2 recent stent placement.      Disposition - currently pursuing home health.  Training family on 3/12 to bring patient home    Antithrombotics:  mg po qd, clopidogrel 75 mg po qd [s/p intracranial stent]  Statins: Atorvastatin 80 mg po qd -held due to transaminitis. Consider resuming when this resolves.  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    - Abscess near PEG w/IR aspirate, growing anaerobes   - Rocephin (last dose 3/5) and Flagyl (last dose 3/7)        Right spastic hemiparesis    -Due to stroke  -Continue aggressive PT/OT; Recommending SNF  -starting baclofen        Nodule of right lung    - Plan for f/u CT 6-12 months via PCP        Oral phase dysphagia    - Continue SLP  - NPO, meds/feeds per PEG        Acute respiratory failure with hypoxia    Likely hypoxia is Aspiration pna vs pneumonitis (as CXR improved after a day) 2/2 to superimposed encephalopathy due to sepsis  -AM CXR 3/2 with mild bibasilar atelectasis   -Continue O2 " sats >90%, okay with current requirements of 1L NC, pt has what appears as Cheyne espinal pattern and reported LOYDA  -Sister requested continuous pulse ox; Ordered  -Contraction alkalosis improved with increasing water flushes via PEG  -Considered PE as differential however pt is on OAC and not tachycardic, tachypneic. Wells score 1.5 (low-risk)  - Continue chest physiotherapy, aspiration precautions, wean off O2 support   -plan for home suctioning upon discharge        Cytotoxic cerebral edema    -2/2 stroke, evident on imaging  Stable on repeat CTH 2/4/18        Urinary tract infection due to Klebsiella species     Afebrile, leukocytosis resolved  - Klebsiella from urine cultures (2/25/18) + Anaerobes from PEG site cultures   - rocephin course completed, and Flagyl completed         Type 2 diabetes mellitus with hyperglycemia, with long-term current use of insulin    - Stroke risk factor, Hgb A1c 10%  - Continue Diabetasource AC TFs  - Endocrine consulted and guiding insulin modification for TF  - POCT Q4h ; Moderate correction q4hr         Mixed hyperlipidemia    -Stroke risk factor, LDL invalid as TG > 400  - holding statin due to rising LFTs        Essential hypertension    -Stroke risk factor  -SBP <140  - Continue scheduled lisinopril 40 mg qd, Metoprolol 50 mg bid          Obstructive sleep apnea    -Stroke risk factor  -CPAP qHS  -Wean O2 as tolerated.             01/25/18:  Brought in for aphasia, RSW with L gaze preferance. LKN unknown, not tPA candidate. Went to IR for angiogram and stent.  01/29/18:  Off Cardene, remains on Insulin gtt. Concern for sepsis, respiratory source. Imaging with mass effect and some brain compression; maycol crani watch.  01/30/18:  EEG consistent with focal L slowing 2/2 lesion and mild generalized slowing, no seizures. CT head stable, no hemorrhagic conversion  02/01/18:  Emergent intubation likely due to upper airway obstruction per NCC.    02/02/18:  Pt off Precedex, moving  left side spontaneously and opening eyes. Intubated, on spontaneous today. NCC giving steroids in hopes to extubate tomorrow.  02/03/18:  NAEON, intubated, not responsive to verbal stimuli, withdraws from pain on LUE, SBP ~135-230. Continued on insulin gtt, SQH for DVT ppx, and captopril.   02/16/18:  Few changes on exam, continues to have significant RUE/RLE weakness with global aphasia.  Family member at bedside noted attempt to communicate with her.  02/18/18:  Pt largely unchanged on exam this am.  No family member present during am rounds.  02/19/18:  Tolerating facemask. PEG by IR this afternoon.  02/20/18:  s/p PEG. O2 % on 5L NC, still requiring frequent suctioning. Primary team considering transfer to Medicine tomorrow.  02/21/18:  Pt sating % on 3L NC. Restarted DAPT, including . Plans for step down to stroke service.  02/22/18:  [facility] with [facility] but not service-connected so unable to be placed at SNF.  Working on insurance coverage of at least HHC therapy.  02/23/18:  Increased detemir to 36 U bid.  [facility] with [facility] SN[facility] pending completion of paperwork by Stroke team and pt's spouse--in process.  2/24/18 - NAEON. Pts WBC mildly elevated 13.01, pt is afebrile. Will continue to monitor. Endocrine consulted - Recommend levemir 40 units daily to cover basal needs (using ~0.7u/kg); Start novolog 6 units q4hr to cover TF; Moderate correction q4hr. SNF placement pending.  2/25/18 -  Pt WBC increased overnight, HR range , and temp 99.2. Blood cultures ordered and drawn. UA/UC ordered. Nursing staff called a rapid response this AM due to pts tachycardia, fever, and decreased O2 sats. Arrived to room @ 0824. 1 liter of NS ordered. STAT CXR ordered. Tylenol given. ABG completed with no significant abnormal results. Respiratory suctioned pt and pt repositioned to help with breathing. Pt O2 sats improved on venti mask, temp decreased, and BP remained normotensive. Started on  vanc and  zosyn. Pt appears more comfortable and no decline in neuro status. Pt family updated.   2/26/18 - afebrile overnight and leukocytosis improving with BS antibiotics. CT abdomen yesterday noted seroma/hematoma on left rectus abdominus. IR consulted for culture +/- drain.  02/28/2018 s/p aspiration of left rectus collection adjacent to the PEG tube.  Culture sent to lab, pending.  Remains afebrile.  Currently on 4L NC O2.   3/1/18: NAEON.   3/2: Pt afebrile, WBC WNL. Now sating well on 1L NC. Pt emotional on exam, family not interested in SSRI at this time. CM attempting to get [facility] [place] [facility]. Increased rate of TFs.  3/3: NAEON. Pending placement. Will monitor Na level tomorrow.  3/4: stable, no new issues.  3/5/18 - sleepy today but no events overnight   3/6/18 - sister concerned due to coughing post speech therapy - discussed with speech team and sister   3/7/18 - No change in exam.  [facility] - pending [facility].  Continue to monitor liver enzymes.  3/8/18 - no acute events overnight. Vitals within normal. Pending placement   03/09/2018 NAEON  3/10/18 starting baclofen for spasticity, fluoxetine for depression and therapy motivation  3/11/18 OT to make splint for RUE to prevent contractures, plans for discharge home with family training planned for Monday    STROKE DOCUMENTATION   Acute Stroke Times   Stroke Team Called Date: 01/25/18  Stroke Team Called Time: 1852  Stroke Team Arrival Date: 01/25/18  Stroke Team Arrival Time: 0652  CT Interpretation Time: 1910  Decision to Treat Time for Alteplase:  (n/a unknown last known normal )  Decision to Treat Time for IR: 1915    NIH Scale:  1a. Level Of Consciousness: 0-->Alert: keenly responsive  1b. LOC Questions: 2-->Answers neither question correctly  1c. LOC Commands: 2-->Performs neither task correctly  2. Best Gaze: 1-->Partial gaze palsy: gaze is abnormal in one or both eyes, but forced deviation or total  gaze paresis is not present  3. Visual: 2-->Complete hemianopia  4. Facial Palsy: 1-->Minor paralysis (flattened nasolabial fold, asymmetry on smiling)  5a. Motor Arm, Left: 0-->No drift: limb holds 90 (or 45) degrees for full 10 secs  5b. Motor Arm, Right: 4-->No movement  6a. Motor Leg, Left: 0-->No drift: leg holds 30 degree position for full 5 secs  6b. Motor Leg, Right: 4-->No movement  7. Limb Ataxia: 0-->Absent  8. Sensory: 1-->Mild-to-moderate sensory loss: patient feels pinprick is less sharp or is dull on the affected side: or there is a loss of superficial pain with pinprick, but patient is aware of being touched  9. Best Language: 3-->Mute, global aphasia: no usable speech or auditory comprehension  10. Dysarthria: 2-->Severe dysarthria: patients speech is so slurred as to be unintelligible in the absence of or out of proportion to any dysphasia, or is mute/anarthric  11. Extinction and Inattention (formerly Neglect): 0-->No abnormality  Total (NIH Stroke Scale): 22       Modified [place]'s Score: 0  Jeff Coma Scale:    ABCD2 Score:    [id number] Score:   HAS -BLED Score:   ICH Score:   Hunt & Laguerre Classification:      Hemorrhagic change of an Ischemic Stroke: Does this patient have an ischemic stroke with hemorrhagic changes? No     Neurologic Chief Complaint: Left MCA stroke     Subjective:     Interval History: family ok with antidepressant, family requesting eye drops for red eyes.   Pending placement with home health     \[facility]\"", Past Medical, Family, and Social History remains the same as documented in the initial encounter.     Review of Systems   Constitutional: Negative for fever.   Eyes: Positive for redness and visual disturbance.   Neurological: Positive for speech difficulty and weakness.   Psychiatric/Behavioral: Negative for behavioral problems.     Scheduled Meds:   albuterol-ipratropium 2.5mg-0.5mg/3mL  3 mL Nebulization Q6H    aspirin  325 mg Per G Tube Daily    baclofen  10 mg Per G  Tube BID    clopidogrel  75 mg Per G Tube Daily    FLUoxetine  20 mg Per G Tube Daily    heparin (porcine)  5,000 Units Subcutaneous Q8H    insulin aspart U-100  13 Units Subcutaneous Q24H    insulin aspart U-100  13 Units Subcutaneous Q24H    insulin aspart U-100  13 Units Subcutaneous Q24H    insulin aspart U-100  13 Units Subcutaneous Q24H    insulin aspart U-100  13 Units Subcutaneous Q24H    insulin aspart U-100  13 Units Subcutaneous Q24H    insulin detemir U-100  22 Units Subcutaneous Daily    lisinopril  40 mg Per NG tube Daily    metoprolol tartrate  50 mg Per NG tube BID    modafinil  200 mg Per NG tube Daily    And    modafinil  100 mg Per NG tube Daily    polyethylene glycol  17 g Per NG tube Daily    potassium chloride 10%  40 mEq Oral Once    senna-docusate 8.6-50 mg  1 tablet Per NG tube BID    sodium chloride 0.9%  3 mL Intravenous Q8H    sodium chloride 3%  4 mL Nebulization Q6H    white petrolatum-mineral oil   Both Eyes QHS     Continuous Infusions:  PRN Meds:acetaminophen, bisacodyl, dextrose 50%, glucagon (human recombinant), insulin aspart U-100, ipratropium, labetalol, ondansetron    Objective:     Vital Signs (Most Recent):  Temp: 97.9 °F (36.6 °C) (03/12/18 1616)  Pulse: 85 (03/12/18 1929)  Resp: 16 (03/12/18 1929)  BP: (!) 149/84 (03/12/18 1616)  SpO2: 98 % (03/12/18 1929)  BP Location: Right arm    Vital Signs Range (Last 24H):  Temp:  [97.2 °F (36.2 °C)-99.1 °F (37.3 °C)]   Pulse:  []   Resp:  [16-18]   BP: (100-149)/(56-92)   SpO2:  [88 %-98 %]   BP Location: Right arm    Physical Exam   Constitutional: He appears well-developed and well-nourished.   Cardiovascular: Normal rate.    Pulmonary/Chest: Effort normal.   Skin: Skin is warm and dry.   Nursing note and vitals reviewed.    Neurological Exam:   LOC: alert  Language: Global aphasia  Articulation: Mute/Anarthric  Visual Fields: Hemianopsia right  EOM (CN III, IV, VI): Gaze preference  left  Facial Movement  (CN VII): Lower facial weakness on the Right  Motor: Arm left  Normal 5/5  Leg left  Paresis: 5/5  Arm right  Plegia 0/5  Leg right Plegia 0/5    Laboratory:  CMP:     Recent Labs  Lab 03/12/18 0428   CALCIUM 9.7   ALBUMIN 3.1*   PROT 7.1      K 3.9   CO2 28   CL 99   BUN 25*   CREATININE 0.8   ALKPHOS 97   ALT 52*   AST 33   BILITOT 0.6     BMP:     Recent Labs  Lab 03/12/18 0428      K 3.9   CL 99   CO2 28   BUN 25*   CREATININE 0.8   CALCIUM 9.7     CBC:     Recent Labs  Lab 03/12/18 0428   WBC 6.73   RBC 4.38*   HGB 12.7*   HCT 38.0*   *   MCV 87   MCH 29.0   MCHC 33.4     Lipid Panel: No results for input(s): CHOL, LDLCALC, HDL, TRIG in the last 168 hours.  Coagulation:     Recent Labs  Lab 03/12/18 0428   INR 0.9     Platelet Aggregation Study: No results for input(s): PLTAGG, PLTAGINTERP, PLTAGREGLACO, ADPPLTAGGREG in the last 168 hours.  Hgb A1C: No results for input(s): HGBA1C in the last 168 hours.  TSH: No results for input(s): TSH in the last 168 hours.      Diagnostic Results       Brain Imaging     Most recent CTH 2/04/18 redemonstration of large L MCA territory infarction w/o evidence of hemorrhagic conversion.  Stable post op change from L MCA endovascular stenting.        Vessel Imaging   IR Cerebral Angiogram 1/25/18 demonstrated occlusion of the L M1 segment of the L MCA with good collaterals. Occlusion removed and stent placed with TICI 2C.    Cardiac Imaging   2D Echo 1/26/18   CONCLUSIONS     1 - Normal left ventricular systolic function (EF 65-70%).     2 - Concentric remodeling.     3 - No wall motion abnormalities.     4 - Normal left ventricular diastolic function.     5 - Normal right ventricular systolic function       CARIE Alejo  Comprehensive Stroke Center  Department of Vascular Neurology   [facility]

## 2018-03-13 NOTE — PHYSICIAN QUERY
PT Name: Todd Quevedo  MR #: [id number]    Physician Query Form - Consultant Diagnosis Clarification     CDS/: Diane Robles RN, CDS               Contact information: [email or web address]  This form is a permanent document in the medical record.     Query Date: March 13, 2018      By submitting this query, we are merely seeking further clarification of documentation.  Please utilize your independent clinical judgment when addressing the question(s) below.      The Medical record contains the following:   Diagnosis Supporting Clinical Information Location in Medical Record     Pressure Injury Sacral spine Stage 3   --Stage 3 pressure injury noted to sacral spine  --Wound dressed with coloplast protective sheet/ hydrocolloid. Recommend changing twice a week.  --Size wise low air loss surface ordered for patient. Low air loss overlay was at bedside.       Pressure Injury 03/11/18 1246 Sacral spine Stage 3   Date First Assessed/Time First Assessed: 03/11/18 1246   Pressure Injury Present on Admission: no  Location: Sacral spine  Staging: Stage 3     Appearance Moist;Pink;Slough  (thin layer of transparent slough noted)   Tissue loss description Full thickness       --Pressure ulcer on sacrum being one of the biggest concerns     Wound Care Note 3/11                                        Nursing Note 3/12 @ 879h           Do you agree with the Consultants diagnosis of _Stage 3 pressure injury noted to sacral spine_____?                 [ x  ]   Yes               [   ]   Yes, but it resolved prior to my assessment of the patient               [   ]   No                [   ]   Clinically insignificant               [   ]   Clinically undetermined               [   ]   Other/Clarification of findings: ___________________________________________

## 2018-03-13 NOTE — PLAN OF CARE
Chip spoke with Santosh at [place] regarding HH and DME. Santosh stated that she spoke with the [facility] representative and asked her to call Cm on this case. Chip asked for [facility] rep's number and she was unable to give the number. Chip will continue to follow.

## 2018-03-13 NOTE — PROGRESS NOTES
"[facility]  Vascular Neurology  Comprehensive Stroke Center  Progress Note    Assessment/Plan:     * Embolic stroke involving left middle cerebral artery    48 yo M with PMHx HLD, LOYDA, poorly controlled DM II [place] [facility] [place] after noted "strange behavior" for which EMS was called. Pt was found to have a L M1 occlusion and was taken to IR for thrombectomy with TICI 2C reperfusion and stent placement due to L MCA stenosis. Etiology remains unclear: no obvious signs of cardiac source or atheromatous disease in the aorto/carotid axis. Echo normal with no hx or signs of A fib. PT/OT/SLP will continue to evaluate and treat; recommending SNF. DAPT 2/2 recent stent placement.      Disposition - currently pursuing home health.  Training family on 3/12 to bring patient home    Antithrombotics:  mg po qd, clopidogrel 75 mg po qd [s/p intracranial stent]  Statins: Atorvastatin 80 mg po qd -held due to transaminitis. Consider resuming when this resolves.  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    - Abscess near PEG w/IR aspirate, growing anaerobes   - Rocephin (last dose 3/5) and Flagyl (last dose 3/7)        Right spastic hemiparesis    -Due to stroke  -Continue aggressive PT/OT; Recommending SNF  -starting baclofen        Nodule of right lung    - Plan for f/u CT 6-12 months via PCP        Oral phase dysphagia    - Continue SLP  - NPO, meds/feeds per PEG        Acute respiratory failure with hypoxia    Likely hypoxia is Aspiration pna vs pneumonitis (as CXR improved after a day) 2/2 to superimposed encephalopathy due to sepsis  -AM CXR 3/2 with mild bibasilar atelectasis   -Continue O2 " sats >90%, okay with current requirements of 1L NC, pt has what appears as Cheyne espinal pattern and reported LOYDA  -Sister requested continuous pulse ox; Ordered  -Contraction alkalosis improved with increasing water flushes via PEG  -Considered PE as differential however pt is on OAC and not tachycardic, tachypneic. Wells score 1.5 (low-risk)  - Continue chest physiotherapy, aspiration precautions, wean off O2 support   -plan for home suctioning upon discharge        Cytotoxic cerebral edema    -2/2 stroke, evident on imaging  Stable on repeat CTH 2/4/18        Urinary tract infection due to Klebsiella species     Afebrile, leukocytosis resolved  - Klebsiella from urine cultures (2/25/18) + Anaerobes from PEG site cultures   - rocephin course completed, and Flagyl completed         Type 2 diabetes mellitus with hyperglycemia, with long-term current use of insulin    - Stroke risk factor, Hgb A1c 10%  - Continue Diabetasource AC TFs  - Endocrine consulted and guiding insulin modification for TF  - POCT Q4h ; Moderate correction q4hr         Mixed hyperlipidemia    -Stroke risk factor, LDL invalid as TG > 400  - holding statin due to rising LFTs        Essential hypertension    -Stroke risk factor  -SBP <140  - Continue scheduled lisinopril 40 mg qd, Metoprolol 50 mg bid          Obstructive sleep apnea    -Stroke risk factor  -CPAP qHS  -Wean O2 as tolerated.             01/25/18:  Brought in for aphasia, RSW with L gaze preferance. LKN unknown, not tPA candidate. Went to IR for angiogram and stent.  01/29/18:  Off Cardene, remains on Insulin gtt. Concern for sepsis, respiratory source. Imaging with mass effect and some brain compression; maycol crani watch.  01/30/18:  EEG consistent with focal L slowing 2/2 lesion and mild generalized slowing, no seizures. CT head stable, no hemorrhagic conversion  02/01/18:  Emergent intubation likely due to upper airway obstruction per NCC.    02/02/18:  Pt off Precedex, moving  left side spontaneously and opening eyes. Intubated, on spontaneous today. NCC giving steroids in hopes to extubate tomorrow.  02/03/18:  NAEON, intubated, not responsive to verbal stimuli, withdraws from pain on LUE, SBP ~135-230. Continued on insulin gtt, SQH for DVT ppx, and captopril.   02/16/18:  Few changes on exam, continues to have significant RUE/RLE weakness with global aphasia.  Family member at bedside noted attempt to communicate with her.  02/18/18:  Pt largely unchanged on exam this am.  No family member present during am rounds.  02/19/18:  Tolerating facemask. PEG by IR this afternoon.  02/20/18:  s/p PEG. O2 % on 5L NC, still requiring frequent suctioning. Primary team considering transfer to Medicine tomorrow.  02/21/18:  Pt sating % on 3L NC. Restarted DAPT, including . Plans for step down to stroke service.  02/22/18:  [facility] with [facility] but not service-connected so unable to be placed at SNF.  Working on insurance coverage of at least HHC therapy.  02/23/18:  Increased detemir to 36 U bid.  [facility] with [facility] SN[facility] pending completion of paperwork by Stroke team and pt's spouse--in process.  2/24/18 - NAEON. Pts WBC mildly elevated 13.01, pt is afebrile. Will continue to monitor. Endocrine consulted - Recommend levemir 40 units daily to cover basal needs (using ~0.7u/kg); Start novolog 6 units q4hr to cover TF; Moderate correction q4hr. SNF placement pending.  2/25/18 -  Pt WBC increased overnight, HR range , and temp 99.2. Blood cultures ordered and drawn. UA/UC ordered. Nursing staff called a rapid response this AM due to pts tachycardia, fever, and decreased O2 sats. Arrived to room @ 0824. 1 liter of NS ordered. STAT CXR ordered. Tylenol given. ABG completed with no significant abnormal results. Respiratory suctioned pt and pt repositioned to help with breathing. Pt O2 sats improved on venti mask, temp decreased, and BP remained normotensive. Started on  vanc and  zosyn. Pt appears more comfortable and no decline in neuro status. Pt family updated.   2/26/18 - afebrile overnight and leukocytosis improving with BS antibiotics. CT abdomen yesterday noted seroma/hematoma on left rectus abdominus. IR consulted for culture +/- drain.  02/28/2018 s/p aspiration of left rectus collection adjacent to the PEG tube.  Culture sent to lab, pending.  Remains afebrile.  Currently on 4L NC O2.   3/1/18: NAEON.   3/2: Pt afebrile, WBC WNL. Now sating well on 1L NC. Pt emotional on exam, family not interested in SSRI at this time. CM attempting to get pt [facility] so [facility] [place] [facility]. Increased rate of TFs.  3/3: NAEON. Pending placement. Will monitor Na level tomorrow.  3/4: stable, no new issues.  3/5/18 - sleepy today but no events overnight   3/6/18 - sister concerned due to coughing post speech therapy - discussed with speech team and sister   3/7/18 - No change in exam.  [facility] - pending [facility].  Continue to monitor liver enzymes.  3/8/18 - no acute events overnight. Vitals within normal. Pending placement   03/09/2018 NAEON  3/10/18 starting baclofen for spasticity, fluoxetine for depression and therapy motivation  3/11/18 OT to make splint for RUE to prevent contractures, plans for discharge home with family training planned for Monday    STROKE DOCUMENTATION   Acute Stroke Times   Stroke Team Called Date: 01/25/18  Stroke Team Called Time: 1852  Stroke Team Arrival Date: 01/25/18  Stroke Team Arrival Time: 0652  CT Interpretation Time: 1910  Decision to Treat Time for Alteplase:  (n/a unknown last known normal )  Decision to Treat Time for IR: 1915    NIH Scale:  1a. Level Of Consciousness: 1-->Not alert: but arousable by minor stimulation to obey, answer, or respond  1b. LOC Questions: 2-->Answers neither question correctly  1c. LOC Commands: 2-->Performs neither task correctly  2. Best Gaze: 1-->Partial gaze palsy: gaze is abnormal  in one or both eyes, but forced deviation or total gaze paresis is not present  3. Visual: 2-->Complete hemianopia  4. Facial Palsy: 1-->Minor paralysis (flattened nasolabial fold, asymmetry on smiling)  5a. Motor Arm, Left: 0-->No drift: limb holds 90 (or 45) degrees for full 10 secs  5b. Motor Arm, Right: 4-->No movement  6a. Motor Leg, Left: 0-->No drift: leg holds 30 degree position for full 5 secs  6b. Motor Leg, Right: 4-->No movement  7. Limb Ataxia: 0-->Absent  8. Sensory: 1-->Mild-to-moderate sensory loss: patient feels pinprick is less sharp or is dull on the affected side: or there is a loss of superficial pain with pinprick, but patient is aware of being touched  9. Best Language: 3-->Mute, global aphasia: no usable speech or auditory comprehension  10. Dysarthria: 2-->Severe dysarthria: patients speech is so slurred as to be unintelligible in the absence of or out of proportion to any dysphasia, or is mute/anarthric  11. Extinction and Inattention (formerly Neglect): 0-->No abnormality  Total (NIH Stroke Scale): 23       Modified Edwards Score: 0  Jeff Coma Scale:    ABCD2 Score:    [id number] Score:   HAS -BLED Score:   ICH Score:   Hunt & Laguerre Classification:      Hemorrhagic change of an Ischemic Stroke: Does this patient have an ischemic stroke with hemorrhagic changes? No     Neurologic Chief Complaint: Left MCA stroke     Subjective:     Interval History: hypotensive overnight, given bolus and resovled     HPI, Past Medical, Family, and Social History remains the same as documented in the initial encounter.     Review of Systems   Constitutional: Negative for fever.   Eyes: Positive for redness and visual disturbance.   Neurological: Positive for speech difficulty and weakness.   Psychiatric/Behavioral: Negative for behavioral problems.     Scheduled Meds:   albuterol-ipratropium 2.5mg-0.5mg/3mL  3 mL Nebulization Q6H    aspirin  325 mg Per G Tube Daily    baclofen  10 mg Per G Tube BID     clopidogrel  75 mg Per G Tube Daily    FLUoxetine  20 mg Per G Tube Daily    heparin (porcine)  5,000 Units Subcutaneous Q8H    insulin aspart U-100  13 Units Subcutaneous Q24H    insulin aspart U-100  13 Units Subcutaneous Q24H    insulin aspart U-100  13 Units Subcutaneous Q24H    insulin aspart U-100  13 Units Subcutaneous Q24H    insulin aspart U-100  13 Units Subcutaneous Q24H    insulin aspart U-100  13 Units Subcutaneous Q24H    insulin detemir U-100  22 Units Subcutaneous Daily    lisinopril  40 mg Per NG tube Daily    metoprolol tartrate  50 mg Per NG tube BID    modafinil  200 mg Per NG tube Daily    And    modafinil  100 mg Per NG tube Daily    polyethylene glycol  17 g Per NG tube Daily    potassium chloride 10%  40 mEq Oral Once    senna-docusate 8.6-50 mg  1 tablet Per NG tube BID    sodium chloride 0.9%  500 mL Intravenous Once    sodium chloride 0.9%  3 mL Intravenous Q8H    sodium chloride 3%  4 mL Nebulization Q6H    white petrolatum-mineral oil   Both Eyes QHS     Continuous Infusions:  PRN Meds:acetaminophen, bisacodyl, dextrose 50%, glucagon (human recombinant), insulin aspart U-100, ipratropium, labetalol, ondansetron    Objective:     Vital Signs (Most Recent):  Temp: 98.5 °F (36.9 °C) (03/13/18 1610)  Pulse: 98 (03/13/18 1610)  Resp: 18 (03/13/18 1610)  BP: (!) 140/88 (03/13/18 1610)  SpO2: 95 % (03/13/18 1610)  BP Location: Right arm    Vital Signs Range (Last 24H):  Temp:  [97.1 °F (36.2 °C)-98.5 °F (36.9 °C)]   Pulse:  []   Resp:  [16-22]   BP: ()/(52-98)   SpO2:  [90 %-99 %]   BP Location: Right arm    Physical Exam   Constitutional: He appears well-developed and well-nourished.   Cardiovascular: Normal rate.    Pulmonary/Chest: Effort normal.   Skin: Skin is warm and dry.   Nursing note and vitals reviewed.    Neurological Exam:   LOC: alert  Language: Global aphasia  Articulation: Mute/Anarthric  Visual Fields: Hemianopsia right  EOM (CN III, IV, VI):  Gaze preference  left  Facial Movement (CN VII): Lower facial weakness on the Right  Motor: Arm left  Normal 5/5  Leg left  Paresis: 5/5  Arm right  Plegia 0/5  Leg right Plegia 0/5    Laboratory:  CMP:   No results for input(s): GLUCOSE, CALCIUM, ALBUMIN, PROT, NA, K, CO2, CL, BUN, CREATININE, ALKPHOS, ALT, AST, BILITOT in the last 24 hours.  BMP:     Recent Labs  Lab 03/12/18  0428      K 3.9   CL 99   CO2 28   BUN 25*   CREATININE 0.8   CALCIUM 9.7     CBC:     Recent Labs  Lab 03/12/18  0428   WBC 6.73   RBC 4.38*   HGB 12.7*   HCT 38.0*   *   MCV 87   MCH 29.0   MCHC 33.4     Lipid Panel: No results for input(s): CHOL, LDLCALC, HDL, TRIG in the last 168 hours.  Coagulation:     Recent Labs  Lab 03/13/18  0414   INR 0.9     Platelet Aggregation Study: No results for input(s): PLTAGG, PLTAGINTERP, PLTAGREGLACO, ADPPLTAGGREG in the last 168 hours.  Hgb A1C: No results for input(s): HGBA1C in the last 168 hours.  TSH: No results for input(s): TSH in the last 168 hours.      Diagnostic Results       Brain Imaging     Most recent CTH 2/04/18 redemonstration of large L MCA territory infarction w/o evidence of hemorrhagic conversion.  Stable post op change from L MCA endovascular stenting.        Vessel Imaging   IR Cerebral Angiogram 1/25/18 demonstrated occlusion of the L M1 segment of the L MCA with good collaterals. Occlusion removed and stent placed with TICI 2C.    Cardiac Imaging   2D Echo 1/26/18   CONCLUSIONS     1 - Normal left ventricular systolic function (EF 65-70%).     2 - Concentric remodeling.     3 - No wall motion abnormalities.     4 - Normal left ventricular diastolic function.     5 - Normal right ventricular systolic function       CARIE Alejo  Comprehensive Stroke Center  Department of Vascular Neurology   [facility]

## 2018-03-13 NOTE — PLAN OF CARE
Problem: Patient Care Overview  Goal: Plan of Care Review  Outcome: Ongoing (interventions implemented as appropriate)  Patient arouses to voice/gentle shaking. Plan of care and all safety precautions maintained and discussed. Sister at bedside throughout the night. SR on tele, RA. Diabetasource tube feed at goal of 75 mL/hr. Condom catheter in place. BG checks Q4 hrs. Frequent repositioning in place. Patient remains free from injury/falls. Will continue to monitor.

## 2018-03-13 NOTE — NURSING
Spoke with Ericka REYNAGA NP about patients BP 71/52 (58). Order for a 500 cc NS bolus x1 to be given.

## 2018-03-13 NOTE — ASSESSMENT & PLAN NOTE
Afebrile, leukocytosis resolved  - Klebsiella from urine cultures (2/25/18) + Anaerobes from PEG site cultures   - rocephin course completed, and Flagyl completed

## 2018-03-13 NOTE — ASSESSMENT & PLAN NOTE
"48 yo M with PMHx HLD, LOYDA, poorly controlled DM II [place] [facility] [place]25[place]18 after noted "strange behavior" for which EMS was called. Pt was found to have a L M1 occlusion and was taken to IR for thrombectomy with TICI 2C reperfusion and stent placement due to L MCA stenosis. Etiology remains unclear: no obvious signs of cardiac source or atheromatous disease in the aorto/carotid axis. Echo normal with no hx or signs of A fib. PT/OT/SLP will continue to evaluate and treat; recommending SNF. DAPT 2/2 recent stent placement.      Disposition - currently pursuing home health.  Training family on 3/12 to bring patient home    Antithrombotics:  mg po qd, clopidogrel 75 mg po qd [s/p intracranial stent]  Statins: Atorvastatin 80 mg po qd -held due to transaminitis. Consider resuming when this resolves.  Aggressive risk factor modification: HLD, DM II (Hgb A1c 10%), Obesity  Rehab efforts: PT/OT/SLP-- Recommending SNF, placement pending  Diagnostics ordered/pending: None  VTE prophylaxis: Heparin 5000 U q8h  BP parameters: SBP <140   "

## 2018-03-13 NOTE — ASSESSMENT & PLAN NOTE
"46 yo M with PMHx HLD, LOYDA, poorly controlled DM II presents to [facility] [place] 01/25/18 after noted "strange behavior" for which EMS was called. Pt was found to have a L M1 occlusion and was taken to IR for thrombectomy with TICI 2C reperfusion and stent placement due to L MCA stenosis. Etiology remains unclear: no obvious signs of cardiac source or atheromatous disease in the aorto/carotid axis. Echo normal with no hx or signs of A fib. PT/OT/SLP will continue to evaluate and treat; recommending SNF. DAPT 2/2 recent stent placement.      Disposition - currently pursuing home health.  Training family on 3/12 to bring patient home    Antithrombotics:  mg po qd, clopidogrel 75 mg po qd [s/p intracranial stent]  Statins: Atorvastatin 80 mg po qd -held due to transaminitis. Consider resuming when this resolves.  Aggressive risk factor modification: HLD, DM II (Hgb A1c 10%), Obesity  Rehab efforts: PT/OT/SLP-- Recommending SNF, placement pending  Diagnostics ordered/pending: None  VTE prophylaxis: Heparin 5000 U q8h  BP parameters: SBP <140   "

## 2018-03-14 PROBLEM — R23.9 ALTERATION IN SKIN INTEGRITY: Status: ACTIVE | Noted: 2018-03-14

## 2018-03-14 LAB
ALBUMIN SERPL BCP-MCNC: 3 G/DL
ALP SERPL-CCNC: 87 U/L
ALT SERPL W/O P-5'-P-CCNC: 44 U/L
ANION GAP SERPL CALC-SCNC: 9 MMOL/L
AST SERPL-CCNC: 29 U/L
BASOPHILS # BLD AUTO: 0.05 K/UL
BASOPHILS NFR BLD: 0.8 %
BILIRUB SERPL-MCNC: 0.5 MG/DL
BUN SERPL-MCNC: 24 MG/DL
CALCIUM SERPL-MCNC: 9.6 MG/DL
CHLORIDE SERPL-SCNC: 102 MMOL/L
CO2 SERPL-SCNC: 29 MMOL/L
CREAT SERPL-MCNC: 0.8 MG/DL
DIFFERENTIAL METHOD: ABNORMAL
EOSINOPHIL # BLD AUTO: 0.1 K/UL
EOSINOPHIL NFR BLD: 1.5 %
ERYTHROCYTE [DISTWIDTH] IN BLOOD BY AUTOMATED COUNT: 14.8 %
EST. GFR  (AFRICAN AMERICAN): >60 ML/MIN/1.73 M^2
EST. GFR  (NON AFRICAN AMERICAN): >60 ML/MIN/1.73 M^2
GLUCOSE SERPL-MCNC: 153 MG/DL
HCT VFR BLD AUTO: 36.9 %
HGB BLD-MCNC: 12.2 G/DL
IMM GRANULOCYTES # BLD AUTO: 0.02 K/UL
IMM GRANULOCYTES NFR BLD AUTO: 0.3 %
INR PPP: 0.9
LYMPHOCYTES # BLD AUTO: 2.2 K/UL
LYMPHOCYTES NFR BLD: 37.2 %
MAGNESIUM SERPL-MCNC: 1.9 MG/DL
MCH RBC QN AUTO: 28.7 PG
MCHC RBC AUTO-ENTMCNC: 33.1 G/DL
MCV RBC AUTO: 87 FL
MONOCYTES # BLD AUTO: 0.7 K/UL
MONOCYTES NFR BLD: 11.4 %
NEUTROPHILS # BLD AUTO: 2.9 K/UL
NEUTROPHILS NFR BLD: 48.8 %
NRBC BLD-RTO: 0 /100 WBC
PHOSPHATE SERPL-MCNC: 4.6 MG/DL
PLATELET # BLD AUTO: 411 K/UL
PMV BLD AUTO: 10 FL
POCT GLUCOSE: 112 MG/DL (ref 70–110)
POCT GLUCOSE: 137 MG/DL (ref 70–110)
POCT GLUCOSE: 150 MG/DL (ref 70–110)
POCT GLUCOSE: 177 MG/DL (ref 70–110)
POCT GLUCOSE: 184 MG/DL (ref 70–110)
POTASSIUM SERPL-SCNC: 3.7 MMOL/L
PROT SERPL-MCNC: 6.8 G/DL
PROTHROMBIN TIME: 9.9 SEC
RBC # BLD AUTO: 4.25 M/UL
SODIUM SERPL-SCNC: 140 MMOL/L
WBC # BLD AUTO: 5.89 K/UL

## 2018-03-14 PROCEDURE — 36415 COLL VENOUS BLD VENIPUNCTURE: CPT

## 2018-03-14 PROCEDURE — 20600001 HC STEP DOWN PRIVATE ROOM

## 2018-03-14 PROCEDURE — 25000003 PHARM REV CODE 250: Performed by: STUDENT IN AN ORGANIZED HEALTH CARE EDUCATION/TRAINING PROGRAM

## 2018-03-14 PROCEDURE — 84100 ASSAY OF PHOSPHORUS: CPT

## 2018-03-14 PROCEDURE — 92526 ORAL FUNCTION THERAPY: CPT

## 2018-03-14 PROCEDURE — 92507 TX SP LANG VOICE COMM INDIV: CPT

## 2018-03-14 PROCEDURE — 85025 COMPLETE CBC W/AUTO DIFF WBC: CPT

## 2018-03-14 PROCEDURE — A4216 STERILE WATER/SALINE, 10 ML: HCPCS | Performed by: NURSE PRACTITIONER

## 2018-03-14 PROCEDURE — 25000003 PHARM REV CODE 250: Performed by: PHYSICIAN ASSISTANT

## 2018-03-14 PROCEDURE — 94761 N-INVAS EAR/PLS OXIMETRY MLT: CPT

## 2018-03-14 PROCEDURE — 99900035 HC TECH TIME PER 15 MIN (STAT)

## 2018-03-14 PROCEDURE — 94668 MNPJ CHEST WALL SBSQ: CPT

## 2018-03-14 PROCEDURE — 99233 SBSQ HOSP IP/OBS HIGH 50: CPT | Mod: ,,, | Performed by: PSYCHIATRY & NEUROLOGY

## 2018-03-14 PROCEDURE — 25000242 PHARM REV CODE 250 ALT 637 W/ HCPCS: Performed by: NURSE PRACTITIONER

## 2018-03-14 PROCEDURE — 83735 ASSAY OF MAGNESIUM: CPT

## 2018-03-14 PROCEDURE — 85610 PROTHROMBIN TIME: CPT

## 2018-03-14 PROCEDURE — 94640 AIRWAY INHALATION TREATMENT: CPT

## 2018-03-14 PROCEDURE — 25000003 PHARM REV CODE 250: Performed by: NURSE PRACTITIONER

## 2018-03-14 PROCEDURE — 25000242 PHARM REV CODE 250 ALT 637 W/ HCPCS: Performed by: PSYCHIATRY & NEUROLOGY

## 2018-03-14 PROCEDURE — 80053 COMPREHEN METABOLIC PANEL: CPT

## 2018-03-14 PROCEDURE — 97530 THERAPEUTIC ACTIVITIES: CPT

## 2018-03-14 PROCEDURE — 63600175 PHARM REV CODE 636 W HCPCS: Performed by: NURSE PRACTITIONER

## 2018-03-14 RX ADMIN — METOPROLOL TARTRATE 50 MG: 50 TABLET, FILM COATED ORAL at 09:03

## 2018-03-14 RX ADMIN — MODAFINIL 200 MG: 100 TABLET ORAL at 06:03

## 2018-03-14 RX ADMIN — MODAFINIL 100 MG: 100 TABLET ORAL at 01:03

## 2018-03-14 RX ADMIN — HEPARIN SODIUM 5000 UNITS: 5000 INJECTION, SOLUTION INTRAVENOUS; SUBCUTANEOUS at 06:03

## 2018-03-14 RX ADMIN — Medication 3 ML: at 01:03

## 2018-03-14 RX ADMIN — INSULIN ASPART 13 UNITS: 100 INJECTION, SOLUTION INTRAVENOUS; SUBCUTANEOUS at 02:03

## 2018-03-14 RX ADMIN — INSULIN DETEMIR 22 UNITS: 100 INJECTION, SOLUTION SUBCUTANEOUS at 09:03

## 2018-03-14 RX ADMIN — SODIUM CHLORIDE SOLN NEBU 3% 4 ML: 3 NEBU SOLN at 12:03

## 2018-03-14 RX ADMIN — INSULIN ASPART 13 UNITS: 100 INJECTION, SOLUTION INTRAVENOUS; SUBCUTANEOUS at 04:03

## 2018-03-14 RX ADMIN — INSULIN ASPART 13 UNITS: 100 INJECTION, SOLUTION INTRAVENOUS; SUBCUTANEOUS at 11:03

## 2018-03-14 RX ADMIN — IPRATROPIUM BROMIDE AND ALBUTEROL SULFATE 3 ML: 2.5; .5 SOLUTION RESPIRATORY (INHALATION) at 12:03

## 2018-03-14 RX ADMIN — BACLOFEN 10 MG: 10 TABLET ORAL at 09:03

## 2018-03-14 RX ADMIN — INSULIN ASPART 13 UNITS: 100 INJECTION, SOLUTION INTRAVENOUS; SUBCUTANEOUS at 09:03

## 2018-03-14 RX ADMIN — LISINOPRIL 40 MG: 20 TABLET ORAL at 09:03

## 2018-03-14 RX ADMIN — ASPIRIN 325 MG ORAL TABLET 325 MG: 325 PILL ORAL at 09:03

## 2018-03-14 RX ADMIN — FLUOXETINE 20 MG: 20 CAPSULE ORAL at 09:03

## 2018-03-14 RX ADMIN — HEPARIN SODIUM 5000 UNITS: 5000 INJECTION, SOLUTION INTRAVENOUS; SUBCUTANEOUS at 01:03

## 2018-03-14 RX ADMIN — HEPARIN SODIUM 5000 UNITS: 5000 INJECTION, SOLUTION INTRAVENOUS; SUBCUTANEOUS at 09:03

## 2018-03-14 RX ADMIN — IPRATROPIUM BROMIDE AND ALBUTEROL SULFATE 3 ML: 2.5; .5 SOLUTION RESPIRATORY (INHALATION) at 07:03

## 2018-03-14 RX ADMIN — INSULIN ASPART 13 UNITS: 100 INJECTION, SOLUTION INTRAVENOUS; SUBCUTANEOUS at 06:03

## 2018-03-14 RX ADMIN — SODIUM CHLORIDE SOLN NEBU 3% 4 ML: 3 NEBU SOLN at 10:03

## 2018-03-14 RX ADMIN — SODIUM CHLORIDE SOLN NEBU 3% 4 ML: 3 NEBU SOLN at 07:03

## 2018-03-14 RX ADMIN — Medication 10 ML: at 06:03

## 2018-03-14 RX ADMIN — Medication 3 ML: at 10:03

## 2018-03-14 RX ADMIN — CLOPIDOGREL BISULFATE 75 MG: 75 TABLET, FILM COATED ORAL at 09:03

## 2018-03-14 RX ADMIN — IPRATROPIUM BROMIDE AND ALBUTEROL SULFATE 3 ML: 2.5; .5 SOLUTION RESPIRATORY (INHALATION) at 10:03

## 2018-03-14 NOTE — PROGRESS NOTES
Patient had medium-size brown, soft bowel movement. Partial bath and incontinence care were given and linen was changed. Sacral foam dressing, bilateral heal foam dressing, and PEG tube gauze dressing are all clean, dry and intact. Patient repositioned using a wedge and pillows to the side-lying right position. PEG tube/feeding tube via continuous pump secured and intact. Bed in low position with head and knees elevated. Condom catheter secured and in proper placement for drainage. Patient family not at bedside currently. Patient stable and appears comfortable. Will continue to monitor.

## 2018-03-14 NOTE — PLAN OF CARE
Problem: SLP Goal  Goal: SLP Goal  Speech Language Pathology Goals  Goals expected to be met by 3/20  1. Pt will participate in ongoing bedside assessment of swallow to determine least restrictive diet.  2. Pt will open eyes to stim x5/session given max cues.-met  3. Pt will follow simple commands x2/session given max cues.  4. Pt will answer basic y/n question via any modality x2/session given max cues.  5. Pt will vocalize in response to any stim x2/session given max cues.           Pt alert during ST session this am.  Severe expressive/receptive language deficits persist.  High risk for aspiration remains apparent.  Emotional lability noted this session.  Continue current  plan of care.     EDWARD Corona, CCC-SLP  Speech Language Pathologist  [phone]  3/14/2018

## 2018-03-14 NOTE — PROGRESS NOTES
Patient seen for follow up wound care for pressure injury to sacral spine. Patient seen along with Leslie Conrad RN CWCN and unit director of NSU. Stage 3 pressure injury noted to sacral spine, no undermining or tunneling noted. No skin breakdown or pressure injuries noted to patient's heels. Patient currently with EHOB heel boot on right foot. Patient moving left foot around often in bed, maintaining heel boot on left foot would be difficult, dressed both heels with Aquacel heel foam dressings for added cushion and protection. Recommend changing heel foams twice a week. Sacral spine pressure injury cleansed with normal saline, cavilon no sting barrier film applied to periwound and buttocks. Dressed sacral spine pressure injury with a sacral 3M hydrocolloid tegaderm border. Recommend changing twice a week. Patient incontinent of stool and urine. Nursing utilizing condom catheter for urinary incontinence, nursing to continue to monitor and change out condom catheter per protocol. Recommend applying cavilon no sting barrier film for perineal care/fecal incontinence (moisture management) twice a day and as needed, use of barrier cream will affect dressing adherence to sacral pressure injury. Patient on Size wise Pulsate bed (low air loss surface). Turn every 2hrs, foam wedge in use. Patient shifts himself in bed per staff RN, patient moves back to supine position after being repositioned, attempt to keep patient off sacral spine pressure injury as much as possible. Updated Do DARNELL with stroke team of wound care recommendations, telephone order received to modify/update nursing orders. Patient communication board updated with wound care recommendations/orders. Staff RN and Unit Director NSU both updated of recommendations. Updated family member,sister at bedside, answered family member's questions, no additional needs noted at this time. Orders in place for nursing. Wound care to assist as needed.    -Howard  Oscar ESCALONAN, RN CWCN    See flow sheet below for additional documentation.      03/14/18 0845       Pressure Injury 03/11/18 1246 Sacral spine Stage 3   Date First Assessed/Time First Assessed: 03/11/18 1246   Pressure Injury Present on Admission: no  Location: Sacral spine  Staging: Stage 3   Wound Image    Staging 3   Drainage Amount Scant   Drainage Characteristics/Odor Serous;No odor   Appearance Moist;Pink;Yellow;Slough  (thin/transparent layer of yellow slough noted)   Tissue loss description Full thickness   Red (%), Wound Tissue Color 98 %   Yellow (%), Wound Tissue Color 2 %   Periwound Area Intact   Wound Edges Open   Wound Length (cm) 2   Wound Width (cm) 1   Depth (cm) 0.2   Tunneling (depth (cm)/location) (no tunneling)   Undermining (depth (cm)/location) (no undermining)   Care Cleansed with:;Sterile normal saline   Dressing Changed;Hydrocolloid  (3m hydrocolloid with tegaderm border)   Periwound Care Skin barrier film applied  (cavilon no sting barrier film applied to periwound)   Skin Interventions   Device Skin Pressure Protection absorbent pad utilized/changed;positioning supports utilized   Pressure Reduction Devices Foam padding utilized;Heel offloading device utilized;Pressure-redistributing mattress utilized   Pressure Reduction Techniques positioned off wounds

## 2018-03-14 NOTE — PT/OT/SLP PROGRESS
Physical Therapy  Patient Not Seen      Patient Name:  Todd Quevedo   MRN:  [id number]    PT attempted to see pt x2 this PM; pt receiving nursing care and OT treatment upon attempts. Per follow up with OT, pt appropriate to be seen Thursday, 3/15/18      Ivette Rodriges, DAVID   3/14/18

## 2018-03-14 NOTE — PROGRESS NOTES
Patient repositioned using a wedge and pillows to the side-lying right position. All lines secured and intact. Bed in low position with head and knees elevated. Condom catheter secured and in proper placement for drainage. Dressing on PEG tube has brown, crust-like drainage, and will be changed upon next entrance into patient room. Foam dressing in place on sacrum per wound care; wound care to visit patient today to change sacral dressing and evaluate. Tube feeding infusing. Patient stable with sister at bedside.

## 2018-03-14 NOTE — ASSESSMENT & PLAN NOTE
"46 yo M with PMHx HLD, LOYDA, poorly controlled DM II [place] [facility] [place]18 after noted "strange behavior" for which EMS was called. Pt was found to have a L M1 occlusion and was taken to IR for thrombectomy with TICI 2C reperfusion and stent placement due to L MCA stenosis. Etiology remains unclear: no obvious signs of cardiac source or atheromatous disease in the aorto/carotid axis. Echo normal with no hx or signs of A fib. PT/OT/SLP will continue to evaluate and treat; recommending SNF. DAPT 2/2 recent stent placement.      Disposition - currently pursuing home health.  Training family on 3/12 to bring patient home    Antithrombotics:  mg po qd, clopidogrel 75 mg po qd [s/p intracranial stent]  Statins: Atorvastatin 80 mg po qd -held due to transaminitis- improving, consider restarting statin on 3/15/18  Aggressive risk factor modification: HLD, DM II (Hgb A1c 10%), Obesity  Rehab efforts: PT/OT/SLP-- Recommending SNF, placement pending  Diagnostics ordered/pending: None  VTE prophylaxis: Heparin 5000 U q8h  BP parameters: SBP <140   "

## 2018-03-14 NOTE — PLAN OF CARE
"Problem: Patient Care Overview  Goal: Plan of Care Review  POC discussed with family member at bedside, the patient's sister. VSS q 4. Bed alarm remained on throughout the night, height of bed low, call light within reach. Sister discussed dissatisfaction with the culture of the hospital. Reinforced hospital policy including providing the best possible care by caring for the patient and the family. Sister stated, "i'm glad I won't have to wake up every couple of hours to make sure my brother is being turned". No acute events occurred over night.      "

## 2018-03-14 NOTE — PLAN OF CARE
Cm spoke with [facility] representative regarding patient's discharge orders. Chip was asked to get wound care orders and fax them to [phone]. Cm notified Stroke team. Awaiting orders. Cm will continue to follow.

## 2018-03-14 NOTE — PROGRESS NOTES
Patient had scant, brown, soft bowel movement. Partial bath and incontinence care were given and linen was changed. Sacral foam dressing, bilateral heal foam dressing, and PEG tube gauze dressing are all clean, dry and intact. Patient repositioned using a wedge and pillows to the side-lying left position. PEG tube/feeding tube via continuous pump secured and intact. Bed in low position with head and knees elevated. Condom catheter replaced after being pulled loose during therapy, and  Is in proper placement for drainage. Patient stable and appears comfortable; sister at bedside.

## 2018-03-14 NOTE — PLAN OF CARE
Problem: Occupational Therapy Goal  Goal: Occupational Therapy Goal  Goals revised 3/9 to be addressed for 14 days with expiration date, 3/23:  Patient will increase functional independence with ADLs by performing:    Patient will demonstrate rolling bilaterally with max assist.  Not met   Patient will engage in therapeutic task with visual attention for 30 sec duration. Not met  Patient will demonstrate independence with HEP for right UE positioning.    Not met  Patient's family / caregiver will demonstrate independence and safety with assisting patient with self-care skills and functional mobility.     Not met  Patient's family / caregiver will demonstrate independence with providing ROM and changes in bed positioning.   Not met           POC remains appropriate.    ISAMAR Chang  3/14/2018

## 2018-03-14 NOTE — PLAN OF CARE
Wound care orders -   Sacral spine pressure injury: clean with normal saline, apply cavilon no sting barrier film to periwound, apply 3m hydrocolloid tegaderm border.   Frequency - every Tuesday and Friday OR when soiled    Apply aquacel heel foam to bilateral heels, change twice a week (tuesday and Friday) or when soiled.     Perineal/ fecal incontinence care: apply cavilon no sting barrier spray to perineum BID and as needed for incontinence care. Do not apply barrier creams to buttocks, barrier creams can affect dressing adherence.    Cristiane Peoples PA-C  Vascular Neurology  [phone]

## 2018-03-14 NOTE — PROGRESS NOTES
"[facility]  Vascular Neurology  Comprehensive Stroke Center  Progress Note    Assessment/Plan:     * Embolic stroke involving left middle cerebral artery    48 yo M with PMHx HLD, LOYDA, poorly controlled DM II [place] [facility] [place] after noted "strange behavior" for which EMS was called. Pt was found to have a L M1 occlusion and was taken to IR for thrombectomy with TICI 2C reperfusion and stent placement due to L MCA stenosis. Etiology remains unclear: no obvious signs of cardiac source or atheromatous disease in the aorto/carotid axis. Echo normal with no hx or signs of A fib. PT/OT/SLP will continue to evaluate and treat; recommending SNF. DAPT 2/2 recent stent placement.      Disposition - currently pursuing home health.  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    - Abscess near PEG w/IR aspirate, growing anaerobes   - Rocephin (last dose 3/5) and Flagyl (last dose 3/7)        Right spastic hemiparesis    -Due to stroke  -Continue aggressive PT/OT; Recommending SNF  -starting baclofen        Nodule of right lung    - Plan for f/u CT 6-12 months via PCP        Oral phase dysphagia    - Continue SLP  - NPO, meds/feeds per PEG        Acute respiratory failure with hypoxia    Likely hypoxia is Aspiration pna vs pneumonitis (as CXR improved after a day) 2/2 to superimposed encephalopathy due to " sepsis  -AM CXR 3/2 with mild bibasilar atelectasis   -Continue O2 sats >90%, okay with current requirements of 1L NC, pt has what appears as Cheyne espinal pattern and reported LOYDA  -Sister requested continuous pulse ox; Ordered  -Contraction alkalosis improved with increasing water flushes via PEG  -Considered PE as differential however pt is on OAC and not tachycardic, tachypneic. Wells score 1.5 (low-risk)  - Continue chest physiotherapy, aspiration precautions, wean off O2 support   -plan for home suctioning upon discharge        Cytotoxic cerebral edema    -2/2 stroke, evident on imaging  Stable on repeat CTH 2/4/18        Urinary tract infection due to Klebsiella species     Afebrile, leukocytosis resolved  - Klebsiella from urine cultures (2/25/18) + Anaerobes from PEG site cultures   - rocephin course completed, and Flagyl completed         Type 2 diabetes mellitus with hyperglycemia, with long-term current use of insulin    - Stroke risk factor, Hgb A1c 10%  - Continue Diabetasource AC TFs  - Endocrine consulted and guiding insulin modification for TF  - POCT Q4h ; Moderate correction q4hr         Mixed hyperlipidemia    -Stroke risk factor, LDL invalid as TG > 400  - holding statin due to rising LFTs        Essential hypertension    -Stroke risk factor  -SBP <140  - Continue scheduled lisinopril 40 mg qd, Metoprolol 50 mg bid          Obstructive sleep apnea    -Stroke risk factor  -CPAP qHS  -Wean O2 as tolerated.             01/25/18:  Brought in for aphasia, RSW with L gaze preferance. LKN unknown, not tPA candidate. Went to IR for angiogram and stent.  01/29/18:  Off Cardene, remains on Insulin gtt. Concern for sepsis, respiratory source. Imaging with mass effect and some brain compression; maycol crani watch.  01/30/18:  EEG consistent with focal L slowing 2/2 lesion and mild generalized slowing, no seizures. CT head stable, no hemorrhagic conversion  02/01/18:  Emergent intubation likely due to upper  airway obstruction per NCC.    02/02/18:  Pt off Precedex, moving left side spontaneously and opening eyes. Intubated, on spontaneous today. NCC giving steroids in hopes to extubate tomorrow.  02/03/18:  NAEON, intubated, not responsive to verbal stimuli, withdraws from pain on LUE, SBP ~135-230. Continued on insulin gtt, SQH for DVT ppx, and captopril.   02/16/18:  Few changes on exam, continues to have significant RUE/RLE weakness with global aphasia.  Family member at bedside noted attempt to communicate with her.  02/18/18:  Pt largely unchanged on exam this am.  No family member present during am rounds.  02/19/18:  Tolerating facemask. PEG by IR this afternoon.  02/20/18:  s/p PEG. O2 % on 5L NC, still requiring frequent suctioning. Primary team considering transfer to Medicine tomorrow.  02/21/18:  Pt sating % on 3L NC. Restarted DAPT, including . Plans for step down to stroke service.  02/22/18:  Pt with [facility] but not service-connected so unable to be placed at SNF.  Working on insurance coverage of at least HHC therapy.  02/23/18:  Increased detemir to 36 U bid.  [facility] with [facility] SNF pending completion of paperwork by Stroke team and pt's spouse--in process.  2/24/18 - NAEON. Pts WBC mildly elevated 13.01, pt is afebrile. Will continue to monitor. Endocrine consulted - Recommend levemir 40 units daily to cover basal needs (using ~0.7u/kg); Start novolog 6 units q4hr to cover TF; Moderate correction q4hr. SNF placement pending.  2/25/18 -  Pt WBC increased overnight, HR range , and temp 99.2. Blood cultures ordered and drawn. UA/UC ordered. Nursing staff called a rapid response this AM due to pts tachycardia, fever, and decreased O2 sats. Arrived to room @ 0824. 1 liter of NS ordered. STAT CXR ordered. Tylenol given. ABG completed with no significant abnormal results. Respiratory suctioned pt and pt repositioned to help with breathing. Pt O2 sats improved on venti mask, temp  decreased, and BP remained normotensive. Started on  vanc and zosyn. Pt appears more comfortable and no decline in neuro status. Pt family updated.   2/26/18 - afebrile overnight and leukocytosis improving with BS antibiotics. CT abdomen yesterday noted seroma/hematoma on left rectus abdominus. IR consulted for culture +/- drain.  02/28/2018 s/p aspiration of left rectus collection adjacent to the PEG tube.  Culture sent to lab, pending.  Remains afebrile.  Currently on 4L NC O2.   3/1/18: NAEON.   3/2: Pt afebrile, WBC WNL. Now sating well on 1L NC. Pt emotional on exam, family not interested in SSRI at this time. CM attempting to get pt placed [facility] so [facility] [place] [facility]. Increased rate of TFs.  3/3: NAEON. Pending placement. Will monitor Na level tomorrow.  3/4: stable, no new issues.  3/5/18 - sleepy today but no events overnight   3/6/18 - sister concerned due to coughing post speech therapy - discussed with speech team and sister   3/7/18 - No change in exam.  [facility] to [facility] - pending approval.  Continue to monitor liver enzymes.  3/8/18 - no acute events overnight. Vitals within normal. Pending placement   03/09/2018 NAEON  3/10/18 starting baclofen for spasticity, fluoxetine for depression and therapy motivation  3/11/18 OT to make splint for RUE to prevent contractures, plans for discharge home with family training planned for Monday    STROKE DOCUMENTATION   Acute Stroke Times   Stroke Team Called Date: 01/25/18  Stroke Team Called Time: 1852  Stroke Team Arrival Date: 01/25/18  Stroke Team Arrival Time: 0652  CT Interpretation Time: 1910  Decision to Treat Time for Alteplase:  (n/a unknown last known normal )  Decision to Treat Time for IR: 1915    NIH Scale:  1a. Level Of Consciousness: 0-->Alert: keenly responsive  1b. LOC Questions: 2-->Answers neither question correctly  1c. LOC Commands: 2-->Performs neither task correctly  2. Best Gaze: 1-->Partial gaze palsy: gaze  is abnormal in one or both eyes, but forced deviation or total gaze paresis is not present  3. Visual: 2-->Complete hemianopia  4. Facial Palsy: 1-->Minor paralysis (flattened nasolabial fold, asymmetry on smiling)  5a. Motor Arm, Left: 0-->No drift: limb holds 90 (or 45) degrees for full 10 secs  5b. Motor Arm, Right: 4-->No movement  6a. Motor Leg, Left: 0-->No drift: leg holds 30 degree position for full 5 secs  6b. Motor Leg, Right: 4-->No movement  7. Limb Ataxia: 0-->Absent  8. Sensory: 1-->Mild-to-moderate sensory loss: patient feels pinprick is less sharp or is dull on the affected side: or there is a loss of superficial pain with pinprick, but patient is aware of being touched  9. Best Language: 3-->Mute, global aphasia: no usable speech or auditory comprehension  10. Dysarthria: 2-->Severe dysarthria: patients speech is so slurred as to be unintelligible in the absence of or out of proportion to any dysphasia, or is mute/anarthric  11. Extinction and Inattention (formerly Neglect): 0-->No abnormality  Total (NIH Stroke Scale): 22       Modified Jasmin Score: 0  [place]ank Coma Scale:    ABCD2 Score:    [id number] Score:   HAS -BLED Score:   ICH Score:   Hunt & Laguerre Classification:      Hemorrhagic change of an Ischemic Stroke: Does this patient have an ischemic stroke with hemorrhagic changes? No     Neurologic Chief Complaint: Left MCA stroke     Subjective:     Interval History: no events overnight   Therapy working with patient during exam today   Family reports he may be tracking more visually     HPI, Past Medical, Family, and Social History remains the same as documented in the initial encounter.     Review of Systems   Constitutional: Negative for fever.   Eyes: Positive for redness and visual disturbance.   Neurological: Positive for speech difficulty and weakness.   Psychiatric/Behavioral: Negative for behavioral problems.     Scheduled Meds:   albuterol-ipratropium 2.5mg-0.5mg/3mL  3 mL  Nebulization Q6H    aspirin  325 mg Per G Tube Daily    baclofen  10 mg Per G Tube BID    clopidogrel  75 mg Per G Tube Daily    FLUoxetine  20 mg Per G Tube Daily    heparin (porcine)  5,000 Units Subcutaneous Q8H    insulin aspart U-100  13 Units Subcutaneous Q24H    insulin aspart U-100  13 Units Subcutaneous Q24H    insulin aspart U-100  13 Units Subcutaneous Q24H    insulin aspart U-100  13 Units Subcutaneous Q24H    insulin aspart U-100  13 Units Subcutaneous Q24H    insulin aspart U-100  13 Units Subcutaneous Q24H    insulin detemir U-100  22 Units Subcutaneous Daily    lisinopril  40 mg Per NG tube Daily    metoprolol tartrate  50 mg Per NG tube BID    modafinil  200 mg Per NG tube Daily    And    modafinil  100 mg Per NG tube Daily    polyethylene glycol  17 g Per NG tube Daily    potassium chloride 10%  40 mEq Oral Once    senna-docusate 8.6-50 mg  1 tablet Per NG tube BID    sodium chloride 0.9%  500 mL Intravenous Once    sodium chloride 0.9%  3 mL Intravenous Q8H    sodium chloride 3%  4 mL Nebulization Q6H    white petrolatum-mineral oil   Both Eyes QHS     Continuous Infusions:  PRN Meds:acetaminophen, bisacodyl, dextrose 50%, glucagon (human recombinant), insulin aspart U-100, ipratropium, labetalol, ondansetron    Objective:     Vital Signs (Most Recent):  Temp: 97.7 °F (36.5 °C) (03/14/18 1608)  Pulse: 86 (03/14/18 1608)  Resp: 17 (03/14/18 1608)  BP: 131/77 (03/14/18 1608)  SpO2: (!) 93 % (03/14/18 1608)  BP Location: Right arm    Vital Signs Range (Last 24H):  Temp:  [97.4 °F (36.3 °C)-99.4 °F (37.4 °C)]   Pulse:  []   Resp:  [16-20]   BP: (117-162)/(76-99)   SpO2:  [91 %-98 %]   BP Location: Right arm    Physical Exam   Constitutional: He appears well-developed and well-nourished.   Cardiovascular: Normal rate.    Pulmonary/Chest: Effort normal.   Skin: Skin is warm and dry.   Nursing note and vitals reviewed.    Neurological Exam:   LOC: alert  Language: Global  aphasia  Articulation: Mute/Anarthric  Visual Fields: Hemianopsia right  EOM (CN III, IV, VI): Gaze preference  left  Facial Movement (CN VII): Lower facial weakness on the Right  Motor: Arm left  Normal 5/5  Leg left  Paresis: 5/5  Arm right  Plegia 0/5  Leg right Plegia 0/5    Laboratory:  CMP:     Recent Labs  Lab 03/14/18  0436   CALCIUM 9.6   ALBUMIN 3.0*   PROT 6.8      K 3.7   CO2 29      BUN 24*   CREATININE 0.8   ALKPHOS 87   ALT 44   AST 29   BILITOT 0.5     BMP:     Recent Labs  Lab 03/14/18  0436      K 3.7      CO2 29   BUN 24*   CREATININE 0.8   CALCIUM 9.6     CBC:     Recent Labs  Lab 03/14/18  0436   WBC 5.89   RBC 4.25*   HGB 12.2*   HCT 36.9*   *   MCV 87   MCH 28.7   MCHC 33.1     Lipid Panel: No results for input(s): CHOL, LDLCALC, HDL, TRIG in the last 168 hours.  Coagulation:     Recent Labs  Lab 03/14/18  0436   INR 0.9     Platelet Aggregation Study: No results for input(s): PLTAGG, PLTAGINTERP, PLTAGREGLACO, ADPPLTAGGREG in the last 168 hours.  Hgb A1C: No results for input(s): HGBA1C in the last 168 hours.  TSH: No results for input(s): TSH in the last 168 hours.      Diagnostic Results       Brain Imaging     Most recent CTH 2/04/18 redemonstration of large L MCA territory infarction w/o evidence of hemorrhagic conversion.  Stable post op change from L MCA endovascular stenting.        Vessel Imaging   IR Cerebral Angiogram 1/25/18 demonstrated occlusion of the L M1 segment of the L MCA with good collaterals. Occlusion removed and stent placed with TICI 2C.    Cardiac Imaging   2D Echo 1/26/18   CONCLUSIONS     1 - Normal left ventricular systolic function (EF 65-70%).     2 - Concentric remodeling.     3 - No wall motion abnormalities.     4 - Normal left ventricular diastolic function.     5 - Normal right ventricular systolic function       CARIE Alejo  [facility]  Department of Vascular Neurology   [facility]  [place]

## 2018-03-14 NOTE — PT/OT/SLP PROGRESS
Occupational Therapy   Treatment    Name: Todd Quevedo  MRN: [id number]  Admitting Diagnosis:  Embolic stroke involving left middle cerebral artery       Recommendations:     Discharge Recommendations: home with home health (24 hour care)  Discharge Equipment Recommendations:   (hospital bed, jim lift, reclining wheelchair, wheelchair cushion)  Barriers to discharge:  Inaccessible home environment, Decreased caregiver support    Subjective     Communicated with: RN prior to and during session.  Pain/Comfort:  · Pain Rating 1: 0/10  · Pain Addressed 1: Reposition, Distraction  · Pain Rating Post-Intervention 1: 0/10    Patients cultural, spiritual, Mandaen conflicts given the current situation: None stated    Objective:     Patient found with: telemetry, SCD, bed alarm, Condom Catheter, pulse ox (continuous), PEG Tube, PRAFO.  Therapy tech (Emmy) assisted with session.  Sister present during session.    General Precautions: Standard, aphasia, aspiration, NPO, fall   Orthopedic Precautions:N/A   Braces: N/A     Occupational Performance:    Bed Mobility:    · Patient completed Rolling/Turning to Left with  dependent  · Patient completed Rolling/Turning to Right with dependent  · Patient completed Scooting/Bridging with dependent  · Patient completed Supine to Sit with dependent  · Patient completed Sit to Supine with dependent   · With assist of 2     Functional Mobility/Transfers:  · Pt not safe for activity at this time 2* poor trunk control and decreased ability to follow commands.    Activities of Daily Living:  · Toileting: Dependent for performing bj care in side lying position after bowel movement.    Patient left supine with HOB elevated; wedge placed under R side. all lines intact, call button in reach and sister present    [facility] 6 Click:  [facility] Total Score: 6    Treatment & Education:  *Pt sat EOB or ~30 minutes with Total A given to maintain upright position.  Pt mobilized LUE pushing occasionally.   Throughout pt's time seated EOB verbal and tactile cues provided to assist pt with rotating head towards R and visually tracking moving hand of therapist; poor follow through noted.  *PROM performed on RUE incorporating elbow and wrist joint: 3 sets x 15 reps.  Shoulder PROM performed while in bed with support from mattress 2* decreased muscle tone noted.  *AAROM performed on LUE: 3 sets x 15 reps incorporating all joints.    *PROM performed on (B) LE to provide: 2 sets x 10 reps on each side per exercise  -LAQ  -Seated marches  *Pt participated in multiple trials of rolling to position blue pad and perform bj care  *Gentle PROM performed on shoulder while supine with support for scapulohumeral rhythm provided: 1 set x 10 reps  *AAROM Cervical spine stretch performed: 1 set x 6 reps rotating laterally from R to L  *POC reviewed with pt and sister  Education:    Assessment:     Todd Quevedo is a 47 y.o. male with a medical diagnosis of Embolic stroke involving left middle cerebral artery.  He presents with the following performance deficits affecting function are weakness, impaired self care skills, impaired balance, decreased safety awareness, impaired functional mobilty, impaired endurance, decreased upper extremity function, decreased lower extremity function, visual deficits, impaired cognition, impaired sensation, decreased ROM.  Pt required Total A to sit EOB with L gaze preference observed majority of session.  Pt tolerated all ROM activity well with no signs of distress.  Pt continues to require Total A for all ADLs and mobility, and would continue to benefit from skilled OT services to address problems listed below and increase independence with ADLs.       Rehab Prognosis:  Good; patient would benefit from acute skilled OT services to address these deficits and reach maximum level of function.       Plan:     Patient to be seen 3 x/week to address the above listed problems via self-care/home management,  therapeutic activities, therapeutic exercises, neuromuscular re-education  · Plan of Care Expires: 03/21/18  · Plan of Care Reviewed with: patient, sibling    This Plan of care has been discussed with the patient who was involved in its development and understands and is in agreement with the identified goals and treatment plan    GOALS:    Occupational Therapy Goals        Problem: Occupational Therapy Goal    Goal Priority Disciplines Outcome Interventions   Occupational Therapy Goal     OT, PT/OT Ongoing (interventions implemented as appropriate)    Description:  Goals revised 3/9 to be addressed for 14 days with expiration date, 3/23:  Patient will increase functional independence with ADLs by performing:    Patient will demonstrate rolling bilaterally with max assist.  Not met   Patient will engage in therapeutic task with visual attention for 30 sec duration. Not met  Patient will demonstrate independence with HEP for right UE positioning.    Not met  Patient's family / caregiver will demonstrate independence and safety with assisting patient with self-care skills and functional mobility.     Not met  Patient's family / caregiver will demonstrate independence with providing ROM and changes in bed positioning.   Not met                          Time Tracking:     OT Date of Treatment: 03/14/18  OT Start Time: 1339  OT Stop Time: 1427  OT Total Time (min): 48 min    Billable Minutes:Therapeutic Activity 38    ISAMAR Chang  3/14/2018

## 2018-03-14 NOTE — CARE UPDATE
Computer glance, bg 130s-180s. No changes at this time.  ** Please call Endocrine for any BG related issues **  Angeles Dennis, NP  [phone]

## 2018-03-14 NOTE — PROGRESS NOTES
Patient repositioned using a wedge and pillows to the side-lying left position. All lines secured and intact. Bed in low position with head and knees elevated. Condom catheter secured and in proper placement for drainage. Dressing on PEG tube was changed and labeled. Foam dressing in place on sacrum and both heels per wound care. Tube feeding infusing. Patient stable with sister at bedside.

## 2018-03-14 NOTE — PROGRESS NOTES
Patient repositioned using a wedge and pillows to the side-lying right position. PEG tube/feeding tube via continuous pump secured and intact. Bed in low position with head and knees elevated. Incontinence care was given and linen was changed. Sacral foam dressing, bilateral heal foam dressing, and PEG tube gauze dressing are all clean, dry and intact. Condom catheter on and in proper placement for drainage. Patient stable and appears comfortable; sister at bedside.

## 2018-03-14 NOTE — PT/OT/SLP PROGRESS
"Speech Language Pathology Treatment    Patient Name:  Todd Quevedo   MRN:  [id number]  Admitting Diagnosis: Embolic stroke involving left middle cerebral artery    Recommendations:                 General Recommendations:  Dysphagia therapy, Speech/language therapy and Cognitive-linguistic therapy  Diet recommendations:  NPO, Liquid Diet Level: NPO   Aspiration Precautions: Alternate means of nutrition/hydration, Frequent oral care and Strict aspiration precautions   General Precautions: Standard, aspiration, aphasia, NPO, fall  Communication strategies:  go to room if call light pushed    Subjective     nonverbal   "He seems to understand some" (pt's sister states)  Pain/Comfort:  · Pain Rating 1: 0/10  · Pain Rating Post-Intervention 1: 0/10    Objective:     Has the patient been evaluated by SLP for swallowing?   Yes  Keep patient NPO? Yes   Current Respiratory Status: nasal cannula      Pt seen this am with sister present for initiation of session.  Pt with left gaze preference.  Unable to track ST finger.   Poor positioning noted in bed; however, sister states pt was recent repositioned.  Sister reports pt is unable to maintain good/upright positioning in bed for long.  Pt was seated upright as much as possible.  With light pressure, pt allowed moist/cold oral swab in oral cavity for care/ stimulation.  Pharyngeal swallows were palpated x2 out of 5 trials.  Delayed cough evident x1.   Swallows were delayed from stimulation and laryngeal elevation was reduced.  No po trials were presented secondary to poor positioning and aspiration risk.  Expressively, pt was unable to elicit any verbalizations or vocalizations.  ST attempted modeling vowel sounds, automatic speech, spontaneous speech, naming and repetition without success.  Receptively, pt did not follow any commands or respond to simple y/n question verbally or nonverbally.  Attempted to have pt identify objects in a fo2 without success.  With a marker placed " in left hand, pt did not attempt to make jigar or copy shape.  Of note, pt emotional at presentation of tasks from middle to end of session.  ST remained with pt until calm.  Left resting comfortably in bed.   Whiteboard updated.     Assessment:     Todd Quevedo is a 47 y.o. male with an SLP diagnosis of Aphasia, Dysphagia, Cognitive-Linguistic Impairment and Visio-Spatial Impairment.      Goals:    SLP Goals        Problem: SLP Goal    Goal Priority Disciplines Outcome   SLP Goal     SLP Ongoing (interventions implemented as appropriate)   Description:  Speech Language Pathology Goals  Goals expected to be met by 3/20  1. Pt will participate in ongoing bedside assessment of swallow to determine least restrictive diet.  2. Pt will open eyes to stim x5/session given max cues.-met  3. Pt will follow simple commands x2/session given max cues.  4. Pt will answer basic y/n question via any modality x2/session given max cues.  5. Pt will vocalize in response to any stim x2/session given max cues.                            Plan:     · Patient to be seen:  2 x/week   · Plan of Care expires:  03/18/18  · Plan of Care reviewed with:  patient, sibling   · SLP Follow-Up:  Yes       Discharge recommendations:  home health speech therapy ((and 24 hour care))     Time Tracking:     SLP Treatment Date:   03/14/18  Speech Start Time:  1110  Speech Stop Time:  1130     Speech Total Time (min):  20 min    Billable Minutes: Speech Therapy Individual 12 and Treatment Swallowing Dysfunction 8    EDWARD Corona, CCC-SLP  Speech Language Pathologist  [phone]  3/14/2018

## 2018-03-15 LAB
INR PPP: 0.9
POCT GLUCOSE: 134 MG/DL (ref 70–110)
POCT GLUCOSE: 158 MG/DL (ref 70–110)
POCT GLUCOSE: 163 MG/DL (ref 70–110)
POCT GLUCOSE: 166 MG/DL (ref 70–110)
POCT GLUCOSE: 174 MG/DL (ref 70–110)
POCT GLUCOSE: 174 MG/DL (ref 70–110)
PROTHROMBIN TIME: 9.7 SEC

## 2018-03-15 PROCEDURE — 97110 THERAPEUTIC EXERCISES: CPT

## 2018-03-15 PROCEDURE — 99233 SBSQ HOSP IP/OBS HIGH 50: CPT | Mod: ,,, | Performed by: PSYCHIATRY & NEUROLOGY

## 2018-03-15 PROCEDURE — 25000003 PHARM REV CODE 250: Performed by: NURSE PRACTITIONER

## 2018-03-15 PROCEDURE — 97112 NEUROMUSCULAR REEDUCATION: CPT

## 2018-03-15 PROCEDURE — 85610 PROTHROMBIN TIME: CPT

## 2018-03-15 PROCEDURE — 36415 COLL VENOUS BLD VENIPUNCTURE: CPT

## 2018-03-15 PROCEDURE — 25000242 PHARM REV CODE 250 ALT 637 W/ HCPCS: Performed by: PSYCHIATRY & NEUROLOGY

## 2018-03-15 PROCEDURE — 63600175 PHARM REV CODE 636 W HCPCS: Performed by: NURSE PRACTITIONER

## 2018-03-15 PROCEDURE — 94761 N-INVAS EAR/PLS OXIMETRY MLT: CPT

## 2018-03-15 PROCEDURE — 25000003 PHARM REV CODE 250: Performed by: STUDENT IN AN ORGANIZED HEALTH CARE EDUCATION/TRAINING PROGRAM

## 2018-03-15 PROCEDURE — 25000242 PHARM REV CODE 250 ALT 637 W/ HCPCS: Performed by: NURSE PRACTITIONER

## 2018-03-15 PROCEDURE — 25000003 PHARM REV CODE 250: Performed by: PHYSICIAN ASSISTANT

## 2018-03-15 PROCEDURE — 25000003 PHARM REV CODE 250: Performed by: PSYCHIATRY & NEUROLOGY

## 2018-03-15 PROCEDURE — 97803 MED NUTRITION INDIV SUBSEQ: CPT

## 2018-03-15 PROCEDURE — 20600001 HC STEP DOWN PRIVATE ROOM

## 2018-03-15 PROCEDURE — 63600175 PHARM REV CODE 636 W HCPCS: Performed by: INTERNAL MEDICINE

## 2018-03-15 PROCEDURE — 94668 MNPJ CHEST WALL SBSQ: CPT

## 2018-03-15 PROCEDURE — 97530 THERAPEUTIC ACTIVITIES: CPT

## 2018-03-15 PROCEDURE — 94640 AIRWAY INHALATION TREATMENT: CPT

## 2018-03-15 PROCEDURE — A4216 STERILE WATER/SALINE, 10 ML: HCPCS | Performed by: NURSE PRACTITIONER

## 2018-03-15 PROCEDURE — 99900035 HC TECH TIME PER 15 MIN (STAT)

## 2018-03-15 RX ADMIN — Medication 3 ML: at 10:03

## 2018-03-15 RX ADMIN — METOPROLOL TARTRATE 50 MG: 50 TABLET, FILM COATED ORAL at 09:03

## 2018-03-15 RX ADMIN — INSULIN ASPART 2 UNITS: 100 INJECTION, SOLUTION INTRAVENOUS; SUBCUTANEOUS at 04:03

## 2018-03-15 RX ADMIN — SODIUM CHLORIDE SOLN NEBU 3% 4 ML: 3 NEBU SOLN at 07:03

## 2018-03-15 RX ADMIN — INSULIN ASPART 2 UNITS: 100 INJECTION, SOLUTION INTRAVENOUS; SUBCUTANEOUS at 01:03

## 2018-03-15 RX ADMIN — Medication 3 ML: at 04:03

## 2018-03-15 RX ADMIN — IPRATROPIUM BROMIDE AND ALBUTEROL SULFATE 3 ML: 2.5; .5 SOLUTION RESPIRATORY (INHALATION) at 07:03

## 2018-03-15 RX ADMIN — MODAFINIL 100 MG: 100 TABLET ORAL at 04:03

## 2018-03-15 RX ADMIN — INSULIN ASPART 13 UNITS: 100 INJECTION, SOLUTION INTRAVENOUS; SUBCUTANEOUS at 01:03

## 2018-03-15 RX ADMIN — MODAFINIL 200 MG: 100 TABLET ORAL at 05:03

## 2018-03-15 RX ADMIN — HEPARIN SODIUM 5000 UNITS: 5000 INJECTION, SOLUTION INTRAVENOUS; SUBCUTANEOUS at 05:03

## 2018-03-15 RX ADMIN — HEPARIN SODIUM 5000 UNITS: 5000 INJECTION, SOLUTION INTRAVENOUS; SUBCUTANEOUS at 09:03

## 2018-03-15 RX ADMIN — INSULIN ASPART 13 UNITS: 100 INJECTION, SOLUTION INTRAVENOUS; SUBCUTANEOUS at 10:03

## 2018-03-15 RX ADMIN — INSULIN ASPART 1 UNITS: 100 INJECTION, SOLUTION INTRAVENOUS; SUBCUTANEOUS at 09:03

## 2018-03-15 RX ADMIN — IPRATROPIUM BROMIDE AND ALBUTEROL SULFATE 3 ML: 2.5; .5 SOLUTION RESPIRATORY (INHALATION) at 12:03

## 2018-03-15 RX ADMIN — INSULIN ASPART 13 UNITS: 100 INJECTION, SOLUTION INTRAVENOUS; SUBCUTANEOUS at 04:03

## 2018-03-15 RX ADMIN — INSULIN ASPART 13 UNITS: 100 INJECTION, SOLUTION INTRAVENOUS; SUBCUTANEOUS at 12:03

## 2018-03-15 RX ADMIN — SODIUM CHLORIDE SOLN NEBU 3% 4 ML: 3 NEBU SOLN at 12:03

## 2018-03-15 RX ADMIN — FLUOXETINE 20 MG: 20 CAPSULE ORAL at 09:03

## 2018-03-15 RX ADMIN — MINERAL OIL AND WHITE PETROLATUM: 150; 830 OINTMENT OPHTHALMIC at 10:03

## 2018-03-15 RX ADMIN — STANDARDIZED SENNA CONCENTRATE AND DOCUSATE SODIUM 1 TABLET: 8.6; 5 TABLET, FILM COATED ORAL at 09:03

## 2018-03-15 RX ADMIN — CLOPIDOGREL BISULFATE 75 MG: 75 TABLET, FILM COATED ORAL at 09:03

## 2018-03-15 RX ADMIN — BACLOFEN 10 MG: 10 TABLET ORAL at 09:03

## 2018-03-15 RX ADMIN — ASPIRIN 325 MG ORAL TABLET 325 MG: 325 PILL ORAL at 09:03

## 2018-03-15 RX ADMIN — IPRATROPIUM BROMIDE AND ALBUTEROL SULFATE 3 ML: 2.5; .5 SOLUTION RESPIRATORY (INHALATION) at 01:03

## 2018-03-15 RX ADMIN — LISINOPRIL 40 MG: 20 TABLET ORAL at 09:03

## 2018-03-15 RX ADMIN — SODIUM CHLORIDE SOLN NEBU 3% 4 ML: 3 NEBU SOLN at 01:03

## 2018-03-15 RX ADMIN — HEPARIN SODIUM 5000 UNITS: 5000 INJECTION, SOLUTION INTRAVENOUS; SUBCUTANEOUS at 04:03

## 2018-03-15 NOTE — PLAN OF CARE
Problem: Physical Therapy Goal  Goal: Physical Therapy Goal    Goals to be met by 3/16/2018    1. Pt will perform rolling to the R and L with max assist.  -met to the R only  2. Pt will perform supine to sit from both sides of the bed with max assist.  3. Pt will perform sit to supine with max assist.  4. Pt will sit EOB x 5 minutes with moderate assist to prepare for functional tasks in sitting.   5. Pt will participate in BLE and cervical ROM exercise. - MET with fly assist  6. Family will verbalize and demonstrate appropriate positioning and ROM techniques - MET  7. Pt will tolerate sitting up in cardiac chair for 1 hr to improve endurance.     Outcome: Ongoing (interventions implemented as appropriate)  Patient participated well in therapy.  POC and goals remain appropriate.  Please refer to progress note for functional mobility.     Recommend EOB and transfers with therapy only.     Madina Luu, PT  3/15/2018  [phone] (pager)

## 2018-03-15 NOTE — PLAN OF CARE
Problem: Patient Care Overview  Goal: Plan of Care Review  Recommendations    Recommend changing TF to Glucerna 1.5 @ 65mL/hr + 200mL water flush q 4hrs to meet energy/ protein/ fluid needs for wound healing. Provides 2340 kcals, 129g pro, and 2384mL water.     RD following.     Goals: Meet >90% EEN/EPN from TF   Nutrition Goal Status: progressing towards goal

## 2018-03-15 NOTE — NURSING
Held scheduled insulin because tube feedings were off. Family member stated that she turned off the feedings about 3 hours ago because it was empty and had forgotten to notify staff afterwards. . Resumed feedings, and alerted charge nurse.  Also encouraged family to call staff for assistance and educated on importance of continuous feedings.

## 2018-03-15 NOTE — ASSESSMENT & PLAN NOTE
Contributing Nutrition Diagnosis  Increased nutrient needs    Related to (etiology):   Wound healing    Signs and Symptoms (as evidenced by):   Stage 3 pressure ulcer to sacral spine     Nutrition Diagnosis Status:   New

## 2018-03-15 NOTE — PT/OT/SLP PROGRESS
Physical Therapy Treatment    Patient Name:  Todd Quevedo   MRN:  [id number]    Recommendations:     Discharge Recommendations:  home with home health (and 24 hr assistance)   Discharge Equipment Recommendations:  (hospital bed, jim lift, reclining w/c, w/c cushion)   Barriers to discharge: DME needs    Plan:     During this hospitalization, patient to be seen 3 x/week to address the above listed problems via therapeutic activities, therapeutic exercises, neuromuscular re-education  · Plan of Care Expires:  04/14/18   Plan of Care Reviewed with: patient, sibling    Subjective     Chief Complaint: patient non verbal   Patient comments/goals: pt unable to report  Pain/Comfort:  · Pain Rating 1: 0/10  · Pain Rating Post-Intervention 1: 0/10    Patients cultural, spiritual, Bahai conflicts given the current situation: none noted    Objective:     Patient found with: bed alarm, Condom Catheter, pressure relief boots, SCD, telemetry, pulse ox (continuous), PEG Tube     General Precautions: Standard, aphasia, aspiration, fall, NPO     Patient was found supine in bed with bed alarm on, positioned in L sidelying on wedge with R SCD and heel offloading boot in place.  His sister was present and expressed gratitude for the therapist's presence.  Rehab tech (Luana) assisted PT to prepare patient for draw sheet t/f to the cardiac chair. Pt was found to have recent BM.  Patient actively participated in rolling R with max assist for therapist to perform bj care and hygiene.  Patient was transferred to cardiac chair using draw sheet (total assist).  Sitting trial performed in cardiac chair with lap belt in place and constant supervision by therapist for postural control and safety.  Dynamic activities performed in sitting to maximize participation in therapy and elongate time spent up in chair. Head control and cervical ROM performed in sitting.     Functional Mobility:  Bed Mobility:   · Rolling R: max assist  · Rolling  L: total assist    Sitting in Cardiac chair with lap belt in place for pelvic positioning:  · Assistance Level Required: back support (chair) and min assist from therapist for safety and positioning  · Time: 45 minutes  · Postural deviations noted: L gaze preference, L cervical rotation, RUE flexion (hypertonicity), R scapula ABD and protracted  · PT Encouraged: visual tracking and neutral head positioning, active participation in reaching tasks  · Comments: reaching task performed with hand over hand assistance; cervical ROM; (see below for details); patient actively leaned forward away from back support, but maintained midline while resting with both elbows on arm rests;   · Patient experienced gradual anterior LOB with fatigue.    · Patient began attempting to disconnect lap belt toward the end of the session.  Pt not safe to leave sitting up in the cardiac chair without assistance at this time. Pt not safe to sit in regular bedside chair or w/c without high back support.   · HR pre/post 90/97bpm, SpO2 97-99% on room air    Therapeutic Activities and Exercises:   UE reaching tasks with object transport to target (bucket):  - Cones 10x: hand over hand assist for reaching and object transport.  Pt grasps and holds cones when pressed to palm.  Max assist to release object   - clothes pins 10x: hand over hand assist for reaching, object transport and release.  Pt grasps and holds pins once placed in hand.   - clothes pins 5x: Pt independently unclips pins from the hem of his gown, max assist to reach for bucket, released 2/5 without assistance  - baloon toss 10x: Pt reaches for balloon in lap, repositions in hand and extends arm with elbow resting on arm rest, but does not actively release balloon    Cervical positioning/ROM  - R cervical rotation with gentle overpressure 3 sets x 5 reps (active assist) - eyes do not cross midline  - cervical ext 5x    At the end of the session, the patient was left supine with all  lines intact and PCTs present.      AM-PAC 6 CLICK MOBILITY  Turning over in bed (including adjusting bedclothes, sheets and blankets)?: 2  Sitting down on and standing up from a chair with arms (e.g., wheelchair, bedside commode, etc.): 1  Moving from lying on back to sitting on the side of the bed?: 1  Moving to and from a bed to a chair (including a wheelchair)?: 1  Need to walk in hospital room?: 1  Climbing 3-5 steps with a railing?: 1  Total Score: 7     GOALS:    Physical Therapy Goals        Problem: Physical Therapy Goal    Goal Priority Disciplines Outcome Goal Variances Interventions   Physical Therapy Goal     PT/OT, PT Ongoing (interventions implemented as appropriate)     Description:    Goals to be met by 3/16/2018    1. Pt will perform rolling to the R and L with max assist.  -met to the R only  2. Pt will perform supine to sit from both sides of the bed with max assist.  3. Pt will perform sit to supine with max assist.  4. Pt will sit EOB x 5 minutes with moderate assist to prepare for functional tasks in sitting.   5. Pt will participate in BLE and cervical ROM exercise. - MET with fly assist  6. Family will verbalize and demonstrate appropriate positioning and ROM techniques - MET  7. Pt will tolerate sitting up in cardiac chair for 1 hr to improve endurance.                     Assessment:     Todd Quevedo is a 47 y.o. male admitted with a medical diagnosis of Embolic stroke involving left middle cerebral artery.  Patient tolerated sitting trial in cardiac chair well. He is exhibiting improved activity tolerance and postural control with less lateral trunk instability.  He continues to demonstrate poor head control and a significant L gaze preference with inability to maintain head in midline. He remains unsafe to perform sitting trials without a high back chair and unsafe to attempt transfers.  He did begin to participate in reaching trials after >20 repetitions, but effort was variable.  He  continued to benefit from skilled PT services to progress mobility and postural control in preparation for transfer to next level of care.     He presents with the following impairments/functional limitations:  weakness, impaired functional mobilty, impaired cognition, decreased safety awareness, impaired endurance, impaired fine motor, impaired sensation, impaired balance, decreased upper extremity function, decreased lower extremity function, abnormal tone, impaired self care skills, visual deficits, R neglect, poor midline orientation .    Rehab Prognosis:  limited; patient would benefit from acute skilled PT services to address these deficits and reach maximum level of function.      Recent Surgery: Procedure(s) (LRB):  TRACHEOSTOMY (N/A)  PLACEMENT-TUBE-PEG (N/A)          Time Tracking:     PT Received On: 03/15/18  PT Start Time: 1437     PT Stop Time: 1550  PT Total Time (min): 73 min     Billable Minutes: Therapeutic Activity 30, Therapeutic Exercise 13 and Neuromuscular Re-education 30    Treatment Type: Treatment  PT/PTA: PT         Madina Luu, PT  3/15/2018  [phone] (pager)

## 2018-03-15 NOTE — PROGRESS NOTES
" [facility]  Adult Nutrition  Progress Note    SUMMARY       Recommendations    Recommend changing TF to Glucerna 1.5 @ 65mL/hr + 200mL water flush q 4hrs to meet energy/ protein/ fluid needs for wound healing. Provides 2340 kcals, 129g pro, and 2384mL water.     RD following.     Goals: Meet >90% EEN/EPN from TF   Nutrition Goal Status: progressing towards goal  Communication of RD Recs:  (POC)    Reason for Assessment    Reason for Assessment: RD follow-up  Diagnosis: stroke/CVA (Embolic stroke L MCA)  Relevant Medical History: HTN, T2DM  Interdisciplinary Rounds: did not attend    General Information Comments: Tolerating TF at current goal rate. New pressure ulcer stage 3 to sacral spine noted-needs adjusted. Per RN note, TF held x3 hrs yesterday 2/2 family turning off because bag was empty and did not notify RN. SLP still recommends NPO status.     Nutrition Discharge Planning: Glucerna 1.5 @ 65mL/hr + 200mL flush q4hrs    Nutrition Risk Screen    Nutrition Risk Screen: no indicators present    Nutrition/Diet History    Patient Reported Diet/Restrictions/Preferences: other (see comments) (FRIEDA)  Typical Food/Fluid Intake: FRIEDA  Food Preferences:  (FRIEDA)  Do you have any cultural, spiritual, Moravian conflicts, given your current situation?: none known  Supplemental Drinks or Food Habits: Other (see comments) (FRIEDA)  Food Allergies: NKFA  Factors Affecting Nutritional Intake: NPO, difficulty/impaired swallowing    Anthropometrics    Temp: 97.5 °F (36.4 °C)  Height: 5' 10" (177.8 cm)  Height (inches): 70 in  Weight Method: Bed Scale  Weight: 106 kg (233 lb 11 oz)  Weight (lb): 233.69 lb  Ideal Body Weight (IBW), Male: 166 lb  % Ideal Body Weight, Male (lb): 140.25 lb  BMI (Calculated): 33.5  BMI Grade: 30 - 34.9- obesity - grade I     Lab/Procedures/Meds    Pertinent Labs Reviewed: reviewed, pertinent  Pertinent Labs Comments: POCT Gluc 134-191, Phos 4.6, BUN 24, Alb 3.0  Pertinent Medications " "Reviewed: reviewed, pertinent  Pertinent Medications Comments: heparin, insulin, KCl, senna,     Physical Findings/Assessment    Overall Physical Appearance: obese  Tubes: gastrostomy tube  Oral/Mouth Cavity: tooth/teeth missing  Skin: pressure ulcer(s), incision(s) (Stage 3 sacral)    Estimated/Assessed Needs    Weight Used For Calorie Calculations: 106 kg (233 lb 11 oz)  Height: 5' 10" (177.8 cm)  Energy Calorie Requirements (kcal): 0562-2825  Energy Need Method:  (PAL 1.2-1.5 (PU))  RMR (Falcon-St. Jeor Equation): 1936.25  Protein Requirements:  (PU)  Weight Used For Protein Calculations: 106 kg (233 lb 11 oz)  Fluid Need Method: RDA Method (Per MD or 1 ml/kcal)  RDA Method (mL): 2323  CHO Requirement: 50% total kcals     Nutrition Prescription Ordered    Current Diet Order: NPO  Nutrition Order Comments: Beneprotein under TF order comments; no quantity noted  Current Nutrition Support Formula Ordered: Diabetisource  Current Nutrition Support Rate Ordered: 75 (ml)  Current Nutrition Support Frequency Ordered: ml/hr    Evaluation of Received Nutrient/Fluid Intake    Enteral Calories (kcal): 2160  Enteral Protein (gm): 108  Enteral (Free Water) Fluid (mL): 1472  Free Water Flush Fluid (mL): 1800 (300mL q4)  % Kcal Needs: 93%  Total Protein (gm): 0  % Protein Needs: 85  I/O: +I/O   Energy Calories Required: meeting needs  Protein Required: meeting needs  Fluid Required: meeting needs  Comments: LBM 3/14  Tolerance: tolerating    % Intake of Estimated Energy Needs: 75 - 100 %  % Meal Intake: NPO    Nutrition Risk    Level of Risk/Frequency of Follow-up:  (f/u 1x week)     Assessment and Plan    * Embolic stroke involving left middle cerebral artery    Problem  Inadequate energy intake    Related to (etiology):   Inability to consume sufficient energy    Signs and Symptoms (as evidenced by):   TF meeting <85% EEN/EPN     Interventions/Recommendations (treatment strategy):  See recs above    Nutrition Diagnosis " Status:   Resolved         Alteration in skin integrity    Contributing Nutrition Diagnosis  Increased nutrient needs    Related to (etiology):   Wound healing    Signs and Symptoms (as evidenced by):   Stage 3 pressure ulcer to sacral spine     Nutrition Diagnosis Status:   New               Monitor and Evaluation    Food and Nutrient Intake: enteral nutrition intake  Food and Nutrient Adminstration: enteral and parenteral nutrition administration  Physical Activity and Function: nutrition-related ADLs and IADLs  Anthropometric Measurements: weight, weight change  Biochemical Data, Medical Tests and Procedures: electrolyte and renal panel, gastrointestinal profile, glucose/endocrine profile, inflammatory profile, lipid profile  Nutrition-Focused Physical Findings: overall appearance     Nutrition Follow-Up    RD Follow-up?: Yes

## 2018-03-15 NOTE — CARE UPDATE
Computer glance, overall BG remain at goal w/ TF.  No changes at this time.  Will keep in mind with dietitian's recs today 3/15  Glucerna 1.5 @ 65mL/hr + 200mL water flush q 4hr.  ** Please call Endocrine for any BG related issues **  Angeles Dennis, NP  [phone]

## 2018-03-16 LAB
ALBUMIN SERPL BCP-MCNC: 3.1 G/DL
ALP SERPL-CCNC: 84 U/L
ALT SERPL W/O P-5'-P-CCNC: 49 U/L
ANION GAP SERPL CALC-SCNC: 10 MMOL/L
AST SERPL-CCNC: 35 U/L
BASOPHILS # BLD AUTO: 0.05 K/UL
BASOPHILS NFR BLD: 0.9 %
BILIRUB SERPL-MCNC: 0.7 MG/DL
BUN SERPL-MCNC: 25 MG/DL
CALCIUM SERPL-MCNC: 9.5 MG/DL
CHLORIDE SERPL-SCNC: 100 MMOL/L
CO2 SERPL-SCNC: 27 MMOL/L
CREAT SERPL-MCNC: 0.8 MG/DL
DIFFERENTIAL METHOD: ABNORMAL
EOSINOPHIL # BLD AUTO: 0.1 K/UL
EOSINOPHIL NFR BLD: 1.5 %
ERYTHROCYTE [DISTWIDTH] IN BLOOD BY AUTOMATED COUNT: 14.8 %
EST. GFR  (AFRICAN AMERICAN): >60 ML/MIN/1.73 M^2
EST. GFR  (NON AFRICAN AMERICAN): >60 ML/MIN/1.73 M^2
GLUCOSE SERPL-MCNC: 182 MG/DL
HCT VFR BLD AUTO: 37.5 %
HGB BLD-MCNC: 12.5 G/DL
IMM GRANULOCYTES # BLD AUTO: 0.01 K/UL
IMM GRANULOCYTES NFR BLD AUTO: 0.2 %
INR PPP: 0.9
LYMPHOCYTES # BLD AUTO: 1.8 K/UL
LYMPHOCYTES NFR BLD: 31.3 %
MAGNESIUM SERPL-MCNC: 1.9 MG/DL
MCH RBC QN AUTO: 30 PG
MCHC RBC AUTO-ENTMCNC: 33.3 G/DL
MCV RBC AUTO: 90 FL
MONOCYTES # BLD AUTO: 0.5 K/UL
MONOCYTES NFR BLD: 8.9 %
NEUTROPHILS # BLD AUTO: 3.3 K/UL
NEUTROPHILS NFR BLD: 57.2 %
NRBC BLD-RTO: 0 /100 WBC
PHOSPHATE SERPL-MCNC: 4.6 MG/DL
PLATELET # BLD AUTO: 373 K/UL
PMV BLD AUTO: 10.2 FL
POCT GLUCOSE: 125 MG/DL (ref 70–110)
POCT GLUCOSE: 141 MG/DL (ref 70–110)
POCT GLUCOSE: 152 MG/DL (ref 70–110)
POCT GLUCOSE: 160 MG/DL (ref 70–110)
POCT GLUCOSE: 167 MG/DL (ref 70–110)
POCT GLUCOSE: 179 MG/DL (ref 70–110)
POTASSIUM SERPL-SCNC: 3.8 MMOL/L
PROT SERPL-MCNC: 6.8 G/DL
PROTHROMBIN TIME: 9.6 SEC
RBC # BLD AUTO: 4.17 M/UL
SODIUM SERPL-SCNC: 137 MMOL/L
WBC # BLD AUTO: 5.82 K/UL

## 2018-03-16 PROCEDURE — 99233 SBSQ HOSP IP/OBS HIGH 50: CPT | Mod: ,,, | Performed by: PSYCHIATRY & NEUROLOGY

## 2018-03-16 PROCEDURE — 25000003 PHARM REV CODE 250: Performed by: NURSE PRACTITIONER

## 2018-03-16 PROCEDURE — 85025 COMPLETE CBC W/AUTO DIFF WBC: CPT

## 2018-03-16 PROCEDURE — 80053 COMPREHEN METABOLIC PANEL: CPT

## 2018-03-16 PROCEDURE — 25000242 PHARM REV CODE 250 ALT 637 W/ HCPCS: Performed by: NURSE PRACTITIONER

## 2018-03-16 PROCEDURE — 25000003 PHARM REV CODE 250: Performed by: PHYSICIAN ASSISTANT

## 2018-03-16 PROCEDURE — 25000003 PHARM REV CODE 250: Performed by: PSYCHIATRY & NEUROLOGY

## 2018-03-16 PROCEDURE — 94668 MNPJ CHEST WALL SBSQ: CPT

## 2018-03-16 PROCEDURE — 97530 THERAPEUTIC ACTIVITIES: CPT

## 2018-03-16 PROCEDURE — 63600175 PHARM REV CODE 636 W HCPCS: Performed by: NURSE PRACTITIONER

## 2018-03-16 PROCEDURE — 99900035 HC TECH TIME PER 15 MIN (STAT)

## 2018-03-16 PROCEDURE — 94761 N-INVAS EAR/PLS OXIMETRY MLT: CPT

## 2018-03-16 PROCEDURE — 25000242 PHARM REV CODE 250 ALT 637 W/ HCPCS: Performed by: PSYCHIATRY & NEUROLOGY

## 2018-03-16 PROCEDURE — A4216 STERILE WATER/SALINE, 10 ML: HCPCS | Performed by: NURSE PRACTITIONER

## 2018-03-16 PROCEDURE — 83735 ASSAY OF MAGNESIUM: CPT

## 2018-03-16 PROCEDURE — 25000003 PHARM REV CODE 250: Performed by: STUDENT IN AN ORGANIZED HEALTH CARE EDUCATION/TRAINING PROGRAM

## 2018-03-16 PROCEDURE — 85610 PROTHROMBIN TIME: CPT

## 2018-03-16 PROCEDURE — 20600001 HC STEP DOWN PRIVATE ROOM

## 2018-03-16 PROCEDURE — 84100 ASSAY OF PHOSPHORUS: CPT

## 2018-03-16 PROCEDURE — 36415 COLL VENOUS BLD VENIPUNCTURE: CPT

## 2018-03-16 PROCEDURE — 94640 AIRWAY INHALATION TREATMENT: CPT

## 2018-03-16 RX ADMIN — ASPIRIN 325 MG ORAL TABLET 325 MG: 325 PILL ORAL at 09:03

## 2018-03-16 RX ADMIN — INSULIN ASPART 13 UNITS: 100 INJECTION, SOLUTION INTRAVENOUS; SUBCUTANEOUS at 08:03

## 2018-03-16 RX ADMIN — MINERAL OIL AND WHITE PETROLATUM: 150; 830 OINTMENT OPHTHALMIC at 10:03

## 2018-03-16 RX ADMIN — IPRATROPIUM BROMIDE AND ALBUTEROL SULFATE 3 ML: 2.5; .5 SOLUTION RESPIRATORY (INHALATION) at 12:03

## 2018-03-16 RX ADMIN — INSULIN ASPART 13 UNITS: 100 INJECTION, SOLUTION INTRAVENOUS; SUBCUTANEOUS at 04:03

## 2018-03-16 RX ADMIN — SODIUM CHLORIDE SOLN NEBU 3% 4 ML: 3 NEBU SOLN at 07:03

## 2018-03-16 RX ADMIN — LISINOPRIL 40 MG: 20 TABLET ORAL at 09:03

## 2018-03-16 RX ADMIN — HEPARIN SODIUM 5000 UNITS: 5000 INJECTION, SOLUTION INTRAVENOUS; SUBCUTANEOUS at 05:03

## 2018-03-16 RX ADMIN — INSULIN ASPART 2 UNITS: 100 INJECTION, SOLUTION INTRAVENOUS; SUBCUTANEOUS at 09:03

## 2018-03-16 RX ADMIN — STANDARDIZED SENNA CONCENTRATE AND DOCUSATE SODIUM 1 TABLET: 8.6; 5 TABLET, FILM COATED ORAL at 10:03

## 2018-03-16 RX ADMIN — MODAFINIL 200 MG: 100 TABLET ORAL at 05:03

## 2018-03-16 RX ADMIN — HEPARIN SODIUM 5000 UNITS: 5000 INJECTION, SOLUTION INTRAVENOUS; SUBCUTANEOUS at 10:03

## 2018-03-16 RX ADMIN — INSULIN ASPART 13 UNITS: 100 INJECTION, SOLUTION INTRAVENOUS; SUBCUTANEOUS at 12:03

## 2018-03-16 RX ADMIN — INSULIN ASPART 1 UNITS: 100 INJECTION, SOLUTION INTRAVENOUS; SUBCUTANEOUS at 05:03

## 2018-03-16 RX ADMIN — Medication 3 ML: at 10:03

## 2018-03-16 RX ADMIN — IPRATROPIUM BROMIDE AND ALBUTEROL SULFATE 3 ML: 2.5; .5 SOLUTION RESPIRATORY (INHALATION) at 07:03

## 2018-03-16 RX ADMIN — Medication 3 ML: at 02:03

## 2018-03-16 RX ADMIN — METOPROLOL TARTRATE 50 MG: 50 TABLET, FILM COATED ORAL at 09:03

## 2018-03-16 RX ADMIN — INSULIN ASPART 2 UNITS: 100 INJECTION, SOLUTION INTRAVENOUS; SUBCUTANEOUS at 12:03

## 2018-03-16 RX ADMIN — BACLOFEN 10 MG: 10 TABLET ORAL at 10:03

## 2018-03-16 RX ADMIN — INSULIN ASPART 13 UNITS: 100 INJECTION, SOLUTION INTRAVENOUS; SUBCUTANEOUS at 05:03

## 2018-03-16 RX ADMIN — STANDARDIZED SENNA CONCENTRATE AND DOCUSATE SODIUM 1 TABLET: 8.6; 5 TABLET, FILM COATED ORAL at 09:03

## 2018-03-16 RX ADMIN — IPRATROPIUM BROMIDE AND ALBUTEROL SULFATE 3 ML: 2.5; .5 SOLUTION RESPIRATORY (INHALATION) at 08:03

## 2018-03-16 RX ADMIN — SODIUM CHLORIDE SOLN NEBU 3% 4 ML: 3 NEBU SOLN at 08:03

## 2018-03-16 RX ADMIN — BACLOFEN 10 MG: 10 TABLET ORAL at 09:03

## 2018-03-16 RX ADMIN — SODIUM CHLORIDE SOLN NEBU 3% 4 ML: 3 NEBU SOLN at 12:03

## 2018-03-16 RX ADMIN — METOPROLOL TARTRATE 50 MG: 50 TABLET, FILM COATED ORAL at 10:03

## 2018-03-16 RX ADMIN — INSULIN ASPART 13 UNITS: 100 INJECTION, SOLUTION INTRAVENOUS; SUBCUTANEOUS at 09:03

## 2018-03-16 RX ADMIN — HEPARIN SODIUM 5000 UNITS: 5000 INJECTION, SOLUTION INTRAVENOUS; SUBCUTANEOUS at 01:03

## 2018-03-16 RX ADMIN — INSULIN DETEMIR 22 UNITS: 100 INJECTION, SOLUTION SUBCUTANEOUS at 09:03

## 2018-03-16 RX ADMIN — MODAFINIL 100 MG: 100 TABLET ORAL at 01:03

## 2018-03-16 RX ADMIN — CLOPIDOGREL BISULFATE 75 MG: 75 TABLET, FILM COATED ORAL at 09:03

## 2018-03-16 RX ADMIN — Medication 3 ML: at 06:03

## 2018-03-16 RX ADMIN — FLUOXETINE 20 MG: 20 CAPSULE ORAL at 09:03

## 2018-03-16 NOTE — ASSESSMENT & PLAN NOTE
-holding statin  - Liver enzymes normalized 3/15/18, but sister refuses to restart statin due to potential side effects.

## 2018-03-16 NOTE — ASSESSMENT & PLAN NOTE
"48 yo M with PMHx HLD, LOYDA, poorly controlled DM II [place] [facility] [place] after noted "strange behavior" for which EMS was called. Pt was found to have a L M1 occlusion and was taken to IR for thrombectomy with TICI 2C reperfusion and stent placement due to L MCA stenosis. Etiology remains unclear: no obvious signs of cardiac source or atheromatous disease in the aorto/carotid axis. Echo normal with no hx or signs of A fib. PT/OT/SLP will continue to evaluate and treat; recommending SNF. DAPT 2/2 recent stent placement.      Disposition - currently pursuing home health.  Training family on 3/12 to bring patient home.  CM working with [facility] to get home care needs in place. Pending discharge home with family.     Antithrombotics:  mg po qd, clopidogrel 75 mg po qd [s/p intracranial stent]  Statins: Atorvastatin 80 mg po qd -held due to transaminitis- normalized.  Sister refuses to restart statin due to potential side effects.   Aggressive risk factor modification: HLD, DM II (Hgb A1c 10%), Obesity  Rehab efforts: PT/OT/SLP-- Recommending SNF, family wants to bring patient home; working on home health setup   Diagnostics ordered/pending: None  VTE prophylaxis: Heparin 5000 U q8h  BP parameters: SBP <140   "

## 2018-03-16 NOTE — PROGRESS NOTES
"[facility]  Vascular Neurology  Comprehensive Stroke Center  Progress Note    Assessment/Plan:     * Embolic stroke involving left middle cerebral artery    46 yo M with PMHx HLD, LOYDA, poorly controlled DM II [place] [facility] [place] after noted "strange behavior" for which EMS was called. Pt was found to have a L M1 occlusion and was taken to IR for thrombectomy with TICI 2C reperfusion and stent placement due to L MCA stenosis. Etiology remains unclear: no obvious signs of cardiac source or atheromatous disease in the aorto/carotid axis. Echo normal with no hx or signs of A fib. PT/OT/SLP will continue to evaluate and treat; recommending SNF. DAPT 2/2 recent stent placement.      Disposition - currently pursuing home health.  Training family on 3/12 to bring patient home.  CM working with [facility] to get home care needs in place. Pending discharge home with family.     Antithrombotics:  mg po qd, clopidogrel 75 mg po qd [s/p intracranial stent]  Statins: Atorvastatin 80 mg po qd -held due to transaminitis- normalized.  Sister refuses to restart statin due to potential side effects.   Aggressive risk factor modification: HLD, DM II (Hgb A1c 10%), Obesity  Rehab efforts: PT/OT/SLP-- Recommending SNF, family wants to bring patient home; working on home health setup   Diagnostics ordered/pending: None  VTE prophylaxis: Heparin 5000 U q8h  BP parameters: SBP <140         Transaminitis    -holding statin  - Liver enzymes normalized 3/15/18, but sister refuses to restart statin due to potential side effects.         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    - Abscess near PEG w/IR aspirate, growing anaerobes   - Rocephin (last dose 3/5) and Flagyl (last dose 3/7)        Right spastic hemiparesis    -Due to stroke  -Continue aggressive PT/OT; Recommending SNF  -starting baclofen        Nodule of right lung    - Plan for f/u CT 6-12 months via PCP        Oral phase dysphagia    - " Continue SLP  - NPO, meds/feeds per PEG        Acute respiratory failure with hypoxia    Likely hypoxia is Aspiration pna vs pneumonitis (as CXR improved after a day) 2/2 to superimposed encephalopathy due to sepsis  -AM CXR 3/2 with mild bibasilar atelectasis   -Continue O2 sats >90%, okay with current requirements of 1L NC, pt has what appears as Cheyne espinal pattern and reported LOYDA  -Sister requested continuous pulse ox; Ordered  -Contraction alkalosis improved with increasing water flushes via PEG  -Considered PE as differential however pt is on OAC and not tachycardic, tachypneic. Wells score 1.5 (low-risk)  - Continue chest physiotherapy, aspiration precautions, wean off O2 support   -plan for home suctioning upon discharge        Cytotoxic cerebral edema    -2/2 stroke, evident on imaging  Stable on repeat CTH 2/4/18        Urinary tract infection due to Klebsiella species     Afebrile, leukocytosis resolved  - Klebsiella from urine cultures (2/25/18) + Anaerobes from PEG site cultures   - rocephin course completed, and Flagyl completed         Type 2 diabetes mellitus with hyperglycemia, with long-term current use of insulin    - Stroke risk factor, Hgb A1c 10%  - Continue Diabetasource AC TFs  - Endocrine consulted and guiding insulin modification for TF  - POCT Q4h ; Moderate correction q4hr         Mixed hyperlipidemia    -Stroke risk factor, LDL invalid as TG > 400  - holding statin due to rising LFTs; liver enzymes normalized, but sister refuses to restart statin due to potential side effects.         Essential hypertension    -Stroke risk factor  -SBP <140  -Continue scheduled lisinopril 40 mg qd, Metoprolol 50 mg bid          Obstructive sleep apnea    -Stroke risk factor  -CPAP qHS  -Wean O2 as tolerated.             01/25/18:  Brought in for aphasia, RSW with L gaze preferance. LKN unknown, not tPA candidate. Went to IR for angiogram and stent.  01/29/18:  Off Cardene, remains on Insulin gtt.  Concern for sepsis, respiratory source. Imaging with mass effect and some brain compression; maycol crani watch.  01/30/18:  EEG consistent with focal L slowing 2/2 lesion and mild generalized slowing, no seizures. CT head stable, no hemorrhagic conversion  02/01/18:  Emergent intubation likely due to upper airway obstruction per NCC.    02/02/18:  Pt off Precedex, moving left side spontaneously and opening eyes. Intubated, on spontaneous today. NCC giving steroids in hopes to extubate tomorrow.  02/03/18:  NAEON, intubated, not responsive to verbal stimuli, withdraws from pain on LUE, SBP ~135-230. Continued on insulin gtt, SQH for DVT ppx, and captopril.   02/16/18:  Few changes on exam, continues to have significant RUE/RLE weakness with global aphasia.  Family member at bedside noted attempt to communicate with her.  02/18/18:  Pt largely unchanged on exam this am.  No family member present during am rounds.  02/19/18:  Tolerating facemask. PEG by IR this afternoon.  02/20/18:  s/p PEG. O2 % on 5L NC, still requiring frequent suctioning. Primary team considering transfer to Medicine tomorrow.  02/21/18:  Pt sating % on 3L NC. Restarted DAPT, including . Plans for step down to stroke service.  02/22/18:  Pt with [facility] but not service-connected so unable to be placed at SNF.  Working on insurance coverage of at least [facility] therapy.  02/23/18:  Increased detemir to 36 U bid.  [facility] with [facility] SN[facility] pending completion of paperwork by Stroke team and pt's spouse--in process.  2/24/18 - NAEON. Pts WBC mildly elevated 13.01, pt is afebrile. Will continue to monitor. Endocrine consulted - Recommend levemir 40 units daily to cover basal needs (using ~0.7u/kg); Start novolog 6 units q4hr to cover TF; Moderate correction q4hr. SNF placement pending.  2/25/18 -  Pt WBC increased overnight, HR range , and temp 99.2. Blood cultures ordered and drawn. UA/UC ordered. Nursing staff called a rapid  response this AM due to pts tachycardia, fever, and decreased O2 sats. Arrived to room @ 0824. 1 liter of NS ordered. STAT CXR ordered. Tylenol given. ABG completed with no significant abnormal results. Respiratory suctioned pt and pt repositioned to help with breathing. Pt O2 sats improved on venti mask, temp decreased, and BP remained normotensive. Started on  vanc and zosyn. Pt appears more comfortable and no decline in neuro status. Pt family updated.   2/26/18 - afebrile overnight and leukocytosis improving with BS antibiotics. CT abdomen yesterday noted seroma/hematoma on left rectus abdominus. IR consulted for culture +/- drain.  02/28/2018 s/p aspiration of left rectus collection adjacent to the PEG tube.  Culture sent to lab, pending.  Remains afebrile.  Currently on 4L NC O2.   3/1/18: NAEON.   3/2: Pt afebrile, WBC WNL. Now sating well on 1L NC. Pt emotional on exam, family not interested in SSRI at this time. CM attempting to get [place] so he can then transfer to [facility]. Increased rate of TFs.  3/3: NAEON. Pending placement. Will monitor Na level tomorrow.  3/4: stable, no new issues.  3/5/18 - sleepy today but no events overnight   3/6/18 - sister concerned due to coughing post speech therapy - discussed with speech team and sister   3/7/18 - No change in exam.  [facility] - pending approval.  Continue to monitor liver enzymes.  3/8/18 - no acute events overnight. Vitals within normal. Pending placement   03/09/2018 NAEON  3/10/18 starting baclofen for spasticity, fluoxetine for depression and therapy motivation  3/11/18 OT to make splint for RUE to prevent contractures, plans for discharge home with family training planned for Monday  3/15/18 CM working with [facility] to get home care needs in place. Possible discharge tomorrow with family. NAEON. Liver enzymes normalized, but sister refuses to restart statin due to potential side effects.   3/16/18 NAEON. Still  awaiting home health equipment before patient is able to go home.     STROKE DOCUMENTATION   Acute Stroke Times   Stroke Team Called Date: 01/25/18  Stroke Team Called Time: 1852  Stroke Team Arrival Date: 01/25/18  Stroke Team Arrival Time: 0652  CT Interpretation Time: 1910  Decision to Treat Time for Alteplase:  (n/a unknown last known normal )  Decision to Treat Time for IR: 1915    NIH Scale:  1a. Level Of Consciousness: 0-->Alert: keenly responsive  1b. LOC Questions: 2-->Answers neither question correctly  1c. LOC Commands: 2-->Performs neither task correctly  2. Best Gaze: 1-->Partial gaze palsy: gaze is abnormal in one or both eyes, but forced deviation or total gaze paresis is not present  3. Visual: 2-->Complete hemianopia  4. Facial Palsy: 1-->Minor paralysis (flattened nasolabial fold, asymmetry on smiling)  5a. Motor Arm, Left: 0-->No drift: limb holds 90 (or 45) degrees for full 10 secs  5b. Motor Arm, Right: 4-->No movement  6a. Motor Leg, Left: 0-->No drift: leg holds 30 degree position for full 5 secs  6b. Motor Leg, Right: 4-->No movement  7. Limb Ataxia: 0-->Absent  8. Sensory: 1-->Mild-to-moderate sensory loss: patient feels pinprick is less sharp or is dull on the affected side: or there is a loss of superficial pain with pinprick, but patient is aware of being touched  9. Best Language: 3-->Mute, global aphasia: no usable speech or auditory comprehension  10. Dysarthria: 2-->Severe dysarthria: patients speech is so slurred as to be unintelligible in the absence of or out of proportion to any dysphasia, or is mute/anarthric  11. Extinction and Inattention (formerly Neglect): 0-->No abnormality  Total (NIH Stroke Scale): 22       Modified [place] Score: 0  Jeff Coma Scale:    ABCD2 Score:    [id number] Score:   HAS -BLED Score:   ICH Score:   Hunt & Laguerre Classification:      Hemorrhagic change of an Ischemic Stroke: Does this patient have an ischemic stroke with hemorrhagic changes? No      Neurologic Chief Complaint: Left MCA stroke     Subjective:     Interval History:     NAEON. Still awaiting home health equipment before patient is able to go home.     HPI, Past Medical, Family, and Social History remains the same as documented in the initial encounter.      Review of Systems   Constitutional: Negative for fever.   Eyes: Positive for redness and visual disturbance.   Neurological: Positive for speech difficulty and weakness.   Psychiatric/Behavioral: Negative for behavioral problems.     Scheduled Meds:   albuterol-ipratropium 2.5mg-0.5mg/3mL  3 mL Nebulization Q6H    aspirin  325 mg Per G Tube Daily    baclofen  10 mg Per G Tube BID    clopidogrel  75 mg Per G Tube Daily    FLUoxetine  20 mg Per G Tube Daily    heparin (porcine)  5,000 Units Subcutaneous Q8H    insulin aspart U-100  13 Units Subcutaneous Q24H    insulin aspart U-100  13 Units Subcutaneous Q24H    insulin aspart U-100  13 Units Subcutaneous Q24H    insulin aspart U-100  13 Units Subcutaneous Q24H    insulin aspart U-100  13 Units Subcutaneous Q24H    insulin aspart U-100  13 Units Subcutaneous Q24H    insulin detemir U-100  22 Units Subcutaneous Daily    lisinopril  40 mg Per NG tube Daily    metoprolol tartrate  50 mg Per NG tube BID    modafinil  200 mg Per NG tube Daily    And    modafinil  100 mg Per NG tube Daily    polyethylene glycol  17 g Per NG tube Daily    potassium chloride 10%  40 mEq Oral Once    senna-docusate 8.6-50 mg  1 tablet Per NG tube BID    sodium chloride 0.9%  500 mL Intravenous Once    sodium chloride 0.9%  3 mL Intravenous Q8H    sodium chloride 3%  4 mL Nebulization Q6H    white petrolatum-mineral oil   Both Eyes QHS     Continuous Infusions:  PRN Meds:acetaminophen, bisacodyl, dextrose 50%, glucagon (human recombinant), insulin aspart U-100, ipratropium, labetalol, ondansetron    Objective:     Vital Signs (Most Recent):  Temp: 97.5 °F (36.4 °C) (03/16/18 0826)  Pulse: 85  (03/16/18 1453)  Resp: 14 (03/16/18 1242)  BP: (!) 163/89 (03/16/18 1237)  SpO2: (!) 94 % (03/16/18 1237)  BP Location: Right arm    Vital Signs Range (Last 24H):  Temp:  [96.5 °F (35.8 °C)-98.6 °F (37 °C)]   Pulse:  [68-93]   Resp:  [14-18]   BP: (122-163)/(76-89)   SpO2:  [88 %-97 %]   BP Location: Right arm    Physical Exam   Constitutional: He appears well-developed and well-nourished.   Cardiovascular: Normal rate.    Pulmonary/Chest: Effort normal.   Neurological: He is alert.   Skin: Skin is warm and dry.   Nursing note and vitals reviewed.    Neurological Exam:  LOC: alert  Language: Global aphasia  Articulation: Mute/Anarthric  Visual Fields: Hemianopsia right  EOM (CN III, IV, VI): Gaze preference  left  Facial Movement (CN VII): Lower facial weakness on the Right  Motor: Arm left  Normal 5/5  Leg left  Paresis: 5/5  Arm right  Plegia 0/5  Leg right Plegia 0/5  Tone: increased tone of the RUE and RLE; normal L sided tone    Laboratory:  CMP:     Recent Labs  Lab 03/16/18  0543   CALCIUM 9.5   ALBUMIN 3.1*   PROT 6.8      K 3.8   CO2 27      BUN 25*   CREATININE 0.8   ALKPHOS 84   ALT 49*   AST 35   BILITOT 0.7     BMP:     Recent Labs  Lab 03/16/18  0543      K 3.8      CO2 27   BUN 25*   CREATININE 0.8   CALCIUM 9.5     CBC:     Recent Labs  Lab 03/16/18  0543   WBC 5.82   RBC 4.17*   HGB 12.5*   HCT 37.5*   *   MCV 90   MCH 30.0   MCHC 33.3     Lipid Panel: No results for input(s): CHOL, LDLCALC, HDL, TRIG in the last 168 hours.  Coagulation:     Recent Labs  Lab 03/16/18  0543   INR 0.9     Platelet Aggregation Study: No results for input(s): PLTAGG, PLTAGINTERP, PLTAGREGLACO, ADPPLTAGGREG in the last 168 hours.  Hgb A1C: No results for input(s): HGBA1C in the last 168 hours.  TSH: No results for input(s): TSH in the last 168 hours.      Diagnostic Results       Brain Imaging     Most recent CTH 2/04/18 redemonstration of large L MCA territory infarction w/o evidence of  hemorrhagic conversion.  Stable post op change from L MCA endovascular stenting.      Vessel Imaging   IR Cerebral Angiogram 1/25/18 demonstrated occlusion of the L M1 segment of the L MCA with good collaterals. Occlusion removed and stent placed with TICI 2C.    Cardiac Imaging   2D Echo 1/26/18   CONCLUSIONS     1 - Normal left ventricular systolic function (EF 65-70%).     2 - Concentric remodeling.     3 - No wall motion abnormalities.     4 - Normal left ventricular diastolic function.     5 - Normal right ventricular systolic function       Do Murphy PA-C  Comprehensive Stroke Center  Department of Vascular Neurology   [facility]

## 2018-03-16 NOTE — SUBJECTIVE & OBJECTIVE
Neurologic Chief Complaint: Left MCA stroke     Subjective:     Interval History:     NAEON. Still awaiting home health equipment before patient is able to go home.     HPI, Past Medical, Family, and Social History remains the same as documented in the initial encounter.      Review of Systems   Constitutional: Negative for fever.   Eyes: Positive for redness and visual disturbance.   Neurological: Positive for speech difficulty and weakness.   Psychiatric/Behavioral: Negative for behavioral problems.     Scheduled Meds:   albuterol-ipratropium 2.5mg-0.5mg/3mL  3 mL Nebulization Q6H    aspirin  325 mg Per G Tube Daily    baclofen  10 mg Per G Tube BID    clopidogrel  75 mg Per G Tube Daily    FLUoxetine  20 mg Per G Tube Daily    heparin (porcine)  5,000 Units Subcutaneous Q8H    insulin aspart U-100  13 Units Subcutaneous Q24H    insulin aspart U-100  13 Units Subcutaneous Q24H    insulin aspart U-100  13 Units Subcutaneous Q24H    insulin aspart U-100  13 Units Subcutaneous Q24H    insulin aspart U-100  13 Units Subcutaneous Q24H    insulin aspart U-100  13 Units Subcutaneous Q24H    insulin detemir U-100  22 Units Subcutaneous Daily    lisinopril  40 mg Per NG tube Daily    metoprolol tartrate  50 mg Per NG tube BID    modafinil  200 mg Per NG tube Daily    And    modafinil  100 mg Per NG tube Daily    polyethylene glycol  17 g Per NG tube Daily    potassium chloride 10%  40 mEq Oral Once    senna-docusate 8.6-50 mg  1 tablet Per NG tube BID    sodium chloride 0.9%  500 mL Intravenous Once    sodium chloride 0.9%  3 mL Intravenous Q8H    sodium chloride 3%  4 mL Nebulization Q6H    white petrolatum-mineral oil   Both Eyes QHS     Continuous Infusions:  PRN Meds:acetaminophen, bisacodyl, dextrose 50%, glucagon (human recombinant), insulin aspart U-100, ipratropium, labetalol, ondansetron    Objective:     Vital Signs (Most Recent):  Temp: 97.5 °F (36.4 °C) (03/16/18 0826)  Pulse: 85  (03/16/18 1453)  Resp: 14 (03/16/18 1242)  BP: (!) 163/89 (03/16/18 1237)  SpO2: (!) 94 % (03/16/18 1237)  BP Location: Right arm    Vital Signs Range (Last 24H):  Temp:  [96.5 °F (35.8 °C)-98.6 °F (37 °C)]   Pulse:  [68-93]   Resp:  [14-18]   BP: (122-163)/(76-89)   SpO2:  [88 %-97 %]   BP Location: Right arm    Physical Exam   Constitutional: He appears well-developed and well-nourished.   Cardiovascular: Normal rate.    Pulmonary/Chest: Effort normal.   Neurological: He is alert.   Skin: Skin is warm and dry.   Nursing note and vitals reviewed.    Neurological Exam:  LOC: alert  Language: Global aphasia  Articulation: Mute/Anarthric  Visual Fields: Hemianopsia right  EOM (CN III, IV, VI): Gaze preference  left  Facial Movement (CN VII): Lower facial weakness on the Right  Motor: Arm left  Normal 5/5  Leg left  Paresis: 5/5  Arm right  Plegia 0/5  Leg right Plegia 0/5  Tone: increased tone of the RUE and RLE; normal L sided tone    Laboratory:  CMP:     Recent Labs  Lab 03/16/18  0543   CALCIUM 9.5   ALBUMIN 3.1*   PROT 6.8      K 3.8   CO2 27      BUN 25*   CREATININE 0.8   ALKPHOS 84   ALT 49*   AST 35   BILITOT 0.7     BMP:     Recent Labs  Lab 03/16/18  0543      K 3.8      CO2 27   BUN 25*   CREATININE 0.8   CALCIUM 9.5     CBC:     Recent Labs  Lab 03/16/18  0543   WBC 5.82   RBC 4.17*   HGB 12.5*   HCT 37.5*   *   MCV 90   MCH 30.0   MCHC 33.3     Lipid Panel: No results for input(s): CHOL, LDLCALC, HDL, TRIG in the last 168 hours.  Coagulation:     Recent Labs  Lab 03/16/18  0543   INR 0.9     Platelet Aggregation Study: No results for input(s): PLTAGG, PLTAGINTERP, PLTAGREGLACO, ADPPLTAGGREG in the last 168 hours.  Hgb A1C: No results for input(s): HGBA1C in the last 168 hours.  TSH: No results for input(s): TSH in the last 168 hours.      Diagnostic Results       Brain Imaging     Most recent CTH 2/04/18 redemonstration of large L MCA territory infarction w/o evidence of  hemorrhagic conversion.  Stable post op change from L MCA endovascular stenting.      Vessel Imaging   IR Cerebral Angiogram 1/25/18 demonstrated occlusion of the L M1 segment of the L MCA with good collaterals. Occlusion removed and stent placed with TICI 2C.    Cardiac Imaging   2D Echo 1/26/18   CONCLUSIONS     1 - Normal left ventricular systolic function (EF 65-70%).     2 - Concentric remodeling.     3 - No wall motion abnormalities.     4 - Normal left ventricular diastolic function.     5 - Normal right ventricular systolic function

## 2018-03-16 NOTE — PT/OT/SLP PROGRESS
Occupational Therapy   Treatment    Name: Todd Quevedo  MRN: [id number]  Admitting Diagnosis:  Embolic stroke involving left middle cerebral artery       Recommendations:     Discharge Recommendations: home with home health (and 24 hour assistance)  Discharge Equipment Recommendations:   (hospital bed, jim lift, reclining wheelchair, cushion)  Barriers to discharge:  Inaccessible home environment, Decreased caregiver support    Subjective     Communicated with: RN prior to after session.  Pain/Comfort:  · Pain Rating 1: 0/10  · Pain Rating Post-Intervention 1: 0/10    Patients cultural, spiritual, Lutheran conflicts given the current situation: None stated    Objective:     Patient found with: bed alarm, Condom Catheter, pressure relief boots, SCD, pulse ox (continuous), telemetry (PEG).  Therapy tech (Luana) assisted with session.  Sister present at beginning and end of session.    General Precautions: Standard, aphasia, aspiration, fall, NPO   Orthopedic Precautions:N/A   Braces: N/A     Occupational Performance:    Bed Mobility:    · Patient completed Rolling/Turning to Left with  total assistance  · Patient completed Rolling/Turning to Right with total assistance  · Patient completed Scooting/Bridging with total assistance  · Patient completed Supine to Sit with total assistance  · Patient completed Sit to Supine with total assistance   · With assist of 2     Functional Mobility/Transfers:  · Pt not appropriate for task at this time 2* poor trunk control and decreased ability to follow commands.      Activities of Daily Living:  · LB Dressing: total assistance for donning socks while supine.    Patient left HOB elevated with all lines intact, call button in reach and RN and sister present; wedge placed on R side.    AMPA 6 Click:  AMPA Total Score: 6    Treatment & Education:  *Pt sat EOB for ~28 minutes with Max A-Total A required to maintain upright position.  Pillow placed under RUE (elbow placed in  flexed to 90 degrees) to provide support while seated upright.  Pt leaned towards right requiring frequent cues and Max A to adjust posture.  Decreased flexor tone noted in RUE.  *PROM performed on RUE incorporating elbow and wrist joint: 3 sets x 10 reps.  Decreased muscle tone noted in R shoulder.  *AAROM performed on LUE incorporating all joints: 3 sets x 15 reps.  *Cervical stretch performed with lateral rotation from R to L: 1 set x 10 reps with Mod A-Max A.  Pt then performed stretch incorporating cervical flexion/extension; pt actively resisted movement requiring Total A: 1 set x 5 reps  *PROM performed on (B) LE (pt able to initiate movement in LLE, but did not move to command) incorporating all joints: 2 sets x 10 reps on each side  *Pt performed weight bearing activity leaning onto L forearm, holding for 5 seconds, then pushing back upright to achieve full upright position: 1 set x 5 reps with Max A-Total A.   *POC reviewed with pt   Education:    Assessment:     Todd Quevedo is a 47 y.o. male with a medical diagnosis of Embolic stroke involving left middle cerebral artery.  He presents with the following performance deficits affecting function: weakness, impaired functional mobilty, impaired self care skills, impaired balance, decreased upper extremity function, decreased lower extremity function, decreased coordination, visual deficits, impaired endurance, abnormal tone, decreased safety awareness, R neglect.  Pt tolerated sitting EOB well with no signs of distress.  Total A required to perform bed mobility with Max A-Total required to maintain upright position.  Pt maintained head in L lateral rotation with gaze fixed to L side.  Pt unable to follow commands this date despite increased time and max cues.  He is dependent for ADLs and mobility at this time and would continue to benefit from skilled OT services to address problems listed below and increase independence with ADLs.     Rehab Prognosis:   Fair; patient would benefit from acute skilled OT services to address these deficits and reach maximum level of function.       Plan:     Patient to be seen 3 x/week to address the above listed problems via self-care/home management, therapeutic activities, therapeutic exercises, neuromuscular re-education  · Plan of Care Expires: 03/21/18  · Plan of Care Reviewed with: patient    This Plan of care has been discussed with the patient who was involved in its development and understands and is in agreement with the identified goals and treatment plan    GOALS:    Occupational Therapy Goals        Problem: Occupational Therapy Goal    Goal Priority Disciplines Outcome Interventions   Occupational Therapy Goal     OT, PT/OT Ongoing (interventions implemented as appropriate)    Description:  Goals revised 3/9 to be addressed for 14 days with expiration date, 3/23:  Patient will increase functional independence with ADLs by performing:    Patient will demonstrate rolling bilaterally with max assist.  Not met   Patient will engage in therapeutic task with visual attention for 30 sec duration. Not met  Patient will demonstrate independence with HEP for right UE positioning.    Not met  Patient's family / caregiver will demonstrate independence and safety with assisting patient with self-care skills and functional mobility.     Not met  Patient's family / caregiver will demonstrate independence with providing ROM and changes in bed positioning.   Not met                          Time Tracking:     OT Date of Treatment: 03/16/18  OT Start Time: 1331  OT Stop Time: 1409  OT Total Time (min): 38 min    Billable Minutes:Therapeutic Activity 38    ISAMAR Chang  3/16/2018

## 2018-03-16 NOTE — PROGRESS NOTES
"[facility]  Vascular Neurology  Comprehensive Stroke Center  Progress Note    Assessment/Plan:     * Embolic stroke involving left middle cerebral artery    48 yo M with PMHx HLD, LYODA, poorly controlled DM II presents to [facility] 01/25/18 after noted "strange behavior" for which EMS was called. Pt was found to have a L M1 occlusion and was taken to IR for thrombectomy with TICI 2C reperfusion and stent placement due to L MCA stenosis. Etiology remains unclear: no obvious signs of cardiac source or atheromatous disease in the aorto/carotid axis. Echo normal with no hx or signs of A fib. PT/OT/SLP will continue to evaluate and treat; recommending SNF. DAPT 2/2 recent stent placement.      Disposition - currently pursuing home health.  Training family on 3/12 to bring patient home  CM working with [facility] to get home care needs in place. Possible discharge tomorrow with family.     Antithrombotics:  mg po qd, clopidogrel 75 mg po qd [s/p intracranial stent]  Statins: Atorvastatin 80 mg po qd -held due to transaminitis- normalized.  Sister refuses to restart statin due to potential side effects.   Aggressive risk factor modification: HLD, DM II (Hgb A1c 10%), Obesity  Rehab efforts: PT/OT/SLP-- Recommending SNF, placement pending  Diagnostics ordered/pending: None  VTE prophylaxis: Heparin 5000 U q8h  BP parameters: SBP <140         Transaminitis    -holding statin  - Liver enzymes normalized 3/15/18, but sister refuses to restart statin due to potential side effects.         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    - Abscess near PEG w/IR aspirate, growing anaerobes   - Rocephin (last dose 3/5) and Flagyl (last dose 3/7)        Right spastic hemiparesis    -Due to stroke  -Continue aggressive PT/OT; Recommending SNF  -starting baclofen        Nodule of right lung    - Plan for f/u CT 6-12 months via PCP        Oral phase dysphagia    - Continue SLP  - NPO, meds/feeds per PEG      "   Acute respiratory failure with hypoxia    Likely hypoxia is Aspiration pna vs pneumonitis (as CXR improved after a day) 2/2 to superimposed encephalopathy due to sepsis  -AM CXR 3/2 with mild bibasilar atelectasis   -Continue O2 sats >90%, okay with current requirements of 1L NC, pt has what appears as Cheyne espinal pattern and reported LOYDA  -Sister requested continuous pulse ox; Ordered  -Contraction alkalosis improved with increasing water flushes via PEG  -Considered PE as differential however pt is on OAC and not tachycardic, tachypneic. Wells score 1.5 (low-risk)  - Continue chest physiotherapy, aspiration precautions, wean off O2 support   -plan for home suctioning upon discharge        Cytotoxic cerebral edema    -2/2 stroke, evident on imaging  Stable on repeat CTH 2/4/18        Urinary tract infection due to Klebsiella species     Afebrile, leukocytosis resolved  - Klebsiella from urine cultures (2/25/18) + Anaerobes from PEG site cultures   - rocephin course completed, and Flagyl completed         Type 2 diabetes mellitus with hyperglycemia, with long-term current use of insulin    - Stroke risk factor, Hgb A1c 10%  - Continue Diabetasource AC TFs  - Endocrine consulted and guiding insulin modification for TF  - POCT Q4h ; Moderate correction q4hr         Mixed hyperlipidemia    -Stroke risk factor, LDL invalid as TG > 400  - holding statin due to rising LFTs; liver enzymes normalized, but sister refuses to restart statin due to potential side effects.         Essential hypertension    -Stroke risk factor  -SBP <140  - Continue scheduled lisinopril 40 mg qd, Metoprolol 50 mg bid          Obstructive sleep apnea    -Stroke risk factor  -CPAP qHS  -Wean O2 as tolerated.             01/25/18:  Brought in for aphasia, RSW with L gaze preferance. LKN unknown, not tPA candidate. Went to IR for angiogram and stent.  01/29/18:  Off Cardene, remains on Insulin gtt. Concern for sepsis, respiratory source. Imaging  with mass effect and some brain compression; maycol crani watch.  01/30/18:  EEG consistent with focal L slowing 2/2 lesion and mild generalized slowing, no seizures. CT head stable, no hemorrhagic conversion  02/01/18:  Emergent intubation likely due to upper airway obstruction per NCC.    02/02/18:  Pt off Precedex, moving left side spontaneously and opening eyes. Intubated, on spontaneous today. NCC giving steroids in hopes to extubate tomorrow.  02/03/18:  NAEON, intubated, not responsive to verbal stimuli, withdraws from pain on LUE, SBP ~135-230. Continued on insulin gtt, SQH for DVT ppx, and captopril.   02/16/18:  Few changes on exam, continues to have significant RUE/RLE weakness with global aphasia.  Family member at bedside noted attempt to communicate with her.  02/18/18:  Pt largely unchanged on exam this am.  No family member present during am rounds.  02/19/18:  Tolerating facemask. PEG by IR this afternoon.  02/20/18:  s/p PEG. O2 % on 5L NC, still requiring frequent suctioning. Primary team considering transfer to Medicine tomorrow.  02/21/18:  Pt sating % on 3L NC. Restarted DAPT, including . Plans for step down to stroke service.  02/22/18:  Pt with [facility] but not service-connected so unable to be placed at SNF.  Working on insurance coverage of at least [facility] therapy.  02/23/18:  Increased detemir to 36 U bid.  [facility] with [facility] SNF pending completion of paperwork by Stroke team and pt's spouse--in process.  2/24/18 - NAEON. Pts WBC mildly elevated 13.01, pt is afebrile. Will continue to monitor. Endocrine consulted - Recommend levemir 40 units daily to cover basal needs (using ~0.7u/kg); Start novolog 6 units q4hr to cover TF; Moderate correction q4hr. SNF placement pending.  2/25/18 -  Pt WBC increased overnight, HR range , and temp 99.2. Blood cultures ordered and drawn. UA/UC ordered. Nursing staff called a rapid response this AM due to pts tachycardia, fever, and  decreased O2 sats. Arrived to room @ 0824. 1 liter of NS ordered. STAT CXR ordered. Tylenol given. ABG completed with no significant abnormal results. Respiratory suctioned pt and pt repositioned to help with breathing. Pt O2 sats improved on venti mask, temp decreased, and BP remained normotensive. Started on  vanc and zosyn. Pt appears more comfortable and no decline in neuro status. Pt family updated.   2/26/18 - afebrile overnight and leukocytosis improving with BS antibiotics. CT abdomen yesterday noted seroma/hematoma on left rectus abdominus. IR consulted for culture +/- drain.  02/28/2018 s/p aspiration of left rectus collection adjacent to the PEG tube.  Culture sent to lab, pending.  Remains afebrile.  Currently on 4L NC O2.   3/1/18: NAEON.   3/2: Pt afebrile, WBC WNL. Now sating well on 1L NC. Pt emotional on exam, family not interested in SSRI at this time. CM attempting to get pt placed to [facility] so he can then transfer to [facility]. Increased rate of TFs.  3/3: NAEON. Pending placement. Will monitor Na level tomorrow.  3/4: stable, no new issues.  3/5/18 - sleepy today but no events overnight   3/6/18 - sister concerned due to coughing post speech therapy - discussed with speech team and sister   3/7/18 - No change in exam.  [facility] to [facility] - pending approval.  Continue to monitor liver enzymes.  3/8/18 - no acute events overnight. Vitals within normal. Pending placement   03/09/2018 NAEON  3/10/18 starting baclofen for spasticity, fluoxetine for depression and therapy motivation  3/11/18 OT to make splint for RUE to prevent contractures, plans for discharge home with family training planned for Monday  3/15/18 CM working with [facility] to get home care needs in place. Possible discharge tomorrow with family. NAEON. Liver enzymes normalized, but sister refuses to restart statin due to potential side effects.     STROKE DOCUMENTATION   Acute Stroke Times   Stroke Team Called Date:  01/25/18  Stroke Team Called Time: 1852  Stroke Team Arrival Date: 01/25/18  Stroke Team Arrival Time: 0652  CT Interpretation Time: 1910  Decision to Treat Time for Alteplase:  (n/a unknown last known normal )  Decision to Treat Time for IR: 1915    NIH Scale:  1a. Level Of Consciousness: 0-->Alert: keenly responsive  1b. LOC Questions: 2-->Answers neither question correctly  1c. LOC Commands: 2-->Performs neither task correctly  2. Best Gaze: 1-->Partial gaze palsy: gaze is abnormal in one or both eyes, but forced deviation or total gaze paresis is not present  3. Visual: 2-->Complete hemianopia  4. Facial Palsy: 1-->Minor paralysis (flattened nasolabial fold, asymmetry on smiling)  5a. Motor Arm, Left: 0-->No drift: limb holds 90 (or 45) degrees for full 10 secs  5b. Motor Arm, Right: 4-->No movement  6a. Motor Leg, Left: 0-->No drift: leg holds 30 degree position for full 5 secs  6b. Motor Leg, Right: 4-->No movement  7. Limb Ataxia: 0-->Absent  8. Sensory: 1-->Mild-to-moderate sensory loss: patient feels pinprick is less sharp or is dull on the affected side: or there is a loss of superficial pain with pinprick, but patient is aware of being touched  9. Best Language: 3-->Mute, global aphasia: no usable speech or auditory comprehension  10. Dysarthria: 2-->Severe dysarthria: patients speech is so slurred as to be unintelligible in the absence of or out of proportion to any dysphasia, or is mute/anarthric  11. Extinction and Inattention (formerly Neglect): 0-->No abnormality  Total (NIH Stroke Scale): 22       Modified Kevil Score: 0  Jeff Coma Scale:    ABCD2 Score:    [id number] Score:   HAS -BLED Score:   ICH Score:   Hunt & Laguerre Classification:      Hemorrhagic change of an Ischemic Stroke: Does this patient have an ischemic stroke with hemorrhagic changes? No     Neurologic Chief Complaint: Left MCA stroke     Subjective:     Interval History:     CM working with [facility] to get home care needs in  place. Possible discharge tomorrow with family. VILMAON. Liver enzymes normalized, but sister refuses to restart statin due to potential side effects.     HPI, Past Medical, Family, and Social History remains the same as documented in the initial encounter.     Review of Systems   Constitutional: Negative for fever.   Eyes: Positive for redness and visual disturbance.   Neurological: Positive for speech difficulty and weakness.   Psychiatric/Behavioral: Negative for behavioral problems.     Scheduled Meds:   albuterol-ipratropium 2.5mg-0.5mg/3mL  3 mL Nebulization Q6H    aspirin  325 mg Per G Tube Daily    baclofen  10 mg Per G Tube BID    clopidogrel  75 mg Per G Tube Daily    FLUoxetine  20 mg Per G Tube Daily    heparin (porcine)  5,000 Units Subcutaneous Q8H    insulin aspart U-100  13 Units Subcutaneous Q24H    insulin aspart U-100  13 Units Subcutaneous Q24H    insulin aspart U-100  13 Units Subcutaneous Q24H    insulin aspart U-100  13 Units Subcutaneous Q24H    insulin aspart U-100  13 Units Subcutaneous Q24H    insulin aspart U-100  13 Units Subcutaneous Q24H    insulin detemir U-100  22 Units Subcutaneous Daily    lisinopril  40 mg Per NG tube Daily    metoprolol tartrate  50 mg Per NG tube BID    modafinil  200 mg Per NG tube Daily    And    modafinil  100 mg Per NG tube Daily    polyethylene glycol  17 g Per NG tube Daily    potassium chloride 10%  40 mEq Oral Once    senna-docusate 8.6-50 mg  1 tablet Per NG tube BID    sodium chloride 0.9%  500 mL Intravenous Once    sodium chloride 0.9%  3 mL Intravenous Q8H    sodium chloride 3%  4 mL Nebulization Q6H    white petrolatum-mineral oil   Both Eyes QHS     Continuous Infusions:  PRN Meds:acetaminophen, bisacodyl, dextrose 50%, glucagon (human recombinant), insulin aspart U-100, ipratropium, labetalol, ondansetron    Objective:     Vital Signs (Most Recent):  Temp: 98.6 °F (37 °C) (03/15/18 1649)  Pulse: 86 (03/15/18 1649)  Resp: 18  (03/15/18 1649)  BP: 129/76 (03/15/18 1649)  SpO2: (!) 92 % (03/15/18 1649)  BP Location: Right arm    Vital Signs Range (Last 24H):  Temp:  [97.4 °F (36.3 °C)-98.9 °F (37.2 °C)]   Pulse:  [75-98]   Resp:  [18]   BP: (108-164)/()   SpO2:  [85 %-98 %]   BP Location: Right arm    Physical Exam   Constitutional: He appears well-developed and well-nourished.   Cardiovascular: Normal rate.    Pulmonary/Chest: Effort normal.   Skin: Skin is warm and dry.   Nursing note and vitals reviewed.    Neurological Exam:  LOC: alert  Language: Global aphasia  Articulation: Mute/Anarthric  Visual Fields: Hemianopsia right  EOM (CN III, IV, VI): Gaze preference  left  Facial Movement (CN VII): Lower facial weakness on the Right  Motor: Arm left  Normal 5/5  Leg left  Paresis: 5/5  Arm right  Plegia 0/5  Leg right Plegia 0/5    Laboratory:  CMP:   No results for input(s): GLUCOSE, CALCIUM, ALBUMIN, PROT, NA, K, CO2, CL, BUN, CREATININE, ALKPHOS, ALT, AST, BILITOT in the last 24 hours.  BMP:     Recent Labs  Lab 03/14/18  0436      K 3.7      CO2 29   BUN 24*   CREATININE 0.8   CALCIUM 9.6     CBC:     Recent Labs  Lab 03/14/18  0436   WBC 5.89   RBC 4.25*   HGB 12.2*   HCT 36.9*   *   MCV 87   MCH 28.7   MCHC 33.1     Lipid Panel: No results for input(s): CHOL, LDLCALC, HDL, TRIG in the last 168 hours.  Coagulation:     Recent Labs  Lab 03/15/18  0405   INR 0.9     Platelet Aggregation Study: No results for input(s): PLTAGG, PLTAGINTERP, PLTAGREGLACO, ADPPLTAGGREG in the last 168 hours.  Hgb A1C: No results for input(s): HGBA1C in the last 168 hours.  TSH: No results for input(s): TSH in the last 168 hours.      Diagnostic Results       Brain Imaging     Most recent CTH 2/04/18 redemonstration of large L MCA territory infarction w/o evidence of hemorrhagic conversion.  Stable post op change from L MCA endovascular stenting.        Vessel Imaging   IR Cerebral Angiogram 1/25/18 demonstrated occlusion of the L  M1 segment of the L MCA with good collaterals. Occlusion removed and stent placed with TICI 2C.    Cardiac Imaging   2D Echo 1/26/18   CONCLUSIONS     1 - Normal left ventricular systolic function (EF 65-70%).     2 - Concentric remodeling.     3 - No wall motion abnormalities.     4 - Normal left ventricular diastolic function.     5 - Normal right ventricular systolic function       Do Murphy PA-C  [facility]  Department of Vascular Neurology   [facility]

## 2018-03-16 NOTE — CARE UPDATE
Computer glance, BG remain at goal. No changes.  ** Please call Endocrine for any BG related issues **  Angeles Dennis NP  [id number]

## 2018-03-16 NOTE — ASSESSMENT & PLAN NOTE
"48 yo M with PMHx HLD, LOYDA, poorly controlled DM II [place] [facility] [place] after noted "strange behavior" for which EMS was called. Pt was found to have a L M1 occlusion and was taken to IR for thrombectomy with TICI 2C reperfusion and stent placement due to L MCA stenosis. Etiology remains unclear: no obvious signs of cardiac source or atheromatous disease in the aorto/carotid axis. Echo normal with no hx or signs of A fib. PT/OT/SLP will continue to evaluate and treat; recommending SNF. DAPT 2/2 recent stent placement.      Disposition - currently pursuing home health.  Training family on 3/12 to bring patient home  CM working with [facility] to get home care needs in place. Possible discharge tomorrow with family.     Antithrombotics:  mg po qd, clopidogrel 75 mg po qd [s/p intracranial stent]  Statins: Atorvastatin 80 mg po qd -held due to transaminitis- normalized.  Sister refuses to restart statin due to potential side effects.   Aggressive risk factor modification: HLD, DM II (Hgb A1c 10%), Obesity  Rehab efforts: PT/OT/SLP-- Recommending SNF, placement pending  Diagnostics ordered/pending: None  VTE prophylaxis: Heparin 5000 U q8h  BP parameters: SBP <140   "

## 2018-03-16 NOTE — PLAN OF CARE
Problem: Occupational Therapy Goal  Goal: Occupational Therapy Goal  Goals revised 3/9 to be addressed for 14 days with expiration date, 3/23:  Patient will increase functional independence with ADLs by performing:    Patient will demonstrate rolling bilaterally with max assist.  Not met   Patient will engage in therapeutic task with visual attention for 30 sec duration. Not met  Patient will demonstrate independence with HEP for right UE positioning.    Not met  Patient's family / caregiver will demonstrate independence and safety with assisting patient with self-care skills and functional mobility.     Not met  Patient's family / caregiver will demonstrate independence with providing ROM and changes in bed positioning.   Not met           POC remains appropriate.    ISAMAR Chang  3/16/2018

## 2018-03-16 NOTE — SUBJECTIVE & OBJECTIVE
Neurologic Chief Complaint: Left MCA stroke     Subjective:     Interval History:     CM working with [facility] to get home care needs in place. Possible discharge tomorrow with family. ELY. Liver enzymes normalized, but sister refuses to restart statin due to potential side effects.     HPI, Past Medical, Family, and Social History remains the same as documented in the initial encounter.     Review of Systems   Constitutional: Negative for fever.   Eyes: Positive for redness and visual disturbance.   Neurological: Positive for speech difficulty and weakness.   Psychiatric/Behavioral: Negative for behavioral problems.     Scheduled Meds:   albuterol-ipratropium 2.5mg-0.5mg/3mL  3 mL Nebulization Q6H    aspirin  325 mg Per G Tube Daily    baclofen  10 mg Per G Tube BID    clopidogrel  75 mg Per G Tube Daily    FLUoxetine  20 mg Per G Tube Daily    heparin (porcine)  5,000 Units Subcutaneous Q8H    insulin aspart U-100  13 Units Subcutaneous Q24H    insulin aspart U-100  13 Units Subcutaneous Q24H    insulin aspart U-100  13 Units Subcutaneous Q24H    insulin aspart U-100  13 Units Subcutaneous Q24H    insulin aspart U-100  13 Units Subcutaneous Q24H    insulin aspart U-100  13 Units Subcutaneous Q24H    insulin detemir U-100  22 Units Subcutaneous Daily    lisinopril  40 mg Per NG tube Daily    metoprolol tartrate  50 mg Per NG tube BID    modafinil  200 mg Per NG tube Daily    And    modafinil  100 mg Per NG tube Daily    polyethylene glycol  17 g Per NG tube Daily    potassium chloride 10%  40 mEq Oral Once    senna-docusate 8.6-50 mg  1 tablet Per NG tube BID    sodium chloride 0.9%  500 mL Intravenous Once    sodium chloride 0.9%  3 mL Intravenous Q8H    sodium chloride 3%  4 mL Nebulization Q6H    white petrolatum-mineral oil   Both Eyes QHS     Continuous Infusions:  PRN Meds:acetaminophen, bisacodyl, dextrose 50%, glucagon (human recombinant), insulin aspart U-100, ipratropium,  labetalol, ondansetron    Objective:     Vital Signs (Most Recent):  Temp: 98.6 °F (37 °C) (03/15/18 1649)  Pulse: 86 (03/15/18 1649)  Resp: 18 (03/15/18 1649)  BP: 129/76 (03/15/18 1649)  SpO2: (!) 92 % (03/15/18 1649)  BP Location: Right arm    Vital Signs Range (Last 24H):  Temp:  [97.4 °F (36.3 °C)-98.9 °F (37.2 °C)]   Pulse:  [75-98]   Resp:  [18]   BP: (108-164)/()   SpO2:  [85 %-98 %]   BP Location: Right arm    Physical Exam   Constitutional: He appears well-developed and well-nourished.   Cardiovascular: Normal rate.    Pulmonary/Chest: Effort normal.   Skin: Skin is warm and dry.   Nursing note and vitals reviewed.    Neurological Exam:  LOC: alert  Language: Global aphasia  Articulation: Mute/Anarthric  Visual Fields: Hemianopsia right  EOM (CN III, IV, VI): Gaze preference  left  Facial Movement (CN VII): Lower facial weakness on the Right  Motor: Arm left  Normal 5/5  Leg left  Paresis: 5/5  Arm right  Plegia 0/5  Leg right Plegia 0/5    Laboratory:  CMP:   No results for input(s): GLUCOSE, CALCIUM, ALBUMIN, PROT, NA, K, CO2, CL, BUN, CREATININE, ALKPHOS, ALT, AST, BILITOT in the last 24 hours.  BMP:     Recent Labs  Lab 03/14/18  0436      K 3.7      CO2 29   BUN 24*   CREATININE 0.8   CALCIUM 9.6     CBC:     Recent Labs  Lab 03/14/18  0436   WBC 5.89   RBC 4.25*   HGB 12.2*   HCT 36.9*   *   MCV 87   MCH 28.7   MCHC 33.1     Lipid Panel: No results for input(s): CHOL, LDLCALC, HDL, TRIG in the last 168 hours.  Coagulation:     Recent Labs  Lab 03/15/18  0405   INR 0.9     Platelet Aggregation Study: No results for input(s): PLTAGG, PLTAGINTERP, PLTAGREGLACO, ADPPLTAGGREG in the last 168 hours.  Hgb A1C: No results for input(s): HGBA1C in the last 168 hours.  TSH: No results for input(s): TSH in the last 168 hours.      Diagnostic Results       Brain Imaging     Most recent CTH 2/04/18 redemonstration of large L MCA territory infarction w/o evidence of hemorrhagic conversion.   Stable post op change from L MCA endovascular stenting.        Vessel Imaging   IR Cerebral Angiogram 1/25/18 demonstrated occlusion of the L M1 segment of the L MCA with good collaterals. Occlusion removed and stent placed with TICI 2C.    Cardiac Imaging   2D Echo 1/26/18   CONCLUSIONS     1 - Normal left ventricular systolic function (EF 65-70%).     2 - Concentric remodeling.     3 - No wall motion abnormalities.     4 - Normal left ventricular diastolic function.     5 - Normal right ventricular systolic function

## 2018-03-16 NOTE — ASSESSMENT & PLAN NOTE
-Stroke risk factor, LDL invalid as TG > 400  - holding statin due to rising LFTs; liver enzymes normalized, but sister refuses to restart statin due to potential side effects.

## 2018-03-16 NOTE — PLAN OF CARE
Problem: Patient Care Overview  Goal: Plan of Care Review  Outcome: Ongoing (interventions implemented as appropriate)  Plan of care discussed with sibling. No distress noted at present. Nonverbal indicators of pain absent at present will cont to monitor. Peg tube in place with cont feedings tolerating well. Accuchecks done every 4 hours with coverage given as ordered. No s/s of hypoglycemia. Fall precautions in place.

## 2018-03-17 LAB
INR PPP: 1
POCT GLUCOSE: 112 MG/DL (ref 70–110)
POCT GLUCOSE: 119 MG/DL (ref 70–110)
POCT GLUCOSE: 137 MG/DL (ref 70–110)
POCT GLUCOSE: 142 MG/DL (ref 70–110)
POCT GLUCOSE: 151 MG/DL (ref 70–110)
POCT GLUCOSE: 167 MG/DL (ref 70–110)
POCT GLUCOSE: 170 MG/DL (ref 70–110)
PROTHROMBIN TIME: 10.6 SEC

## 2018-03-17 PROCEDURE — 25000003 PHARM REV CODE 250: Performed by: PHYSICIAN ASSISTANT

## 2018-03-17 PROCEDURE — 25000003 PHARM REV CODE 250: Performed by: NURSE PRACTITIONER

## 2018-03-17 PROCEDURE — 36415 COLL VENOUS BLD VENIPUNCTURE: CPT

## 2018-03-17 PROCEDURE — 85610 PROTHROMBIN TIME: CPT

## 2018-03-17 PROCEDURE — A4216 STERILE WATER/SALINE, 10 ML: HCPCS | Performed by: NURSE PRACTITIONER

## 2018-03-17 PROCEDURE — 94640 AIRWAY INHALATION TREATMENT: CPT

## 2018-03-17 PROCEDURE — 94668 MNPJ CHEST WALL SBSQ: CPT

## 2018-03-17 PROCEDURE — 94761 N-INVAS EAR/PLS OXIMETRY MLT: CPT

## 2018-03-17 PROCEDURE — 99233 SBSQ HOSP IP/OBS HIGH 50: CPT | Mod: ,,, | Performed by: PSYCHIATRY & NEUROLOGY

## 2018-03-17 PROCEDURE — 25000003 PHARM REV CODE 250: Performed by: STUDENT IN AN ORGANIZED HEALTH CARE EDUCATION/TRAINING PROGRAM

## 2018-03-17 PROCEDURE — 27000221 HC OXYGEN, UP TO 24 HOURS

## 2018-03-17 PROCEDURE — 20600001 HC STEP DOWN PRIVATE ROOM

## 2018-03-17 PROCEDURE — 63600175 PHARM REV CODE 636 W HCPCS: Performed by: NURSE PRACTITIONER

## 2018-03-17 PROCEDURE — 25000242 PHARM REV CODE 250 ALT 637 W/ HCPCS: Performed by: PSYCHIATRY & NEUROLOGY

## 2018-03-17 PROCEDURE — 25000003 PHARM REV CODE 250: Performed by: PSYCHIATRY & NEUROLOGY

## 2018-03-17 PROCEDURE — 25000242 PHARM REV CODE 250 ALT 637 W/ HCPCS: Performed by: NURSE PRACTITIONER

## 2018-03-17 RX ADMIN — IPRATROPIUM BROMIDE AND ALBUTEROL SULFATE 3 ML: 2.5; .5 SOLUTION RESPIRATORY (INHALATION) at 12:03

## 2018-03-17 RX ADMIN — INSULIN ASPART 13 UNITS: 100 INJECTION, SOLUTION INTRAVENOUS; SUBCUTANEOUS at 05:03

## 2018-03-17 RX ADMIN — CLOPIDOGREL BISULFATE 75 MG: 75 TABLET, FILM COATED ORAL at 09:03

## 2018-03-17 RX ADMIN — Medication 3 ML: at 02:03

## 2018-03-17 RX ADMIN — HEPARIN SODIUM 5000 UNITS: 5000 INJECTION, SOLUTION INTRAVENOUS; SUBCUTANEOUS at 09:03

## 2018-03-17 RX ADMIN — METOPROLOL TARTRATE 50 MG: 50 TABLET, FILM COATED ORAL at 09:03

## 2018-03-17 RX ADMIN — HEPARIN SODIUM 5000 UNITS: 5000 INJECTION, SOLUTION INTRAVENOUS; SUBCUTANEOUS at 02:03

## 2018-03-17 RX ADMIN — STANDARDIZED SENNA CONCENTRATE AND DOCUSATE SODIUM 1 TABLET: 8.6; 5 TABLET, FILM COATED ORAL at 09:03

## 2018-03-17 RX ADMIN — HEPARIN SODIUM 5000 UNITS: 5000 INJECTION, SOLUTION INTRAVENOUS; SUBCUTANEOUS at 05:03

## 2018-03-17 RX ADMIN — MINERAL OIL AND WHITE PETROLATUM: 150; 830 OINTMENT OPHTHALMIC at 09:03

## 2018-03-17 RX ADMIN — FLUOXETINE 20 MG: 20 CAPSULE ORAL at 09:03

## 2018-03-17 RX ADMIN — INSULIN ASPART 13 UNITS: 100 INJECTION, SOLUTION INTRAVENOUS; SUBCUTANEOUS at 08:03

## 2018-03-17 RX ADMIN — ACETAMINOPHEN 650 MG: 325 TABLET, FILM COATED ORAL at 09:03

## 2018-03-17 RX ADMIN — MODAFINIL 200 MG: 100 TABLET ORAL at 05:03

## 2018-03-17 RX ADMIN — INSULIN ASPART 13 UNITS: 100 INJECTION, SOLUTION INTRAVENOUS; SUBCUTANEOUS at 04:03

## 2018-03-17 RX ADMIN — Medication 3 ML: at 10:03

## 2018-03-17 RX ADMIN — LISINOPRIL 40 MG: 20 TABLET ORAL at 09:03

## 2018-03-17 RX ADMIN — SODIUM CHLORIDE SOLN NEBU 3% 4 ML: 3 NEBU SOLN at 12:03

## 2018-03-17 RX ADMIN — IPRATROPIUM BROMIDE AND ALBUTEROL SULFATE 3 ML: 2.5; .5 SOLUTION RESPIRATORY (INHALATION) at 07:03

## 2018-03-17 RX ADMIN — IPRATROPIUM BROMIDE AND ALBUTEROL SULFATE 3 ML: 2.5; .5 SOLUTION RESPIRATORY (INHALATION) at 08:03

## 2018-03-17 RX ADMIN — BACLOFEN 10 MG: 10 TABLET ORAL at 09:03

## 2018-03-17 RX ADMIN — INSULIN ASPART 13 UNITS: 100 INJECTION, SOLUTION INTRAVENOUS; SUBCUTANEOUS at 11:03

## 2018-03-17 RX ADMIN — INSULIN ASPART 2 UNITS: 100 INJECTION, SOLUTION INTRAVENOUS; SUBCUTANEOUS at 08:03

## 2018-03-17 RX ADMIN — POLYETHYLENE GLYCOL 3350 17 G: 17 POWDER, FOR SOLUTION ORAL at 09:03

## 2018-03-17 RX ADMIN — Medication 3 ML: at 06:03

## 2018-03-17 RX ADMIN — SODIUM CHLORIDE SOLN NEBU 3% 4 ML: 3 NEBU SOLN at 07:03

## 2018-03-17 RX ADMIN — INSULIN ASPART 13 UNITS: 100 INJECTION, SOLUTION INTRAVENOUS; SUBCUTANEOUS at 09:03

## 2018-03-17 RX ADMIN — MODAFINIL 100 MG: 100 TABLET ORAL at 02:03

## 2018-03-17 RX ADMIN — INSULIN ASPART 13 UNITS: 100 INJECTION, SOLUTION INTRAVENOUS; SUBCUTANEOUS at 12:03

## 2018-03-17 RX ADMIN — ASPIRIN 325 MG ORAL TABLET 325 MG: 325 PILL ORAL at 09:03

## 2018-03-17 RX ADMIN — INSULIN DETEMIR 22 UNITS: 100 INJECTION, SOLUTION SUBCUTANEOUS at 09:03

## 2018-03-17 NOTE — SUBJECTIVE & OBJECTIVE
Neurologic Chief Complaint: Left MCA stroke     Subjective:     Interval History:     No issues overnight stable awaiting discharge     HPI, Past Medical, Family, and Social History remains the same as documented in the initial encounter.      Review of Systems   Constitutional: Negative for chills and fever.   Eyes: Positive for redness and visual disturbance.   Neurological: Positive for speech difficulty and weakness.   Psychiatric/Behavioral: Negative for behavioral problems.     Scheduled Meds:   albuterol-ipratropium 2.5mg-0.5mg/3mL  3 mL Nebulization Q6H    aspirin  325 mg Per G Tube Daily    baclofen  10 mg Per G Tube BID    clopidogrel  75 mg Per G Tube Daily    FLUoxetine  20 mg Per G Tube Daily    heparin (porcine)  5,000 Units Subcutaneous Q8H    insulin aspart U-100  13 Units Subcutaneous Q24H    insulin aspart U-100  13 Units Subcutaneous Q24H    insulin aspart U-100  13 Units Subcutaneous Q24H    insulin aspart U-100  13 Units Subcutaneous Q24H    insulin aspart U-100  13 Units Subcutaneous Q24H    insulin aspart U-100  13 Units Subcutaneous Q24H    insulin detemir U-100  22 Units Subcutaneous Daily    lisinopril  40 mg Per NG tube Daily    metoprolol tartrate  50 mg Per NG tube BID    modafinil  200 mg Per NG tube Daily    And    modafinil  100 mg Per NG tube Daily    polyethylene glycol  17 g Per NG tube Daily    potassium chloride 10%  40 mEq Oral Once    senna-docusate 8.6-50 mg  1 tablet Per NG tube BID    sodium chloride 0.9%  500 mL Intravenous Once    sodium chloride 0.9%  3 mL Intravenous Q8H    sodium chloride 3%  4 mL Nebulization Q6H    white petrolatum-mineral oil   Both Eyes QHS     Continuous Infusions:  PRN Meds:acetaminophen, bisacodyl, dextrose 50%, glucagon (human recombinant), insulin aspart U-100, ipratropium, labetalol, ondansetron    Objective:     Vital Signs (Most Recent):  Temp: 96.4 °F (35.8 °C) (03/17/18 0810)  Pulse: 82 (03/17/18 1107)  Resp: 16  (03/17/18 0846)  BP: 132/72 (03/17/18 0810)  SpO2: 100 % (03/17/18 0846)  BP Location: Left arm    Vital Signs Range (Last 24H):  Temp:  [96.4 °F (35.8 °C)-98.5 °F (36.9 °C)]   Pulse:  [64-85]   Resp:  [14-20]   BP: (118-163)/(72-94)   SpO2:  [91 %-100 %]   BP Location: Left arm    Physical Exam   Constitutional: He appears well-developed and well-nourished.   Neurological: He is alert.   Skin: Skin is warm and dry.   Nursing note and vitals reviewed.    Neurological Exam:  LOC: alert  Language: Global aphasia  Articulation: Mute/Anarthric  Visual Fields: Hemianopsia right  EOM (CN III, IV, VI): Gaze preference  left  Facial Movement (CN VII): Lower facial weakness on the Right  Motor: Arm left  Normal 5/5  Leg left  Paresis: 5/5  Arm right  Plegia 0/5  Leg right Plegia 0/5  Tone: increased tone of the RUE and RLE; normal L sided tone    Laboratory:  CMP:   No results for input(s): GLUCOSE, CALCIUM, ALBUMIN, PROT, NA, K, CO2, CL, BUN, CREATININE, ALKPHOS, ALT, AST, BILITOT in the last 24 hours.  BMP:     Recent Labs  Lab 03/16/18  0543      K 3.8      CO2 27   BUN 25*   CREATININE 0.8   CALCIUM 9.5     CBC:     Recent Labs  Lab 03/16/18  0543   WBC 5.82   RBC 4.17*   HGB 12.5*   HCT 37.5*   *   MCV 90   MCH 30.0   MCHC 33.3     Lipid Panel: No results for input(s): CHOL, LDLCALC, HDL, TRIG in the last 168 hours.  Coagulation:     Recent Labs  Lab 03/17/18  0408   INR 1.0     Platelet Aggregation Study: No results for input(s): PLTAGG, PLTAGINTERP, PLTAGREGLACO, ADPPLTAGGREG in the last 168 hours.  Hgb A1C: No results for input(s): HGBA1C in the last 168 hours.  TSH: No results for input(s): TSH in the last 168 hours.      Diagnostic Results       Brain Imaging     Most recent CTH 2/04/18 redemonstration of large L MCA territory infarction w/o evidence of hemorrhagic conversion.  Stable post op change from L MCA endovascular stenting.      Vessel Imaging   IR Cerebral Angiogram 1/25/18 demonstrated  occlusion of the L M1 segment of the L MCA with good collaterals. Occlusion removed and stent placed with TICI 2C.    Cardiac Imaging   2D Echo 1/26/18   CONCLUSIONS     1 - Normal left ventricular systolic function (EF 65-70%).     2 - Concentric remodeling.     3 - No wall motion abnormalities.     4 - Normal left ventricular diastolic function.     5 - Normal right ventricular systolic function

## 2018-03-17 NOTE — ASSESSMENT & PLAN NOTE
"46 yo M with PMHx HLD, LOYDA, poorly controlled DM II presents to [facility] 01/25/18 after noted "strange behavior" for which EMS was called. Pt was found to have a L M1 occlusion and was taken to IR for thrombectomy with TICI 2C reperfusion and stent placement due to L MCA stenosis. Etiology remains unclear: no obvious signs of cardiac source or atheromatous disease in the aorto/carotid axis. Echo normal with no hx or signs of A fib. PT/OT/SLP will continue to evaluate and treat; recommending SNF. DAPT 2/2 recent stent placement.      Disposition - currently pursuing home health.  Training family on 3/12 to bring patient home.  CM working with [facility] to get home care needs in place. Pending discharge home with family.     Antithrombotics:  mg po qd, clopidogrel 75 mg po qd [s/p intracranial stent]  Statins: Atorvastatin 80 mg po qd -held due to transaminitis- normalized.  Sister refuses to restart statin due to potential side effects.   Aggressive risk factor modification: HLD, DM II (Hgb A1c 10%), Obesity  Rehab efforts: PT/OT/SLP-- Recommending SNF, family wants to bring patient home; working on home health setup   Diagnostics ordered/pending: None  VTE prophylaxis: Heparin 5000 U q8h  BP parameters: SBP <140   "

## 2018-03-17 NOTE — PLAN OF CARE
Problem: Fall Risk (Adult)  Goal: Absence of Falls  Patient will demonstrate the desired outcomes by discharge/transition of care.   Outcome: Ongoing (interventions implemented as appropriate)  POC discussed with family member at bedside. No Neuro changes since previous shift. Cont accu checks every 4 hrs. Pt to be rotated every 2 hours. Pt's family member to call for assistance when ambulating pt.

## 2018-03-17 NOTE — PLAN OF CARE
Chip spoke with Sudha from [facility] regarding patient's DME hospital bed delivery. Sudha informed Chip that the DME company has no mattresses at this time but should have them back in stock in the next day or so. She also gave  [employer] contact number [phone].

## 2018-03-17 NOTE — PLAN OF CARE
Problem: Patient Care Overview  Goal: Plan of Care Review  Outcome: Ongoing (interventions implemented as appropriate)  Plan of care discussed with sibling. Pt disoriented/aphasic unable to process information. No change in neuro status from previous shift. No distress noted at present. Nonverbal indicators of pain absent at present will cont to monitor. Peg tube in place with cont feedings tolerating well. Accuchecks done every 4 hours with coverage given as ordered. No s/s of hypoglycemia. Fall precautions in place. Will cont to monitor.

## 2018-03-17 NOTE — PLAN OF CARE
Chip spoke with Sudha at the [facility] regarding contact information for Mrs. Quevedo. Sudha in formed Cm that she is having trouble contacting Mrs. Quevedo. Cm verified contact numbers with Sudha. Cm will continue to follow      1700  Cm spoke with the patient's sister Jessica regarding discharge plan Cm explained that the [facility] DME company has no mattresses for the patient's hospital bed at this time but should have some in stock in the next day or so. Chip also confirmed with Jessica that [facility]. Quevedo had [facility]. Cm will continue to follow.

## 2018-03-17 NOTE — PROGRESS NOTES
"[facility]  Vascular Neurology  Comprehensive Stroke Center  Progress Note    Assessment/Plan:     * Embolic stroke involving left middle cerebral artery    48 yo M with PMHx HLD, LOYDA, poorly controlled DM II [place] [facility] [place] after noted "strange behavior" for which EMS was called. Pt was found to have a L M1 occlusion and was taken to IR for thrombectomy with TICI 2C reperfusion and stent placement due to L MCA stenosis. Etiology remains unclear: no obvious signs of cardiac source or atheromatous disease in the aorto/carotid axis. Echo normal with no hx or signs of A fib. PT/OT/SLP will continue to evaluate and treat; recommending SNF. DAPT 2/2 recent stent placement.      Disposition - currently pursuing home health.  Training family on 3/12 to bring patient home.  CM working with [facility] to get home care needs in place. Pending discharge home with family.     Antithrombotics:  mg po qd, clopidogrel 75 mg po qd [s/p intracranial stent]  Statins: Atorvastatin 80 mg po qd -held due to transaminitis- normalized.  Sister refuses to restart statin due to potential side effects.   Aggressive risk factor modification: HLD, DM II (Hgb A1c 10%), Obesity  Rehab efforts: PT/OT/SLP-- Recommending SNF, family wants to bring patient home; working on home health setup   Diagnostics ordered/pending: None  VTE prophylaxis: Heparin 5000 U q8h  BP parameters: SBP <140         Right spastic hemiparesis    -Due to stroke  -Continue aggressive PT/OT; Recommending SNF  -starting baclofen        Essential hypertension    -Stroke risk factor  -SBP <140  -Continue scheduled lisinopril 40 mg qd, Metoprolol 50 mg bid          Mixed hyperlipidemia    -Stroke risk factor, LDL invalid as TG > 400  - holding statin due to rising LFTs; liver enzymes normalized, but sister refuses to restart statin due to potential side effects.         Obstructive sleep apnea    -Stroke risk factor  -CPAP qHS  -Wean O2 as " tolerated.        Type 2 diabetes mellitus with hyperglycemia, with long-term current use of insulin    - Stroke risk factor, Hgb A1c 10%  - Continue Diabetasource AC TFs  - Endocrine consulted and guiding insulin modification for TF  - POCT Q4h ; Moderate correction q4hr         Transaminitis    -holding statin  - Liver enzymes normalized 3/15/18, but sister refuses to restart statin due to potential side effects.         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    - Abscess near PEG w/IR aspirate, growing anaerobes   - Rocephin (last dose 3/5) and Flagyl (last dose 3/7)        Nodule of right lung    - Plan for f/u CT 6-12 months via PCP        Oral phase dysphagia    - Continue SLP  - NPO, meds/feeds per PEG        Acute respiratory failure with hypoxia    Likely hypoxia is Aspiration pna vs pneumonitis (as CXR improved after a day) 2/2 to superimposed encephalopathy due to sepsis  -AM CXR 3/2 with mild bibasilar atelectasis   -Continue O2 sats >90%, okay with current requirements of 1L NC, pt has what appears as Cheyne espinal pattern and reported LOYDA  -Sister requested continuous pulse ox; Ordered  -Contraction alkalosis improved with increasing water flushes via PEG  -Considered PE as differential however pt is on OAC and not tachycardic, tachypneic. Wells score 1.5 (low-risk)  - Continue chest physiotherapy, aspiration precautions, wean off O2 support   -plan for home suctioning upon discharge        Cytotoxic cerebral edema    -2/2 stroke, evident on imaging  Stable on repeat CTH 2/4/18        Urinary tract infection due to Klebsiella species     Afebrile, leukocytosis resolved  - Klebsiella from urine cultures (2/25/18) + Anaerobes from PEG site cultures   - rocephin course completed, and Flagyl completed              01/25/18:  Brought in for aphasia, RSW with L gaze preferance. LKN unknown, not tPA candidate. Went to IR for angiogram and stent.  01/29/18:  Off Cardene, remains on Insulin gtt.  Concern for sepsis, respiratory source. Imaging with mass effect and some brain compression; maycol crani watch.  01/30/18:  EEG consistent with focal L slowing 2/2 lesion and mild generalized slowing, no seizures. CT head stable, no hemorrhagic conversion  02/01/18:  Emergent intubation likely due to upper airway obstruction per NCC.    02/02/18:  Pt off Precedex, moving left side spontaneously and opening eyes. Intubated, on spontaneous today. NCC giving steroids in hopes to extubate tomorrow.  02/03/18:  NAEON, intubated, not responsive to verbal stimuli, withdraws from pain on LUE, SBP ~135-230. Continued on insulin gtt, SQH for DVT ppx, and captopril.   02/16/18:  Few changes on exam, continues to have significant RUE/RLE weakness with global aphasia.  Family member at bedside noted attempt to communicate with her.  02/18/18:  Pt largely unchanged on exam this am.  No family member present during am rounds.  02/19/18:  Tolerating facemask. PEG by IR this afternoon.  02/20/18:  s/p PEG. O2 % on 5L NC, still requiring frequent suctioning. Primary team considering transfer to Medicine tomorrow.  02/21/18:  Pt sating % on 3L NC. Restarted DAPT, including . Plans for step down to stroke service.  02/22/18:  Pt with [facility] but not service-connected so unable to be placed at SNF.  Working on insurance coverage of at least [facility] therapy.  02/23/18:  Increased detemir to 36 U bid.  [facility] with [facility] SN[facility] pending completion of paperwork by Stroke team and pt's spouse--in process.  2/24/18 - NAEON. Pts WBC mildly elevated 13.01, pt is afebrile. Will continue to monitor. Endocrine consulted - Recommend levemir 40 units daily to cover basal needs (using ~0.7u/kg); Start novolog 6 units q4hr to cover TF; Moderate correction q4hr. SNF placement pending.  2/25/18 -  Pt WBC increased overnight, HR range , and temp 99.2. Blood cultures ordered and drawn. UA/UC ordered. Nursing staff called a rapid  response this AM due to pts tachycardia, fever, and decreased O2 sats. Arrived to room @ 0824. 1 liter of NS ordered. STAT CXR ordered. Tylenol given. ABG completed with no significant abnormal results. Respiratory suctioned pt and pt repositioned to help with breathing. Pt O2 sats improved on venti mask, temp decreased, and BP remained normotensive. Started on  vanc and zosyn. Pt appears more comfortable and no decline in neuro status. Pt family updated.   2/26/18 - afebrile overnight and leukocytosis improving with BS antibiotics. CT abdomen yesterday noted seroma/hematoma on left rectus abdominus. IR consulted for culture +/- drain.  02/28/2018 s/p aspiration of left rectus collection adjacent to the PEG tube.  Culture sent to lab, pending.  Remains afebrile.  Currently on 4L NC O2.   3/1/18: NAEON.   3/2: Pt afebrile, WBC WNL. Now sating well on 1L NC. Pt emotional on exam, family not interested in SSRI at this time. CM attempting to get pt placed to [facility] so he can then transfer to [facility]. Increased rate of TFs.  3/3: NAEON. Pending placement. Will monitor Na level tomorrow.  3/4: stable, no new issues.  3/5/18 - sleepy today but no events overnight   3/6/18 - sister concerned due to coughing post speech therapy - discussed with speech team and sister   3/7/18 - No change in exam.  [facility] to [facility] - pending approval.  Continue to monitor liver enzymes.  3/8/18 - no acute events overnight. Vitals within normal. Pending placement   03/09/2018 NAEON  3/10/18 starting baclofen for spasticity, fluoxetine for depression and therapy motivation  3/11/18 OT to make splint for RUE to prevent contractures, plans for discharge home with family training planned for Monday  3/15/18 CM working with [facility] to get home care needs in place. Possible discharge tomorrow with family. NAEON. Liver enzymes normalized, but sister refuses to restart statin due to potential side effects.   3/16/18 NAEON. Still  awaiting home health equipment before patient is able to go home.   3-17-18 still awaiting mattress for bed to be able to go home to [place]    STROKE [facility] [place]   Acute Stroke Times   Stroke Team Called Date: 01/25/18  Stroke Team Called Time: 1852  Stroke Team Arrival Date: 01/25/18  Stroke Team Arrival Time: 0652  CT Interpretation Time: 1910  Decision to Treat Time for Alteplase:  (n/a unknown last known normal )  Decision to Treat Time for IR: 1915    NIH Scale:  1a. Level Of Consciousness: 0-->Alert: keenly responsive  1b. LOC Questions: 2-->Answers neither question correctly  1c. LOC Commands: 2-->Performs neither task correctly  2. Best Gaze: 1-->Partial gaze palsy: gaze is abnormal in one or both eyes, but forced deviation or total gaze paresis is not present  3. Visual: 2-->Complete hemianopia  4. Facial Palsy: 1-->Minor paralysis (flattened nasolabial fold, asymmetry on smiling)  5a. Motor Arm, Left: 0-->No drift: limb holds 90 (or 45) degrees for full 10 secs  5b. Motor Arm, Right: 4-->No movement  6a. Motor Leg, Left: 0-->No drift: leg holds 30 degree position for full 5 secs  6b. Motor Leg, Right: 4-->No movement  7. Limb Ataxia: 0-->Absent  8. Sensory: 1-->Mild-to-moderate sensory loss: patient feels pinprick is less sharp or is dull on the affected side: or there is a loss of superficial pain with pinprick, but patient is aware of being touched  9. Best Language: 3-->Mute, global aphasia: no usable speech or auditory comprehension  10. Dysarthria: 2-->Severe dysarthria: patients speech is so slurred as to be unintelligible in the absence of or out of proportion to any dysphasia, or is mute/anarthric  11. Extinction and Inattention (formerly Neglect): 0-->No abnormality  Total (NIH Stroke Scale): 22       Modified Tampa Score: 0  Streator Coma Scale:    ABCD2 Score:    [id number] Score:   HAS -BLED Score:   ICH Score:   Hunt & Laguerre Classification:      Hemorrhagic change of an Ischemic Stroke: Does  this patient have an ischemic stroke with hemorrhagic changes? No     Neurologic Chief Complaint: Left MCA stroke     Subjective:     Interval History:     No issues overnight stable awaiting discharge     HPI, Past Medical, Family, and Social History remains the same as documented in the initial encounter.      Review of Systems   Constitutional: Negative for chills and fever.   Eyes: Positive for redness and visual disturbance.   Neurological: Positive for speech difficulty and weakness.   Psychiatric/Behavioral: Negative for behavioral problems.     Scheduled Meds:   albuterol-ipratropium 2.5mg-0.5mg/3mL  3 mL Nebulization Q6H    aspirin  325 mg Per G Tube Daily    baclofen  10 mg Per G Tube BID    clopidogrel  75 mg Per G Tube Daily    FLUoxetine  20 mg Per G Tube Daily    heparin (porcine)  5,000 Units Subcutaneous Q8H    insulin aspart U-100  13 Units Subcutaneous Q24H    insulin aspart U-100  13 Units Subcutaneous Q24H    insulin aspart U-100  13 Units Subcutaneous Q24H    insulin aspart U-100  13 Units Subcutaneous Q24H    insulin aspart U-100  13 Units Subcutaneous Q24H    insulin aspart U-100  13 Units Subcutaneous Q24H    insulin detemir U-100  22 Units Subcutaneous Daily    lisinopril  40 mg Per NG tube Daily    metoprolol tartrate  50 mg Per NG tube BID    modafinil  200 mg Per NG tube Daily    And    modafinil  100 mg Per NG tube Daily    polyethylene glycol  17 g Per NG tube Daily    potassium chloride 10%  40 mEq Oral Once    senna-docusate 8.6-50 mg  1 tablet Per NG tube BID    sodium chloride 0.9%  500 mL Intravenous Once    sodium chloride 0.9%  3 mL Intravenous Q8H    sodium chloride 3%  4 mL Nebulization Q6H    white petrolatum-mineral oil   Both Eyes QHS     Continuous Infusions:  PRN Meds:acetaminophen, bisacodyl, dextrose 50%, glucagon (human recombinant), insulin aspart U-100, ipratropium, labetalol, ondansetron    Objective:     Vital Signs (Most Recent):  Temp: 96.4  °F (35.8 °C) (03/17/18 0810)  Pulse: 82 (03/17/18 1107)  Resp: 16 (03/17/18 0846)  BP: 132/72 (03/17/18 0810)  SpO2: 100 % (03/17/18 0846)  BP Location: Left arm    Vital Signs Range (Last 24H):  Temp:  [96.4 °F (35.8 °C)-98.5 °F (36.9 °C)]   Pulse:  [64-85]   Resp:  [14-20]   BP: (118-163)/(72-94)   SpO2:  [91 %-100 %]   BP Location: Left arm    Physical Exam   Constitutional: He appears well-developed and well-nourished.   Neurological: He is alert.   Skin: Skin is warm and dry.   Nursing note and vitals reviewed.    Neurological Exam:  LOC: alert  Language: Global aphasia  Articulation: Mute/Anarthric  Visual Fields: Hemianopsia right  EOM (CN III, IV, VI): Gaze preference  left  Facial Movement (CN VII): Lower facial weakness on the Right  Motor: Arm left  Normal 5/5  Leg left  Paresis: 5/5  Arm right  Plegia 0/5  Leg right Plegia 0/5  Tone: increased tone of the RUE and RLE; normal L sided tone    Laboratory:  CMP:   No results for input(s): GLUCOSE, CALCIUM, ALBUMIN, PROT, NA, K, CO2, CL, BUN, CREATININE, ALKPHOS, ALT, AST, BILITOT in the last 24 hours.  BMP:     Recent Labs  Lab 03/16/18  0543      K 3.8      CO2 27   BUN 25*   CREATININE 0.8   CALCIUM 9.5     CBC:     Recent Labs  Lab 03/16/18  0543   WBC 5.82   RBC 4.17*   HGB 12.5*   HCT 37.5*   *   MCV 90   MCH 30.0   MCHC 33.3     Lipid Panel: No results for input(s): CHOL, LDLCALC, HDL, TRIG in the last 168 hours.  Coagulation:     Recent Labs  Lab 03/17/18  0408   INR 1.0     Platelet Aggregation Study: No results for input(s): PLTAGG, PLTAGINTERP, PLTAGREGLACO, ADPPLTAGGREG in the last 168 hours.  Hgb A1C: No results for input(s): HGBA1C in the last 168 hours.  TSH: No results for input(s): TSH in the last 168 hours.      Diagnostic Results       Brain Imaging     Most recent CTH 2/04/18 redemonstration of large L MCA territory infarction w/o evidence of hemorrhagic conversion.  Stable post op change from L MCA endovascular  stenting.      Vessel Imaging   IR Cerebral Angiogram 1/25/18 demonstrated occlusion of the L M1 segment of the L MCA with good collaterals. Occlusion removed and stent placed with TICI 2C.    Cardiac Imaging   2D Echo 1/26/18   CONCLUSIONS     1 - Normal left ventricular systolic function (EF 65-70%).     2 - Concentric remodeling.     3 - No wall motion abnormalities.     4 - Normal left ventricular diastolic function.     5 - Normal right ventricular systolic function       Ericka Mason NP  Comprehensive Stroke Center  Department of Vascular Neurology   [facility]

## 2018-03-18 LAB
ALBUMIN SERPL BCP-MCNC: 3.3 G/DL
ALP SERPL-CCNC: 92 U/L
ALT SERPL W/O P-5'-P-CCNC: 51 U/L
ANION GAP SERPL CALC-SCNC: 9 MMOL/L
AST SERPL-CCNC: 30 U/L
BASOPHILS # BLD AUTO: 0.04 K/UL
BASOPHILS NFR BLD: 0.7 %
BILIRUB SERPL-MCNC: 0.6 MG/DL
BUN SERPL-MCNC: 23 MG/DL
CALCIUM SERPL-MCNC: 9.7 MG/DL
CHLORIDE SERPL-SCNC: 100 MMOL/L
CO2 SERPL-SCNC: 28 MMOL/L
CREAT SERPL-MCNC: 0.8 MG/DL
DIFFERENTIAL METHOD: ABNORMAL
EOSINOPHIL # BLD AUTO: 0.1 K/UL
EOSINOPHIL NFR BLD: 1.4 %
ERYTHROCYTE [DISTWIDTH] IN BLOOD BY AUTOMATED COUNT: 14.7 %
EST. GFR  (AFRICAN AMERICAN): >60 ML/MIN/1.73 M^2
EST. GFR  (NON AFRICAN AMERICAN): >60 ML/MIN/1.73 M^2
GLUCOSE SERPL-MCNC: 138 MG/DL
HCT VFR BLD AUTO: 40.5 %
HGB BLD-MCNC: 13.8 G/DL
IMM GRANULOCYTES # BLD AUTO: 0.02 K/UL
IMM GRANULOCYTES NFR BLD AUTO: 0.3 %
INR PPP: 0.9
LYMPHOCYTES # BLD AUTO: 2 K/UL
LYMPHOCYTES NFR BLD: 34.6 %
MAGNESIUM SERPL-MCNC: 2 MG/DL
MCH RBC QN AUTO: 30.2 PG
MCHC RBC AUTO-ENTMCNC: 34.1 G/DL
MCV RBC AUTO: 89 FL
MONOCYTES # BLD AUTO: 0.6 K/UL
MONOCYTES NFR BLD: 9.8 %
NEUTROPHILS # BLD AUTO: 3.1 K/UL
NEUTROPHILS NFR BLD: 53.2 %
NRBC BLD-RTO: 0 /100 WBC
PHOSPHATE SERPL-MCNC: 4.4 MG/DL
PLATELET # BLD AUTO: 366 K/UL
PMV BLD AUTO: 9.8 FL
POCT GLUCOSE: 144 MG/DL (ref 70–110)
POCT GLUCOSE: 148 MG/DL (ref 70–110)
POCT GLUCOSE: 153 MG/DL (ref 70–110)
POCT GLUCOSE: 171 MG/DL (ref 70–110)
POCT GLUCOSE: 177 MG/DL (ref 70–110)
POCT GLUCOSE: 181 MG/DL (ref 70–110)
POCT GLUCOSE: 95 MG/DL (ref 70–110)
POTASSIUM SERPL-SCNC: 3.9 MMOL/L
PROT SERPL-MCNC: 7.3 G/DL
PROTHROMBIN TIME: 9.5 SEC
RBC # BLD AUTO: 4.57 M/UL
SODIUM SERPL-SCNC: 137 MMOL/L
WBC # BLD AUTO: 5.89 K/UL

## 2018-03-18 PROCEDURE — 20600001 HC STEP DOWN PRIVATE ROOM

## 2018-03-18 PROCEDURE — 94668 MNPJ CHEST WALL SBSQ: CPT

## 2018-03-18 PROCEDURE — 25000242 PHARM REV CODE 250 ALT 637 W/ HCPCS: Performed by: NURSE PRACTITIONER

## 2018-03-18 PROCEDURE — 83735 ASSAY OF MAGNESIUM: CPT

## 2018-03-18 PROCEDURE — 63600175 PHARM REV CODE 636 W HCPCS: Performed by: NURSE PRACTITIONER

## 2018-03-18 PROCEDURE — 85610 PROTHROMBIN TIME: CPT

## 2018-03-18 PROCEDURE — 25000003 PHARM REV CODE 250: Performed by: STUDENT IN AN ORGANIZED HEALTH CARE EDUCATION/TRAINING PROGRAM

## 2018-03-18 PROCEDURE — 85025 COMPLETE CBC W/AUTO DIFF WBC: CPT

## 2018-03-18 PROCEDURE — 25000003 PHARM REV CODE 250: Performed by: NURSE PRACTITIONER

## 2018-03-18 PROCEDURE — 25000242 PHARM REV CODE 250 ALT 637 W/ HCPCS: Performed by: PSYCHIATRY & NEUROLOGY

## 2018-03-18 PROCEDURE — 84100 ASSAY OF PHOSPHORUS: CPT

## 2018-03-18 PROCEDURE — 80053 COMPREHEN METABOLIC PANEL: CPT

## 2018-03-18 PROCEDURE — 25000003 PHARM REV CODE 250: Performed by: PSYCHIATRY & NEUROLOGY

## 2018-03-18 PROCEDURE — 25000003 PHARM REV CODE 250: Performed by: PHYSICIAN ASSISTANT

## 2018-03-18 PROCEDURE — A4216 STERILE WATER/SALINE, 10 ML: HCPCS | Performed by: NURSE PRACTITIONER

## 2018-03-18 PROCEDURE — 94640 AIRWAY INHALATION TREATMENT: CPT

## 2018-03-18 PROCEDURE — 94761 N-INVAS EAR/PLS OXIMETRY MLT: CPT

## 2018-03-18 PROCEDURE — 36415 COLL VENOUS BLD VENIPUNCTURE: CPT

## 2018-03-18 PROCEDURE — 99233 SBSQ HOSP IP/OBS HIGH 50: CPT | Mod: ,,, | Performed by: PSYCHIATRY & NEUROLOGY

## 2018-03-18 PROCEDURE — 99900035 HC TECH TIME PER 15 MIN (STAT)

## 2018-03-18 RX ADMIN — SODIUM CHLORIDE SOLN NEBU 3% 4 ML: 3 NEBU SOLN at 06:03

## 2018-03-18 RX ADMIN — HEPARIN SODIUM 5000 UNITS: 5000 INJECTION, SOLUTION INTRAVENOUS; SUBCUTANEOUS at 03:03

## 2018-03-18 RX ADMIN — Medication 3 ML: at 10:03

## 2018-03-18 RX ADMIN — IPRATROPIUM BROMIDE AND ALBUTEROL SULFATE 3 ML: 2.5; .5 SOLUTION RESPIRATORY (INHALATION) at 07:03

## 2018-03-18 RX ADMIN — MINERAL OIL AND WHITE PETROLATUM: 150; 830 OINTMENT OPHTHALMIC at 09:03

## 2018-03-18 RX ADMIN — ASPIRIN 325 MG ORAL TABLET 325 MG: 325 PILL ORAL at 10:03

## 2018-03-18 RX ADMIN — INSULIN ASPART 1 UNITS: 100 INJECTION, SOLUTION INTRAVENOUS; SUBCUTANEOUS at 12:03

## 2018-03-18 RX ADMIN — SODIUM CHLORIDE SOLN NEBU 3% 4 ML: 3 NEBU SOLN at 07:03

## 2018-03-18 RX ADMIN — MODAFINIL 200 MG: 100 TABLET ORAL at 06:03

## 2018-03-18 RX ADMIN — INSULIN ASPART 13 UNITS: 100 INJECTION, SOLUTION INTRAVENOUS; SUBCUTANEOUS at 04:03

## 2018-03-18 RX ADMIN — SODIUM CHLORIDE SOLN NEBU 3% 4 ML: 3 NEBU SOLN at 12:03

## 2018-03-18 RX ADMIN — Medication 3 ML: at 02:03

## 2018-03-18 RX ADMIN — IPRATROPIUM BROMIDE AND ALBUTEROL SULFATE 3 ML: 2.5; .5 SOLUTION RESPIRATORY (INHALATION) at 12:03

## 2018-03-18 RX ADMIN — INSULIN ASPART 2 UNITS: 100 INJECTION, SOLUTION INTRAVENOUS; SUBCUTANEOUS at 04:03

## 2018-03-18 RX ADMIN — INSULIN ASPART 13 UNITS: 100 INJECTION, SOLUTION INTRAVENOUS; SUBCUTANEOUS at 12:03

## 2018-03-18 RX ADMIN — HEPARIN SODIUM 5000 UNITS: 5000 INJECTION, SOLUTION INTRAVENOUS; SUBCUTANEOUS at 06:03

## 2018-03-18 RX ADMIN — BACLOFEN 10 MG: 10 TABLET ORAL at 09:03

## 2018-03-18 RX ADMIN — STANDARDIZED SENNA CONCENTRATE AND DOCUSATE SODIUM 1 TABLET: 8.6; 5 TABLET, FILM COATED ORAL at 10:03

## 2018-03-18 RX ADMIN — METOPROLOL TARTRATE 50 MG: 50 TABLET, FILM COATED ORAL at 10:03

## 2018-03-18 RX ADMIN — FLUOXETINE 20 MG: 20 CAPSULE ORAL at 10:03

## 2018-03-18 RX ADMIN — LISINOPRIL 40 MG: 20 TABLET ORAL at 10:03

## 2018-03-18 RX ADMIN — INSULIN DETEMIR 22 UNITS: 100 INJECTION, SOLUTION SUBCUTANEOUS at 08:03

## 2018-03-18 RX ADMIN — INSULIN ASPART 13 UNITS: 100 INJECTION, SOLUTION INTRAVENOUS; SUBCUTANEOUS at 08:03

## 2018-03-18 RX ADMIN — CLOPIDOGREL BISULFATE 75 MG: 75 TABLET, FILM COATED ORAL at 10:03

## 2018-03-18 RX ADMIN — MODAFINIL 100 MG: 100 TABLET ORAL at 03:03

## 2018-03-18 RX ADMIN — IPRATROPIUM BROMIDE AND ALBUTEROL SULFATE 3 ML: 2.5; .5 SOLUTION RESPIRATORY (INHALATION) at 11:03

## 2018-03-18 RX ADMIN — INSULIN ASPART 1 UNITS: 100 INJECTION, SOLUTION INTRAVENOUS; SUBCUTANEOUS at 04:03

## 2018-03-18 RX ADMIN — INSULIN ASPART 2 UNITS: 100 INJECTION, SOLUTION INTRAVENOUS; SUBCUTANEOUS at 08:03

## 2018-03-18 RX ADMIN — Medication 3 ML: at 06:03

## 2018-03-18 RX ADMIN — METOPROLOL TARTRATE 50 MG: 50 TABLET, FILM COATED ORAL at 09:03

## 2018-03-18 RX ADMIN — IPRATROPIUM BROMIDE AND ALBUTEROL SULFATE 3 ML: 2.5; .5 SOLUTION RESPIRATORY (INHALATION) at 06:03

## 2018-03-18 RX ADMIN — HEPARIN SODIUM 5000 UNITS: 5000 INJECTION, SOLUTION INTRAVENOUS; SUBCUTANEOUS at 09:03

## 2018-03-18 NOTE — PROGRESS NOTES
"[facility]  Vascular Neurology  Comprehensive Stroke Center  Progress Note    Assessment/Plan:     * Embolic stroke involving left middle cerebral artery    48 yo M with PMHx HLD, LOYDA, poorly controlled DM II [place] [facility] [place] after noted "strange behavior" for which EMS was called. Pt was found to have a L M1 occlusion and was taken to IR for thrombectomy with TICI 2C reperfusion and stent placement due to L MCA stenosis. Etiology remains unclear: no obvious signs of cardiac source or atheromatous disease in the aorto/carotid axis. Echo normal with no hx or signs of A fib. PT/OT/SLP will continue to evaluate and treat; recommending SNF. DAPT 2/2 recent stent placement.      Disposition - currently pursuing home health.  Training family on 3/12 to bring patient home.  CM working with [facility] to get home care needs in place. Pending discharge home with family.     Antithrombotics:  mg po qd, clopidogrel 75 mg po qd [s/p intracranial stent]  Statins: Atorvastatin 80 mg po qd -held due to transaminitis- normalized.  Sister refuses to restart statin due to potential side effects.   Aggressive risk factor modification: HLD, DM II (Hgb A1c 10%), Obesity  Rehab efforts: PT/OT/SLP-- Recommending SNF, family wants to bring patient home; working on home health setup   Diagnostics ordered/pending: None  VTE prophylaxis: Heparin 5000 U q8h  BP parameters: SBP <140         Right spastic hemiparesis    -Due to stroke  -Continue aggressive PT/OT; Recommending SNF  -starting baclofen        Essential hypertension    -Stroke risk factor  -SBP <140  -Continue scheduled lisinopril 40 mg qd, Metoprolol 50 mg bid          Mixed hyperlipidemia    -Stroke risk factor, LDL invalid as TG > 400  - holding statin due to rising LFTs; liver enzymes normalized, but sister refuses to restart statin due to potential side effects.         Obstructive sleep apnea    -Stroke risk factor  -CPAP qHS  -Wean O2 as " tolerated.        Type 2 diabetes mellitus with hyperglycemia, with long-term current use of insulin    - Stroke risk factor, Hgb A1c 10%  - Continue Diabetasource AC TFs  - Endocrine consulted and guiding insulin modification for TF  - POCT Q4h ; Moderate correction q4hr         Transaminitis    -holding statin  - Liver enzymes normalized 3/15/18, but sister refuses to restart statin due to potential side effects.         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    - Abscess near PEG w/IR aspirate, growing anaerobes   - Rocephin (last dose 3/5) and Flagyl (last dose 3/7)        Nodule of right lung    - Plan for f/u CT 6-12 months via PCP        Oral phase dysphagia    - Continue SLP  - NPO, meds/feeds per PEG        Acute respiratory failure with hypoxia    Likely hypoxia is Aspiration pna vs pneumonitis (as CXR improved after a day) 2/2 to superimposed encephalopathy due to sepsis  -AM CXR 3/2 with mild bibasilar atelectasis   -Continue O2 sats >90%, okay with current requirements of 1L NC, pt has what appears as Cheyne espinal pattern and reported LOYDA  -Sister requested continuous pulse ox; Ordered  -Contraction alkalosis improved with increasing water flushes via PEG  -Considered PE as differential however pt is on OAC and not tachycardic, tachypneic. Wells score 1.5 (low-risk)  - Continue chest physiotherapy, aspiration precautions, wean off O2 support   -plan for home suctioning upon discharge        Cytotoxic cerebral edema    -2/2 stroke, evident on imaging  Stable on repeat CTH 2/4/18        Urinary tract infection due to Klebsiella species     Afebrile, leukocytosis resolved  - Klebsiella from urine cultures (2/25/18) + Anaerobes from PEG site cultures   - rocephin course completed, and Flagyl completed              01/25/18:  Brought in for aphasia, RSW with L gaze preferance. LKN unknown, not tPA candidate. Went to IR for angiogram and stent.  01/29/18:  Off Cardene, remains on Insulin gtt.  Concern for sepsis, respiratory source. Imaging with mass effect and some brain compression; maycol crani watch.  01/30/18:  EEG consistent with focal L slowing 2/2 lesion and mild generalized slowing, no seizures. CT head stable, no hemorrhagic conversion  02/01/18:  Emergent intubation likely due to upper airway obstruction per NCC.    02/02/18:  Pt off Precedex, moving left side spontaneously and opening eyes. Intubated, on spontaneous today. NCC giving steroids in hopes to extubate tomorrow.  02/03/18:  NAEON, intubated, not responsive to verbal stimuli, withdraws from pain on LUE, SBP ~135-230. Continued on insulin gtt, SQH for DVT ppx, and captopril.   02/16/18:  Few changes on exam, continues to have significant RUE/RLE weakness with global aphasia.  Family member at bedside noted attempt to communicate with her.  02/18/18:  Pt largely unchanged on exam this am.  No family member present during am rounds.  02/19/18:  Tolerating facemask. PEG by IR this afternoon.  02/20/18:  s/p PEG. O2 % on 5L NC, still requiring frequent suctioning. Primary team considering transfer to Medicine tomorrow.  02/21/18:  Pt sating % on 3L NC. Restarted DAPT, including . Plans for step down to stroke service.  02/22/18:  Pt with [facility] but not service-connected so unable to be placed at SNF.  Working on insurance coverage of at least [facility] therapy.  02/23/18:  Increased detemir to 36 U bid.  [facility] with [facility] SN[facility] pending completion of paperwork by Stroke team and pt's spouse--in process.  2/24/18 - NAEON. Pts WBC mildly elevated 13.01, pt is afebrile. Will continue to monitor. Endocrine consulted - Recommend levemir 40 units daily to cover basal needs (using ~0.7u/kg); Start novolog 6 units q4hr to cover TF; Moderate correction q4hr. SNF placement pending.  2/25/18 -  Pt WBC increased overnight, HR range , and temp 99.2. Blood cultures ordered and drawn. UA/UC ordered. Nursing staff called a rapid  response this AM due to pts tachycardia, fever, and decreased O2 sats. Arrived to room @ 0824. 1 liter of NS ordered. STAT CXR ordered. Tylenol given. ABG completed with no significant abnormal results. Respiratory suctioned pt and pt repositioned to help with breathing. Pt O2 sats improved on venti mask, temp decreased, and BP remained normotensive. Started on  vanc and zosyn. Pt appears more comfortable and no decline in neuro status. Pt family updated.   2/26/18 - afebrile overnight and leukocytosis improving with BS antibiotics. CT abdomen yesterday noted seroma/hematoma on left rectus abdominus. IR consulted for culture +/- drain.  02/28/2018 s/p aspiration of left rectus collection adjacent to the PEG tube.  Culture sent to lab, pending.  Remains afebrile.  Currently on 4L NC O2.   3/1/18: NAEON.   3/2: Pt afebrile, WBC WNL. Now sating well on 1L NC. Pt emotional on exam, family not interested in SSRI at this time. CM attempting to get pt placed to [facility] so he can then transfer to [facility]. Increased rate of TFs.  3/3: NAEON. Pending placement. Will monitor Na level tomorrow.  3/4: stable, no new issues.  3/5/18 - sleepy today but no events overnight   3/6/18 - sister concerned due to coughing post speech therapy - discussed with speech team and sister   3/7/18 - No change in exam.  [facility] to [facility] - pending approval.  Continue to monitor liver enzymes.  3/8/18 - no acute events overnight. Vitals within normal. Pending placement   03/09/2018 NAEON  3/10/18 starting baclofen for spasticity, fluoxetine for depression and therapy motivation  3/11/18 OT to make splint for RUE to prevent contractures, plans for discharge home with family training planned for Monday  3/15/18 CM working with [facility] to get home care needs in place. Possible discharge tomorrow with family. NAEON. Liver enzymes normalized, but sister refuses to restart statin due to potential side effects.   3/16/18 NAEON. Still  awaiting home health equipment before patient is able to go home.   3-17-18 still awaiting mattress for bed to be able to go home to [place]    STROKE [facility]   Acute Stroke Times   Stroke Team Called Date: 01/25/18  Stroke Team Called Time: 1852  Stroke Team Arrival Date: 01/25/18  Stroke Team Arrival Time: 0652  CT Interpretation Time: 1910  Decision to Treat Time for Alteplase:  (n/a unknown last known normal )  Decision to Treat Time for IR: 1915    NIH Scale:  1a. Level Of Consciousness: 0-->Alert: keenly responsive  1b. LOC Questions: 2-->Answers neither question correctly  1c. LOC Commands: 2-->Performs neither task correctly  2. Best Gaze: 1-->Partial gaze palsy: gaze is abnormal in one or both eyes, but forced deviation or total gaze paresis is not present  3. Visual: 2-->Complete hemianopia  4. Facial Palsy: 1-->Minor paralysis (flattened nasolabial fold, asymmetry on smiling)  5a. Motor Arm, Left: 0-->No drift: limb holds 90 (or 45) degrees for full 10 secs  5b. Motor Arm, Right: 4-->No movement  6a. Motor Leg, Left: 0-->No drift: leg holds 30 degree position for full 5 secs  6b. Motor Leg, Right: 4-->No movement  7. Limb Ataxia: 0-->Absent  8. Sensory: 1-->Mild-to-moderate sensory loss: patient feels pinprick is less sharp or is dull on the affected side: or there is a loss of superficial pain with pinprick, but patient is aware of being touched  9. Best Language: 3-->Mute, global aphasia: no usable speech or auditory comprehension  10. Dysarthria: 2-->Severe dysarthria: patients speech is so slurred as to be unintelligible in the absence of or out of proportion to any dysphasia, or is mute/anarthric  11. Extinction and Inattention (formerly Neglect): 0-->No abnormality  Total (NIH Stroke Scale): 22       Modified Watchung Score: 0  [place] Coma Scale:    ABCD2 Score:    [id number] Score:   HAS -BLED Score:   ICH Score:   Hunt & Laguerre Classification:      Hemorrhagic change of an Ischemic Stroke: Does  this patient have an ischemic stroke with hemorrhagic changes? No     Neurologic Chief Complaint: Left MCA stroke     Subjective:     Interval History:     No issues overnight stable awaiting discharge     HPI, Past Medical, Family, and Social History remains the same as documented in the initial encounter.      Review of Systems   Constitutional: Negative for chills and fever.   Eyes: Positive for redness and visual disturbance.   Neurological: Positive for speech difficulty and weakness.   Psychiatric/Behavioral: Negative for behavioral problems.     Scheduled Meds:   albuterol-ipratropium 2.5mg-0.5mg/3mL  3 mL Nebulization Q6H    aspirin  325 mg Per G Tube Daily    baclofen  10 mg Per G Tube BID    clopidogrel  75 mg Per G Tube Daily    FLUoxetine  20 mg Per G Tube Daily    heparin (porcine)  5,000 Units Subcutaneous Q8H    insulin aspart U-100  13 Units Subcutaneous Q24H    insulin aspart U-100  13 Units Subcutaneous Q24H    insulin aspart U-100  13 Units Subcutaneous Q24H    insulin aspart U-100  13 Units Subcutaneous Q24H    insulin aspart U-100  13 Units Subcutaneous Q24H    insulin aspart U-100  13 Units Subcutaneous Q24H    insulin detemir U-100  22 Units Subcutaneous Daily    lisinopril  40 mg Per NG tube Daily    metoprolol tartrate  50 mg Per NG tube BID    modafinil  200 mg Per NG tube Daily    And    modafinil  100 mg Per NG tube Daily    polyethylene glycol  17 g Per NG tube Daily    potassium chloride 10%  40 mEq Oral Once    senna-docusate 8.6-50 mg  1 tablet Per NG tube BID    sodium chloride 0.9%  500 mL Intravenous Once    sodium chloride 0.9%  3 mL Intravenous Q8H    sodium chloride 3%  4 mL Nebulization Q6H    white petrolatum-mineral oil   Both Eyes QHS     Continuous Infusions:  PRN Meds:acetaminophen, bisacodyl, dextrose 50%, glucagon (human recombinant), insulin aspart U-100, ipratropium, labetalol, ondansetron    Objective:     Vital Signs (Most Recent):  Temp: 97.9  °F (36.6 °C) (03/18/18 1113)  Pulse: 70 (03/18/18 1113)  Resp: 18 (03/18/18 1113)  BP: 127/89 (03/18/18 1113)  SpO2: (!) 92 % (03/18/18 1113)  BP Location: Right arm    Vital Signs Range (Last 24H):  Temp:  [97.9 °F (36.6 °C)-99.2 °F (37.3 °C)]   Pulse:  []   Resp:  [16-18]   BP: (119-150)/(78-98)   SpO2:  [92 %-99 %]   BP Location: Right arm    Physical Exam   Constitutional: He appears well-developed and well-nourished.   Neurological: He is alert.   Skin: Skin is warm and dry.   Nursing note and vitals reviewed.    Neurological Exam:  LOC: alert  Language: Global aphasia  Articulation: Mute/Anarthric  Visual Fields: Hemianopsia right  EOM (CN III, IV, VI): Gaze preference  left  Facial Movement (CN VII): Lower facial weakness on the Right  Motor: Arm left  Normal 5/5  Leg left  Paresis: 5/5  Arm right  Plegia 0/5  Leg right Plegia 0/5  Tone: increased tone of the RUE and RLE; normal L sided tone    Laboratory:  CMP:     Recent Labs  Lab 03/18/18  0644   CALCIUM 9.7   ALBUMIN 3.3*   PROT 7.3      K 3.9   CO2 28      BUN 23*   CREATININE 0.8   ALKPHOS 92   ALT 51*   AST 30   BILITOT 0.6     BMP:     Recent Labs  Lab 03/18/18  0644      K 3.9      CO2 28   BUN 23*   CREATININE 0.8   CALCIUM 9.7     CBC:     Recent Labs  Lab 03/18/18  0643   WBC 5.89   RBC 4.57*   HGB 13.8*   HCT 40.5   *   MCV 89   MCH 30.2   MCHC 34.1     Lipid Panel: No results for input(s): CHOL, LDLCALC, HDL, TRIG in the last 168 hours.  Coagulation:     Recent Labs  Lab 03/18/18  0644   INR 0.9     Platelet Aggregation Study: No results for input(s): PLTAGG, PLTAGINTERP, PLTAGREGLACO, ADPPLTAGGREG in the last 168 hours.  Hgb A1C: No results for input(s): HGBA1C in the last 168 hours.  TSH: No results for input(s): TSH in the last 168 hours.      Diagnostic Results       Brain Imaging     Most recent CTH 2/04/18 redemonstration of large L MCA territory infarction w/o evidence of hemorrhagic conversion.   Stable post op change from L MCA endovascular stenting.      Vessel Imaging   IR Cerebral Angiogram 1/25/18 demonstrated occlusion of the L M1 segment of the L MCA with good collaterals. Occlusion removed and stent placed with TICI 2C.    Cardiac Imaging   2D Echo 1/26/18   CONCLUSIONS     1 - Normal left ventricular systolic function (EF 65-70%).     2 - Concentric remodeling.     3 - No wall motion abnormalities.     4 - Normal left ventricular diastolic function.     5 - Normal right ventricular systolic function       Ericka Mason NP  [facility] Stroke [facility]  Department of Vascular Neurology   [facility]

## 2018-03-18 NOTE — PLAN OF CARE
Problem: Fall Risk (Adult)  Goal: Identify Related Risk Factors and Signs and Symptoms  Related risk factors and signs and symptoms are identified upon initiation of Human Response Clinical Practice Guideline (CPG)    Outcome: Ongoing (interventions implemented as appropriate)  Fall precautions maintained, call bell within reach, VSS, bed in lowest position, no distress noted, will continue to monitor.

## 2018-03-18 NOTE — SUBJECTIVE & OBJECTIVE
Neurologic Chief Complaint: Left MCA stroke     Subjective:     Interval History:     No issues overnight stable awaiting discharge     HPI, Past Medical, Family, and Social History remains the same as documented in the initial encounter.      Review of Systems   Constitutional: Negative for chills and fever.   Eyes: Positive for redness and visual disturbance.   Neurological: Positive for speech difficulty and weakness.   Psychiatric/Behavioral: Negative for behavioral problems.     Scheduled Meds:   albuterol-ipratropium 2.5mg-0.5mg/3mL  3 mL Nebulization Q6H    aspirin  325 mg Per G Tube Daily    baclofen  10 mg Per G Tube BID    clopidogrel  75 mg Per G Tube Daily    FLUoxetine  20 mg Per G Tube Daily    heparin (porcine)  5,000 Units Subcutaneous Q8H    insulin aspart U-100  13 Units Subcutaneous Q24H    insulin aspart U-100  13 Units Subcutaneous Q24H    insulin aspart U-100  13 Units Subcutaneous Q24H    insulin aspart U-100  13 Units Subcutaneous Q24H    insulin aspart U-100  13 Units Subcutaneous Q24H    insulin aspart U-100  13 Units Subcutaneous Q24H    insulin detemir U-100  22 Units Subcutaneous Daily    lisinopril  40 mg Per NG tube Daily    metoprolol tartrate  50 mg Per NG tube BID    modafinil  200 mg Per NG tube Daily    And    modafinil  100 mg Per NG tube Daily    polyethylene glycol  17 g Per NG tube Daily    potassium chloride 10%  40 mEq Oral Once    senna-docusate 8.6-50 mg  1 tablet Per NG tube BID    sodium chloride 0.9%  500 mL Intravenous Once    sodium chloride 0.9%  3 mL Intravenous Q8H    sodium chloride 3%  4 mL Nebulization Q6H    white petrolatum-mineral oil   Both Eyes QHS     Continuous Infusions:  PRN Meds:acetaminophen, bisacodyl, dextrose 50%, glucagon (human recombinant), insulin aspart U-100, ipratropium, labetalol, ondansetron    Objective:     Vital Signs (Most Recent):  Temp: 97.9 °F (36.6 °C) (03/18/18 1113)  Pulse: 70 (03/18/18 1113)  Resp: 18  (03/18/18 1113)  BP: 127/89 (03/18/18 1113)  SpO2: (!) 92 % (03/18/18 1113)  BP Location: Right arm    Vital Signs Range (Last 24H):  Temp:  [97.9 °F (36.6 °C)-99.2 °F (37.3 °C)]   Pulse:  []   Resp:  [16-18]   BP: (119-150)/(78-98)   SpO2:  [92 %-99 %]   BP Location: Right arm    Physical Exam   Constitutional: He appears well-developed and well-nourished.   Neurological: He is alert.   Skin: Skin is warm and dry.   Nursing note and vitals reviewed.    Neurological Exam:  LOC: alert  Language: Global aphasia  Articulation: Mute/Anarthric  Visual Fields: Hemianopsia right  EOM (CN III, IV, VI): Gaze preference  left  Facial Movement (CN VII): Lower facial weakness on the Right  Motor: Arm left  Normal 5/5  Leg left  Paresis: 5/5  Arm right  Plegia 0/5  Leg right Plegia 0/5  Tone: increased tone of the RUE and RLE; normal L sided tone    Laboratory:  CMP:     Recent Labs  Lab 03/18/18  0644   CALCIUM 9.7   ALBUMIN 3.3*   PROT 7.3      K 3.9   CO2 28      BUN 23*   CREATININE 0.8   ALKPHOS 92   ALT 51*   AST 30   BILITOT 0.6     BMP:     Recent Labs  Lab 03/18/18  0644      K 3.9      CO2 28   BUN 23*   CREATININE 0.8   CALCIUM 9.7     CBC:     Recent Labs  Lab 03/18/18  0643   WBC 5.89   RBC 4.57*   HGB 13.8*   HCT 40.5   *   MCV 89   MCH 30.2   MCHC 34.1     Lipid Panel: No results for input(s): CHOL, LDLCALC, HDL, TRIG in the last 168 hours.  Coagulation:     Recent Labs  Lab 03/18/18  0644   INR 0.9     Platelet Aggregation Study: No results for input(s): PLTAGG, PLTAGINTERP, PLTAGREGLACO, ADPPLTAGGREG in the last 168 hours.  Hgb A1C: No results for input(s): HGBA1C in the last 168 hours.  TSH: No results for input(s): TSH in the last 168 hours.      Diagnostic Results       Brain Imaging     Most recent CTH 2/04/18 redemonstration of large L MCA territory infarction w/o evidence of hemorrhagic conversion.  Stable post op change from L MCA endovascular stenting.      Vessel  Imaging   IR Cerebral Angiogram 1/25/18 demonstrated occlusion of the L M1 segment of the L MCA with good collaterals. Occlusion removed and stent placed with TICI 2C.    Cardiac Imaging   2D Echo 1/26/18   CONCLUSIONS     1 - Normal left ventricular systolic function (EF 65-70%).     2 - Concentric remodeling.     3 - No wall motion abnormalities.     4 - Normal left ventricular diastolic function.     5 - Normal right ventricular systolic function

## 2018-03-18 NOTE — TREATMENT PLAN
bg at goal. Continue novolog 13 units every 4 hrs and detemir 22 units daily.    Boris Brothers MD

## 2018-03-18 NOTE — ASSESSMENT & PLAN NOTE
"48 yo M with PMHx HLD, LOYDA, poorly controlled DM II presents to [facility] 01/25/18 after noted "strange behavior" for which EMS was called. Pt was found to have a L M1 occlusion and was taken to IR for thrombectomy with TICI 2C reperfusion and stent placement due to L MCA stenosis. Etiology remains unclear: no obvious signs of cardiac source or atheromatous disease in the aorto/carotid axis. Echo normal with no hx or signs of A fib. PT/OT/SLP will continue to evaluate and treat; recommending SNF. DAPT 2/2 recent stent placement.      Disposition - currently pursuing home health.  Training family on 3/12 to bring patient home.  CM working with [facility] to get home care needs in place. Pending discharge home with family.     Antithrombotics:  mg po qd, clopidogrel 75 mg po qd [s/p intracranial stent]  Statins: Atorvastatin 80 mg po qd -held due to transaminitis- normalized.  Sister refuses to restart statin due to potential side effects.   Aggressive risk factor modification: HLD, DM II (Hgb A1c 10%), Obesity  Rehab efforts: PT/OT/SLP-- Recommending SNF, family wants to bring patient home; working on home health setup   Diagnostics ordered/pending: None  VTE prophylaxis: Heparin 5000 U q8h  BP parameters: SBP <140   "

## 2018-03-19 LAB
POCT GLUCOSE: 133 MG/DL (ref 70–110)
POCT GLUCOSE: 137 MG/DL (ref 70–110)
POCT GLUCOSE: 144 MG/DL (ref 70–110)
POCT GLUCOSE: 153 MG/DL (ref 70–110)
POCT GLUCOSE: 162 MG/DL (ref 70–110)
POCT GLUCOSE: 165 MG/DL (ref 70–110)

## 2018-03-19 PROCEDURE — 99233 SBSQ HOSP IP/OBS HIGH 50: CPT | Mod: ,,, | Performed by: PSYCHIATRY & NEUROLOGY

## 2018-03-19 PROCEDURE — 92507 TX SP LANG VOICE COMM INDIV: CPT

## 2018-03-19 PROCEDURE — 94761 N-INVAS EAR/PLS OXIMETRY MLT: CPT

## 2018-03-19 PROCEDURE — 25000242 PHARM REV CODE 250 ALT 637 W/ HCPCS: Performed by: NURSE PRACTITIONER

## 2018-03-19 PROCEDURE — 92526 ORAL FUNCTION THERAPY: CPT

## 2018-03-19 PROCEDURE — 25000003 PHARM REV CODE 250: Performed by: NURSE PRACTITIONER

## 2018-03-19 PROCEDURE — 25000003 PHARM REV CODE 250: Performed by: PHYSICIAN ASSISTANT

## 2018-03-19 PROCEDURE — A4216 STERILE WATER/SALINE, 10 ML: HCPCS | Performed by: NURSE PRACTITIONER

## 2018-03-19 PROCEDURE — 94760 N-INVAS EAR/PLS OXIMETRY 1: CPT

## 2018-03-19 PROCEDURE — 99232 SBSQ HOSP IP/OBS MODERATE 35: CPT | Mod: ,,, | Performed by: NURSE PRACTITIONER

## 2018-03-19 PROCEDURE — 25000003 PHARM REV CODE 250: Performed by: PSYCHIATRY & NEUROLOGY

## 2018-03-19 PROCEDURE — 94668 MNPJ CHEST WALL SBSQ: CPT

## 2018-03-19 PROCEDURE — 25000242 PHARM REV CODE 250 ALT 637 W/ HCPCS: Performed by: PSYCHIATRY & NEUROLOGY

## 2018-03-19 PROCEDURE — 25000003 PHARM REV CODE 250: Performed by: STUDENT IN AN ORGANIZED HEALTH CARE EDUCATION/TRAINING PROGRAM

## 2018-03-19 PROCEDURE — 20600001 HC STEP DOWN PRIVATE ROOM

## 2018-03-19 PROCEDURE — 94640 AIRWAY INHALATION TREATMENT: CPT

## 2018-03-19 PROCEDURE — 63600175 PHARM REV CODE 636 W HCPCS: Performed by: NURSE PRACTITIONER

## 2018-03-19 RX ORDER — MODAFINIL 100 MG/1
200 TABLET ORAL DAILY
Status: DISCONTINUED | OUTPATIENT
Start: 2018-03-20 | End: 2018-03-21 | Stop reason: HOSPADM

## 2018-03-19 RX ORDER — MODAFINIL 100 MG/1
100 TABLET ORAL DAILY
Status: DISCONTINUED | OUTPATIENT
Start: 2018-03-19 | End: 2018-03-21 | Stop reason: HOSPADM

## 2018-03-19 RX ORDER — MODAFINIL 100 MG/1
200 TABLET ORAL DAILY
Status: DISCONTINUED | OUTPATIENT
Start: 2018-03-20 | End: 2018-03-19

## 2018-03-19 RX ORDER — MODAFINIL 100 MG/1
100 TABLET ORAL DAILY
Status: DISCONTINUED | OUTPATIENT
Start: 2018-03-20 | End: 2018-03-19

## 2018-03-19 RX ADMIN — MODAFINIL 100 MG: 100 TABLET ORAL at 03:03

## 2018-03-19 RX ADMIN — BACLOFEN 10 MG: 10 TABLET ORAL at 09:03

## 2018-03-19 RX ADMIN — POLYETHYLENE GLYCOL 3350 17 G: 17 POWDER, FOR SOLUTION ORAL at 09:03

## 2018-03-19 RX ADMIN — SODIUM CHLORIDE SOLN NEBU 3% 4 ML: 3 NEBU SOLN at 07:03

## 2018-03-19 RX ADMIN — IPRATROPIUM BROMIDE AND ALBUTEROL SULFATE 3 ML: 2.5; .5 SOLUTION RESPIRATORY (INHALATION) at 07:03

## 2018-03-19 RX ADMIN — IPRATROPIUM BROMIDE AND ALBUTEROL SULFATE 3 ML: 2.5; .5 SOLUTION RESPIRATORY (INHALATION) at 01:03

## 2018-03-19 RX ADMIN — HEPARIN SODIUM 5000 UNITS: 5000 INJECTION, SOLUTION INTRAVENOUS; SUBCUTANEOUS at 02:03

## 2018-03-19 RX ADMIN — INSULIN ASPART 13 UNITS: 100 INJECTION, SOLUTION INTRAVENOUS; SUBCUTANEOUS at 10:03

## 2018-03-19 RX ADMIN — INSULIN ASPART 13 UNITS: 100 INJECTION, SOLUTION INTRAVENOUS; SUBCUTANEOUS at 01:03

## 2018-03-19 RX ADMIN — INSULIN ASPART 13 UNITS: 100 INJECTION, SOLUTION INTRAVENOUS; SUBCUTANEOUS at 04:03

## 2018-03-19 RX ADMIN — SODIUM CHLORIDE SOLN NEBU 3% 4 ML: 3 NEBU SOLN at 01:03

## 2018-03-19 RX ADMIN — METOPROLOL TARTRATE 50 MG: 50 TABLET, FILM COATED ORAL at 09:03

## 2018-03-19 RX ADMIN — INSULIN ASPART 2 UNITS: 100 INJECTION, SOLUTION INTRAVENOUS; SUBCUTANEOUS at 06:03

## 2018-03-19 RX ADMIN — CLOPIDOGREL BISULFATE 75 MG: 75 TABLET, FILM COATED ORAL at 09:03

## 2018-03-19 RX ADMIN — INSULIN ASPART 13 UNITS: 100 INJECTION, SOLUTION INTRAVENOUS; SUBCUTANEOUS at 08:03

## 2018-03-19 RX ADMIN — LISINOPRIL 40 MG: 20 TABLET ORAL at 09:03

## 2018-03-19 RX ADMIN — ASPIRIN 325 MG ORAL TABLET 325 MG: 325 PILL ORAL at 09:03

## 2018-03-19 RX ADMIN — MODAFINIL 200 MG: 100 TABLET ORAL at 06:03

## 2018-03-19 RX ADMIN — HEPARIN SODIUM 5000 UNITS: 5000 INJECTION, SOLUTION INTRAVENOUS; SUBCUTANEOUS at 06:03

## 2018-03-19 RX ADMIN — FLUOXETINE 20 MG: 20 CAPSULE ORAL at 09:03

## 2018-03-19 RX ADMIN — STANDARDIZED SENNA CONCENTRATE AND DOCUSATE SODIUM 1 TABLET: 8.6; 5 TABLET, FILM COATED ORAL at 09:03

## 2018-03-19 RX ADMIN — HEPARIN SODIUM 5000 UNITS: 5000 INJECTION, SOLUTION INTRAVENOUS; SUBCUTANEOUS at 10:03

## 2018-03-19 RX ADMIN — INSULIN ASPART 13 UNITS: 100 INJECTION, SOLUTION INTRAVENOUS; SUBCUTANEOUS at 06:03

## 2018-03-19 RX ADMIN — Medication 3 ML: at 02:03

## 2018-03-19 RX ADMIN — Medication 3 ML: at 09:03

## 2018-03-19 RX ADMIN — INSULIN DETEMIR 22 UNITS: 100 INJECTION, SOLUTION SUBCUTANEOUS at 09:03

## 2018-03-19 NOTE — PLAN OF CARE
Chip spoke with Shelby at [employer] delivering the patient's hospital bed. Chip verified with Shelby that the bed will be delivered tomorrow between 10:00 am and 5:00 pm. Chip spoke to Mrs. Quevedo and informed her of the above information. Chip will continue to follow.

## 2018-03-19 NOTE — PROGRESS NOTES
"[facility]  Vascular Neurology  Comprehensive Stroke Center  Progress Note    Assessment/Plan:     * Embolic stroke involving left middle cerebral artery    48 yo M with PMHx HLD, LOYDA, poorly controlled DM II presents to [facility] 01/25/18 after noted "strange behavior" for which EMS was called. Pt was found to have a L M1 occlusion and was taken to IR for thrombectomy with TICI 2C reperfusion and stent placement due to L MCA stenosis. Etiology remains unclear: no obvious signs of cardiac source or atheromatous disease in the aorto/carotid axis. Echo normal with no hx or signs of A fib. PT/OT/SLP will continue to evaluate and treat; recommending SNF. DAPT 2/2 recent stent placement.      Disposition - currently pursuing home health.  Training family on 3/12 to bring patient home.  CM working with [facility] to get home care needs in place. Pending discharge home with family.     Antithrombotics:  mg po qd, clopidogrel 75 mg po qd [s/p intracranial stent]  Statins: Atorvastatin 80 mg po qd -held due to transaminitis- normalized.  Sister refuses to restart statin due to potential side effects.   Aggressive risk factor modification: HLD, DM II (Hgb A1c 10%), Obesity  Rehab efforts: PT/OT/SLP-- Recommending SNF, family wants to bring patient home; working on home health setup   Diagnostics ordered/pending: None  VTE prophylaxis: Heparin 5000 U q8h  BP parameters: SBP <140         Transaminitis    -holding statin  - Liver enzymes normalized 3/15/18, but sister refuses to restart statin due to potential side effects.         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    - Abscess near PEG w/IR aspirate, growing anaerobes   - Rocephin (last dose 3/5) and Flagyl (last dose 3/7)        Right spastic hemiparesis    -Due to stroke  -Continue aggressive PT/OT; Recommending SNF  -starting baclofen        Nodule of right lung    - Plan for f/u CT 6-12 months via PCP        Oral phase dysphagia    - " Continue SLP  - NPO, meds/feeds per PEG        Acute respiratory failure with hypoxia    Likely hypoxia is Aspiration pna vs pneumonitis (as CXR improved after a day) 2/2 to superimposed encephalopathy due to sepsis  -AM CXR 3/2 with mild bibasilar atelectasis   -Continue O2 sats >90%, okay with current requirements of 1L NC, pt has what appears as Cheyne espinal pattern and reported LOYDA  -Sister requested continuous pulse ox; Ordered  -Contraction alkalosis improved with increasing water flushes via PEG  -Considered PE as differential however pt is on OAC and not tachycardic, tachypneic. Wells score 1.5 (low-risk)  - Continue chest physiotherapy, aspiration precautions, wean off O2 support   -plan for home suctioning upon discharge        Cytotoxic cerebral edema    -2/2 stroke, evident on imaging  Stable on repeat CTH 2/4/18        Urinary tract infection due to Klebsiella species     Afebrile, leukocytosis resolved  - Klebsiella from urine cultures (2/25/18) + Anaerobes from PEG site cultures   - rocephin course completed, and Flagyl completed         Type 2 diabetes mellitus with hyperglycemia, with long-term current use of insulin    - Stroke risk factor, Hgb A1c 10%  - Continue Diabetasource AC TFs  - Endocrine consulted and guiding insulin modification for TF  - POCT Q4h ; Moderate correction q4hr         Mixed hyperlipidemia    -Stroke risk factor, LDL invalid as TG > 400  - holding statin due to rising LFTs; liver enzymes normalized, but sister refuses to restart statin due to potential side effects.         Essential hypertension    -Stroke risk factor  -SBP <140  -Continue scheduled lisinopril 40 mg qd, Metoprolol 50 mg bid          Obstructive sleep apnea    -Stroke risk factor  -CPAP qHS  -Wean O2 as tolerated.             01/25/18:  Brought in for aphasia, RSW with L gaze preferance. LKN unknown, not tPA candidate. Went to IR for angiogram and stent.  01/29/18:  Off Cardene, remains on Insulin gtt.  Concern for sepsis, respiratory source. Imaging with mass effect and some brain compression; maycol crani watch.  01/30/18:  EEG consistent with focal L slowing 2/2 lesion and mild generalized slowing, no seizures. CT head stable, no hemorrhagic conversion  02/01/18:  Emergent intubation likely due to upper airway obstruction per NCC.    02/02/18:  Pt off Precedex, moving left side spontaneously and opening eyes. Intubated, on spontaneous today. NCC giving steroids in hopes to extubate tomorrow.  02/03/18:  NAEON, intubated, not responsive to verbal stimuli, withdraws from pain on LUE, SBP ~135-230. Continued on insulin gtt, SQH for DVT ppx, and captopril.   02/16/18:  Few changes on exam, continues to have significant RUE/RLE weakness with global aphasia.  Family member at bedside noted attempt to communicate with her.  02/18/18:  Pt largely unchanged on exam this am.  No family member present during am rounds.  02/19/18:  Tolerating facemask. PEG by IR this afternoon.  02/20/18:  s/p PEG. O2 % on 5L NC, still requiring frequent suctioning. Primary team considering transfer to Medicine tomorrow.  02/21/18:  Pt sating % on 3L NC. Restarted DAPT, including . Plans for step down to stroke service.  02/22/18:  Pt with [facility] but not service-connected so unable to be placed at SNF.  Working on insurance coverage of at least [facility] therapy.  02/23/18:  Increased detemir to 36 U bid.  Pl[facility] with [facility] SNF pending completion of paperwork by Stroke team and pt's spouse--in process.  2/24/18 - NAEON. Pts WBC mildly elevated 13.01, pt is afebrile. Will continue to monitor. Endocrine consulted - Recommend levemir 40 units daily to cover basal needs (using ~0.7u/kg); Start novolog 6 units q4hr to cover TF; Moderate correction q4hr. SNF placement pending.  2/25/18 -  Pt WBC increased overnight, HR range , and temp 99.2. Blood cultures ordered and drawn. UA/UC ordered. Nursing staff called a rapid  response this AM due to pts tachycardia, fever, and decreased O2 sats. Arrived to room @ 0824. 1 liter of NS ordered. STAT CXR ordered. Tylenol given. ABG completed with no significant abnormal results. Respiratory suctioned pt and pt repositioned to help with breathing. Pt O2 sats improved on venti mask, temp decreased, and BP remained normotensive. Started on  vanc and zosyn. Pt appears more comfortable and no decline in neuro status. Pt family updated.   2/26/18 - afebrile overnight and leukocytosis improving with BS antibiotics. CT abdomen yesterday noted seroma/hematoma on left rectus abdominus. IR consulted for culture +/- drain.  02/28/2018 s/p aspiration of left rectus collection adjacent to the PEG tube.  Culture sent to lab, pending.  Remains afebrile.  Currently on 4L NC O2.   3/1/18: NAEON.   3/2: Pt afebrile, WBC WNL. Now sating well on 1L NC. Pt emotional on exam, family not interested in SSRI at this time. CM attempting to get pt placed to [facility] so he can then transfer to [facility]. Increased rate of TFs.  3/3: NAEON. Pending placement. Will monitor Na level tomorrow.  3/4: stable, no new issues.  3/5/18 - sleepy today but no events overnight   3/6/18 - sister concerned due to coughing post speech therapy - discussed with speech team and sister   3/7/18 - No change in exam.  [facility] to [facility] - pending approval.  Continue to monitor liver enzymes.  3/8/18 - no acute events overnight. Vitals within normal. Pending placement   03/09/2018 NAEON  3/10/18 starting baclofen for spasticity, fluoxetine for depression and therapy motivation  3/11/18 OT to make splint for RUE to prevent contractures, plans for discharge home with family training planned for Monday  3/15/18 CM working with [facility] to get home care needs in place. Possible discharge tomorrow with family. NAEON. Liver enzymes normalized, but sister refuses to restart statin due to potential side effects.   3/16/18 NAEON. Still  awaiting home health equipment before patient is able to go home.   3-17-18 still awaiting mattress for bed to be able to go home to [place]      STROKE [facility]   Acute Stroke Times   Stroke Team Called Date: 01/25/18  Stroke Team Called Time: 1852  Stroke Team Arrival Date: 01/25/18  Stroke Team Arrival Time: 0652  CT Interpretation Time: 1910  Decision to Treat Time for Alteplase:  (n/a unknown last known normal )  Decision to Treat Time for IR: 1915    NIH Scale:  1a. Level Of Consciousness: 0-->Alert: keenly responsive  1b. LOC Questions: 2-->Answers neither question correctly  1c. LOC Commands: 2-->Performs neither task correctly  2. Best Gaze: 1-->Partial gaze palsy: gaze is abnormal in one or both eyes, but forced deviation or total gaze paresis is not present  3. Visual: 2-->Complete hemianopia  4. Facial Palsy: 1-->Minor paralysis (flattened nasolabial fold, asymmetry on smiling)  5a. Motor Arm, Left: 0-->No drift: limb holds 90 (or 45) degrees for full 10 secs  5b. Motor Arm, Right: 4-->No movement  6a. Motor Leg, Left: 0-->No drift: leg holds 30 degree position for full 5 secs  6b. Motor Leg, Right: 4-->No movement  7. Limb Ataxia: 0-->Absent  8. Sensory: 0-->Normal: no sensory loss  9. Best Language: 3-->Mute, global aphasia: no usable speech or auditory comprehension  10. Dysarthria: 2-->Severe dysarthria: patients speech is so slurred as to be unintelligible in the absence of or out of proportion to any dysphasia, or is mute/anarthric  11. Extinction and Inattention (formerly Neglect): 0-->No abnormality  Total (NIH Stroke Scale): 21       Modified [place] Score: 0  Jeff Coma Scale:    ABCD2 Score:    [id number] Score:   HAS -BLED Score:   ICH Score:   Hunt & Laguerre Classification:      Hemorrhagic change of an Ischemic Stroke: Does this patient have an ischemic stroke with hemorrhagic changes? No     Neurologic Chief Complaint: Left MCA stroke     Subjective:     Interval History:     No issues  overnight stable awaiting discharge     HPI, Past Medical, Family, and Social History remains the same as documented in the initial encounter.      Review of Systems   Constitutional: Negative for chills and fever.   HENT: Positive for trouble swallowing.    Gastrointestinal: Negative for vomiting.   Neurological: Positive for speech difficulty and weakness.   Psychiatric/Behavioral: Negative for behavioral problems.     Scheduled Meds:   albuterol-ipratropium 2.5mg-0.5mg/3mL  3 mL Nebulization Q6H    aspirin  325 mg Per G Tube Daily    baclofen  10 mg Per G Tube BID    clopidogrel  75 mg Per G Tube Daily    FLUoxetine  20 mg Per G Tube Daily    heparin (porcine)  5,000 Units Subcutaneous Q8H    insulin aspart U-100  13 Units Subcutaneous Q24H    insulin aspart U-100  13 Units Subcutaneous Q24H    insulin aspart U-100  13 Units Subcutaneous Q24H    insulin aspart U-100  13 Units Subcutaneous Q24H    insulin aspart U-100  13 Units Subcutaneous Q24H    insulin aspart U-100  13 Units Subcutaneous Q24H    insulin detemir U-100  22 Units Subcutaneous Daily    lisinopril  40 mg Per NG tube Daily    metoprolol tartrate  50 mg Per NG tube BID    modafinil  200 mg Per NG tube Daily    And    modafinil  100 mg Per NG tube Daily    polyethylene glycol  17 g Per NG tube Daily    potassium chloride 10%  40 mEq Oral Once    senna-docusate 8.6-50 mg  1 tablet Per NG tube BID    sodium chloride 0.9%  500 mL Intravenous Once    sodium chloride 0.9%  3 mL Intravenous Q8H    sodium chloride 3%  4 mL Nebulization Q6H    white petrolatum-mineral oil   Both Eyes QHS     Continuous Infusions:  PRN Meds:acetaminophen, bisacodyl, dextrose 50%, glucagon (human recombinant), insulin aspart U-100, ipratropium, labetalol, ondansetron    Objective:     Vital Signs (Most Recent):  Temp: 98.6 °F (37 °C) (03/19/18 1220)  Pulse: 73 (03/19/18 1220)  Resp: 18 (03/19/18 1220)  BP: (!) 171/96 (03/19/18 1220)  SpO2: 97 % (03/19/18  1220)  BP Location: Right arm    Vital Signs Range (Last 24H):  Temp:  [97.6 °F (36.4 °C)-98.6 °F (37 °C)]   Pulse:  []   Resp:  [16-19]   BP: (141-181)/(85-97)   SpO2:  [92 %-99 %]   BP Location: Right arm    Physical Exam   Constitutional: He appears well-developed and well-nourished. No distress.   HENT:   Head: Normocephalic and atraumatic.   Eyes: Pupils are equal, round, and reactive to light.   Cardiovascular: Normal rate.    Pulmonary/Chest: Effort normal. No respiratory distress.   Neurological: He is alert.   Skin: Skin is warm and dry.   Nursing note and vitals reviewed.    Neurological Exam:  LOC: alert  Language: Global aphasia  Articulation: Mute/Anarthric  Visual Fields: Hemianopsia right  EOM (CN III, IV, VI): Gaze preference  left  Facial Movement (CN VII): Lower facial weakness on the Right  Motor: Arm left  Normal 5/5  Leg left  Paresis: 5/5  Arm right  Plegia 0/5  Leg right Plegia 0/5  Tone: increased tone of the RUE and RLE; normal L sided tone    Laboratory:  CMP:   No results for input(s): GLUCOSE, CALCIUM, ALBUMIN, PROT, NA, K, CO2, CL, BUN, CREATININE, ALKPHOS, ALT, AST, BILITOT in the last 24 hours.  BMP:     Recent Labs  Lab 03/18/18  0644      K 3.9      CO2 28   BUN 23*   CREATININE 0.8   CALCIUM 9.7     CBC:     Recent Labs  Lab 03/18/18  0643   WBC 5.89   RBC 4.57*   HGB 13.8*   HCT 40.5   *   MCV 89   MCH 30.2   MCHC 34.1     Lipid Panel: No results for input(s): CHOL, LDLCALC, HDL, TRIG in the last 168 hours.  Coagulation:     Recent Labs  Lab 03/18/18  0644   INR 0.9     Platelet Aggregation Study: No results for input(s): PLTAGG, PLTAGINTERP, PLTAGREGLACO, ADPPLTAGGREG in the last 168 hours.  Hgb A1C: No results for input(s): HGBA1C in the last 168 hours.  TSH: No results for input(s): TSH in the last 168 hours.      Diagnostic Results       Brain Imaging     Most recent CTH 2/04/18 redemonstration of large L MCA territory infarction w/o evidence of  hemorrhagic conversion.  Stable post op change from L MCA endovascular stenting.      Vessel Imaging   IR Cerebral Angiogram 1/25/18 demonstrated occlusion of the L M1 segment of the L MCA with good collaterals. Occlusion removed and stent placed with TICI 2C.    Cardiac Imaging   2D Echo 1/26/18   CONCLUSIONS     1 - Normal left ventricular systolic function (EF 65-70%).     2 - Concentric remodeling.     3 - No wall motion abnormalities.     4 - Normal left ventricular diastolic function.     5 - Normal right ventricular systolic function             Do Cochran PA-C  Comprehensive Stroke Center  Department of Vascular Neurology   [facility]

## 2018-03-19 NOTE — PT/OT/SLP PROGRESS
Speech Language Pathology Treatment    Patient Name:  Todd Quevedo   MRN:  [id number]  Admitting Diagnosis: Embolic stroke involving left middle cerebral artery    Recommendations:                 General Recommendations:  Dysphagia therapy and Speech/language therapy  Diet recommendations:  NPO, Liquid Diet Level: NPO   Aspiration Precautions: Alternate means of nutrition/hydration and Strict aspiration precautions   General Precautions: Standard, aphasia, aspiration, fall, NPO  Communication strategies:  go to room if call light pushed; global aphasia    Subjective     nonverbal  Patient goals: unknown    Pain/Comfort:  · Pain Rating 1: 0/10  · Pain Rating Post-Intervention 1: 0/10    Objective:     Has the patient been evaluated by SLP for swallowing?   Yes  Keep patient NPO? Yes   Current Respiratory Status: nasal cannula      Pt seen this afternoon with no family present.  Mr. Quevedo was awake/alert throughout session.  Severe expressive and receptive language deficits remain evident.  Pt unable vocalize or verbalize throughout attempted speech tasks.  ST attempted to have pt model vowel sounds, repeat words, name objects, count, sing and respond to spontaneous speech questions without success.  Mr Quevedo did not model/follow any simple commands or respond to simple y/n questions in function.  Pt shifted eyes slightly when tracking an object; however, this was inconsistent.  Pt was seated upright and oral care/stim was attempted.  Pt resistant to cold moist swab presented.  Pt did not trigger pharyngeal swallows despite multiple attempts at stimulation.  A sliver of ice was rubbed on pt's lips without acceptance.  When ST attempted to place in pt's mouth, anterior loss of ice was evident.  Attempts were halted and oral suction provided to remove any excess moisture from oral cavity.    Education / encouragement provided to pt throughout session.  Reviewed continued ST role and importance of language/swallow  stimulation.  No response made to education.   Pt left resting comfortably.    Whiteboard current.     Assessment:     Todd Quevedo is a 47 y.o. male with an SLP diagnosis of Aphasia, Dysphagia, Cognitive-Linguistic Impairment and Visio-Spatial Impairment.      Goals:    SLP Goals        Problem: SLP Goal    Goal Priority Disciplines Outcome   SLP Goal     SLP Ongoing (interventions implemented as appropriate)   Description:  Speech Language Pathology Goals  Goals expected to be met by 3/20  1. Pt will participate in ongoing bedside assessment of swallow to determine least restrictive diet.  2. Pt will open eyes to stim x5/session given max cues.-met  3. Pt will follow simple commands x2/session given max cues.  4. Pt will answer basic y/n question via any modality x2/session given max cues.  5. Pt will vocalize in response to any stim x2/session given max cues.                            Plan:     · Patient to be seen:  2 x/week   · Plan of Care expires:  03/18/18  · Plan of Care reviewed with:  patient   · SLP Follow-Up:  Yes       Discharge recommendations:  home with home health ((24 hour assistance))       Time Tracking:     SLP Treatment Date:   03/19/18  Speech Start Time:  1225  Speech Stop Time:  1241     Speech Total Time (min):  16 min    Billable Minutes: Speech Therapy Individual 8 and Treatment Swallowing Dysfunction 8    EDWARD Corona, CCC-SLP  Speech Language Pathologist  [phone]  3/19/2018

## 2018-03-19 NOTE — PLAN OF CARE
Problem: SLP Goal  Goal: SLP Goal  Speech Language Pathology Goals  Goals expected to be met by 3/20  1. Pt will participate in ongoing bedside assessment of swallow to determine least restrictive diet.  2. Pt will open eyes to stim x5/session given max cues.-met  3. Pt will follow simple commands x2/session given max cues.  4. Pt will answer basic y/n question via any modality x2/session given max cues.  5. Pt will vocalize in response to any stim x2/session given max cues.           Pt awake/alert throughout ST session this date.  No significant change noted with expressive/receptive language or swallow function.  Continue current  plan of care.    EDWARD Corona, CCC-SLP  Speech Language Pathologist  [phone]  3/19/2018

## 2018-03-19 NOTE — SUBJECTIVE & OBJECTIVE
Neurologic Chief Complaint: Left MCA stroke     Subjective:     Interval History:     No issues overnight stable awaiting discharge     HPI, Past Medical, Family, and Social History remains the same as documented in the initial encounter.      Review of Systems   Constitutional: Negative for chills and fever.   HENT: Positive for trouble swallowing.    Gastrointestinal: Negative for vomiting.   Neurological: Positive for speech difficulty and weakness.   Psychiatric/Behavioral: Negative for behavioral problems.     Scheduled Meds:   albuterol-ipratropium 2.5mg-0.5mg/3mL  3 mL Nebulization Q6H    aspirin  325 mg Per G Tube Daily    baclofen  10 mg Per G Tube BID    clopidogrel  75 mg Per G Tube Daily    FLUoxetine  20 mg Per G Tube Daily    heparin (porcine)  5,000 Units Subcutaneous Q8H    insulin aspart U-100  13 Units Subcutaneous Q24H    insulin aspart U-100  13 Units Subcutaneous Q24H    insulin aspart U-100  13 Units Subcutaneous Q24H    insulin aspart U-100  13 Units Subcutaneous Q24H    insulin aspart U-100  13 Units Subcutaneous Q24H    insulin aspart U-100  13 Units Subcutaneous Q24H    insulin detemir U-100  22 Units Subcutaneous Daily    lisinopril  40 mg Per NG tube Daily    metoprolol tartrate  50 mg Per NG tube BID    modafinil  200 mg Per NG tube Daily    And    modafinil  100 mg Per NG tube Daily    polyethylene glycol  17 g Per NG tube Daily    potassium chloride 10%  40 mEq Oral Once    senna-docusate 8.6-50 mg  1 tablet Per NG tube BID    sodium chloride 0.9%  500 mL Intravenous Once    sodium chloride 0.9%  3 mL Intravenous Q8H    sodium chloride 3%  4 mL Nebulization Q6H    white petrolatum-mineral oil   Both Eyes QHS     Continuous Infusions:  PRN Meds:acetaminophen, bisacodyl, dextrose 50%, glucagon (human recombinant), insulin aspart U-100, ipratropium, labetalol, ondansetron    Objective:     Vital Signs (Most Recent):  Temp: 98.6 °F (37 °C) (03/19/18 1220)  Pulse: 73  (03/19/18 1220)  Resp: 18 (03/19/18 1220)  BP: (!) 171/96 (03/19/18 1220)  SpO2: 97 % (03/19/18 1220)  BP Location: Right arm    Vital Signs Range (Last 24H):  Temp:  [97.6 °F (36.4 °C)-98.6 °F (37 °C)]   Pulse:  []   Resp:  [16-19]   BP: (141-181)/(85-97)   SpO2:  [92 %-99 %]   BP Location: Right arm    Physical Exam   Constitutional: He appears well-developed and well-nourished. No distress.   HENT:   Head: Normocephalic and atraumatic.   Eyes: Pupils are equal, round, and reactive to light.   Cardiovascular: Normal rate.    Pulmonary/Chest: Effort normal. No respiratory distress.   Neurological: He is alert.   Skin: Skin is warm and dry.   Nursing note and vitals reviewed.    Neurological Exam:  LOC: alert  Language: Global aphasia  Articulation: Mute/Anarthric  Visual Fields: Hemianopsia right  EOM (CN III, IV, VI): Gaze preference  left  Facial Movement (CN VII): Lower facial weakness on the Right  Motor: Arm left  Normal 5/5  Leg left  Paresis: 5/5  Arm right  Plegia 0/5  Leg right Plegia 0/5  Tone: increased tone of the RUE and RLE; normal L sided tone    Laboratory:  CMP:   No results for input(s): GLUCOSE, CALCIUM, ALBUMIN, PROT, NA, K, CO2, CL, BUN, CREATININE, ALKPHOS, ALT, AST, BILITOT in the last 24 hours.  BMP:     Recent Labs  Lab 03/18/18  0644      K 3.9      CO2 28   BUN 23*   CREATININE 0.8   CALCIUM 9.7     CBC:     Recent Labs  Lab 03/18/18  0643   WBC 5.89   RBC 4.57*   HGB 13.8*   HCT 40.5   *   MCV 89   MCH 30.2   MCHC 34.1     Lipid Panel: No results for input(s): CHOL, LDLCALC, HDL, TRIG in the last 168 hours.  Coagulation:     Recent Labs  Lab 03/18/18  0644   INR 0.9     Platelet Aggregation Study: No results for input(s): PLTAGG, PLTAGINTERP, PLTAGREGLACO, ADPPLTAGGREG in the last 168 hours.  Hgb A1C: No results for input(s): HGBA1C in the last 168 hours.  TSH: No results for input(s): TSH in the last 168 hours.      Diagnostic Results       Brain Imaging      Most recent CTH 2/04/18 redemonstration of large L MCA territory infarction w/o evidence of hemorrhagic conversion.  Stable post op change from L MCA endovascular stenting.      Vessel Imaging   IR Cerebral Angiogram 1/25/18 demonstrated occlusion of the L M1 segment of the L MCA with good collaterals. Occlusion removed and stent placed with TICI 2C.    Cardiac Imaging   2D Echo 1/26/18   CONCLUSIONS     1 - Normal left ventricular systolic function (EF 65-70%).     2 - Concentric remodeling.     3 - No wall motion abnormalities.     4 - Normal left ventricular diastolic function.     5 - Normal right ventricular systolic function

## 2018-03-19 NOTE — SUBJECTIVE & OBJECTIVE
"Interval HPI:   Overnight events: BG reasonably controlled on scheduled Novolog q4hr with TF and Levemir QAM.  Eating:   NPO  Nausea: No  Hypoglycemia and intervention: No  Fever: No  TF: Yes  If yes, type of TF and rate: Diabetasource 75cc/hr    BP (!) 172/97 (BP Location: Right arm, Patient Position: Lying)   Pulse 101   Temp 97.6 °F (36.4 °C) (Axillary)   Resp 18   Ht 5' 10" (1.778 m)   Wt 113 kg (249 lb 1.9 oz)   SpO2 95%   BMI 35.74 kg/m²     Labs Reviewed and Include    No results for input(s): GLU, CALCIUM, ALBUMIN, PROT, NA, K, CO2, CL, BUN, CREATININE, ALKPHOS, ALT, AST, BILITOT in the last 24 hours.  Lab Results   Component Value Date    WBC 5.89 03/18/2018    HGB 13.8 (L) 03/18/2018    HCT 40.5 03/18/2018    MCV 89 03/18/2018     (H) 03/18/2018     No results for input(s): TSH, FREET4 in the last 168 hours.  Lab Results   Component Value Date    HGBA1C 10.0 (H) 01/25/2018       Nutritional status:   Body mass index is 35.74 kg/m².  Lab Results   Component Value Date    ALBUMIN 3.3 (L) 03/18/2018    ALBUMIN 3.1 (L) 03/16/2018    ALBUMIN 3.0 (L) 03/14/2018     No results found for: PREALBUMIN    Estimated Creatinine Clearance: 142.2 mL/min (based on SCr of 0.8 mg/dL).    Accu-Checks  Recent Labs      03/17/18   2128  03/18/18   0053  03/18/18   0403  03/18/18   0851  03/18/18   1200  03/18/18   1642  03/18/18   2138  03/18/18   2347  03/19/18   0442  03/19/18   0842   POCTGLUCOSE  142*  153*  177*  181*  95  171*  144*  148*  165*  153*       Current Medications and/or Treatments Impacting Glycemic Control  Immunotherapy:    Immunosuppressants     None        Steroids:   Hormones     None        Pressors:    Autonomic Drugs     Start     Stop Route Frequency Ordered    01/31/18 0854  succinylcholine (ANECTINE) 20 mg/mL injection     Comments:  Created by cabinet override    01/31 2059 01/31/18 0854        Hyperglycemia/Diabetes Medications:   Antihyperglycemics     Start     Stop Route " Frequency Ordered    03/10/18 0800  insulin aspart U-100 pen 13 Units      -- SubQ Every 24 hours (non-standard times) 03/09/18 0940    03/10/18 0400  insulin aspart U-100 pen 13 Units      -- SubQ Every 24 hours (non-standard times) 03/09/18 0940    03/10/18 0000  insulin aspart U-100 pen 13 Units      -- SubQ Every 24 hours (non-standard times) 03/09/18 0940    03/09/18 2000  insulin aspart U-100 pen 13 Units      -- SubQ Every 24 hours (non-standard times) 03/09/18 0940    03/09/18 1600  insulin aspart U-100 pen 13 Units      -- SubQ Every 24 hours (non-standard times) 03/09/18 0940    03/09/18 1200  insulin aspart U-100 pen 13 Units      -- SubQ Every 24 hours (non-standard times) 03/09/18 0940    03/02/18 0900  insulin detemir U-100 pen 22 Units      -- SubQ Daily 03/02/18 0808    02/24/18 1148  insulin aspart U-100 pen 1-10 Units      -- SubQ Every 4 hours PRN 02/24/18 1049    02/16/18 1400  insulin aspart pen 8 Units      02/19 0001 SubQ Every 4 hours 02/16/18 1058

## 2018-03-19 NOTE — ASSESSMENT & PLAN NOTE
completed antibiotic therapy, infection may increase insulin needs.   Abscess near PEG w/IR aspirate, growing anaerobes   - Rocephin (last dose 3/5) and Flagyl (last dose 3/7)

## 2018-03-20 LAB
ALBUMIN SERPL BCP-MCNC: 3.2 G/DL
ALP SERPL-CCNC: 91 U/L
ALT SERPL W/O P-5'-P-CCNC: 49 U/L
AMORPH CRY UR QL COMP ASSIST: NORMAL
ANION GAP SERPL CALC-SCNC: 11 MMOL/L
AST SERPL-CCNC: 28 U/L
BACTERIA #/AREA URNS AUTO: NORMAL /HPF
BASOPHILS # BLD AUTO: 0.04 K/UL
BASOPHILS NFR BLD: 0.6 %
BILIRUB SERPL-MCNC: 0.7 MG/DL
BILIRUB UR QL STRIP: NEGATIVE
BUN SERPL-MCNC: 23 MG/DL
CALCIUM SERPL-MCNC: 9.7 MG/DL
CHLORIDE SERPL-SCNC: 100 MMOL/L
CLARITY UR REFRACT.AUTO: ABNORMAL
CO2 SERPL-SCNC: 26 MMOL/L
COLOR UR AUTO: YELLOW
CREAT SERPL-MCNC: 0.8 MG/DL
DIFFERENTIAL METHOD: ABNORMAL
EOSINOPHIL # BLD AUTO: 0.1 K/UL
EOSINOPHIL NFR BLD: 1.4 %
ERYTHROCYTE [DISTWIDTH] IN BLOOD BY AUTOMATED COUNT: 14.7 %
EST. GFR  (AFRICAN AMERICAN): >60 ML/MIN/1.73 M^2
EST. GFR  (NON AFRICAN AMERICAN): >60 ML/MIN/1.73 M^2
GLUCOSE SERPL-MCNC: 182 MG/DL
GLUCOSE UR QL STRIP: NEGATIVE
HCT VFR BLD AUTO: 41.4 %
HGB BLD-MCNC: 14.1 G/DL
HGB UR QL STRIP: NEGATIVE
IMM GRANULOCYTES # BLD AUTO: 0.03 K/UL
IMM GRANULOCYTES NFR BLD AUTO: 0.5 %
KETONES UR QL STRIP: NEGATIVE
LEUKOCYTE ESTERASE UR QL STRIP: NEGATIVE
LYMPHOCYTES # BLD AUTO: 2.4 K/UL
LYMPHOCYTES NFR BLD: 36.2 %
MAGNESIUM SERPL-MCNC: 1.6 MG/DL
MCH RBC QN AUTO: 30.1 PG
MCHC RBC AUTO-ENTMCNC: 34.1 G/DL
MCV RBC AUTO: 88 FL
MICROSCOPIC COMMENT: NORMAL
MONOCYTES # BLD AUTO: 0.6 K/UL
MONOCYTES NFR BLD: 9.5 %
NEUTROPHILS # BLD AUTO: 3.4 K/UL
NEUTROPHILS NFR BLD: 51.8 %
NITRITE UR QL STRIP: NEGATIVE
NRBC BLD-RTO: 0 /100 WBC
PH UR STRIP: 7 [PH] (ref 5–8)
PHOSPHATE SERPL-MCNC: 4.8 MG/DL
PLATELET # BLD AUTO: 325 K/UL
PMV BLD AUTO: 10.1 FL
POCT GLUCOSE: 129 MG/DL (ref 70–110)
POCT GLUCOSE: 137 MG/DL (ref 70–110)
POCT GLUCOSE: 147 MG/DL (ref 70–110)
POCT GLUCOSE: 161 MG/DL (ref 70–110)
POCT GLUCOSE: 171 MG/DL (ref 70–110)
POCT GLUCOSE: 182 MG/DL (ref 70–110)
POTASSIUM SERPL-SCNC: 3.5 MMOL/L
PROT SERPL-MCNC: 6.9 G/DL
PROT UR QL STRIP: NEGATIVE
RBC # BLD AUTO: 4.69 M/UL
SODIUM SERPL-SCNC: 137 MMOL/L
SP GR UR STRIP: 1.02 (ref 1–1.03)
SQUAMOUS #/AREA URNS AUTO: 1 /HPF
URN SPEC COLLECT METH UR: ABNORMAL
UROBILINOGEN UR STRIP-ACNC: NEGATIVE EU/DL
WBC # BLD AUTO: 6.6 K/UL

## 2018-03-20 PROCEDURE — 92526 ORAL FUNCTION THERAPY: CPT

## 2018-03-20 PROCEDURE — 25000003 PHARM REV CODE 250: Performed by: STUDENT IN AN ORGANIZED HEALTH CARE EDUCATION/TRAINING PROGRAM

## 2018-03-20 PROCEDURE — 63600175 PHARM REV CODE 636 W HCPCS: Performed by: NURSE PRACTITIONER

## 2018-03-20 PROCEDURE — 85025 COMPLETE CBC W/AUTO DIFF WBC: CPT

## 2018-03-20 PROCEDURE — 25000242 PHARM REV CODE 250 ALT 637 W/ HCPCS: Performed by: NURSE PRACTITIONER

## 2018-03-20 PROCEDURE — 36415 COLL VENOUS BLD VENIPUNCTURE: CPT

## 2018-03-20 PROCEDURE — 25000003 PHARM REV CODE 250: Performed by: PHYSICIAN ASSISTANT

## 2018-03-20 PROCEDURE — 92507 TX SP LANG VOICE COMM INDIV: CPT

## 2018-03-20 PROCEDURE — 25000003 PHARM REV CODE 250: Performed by: NURSE PRACTITIONER

## 2018-03-20 PROCEDURE — 99233 SBSQ HOSP IP/OBS HIGH 50: CPT | Mod: ,,, | Performed by: PSYCHIATRY & NEUROLOGY

## 2018-03-20 PROCEDURE — 80053 COMPREHEN METABOLIC PANEL: CPT

## 2018-03-20 PROCEDURE — 94668 MNPJ CHEST WALL SBSQ: CPT

## 2018-03-20 PROCEDURE — 63600175 PHARM REV CODE 636 W HCPCS: Performed by: INTERNAL MEDICINE

## 2018-03-20 PROCEDURE — 84100 ASSAY OF PHOSPHORUS: CPT

## 2018-03-20 PROCEDURE — 25000003 PHARM REV CODE 250: Performed by: PSYCHIATRY & NEUROLOGY

## 2018-03-20 PROCEDURE — 94761 N-INVAS EAR/PLS OXIMETRY MLT: CPT

## 2018-03-20 PROCEDURE — 94640 AIRWAY INHALATION TREATMENT: CPT

## 2018-03-20 PROCEDURE — 99900035 HC TECH TIME PER 15 MIN (STAT)

## 2018-03-20 PROCEDURE — 20600001 HC STEP DOWN PRIVATE ROOM

## 2018-03-20 PROCEDURE — 97112 NEUROMUSCULAR REEDUCATION: CPT

## 2018-03-20 PROCEDURE — 97530 THERAPEUTIC ACTIVITIES: CPT

## 2018-03-20 PROCEDURE — 81001 URINALYSIS AUTO W/SCOPE: CPT

## 2018-03-20 PROCEDURE — 83735 ASSAY OF MAGNESIUM: CPT

## 2018-03-20 PROCEDURE — 25000242 PHARM REV CODE 250 ALT 637 W/ HCPCS: Performed by: PSYCHIATRY & NEUROLOGY

## 2018-03-20 PROCEDURE — A4216 STERILE WATER/SALINE, 10 ML: HCPCS | Performed by: NURSE PRACTITIONER

## 2018-03-20 RX ADMIN — INSULIN ASPART 13 UNITS: 100 INJECTION, SOLUTION INTRAVENOUS; SUBCUTANEOUS at 09:03

## 2018-03-20 RX ADMIN — SODIUM CHLORIDE SOLN NEBU 3% 4 ML: 3 NEBU SOLN at 07:03

## 2018-03-20 RX ADMIN — INSULIN DETEMIR 22 UNITS: 100 INJECTION, SOLUTION SUBCUTANEOUS at 08:03

## 2018-03-20 RX ADMIN — STANDARDIZED SENNA CONCENTRATE AND DOCUSATE SODIUM 1 TABLET: 8.6; 5 TABLET, FILM COATED ORAL at 09:03

## 2018-03-20 RX ADMIN — BACLOFEN 10 MG: 10 TABLET ORAL at 09:03

## 2018-03-20 RX ADMIN — IPRATROPIUM BROMIDE AND ALBUTEROL SULFATE 3 ML: 2.5; .5 SOLUTION RESPIRATORY (INHALATION) at 01:03

## 2018-03-20 RX ADMIN — METOPROLOL TARTRATE 50 MG: 50 TABLET, FILM COATED ORAL at 09:03

## 2018-03-20 RX ADMIN — POLYETHYLENE GLYCOL 3350 17 G: 17 POWDER, FOR SOLUTION ORAL at 09:03

## 2018-03-20 RX ADMIN — SODIUM CHLORIDE SOLN NEBU 3% 4 ML: 3 NEBU SOLN at 01:03

## 2018-03-20 RX ADMIN — HEPARIN SODIUM 5000 UNITS: 5000 INJECTION, SOLUTION INTRAVENOUS; SUBCUTANEOUS at 09:03

## 2018-03-20 RX ADMIN — INSULIN ASPART 13 UNITS: 100 INJECTION, SOLUTION INTRAVENOUS; SUBCUTANEOUS at 08:03

## 2018-03-20 RX ADMIN — FLUOXETINE 20 MG: 20 CAPSULE ORAL at 09:03

## 2018-03-20 RX ADMIN — INSULIN ASPART 13 UNITS: 100 INJECTION, SOLUTION INTRAVENOUS; SUBCUTANEOUS at 11:03

## 2018-03-20 RX ADMIN — INSULIN ASPART 2 UNITS: 100 INJECTION, SOLUTION INTRAVENOUS; SUBCUTANEOUS at 11:03

## 2018-03-20 RX ADMIN — INSULIN ASPART 13 UNITS: 100 INJECTION, SOLUTION INTRAVENOUS; SUBCUTANEOUS at 04:03

## 2018-03-20 RX ADMIN — ASPIRIN 325 MG ORAL TABLET 325 MG: 325 PILL ORAL at 09:03

## 2018-03-20 RX ADMIN — MODAFINIL 100 MG: 100 TABLET ORAL at 02:03

## 2018-03-20 RX ADMIN — IPRATROPIUM BROMIDE AND ALBUTEROL SULFATE 3 ML: 2.5; .5 SOLUTION RESPIRATORY (INHALATION) at 12:03

## 2018-03-20 RX ADMIN — Medication 3 ML: at 02:03

## 2018-03-20 RX ADMIN — MINERAL OIL AND WHITE PETROLATUM: 150; 830 OINTMENT OPHTHALMIC at 09:03

## 2018-03-20 RX ADMIN — SODIUM CHLORIDE SOLN NEBU 3% 4 ML: 3 NEBU SOLN at 12:03

## 2018-03-20 RX ADMIN — LISINOPRIL 40 MG: 20 TABLET ORAL at 09:03

## 2018-03-20 RX ADMIN — Medication 3 ML: at 09:03

## 2018-03-20 RX ADMIN — IPRATROPIUM BROMIDE AND ALBUTEROL SULFATE 3 ML: 2.5; .5 SOLUTION RESPIRATORY (INHALATION) at 07:03

## 2018-03-20 RX ADMIN — CLOPIDOGREL BISULFATE 75 MG: 75 TABLET, FILM COATED ORAL at 09:03

## 2018-03-20 RX ADMIN — HEPARIN SODIUM 5000 UNITS: 5000 INJECTION, SOLUTION INTRAVENOUS; SUBCUTANEOUS at 02:03

## 2018-03-20 NOTE — PT/OT/SLP PROGRESS
Physical Therapy Treatment    Patient Name:  Todd Quevedo   MRN:  [id number]    Recommendations:     Discharge Recommendations:  home with home health (DME and 24 hr care)   Discharge Equipment Recommendations:  (hospital bed, jim lift, reclining w/c, w/c cushion)   Barriers to discharge: awaiting reception of DME    Assessment:     Todd Quevedo is a 47 y.o. male admitted with a medical diagnosis of Embolic stroke involving left middle cerebral artery. He continues to be severely limited by hemiparesis and L neglect.  He participated in sitting EOB with moderate assist.  Pt was capable of sitting with less assistance, but was limited by global aphasia and not appropriately using LUE to assist with postural control.  He did exhibit improved head control from prior sessions, but remains in L cervical rotation with L visual gaze preference. He continues to benefit from skilled PT services to progress mobility, especially sitting balance and sitting tolerance. He is expected to d/c home with HH and 24 hr care from family.  HH will be essential for reinforcing family training and instructing in proper use of DME.       He presents with the following impairments/functional limitations:  weakness, decreased upper extremity function, decreased ROM, decreased lower extremity function, impaired balance, impaired endurance, impaired sensation, visual deficits, decreased safety awareness, impaired fine motor, impaired muscle length, impaired self care skills, impaired functional mobilty, abnormal tone, impaired skin.    Rehab Prognosis:  Fair to limited; patient would benefit from acute skilled PT services to address these deficits and reach maximum level of function.      Recent Surgery: Procedure(s) (LRB):  TRACHEOSTOMY (N/A)  PLACEMENT-TUBE-PEG (N/A)      Plan:     During this hospitalization, patient to be seen 3 x/week to address the above listed problems via therapeutic activities, therapeutic exercises, neuromuscular  re-education  · Plan of Care Expires:  04/20/18   Plan of Care Reviewed with: patient, sibling    Subjective     Communicated with RN prior to session.  Patient found sidelying receiving PCT care upon PT entry to room, agreeable to treatment.      Chief Complaint: Patient became tearful when sitting  Patient comments/goals: d/c home  Pain/Comfort:  · Pain Rating 1: 0/10  · Pain Rating Post-Intervention 1: 0/10    Patients cultural, spiritual, Episcopal conflicts given the current situation: none noted    Objective:     Patient found with: NG tube, telemetry, pulse ox (continuous), Condom Catheter     General Precautions: Standard, aphasia, aspiration, fall, NPO     The patient was being cleaned up following a BM when PT arrived.  PT assisted PCT with bed mobility so that bj care could be performed.  After these needs were addressed, PT facilitated rolling L with max assist and moving from L sidelying to sitting EOB total assist (max assist x 2 people).  Patient initially required total assist for sitting balance d/t air mattress surface and bilateral LEs in excessive flexion.  PT facilitated placing feet flat on the floor and fully deflating the bed to improve balance and safety.  Pt sat EOB x 10 minutes with moderate assist.  He demonstrated L visual gaze, L cervical rotation, forward trunk flexion and posterior pelvic tilt.  Dynamic activities were attempted in sitting, but patient was unable to participate in task with clothes pins due to global aphasia.  Static sitting balance with less assistance was initiated via therapist holding LUE.  Patient became very tearful, sobbing, and required increased assistance to maintain balance.  OT arrived to continue activities in sitting.  Pt was left sitting EOB with OT and rehab tech with all lines intact and crying subsided.       AM-PAC 6 CLICK MOBILITY  Turning over in bed (including adjusting bedclothes, sheets and blankets)?: 2  Sitting down on and standing up  from a chair with arms (e.g., wheelchair, bedside commode, etc.): 1  Moving from lying on back to sitting on the side of the bed?: 1  Moving to and from a bed to a chair (including a wheelchair)?: 1  Need to walk in hospital room?: 1  Climbing 3-5 steps with a railing?: 1  Total Score: 7       GOALS:    Physical Therapy Goals        Problem: Physical Therapy Goal    Goal Priority Disciplines Outcome Goal Variances Interventions   Physical Therapy Goal     PT/OT, PT Ongoing (interventions implemented as appropriate)     Description:    Goals to be met by 3/16/2018    1. Pt will perform rolling to the R and L with max assist.  -met to the R only  2. Pt will perform supine to sit from both sides of the bed with max assist.  3. Pt will perform sit to supine with max assist.  4. Pt will sit EOB x 5 minutes with moderate assist to prepare for functional tasks in sitting. - MET  Pt will sit EOB x 10 minutes with min assist to prepare for functional tasks in sitting.   5. Pt will participate in BLE and cervical ROM exercise. - MET with fly assist  6. Family will verbalize and demonstrate appropriate positioning and ROM techniques - MET  7. Pt will tolerate sitting up in cardiac chair for 1 hr to improve endurance.                        Time Tracking:     PT Received On: 03/20/18  PT Start Time: 1315     PT Stop Time: 1334  PT Total Time (min): 19 min     Billable Minutes: Neuromuscular Re-education 19    Treatment Type: Treatment  PT/PTA: PT         Madina Luu, PT  3/20/2018  [phone] (pager)

## 2018-03-20 NOTE — PLAN OF CARE
Problem: SLP Goal  Goal: SLP Goal  Speech Language Pathology Goals  Goals expected to be met by 4/3  1. Pt will participate in ongoing bedside assessment of swallow to determine least restrictive diet.  2. Pt will open eyes to stim x5/session given max cues.-met  3. Pt will follow simple commands x2/session given max cues.  4. Pt will answer basic y/n question via any modality x2/session given max cues.  5. Pt will vocalize in response to any stim x2/session given max cues.           Outcome: Ongoing (interventions implemented as appropriate)  Goals remain appropriate, cont POC. EDWARD Key, CCC/SLP  3/20/2018

## 2018-03-20 NOTE — PLAN OF CARE
Problem: Patient Care Overview  Goal: Plan of Care Review  Outcome: Ongoing (interventions implemented as appropriate)  Pt free of falls, trauma and injury. Skin remains clean dry and intact. Pt goal of 75 ml/hr of Deltasource AC met. Pt educated on importance of measuring accurate  I/O. Reviewed plan of care with Pt and pt verbalized understanding..  Pt VSS, will continue to monitor.

## 2018-03-20 NOTE — ASSESSMENT & PLAN NOTE
Afebrile, leukocytosis resolved  - Klebsiella from urine cultures (2/25/18) + Anaerobes from PEG site cultures   - rocephin course completed, and Flagyl completed     -3/20 nurse noted cloudy urine from condom catheter, repeating UA

## 2018-03-20 NOTE — PT/OT/SLP PROGRESS
Occupational Therapy   Treatment    Name: Todd Quevedo  MRN: [id number]  Admitting Diagnosis:  Embolic stroke involving left middle cerebral artery       Recommendations:     Discharge Recommendations: home with home health (DME and 24 hour care)  Discharge Equipment Recommendations:   (hospital bed, jim lift, reclining wheelchair, wheelchair cushion)  Barriers to discharge:  Inaccessible home environment, Decreased caregiver support    Subjective     Communicated with: RN prior to session.  Pain/Comfort:  · Pain Rating 1: 0/10  · Pain Rating Post-Intervention 1: 0/10    Patients cultural, spiritual, Moravian conflicts given the current situation: None stated    Objective:     Patient found with: PEG Tube, telemetry, Condom Catheter, pulse ox (continuous), SCD (sitting EOB with PT; sister present)    General Precautions: Standard, aphasia, aspiration, fall, NPO   Orthopedic Precautions:N/A   Braces: N/A     Occupational Performance:    Bed Mobility:    · Patient completed Scooting/Bridging with total assistance  · Patient completed Sit to Supine with total assistance   · With assist of 2     Functional Mobility/Transfers:  · Pt not appropriate for task at this time.    Activities of Daily Living:  · Grooming: Min A for balance while for wiping mouth x 2 trials with setup; Max A for wiping mouth x 2 trials with Min A for balance    Patient left HOB elevated with all lines intact and call button in reach    AMPA 6 Click:  AMPA Total Score: 7    Treatment & Education:  *Pt sat EOB for ~25 minutes with Min A-Mod A given to maintain upright position.  Pt maintained head in left lateral rotation requiring consistent cues to mobilize head and move towards midline and right.  Much of session focused on assisting pt with attending to R side and elevating head.  *Cervical spine stretch performed to promote increased attention to R side and provide stretch: 1 set x 10 reps with Min A-Mod A  *OT held washcloth out in front  of pt and instructed him to grasp with pt able to follow through x 1 trial.  Pt did not grasp cloth other trials; however, was able to spontaneously grasp cloth and wipe mouth for several trials.    *Pt performed 1 UE exercises to promote bilateral coordination.  OT placed pt's left hand under his right and performed bicep curls: 1 set x 6 reps with Max A  *POC reviewed with sister and pt    Education:    Assessment:     Todd Quevedo is a 47 y.o. male with a medical diagnosis of Embolic stroke involving left middle cerebral artery.  He presents with the following performance deficits affecting function:  weakness, decreased upper extremity function, decreased lower extremity function, impaired self care skills, impaired endurance, impaired sensation, visual deficits, decreased safety awareness, abnormal tone, impaired skin, impaired balance, impaired cognition.  Pt tolerated session well; however, became emotional several times during the session.  With time and encouragement pt able to recover and continue with session.  Pt demonstrated improvement with sitting balance demonstrating brief moments requiring CGA with Min A-Mod A requiring.  In addition, pt able to attend to voice of sister and direct attention to R x 1 trial.  Pt continues to require increased assist for all ADLs and mobility and would continue to benefit from skilled OT services to address problems listed below and increase independence with ADLs.      Rehab Prognosis:  Good; patient would benefit from acute skilled OT services to address these deficits and reach maximum level of function.       Plan:     Patient to be seen 3 x/week to address the above listed problems via self-care/home management, therapeutic activities, therapeutic exercises, neuromuscular re-education  · Plan of Care Expires: 03/21/18  · Plan of Care Reviewed with: patient, sibling    This Plan of care has been discussed with the patient who was involved in its development and  understands and is in agreement with the identified goals and treatment plan    GOALS:    Occupational Therapy Goals        Problem: Occupational Therapy Goal    Goal Priority Disciplines Outcome Interventions   Occupational Therapy Goal     OT, PT/OT Ongoing (interventions implemented as appropriate)    Description:  Goals revised 3/9 to be addressed for 14 days with expiration date, 3/23:  Patient will increase functional independence with ADLs by performing:    Patient will demonstrate rolling bilaterally with max assist.  Not met   Patient will engage in therapeutic task with visual attention for 30 sec duration. Not met  Patient will demonstrate independence with HEP for right UE positioning.    Not met  Patient's family / caregiver will demonstrate independence and safety with assisting patient with self-care skills and functional mobility.     Not met  Patient's family / caregiver will demonstrate independence with providing ROM and changes in bed positioning.   Not met                          Time Tracking:     OT Date of Treatment: 03/20/18  OT Start Time: 1331  OT Stop Time: 1359  OT Total Time (min): 28 min    Billable Minutes:Therapeutic Activity 28    ISAMAR Chang  3/20/2018

## 2018-03-20 NOTE — PROGRESS NOTES
"[facility]  Vascular Neurology  Comprehensive Stroke Center  Progress Note    Assessment/Plan:     * Embolic stroke involving left middle cerebral artery    48 yo M with PMHx HLD, LOYDA, poorly controlled DM II presents to [facility] 01/25/18 after noted "strange behavior" for which EMS was called. Pt was found to have a L M1 occlusion and was taken to IR for thrombectomy with TICI 2C reperfusion and stent placement due to L MCA stenosis. Etiology remains unclear: no obvious signs of cardiac source or atheromatous disease in the aorto/carotid axis. Echo normal with no hx or signs of A fib. PT/OT/SLP will continue to evaluate and treat; recommending SNF. DAPT 2/2 recent stent placement.      Disposition - currently pursuing home health.  Training family on 3/12 to bring patient home.  CM working with [facility] to get home care needs in place. Pending discharge home with family.     Antithrombotics:  mg po qd, clopidogrel 75 mg po qd [s/p intracranial stent]  Statins: Atorvastatin 80 mg po qd -held due to transaminitis- normalized.  Sister refuses to restart statin due to potential side effects.   Aggressive risk factor modification: HLD, DM II (Hgb A1c 10%), Obesity  Rehab efforts: PT/OT/SLP-- Recommending SNF, family wants to bring patient home; working on home health setup   Diagnostics ordered/pending: None  VTE prophylaxis: Heparin 5000 U q8h  BP parameters: SBP <140         Transaminitis    -holding statin  - Liver enzymes normalized 3/15/18, but sister refuses to restart statin due to potential side effects.         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    - Abscess near PEG w/IR aspirate, growing anaerobes   - Rocephin (last dose 3/5) and Flagyl (last dose 3/7)        Right spastic hemiparesis    -Due to stroke  -Continue aggressive PT/OT; Recommending SNF  -starting baclofen        Nodule of right lung    - Plan for f/u CT 6-12 months via PCP        Oral phase dysphagia    - " Continue SLP  - NPO, meds/feeds per PEG        Acute respiratory failure with hypoxia    Likely hypoxia is Aspiration pna vs pneumonitis (as CXR improved after a day) 2/2 to superimposed encephalopathy due to sepsis  -AM CXR 3/2 with mild bibasilar atelectasis   -Continue O2 sats >90%, okay with current requirements of 1L NC, pt has what appears as Cheyne espinal pattern and reported LOYDA  -Sister requested continuous pulse ox; Ordered  -Contraction alkalosis improved with increasing water flushes via PEG  -Considered PE as differential however pt is on OAC and not tachycardic, tachypneic. Wells score 1.5 (low-risk)  - Continue chest physiotherapy, aspiration precautions, wean off O2 support   -plan for home suctioning upon discharge        Cytotoxic cerebral edema    -2/2 stroke, evident on imaging  Stable on repeat CTH 2/4/18        Urinary tract infection due to Klebsiella species     Afebrile, leukocytosis resolved  - Klebsiella from urine cultures (2/25/18) + Anaerobes from PEG site cultures   - rocephin course completed, and Flagyl completed     -3/20 nurse noted cloudy urine from condom catheter, repeating UA        Type 2 diabetes mellitus with hyperglycemia, with long-term current use of insulin    - Stroke risk factor, Hgb A1c 10%  - Continue Diabetasource AC TFs  - Endocrine consulted and guiding insulin modification for TF  - POCT Q4h ; Moderate correction q4hr         Mixed hyperlipidemia    -Stroke risk factor, LDL invalid as TG > 400  - holding statin due to rising LFTs; liver enzymes normalized, but sister refuses to restart statin due to potential side effects.         Essential hypertension    -Stroke risk factor  -SBP <140  -Continue scheduled lisinopril 40 mg qd, Metoprolol 50 mg bid          Obstructive sleep apnea    -Stroke risk factor  -CPAP qHS  -Wean O2 as tolerated.             01/25/18:  Brought in for aphasia, RSW with L gaze preferance. LKN unknown, not tPA candidate. Went to IR for  angiogram and stent.  01/29/18:  Off Cardene, remains on Insulin gtt. Concern for sepsis, respiratory source. Imaging with mass effect and some brain compression; maycol crani watch.  01/30/18:  EEG consistent with focal L slowing 2/2 lesion and mild generalized slowing, no seizures. CT head stable, no hemorrhagic conversion  02/01/18:  Emergent intubation likely due to upper airway obstruction per NCC.    02/02/18:  Pt off Precedex, moving left side spontaneously and opening eyes. Intubated, on spontaneous today. NCC giving steroids in hopes to extubate tomorrow.  02/03/18:  NAEON, intubated, not responsive to verbal stimuli, withdraws from pain on LUE, SBP ~135-230. Continued on insulin gtt, SQH for DVT ppx, and captopril.   02/16/18:  Few changes on exam, continues to have significant RUE/RLE weakness with global aphasia.  Family member at bedside noted attempt to communicate with her.  02/18/18:  Pt largely unchanged on exam this am.  No family member present during am rounds.  02/19/18:  Tolerating facemask. PEG by IR this afternoon.  02/20/18:  s/p PEG. O2 % on 5L NC, still requiring frequent suctioning. Primary team considering transfer to Medicine tomorrow.  02/21/18:  Pt sating % on 3L NC. Restarted DAPT, including . Plans for step down to stroke service.  02/22/18:  Pt with [facility] but not service-connected so unable to be placed at SNF.  Working on insurance coverage of at least [facility] therapy.  02/23/18:  Increased detemir to 36 U bid.  [facility] with [facility] SN[facility] pending completion of paperwork by Stroke team and pt's spouse--in process.  2/24/18 - NAEON. Pts WBC mildly elevated 13.01, pt is afebrile. Will continue to monitor. Endocrine consulted - Recommend levemir 40 units daily to cover basal needs (using ~0.7u/kg); Start novolog 6 units q4hr to cover TF; Moderate correction q4hr. SNF placement pending.  2/25/18 -  Pt WBC increased overnight, HR range , and temp 99.2. Blood  cultures ordered and drawn. UA/UC ordered. Nursing staff called a rapid response this AM due to pts tachycardia, fever, and decreased O2 sats. Arrived to room @ 0824. 1 liter of NS ordered. STAT CXR ordered. Tylenol given. ABG completed with no significant abnormal results. Respiratory suctioned pt and pt repositioned to help with breathing. Pt O2 sats improved on venti mask, temp decreased, and BP remained normotensive. Started on  vanc and zosyn. Pt appears more comfortable and no decline in neuro status. Pt family updated.   2/26/18 - afebrile overnight and leukocytosis improving with BS antibiotics. CT abdomen yesterday noted seroma/hematoma on left rectus abdominus. IR consulted for culture +/- drain.  02/28/2018 s/p aspiration of left rectus collection adjacent to the PEG tube.  Culture sent to lab, pending.  Remains afebrile.  Currently on 4L NC O2.   3/1/18: NAEON.   3/2: Pt afebrile, WBC WNL. Now sating well on 1L NC. Pt emotional on exam, family not interested in SSRI at this time. CM attempting to get pt placed to [facility] so he can then transfer to [facility]. Increased rate of TFs.  3/3: NAEON. Pending placement. Will monitor Na level tomorrow.  3/4: stable, no new issues.  3/5/18 - sleepy today but no events overnight   3/6/18 - sister concerned due to coughing post speech therapy - discussed with speech team and sister   3/7/18 - No change in exam.  [facility] - pending [facility].  Continue to monitor liver enzymes.  3/8/18 - no acute events overnight. Vitals within normal. Pending placement   03/09/2018 NAEON  3/10/18 starting baclofen for spasticity, fluoxetine for depression and therapy motivation  3/11/18 OT to make splint for RUE to prevent contractures, plans for discharge home with family training planned for Monday  3/15/18 CM working with [facility] to get home care needs in place. Possible discharge tomorrow with family. NAEON. Liver enzymes normalized, but sister refuses to  restart statin due to potential side effects.   3/16/18 NAEON. Still awaiting home health equipment before patient is able to go home.   3-17-18 still awaiting mattress for bed to be able to go home to [place]  3/20/18 mattress to be delivered to patient's home in [place] today, nurse noted cloudy urine, UA pending      STROKE DOCUMENTATION   Acute Stroke Times   Stroke Team Called Date: 01/25/18  Stroke Team Called Time: 1852  Stroke Team Arrival Date: 01/25/18  Stroke Team Arrival Time: 0652  CT Interpretation Time: 1910  Decision to Treat Time for Alteplase:  (n/a unknown last known normal )  Decision to Treat Time for IR: 1915    NIH Scale:  1a. Level Of Consciousness: 0-->Alert: keenly responsive  1b. LOC Questions: 2-->Answers neither question correctly  1c. LOC Commands: 2-->Performs neither task correctly  2. Best Gaze: 1-->Partial gaze palsy: gaze is abnormal in one or both eyes, but forced deviation or total gaze paresis is not present  3. Visual: 2-->Complete hemianopia  4. Facial Palsy: 1-->Minor paralysis (flattened nasolabial fold, asymmetry on smiling)  5a. Motor Arm, Left: 0-->No drift: limb holds 90 (or 45) degrees for full 10 secs  5b. Motor Arm, Right: 4-->No movement  6a. Motor Leg, Left: 0-->No drift: leg holds 30 degree position for full 5 secs  6b. Motor Leg, Right: 4-->No movement  7. Limb Ataxia: 0-->Absent  8. Sensory: 0-->Normal: no sensory loss  9. Best Language: 3-->Mute, global aphasia: no usable speech or auditory comprehension  10. Dysarthria: 2-->Severe dysarthria: patients speech is so slurred as to be unintelligible in the absence of or out of proportion to any dysphasia, or is mute/anarthric  11. Extinction and Inattention (formerly Neglect): 0-->No abnormality  Total (NIH Stroke Scale): 21       Modified Bottineau Score: 0  Jeff Coma Scale:    ABCD2 Score:    [id number] Score:   HAS -BLED Score:   ICH Score:   Hunt & Laguerre Classification:      Hemorrhagic change of an Ischemic  Stroke: Does this patient have an ischemic stroke with hemorrhagic changes? No     Neurologic Chief Complaint: Left MCA stroke     Subjective:     Interval History:     No issues overnight stable awaiting discharge     HPI, Past Medical, Family, and Social History remains the same as documented in the initial encounter.      Review of Systems   Constitutional: Negative for chills and fever.   HENT: Positive for trouble swallowing.    Gastrointestinal: Negative for vomiting.   Neurological: Positive for speech difficulty and weakness.   Psychiatric/Behavioral: Negative for behavioral problems.     Scheduled Meds:   albuterol-ipratropium 2.5mg-0.5mg/3mL  3 mL Nebulization Q6H    aspirin  325 mg Per G Tube Daily    baclofen  10 mg Per G Tube BID    clopidogrel  75 mg Per G Tube Daily    FLUoxetine  20 mg Per G Tube Daily    heparin (porcine)  5,000 Units Subcutaneous Q8H    insulin aspart U-100  13 Units Subcutaneous Q24H    insulin aspart U-100  13 Units Subcutaneous Q24H    insulin aspart U-100  13 Units Subcutaneous Q24H    insulin aspart U-100  13 Units Subcutaneous Q24H    insulin aspart U-100  13 Units Subcutaneous Q24H    insulin aspart U-100  13 Units Subcutaneous Q24H    insulin detemir U-100  22 Units Subcutaneous Daily    lisinopril  40 mg Per NG tube Daily    metoprolol tartrate  50 mg Per NG tube BID    modafinil  100 mg Per G Tube Daily    And    modafinil  200 mg Per G Tube Daily    polyethylene glycol  17 g Per NG tube Daily    potassium chloride 10%  40 mEq Oral Once    senna-docusate 8.6-50 mg  1 tablet Per NG tube BID    sodium chloride 0.9%  500 mL Intravenous Once    sodium chloride 0.9%  3 mL Intravenous Q8H    sodium chloride 3%  4 mL Nebulization Q6H    white petrolatum-mineral oil   Both Eyes QHS     Continuous Infusions:  PRN Meds:acetaminophen, bisacodyl, dextrose 50%, glucagon (human recombinant), insulin aspart U-100, ipratropium, labetalol, ondansetron    Objective:      Vital Signs (Most Recent):  Temp: 97.6 °F (36.4 °C) (03/20/18 1143)  Pulse: 83 (03/20/18 1224)  Resp: (!) 23 (03/20/18 1224)  BP: (!) 121/93 (03/20/18 1143)  SpO2: 99 % (03/20/18 1224)  BP Location: Left arm    Vital Signs Range (Last 24H):  Temp:  [97.6 °F (36.4 °C)-99 °F (37.2 °C)]   Pulse:  [69-99]   Resp:  [14-23]   BP: (102-175)/()   SpO2:  [90 %-99 %]   BP Location: Left arm    Physical Exam   Constitutional: He appears well-developed and well-nourished. No distress.   HENT:   Head: Normocephalic and atraumatic.   Eyes: Pupils are equal, round, and reactive to light.   Cardiovascular: Normal rate.    Pulmonary/Chest: Effort normal. No respiratory distress.   Neurological: He is alert.   Skin: Skin is warm and dry.   Nursing note and vitals reviewed.    Neurological Exam:  LOC: alert  Language: Global aphasia  Articulation: Mute/Anarthric  Visual Fields: Hemianopsia right  EOM (CN III, IV, VI): Gaze preference  left  Facial Movement (CN VII): Lower facial weakness on the Right  Motor: Arm left  Normal 5/5  Leg left  Paresis: 5/5  Arm right  Plegia 0/5  Leg right Plegia 0/5  Tone: increased tone of the RUE and RLE; normal L sided tone    Laboratory:  CMP:     Recent Labs  Lab 03/20/18  0405   CALCIUM 9.7   ALBUMIN 3.2*   PROT 6.9      K 3.5   CO2 26      BUN 23*   CREATININE 0.8   ALKPHOS 91   ALT 49*   AST 28   BILITOT 0.7     BMP:     Recent Labs  Lab 03/20/18  0405      K 3.5      CO2 26   BUN 23*   CREATININE 0.8   CALCIUM 9.7     CBC:     Recent Labs  Lab 03/20/18  0405   WBC 6.60   RBC 4.69   HGB 14.1   HCT 41.4      MCV 88   MCH 30.1   MCHC 34.1     Lipid Panel: No results for input(s): CHOL, LDLCALC, HDL, TRIG in the last 168 hours.  Coagulation:     Recent Labs  Lab 03/18/18  0644   INR 0.9     Platelet Aggregation Study: No results for input(s): PLTAGG, PLTAGINTERP, PLTAGREGLACO, ADPPLTAGGREG in the last 168 hours.  Hgb A1C: No results for input(s): HGBA1C in  the last 168 hours.  TSH: No results for input(s): TSH in the last 168 hours.      Diagnostic Results       Brain Imaging     Most recent CTH 2/04/18 redemonstration of large L MCA territory infarction w/o evidence of hemorrhagic conversion.  Stable post op change from L MCA endovascular stenting.      Vessel Imaging   IR Cerebral Angiogram 1/25/18 demonstrated occlusion of the L M1 segment of the L MCA with good collaterals. Occlusion removed and stent placed with TICI 2C.    Cardiac Imaging   2D Echo 1/26/18   CONCLUSIONS     1 - Normal left ventricular systolic function (EF 65-70%).     2 - Concentric remodeling.     3 - No wall motion abnormalities.     4 - Normal left ventricular diastolic function.     5 - Normal right ventricular systolic function               Do Cochran PA-C  Comprehensive Stroke Center  Department of Vascular Neurology   [facility]

## 2018-03-20 NOTE — PLAN OF CARE
Problem: Occupational Therapy Goal  Goal: Occupational Therapy Goal  Goals revised 3/9 to be addressed for 14 days with expiration date, 3/23:  Patient will increase functional independence with ADLs by performing:    Patient will demonstrate rolling bilaterally with max assist.  Not met   Patient will engage in therapeutic task with visual attention for 30 sec duration. Not met  Patient will demonstrate independence with HEP for right UE positioning.    Not met  Patient's family / caregiver will demonstrate independence and safety with assisting patient with self-care skills and functional mobility.     Not met  Patient's family / caregiver will demonstrate independence with providing ROM and changes in bed positioning.   Not met           POC remains appropriate.    ISAMAR Chang  3/20/2018

## 2018-03-20 NOTE — SUBJECTIVE & OBJECTIVE
Neurologic Chief Complaint: Left MCA stroke     Subjective:     Interval History:     No issues overnight stable awaiting discharge     HPI, Past Medical, Family, and Social History remains the same as documented in the initial encounter.      Review of Systems   Constitutional: Negative for chills and fever.   HENT: Positive for trouble swallowing.    Gastrointestinal: Negative for vomiting.   Neurological: Positive for speech difficulty and weakness.   Psychiatric/Behavioral: Negative for behavioral problems.     Scheduled Meds:   albuterol-ipratropium 2.5mg-0.5mg/3mL  3 mL Nebulization Q6H    aspirin  325 mg Per G Tube Daily    baclofen  10 mg Per G Tube BID    clopidogrel  75 mg Per G Tube Daily    FLUoxetine  20 mg Per G Tube Daily    heparin (porcine)  5,000 Units Subcutaneous Q8H    insulin aspart U-100  13 Units Subcutaneous Q24H    insulin aspart U-100  13 Units Subcutaneous Q24H    insulin aspart U-100  13 Units Subcutaneous Q24H    insulin aspart U-100  13 Units Subcutaneous Q24H    insulin aspart U-100  13 Units Subcutaneous Q24H    insulin aspart U-100  13 Units Subcutaneous Q24H    insulin detemir U-100  22 Units Subcutaneous Daily    lisinopril  40 mg Per NG tube Daily    metoprolol tartrate  50 mg Per NG tube BID    modafinil  100 mg Per G Tube Daily    And    modafinil  200 mg Per G Tube Daily    polyethylene glycol  17 g Per NG tube Daily    potassium chloride 10%  40 mEq Oral Once    senna-docusate 8.6-50 mg  1 tablet Per NG tube BID    sodium chloride 0.9%  500 mL Intravenous Once    sodium chloride 0.9%  3 mL Intravenous Q8H    sodium chloride 3%  4 mL Nebulization Q6H    white petrolatum-mineral oil   Both Eyes QHS     Continuous Infusions:  PRN Meds:acetaminophen, bisacodyl, dextrose 50%, glucagon (human recombinant), insulin aspart U-100, ipratropium, labetalol, ondansetron    Objective:     Vital Signs (Most Recent):  Temp: 97.6 °F (36.4 °C) (03/20/18 1143)  Pulse: 83  (03/20/18 1224)  Resp: (!) 23 (03/20/18 1224)  BP: (!) 121/93 (03/20/18 1143)  SpO2: 99 % (03/20/18 1224)  BP Location: Left arm    Vital Signs Range (Last 24H):  Temp:  [97.6 °F (36.4 °C)-99 °F (37.2 °C)]   Pulse:  [69-99]   Resp:  [14-23]   BP: (102-175)/()   SpO2:  [90 %-99 %]   BP Location: Left arm    Physical Exam   Constitutional: He appears well-developed and well-nourished. No distress.   HENT:   Head: Normocephalic and atraumatic.   Eyes: Pupils are equal, round, and reactive to light.   Cardiovascular: Normal rate.    Pulmonary/Chest: Effort normal. No respiratory distress.   Neurological: He is alert.   Skin: Skin is warm and dry.   Nursing note and vitals reviewed.    Neurological Exam:  LOC: alert  Language: Global aphasia  Articulation: Mute/Anarthric  Visual Fields: Hemianopsia right  EOM (CN III, IV, VI): Gaze preference  left  Facial Movement (CN VII): Lower facial weakness on the Right  Motor: Arm left  Normal 5/5  Leg left  Paresis: 5/5  Arm right  Plegia 0/5  Leg right Plegia 0/5  Tone: increased tone of the RUE and RLE; normal L sided tone    Laboratory:  CMP:     Recent Labs  Lab 03/20/18  0405   CALCIUM 9.7   ALBUMIN 3.2*   PROT 6.9      K 3.5   CO2 26      BUN 23*   CREATININE 0.8   ALKPHOS 91   ALT 49*   AST 28   BILITOT 0.7     BMP:     Recent Labs  Lab 03/20/18  0405      K 3.5      CO2 26   BUN 23*   CREATININE 0.8   CALCIUM 9.7     CBC:     Recent Labs  Lab 03/20/18  0405   WBC 6.60   RBC 4.69   HGB 14.1   HCT 41.4      MCV 88   MCH 30.1   MCHC 34.1     Lipid Panel: No results for input(s): CHOL, LDLCALC, HDL, TRIG in the last 168 hours.  Coagulation:     Recent Labs  Lab 03/18/18  0644   INR 0.9     Platelet Aggregation Study: No results for input(s): PLTAGG, PLTAGINTERP, PLTAGREGLACO, ADPPLTAGGREG in the last 168 hours.  Hgb A1C: No results for input(s): HGBA1C in the last 168 hours.  TSH: No results for input(s): TSH in the last 168  hours.      Diagnostic Results       Brain Imaging     Most recent CTH 2/04/18 redemonstration of large L MCA territory infarction w/o evidence of hemorrhagic conversion.  Stable post op change from L MCA endovascular stenting.      Vessel Imaging   IR Cerebral Angiogram 1/25/18 demonstrated occlusion of the L M1 segment of the L MCA with good collaterals. Occlusion removed and stent placed with TICI 2C.    Cardiac Imaging   2D Echo 1/26/18   CONCLUSIONS     1 - Normal left ventricular systolic function (EF 65-70%).     2 - Concentric remodeling.     3 - No wall motion abnormalities.     4 - Normal left ventricular diastolic function.     5 - Normal right ventricular systolic function

## 2018-03-20 NOTE — PLAN OF CARE
Problem: Fall Risk (Adult)  Goal: Identify Related Risk Factors and Signs and Symptoms  Related risk factors and signs and symptoms are identified upon initiation of Human Response Clinical Practice Guideline (CPG)    Outcome: Ongoing (interventions implemented as appropriate)  Fall precautions maintained, call bell within reach, VSS, bed in lowest position.

## 2018-03-20 NOTE — PLAN OF CARE
Problem: Physical Therapy Goal  Goal: Physical Therapy Goal    Goals to be met by 3/16/2018    1. Pt will perform rolling to the R and L with max assist.  -met to the R only  2. Pt will perform supine to sit from both sides of the bed with max assist.  3. Pt will perform sit to supine with max assist.  4. Pt will sit EOB x 5 minutes with moderate assist to prepare for functional tasks in sitting. - MET  Pt will sit EOB x 10 minutes with min assist to prepare for functional tasks in sitting.   5. Pt will participate in BLE and cervical ROM exercise. - MET with fly assist  6. Family will verbalize and demonstrate appropriate positioning and ROM techniques - MET  7. Pt will tolerate sitting up in cardiac chair for 1 hr to improve endurance.       Patient participated in therapy, but became tearful at EOB.  POC and goals remain appropriate.  Please refer to progress note for functional mobility.     Madina Luu, PT  3/20/2018  [phone] (pager)

## 2018-03-20 NOTE — PLAN OF CARE
Cm called [facility] and left a message for Sudha regarding patient's DME equipment delivery. Cm also called the prosthetics department and left a message regarding the DME equipment delivery. Cm will continue to follow.

## 2018-03-20 NOTE — PT/OT/SLP PROGRESS
Speech Language Pathology Treatment    Patient Name:  Todd Quevedo   MRN:  [id number]  Admitting Diagnosis: Embolic stroke involving left middle cerebral artery    Recommendations:                 General Recommendations:  Dysphagia therapy and Speech/language therapy  Diet recommendations:  NPO, Liquid Diet Level: NPO   Aspiration Precautions: Alternate means of nutrition/hydration and Strict aspiration precautions   General Precautions: Standard, aphasia, aspiration, fall, NPO  Communication strategies:  go to room if call light pushed; global aphasia    Subjective     Pt seen bedside with sister present     Nasal cannula noted to be disconnected from wall O2.  SpO2 at 98%, no s/s respiratory distress.  Nursing alerted.     Pain/Comfort:  · Pain Rating 1: 0/10  · Pain Rating Post-Intervention 1: 0/10    Objective:     Has the patient been evaluated by SLP for swallowing?   Yes  Keep patient NPO? Yes   Current Respiratory Status: nasal cannula      Pt localized to SLP on R provided verbal stim only x1.  Eye contact on R again achieved given max verbal/tactile stim.  Pt with improved affect today.  Smiling at SLP with slight laugh x1.  Pt appeared to follow simple commands for thumbs up/down x2 each given model and max cues though not completely differentiated.  No response using thumbs up/down or any modality to simple y/n questions.  No other commands followed.  Increased oral breathing intermittently with noisy exhale though no true voicing elicited.  Oral care provided by SLP, pt reaching up to remove swab from oral cavity indptly.  Delayed swallow initiated x1 with oral care and x1 with oral stim with lemon glycerine swabs given 3 trials.  Ice chip presented to lips though pt clenching lips and turning head away from SLP.  Ice chip placed in hand and pt held the ice chip between his pinky and palm though no attempts to bring to oral cavity.  No oral acceptance when SLP assisted with bringing hand to oral cavity.   Pt resting comfortably at end of session.  White board updated.      Assessment:     Todd Quevedo is a 47 y.o. male with an SLP diagnosis of Aphasia, Dysphagia and Visio-Spatial Impairment.     Goals:    SLP Goals        Problem: SLP Goal    Goal Priority Disciplines Outcome   SLP Goal     SLP Ongoing (interventions implemented as appropriate)   Description:  Speech Language Pathology Goals  Goals expected to be met by 4/3  1. Pt will participate in ongoing bedside assessment of swallow to determine least restrictive diet.  2. Pt will open eyes to stim x5/session given max cues.-met  3. Pt will follow simple commands x2/session given max cues.  4. Pt will answer basic y/n question via any modality x2/session given max cues.  5. Pt will vocalize in response to any stim x2/session given max cues.                             Plan:     · Patient to be seen:  2 x/week   · Plan of Care expires:  03/18/18  · Plan of Care reviewed with:  patient, sibling   · SLP Follow-Up:  Yes       Discharge recommendations:  home with home health       Time Tracking:     SLP Treatment Date:   03/20/18  Speech Start Time:  1055  Speech Stop Time:  1113     Speech Total Time (min):  18 min    Billable Minutes: Speech Therapy Individual 10 and Treatment Swallowing Dysfunction 8    EDWARD Key, CCC-SLP  03/20/2018

## 2018-03-20 NOTE — PLAN OF CARE
Chip spoke with Mrs. Quevedo and she has received the patient's hospital bed.  Mrs. Quevedo will  the suction machine from the [facility] tomorrow as well. Chip has placed EMS transportation scheduled for 11:00 am. Confirmation number is [id number]. Chip will continue to follow.

## 2018-03-21 VITALS
WEIGHT: 249.13 LBS | BODY MASS INDEX: 35.66 KG/M2 | HEART RATE: 96 BPM | DIASTOLIC BLOOD PRESSURE: 83 MMHG | SYSTOLIC BLOOD PRESSURE: 128 MMHG | HEIGHT: 70 IN | OXYGEN SATURATION: 97 % | TEMPERATURE: 98 F | RESPIRATION RATE: 18 BRPM

## 2018-03-21 PROBLEM — K94.22 INFECTION OF PEG SITE: Status: RESOLVED | Noted: 2018-02-27 | Resolved: 2018-03-21

## 2018-03-21 PROBLEM — R74.01 TRANSAMINITIS: Status: RESOLVED | Noted: 2018-03-04 | Resolved: 2018-03-21

## 2018-03-21 PROBLEM — B96.89 URINARY TRACT INFECTION DUE TO KLEBSIELLA SPECIES: Status: RESOLVED | Noted: 2018-01-27 | Resolved: 2018-03-21

## 2018-03-21 PROBLEM — G93.6 CYTOTOXIC CEREBRAL EDEMA: Status: RESOLVED | Noted: 2018-01-28 | Resolved: 2018-03-21

## 2018-03-21 PROBLEM — N39.0 URINARY TRACT INFECTION DUE TO KLEBSIELLA SPECIES: Status: RESOLVED | Noted: 2018-01-27 | Resolved: 2018-03-21

## 2018-03-21 PROBLEM — J96.01 ACUTE RESPIRATORY FAILURE WITH HYPOXIA: Status: RESOLVED | Noted: 2018-02-26 | Resolved: 2018-03-21

## 2018-03-21 PROBLEM — E87.3 METABOLIC ALKALOSIS: Status: RESOLVED | Noted: 2018-03-01 | Resolved: 2018-03-21

## 2018-03-21 PROBLEM — L30.4 INTERTRIGO: Status: RESOLVED | Noted: 2018-03-05 | Resolved: 2018-03-21

## 2018-03-21 LAB
POCT GLUCOSE: 155 MG/DL (ref 70–110)
POCT GLUCOSE: 184 MG/DL (ref 70–110)

## 2018-03-21 PROCEDURE — 94668 MNPJ CHEST WALL SBSQ: CPT

## 2018-03-21 PROCEDURE — 94640 AIRWAY INHALATION TREATMENT: CPT

## 2018-03-21 PROCEDURE — 25000242 PHARM REV CODE 250 ALT 637 W/ HCPCS: Performed by: NURSE PRACTITIONER

## 2018-03-21 PROCEDURE — 63600175 PHARM REV CODE 636 W HCPCS: Performed by: NURSE PRACTITIONER

## 2018-03-21 PROCEDURE — 99233 SBSQ HOSP IP/OBS HIGH 50: CPT | Mod: ,,, | Performed by: PSYCHIATRY & NEUROLOGY

## 2018-03-21 PROCEDURE — 25000242 PHARM REV CODE 250 ALT 637 W/ HCPCS: Performed by: PSYCHIATRY & NEUROLOGY

## 2018-03-21 PROCEDURE — 94761 N-INVAS EAR/PLS OXIMETRY MLT: CPT

## 2018-03-21 PROCEDURE — 25000003 PHARM REV CODE 250: Performed by: PSYCHIATRY & NEUROLOGY

## 2018-03-21 RX ORDER — INSULIN ASPART 100 [IU]/ML
13 INJECTION, SOLUTION INTRAVENOUS; SUBCUTANEOUS
Refills: 0 | Status: CANCELLED
Start: 2018-03-21 | End: 2019-03-21

## 2018-03-21 RX ORDER — MODAFINIL 200 MG/1
200 TABLET ORAL DAILY
Qty: 30 TABLET | Refills: 0 | Status: SHIPPED | OUTPATIENT
Start: 2018-03-22 | End: 2018-04-21

## 2018-03-21 RX ORDER — AMOXICILLIN 250 MG
1 CAPSULE ORAL 2 TIMES DAILY
Start: 2018-03-21

## 2018-03-21 RX ORDER — INSULIN ASPART 100 [IU]/ML
13 INJECTION, SOLUTION INTRAVENOUS; SUBCUTANEOUS EVERY 4 HOURS
Refills: 0
Start: 2018-03-21 | End: 2019-03-21

## 2018-03-21 RX ORDER — FLUOXETINE HYDROCHLORIDE 20 MG/1
20 CAPSULE ORAL DAILY
Qty: 30 CAPSULE | Refills: 0 | Status: SHIPPED | OUTPATIENT
Start: 2018-03-21 | End: 2019-03-21

## 2018-03-21 RX ORDER — CLOPIDOGREL BISULFATE 75 MG/1
75 TABLET ORAL DAILY
Qty: 30 TABLET | Refills: 11
Start: 2018-03-21 | End: 2018-03-21

## 2018-03-21 RX ORDER — BACLOFEN 10 MG/1
10 TABLET ORAL 2 TIMES DAILY
Qty: 60 TABLET | Refills: 0 | Status: SHIPPED | OUTPATIENT
Start: 2018-03-21 | End: 2019-03-21

## 2018-03-21 RX ORDER — METOPROLOL TARTRATE 50 MG/1
50 TABLET ORAL 2 TIMES DAILY
Qty: 60 TABLET | Refills: 11
Start: 2018-03-21 | End: 2018-03-21

## 2018-03-21 RX ORDER — IPRATROPIUM BROMIDE AND ALBUTEROL SULFATE 2.5; .5 MG/3ML; MG/3ML
3 SOLUTION RESPIRATORY (INHALATION) EVERY 6 HOURS
Qty: 1 BOX | Refills: 0 | Status: SHIPPED | OUTPATIENT
Start: 2018-03-21 | End: 2019-03-21

## 2018-03-21 RX ORDER — FLUOXETINE HYDROCHLORIDE 20 MG/1
20 CAPSULE ORAL DAILY
Qty: 30 CAPSULE | Refills: 11
Start: 2018-03-21 | End: 2018-03-21

## 2018-03-21 RX ORDER — MODAFINIL 200 MG/1
200 TABLET ORAL DAILY
Qty: 30 TABLET | Refills: 0
Start: 2018-03-22 | End: 2018-03-21

## 2018-03-21 RX ORDER — CLOPIDOGREL BISULFATE 75 MG/1
75 TABLET ORAL DAILY
Qty: 30 TABLET | Refills: 0 | Status: SHIPPED | OUTPATIENT
Start: 2018-03-21 | End: 2019-03-21

## 2018-03-21 RX ORDER — IPRATROPIUM BROMIDE AND ALBUTEROL SULFATE 2.5; .5 MG/3ML; MG/3ML
3 SOLUTION RESPIRATORY (INHALATION) EVERY 6 HOURS
Qty: 1 BOX | Refills: 0
Start: 2018-03-21 | End: 2018-03-21

## 2018-03-21 RX ORDER — ACETAMINOPHEN 325 MG/1
650 TABLET ORAL EVERY 6 HOURS PRN
Refills: 0
Start: 2018-03-21

## 2018-03-21 RX ORDER — BISACODYL 10 MG
10 SUPPOSITORY, RECTAL RECTAL DAILY PRN
Refills: 0
Start: 2018-03-21

## 2018-03-21 RX ORDER — MODAFINIL 100 MG/1
100 TABLET ORAL DAILY
Qty: 30 TABLET | Refills: 0
Start: 2018-03-21 | End: 2018-03-21

## 2018-03-21 RX ORDER — INSULIN ASPART 100 [IU]/ML
1-10 INJECTION, SOLUTION INTRAVENOUS; SUBCUTANEOUS EVERY 4 HOURS PRN
Refills: 0
Start: 2018-03-21 | End: 2018-03-21

## 2018-03-21 RX ORDER — METOPROLOL TARTRATE 50 MG/1
50 TABLET ORAL 2 TIMES DAILY
Qty: 60 TABLET | Refills: 0 | Status: SHIPPED | OUTPATIENT
Start: 2018-03-21 | End: 2019-03-21

## 2018-03-21 RX ORDER — LISINOPRIL 40 MG/1
40 TABLET ORAL DAILY
Qty: 30 TABLET | Refills: 0 | Status: SHIPPED | OUTPATIENT
Start: 2018-03-21 | End: 2019-03-21

## 2018-03-21 RX ORDER — INSULIN ASPART 100 [IU]/ML
1-10 INJECTION, SOLUTION INTRAVENOUS; SUBCUTANEOUS EVERY 4 HOURS PRN
Qty: 2 SYRINGE | Refills: 0 | Status: SHIPPED | OUTPATIENT
Start: 2018-03-21 | End: 2019-03-21

## 2018-03-21 RX ORDER — MODAFINIL 100 MG/1
100 TABLET ORAL DAILY
Qty: 30 TABLET | Refills: 0 | Status: SHIPPED | OUTPATIENT
Start: 2018-03-21 | End: 2018-04-20

## 2018-03-21 RX ORDER — BACLOFEN 10 MG/1
10 TABLET ORAL 2 TIMES DAILY
Qty: 60 TABLET | Refills: 11
Start: 2018-03-21 | End: 2018-03-21

## 2018-03-21 RX ORDER — INSULIN ASPART 100 [IU]/ML
13 INJECTION, SOLUTION INTRAVENOUS; SUBCUTANEOUS
Refills: 0 | Status: CANCELLED
Start: 2018-03-22 | End: 2019-03-22

## 2018-03-21 RX ORDER — ASPIRIN 325 MG
325 TABLET ORAL DAILY
Refills: 0
Start: 2018-03-21 | End: 2019-03-21

## 2018-03-21 RX ORDER — POLYETHYLENE GLYCOL 3350 17 G/17G
17 POWDER, FOR SOLUTION ORAL DAILY
Refills: 0
Start: 2018-03-21

## 2018-03-21 RX ORDER — LISINOPRIL 40 MG/1
40 TABLET ORAL DAILY
Qty: 90 TABLET | Refills: 3
Start: 2018-03-21 | End: 2018-03-21

## 2018-03-21 RX ADMIN — IPRATROPIUM BROMIDE AND ALBUTEROL SULFATE 3 ML: 2.5; .5 SOLUTION RESPIRATORY (INHALATION) at 12:03

## 2018-03-21 RX ADMIN — MODAFINIL 200 MG: 100 TABLET ORAL at 05:03

## 2018-03-21 RX ADMIN — HEPARIN SODIUM 5000 UNITS: 5000 INJECTION, SOLUTION INTRAVENOUS; SUBCUTANEOUS at 05:03

## 2018-03-21 RX ADMIN — INSULIN ASPART 13 UNITS: 100 INJECTION, SOLUTION INTRAVENOUS; SUBCUTANEOUS at 04:03

## 2018-03-21 RX ADMIN — SODIUM CHLORIDE SOLN NEBU 3% 4 ML: 3 NEBU SOLN at 12:03

## 2018-03-21 RX ADMIN — SODIUM CHLORIDE SOLN NEBU 3% 4 ML: 3 NEBU SOLN at 07:03

## 2018-03-21 RX ADMIN — INSULIN ASPART 1 UNITS: 100 INJECTION, SOLUTION INTRAVENOUS; SUBCUTANEOUS at 04:03

## 2018-03-21 RX ADMIN — IPRATROPIUM BROMIDE AND ALBUTEROL SULFATE 3 ML: 2.5; .5 SOLUTION RESPIRATORY (INHALATION) at 07:03

## 2018-03-21 NOTE — PROGRESS NOTES
"[facility]  Vascular Neurology  Comprehensive Stroke Center  Progress Note    Assessment/Plan:     * Embolic stroke involving left middle cerebral artery    46 yo M with PMHx HLD, LOYDA, poorly controlled DM II presents to [facility] 01/25/18 after noted "strange behavior" for which EMS was called. Pt was found to have a L M1 occlusion and was taken to IR for thrombectomy with TICI 2C reperfusion and stent placement due to L MCA stenosis. Etiology remains unclear: no obvious signs of cardiac source or atheromatous disease in the aorto/carotid axis. Echo normal with no hx or signs of A fib. PT/OT/SLP will continue to evaluate and treat; recommending SNF. DAPT 2/2 recent stent placement.      Disposition - currently pursuing home health.  Training family on 3/12 to bring patient home.  CM working with [facility] to get home care needs in place. Pending discharge home with family.     Antithrombotics:  mg po qd, clopidogrel 75 mg po qd [s/p intracranial stent]  Statins: Atorvastatin 80 mg po qd -held due to transaminitis- normalized.  Sister refuses to restart statin due to potential side effects.   Aggressive risk factor modification: HLD, DM II (Hgb A1c 10%), Obesity  Rehab efforts: PT/OT/SLP-- Recommending SNF, family wants to bring patient home; working on home health setup   Diagnostics ordered/pending: None  VTE prophylaxis: Heparin 5000 U q8h  BP parameters: SBP <140         Right spastic hemiparesis    -Due to stroke  -Continue aggressive PT/OT; Recommending SNF  -starting baclofen        Essential hypertension    -Stroke risk factor  -SBP <140  -Continue scheduled lisinopril 40 mg qd, Metoprolol 50 mg bid          Mixed hyperlipidemia    -Stroke risk factor, LDL invalid as TG > 400  - holding statin due to rising LFTs; liver enzymes normalized, but sister refuses to restart statin due to potential side effects.         Obstructive sleep apnea    -Stroke risk factor  -CPAP qHS  -Wean O2 as " tolerated.        Type 2 diabetes mellitus with hyperglycemia, with long-term current use of insulin    - Stroke risk factor, Hgb A1c 10%  - Continue Diabetasource AC TFs  - Endocrine consulted and guiding insulin modification for TF  - POCT Q4h ; Moderate correction q4hr         Nodule of right lung    - Plan for f/u CT 6-12 months via PCP        Oral phase dysphagia    - Continue SLP  - NPO, meds/feeds per PEG             01/25/18:  Brought in for aphasia, RSW with L gaze preferance. LKN unknown, not tPA candidate. Went to IR for angiogram and stent.  01/29/18:  Off Cardene, remains on Insulin gtt. Concern for sepsis, respiratory source. Imaging with mass effect and some brain compression; maycol crani watch.  01/30/18:  EEG consistent with focal L slowing 2/2 lesion and mild generalized slowing, no seizures. CT head stable, no hemorrhagic conversion  02/01/18:  Emergent intubation likely due to upper airway obstruction per NCC.    02/02/18:  Pt off Precedex, moving left side spontaneously and opening eyes. Intubated, on spontaneous today. NCC giving steroids in hopes to extubate tomorrow.  02/03/18:  NAEON, intubated, not responsive to verbal stimuli, withdraws from pain on LUE, SBP ~135-230. Continued on insulin gtt, SQH for DVT ppx, and captopril.   02/16/18:  Few changes on exam, continues to have significant RUE/RLE weakness with global aphasia.  Family member at bedside noted attempt to communicate with her.  02/18/18:  Pt largely unchanged on exam this am.  No family member present during am rounds.  02/19/18:  Tolerating facemask. PEG by IR this afternoon.  02/20/18:  s/p PEG. O2 % on 5L NC, still requiring frequent suctioning. Primary team considering transfer to Medicine tomorrow.  02/21/18:  Pt sating % on 3L NC. Restarted DAPT, including . Plans for step down to stroke service.  02/22/18:  Pt with [facility] insurance but not service-connected so unable to be placed at SNF.  Working on  insurance coverage of at least HHC therapy.  02/23/18:  Increased detemir to 36 U bid.  Pl[facility] with [facility] SN[facility] pending completion of paperwork by Stroke team and pt's spouse--in process.  2/24/18 - NAEON. Pts WBC mildly elevated 13.01, pt is afebrile. Will continue to monitor. Endocrine consulted - Recommend levemir 40 units daily to cover basal needs (using ~0.7u/kg); Start novolog 6 units q4hr to cover TF; Moderate correction q4hr. SNF placement pending.  2/25/18 -  Pt WBC increased overnight, HR range , and temp 99.2. Blood cultures ordered and drawn. UA/UC ordered. Nursing staff called a rapid response this AM due to pts tachycardia, fever, and decreased O2 sats. Arrived to room @ 0824. 1 liter of NS ordered. STAT CXR ordered. Tylenol given. ABG completed with no significant abnormal results. Respiratory suctioned pt and pt repositioned to help with breathing. Pt O2 sats improved on venti mask, temp decreased, and BP remained normotensive. Started on  vanc and zosyn. Pt appears more comfortable and no decline in neuro status. Pt family updated.   2/26/18 - afebrile overnight and leukocytosis improving with BS antibiotics. CT abdomen yesterday noted seroma/hematoma on left rectus abdominus. IR consulted for culture +/- drain.  02/28/2018 s/p aspiration of left rectus collection adjacent to the PEG tube.  Culture sent to lab, pending.  Remains afebrile.  Currently on 4L NC O2.   3/1/18: NAEON.   3/2: Pt afebrile, WBC WNL. Now sating well on 1L NC. Pt emotional on exam, family not interested in SSRI at this time. CM attempting to get pt placed to [facility] so he can then transfer to [facility]. Increased rate of TFs.  3/3: NAEON. Pending placement. Will monitor Na level tomorrow.  3/4: stable, no new issues.  3/5/18 - sleepy today but no events overnight   3/6/18 - sister concerned due to coughing post speech therapy - discussed with speech team and sister   3/7/18 - No change in exam.  [facility]  [facility] - pending approval.  Continue to monitor liver enzymes.  3/8/18 - no acute events overnight. Vitals within normal. Pending placement   03/09/2018 NAEON  3/10/18 starting baclofen for spasticity, fluoxetine for depression and therapy motivation  3/11/18 OT to make splint for RUE to prevent contractures, plans for discharge home with family training planned for Monday  3/15/18 CM working with [facility] to get home care needs in place. Possible discharge tomorrow with family. NAEON. Liver enzymes normalized, but sister refuses to restart statin due to potential side effects.   3/16/18 NAEON. Still awaiting home health equipment before patient is able to go home.   3-17-18 still awaiting mattress for bed to be able to go home to [place]  3/20/18 mattress to be delivered to patient's home in [place] today, nurse noted cloudy urine, UA pending  3-21-18 UA negative, ready for discharge     3-21-18 boss placed to get patient home to [place] where urinary retention can be addressed    STROKE DOCUMENTATION   Acute Stroke Times   Stroke Team Called Date: 01/25/18  Stroke Team Called Time: 1852  Stroke Team Arrival Date: 01/25/18  Stroke Team Arrival Time: 0652  CT Interpretation Time: 1910  Decision to Treat Time for Alteplase:  (n/a unknown last known normal )  Decision to Treat Time for IR: 1915    NIH Scale:  1a. Level Of Consciousness: 0-->Alert: keenly responsive  1b. LOC Questions: 2-->Answers neither question correctly  1c. LOC Commands: 2-->Performs neither task correctly  2. Best Gaze: 1-->Partial gaze palsy: gaze is abnormal in one or both eyes, but forced deviation or total gaze paresis is not present  3. Visual: 2-->Complete hemianopia  4. Facial Palsy: 1-->Minor paralysis (flattened nasolabial fold, asymmetry on smiling)  5a. Motor Arm, Left: 0-->No drift: limb holds 90 (or 45) degrees for full 10 secs  5b. Motor Arm, Right: 4-->No movement  6a. Motor Leg, Left: 0-->No drift: leg holds 30 degree position for full 5  secs  6b. Motor Leg, Right: 4-->No movement  7. Limb Ataxia: 0-->Absent  8. Sensory: 0-->Normal: no sensory loss  9. Best Language: 3-->Mute, global aphasia: no usable speech or auditory comprehension  10. Dysarthria: 2-->Severe dysarthria: patients speech is so slurred as to be unintelligible in the absence of or out of proportion to any dysphasia, or is mute/anarthric  11. Extinction and Inattention (formerly Neglect): 0-->No abnormality  Total (NIH Stroke Scale): 21       Modified Jasmin Score: 0  Jeff Coma Scale:    ABCD2 Score:    [id number] Score:   HAS -BLED Score:   ICH Score:   Hunt & Laguerre Classification:      Hemorrhagic change of an Ischemic Stroke: Does this patient have an ischemic stroke with hemorrhagic changes? No     Neurologic Chief Complaint: Left MCA stroke     Subjective:     Interval History:     No issues overnight stable for discharge today    HPI, Past Medical, Family, and Social History remains the same as documented in the initial encounter.      Review of Systems   Constitutional: Negative for chills and fever.   Eyes: Positive for visual disturbance.   Neurological: Positive for speech difficulty and weakness.   Psychiatric/Behavioral: Negative for behavioral problems.     Scheduled Meds:   albuterol-ipratropium 2.5mg-0.5mg/3mL  3 mL Nebulization Q6H    aspirin  325 mg Per G Tube Daily    baclofen  10 mg Per G Tube BID    clopidogrel  75 mg Per G Tube Daily    FLUoxetine  20 mg Per G Tube Daily    heparin (porcine)  5,000 Units Subcutaneous Q8H    insulin aspart U-100  13 Units Subcutaneous Q24H    insulin aspart U-100  13 Units Subcutaneous Q24H    insulin aspart U-100  13 Units Subcutaneous Q24H    insulin aspart U-100  13 Units Subcutaneous Q24H    insulin aspart U-100  13 Units Subcutaneous Q24H    insulin aspart U-100  13 Units Subcutaneous Q24H    insulin detemir U-100  22 Units Subcutaneous Daily    lisinopril  40 mg Per NG tube Daily    metoprolol tartrate  50  mg Per NG tube BID    modafinil  100 mg Per G Tube Daily    And    modafinil  200 mg Per G Tube Daily    polyethylene glycol  17 g Per NG tube Daily    potassium chloride 10%  40 mEq Oral Once    senna-docusate 8.6-50 mg  1 tablet Per NG tube BID    sodium chloride 0.9%  500 mL Intravenous Once    sodium chloride 0.9%  3 mL Intravenous Q8H    sodium chloride 3%  4 mL Nebulization Q6H    white petrolatum-mineral oil   Both Eyes QHS     Continuous Infusions:  PRN Meds:acetaminophen, bisacodyl, dextrose 50%, glucagon (human recombinant), insulin aspart U-100, ipratropium, labetalol, ondansetron    Objective:     Vital Signs (Most Recent):  Temp: 97.8 °F (36.6 °C) (03/21/18 0414)  Pulse: 89 (03/21/18 0800)  Resp: 18 (03/21/18 0800)  BP: 128/83 (03/21/18 0800)  SpO2: 97 % (03/21/18 0726)  BP Location: Right arm    Vital Signs Range (Last 24H):  Temp:  [97 °F (36.1 °C)-98.5 °F (36.9 °C)]   Pulse:  [66-99]   Resp:  [16-23]   BP: (108-128)/(69-93)   SpO2:  [92 %-100 %]   BP Location: Right arm    Physical Exam   Constitutional: He appears well-developed and well-nourished.   Neurological: He is alert.   Skin: Skin is warm and dry.   Nursing note and vitals reviewed.    Neurological Exam:  LOC: alert  Language: Global aphasia  Articulation: Mute/Anarthric  Visual Fields: Hemianopsia right  EOM (CN III, IV, VI): Gaze preference  left  Facial Movement (CN VII): Lower facial weakness on the Right  Motor: Arm left  Normal 5/5  Leg left  Paresis: 5/5  Arm right  Plegia 0/5  Leg right Plegia 0/5  Tone: increased tone of the RUE and RLE; normal L sided tone    Laboratory:  CMP:   No results for input(s): GLUCOSE, CALCIUM, ALBUMIN, PROT, NA, K, CO2, CL, BUN, CREATININE, ALKPHOS, ALT, AST, BILITOT in the last 24 hours.  BMP:     Recent Labs  Lab 03/20/18  0405      K 3.5      CO2 26   BUN 23*   CREATININE 0.8   CALCIUM 9.7     CBC:     Recent Labs  Lab 03/20/18  0405   WBC 6.60   RBC 4.69   HGB 14.1   HCT 41.4       MCV 88   MCH 30.1   MCHC 34.1     Lipid Panel: No results for input(s): CHOL, LDLCALC, HDL, TRIG in the last 168 hours.  Coagulation:     Recent Labs  Lab 03/18/18  0644   INR 0.9     Platelet Aggregation Study: No results for input(s): PLTAGG, PLTAGINTERP, PLTAGREGLACO, ADPPLTAGGREG in the last 168 hours.  Hgb A1C: No results for input(s): HGBA1C in the last 168 hours.  TSH: No results for input(s): TSH in the last 168 hours.      Diagnostic Results       Brain Imaging     Most recent CTH 2/04/18 redemonstration of large L MCA territory infarction w/o evidence of hemorrhagic conversion.  Stable post op change from L MCA endovascular stenting.      Vessel Imaging   IR Cerebral Angiogram 1/25/18 demonstrated occlusion of the L M1 segment of the L MCA with good collaterals. Occlusion removed and stent placed with TICI 2C.    Cardiac Imaging   2D Echo 1/26/18   CONCLUSIONS     1 - Normal left ventricular systolic function (EF 65-70%).     2 - Concentric remodeling.     3 - No wall motion abnormalities.     4 - Normal left ventricular diastolic function.     5 - Normal right ventricular systolic function       Ericka Mason, NP  [facility]  Department of Vascular Neurology   [facility]

## 2018-03-21 NOTE — PLAN OF CARE
Problem: Patient Care Overview  Goal: Plan of Care Review  Outcome: Ongoing (interventions implemented as appropriate)  Pt nonverbal, does not follow commands. Grossly tracks--PERRLA.  Slight L facial at rest.  Will not protrude his tongue.  RUE/RLE 1/5 strength, occasional trace of muscle movement--will withdraw from noxious stimulation, LUE/LLE 4/5 strength, spontaneously moving. NSR on tele, O2 sat > 92% on RA.  [place] cath remains in place for comfort.  Q2 turns.  Dressing in place on stage 2 on sacrum, bj-care PRN. NPO--Diabetasource continuous at 75 mL/hr with 0-5 mL residual.  Q4 BG monitoring, WNL. Patients sibling remains at bedside.  No s/sx of pain.  No significant events overnight.

## 2018-03-21 NOTE — DISCHARGE SUMMARY
[facility]  Vascular Neurology  [facility] Stroke Center  Discharge Summary     Summary:     Admit Date: 1/25/2018  7:02 PM    Discharge Date and Time: No discharge date for patient encounter.    Attending Physician: Jaime Reynolds MD     Discharge Provider: Ericka Mason NP    History of Present Illness: Patient is a 47 year old male who walked into [facility] and was acting abnormal.  EMS was called and brought to the ED for concern of L MCA syndrome.  He had left gaze deviation, right sided hemianopsia, right sided weakness and global aphasia.  CT head showed no hemorrhage and CTA MP showed L M1 occlusion.  Not a candidate for IV-tpa because unknown last normal.  He was than emergently taken for thrombectomy.          EMS reported glucose 309 and EKG abnormalities.      Hospital Course (synopsis of major diagnoses, care, treatment, and services provided during the course of the hospital stay): 01/25/18:  Brought in for aphasia, RSW with L gaze preferance. LKN unknown, not tPA candidate. Went to IR for angiogram and stent.  01/29/18:  Off Cardene, remains on Insulin gtt. Concern for sepsis, respiratory source. Imaging with mass effect and some brain compression; maycol crani watch.  01/30/18:  EEG consistent with focal L slowing 2/2 lesion and mild generalized slowing, no seizures. CT head stable, no hemorrhagic conversion  02/01/18:  Emergent intubation likely due to upper airway obstruction per NCC.    02/02/18:  Pt off Precedex, moving left side spontaneously and opening eyes. Intubated, on spontaneous today. NCC giving steroids in hopes to extubate tomorrow.  02/03/18:  NAEON, intubated, not responsive to verbal stimuli, withdraws from pain on LUE, SBP ~135-230. Continued on insulin gtt, SQH for DVT ppx, and captopril.   02/16/18:  Few changes on exam, continues to have significant RUE/RLE weakness with global aphasia.  Family member at bedside noted attempt to communicate with  her.  02/18/18:  Pt largely unchanged on exam this am.  No family member present during am rounds.  02/19/18:  Tolerating facemask. PEG by IR this afternoon.  02/20/18:  s/p PEG. O2 % on 5L NC, still requiring frequent suctioning. Primary team considering transfer to Medicine tomorrow.  02/21/18:  Pt sating % on 3L NC. Restarted DAPT, including . Plans for step down to stroke service.  02/22/18:  [facility] with [facility] but not service-connected so unable to be placed at SNF.  Working on insurance coverage of at least HHC therapy.  02/23/18:  Increased detemir to 36 U bid.  [facility] with [facility] SN[facility] pending completion of paperwork by Stroke team and pt's spouse--in process.  2/24/18 - NAEON. Pts WBC mildly elevated 13.01, pt is afebrile. Will continue to monitor. Endocrine consulted - Recommend levemir 40 units daily to cover basal needs (using ~0.7u/kg); Start novolog 6 units q4hr to cover TF; Moderate correction q4hr. SNF placement pending.  2/25/18 -  Pt WBC increased overnight, HR range , and temp 99.2. Blood cultures ordered and drawn. UA/UC ordered. Nursing staff called a rapid response this AM due to pts tachycardia, fever, and decreased O2 sats. Arrived to room @ 0824. 1 liter of NS ordered. STAT CXR ordered. Tylenol given. ABG completed with no significant abnormal results. Respiratory suctioned pt and pt repositioned to help with breathing. Pt O2 sats improved on venti mask, temp decreased, and BP remained normotensive. Started on  vanc and zosyn. Pt appears more comfortable and no decline in neuro status. Pt family updated.   2/26/18 - afebrile overnight and leukocytosis improving with BS antibiotics. CT abdomen yesterday noted seroma/hematoma on left rectus abdominus. IR consulted for culture +/- drain.  02/28/2018 s/p aspiration of left rectus collection adjacent to the PEG tube.  Culture sent to lab, pending.  Remains afebrile.  Currently on 4L NC O2.   3/1/18: NAEON.   3/2: Pt  afebrile, WBC WNL. Now sating well on 1L NC. Pt emotional on exam, family not interested in SSRI at this time. CM attempting to get pt placed to [facility] so he can then transfer to [facility]. Increased rate of TFs.  3/3: NAEON. Pending placement. Will monitor Na level tomorrow.  3/4: stable, no new issues.  3/5/18 - sleepy today but no events overnight   3/6/18 - sister concerned due to coughing post speech therapy - discussed with speech team and sister   3/7/18 - No change in exam.  [facility] to [facility] - pending approval.  Continue to monitor liver enzymes.  3/8/18 - no acute events overnight. Vitals within normal. Pending placement   03/09/2018 NAEON  3/10/18 starting baclofen for spasticity, fluoxetine for depression and therapy motivation  3/11/18 OT to make splint for RUE to prevent contractures, plans for discharge home with family training planned for Monday  3/15/18 CM working with [facility] to get home care needs in place. Possible discharge tomorrow with family. NAEON. Liver enzymes normalized, but sister refuses to restart statin due to potential side effects.   3/16/18 NAEON. Still awaiting home health equipment before patient is able to go home.   3-17-18 still awaiting mattress for bed to be able to go home to [place]  3/20/18 mattress to be delivered to patient's home in [place] today, nurse noted cloudy urine, UA pending  3-21-18 UA negative, ready for discharge, will not have f/u with stroke service as going to [place]    STROKE DOCUMENTATION   Acute Stroke Times   Stroke Team Called Date: 01/25/18  Stroke Team Called Time: 1852  Stroke Team Arrival Date: 01/25/18  Stroke Team Arrival Time: 0652  CT Interpretation Time: 1910  Decision to Treat Time for Alteplase:  (n/a unknown last known normal )  Decision to Treat Time for IR: 1915     NIH Scale:  1a. Level Of Consciousness: 0-->Alert: keenly responsive  1b. LOC Questions: 2-->Answers neither question correctly  1c. LOC Commands: 2-->Performs  neither task correctly  2. Best Gaze: 1-->Partial gaze palsy: gaze is abnormal in one or both eyes, but forced deviation or total gaze paresis is not present  3. Visual: 2-->Complete hemianopia  4. Facial Palsy: 1-->Minor paralysis (flattened nasolabial fold, asymmetry on smiling)  5a. Motor Arm, Left: 0-->No drift: limb holds 90 (or 45) degrees for full 10 secs  5b. Motor Arm, Right: 4-->No movement  6a. Motor Leg, Left: 0-->No drift: leg holds 30 degree position for full 5 secs  6b. Motor Leg, Right: 4-->No movement  7. Limb Ataxia: 0-->Absent  8. Sensory: 0-->Normal: no sensory loss  9. Best Language: 3-->Mute, global aphasia: no usable speech or auditory comprehension  10. Dysarthria: 2-->Severe dysarthria: patients speech is so slurred as to be unintelligible in the absence of or out of proportion to any dysphasia, or is mute/anarthric  11. Extinction and Inattention (formerly Neglect): 0-->No abnormality  Total (NIH Stroke Scale): 21        Modified Jasmin Score: 5  [place] Coma Scale:    ABCD2 Score:    [id number] Score:   HAS -BLED Score:   ICH Score:   Hunt & Laguerre Classification:       Assessment/Plan:     Diagnostic Results:      Brain Imaging:   Most recent CTH 2/04/18 redemonstration of large L MCA territory infarction w/o evidence of hemorrhagic conversion.  Stable post op change from L MCA endovascular stenting.       Vessel Imaging   IR Cerebral Angiogram 1/25/18 demonstrated occlusion of the L M1 segment of the L MCA with good collaterals. Occlusion removed and stent placed with TICI 2C.     Cardiac Imaging   2D Echo 1/26/18   CONCLUSIONS     1 - Normal left ventricular systolic function (EF 65-70%).     2 - Concentric remodeling.     3 - No wall motion abnormalities.     4 - Normal left ventricular diastolic function.     5 - Normal right ventricular systolic function     Interventions: Thrombectomy    Complications: None    Disposition: Home or Self Care    Final Active Diagnoses:    Diagnosis  Date Noted POA    PRINCIPAL PROBLEM:  Embolic stroke involving left middle cerebral artery [I63.412] 01/26/2018 Yes    Right spastic hemiparesis [G81.11] 02/19/2018 Yes    Essential hypertension [I10] 01/26/2018 Yes    Mixed hyperlipidemia [E78.2] 01/26/2018 Yes    Obstructive sleep apnea [G47.33] 01/25/2018 Yes    Type 2 diabetes mellitus with hyperglycemia, with long-term current use of insulin [E11.65, Z79.4] 01/26/2018 Not Applicable    Alteration in skin integrity [R23.9] 03/14/2018 Yes    Pressure injury of skin and subcutaneous tissue, stage 3 [L89.93] 03/11/2018 Yes    At moderate risk for alteration in skin integrity [Z91.89] 03/11/2018 Yes    On enteral nutrition [Z78.9] 02/24/2018 No    Nodule of right lung [R91.1] 02/17/2018 Yes    Oral phase dysphagia [R13.11] 02/14/2018 Yes      Problems Resolved During this Admission:    Diagnosis Date Noted Date Resolved POA    Intertrigo [L30.4] 03/05/2018 03/21/2018 Yes    Transaminitis [R74.0] 03/04/2018 03/21/2018 No    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 [I63.9, F06.31] 03/02/2018 03/06/2018 Yes    Metabolic alkalosis [E87.3] 03/01/2018 03/21/2018 No    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 [K94.22] 02/27/2018 03/21/2018 No    Acute respiratory failure with hypoxia [J96.01] 02/26/2018 03/21/2018 Yes    Brain compression [G93.5] 02/16/2018 02/24/2018 Yes    Respiratory distress, acute [R06.03] 02/01/2018 02/20/2018 Yes    Ischemic stroke [I63.9] 01/30/2018 01/31/2018 Yes    Cytotoxic cerebral edema [G93.6] 01/28/2018 03/21/2018 Yes    Altered mental status [R41.82]  03/21/2018 Yes    Urinary tract infection due to Klebsiella species [N39.0, B96.1] 01/27/2018 03/21/2018 No    Respiratory distress [R06.03] 01/27/2018 01/31/2018 Yes    Stroke [I63.9] 01/25/2018 01/26/2018 Yes     No new Assessment & Plan notes have been filed under this hospital service since the last note was generated.  Service: Vascular  "Neurology      Recommendations:     Post-discharge complication risks: Falls, Pneumonia, Skin breakdown, Urinary tract infections    Stroke Education given to: family    Follow-up in Stroke Clinic in none going to [place]    Discharge Plan:  Antithrombotics: Aspirin 325mg, Clopidogrel 75mg  Statin:family refuses secondary to transaminitis during hospital stay  Aggresive risk factor modification:  Hypertension  Diet  Exercise  Obesity    Follow Up:  Follow-up Information     PCP.    Contact information:  Need to [facility] in [place]                 Patient Instructions:     SUCTION MACHINE FOR HOME USE   Order Specific Question Answer Comments   Height: 5' 10" (1.778 m)    Weight: 113 kg (249 lb 1.9 oz)    Type of suction: Intermittent    Frequency: Other (please specify) PRN   Disposable cannisters? Yes    Connective tubing? Yes    [place]? Yes    Length of need (1-99 months): 99    Does patient have medical equipment at home? none    Vendor: Other (use comments) [facility]   Expected Date of Delivery: 3/21/2018      Other restrictions (specify):   Order Comments: Needs 24 hour care  OOB per therapy and family teaching     Change dressing (specify)   Order Comments: See wound care orders     Tube Feedings/Formulas   Order Specific Question Answer Comments   Select Adult Formula: Diabetasource AC    Route: Gastrostomy    Formula Rate (mL/hr): 60          Medications:  Reconciled Home Medications:   Current Discharge Medication List      START taking these medications    Details   acetaminophen (TYLENOL) 325 MG tablet 2 tablets (650 mg total) by Per G Tube route every 6 (six) hours as needed.  Refills: 0      albuterol-ipratropium 2.5mg-0.5mg/3mL (DUO-NEB) 0.5 mg-3 mg(2.5 mg base)/3 mL nebulizer solution Take 3 mLs by nebulization every 6 (six) hours. Rescue  Qty: 1 Box, Refills: 0      aspirin 325 MG tablet 1 tablet (325 mg total) by Per G Tube route once daily.  Refills: 0      baclofen (LIORESAL) 10 MG tablet 1 tablet " (10 mg total) by Per G Tube route 2 (two) times daily.  Qty: 60 tablet, Refills: 11      bisacodyl (DULCOLAX) 10 mg Supp Place 1 suppository (10 mg total) rectally daily as needed.  Refills: 0      clopidogrel (PLAVIX) 75 mg tablet 1 tablet (75 mg total) by Per G Tube route once daily.  Qty: 30 tablet, Refills: 11      FLUoxetine (PROZAC) 20 MG capsule 1 capsule (20 mg total) by Per G Tube route once daily.  Qty: 30 capsule, Refills: 11      !! insulin aspart U-100 (NOVOLOG) 100 unit/mL InPn pen Inject 1-10 Units into the skin every 4 (four) hours as needed (Hyperglycemia).  Refills: 0      !! insulin aspart U-100 (NOVOLOG) 100 unit/mL InPn pen Inject 13 Units into the skin every 4 (four) hours. To be given at midnight, 0400, 0800, 1200, 1600, 2000  Refills: 0      insulin detemir U-100 (LEVEMIR FLEXTOUCH) 100 unit/mL (3 mL) SubQ InPn pen Inject 22 Units into the skin once daily.  Refills: 0      lisinopril (PRINIVIL,ZESTRIL) 40 MG tablet 1 tablet (40 mg total) by Per G Tube route once daily.  Qty: 90 tablet, Refills: 3      metoprolol tartrate (LOPRESSOR) 50 MG tablet 1 tablet (50 mg total) by Per G Tube route 2 (two) times daily.  Qty: 60 tablet, Refills: 11      !! modafinil (PROVIGIL) 100 MG Tab 1 tablet (100 mg total) by Per G Tube route once daily.  Qty: 30 tablet, Refills: 0      !! modafinil (PROVIGIL) 200 MG Tab 1 tablet (200 mg total) by Per G Tube route once daily.  Qty: 30 tablet, Refills: 0      polyethylene glycol (GLYCOLAX) 17 gram PwPk 17 g by Per NG tube route once daily.  Refills: 0      senna-docusate 8.6-50 mg (PERICOLACE) 8.6-50 mg per tablet 1 tablet by Per G Tube route 2 (two) times daily.      white petrolatum-mineral oil 83-15 % Oint Place into both eyes every evening.       !! - Potential duplicate medications found. Please discuss with provider.          Ericka Mason NP  [facility]  Department of Vascular Neurology   [facility]

## 2018-03-21 NOTE — SUBJECTIVE & OBJECTIVE
Neurologic Chief Complaint: Left MCA stroke     Subjective:     Interval History:     No issues overnight stable for discharge today    HPI, Past Medical, Family, and Social History remains the same as documented in the initial encounter.      Review of Systems   Constitutional: Negative for chills and fever.   Eyes: Positive for visual disturbance.   Neurological: Positive for speech difficulty and weakness.   Psychiatric/Behavioral: Negative for behavioral problems.     Scheduled Meds:   albuterol-ipratropium 2.5mg-0.5mg/3mL  3 mL Nebulization Q6H    aspirin  325 mg Per G Tube Daily    baclofen  10 mg Per G Tube BID    clopidogrel  75 mg Per G Tube Daily    FLUoxetine  20 mg Per G Tube Daily    heparin (porcine)  5,000 Units Subcutaneous Q8H    insulin aspart U-100  13 Units Subcutaneous Q24H    insulin aspart U-100  13 Units Subcutaneous Q24H    insulin aspart U-100  13 Units Subcutaneous Q24H    insulin aspart U-100  13 Units Subcutaneous Q24H    insulin aspart U-100  13 Units Subcutaneous Q24H    insulin aspart U-100  13 Units Subcutaneous Q24H    insulin detemir U-100  22 Units Subcutaneous Daily    lisinopril  40 mg Per NG tube Daily    metoprolol tartrate  50 mg Per NG tube BID    modafinil  100 mg Per G Tube Daily    And    modafinil  200 mg Per G Tube Daily    polyethylene glycol  17 g Per NG tube Daily    potassium chloride 10%  40 mEq Oral Once    senna-docusate 8.6-50 mg  1 tablet Per NG tube BID    sodium chloride 0.9%  500 mL Intravenous Once    sodium chloride 0.9%  3 mL Intravenous Q8H    sodium chloride 3%  4 mL Nebulization Q6H    white petrolatum-mineral oil   Both Eyes QHS     Continuous Infusions:  PRN Meds:acetaminophen, bisacodyl, dextrose 50%, glucagon (human recombinant), insulin aspart U-100, ipratropium, labetalol, ondansetron    Objective:     Vital Signs (Most Recent):  Temp: 97.8 °F (36.6 °C) (03/21/18 0414)  Pulse: 89 (03/21/18 0800)  Resp: 18 (03/21/18  0800)  BP: 128/83 (03/21/18 0800)  SpO2: 97 % (03/21/18 0726)  BP Location: Right arm    Vital Signs Range (Last 24H):  Temp:  [97 °F (36.1 °C)-98.5 °F (36.9 °C)]   Pulse:  [66-99]   Resp:  [16-23]   BP: (108-128)/(69-93)   SpO2:  [92 %-100 %]   BP Location: Right arm    Physical Exam   Constitutional: He appears well-developed and well-nourished.   Neurological: He is alert.   Skin: Skin is warm and dry.   Nursing note and vitals reviewed.    Neurological Exam:  LOC: alert  Language: Global aphasia  Articulation: Mute/Anarthric  Visual Fields: Hemianopsia right  EOM (CN III, IV, VI): Gaze preference  left  Facial Movement (CN VII): Lower facial weakness on the Right  Motor: Arm left  Normal 5/5  Leg left  Paresis: 5/5  Arm right  Plegia 0/5  Leg right Plegia 0/5  Tone: increased tone of the RUE and RLE; normal L sided tone    Laboratory:  CMP:   No results for input(s): GLUCOSE, CALCIUM, ALBUMIN, PROT, NA, K, CO2, CL, BUN, CREATININE, ALKPHOS, ALT, AST, BILITOT in the last 24 hours.  BMP:     Recent Labs  Lab 03/20/18  0405      K 3.5      CO2 26   BUN 23*   CREATININE 0.8   CALCIUM 9.7     CBC:     Recent Labs  Lab 03/20/18  0405   WBC 6.60   RBC 4.69   HGB 14.1   HCT 41.4      MCV 88   MCH 30.1   MCHC 34.1     Lipid Panel: No results for input(s): CHOL, LDLCALC, HDL, TRIG in the last 168 hours.  Coagulation:     Recent Labs  Lab 03/18/18  0644   INR 0.9     Platelet Aggregation Study: No results for input(s): PLTAGG, PLTAGINTERP, PLTAGREGLACO, ADPPLTAGGREG in the last 168 hours.  Hgb A1C: No results for input(s): HGBA1C in the last 168 hours.  TSH: No results for input(s): TSH in the last 168 hours.      Diagnostic Results       Brain Imaging     Most recent CTH 2/04/18 redemonstration of large L MCA territory infarction w/o evidence of hemorrhagic conversion.  Stable post op change from L MCA endovascular stenting.      Vessel Imaging   IR Cerebral Angiogram 1/25/18 demonstrated occlusion of  the L M1 segment of the L MCA with good collaterals. Occlusion removed and stent placed with TICI 2C.    Cardiac Imaging   2D Echo 1/26/18   CONCLUSIONS     1 - Normal left ventricular systolic function (EF 65-70%).     2 - Concentric remodeling.     3 - No wall motion abnormalities.     4 - Normal left ventricular diastolic function.     5 - Normal right ventricular systolic function

## 2018-03-21 NOTE — PROGRESS NOTES
Wound  Care follow-up.  Continue care with hydrocolloid over pressure injury to sacral spine.  Change 2 x week or as needed.    May use barrier paste instead of hydrocolloid- BID and prn cleaning.      03/21/18 1020   [REMOVED]      Pressure Injury 03/11/18 1246 Sacral spine Stage 3   Final Assessment Date/Final Assessment Time: 03/21/18 0924  Date First Assessed/Time First Assessed: 03/11/18 1246   Pressure Injury Present on Admission: no  Location: Sacral spine  Staging: Stage 3   Wound Image    Staging 3   Dressing Appearance Dry;Intact;Clean   Drainage Amount None   Appearance Pink;Moist;Granulating   Red (%), Wound Tissue Color 100 %   Periwound Area Intact;Dry;Pink   Wound Edges Open   Wound Length (cm) 1.8   Wound Width (cm) 0.7   Depth (cm) 0.2   Care Cleansed with:;Sterile normal saline   Dressing Applied;Hydrocolloid;Transparent film   Dressing Change Due 03/24/18   Skin Interventions   Device Skin Pressure Protection skin-to-skin areas padded;skin-to-device areas padded;positioning supports utilized;pressure points protected   Pressure Reduction Devices Specialty bed utilized;Heel offloading device utilized   Pressure Reduction Techniques pressure points protected;positioned off wounds;heels elevated off bed;weight shift assistance provided

## 2018-03-21 NOTE — PLAN OF CARE
[facility]    HOME HEALTH ORDERS  FACE TO FACE ENCOUNTER    Patient Name: Todd Quevedo  YOB: 1970    PCP: Primary Doctor No   PCP Address: None  PCP Phone Number: None  PCP Fax: None    Encounter Date: 03/21/2018    Admit to Home Health    Diagnoses:  Active Hospital Problems    Diagnosis  POA    *Embolic stroke involving left middle cerebral artery [I63.412]  Yes     Priority: 1 - High    Right spastic hemiparesis [G81.11]  Yes     Priority: 4     Essential hypertension [I10]  Yes     Priority: 4     Mixed hyperlipidemia [E78.2]  Yes     Priority: 5     Obstructive sleep apnea [G47.33]  Yes     Priority: 6     Type 2 diabetes mellitus with hyperglycemia, with long-term current use of insulin [E11.65, Z79.4]  Not Applicable     Priority: 7     Alteration in skin integrity [R23.9]  Yes    Pressure injury of skin and subcutaneous tissue, stage 3 [L89.93]  Yes    At moderate risk for alteration in skin integrity [Z91.89]  Yes    Transaminitis [R74.0]  No    On enteral nutrition [Z78.9]  No    Nodule of right lung [R91.1]  Yes    Oral phase dysphagia [R13.11]  Yes    Urinary tract infection due to Klebsiella species [N39.0, B96.1]  No      Resolved Hospital Problems    Diagnosis Date Resolved POA    Intertrigo [L30.4] 03/21/2018 Yes    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  Follow-up Information     PCP.     [facility]  [facility] [place]                   I have seen and examined this patient face to face today. My clinical findings that support the need for the home health skilled services and home bound status are the following:  Weakness/numbness causing balance and gait disturbance due to Stroke making it taxing to leave home.  Requiring assistive device to leave home due to unsteady gait caused by  Stroke.  Patient with medication mismanagement issues requiring home bound status as evidenced by  Poor understanding of medication regimen/dosage and Uncontrolled hyperglycemic/hypoglycemic events.  Medical restrictions requiring assistance of another human to leave home due to  Newly placed G-tube/ostomy.  Mental confusion making it unsafe for patient to leave home alone due to  Aphasia due to stroke.    Allergies:Review of patient's allergies indicates:  No Known Allergies    Diet: tube feedings: Continuous Diabetasource at 60  mL per hour for 24  hours per day   Water flush 200 ml Q6H    Activities: 24 hour care activity per therapy    Nursing:   SN to complete comprehensive assessment including routine vital signs. Instruct on disease process and s/s of complications to report to MD. Review/verify medication list sent home with the patient at time of discharge  and instruct patient/caregiver as needed. Frequency may be adjusted depending on start of care date.    Notify MD if SBP > 160 or < 90; DBP > 90 or < 50; HR > 120 or < 50; Temp > 101      CONSULTS:    Physical Therapy to evaluate and treat. Evaluate for home safety and equipment needs; Establish/upgrade home exercise program. Perform / instruct on therapeutic exercises, gait training, transfer training, and Range of Motion.  Occupational Therapy to evaluate and treat. Evaluate home environment for safety and equipment needs. Perform/Instruct on transfers, ADL training, ROM, and therapeutic exercises.  Speech Therapy  to evaluate and  treat for  Language, Swallowing and Cognition.   to evaluate for community resources/long-range planning.  Aide to provide assistance with personal care, ADLs, and vital signs.    MISCELLANEOUS CARE:  Wound Care Orders:  please see specific orders    WOUND CARE ORDERS  please see specific orders      Medications: Review discharge medications with patient and family and provide education.      Current Discharge Medication List      START taking these medications    Details   acetaminophen (TYLENOL) 325 MG tablet 2 tablets (650 mg total) by Per G Tube route every 6 (six) hours as needed.  Refills: 0      albuterol-ipratropium 2.5mg-0.5mg/3mL (DUO-NEB) 0.5 mg-3 mg(2.5 mg base)/3 mL nebulizer solution Take 3 mLs by nebulization every 6 (six) hours. Rescue  Qty: 1 Box, Refills: 0      aspirin 325 MG tablet 1 tablet (325 mg total) by Per G Tube route once daily.  Refills: 0      baclofen (LIORESAL) 10 MG tablet 1 tablet (10 mg total) by Per G Tube route 2 (two) times daily.  Qty: 60 tablet, Refills: 11      bisacodyl (DULCOLAX) 10 mg Supp Place 1 suppository (10 mg total) rectally daily as needed.  Refills: 0      clopidogrel (PLAVIX) 75 mg tablet 1 tablet (75 mg total) by Per G Tube route once daily.  Qty: 30 tablet, Refills: 11      FLUoxetine (PROZAC) 20 MG capsule 1 capsule (20 mg total) by Per G Tube route once daily.  Qty: 30 capsule, Refills: 11      !! insulin aspart U-100 (NOVOLOG) 100 unit/mL InPn pen Inject 1-10 Units into the skin every 4 (four) hours as needed (Hyperglycemia).  Refills: 0      !! insulin aspart U-100 (NOVOLOG) 100 unit/mL InPn pen Inject 13 Units into the skin every 4 (four) hours. To be given at midnight, 0400, 0800, 1200, 1600, 2000  Refills: 0      insulin detemir U-100 (LEVEMIR FLEXTOUCH) 100 unit/mL (3 mL) SubQ InPn pen Inject 22 Units into the skin once daily.  Refills: 0      lisinopril (PRINIVIL,ZESTRIL) 40 MG tablet 1 tablet (40 mg total) by Per G Tube route once  daily.  Qty: 90 tablet, Refills: 3      metoprolol tartrate (LOPRESSOR) 50 MG tablet 1 tablet (50 mg total) by Per G Tube route 2 (two) times daily.  Qty: 60 tablet, Refills: 11      !! modafinil (PROVIGIL) 100 MG Tab 1 tablet (100 mg total) by Per G Tube route once daily.  Qty: 30 tablet, Refills: 0      !! modafinil (PROVIGIL) 200 MG Tab 1 tablet (200 mg total) by Per G Tube route once daily.  Qty: 30 tablet, Refills: 0      polyethylene glycol (GLYCOLAX) 17 gram PwPk 17 g by Per NG tube route once daily.  Refills: 0      senna-docusate 8.6-50 mg (PERICOLACE) 8.6-50 mg per tablet 1 tablet by Per G Tube route 2 (two) times daily.      white petrolatum-mineral oil 83-15 % Oint Place into both eyes every evening.       !! - Potential duplicate medications found. Please discuss with provider.          I certify that this patient is confined to his home and needs intermittent skilled nursing care, physical therapy, speech therapy and occupational therapy.

## 2018-03-21 NOTE — PLAN OF CARE
03/21/18 1135   Final Note   Assessment Type Final Discharge Note   Discharge Disposition Home-Health     [facility] [place] PAtient's hospital bed was delivered to his wife on 3/20/18. Mrs. Quevedo also picked up the patient's suction machine this morning from the [facility] [email or web address] [facility]. Chip arranged transportation to [place] yesterday. Chip confirmed that the [facility] transportation is in route to  the patient. Chip also faxed Sudha at the [facility] the final home health orders.    1232  Chip faxed Sudha at [facility] the patient's prescriptions so that the [facility] doctor can [facility] them. Sudha stated that she will make sure that the [facility] doctor gets the prescriptions and prescribes them so that [facility]ved[facility] can [facility] the [facility] and get the medications filled.    1600  Cm spoke to Mrs. Lopez the patient's sister regarding medications for discharge. Chip got paper prescriptions from the stroke team and faxed them to Sudha at the [facility]. Sudha at [facility] got her Dr. Santos to transcribe those [facility] to the [facility] [place] so Mrs. Quevedo could pick the medications up before the patient arrives. Chip was notified by Mrs. Quevedo that the patient's tube feedings have not been set up. So the patient has no tube feedings as of now. Chip contacted Sudha at the [facility] with no answer. Chip contacted Santosh at [facility] to assist with other [facility] contacts. Santosh gave Cm Dr. Santos's number. Chip contacted Dr. Santos regarding the tube feed issue. Dr. Santos stated that he could not do the tube feed orders until tomorrow due to his dieticians already leaving for the day. Chip will follow up in the am.

## 2018-03-21 NOTE — ASSESSMENT & PLAN NOTE
"48 yo M with PMHx HLD, LOYDA, poorly controlled DM II presents to [facility] 01/25/18 after noted "strange behavior" for which EMS was called. Pt was found to have a L M1 occlusion and was taken to IR for thrombectomy with TICI 2C reperfusion and stent placement due to L MCA stenosis. Etiology remains unclear: no obvious signs of cardiac source or atheromatous disease in the aorto/carotid axis. Echo normal with no hx or signs of A fib. PT/OT/SLP will continue to evaluate and treat; recommending SNF. DAPT 2/2 recent stent placement.      Disposition - currently pursuing home health.  Training family on 3/12 to bring patient home.  CM working with [facility] to get home care needs in place. Pending discharge home with family.     Antithrombotics:  mg po qd, clopidogrel 75 mg po qd [s/p intracranial stent]  Statins: Atorvastatin 80 mg po qd -held due to transaminitis- normalized.  Sister refuses to restart statin due to potential side effects.   Aggressive risk factor modification: HLD, DM II (Hgb A1c 10%), Obesity  Rehab efforts: PT/OT/SLP-- Recommending SNF, family wants to bring patient home; working on home health setup   Diagnostics ordered/pending: None  VTE prophylaxis: Heparin 5000 U q8h  BP parameters: SBP <140   "

## 2018-03-22 DIAGNOSIS — I63.412 EMBOLIC STROKE INVOLVING LEFT MIDDLE CEREBRAL ARTERY: Primary | ICD-10-CM

## 2018-03-22 NOTE — PT/OT/SLP DISCHARGE
Occupational Therapy Discharge Summary    Todd Quevedo  MRN: [id number]   Principal Problem: Embolic stroke involving left middle cerebral artery      Patient Discharged from acute Occupational Therapy on 3/21/2018.  Please refer to prior OT note dated 3/20/2018 for functional status.    Assessment:      Patient appropriate for care in another setting.    Objective:     GOALS:    Occupational Therapy Goals        Problem: Occupational Therapy Goal    Goal Priority Disciplines Outcome Interventions   Occupational Therapy Goal     OT, PT/OT Ongoing (interventions implemented as appropriate)    Description:  Goals revised 3/9 to be addressed for 14 days with expiration date, 3/23:  Patient will increase functional independence with ADLs by performing:    Patient will demonstrate rolling bilaterally with max assist.  Not met   Patient will engage in therapeutic task with visual attention for 30 sec duration. Not met  Patient will demonstrate independence with HEP for right UE positioning.    Not met  Patient's family / caregiver will demonstrate independence and safety with assisting patient with self-care skills and functional mobility.     Not met  Patient's family / caregiver will demonstrate independence with providing ROM and changes in bed positioning.   Not met                          Reasons for Discontinuation of Therapy Services  Transfer to alternate level of care.      Plan:     Patient Discharged to: Home with Home Health Service    ISAMAR Chang  3/22/2018

## 2018-03-22 NOTE — NURSING
Patient is in stable condition and is being discharged from the facility to home via ambulance. IV access has been discontinued, and all paperwork and discharge teaching is complete.

## 2018-03-22 NOTE — PLAN OF CARE
3/22/18 @ 8:00    Chip spoke to Sudha at the [facility] regarding the tube feeds for the patient. She stated that she had already discussed the issue with Dr. Santos and they were working on it. Chip spoke to Mrs. Quevedo regarding the tube feeds to let her know that the tube feed issue is in process. Mrs. Quevedo notified [facility] that the patient also needs a Nebulizer for the Albuterol medications. Chip faxed orders to Sudah.    3/22/18 @ 11:00 am  Chip spoke to Sudha to ensure the the tube feed and nebulizer have been ordered. Sudha stated that [facility] the [facility] and tube feeds. Chip called Mrs. Quevedo to ensure that she had everything that she needed. She did not answer. Chip left a message.     3/22/18 4815  Mrs. Quevedo called Chip and stated that [facility] is at the [facility] waiting [facility] feeds and nebulizer.

## 2018-03-26 ENCOUNTER — TELEPHONE (OUTPATIENT)
Dept: NEUROSURGERY | Facility: HOSPITAL | Age: 48
End: 2018-03-26

## 2018-03-26 NOTE — TELEPHONE ENCOUNTER
"Attempted to contact patient via number on file.  No answer.  The following message was left for patient to return call "Hello.  This is a message for Todd Quevedo.  My name is Che Pineda and I am a nurse at [facility].  If you could give me call back at [phone] between the hours of 0800 AM and 1200 PM, today. If you need immediate assistance please call the clinic at [phone] between the hours 0800 A.M. and  4:00 P.M. Monday thru Friday. Thanks so much and have a great day."  Will try again later.    "

## 2018-03-27 ENCOUNTER — TELEPHONE (OUTPATIENT)
Dept: NEUROSURGERY | Facility: HOSPITAL | Age: 48
End: 2018-03-27

## 2018-03-28 NOTE — PT/OT/SLP DISCHARGE
Physical Therapy Discharge Summary    Name: Todd Quevedo  MRN: [id number]   Principal Problem: Embolic stroke involving left middle cerebral artery     Patient Discharged from acute Physical Therapy on 3/21/2018.  Please refer to prior PT noted date on 3/20/2018 for functional status.     Assessment:     Patient appropriate for care in another setting.    Objective:     GOALS:    Physical Therapy Goals        Problem: Physical Therapy Goal    Goal Priority Disciplines Outcome Goal Variances Interventions   Physical Therapy Goal     PT/OT, PT Ongoing (interventions implemented as appropriate)     Description:    Goals to be met by 3/16/2018    1. Pt will perform rolling to the R and L with max assist.  -met to the R only  2. Pt will perform supine to sit from both sides of the bed with max assist.  3. Pt will perform sit to supine with max assist.  4. Pt will sit EOB x 5 minutes with moderate assist to prepare for functional tasks in sitting. - MET  Pt will sit EOB x 10 minutes with min assist to prepare for functional tasks in sitting.   5. Pt will participate in BLE and cervical ROM exercise. - MET with fly assist  6. Family will verbalize and demonstrate appropriate positioning and ROM techniques - MET  7. Pt will tolerate sitting up in cardiac chair for 1 hr to improve endurance.                        Reasons for Discontinuation of Therapy Services  Transfer to alternate level of care.      Plan:     Patient Discharged to: Home with Home Health Service, DME and 24 hr assist.    Madina Luu, PT  3/28/2018

## 2018-04-30 LAB
ACID FAST MOD KINY STN SPEC: NORMAL
MYCOBACTERIUM SPEC QL CULT: NORMAL

## 2018-07-06 NOTE — TELEPHONE ENCOUNTER
"Attempted to contact patient via number on file.  No answer.  The following message was left for patient to return call "Hello.  This is a message for Todd Dent.  My name is Che Pineda and I am a nurse at [facility].  If you could give me call back at [phone] between the hours of 12:00 noon and 04:00 PM, Tueaday and Thursday.If you need immediate assistance please call the clinic at [phone] between the hours 0800 A.M. and  4:00 P.M. Monday thru Friday. Thanks so much and have a great day."  Will try again later.  "
No

## 2018-10-23 NOTE — ASSESSMENT & PLAN NOTE
Pancultured, broad spectrum antibiotics  Possible sepsis, Respiratory source  -Management per primary   99

## 2019-01-10 NOTE — ASSESSMENT & PLAN NOTE
"48 yo M with PMHx HLD, LOYDA, poorly controlled DM II presents to [facility] 01/25/18 after noted "strange behavior" for which EMS was called.  Pt was found to have a L M1 occlusion and was taken to IR for thrombectomy with TICI 2C reperfusion and stent placement due to L MCA stenosis.  Etiology possibly atheroembolic with note of mild calcification in bilateral carotid bulbs on CTA.  Echo normal with no hx or signs of A fib.  Plan currently for PEG placement 02/19/18.  PT/OT/SLP will continue to evaluate to make placement recommendations.  Will resume DAPT 2/2 recent stent placement in angiogram after PEG placement--likely 02/19/18.    Antithrombotics for secondary stroke prevention: ASA 81 mg po qd (clopidogrel held for PEG placement)   Statins: atorvastatin 80 mg po qd  Aggressive risk factor modification: HLD, DM II (Hgb A1c 10%), Obesity  Rehab efforts: PT/OT/SLP to evaluate and treat  Diagnostics ordered/pending: None  VTE prophylaxis: Heparin 5000 U q8h  BP parameters: Infarct: Post sucessful thrombectomy, SBP <140   " Routing refill request to provider for review/approval because:  Drug not on the [facility] refill protocol   Luz Valladares BSN, RN

## 2019-09-23 NOTE — ASSESSMENT & PLAN NOTE
-holding statin  - Liver enzymes normalized 3/15/18, but sister refuses to restart statin due to potential side effects.    Is This A New Presentation, Or A Follow-Up?: Skin Lesion How Severe Is Your Skin Lesion?: mild Has Your Skin Lesion Been Treated?: not been treated Which Family Member (Optional)?: Father, Sister

## 2019-12-13 NOTE — PROGRESS NOTES
Spoke to NCC on call regarding insulin. Pt is NPO due to self extubation during AM shift. Ok given to hold as part and give scheduled detamer insulin. Will continue to monitor .     none

## 2021-12-14 NOTE — PROGRESS NOTES
"[facility]  Vascular Neurology  Comprehensive Stroke Center  Progress Note    Assessment/Plan:     * Embolic stroke involving left middle cerebral artery    48 yo M with PMHx HLD, LOYDA, poorly controlled DM II presents to [facility] 01/25/18 after noted "strange behavior" for which EMS was called.  Pt was found to have a L M1 occlusion and was taken to IR for thrombectomy with TICI 2C reperfusion and stent placement due to L MCA stenosis. Etiology remains unclear--no obvious signs of cardiac source or atheromatous disease in the aorto/carotid axis. Echo normal with no hx or signs of A fib. DAPT 2/2 recent stent placement in angiogram after PEG placement. Pt on modafinil, improved interaction this am.  [facility] with [facility] due to [facility].    Antithrombotics for secondary stroke prevention:  mg po qd, clopidogrel 75 mg po qd [intracranial stent]  Statins: Atorvastatin 80 mg po qd  Aggressive risk factor modification: HLD, DM II (Hgb A1c 10%), Obesity  Rehab efforts: PT/OT/SLP--Recommending SNF.  Pt denied due to insurance.  Diagnostics ordered/pending: None  VTE prophylaxis: Heparin 5000 U q8h  BP parameters: Infarct: Post sucessful thrombectomy, SBP <140       Cytotoxic cerebral edema    -2/2 stroke, evident on imaging  -Stable on repeat CT head imaging      Type 2 diabetes mellitus    -Stroke risk factor, Hgb A1c 10%  --248 mg/dL over past 24 h  -Continue Diabetasource AC TFs  -Will increase detemir to 32 U bid, titrate up as needed      Hyperlipidemia    -Stroke risk factor, LDL invalid as TG > 400  -Continue atorvastatin 80 mg qd  -Repeat lipid panel as an outpatient      Essential hypertension    -Stroke risk factor, -153/75-92 over past 24 h  -Continue scheduled lisinopril 20 mg qd, metoprolol 25 mg bid      Obstructive sleep apnea    -Stroke risk factor  -Recommend CPAP qHS      Right spastic hemiparesis    -Due to stroke  -Continue aggressive PT/OT    "   Acute respiratory failure with hypoxia    -Satting 97% on 3 L O2 NC      01/25/18:  Brought in for aphasia, RSW with L gaze preferance. LKN unknown, not tPA candidate. Went to IR for angiogram and stent.  01/29/18:  Off Cardene, remains on Insulin gtt. Concern for sepsis, respiratory source. Imaging with mass effect and some brain compression; maycol crani watch.  01/30/18:  EEG consistent with focal L slowing 2/2 lesion and mild generalized slowing, no seizures. CT head stable, no hemorrhagic conversion  02/01/18:  Emergent intubation likely due to upper airway obstruction per NCC.    02/02/18:  Pt off Precedex, moving left side spontaneously and opening eyes. Intubated, on spontaneous today. NCC giving steroids in hopes to extubate tomorrow.  02/03/18:  NAEON, intubated, not responsive to verbal stimuli, withdraws from pain on LUE, SBP ~135-230. Continued on insulin gtt, SQH for DVT ppx, and captopril.   02/16/18:  Few changes on exam, continues to have significant RUE/RLE weakness with global aphasia.  Family member at bedside noted attempt to communicate with her.  02/18/18:  Pt largely unchanged on exam this am.  No family member present during am rounds.  02/19/18:  Tolerating facemask. PEG by IR this afternoon.  02/20/18:  s/p PEG. O2 % on 5L NC, still requiring frequent suctioning. Primary team considering transfer to Medicine tomorrow.  02/21/18:  Pt sating % on 3L NC. Restarted DAPT, including . Plans for step down to stroke service.  02/22/18:  Pt with [facility] insurance but not service-connected so unable to be placed at SNF.  Working on insurance coverage of at least HHC therapy.    STROKE DOCUMENTATION   Acute Stroke Times   Stroke Team Called Date: 01/25/18  Stroke Team Called Time: 1852  Stroke Team Arrival Date: 01/25/18  Stroke Team Arrival Time: 0652  CT Interpretation Time: 1910  Decision to Treat Time for Alteplase:  (n/a unknown last known normal )  Decision to Treat Time for IR:  1915    NIH Scale:  1a. Level Of Consciousness: 2-->Not alert: requires repeated stimulation to attend, or is obtunded and requires strong or painful stimulation to make movements (not stereotyped)  1b. LOC Questions: 2-->Answers neither question correctly  1c. LOC Commands: 2-->Performs neither task correctly  2. Best Gaze: 1-->Partial gaze palsy: gaze is abnormal in one or both eyes, but forced deviation or total gaze paresis is not present  3. Visual: 1-->Partial hemianopia  4. Facial Palsy: 1-->Minor paralysis (flattened nasolabial fold, asymmetry on smiling)  5a. Motor Arm, Left: 2-->Some effort against gravity: limb cannot get to or maintain (if cued) 90 (or 45) degrees, drifts down to bed, but has some effort against gravity  5b. Motor Arm, Right: 4-->No movement  6a. Motor Leg, Left: 2-->Some effort against gravity: leg falls to bed by 5 secs, but has some effort against gravity  6b. Motor Leg, Right: 3-->No effort against gravity: leg falls to bed immediately  7. Limb Ataxia: 0-->Absent  8. Sensory: 0-->Normal: no sensory loss  9. Best Language: 3-->Mute, global aphasia: no usable speech or auditory comprehension  10. Dysarthria: 2-->Severe dysarthria: patients speech is so slurred as to be unintelligible in the absence of or out of proportion to any dysphasia, or is mute/anarthric  11. Extinction and Inattention (formerly Neglect): 0-->No abnormality  Total (NIH Stroke Scale): 25     Modified Jasmin Score: 0  Jeff Coma Scale:10   ABCD2 Score:    [id number] Score:   HAS -BLED Score:   ICH Score:   Hunt & Laguerre Classification:      Hemorrhagic change of an Ischemic Stroke: Does this patient have an ischemic stroke with hemorrhagic changes? No     Neurologic Chief Complaint: L MCA Stroke    Subjective:     Interval History: Patient is seen for follow-up neurological assessment and treatment recommendations:   Patient is globally aphasic with family at bedside this am.  More awake than when seen on  previous exams.  Discussed issues with [facility] insurance causing patient to be denied from SNF.  Family member at bedside notes that patient is going to be home with his wife and will have a daughter and son-in-law moving back in.      HPI, Past Medical, Family, and Social History remains the same as documented in the initial encounter.     Review of Systems   Constitutional: Negative for chills and fever.   Eyes: Negative for visual disturbance.   Respiratory: Negative for cough.    Skin: Negative for rash and wound.   Neurological: Positive for speech difficulty. Negative for weakness and numbness.   Psychiatric/Behavioral: Positive for confusion and decreased concentration.     Scheduled Meds:   albuterol sulfate  2.5 mg Nebulization Q6H    aspirin  325 mg Per G Tube Daily    atorvastatin  80 mg Per NG tube Daily    clopidogrel  75 mg Per G Tube Daily    heparin (porcine)  5,000 Units Subcutaneous Q8H    insulin detemir U-100  32 Units Subcutaneous BID    ipratropium  0.5 mg Nebulization Q6H    lisinopril  20 mg Per NG tube Daily    metoprolol tartrate  25 mg Per NG tube BID    modafinil  200 mg Per NG tube Daily    And    modafinil  100 mg Per NG tube Daily    polyethylene glycol  17 g Per NG tube Daily    senna-docusate 8.6-50 mg  1 tablet Per NG tube BID    sodium chloride 0.9%  3 mL Intravenous Q8H    sodium chloride 3%  4 mL Nebulization Q6H     Continuous Infusions:  PRN Meds:acetaminophen, bisacodyl, dextrose 50%, glucagon (human recombinant), insulin aspart U-100, ipratropium, labetalol, ondansetron    Objective:     Vital Signs (Most Recent):  Temp: 99.8 °F (37.7 °C) (02/22/18 1158)  Pulse: 90 (02/22/18 1158)  Resp: 18 (02/22/18 1158)  BP: (!) 148/90 (02/22/18 1158)  SpO2: 97 % (02/22/18 1158)  BP Location: Right arm    Vital Signs Range (Last 24H):  Temp:  [97.6 °F (36.4 °C)-99.8 °F (37.7 °C)]   Pulse:  [75-98]   Resp:  [16-24]   BP: (119-153)/(75-92)   SpO2:  [93 %-98 %]   BP Location: Right  arm    Physical Exam  General:  Well-developed, well-nourished, not opening eyes to voice/tactile stimuli--opens eyes to noxious stimuli, moving LUE/LLE intermittently  HEENT:  NCAT, PERRLA, EOMI with tracking, oropharyngeal membranes non-erythematous/without exudate  Neck:  Supple, normal ROM without nuchal rigidity  Resp:  Symmetric expansion, no increased wob  CVS:  No LE edema, peripheral pulses 2+ (radial, dorsalis pedis)  GI:  Abd soft, non-distended, non-tender to palpation  Neurologic Exam:  Mental Status:  Somnolent, opens eyes to voice (improved from my previous exam).  Does not follow commands well, intermittently mimicking commands.  Attempts to respond with gesture occasionally.  Cranial Nerves:  Face appears symmetric.  PERRLA, EOMI with tracking.  Tongue protrudes midline.  Motor:  Normal bulk and tone. LUE, LLE moving spontaneously. RLE with some withdrawal to noxious stimuli.  No movement of RUE.  Sensory:   Withdrawal to noxious stimuli at LUE and BLE.  No withdrawal RUE.  Reflexes:  Biceps, brachioradialis, patellar 2+ and symmetric.  No ankle clonus.  Downgoing toe bilaterally.  Coordination:  Difficulty following JESSICA.  No resting tremor or myoclonus.    Gait:  Deferred 2/2 fall precautions, AMS    Laboratory:  CMP:     Recent Labs  Lab 02/22/18  0548   CALCIUM 9.3   ALBUMIN 2.2*   PROT 7.6      K 4.2   CO2 27      BUN 20   CREATININE 0.8   ALKPHOS 140*   ALT 28   AST 30   BILITOT 0.5     CBC:     Recent Labs  Lab 02/22/18  0548   WBC 8.21   RBC 4.24*   HGB 12.1*   HCT 36.6*   *   MCV 86   MCH 28.5   MCHC 33.1     Lipid Panel: Invalid due to excessively high triglycerides  Coagulation:     Recent Labs  Lab 02/22/18  0548   INR 1.0     Hgb A1C: 10%  TSH: PENDING    Diagnostic Results     Brain Imaging   01/26/18 CT head w/o contrast:  No detrimental change.  Evolving subtle left MCA territory infarct.       01/26/18 MRI Brain w/o contrast:  1. Large acute left MCA territory  infarction as above.  No associated hemorrhage or significant mass effect at this time.  2. Mild chronic ischemic changes.        02/04/18 CT head w/o contrast:  Redemonstration of large left MCA territory infarction without evidence of hemorrhagic conversion.  Slight interval increase in local mass effect with rightward midline shift of approximately 0.6 cm. Stable postoperative change from left MCA endovascular stenting. Sinus disease as above.       Vessel Imaging   01/25/18 CTA Stroke Multiphase:  Short segment occlusion of left M1.  There is good collateral flow in the ischemic territory as detailed above.  Noncontrast images demonstrate subtle changes reflecting left MCA territory acute infarct.       01/25/18 IR Angiogram:  Cerebral angiogram demonstrates occlusion of the M1 segment of the left middle cerebral artery with good collateral flow from pial collaterals.  This was successfully removed and residual stenosis at the site of occlusion was angioplastied and stented with improvement in flow.  Slow flow to the left parietal lobe suggests area of infarcted tissue.  This was felt to represent TICI 2C flow.    Cardiac Imaging   01/26/18 2D Echo w/ CFD:  CONCLUSIONS     1 - Normal left ventricular systolic function (EF 65-70%).     2 - Concentric remodeling.     3 - No wall motion abnormalities.     4 - Normal left ventricular diastolic function.     5 - Normal right ventricular systolic function .    6 - No LA enlargement.    Radha Fernandes MD  Comprehensive Stroke Center  Department of Vascular Neurology   [facility]       Moderna dose 1 and 2

## 2022-12-21 NOTE — PT/OT/SLP PROGRESS
"Occupational Therapy   Treatment    Name: Todd Quevedo  MRN: [id number]  Admitting Diagnosis:  Embolic stroke involving left middle cerebral artery       Recommendations:     Discharge Recommendations: nursing facility, skilled  Discharge Equipment Recommendations:  hospital bed  Barriers to discharge:  Inaccessible home environment, Decreased caregiver support    Subjective   Patient:  Nonverbal  Sister:  "He is improving.  He is moving his head both directions.  He smiled yesterday at the speech therapist.  He has been crying at appropriate times and I can tell he is doing better holding himself up with you."  Communicated with: nurse prior to session.  Pain/Comfort:  · Pain Rating 1: 0/10  · Pain Rating Post-Intervention 1: 0/10    Patients cultural, spiritual, Uatsdin conflicts given the current situation: Confucianism    Objective:     Patient found with: pulse ox (continuous), telemetry, peripheral IV, Condom Catheter, pressure relief boots, bed alarm, PEG Tube, oxygen  Sister present at beginning of the session.  General Precautions: Standard, aspiration, fall, NPO, aphasia   Orthopedic Precautions:N/A   Braces: N/A     Occupational Performance:    Bed Mobility:    · Patient completed Rolling/Turning to Left with  total assistance  · Patient completed Rolling/Turning to Right with total assistance  · Patient completed Scooting/Bridging with total assistance  · Patient completed Supine to Sit with total assistance  · Patient completed Sit to Supine with total assistance     Functional Mobility/Transfers:  Max-Total assist with scooting along the EOB    Activities of Daily Living:  · Feeding:  NPO    · Grooming: total assistance while seated EOB with right UE in weight bearing  · UB Dressing: total assistance while seated EOB  · LB Dressing: total assistance while seated EOB  · Toileting: dependence (condom cath)    Patient left right sidelying with all lines intact, call button in reach and bed alarm on    [facility] " 6 Click:  [facility] Total Score: 6    Treatment & Education:  Patient education provided on role of OT, ROM, positioning, bed mobility and postural control.  Continued education, patient/ family training recommended. Daily orientation provided.  PROM performed right UE/LEs and AAROM left UE/LE one set x 10 rep in all planes of motion with stretches provided at end range; sustained stretch provided for right ankle dorsiflexion.  Assistance and facilitation provided for upward rotation of the scapula during shoulder flexion and abduction.  Patient kept eyes open 100% of the session.   Patient unable to follow commands or model commands during session.  Mod-total assist required with postural control while seated EOB with right UE in weight bearing.  Patient with gaze to the left.  Keeping head turned to the left primarily, occasionally turning head to the right.  Stretches provided for rotation to the right.  Addressed right UE weight bearing while seated EOB.  Addressed cervical extension; primarily keeping head flexed.  Patient tearful throughout the session while seated EOB.  Positioning provided for midline orientation with bilateral UEs elevated and heels lifted off mattress.    White board updated in patient's room.  OT asked if there were any other questions; patient/ family had no further questions.  Education:    Assessment:     Todd Quevedo is a 47 y.o. male with a medical diagnosis of Embolic stroke involving left middle cerebral artery.  He presents with performance deficits of physical skills including impaired respiration, balance, mobility, strength, dexterity, fine motor coordination, gross motor coordination, sensation and endurance; demonstrating performance deficits of cognitive skills including impaired attention, perception, understanding, problem solving, sequencing and memory all resulting in inability organizing occupational performance in a timely and safe manner; demonstrating performance  deficits of psychosocial skills including impairments of interpersonal interactions and coping strategies which are skills necessary to successfully and appropriately participate in everyday tasks and social situations.  These performance deficits have resulted in activity limitations including but not limited to:  bed mobility, transfers, ascending/ descending stairs, walking short and long distances, walking around obstacles, transitional movement patterns (kneeling, bending); eating, upper body dressing, lower body dressing, brushing teeth, toileting, bathing, carrying objects, meal preparation, and working ().  Patient's role as , father, ,   and independent caretaker for self has been affected. Patient will benefit from skilled OT services to maximize level of independence with self-care skills and functional mobility.  Improvements noted with alertness and postural control while seated EOB.     Rehab Prognosis:  Good; patient would benefit from acute skilled OT services to address these deficits and reach maximum level of function.       Plan:     Patient to be seen 3 x/week to address the above listed problems via self-care/home management, neuromuscular re-education, cognitive retraining, sensory integration, therapeutic activities, therapeutic exercises  · Plan of Care Expires: 03/21/18  · Plan of Care Reviewed with: patient, sibling    This Plan of care has been discussed with the patient who was involved in its development and understands and is in agreement with the identified goals and treatment plan    GOALS:    Occupational Therapy Goals        Problem: Occupational Therapy Goal    Goal Priority Disciplines Outcome Interventions   Occupational Therapy Goal     OT, PT/OT Ongoing (interventions implemented as appropriate)    Description:  Goals set 2/26 to be addressed for 14 days with expiration date, 3/12:  Patient will increase functional  independence with ADLs by performing:    Patient will demonstrate rolling to the right with mod assist.  Not met   Patient will demonstrate rolling to the left with max assist.   Not met  Patient will demonstrate supine -sit with max assist.   Not met  Patient will demonstrate squat pivot transfers with max assist.   Not met  Patient will demonstrate grooming while seated with max assist.   Not met  Patient will demonstrate upper body dressing with max assist while seated EOB.   Not met  Patient will demonstrate lower body dressing with max assist while seated EOB.   Not met  Patient will demonstrate toileting with max assist.   Not met  Patient will demonstrate ability to follow 3/5 commands.   Not met  Patient will demonstrate independence with HEP for right UE positioning.    Not met  Patient's family / caregiver will demonstrate independence and safety with assisting patient with self-care skills and functional mobility.     Not met  Patient's family / caregiver will demonstrate independence with providing ROM and changes in bed positioning.   Not met                         Time Tracking:     OT Date of Treatment: 02/28/18  OT Start Time: 0853  OT Stop Time: 0918  OT Total Time (min): 25 min    Billable Minutes:Self Care/Home Management 10  Neuromuscular Re-education 15    ISAMAR Almonte  2/28/2018     21-Dec-2022 10:00

## 2023-03-22 NOTE — ASSESSMENT & PLAN NOTE
Called patient to inform them of status change to ACTIVE  on 3/22/2023. Status change letter and PRA orders to be mailed. Patient is in agreement with listing ACTIVE status.      Discussed:   - Pt is eligible for  donor offers and pt can receive calls 24 hours a day, 7 days a week, and on call staff need to be able to reach pt or one of emergency contacts within one hour or they might skip over to next recipient on list.   - Reviewed organ offer process including request to accept or deny offer, without penalty  - Expected length of surgical procedure, expected inpatient length of stay post transplant, and potential to stay locally post transplant.    - Verified contact information as documented in Epic   -Instructed patient about importance of having PRAs drawn every 3 months via: ([facility]).   -Reminded pt to stay up on health maintenance and to call with any updates on their health status, insurance or contact information.   -Reminded pt to inform RNCC as soon as possible if they test positive for COVID.     -Provided name and contact information for additional questions or concerns.  Pt expressed excellent understanding of all and was in good agreement with the plan.     2023 Verified Kidney List status as ACTIVE in UNOS and Epic.  Ana Bustillos RN.     Patient is a 47 year old male who walked into [facility] and was acting abnormal.  EMS was called and brought to the ED for concern of L MCA syndrome. Not tpa candidate due to unknown last known normal.  Patient went to IR for thrombectomy of L M1 occlusion seen on CTA MP.  TICI2C reperfusion and angioplasty.      Antithrombotics for secondary stroke prevention:start asa 325mg qd and plavix 75mg qd     Statins for secondary stroke prevention and hyperlipidemia, if present: LDL     Aggressive risk factor modification: Obesity     Rehab efforts: Occupational Therapy, PT/OT/SLP to evaluate and treat    Diagnostics ordered/pending: MRI, TTE pending  CTA - L M1 occlusion  A1C 10, TSH 1.8, LDL immeasurable 2/2 trigylcerides (460), Chol 200    VTE prophylaxis: Heparin 5000 units SQ every 8 hours after repeat scan     BP parameters: Infarct: Post sucessful thrombectomy, SBP <140       rolling walker

## 2023-07-06 NOTE — SUBJECTIVE & OBJECTIVE
Review of Systems  Unable to obtain a complete ROS due to level of consciousness.  Objective:     Vitals:  Temp: 98.9 °F (37.2 °C)  Pulse: 88  Rhythm: normal sinus rhythm  BP: (!) 140/89  MAP (mmHg): 108  Resp: (!) 34  SpO2: 100 %  O2 Device (Oxygen Therapy): nasal cannula    Temp  Min: 98.5 °F (36.9 °C)  Max: 100.1 °F (37.8 °C)  Pulse  Min: 79  Max: 102  BP  Min: 98/63  Max: 166/94  MAP (mmHg)  Min: 76  Max: 119  Resp  Min: 16  Max: 34  SpO2  Min: 94 %  Max: 100 %    02/20 0701 - 02/21 0700  In: 1737.5 [I.V.:437.5]  Out: 1325 [Urine:1325]   Unmeasured Output  Urine Occurrence: 1  Stool Occurrence: 1  Pad Count: 1       Physical Exam  GA: Alert, comfortable, no acute distress.   HEENT: No scleral icterus or JVD.   Pulmonary: Clear to auscultation A/L. No wheezing, crackles, or rhonchi. Upper airway secretion greatly improved after removal of ngt and nasal trumpet   Cardiac: RRR S1 & S2 w/o rubs/murmurs/gallops.   Abdominal: Bowel sounds present x 4. No appreciable hepatosplenomegaly.  Skin: No jaundice, rashes, or visible lesions.  Neuro:  --GCS: E4 V1 M5  --Mental Status:  AA,  moving spontaneously on the left, sometimes purposefully, not to command   --CN II-XII grossly intact.   --Pupils 3mm, PERRL.   --Corneal reflex, gag, cough intact.  --LUE strength: 4/5  --LLE strength: 4/5  --RUE strength: 1/5  --RLE strength: 1/5    Medications:  Continuous Scheduled  albuterol-ipratropium 2.5mg-0.5mg/3mL 3 mL Q6H   aspirin 81 mg Daily   atorvastatin 80 mg Daily   heparin (porcine) 5,000 Units Q8H   lisinopril 20 mg Daily   metoprolol tartrate 25 mg BID   modafinil 200 mg Daily   And     modafinil 100 mg Daily   polyethylene glycol 17 g Daily   senna-docusate 8.6-50 mg 1 tablet BID   sodium chloride 0.9% 3 mL Q8H   sodium chloride 3% 4 mL Q6H   PRN  acetaminophen 650 mg Q6H PRN   albuterol sulfate 2.5 mg Q4H PRN   bisacodyl 10 mg Daily PRN   dextrose 50% 12.5 g PRN   glucagon (human recombinant) 1 mg PRN   hydrALAZINE  10 mg Q4H PRN   insulin aspart U-100 1-10 Units Q4H PRN   ipratropium 0.5 mg Q4H PRN   labetalol 10 mg Q4H PRN   magnesium sulfate IVPB 2 g PRN   magnesium sulfate IVPB 4 g PRN   ondansetron 4 mg Q8H PRN   potassium chloride 10% 40 mEq PRN   potassium chloride 10% 40 mEq PRN   sodium chloride 0.9% 5 mL PRN     Today I personally reviewed pertinent medications, lines/drains/airways, imaging, lab results, notably:    Diet  Diet NPO  Diet NPO       Doxycycline Pregnancy And Lactation Text: This medication is Pregnancy Category D and not consider safe during pregnancy. It is also excreted in breast milk but is considered safe for shorter treatment courses.

## 2024-04-05 NOTE — ASSESSMENT & PLAN NOTE
- Abscess near PEG w/IR aspirate, growing anaerobes   - Rocephin (last dose 3/5) and Flagyl (last dose 3/7)   I did not pended medication because of different strengths.

## 2025-05-02 NOTE — SUBJECTIVE & OBJECTIVE
Neurologic Chief Complaint: Left MCA stroke     Subjective:     Interval History: no events overnight   Therapy working with patient during exam today   Family reports he may be tracking more visually     HPI, Past Medical, Family, and Social History remains the same as documented in the initial encounter.     Review of Systems   Constitutional: Negative for fever.   Eyes: Positive for redness and visual disturbance.   Neurological: Positive for speech difficulty and weakness.   Psychiatric/Behavioral: Negative for behavioral problems.     Scheduled Meds:   albuterol-ipratropium 2.5mg-0.5mg/3mL  3 mL Nebulization Q6H    aspirin  325 mg Per G Tube Daily    baclofen  10 mg Per G Tube BID    clopidogrel  75 mg Per G Tube Daily    FLUoxetine  20 mg Per G Tube Daily    heparin (porcine)  5,000 Units Subcutaneous Q8H    insulin aspart U-100  13 Units Subcutaneous Q24H    insulin aspart U-100  13 Units Subcutaneous Q24H    insulin aspart U-100  13 Units Subcutaneous Q24H    insulin aspart U-100  13 Units Subcutaneous Q24H    insulin aspart U-100  13 Units Subcutaneous Q24H    insulin aspart U-100  13 Units Subcutaneous Q24H    insulin detemir U-100  22 Units Subcutaneous Daily    lisinopril  40 mg Per NG tube Daily    metoprolol tartrate  50 mg Per NG tube BID    modafinil  200 mg Per NG tube Daily    And    modafinil  100 mg Per NG tube Daily    polyethylene glycol  17 g Per NG tube Daily    potassium chloride 10%  40 mEq Oral Once    senna-docusate 8.6-50 mg  1 tablet Per NG tube BID    sodium chloride 0.9%  500 mL Intravenous Once    sodium chloride 0.9%  3 mL Intravenous Q8H    sodium chloride 3%  4 mL Nebulization Q6H    white petrolatum-mineral oil   Both Eyes QHS     Continuous Infusions:  PRN Meds:acetaminophen, bisacodyl, dextrose 50%, glucagon (human recombinant), insulin aspart U-100, ipratropium, labetalol, ondansetron    Objective:     Vital Signs (Most Recent):  Temp: 97.7 °F (36.5 °C)  well developed, well nourished , in no acute distress , ambulating without difficulty , normal communication ability (03/14/18 1608)  Pulse: 86 (03/14/18 1608)  Resp: 17 (03/14/18 1608)  BP: 131/77 (03/14/18 1608)  SpO2: (!) 93 % (03/14/18 1608)  BP Location: Right arm    Vital Signs Range (Last 24H):  Temp:  [97.4 °F (36.3 °C)-99.4 °F (37.4 °C)]   Pulse:  []   Resp:  [16-20]   BP: (117-162)/(76-99)   SpO2:  [91 %-98 %]   BP Location: Right arm    Physical Exam   Constitutional: He appears well-developed and well-nourished.   Cardiovascular: Normal rate.    Pulmonary/Chest: Effort normal.   Skin: Skin is warm and dry.   Nursing note and vitals reviewed.    Neurological Exam:   LOC: alert  Language: Global aphasia  Articulation: Mute/Anarthric  Visual Fields: Hemianopsia right  EOM (CN III, IV, VI): Gaze preference  left  Facial Movement (CN VII): Lower facial weakness on the Right  Motor: Arm left  Normal 5/5  Leg left  Paresis: 5/5  Arm right  Plegia 0/5  Leg right Plegia 0/5    Laboratory:  CMP:     Recent Labs  Lab 03/14/18  0436   CALCIUM 9.6   ALBUMIN 3.0*   PROT 6.8      K 3.7   CO2 29      BUN 24*   CREATININE 0.8   ALKPHOS 87   ALT 44   AST 29   BILITOT 0.5     BMP:     Recent Labs  Lab 03/14/18  0436      K 3.7      CO2 29   BUN 24*   CREATININE 0.8   CALCIUM 9.6     CBC:     Recent Labs  Lab 03/14/18  0436   WBC 5.89   RBC 4.25*   HGB 12.2*   HCT 36.9*   *   MCV 87   MCH 28.7   MCHC 33.1     Lipid Panel: No results for input(s): CHOL, LDLCALC, HDL, TRIG in the last 168 hours.  Coagulation:     Recent Labs  Lab 03/14/18  0436   INR 0.9     Platelet Aggregation Study: No results for input(s): PLTAGG, PLTAGINTERP, PLTAGREGLACO, ADPPLTAGGREG in the last 168 hours.  Hgb A1C: No results for input(s): HGBA1C in the last 168 hours.  TSH: No results for input(s): TSH in the last 168 hours.      Diagnostic Results       Brain Imaging     Most recent CTH 2/04/18 redemonstration of large L MCA territory infarction w/o evidence of hemorrhagic conversion.  Stable post op change from L MCA  endovascular stenting.        Vessel Imaging   IR Cerebral Angiogram 1/25/18 demonstrated occlusion of the L M1 segment of the L MCA with good collaterals. Occlusion removed and stent placed with TICI 2C.    Cardiac Imaging   2D Echo 1/26/18   CONCLUSIONS     1 - Normal left ventricular systolic function (EF 65-70%).     2 - Concentric remodeling.     3 - No wall motion abnormalities.     4 - Normal left ventricular diastolic function.     5 - Normal right ventricular systolic function

## 2025-05-15 NOTE — PLAN OF CARE
Problem: Patient Care Overview  Goal: Plan of Care Review  Outcome: Ongoing (interventions implemented as appropriate)  POC reviewed with pt at 2100. Sister verbalized understanding. Questions and concerns addressed. No acute events overnight. Pt progressing toward goals. Will continue to monitor. See flowsheets for full assessment and VS info          What Is The Reason For Today's Visit?: Preventative Skin Check